# Patient Record
Sex: FEMALE | Race: WHITE | HISPANIC OR LATINO | Employment: OTHER | URBAN - METROPOLITAN AREA
[De-identification: names, ages, dates, MRNs, and addresses within clinical notes are randomized per-mention and may not be internally consistent; named-entity substitution may affect disease eponyms.]

---

## 2017-01-09 DIAGNOSIS — K21.00 GASTROESOPHAGEAL REFLUX DISEASE WITH ESOPHAGITIS: ICD-10-CM

## 2017-01-09 RX ORDER — OMEPRAZOLE 20 MG/1
CAPSULE, DELAYED RELEASE ORAL
Qty: 90 CAPSULE | Refills: 0 | Status: SHIPPED | OUTPATIENT
Start: 2017-01-09 | End: 2017-05-07 | Stop reason: SDUPTHER

## 2017-01-10 ENCOUNTER — LAB VISIT (OUTPATIENT)
Dept: LAB | Facility: HOSPITAL | Age: 67
End: 2017-01-10
Attending: INTERNAL MEDICINE
Payer: MEDICARE

## 2017-01-10 DIAGNOSIS — M10.9 INTERVAL GOUT: ICD-10-CM

## 2017-01-10 LAB
ANION GAP SERPL CALC-SCNC: 8 MMOL/L
BUN SERPL-MCNC: 16 MG/DL
CALCIUM SERPL-MCNC: 9.6 MG/DL
CHLORIDE SERPL-SCNC: 107 MMOL/L
CO2 SERPL-SCNC: 25 MMOL/L
CREAT SERPL-MCNC: 0.9 MG/DL
EST. GFR  (AFRICAN AMERICAN): >60 ML/MIN/1.73 M^2
EST. GFR  (NON AFRICAN AMERICAN): >60 ML/MIN/1.73 M^2
GLUCOSE SERPL-MCNC: 135 MG/DL
POTASSIUM SERPL-SCNC: 4.4 MMOL/L
SODIUM SERPL-SCNC: 140 MMOL/L
URATE SERPL-MCNC: 8.9 MG/DL

## 2017-01-10 PROCEDURE — 80048 BASIC METABOLIC PNL TOTAL CA: CPT

## 2017-01-10 PROCEDURE — 84550 ASSAY OF BLOOD/URIC ACID: CPT

## 2017-01-10 PROCEDURE — 36415 COLL VENOUS BLD VENIPUNCTURE: CPT | Mod: PO

## 2017-03-03 ENCOUNTER — OFFICE VISIT (OUTPATIENT)
Dept: INTERNAL MEDICINE | Facility: CLINIC | Age: 67
End: 2017-03-03
Payer: MEDICARE

## 2017-03-03 ENCOUNTER — LAB VISIT (OUTPATIENT)
Dept: LAB | Facility: HOSPITAL | Age: 67
End: 2017-03-03
Attending: INTERNAL MEDICINE
Payer: MEDICARE

## 2017-03-03 ENCOUNTER — TELEPHONE (OUTPATIENT)
Dept: INTERNAL MEDICINE | Facility: CLINIC | Age: 67
End: 2017-03-03

## 2017-03-03 VITALS
HEIGHT: 59 IN | WEIGHT: 170.88 LBS | BODY MASS INDEX: 34.45 KG/M2 | TEMPERATURE: 98 F | DIASTOLIC BLOOD PRESSURE: 81 MMHG | OXYGEN SATURATION: 99 % | SYSTOLIC BLOOD PRESSURE: 139 MMHG | HEART RATE: 66 BPM

## 2017-03-03 DIAGNOSIS — Z78.0 POSTMENOPAUSAL: ICD-10-CM

## 2017-03-03 DIAGNOSIS — R20.2 PARESTHESIA OF LEFT ARM: ICD-10-CM

## 2017-03-03 DIAGNOSIS — Z51.81 ENCOUNTER FOR MONITORING LONG-TERM PROTON PUMP INHIBITOR THERAPY: ICD-10-CM

## 2017-03-03 DIAGNOSIS — Z79.899 ENCOUNTER FOR MONITORING LONG-TERM PROTON PUMP INHIBITOR THERAPY: ICD-10-CM

## 2017-03-03 DIAGNOSIS — E11.9 TYPE 2 DIABETES MELLITUS WITHOUT COMPLICATION, WITHOUT LONG-TERM CURRENT USE OF INSULIN: Primary | ICD-10-CM

## 2017-03-03 DIAGNOSIS — I10 ESSENTIAL HYPERTENSION: ICD-10-CM

## 2017-03-03 DIAGNOSIS — M10.9 INTERVAL GOUT: ICD-10-CM

## 2017-03-03 DIAGNOSIS — E11.9 TYPE 2 DIABETES MELLITUS WITHOUT COMPLICATION, WITHOUT LONG-TERM CURRENT USE OF INSULIN: ICD-10-CM

## 2017-03-03 LAB
25(OH)D3+25(OH)D2 SERPL-MCNC: 40 NG/ML
ALBUMIN SERPL BCP-MCNC: 4 G/DL
ALP SERPL-CCNC: 84 U/L
ALT SERPL W/O P-5'-P-CCNC: 45 U/L
ANION GAP SERPL CALC-SCNC: 8 MMOL/L
AST SERPL-CCNC: 44 U/L
BILIRUB SERPL-MCNC: 0.4 MG/DL
BUN SERPL-MCNC: 18 MG/DL
CALCIUM SERPL-MCNC: 10.6 MG/DL
CHLORIDE SERPL-SCNC: 106 MMOL/L
CO2 SERPL-SCNC: 26 MMOL/L
CREAT SERPL-MCNC: 0.9 MG/DL
EST. GFR  (AFRICAN AMERICAN): >60 ML/MIN/1.73 M^2
EST. GFR  (NON AFRICAN AMERICAN): >60 ML/MIN/1.73 M^2
GLUCOSE SERPL-MCNC: 149 MG/DL
POTASSIUM SERPL-SCNC: 5 MMOL/L
PROT SERPL-MCNC: 8.2 G/DL
SODIUM SERPL-SCNC: 140 MMOL/L
TSH SERPL DL<=0.005 MIU/L-ACNC: 1.28 UIU/ML
VIT B12 SERPL-MCNC: 609 PG/ML

## 2017-03-03 PROCEDURE — 4010F ACE/ARB THERAPY RXD/TAKEN: CPT | Mod: S$GLB,,, | Performed by: INTERNAL MEDICINE

## 2017-03-03 PROCEDURE — 99214 OFFICE O/P EST MOD 30 MIN: CPT | Mod: S$GLB,,, | Performed by: INTERNAL MEDICINE

## 2017-03-03 PROCEDURE — 2022F DILAT RTA XM EVC RTNOPTHY: CPT | Mod: S$GLB,,, | Performed by: INTERNAL MEDICINE

## 2017-03-03 PROCEDURE — 84443 ASSAY THYROID STIM HORMONE: CPT

## 2017-03-03 PROCEDURE — 36415 COLL VENOUS BLD VENIPUNCTURE: CPT

## 2017-03-03 PROCEDURE — 3044F HG A1C LEVEL LT 7.0%: CPT | Mod: S$GLB,,, | Performed by: INTERNAL MEDICINE

## 2017-03-03 PROCEDURE — 82306 VITAMIN D 25 HYDROXY: CPT

## 2017-03-03 PROCEDURE — 1160F RVW MEDS BY RX/DR IN RCRD: CPT | Mod: S$GLB,,, | Performed by: INTERNAL MEDICINE

## 2017-03-03 PROCEDURE — 99999 PR PBB SHADOW E&M-EST. PATIENT-LVL IV: CPT | Mod: PBBFAC,,, | Performed by: INTERNAL MEDICINE

## 2017-03-03 PROCEDURE — 3079F DIAST BP 80-89 MM HG: CPT | Mod: S$GLB,,, | Performed by: INTERNAL MEDICINE

## 2017-03-03 PROCEDURE — 1159F MED LIST DOCD IN RCRD: CPT | Mod: S$GLB,,, | Performed by: INTERNAL MEDICINE

## 2017-03-03 PROCEDURE — 3075F SYST BP GE 130 - 139MM HG: CPT | Mod: S$GLB,,, | Performed by: INTERNAL MEDICINE

## 2017-03-03 PROCEDURE — 1126F AMNT PAIN NOTED NONE PRSNT: CPT | Mod: S$GLB,,, | Performed by: INTERNAL MEDICINE

## 2017-03-03 PROCEDURE — 80053 COMPREHEN METABOLIC PANEL: CPT

## 2017-03-03 PROCEDURE — 1157F ADVNC CARE PLAN IN RCRD: CPT | Mod: S$GLB,,, | Performed by: INTERNAL MEDICINE

## 2017-03-03 PROCEDURE — 83036 HEMOGLOBIN GLYCOSYLATED A1C: CPT

## 2017-03-03 PROCEDURE — 82607 VITAMIN B-12: CPT

## 2017-03-03 RX ORDER — METFORMIN HYDROCHLORIDE 500 MG/1
TABLET ORAL
Qty: 270 TABLET | Refills: 3 | Status: SHIPPED | OUTPATIENT
Start: 2017-03-03 | End: 2018-02-25 | Stop reason: SDUPTHER

## 2017-03-03 RX ORDER — LOSARTAN POTASSIUM 50 MG/1
TABLET ORAL
Qty: 90 TABLET | Refills: 3 | Status: SHIPPED | OUTPATIENT
Start: 2017-03-03 | End: 2018-03-14 | Stop reason: SDUPTHER

## 2017-03-03 NOTE — MR AVS SNAPSHOT
[facility]  [address]  [place] [address]  Phone: [phone]  Fax: [phone]                  Tammi Farris   3/3/2017 9:20 AM   Office Visit    Descripción:  Female : 1950   Personal Médico:  Ariel Mullins MD   Departamento:  [facility]           Razón de la celia     Numbness           Diagnósticos de Esta Visita        Comentarios    Type 2 diabetes mellitus without complication, without long-term current use of insulin    -  Primario     Essential hypertension         Interval gout         Postmenopausal         Paresthesia of left arm         Encounter for monitoring long-term proton pump inhibitor therapy                Lista de tareas           Citas próximas        Personal Médico Departamento Tfno del dpto    3/3/2017 10:40 AM LAB, APPOINTMENT [facility] [phone]    2017 10:00 AM Ariel Mullins MD [facility] - Internal Medicine [phone]      Metas (5 Years of Data)     Ninguna      Follow-Up and Disposition     Return in about 4 months (around 7/3/2017), or earlier if symptoms worsen or fail to improve.      Recetas para recoger        Disp Refills Start End    metformin (GLUCOPHAGE) 500 MG tablet 270 tablet 3 3/3/2017     Take 2 tabs (1000 mg)  in am and 500 mg ( 1 tab) in pm with food    [facility] [address] AT [facility] No. de tlfo: #: [phone]       losartan (COZAAR) 50 MG tablet 90 tablet 3 3/3/2017     TAKE 1 TABLET(25 MG) BY MOUTH EVERY DAY - for blood pressure    [facility]: [facility] [address] AT [facility] No. de tlfo: #: [phone]       blood sugar diagnostic (CONTOUR NEXT STRIPS) Strp 100 strip 3 3/3/2017     Use to check blood sugar daily    [facility]: [facility] [address] AT [address] No. de tlfo: #:  [phone]       Notas para la farmacia: **Patient requests 90 days supply**      [facility]     [facility] - Asistencia 24/7  Enfermeras registradas de [facility] pueden ayudarle a reservar abdirahman celia, proveer educación para la nieves, asesoría clínica, y otros servicios de asesoramiento.   Llame para benjamin servicio gratuito a [phone].             Medicamentos           Mensaje sobre Medicamentos     Verificar los cambios y / o adiciones a burger régimen de medicación son los mismos que discutir con burger médico. Si cualquiera de estos cambios o adiciones son incorrectos, por favor notifique a burger proveedor de atención médica.        CAMBIAR la forma en que está tomando estos medicamentos     Start Taking Instead of    losartan (COZAAR) 50 MG tablet losartan (COZAAR) 25 MG tablet    Dosage:  TAKE 1 TABLET(25 MG) BY MOUTH EVERY DAY - for blood pressure Dosage:  TAKE 1 TABLET(25 MG) BY MOUTH EVERY DAY    Reason for Change:  Reorder     blood sugar diagnostic (CONTOUR NEXT STRIPS) Strp CONTOUR NEXT STRIPS Strp    Dosage:  Use to check blood sugar daily Dosage:  USE AS DIRECTED BY DOCTOR    Reason for Change:  Reorder            Verifique que la siguiente lista de medicamentos es abdirahman representación exacta de los medicamentos que está tomando actualmente. Si no hay ningunos reportados, la lista puede estar en alex. Si no es correcta, por favor póngase en contacto con burger proveedor de atención médica. Lleve esta lista con usted en walker de emergencia.           Medicamentos Actuales     bisoprolol-hydrochlorothiazide (ZIAC) 10-6.25 mg per tablet Take 1 tablet by mouth once daily.    blood sugar diagnostic (CONTOUR NEXT STRIPS) Strp Use to check blood sugar daily    cholestyramine-aspartame (CHOLESTYRAMINE LIGHT) 4 gram PwPk Take 1 packet (4 g total) by mouth 2 (two) times daily. Give other medications an hour before or four hours after this one.    ciclopirox (LOPROX) 0.77 % Crea Apply  "topically to affected area(s) bid.    clobetasol (TEMOVATE) 0.05 % cream APPLY TO EXTERNAL GENITALIA NIGHTLY FOR 4 WEEKS THEN EVERY OTHER DAY FOR THE NEXT 2 WEEKS    cyanocobalamin, vitamin B-12, 2,000 mcg Tab Take 2,000 mcg by mouth once daily.    lancets Misc Use with Contour Next. Test once daily.    losartan (COZAAR) 50 MG tablet TAKE 1 TABLET(25 MG) BY MOUTH EVERY DAY - for blood pressure    meloxicam (MOBIC) 7.5 MG tablet Take 1 tablet (7.5 mg total) by mouth once daily.    metformin (GLUCOPHAGE) 500 MG tablet Take 2 tabs (1000 mg)  in am and 500 mg ( 1 tab) in pm with food    omeprazole (PRILOSEC) 20 MG capsule TAKE ONE CAPSULE BY MOUTH DAILY    peg 3350-electrolytes-vit C (MOVIPREP) 100-7.5-2.691 gram PwPk Take 1 packet by mouth as directed.    allopurinol (ZYLOPRIM) 100 MG tablet Take 1 tablet (100 mg total) by mouth once daily. To help prevent gout. Wait until current flare is gone, then start this medication.    colchicine 0.6 mg tablet Take 1 tablet (0.6 mg total) by mouth once daily. Start on this medication first, to help prevent gout flares.           Información de referencia clínica           Sylvia signos vitales alton     PS Pulso Temperatura [place] Peso SpO2    139/81 (BP Location: Right arm, Patient Position: Sitting) 66 98 °F (36.7 °C) (Oral) 4' 11" (1.499 m) 77.5 kg (170 lb 13.7 oz) 99%    BMI (IM)                   34.51 kg/m2           Blood Pressure          Most Recent Value    BP  139/81      Alergias     A partir del:  3/3/2017        Iodine And Iodide Containing Products    Benadryl [Diphenhydramine Hcl]    Darvon [Propoxyphene]    Shellfish Containing Products    Lisinopril    Propoxyphene Hcl    Tizanidine      Vacunas     Administradas en la fecha de la visita:  3/3/2017        None      Orders Placed During Today's Visit     Exámenes/Procedimientos futuros Se espera el Vence    Comprehensive metabolic panel  3/3/2017 3/3/2018    DXA Bone Density Spine And Hip_Axial Skeleton  3/3/2017 " 6/1/2017    Hemoglobin A1c  3/3/2017 3/3/2018    TSH  3/3/2017 (Approximate) 3/3/2018    Vitamin B12  3/3/2017 5/2/2018    Vitamin D  3/3/2017 (Approximate) 5/2/2018    X-Ray Cervical Spine AP And Lateral  3/3/2017 6/1/2017      Registrarse para [facility]     La activación de burger cuenta [facility] es tan fácil chip 1-2-3!    1) Ir a [email or web address], seleccione Registrarse Ahora, meter el código de activación y burger fecha de nacimiento, y seleccione Próximo.    [id number]  Expires: 4/17/2017 10:35 AM      2) Crear un nombre de usuario y contraseña para usar cuando se visita [facility] en el futuro y selecciona abdirahman pregunta de seguridad en walker de que pierda burger contraseña y seleccione Próximo.    3) Introduzca burger dirección de correo electrónico y jenaro cl en Registrarse!    Información Adicional  Si tiene alguna pregunta, por favor, e-mail [email or web address] o llame al [phone] para hablar con nuestro personal. Recuerde, [facility] no debe ser usada para necesidades urgentes. En walker de emergencia médica, lljennifer al [phone].        Instrucciones      To help prevent gout: take allopurinol WITH colchicine once a day. You will need to take the colchicine along with the allopurinol for 2-3 months, then if you are not having gout flares you can stop the cholchicine and just continue the allopurinol.      Gout Diet  Gout is a painful condition caused by an excess of uric acid, a waste product made by the body. Uric acid forms crystals that collect in the joints. The immune response to these crystals brings on symptoms of joint pain and swelling. This is called a gout attack. Often, medications and diet changes are combined to manage gout. Below are some guidelines for changing your diet to help you manage gout and prevent attacks. Your health care provider will help you determine the best eating plan for you.     Eating to manage gout  Weight loss for those who are overweight may help reduce gout attacks.  Eat less of  these foods  Eating too many foods containing purines may raise the levels of uric acid in your body. This raises your risk for a gout attack. Try to limit these foods and drinks:  · Alcohol, such as beer and red wine. You may be told to avoid alcohol completely.  · Soft drinks that contain sugar or high fructose corn syrup  · Certain fish, including anchovies, sardines, fish eggs, and herring  · Shellfish  · Certain meats, such as red meat, hot dogs, luncheon meats, and turkey  · Organ meats, such as liver, kidneys, and sweetbreads  · Legumes, such as dried beans and peas  · Other high fat foods such as gravy, whole milk, and high fat cheeses  · Vegetables such as asparagus, cauliflower, spinach, and mushrooms used to be thought to contribute to an increased risk for a gout attack, but recent studies show that high purine vegetables don't increase the risk for a gout attack.  Eat more of these foods  Other foods may be helpful for people with gout. Add some of these foods to your diet:  · Cherries contain chemicals that may lower uric acid.  · Omega fatty acids. These are found in some fatty fish such as salmon, certain oils (flax, olive, or nut), and nuts themselves. Omega fatty acids may help prevent inflammation due to gout.  · Dairy products that are low-fat or fat-free, such as cheese and yogurt  · Complex carbohydrate foods, including whole grains, brown rice, oats, and beans  · Coffee, in moderation  · Water, approximately 64 ounces per day  Follow-up care  Follow up with your healthcare provider as advised.  When to seek medical advice  Call your healthcare provider right away if any of these occur:  · Return of gout symptoms, usually at night:  · Severe pain, swelling, and heat in a joint, especially the base of the big toe  · Affected joint is hard to move  · Skin of the affected joint is purple or red  · Fever of 100.4°F (38°C) or higher  · Pain that doesn't get better even with prescribed  medicine   Date Last Reviewed: 2016 [employer]. [address] [place] [address], [place] [address]. All rights reserved. This information is not intended as a substitute for professional medical care. Always follow your healthcare professional's instructions.             Language Assistance Services     ATTENTION: Language assistance services are available, free of charge. Please call [phone].      ATENCIÓN: Si habla español, tiene a burger disposición servicios gratuitos de asistencia lingüística. Llame al [phone].     CHÚ Ý: N?u b?n nói Ti?ng Vi?t, có các d?ch v? h? tr? ngôn ng? mi?n phí dành cho b?n. G?i s? [phone].         [facility] cumple con las leyes federales aplicables de derechos civiles y no discrimina por motivos de patricia, color, origen nacional, edad, discapacidad, o sexo.                 Tammi GIBBS Kaleigh   3/3/2017 9:20 AM   Office Visit    Description:  Female : 1950   Provider:  Ariel Mullins MD   Department:  [facility]           Reason for Visit     Numbness           Diagnoses this Visit        Comments    Type 2 diabetes mellitus without complication, without long-term current use of insulin    -  Primary     Essential hypertension         Interval gout         Postmenopausal         Paresthesia of left arm         Encounter for monitoring long-term proton pump inhibitor therapy                To Do List           Future Appointments        Provider Department Dept Phone    3/3/2017 10:40 AM LAB, APPOINTMENT [facility] [phone]    2017 10:00 AM MD [facility] [phone]      Goals     None      Follow-Up and Disposition     Return in about 4 months (around 7/3/2017), or earlier if symptoms worsen or fail to improve.       These Medications        Disp Refills Start End    metformin (GLUCOPHAGE) 500 MG tablet 270 tablet 3  3/3/2017     Take 2 tabs (1000 mg)  in am and 500 mg ( 1 tab) in pm with food    Pharmacy: [facility] [address] #: [phone]       losartan (COZAAR) 50 MG tablet 90 tablet 3 3/3/2017     TAKE 1 TABLET(25 MG) BY MOUTH EVERY DAY - for blood pressure    Pharmacy: [facility] [address] #: [phone]       blood sugar diagnostic (CONTOUR NEXT STRIPS) Strp 100 strip 3 3/3/2017     Use to check blood sugar daily    Pharmacy: [facility] [address] #: [phone]       Notes to Pharmacy: **Patient requests 90 days supply**      [facility]     [facility] - 24/7 Assistance  Registered nurses in the [facility] On Call Center provide clinical advisement, health education, appointment booking, and other advisory services.  Call for this free service at [phone].             Medications           Message regarding Medications     Verify the changes and/or additions to your medication regime listed below are the same as discussed with your clinician today.  If any of these changes or additions are incorrect, please notify your healthcare provider.        CHANGE how you are taking these medications     Start Taking Instead of    losartan (COZAAR) 50 MG tablet losartan (COZAAR) 25 MG tablet    Dosage:  TAKE 1 TABLET(25 MG) BY MOUTH EVERY DAY - for blood pressure Dosage:  TAKE 1 TABLET(25 MG) BY MOUTH EVERY DAY    Reason for Change:  Reorder     blood sugar diagnostic (CONTOUR NEXT STRIPS) Strp CONTOUR NEXT STRIPS Strp    Dosage:  Use to check blood sugar daily Dosage:  USE AS DIRECTED BY DOCTOR    Reason for Change:  Reorder            Verify that the below list of medications is an accurate representation of the medications you are currently taking.  If none reported, the list may be blank. If  "incorrect, please contact your healthcare provider. Carry this list with you in case of emergency.           Current Medications     bisoprolol-hydrochlorothiazide (ZIAC) 10-6.25 mg per tablet Take 1 tablet by mouth once daily.    blood sugar diagnostic (CONTOUR NEXT STRIPS) Strp Use to check blood sugar daily    cholestyramine-aspartame (CHOLESTYRAMINE LIGHT) 4 gram PwPk Take 1 packet (4 g total) by mouth 2 (two) times daily. Give other medications an hour before or four hours after this one.    ciclopirox (LOPROX) 0.77 % Crea Apply topically to affected area(s) bid.    clobetasol (TEMOVATE) 0.05 % cream APPLY TO EXTERNAL GENITALIA NIGHTLY FOR 4 WEEKS THEN EVERY OTHER DAY FOR THE NEXT 2 WEEKS    cyanocobalamin, vitamin B-12, 2,000 mcg Tab Take 2,000 mcg by mouth once daily.    lancets Misc Use with Contour Next. Test once daily.    losartan (COZAAR) 50 MG tablet TAKE 1 TABLET(25 MG) BY MOUTH EVERY DAY - for blood pressure    meloxicam (MOBIC) 7.5 MG tablet Take 1 tablet (7.5 mg total) by mouth once daily.    metformin (GLUCOPHAGE) 500 MG tablet Take 2 tabs (1000 mg)  in am and 500 mg ( 1 tab) in pm with food    omeprazole (PRILOSEC) 20 MG capsule TAKE ONE CAPSULE BY MOUTH DAILY    peg 3350-electrolytes-vit C (MOVIPREP) 100-7.5-2.691 gram PwPk Take 1 packet by mouth as directed.    allopurinol (ZYLOPRIM) 100 MG tablet Take 1 tablet (100 mg total) by mouth once daily. To help prevent gout. Wait until current flare is gone, then start this medication.    colchicine 0.6 mg tablet Take 1 tablet (0.6 mg total) by mouth once daily. Start on this medication first, to help prevent gout flares.           Clinical Reference Information           Your Vitals Were     BP Pulse Temp Height Weight SpO2    139/81 (BP Location: Right arm, Patient Position: Sitting) 66 98 °F (36.7 °C) (Oral) 4' 11" (1.499 m) 77.5 kg (170 lb 13.7 oz) 99%    BMI                   34.51 kg/m2           Blood Pressure          Most Recent Value    BP  " 139/81      Allergies as of 3/3/2017     Iodine And Iodide Containing Products    Benadryl [Diphenhydramine Hcl]    Darvon [Propoxyphene]    Shellfish Containing Products    Lisinopril    Propoxyphene Hcl    Tizanidine      Immunizations Administered on Date of Encounter - 3/3/2017     None      Orders Placed During Today's Visit     Future Labs/Procedures Expected by Expires    Comprehensive metabolic panel  3/3/2017 3/3/2018    DXA Bone Density Spine And Hip_Axial Skeleton  3/3/2017 6/1/2017    Hemoglobin A1c  3/3/2017 3/3/2018    TSH  3/3/2017 (Approximate) 3/3/2018    Vitamin B12  3/3/2017 5/2/2018    Vitamin D  3/3/2017 (Approximate) 5/2/2018    X-Ray Cervical Spine AP And Lateral  3/3/2017 6/1/2017      [facility] Sign-Up     Activating your [facility] account is as easy as 1-2-3!     1) Visit [email or web address], select Sign Up Now, enter this activation code and your date of birth, then select Next.  [email or web address]  Expires: 4/17/2017 10:35 AM      2) Create a username and password to use when you visit [facility] in the future and select a security question in case you lose your password and select Next.    3) Enter your e-mail address and click Sign Up!    Additional Information  If you have questions, please e-mail [email or web address] or call [phone] to talk to our [facility] staff. Remember, [facility] is NOT to be used for urgent needs. For medical emergencies, dial 911.         Instructions      To help prevent gout: take allopurinol WITH colchicine once a day. You will need to take the colchicine along with the allopurinol for 2-3 months, then if you are not having gout flares you can stop the cholchicine and just continue the allopurinol.      Gout Diet  Gout is a painful condition caused by an excess of uric acid, a waste product made by the body. Uric acid forms crystals that collect in the joints. The immune response to these crystals brings on symptoms of joint pain and swelling. This is called a  gout attack. Often, medications and diet changes are combined to manage gout. Below are some guidelines for changing your diet to help you manage gout and prevent attacks. Your health care provider will help you determine the best eating plan for you.     Eating to manage gout  Weight loss for those who are overweight may help reduce gout attacks.  Eat less of these foods  Eating too many foods containing purines may raise the levels of uric acid in your body. This raises your risk for a gout attack. Try to limit these foods and drinks:  · Alcohol, such as beer and red wine. You may be told to avoid alcohol completely.  · Soft drinks that contain sugar or high fructose corn syrup  · Certain fish, including anchovies, sardines, fish eggs, and herring  · Shellfish  · Certain meats, such as red meat, hot dogs, luncheon meats, and turkey  · Organ meats, such as liver, kidneys, and sweetbreads  · Legumes, such as dried beans and peas  · Other high fat foods such as gravy, whole milk, and high fat cheeses  · Vegetables such as asparagus, cauliflower, spinach, and mushrooms used to be thought to contribute to an increased risk for a gout attack, but recent studies show that high purine vegetables don't increase the risk for a gout attack.  Eat more of these foods  Other foods may be helpful for people with gout. Add some of these foods to your diet:  · Cherries contain chemicals that may lower uric acid.  · Omega fatty acids. These are found in some fatty fish such as salmon, certain oils (flax, olive, or nut), and nuts themselves. Omega fatty acids may help prevent inflammation due to gout.  · Dairy products that are low-fat or fat-free, such as cheese and yogurt  · Complex carbohydrate foods, including whole grains, brown rice, oats, and beans  · Coffee, in moderation  · Water, approximately 64 ounces per day  Follow-up care  Follow up with your healthcare provider as advised.  When to seek medical advice  Call your  healthcare provider right away if any of these occur:  · Return of gout symptoms, usually at night:  · Severe pain, swelling, and heat in a joint, especially the base of the big toe  · Affected joint is hard to move  · Skin of the affected joint is purple or red  · Fever of 100.4°F (38°C) or higher  · Pain that doesn't get better even with prescribed medicine   Date Last Reviewed: 1/12/2016  © 8634-0774 [employer]. [address] [place] [address], [place] [address]. All rights reserved. This information is not intended as a substitute for professional medical care. Always follow your healthcare professional's instructions.             Language Assistance Services     ATTENTION: Language assistance services are available, free of charge. Please call [phone].      ATENCIÓN: Si kalie lemus, tiene a burger disposición servicios gratuitos de asistencia lingüística. Llame al [phone].     CHÚ Ý: N?u b?n nói Ti?ng Vi?t, có các d?ch v? h? tr? ngôn ng? mi?n phí dành cho b?n. G?i s? [phone].         [facility] complies with applicable Federal civil rights laws and does not discriminate on the basis of race, color, national origin, age, disability, or sex.

## 2017-03-03 NOTE — PROGRESS NOTES
"Subjective:       Patient ID: Tammi Farris is a 66 y.o. female.    Chief Complaint: Numbness (L arm)    HPI  67 y/o woman with HTN, DM2, GERD, gout here for follow-up visit.  Arrived 25 min late for visit. Primary concern is L arm numbness/paresthesias - new issue.    L arm paresthesias - has been noting tingling, "feeling like something is crawling on my arm" on underside of L arm and forearm. No symptoms in her hand or fingers. Has been having this for about 6 weeks. Intermittent, usually no more than once/day, lasts only a few minutes.  No similar symptoms in either leg or foot.     Gout - started on daily colchicine at last visit and instructed to start allopurinol after acute gout flare resolved -- she took the colchicine for a time then stop. Did not start allopurinol. No flares since last visit.   Uric acid level decreased from 9.1 7/2016 to 8.9 1/10/17.    Depression symptoms - discussed at last visit; doesn't want to discuss in detail today but feels that these have improved somewhat. She is less alienated from the family members she was having difficulty with before.     HTN - switched from lisinopril to losartan. Continued on ziac (bisoprolol-HCTZ 10-6.25mg) once daily. Discussed at that visit that HCTZ can contribute to gout but she preferred to continue on this medication with low-dose HCTZ as this has been working well for her.    DM2 - last A1c 6.9 in 8/22/16. Peak A1c in our records here is 7.7 in 2013.   Foot exam done 7/7/16 with podatry.   Due for eye exam.     GERD - taking omeprazole which controls symptoms well but she does have recurrence if she misses a day. Saw GI for this 8/2016.   B12 level low in 8/2016. Vitamin D level high at last visit - has stopped taking supplement for now.     Postmenopausal - DEXA 1/2013 showed elevated BMD associated with elevated BMI, recommended to repeat this in 4 years. H/o JOSEFA.     Due for colonoscopy - this was scheduled but not done, she has not yet " "rescheduled.  Mammogram done 7/2016, normal.  HCV testing done 7/2016, negative.    Review of Systems   Constitutional: Negative for activity change and fever.   HENT: Negative for congestion and sore throat.    Eyes: Negative for visual disturbance (no recent change).   Respiratory: Negative for cough and shortness of breath.    Cardiovascular: Negative.  Negative for chest pain and leg swelling.   Gastrointestinal: Negative.  Negative for abdominal pain.   Endocrine: Negative.    Genitourinary: Negative.    Musculoskeletal: Negative for arthralgias, back pain, gait problem and joint swelling (improved).   Neurological: Positive for numbness (paresthesias as noted). Negative for tremors, facial asymmetry, weakness, light-headedness and headaches.   Psychiatric/Behavioral: Negative for dysphoric mood (improved). The patient is not nervous/anxious.          Past medical history, surgical history, and family medical history reviewed and updated as appropriate.    Medications and allergies reviewed.     Objective:          Vitals:    03/03/17 0956   BP: 139/81   BP Location: Right arm   Patient Position: Sitting   Pulse: 66   Temp: 98 °F (36.7 °C)   TempSrc: Oral   SpO2: 99%   Weight: 77.5 kg (170 lb 13.7 oz)   Height: 4' 11" (1.499 m)     Body mass index is 34.51 kg/(m^2).  Physical Exam   Constitutional: She is oriented to person, place, and time. She appears well-developed and well-nourished. No distress.   HENT:   Head: Normocephalic and atraumatic.   Nose: Nose normal.   Mouth/Throat: Oropharynx is clear and moist.   Eyes: Conjunctivae and EOM are normal. Pupils are equal, round, and reactive to light.   Neck: Neck supple.   Cardiovascular: Normal rate, regular rhythm, normal heart sounds and intact distal pulses.    Pulmonary/Chest: Effort normal and breath sounds normal. No respiratory distress.   Abdominal: Soft. Bowel sounds are normal. She exhibits no distension. There is no tenderness.   Musculoskeletal: " Normal range of motion. She exhibits no edema or tenderness.   .    Lymphadenopathy:     She has no cervical adenopathy.   Neurological: She is alert and oriented to person, place, and time. No cranial nerve deficit or sensory deficit. Gait normal.   Skin: Skin is warm and dry.   Pink dry scaling rash behind both ears and between breasts on torso. Darker plaque at R radial side of wrist, 5-6mm, no scaling but +ointment over whole area.     Pruritic 1cm raised area with mild erythema posterior R shoulder   Psychiatric: She has a normal mood and affect.   Vitals reviewed.      Lab Results   Component Value Date    WBC 9.21 03/04/2016    HGB 12.3 03/04/2016    HCT 38.0 03/04/2016     03/04/2016    CHOL 192 08/22/2016    TRIG 256 (H) 08/22/2016    HDL 30 (L) 08/22/2016    ALT 28 03/04/2016    AST 32 03/04/2016     01/10/2017    K 4.4 01/10/2017     01/10/2017    CREATININE 0.9 01/10/2017    BUN 16 01/10/2017    CO2 25 01/10/2017    TSH 2.651 03/04/2016    INR 1.0 06/02/2007    HGBA1C 6.9 (H) 08/22/2016       Assessment:       1. Type 2 diabetes mellitus without complication, without long-term current use of insulin    2. Essential hypertension    3. Interval gout    4. Postmenopausal    5. Paresthesia of left arm    6. Encounter for monitoring long-term proton pump inhibitor therapy        Plan:   Tammi was seen today for numbness.    Diagnoses and all orders for this visit:    Type 2 diabetes mellitus without complication, without long-term current use of insulin - at goal on last check, will recheck A1c q6 months while <7. Continue metformin. Due for eye exam.  -     Hemoglobin A1c; Future  -     Comprehensive metabolic panel; Future  -     Vitamin D; Future  -     Vitamin B12; Future  -     metformin (GLUCOPHAGE) 500 MG tablet; Take 2 tabs (1000 mg)  in am and 500 mg ( 1 tab) in pm with food  -     blood sugar diagnostic (CONTOUR NEXT STRIPS) Strp; Use to check blood sugar daily    Essential  hypertension - manual recheck consistent with initial vitals. Goal <130/80. Will increase losartan today. Recommended she check BP at home, bring log to next visit.  Also bring all meds to next visit for full review.  -     losartan (COZAAR) 50 MG tablet; TAKE 1 TABLET(25 MG) BY MOUTH EVERY DAY - for blood pressure    Interval gout - again discussed role of allopurinol + colchicine for prophylaxis, recommended that she restart these. Plan to recheck uric acid before next visit.     Postmenopausal - due for DEXA  -     DXA Bone Density Spine And Hip_Axial Skeleton; Future    Paresthesia of left arm - mild symptoms, not clearly dermatomal but approximately in C8 dermatome without hand involvement. Will check c-spine xray to evaluate for possible areas of nerve compression; will also recheck B12 and TSH. Recommended work on regular activity, weight loss as well.  -     Comprehensive metabolic panel; Future  -     Vitamin B12; Future  -     TSH; Future  -     X-Ray Cervical Spine AP And Lateral; Future    Encounter for monitoring long-term proton pump inhibitor therapy -   -     Vitamin D; Future    Health maintenance reviewed with patient.   Recommended she schedule eye exam at checkout today.  Will schedule for DEXA.  A1c today.   Will give # for her to call to schedule c-scope; she says she will do this in a month when she returns after traveling to [place].    Return in about 4 months (around 7/3/2017), or earlier if symptoms worsen or fail to improve.    Ariel Mullins MD  Internal Medicine  [facility]  3/3/2017

## 2017-03-03 NOTE — PATIENT INSTRUCTIONS
To help prevent gout: take allopurinol WITH colchicine once a day. You will need to take the colchicine along with the allopurinol for 2-3 months, then if you are not having gout flares you can stop the cholchicine and just continue the allopurinol.      Gout Diet  Gout is a painful condition caused by an excess of uric acid, a waste product made by the body. Uric acid forms crystals that collect in the joints. The immune response to these crystals brings on symptoms of joint pain and swelling. This is called a gout attack. Often, medications and diet changes are combined to manage gout. Below are some guidelines for changing your diet to help you manage gout and prevent attacks. Your health care provider will help you determine the best eating plan for you.     Eating to manage gout  Weight loss for those who are overweight may help reduce gout attacks.  Eat less of these foods  Eating too many foods containing purines may raise the levels of uric acid in your body. This raises your risk for a gout attack. Try to limit these foods and drinks:  · Alcohol, such as beer and red wine. You may be told to avoid alcohol completely.  · Soft drinks that contain sugar or high fructose corn syrup  · Certain fish, including anchovies, sardines, fish eggs, and herring  · Shellfish  · Certain meats, such as red meat, hot dogs, luncheon meats, and turkey  · Organ meats, such as liver, kidneys, and sweetbreads  · Legumes, such as dried beans and peas  · Other high fat foods such as gravy, whole milk, and high fat cheeses  · Vegetables such as asparagus, cauliflower, spinach, and mushrooms used to be thought to contribute to an increased risk for a gout attack, but recent studies show that high purine vegetables don't increase the risk for a gout attack.  Eat more of these foods  Other foods may be helpful for people with gout. Add some of these foods to your diet:  · Cherries contain chemicals that may lower uric acid.  · Omega  fatty acids. These are found in some fatty fish such as salmon, certain oils (flax, olive, or nut), and nuts themselves. Omega fatty acids may help prevent inflammation due to gout.  · Dairy products that are low-fat or fat-free, such as cheese and yogurt  · Complex carbohydrate foods, including whole grains, brown rice, oats, and beans  · Coffee, in moderation  · Water, approximately 64 ounces per day  Follow-up care  Follow up with your healthcare provider as advised.  When to seek medical advice  Call your healthcare provider right away if any of these occur:  · Return of gout symptoms, usually at night:  · Severe pain, swelling, and heat in a joint, especially the base of the big toe  · Affected joint is hard to move  · Skin of the affected joint is purple or red  · Fever of 100.4°F (38°C) or higher  · Pain that doesn't get better even with prescribed medicine   Date Last Reviewed: 1/12/2016  © 6711-0150 [employer]. [address] [place] [address], [place] [address]. All rights reserved. This information is not intended as a substitute for professional medical care. Always follow your healthcare professional's instructions.

## 2017-03-04 LAB
ESTIMATED AVG GLUCOSE: 146 MG/DL
HBA1C MFR BLD HPLC: 6.7 %

## 2017-03-07 ENCOUNTER — TELEPHONE (OUTPATIENT)
Dept: INTERNAL MEDICINE | Facility: CLINIC | Age: 67
End: 2017-03-07

## 2017-03-07 NOTE — TELEPHONE ENCOUNTER
----- Message from Alondra Elizabeth sent at 3/7/2017  9:28 AM CST -----  Contact: pharmacy/trudy/[phone]  Pharmacy called in regards to getting clarification on the pt Rx for losartan (COZAAR) 50 MG tablet.        [facility]  Please advise

## 2017-03-08 ENCOUNTER — HOSPITAL ENCOUNTER (OUTPATIENT)
Dept: RADIOLOGY | Facility: HOSPITAL | Age: 67
Discharge: HOME | End: 2017-03-08
Attending: INTERNAL MEDICINE
Payer: MEDICARE

## 2017-03-08 DIAGNOSIS — R10.30 LOWER ABDOMINAL PAIN: ICD-10-CM

## 2017-03-08 DIAGNOSIS — R20.2 PARESTHESIA OF LEFT ARM: ICD-10-CM

## 2017-03-08 PROCEDURE — 76856 US EXAM PELVIC COMPLETE: CPT | Mod: 26,,, | Performed by: RADIOLOGY

## 2017-03-08 PROCEDURE — 76830 TRANSVAGINAL US NON-OB: CPT | Mod: 26,,, | Performed by: RADIOLOGY

## 2017-03-08 PROCEDURE — 72040 X-RAY EXAM NECK SPINE 2-3 VW: CPT | Mod: TC

## 2017-03-08 PROCEDURE — 72040 X-RAY EXAM NECK SPINE 2-3 VW: CPT | Mod: 26,,, | Performed by: RADIOLOGY

## 2017-03-08 PROCEDURE — 76856 US EXAM PELVIC COMPLETE: CPT | Mod: TC

## 2017-03-09 ENCOUNTER — TELEPHONE (OUTPATIENT)
Dept: INTERNAL MEDICINE | Facility: CLINIC | Age: 67
End: 2017-03-09

## 2017-03-09 ENCOUNTER — HOSPITAL ENCOUNTER (OUTPATIENT)
Dept: RADIOLOGY | Facility: CLINIC | Age: 67
Discharge: HOME | End: 2017-03-09
Attending: INTERNAL MEDICINE
Payer: MEDICARE

## 2017-03-09 DIAGNOSIS — Z78.0 POSTMENOPAUSAL: ICD-10-CM

## 2017-03-09 DIAGNOSIS — R20.2 PARESTHESIA OF LEFT ARM: Primary | ICD-10-CM

## 2017-03-09 DIAGNOSIS — M50.30 DEGENERATIVE DISC DISEASE, CERVICAL: ICD-10-CM

## 2017-03-09 PROCEDURE — 77080 DXA BONE DENSITY AXIAL: CPT | Mod: TC,PO

## 2017-03-09 PROCEDURE — 77080 DXA BONE DENSITY AXIAL: CPT | Mod: 26,,, | Performed by: INTERNAL MEDICINE

## 2017-03-09 NOTE — TELEPHONE ENCOUNTER
Pt informed of results and verbalized understanding, Pt will call to schedule appt with the back and spine clinic.     Xray of cervical spine in neck shows degenerative disc changes and arthritis, which can cause compression of nerves. I would recommend that she see the back & spine clinic given the new symptoms she is having in her left arm.   Referral placed. Please contact patient to inform & schedule   Ariel Mullins MD

## 2017-03-09 NOTE — TELEPHONE ENCOUNTER
Pt informed and verbalized understanding and will call to get scheduled with the back and spine clinic.

## 2017-03-09 NOTE — PROGRESS NOTES
Xray of cervical spine in neck shows degenerative disc changes and arthritis, which can cause compression of nerves. I would recommend that she see the back & spine clinic given the new symptoms she is having in her left arm.   Referral placed. Please contact patient to inform & schedule  Ariel Mullins MD

## 2017-03-22 ENCOUNTER — TELEPHONE (OUTPATIENT)
Dept: INTERNAL MEDICINE | Facility: CLINIC | Age: 67
End: 2017-03-22

## 2017-03-22 NOTE — PROGRESS NOTES
On Bone density scan, patient does have somewhat higher bone density than average, but not worrisome. We will recheck this in 4 years.  Please call patient to inform  Ariel Mullins MD

## 2017-03-22 NOTE — TELEPHONE ENCOUNTER
Called pt left Vm requesting return call:      On Bone density scan, patient does have somewhat higher bone density than average, but not worrisome. We will recheck this in 4 years.   Please call patient to inform   Ariel Mullins MD

## 2017-05-05 ENCOUNTER — TELEPHONE (OUTPATIENT)
Dept: OPTOMETRY | Facility: CLINIC | Age: 67
End: 2017-05-05

## 2017-05-07 DIAGNOSIS — K21.00 GASTROESOPHAGEAL REFLUX DISEASE WITH ESOPHAGITIS: ICD-10-CM

## 2017-05-08 RX ORDER — OMEPRAZOLE 20 MG/1
CAPSULE, DELAYED RELEASE ORAL
Qty: 90 CAPSULE | Refills: 0 | Status: SHIPPED | OUTPATIENT
Start: 2017-05-08 | End: 2017-07-29 | Stop reason: SDUPTHER

## 2017-05-19 ENCOUNTER — TELEPHONE (OUTPATIENT)
Dept: INTERNAL MEDICINE | Facility: CLINIC | Age: 67
End: 2017-05-19

## 2017-05-19 NOTE — TELEPHONE ENCOUNTER
Spoke with Mora @ [facility] pharmacy. She states that the insurance company is not covering pt refills that are requesting to be filled there because it shows that pt med was filled at another location. However, on my end it is showing that it was in fact sent and confirmed by the [facility]'s on file ( [facility] location. ) there seems to be some kind of mix up. Pharmacy mora will call insurance company and the give us a call back to straighten out issue.

## 2017-05-19 NOTE — TELEPHONE ENCOUNTER
Rx for 90 day supply of test strips + 3 refills was sent in 3/2017, so patient should have 3 refills remaining at pharmacy. Please contact patient/pharmacy to verify.

## 2017-05-19 NOTE — TELEPHONE ENCOUNTER
----- Message from Adeleantonino Clark sent at 5/19/2017  2:12 PM CDT -----  Contact: Ashia with [facility] [phone]  Ashia called requesting to speak to you concerning rx on file. Insurance is showing Pt filled at another location in March.  Do you want to cancel refills

## 2017-05-22 NOTE — TELEPHONE ENCOUNTER
Automated refill request rec'd for DM test strips. Rx for this for 90-day supply with 3 refills was sent in in 3/2017; she should not yet be due. Call pharmacy/patient to clarify.

## 2017-05-23 RX ORDER — BLOOD SUGAR DIAGNOSTIC
STRIP MISCELLANEOUS
Qty: 100 STRIP | Refills: 0 | Status: SHIPPED | OUTPATIENT
Start: 2017-05-23 | End: 2017-06-20 | Stop reason: SDUPTHER

## 2017-07-07 ENCOUNTER — LAB VISIT (OUTPATIENT)
Dept: LAB | Facility: HOSPITAL | Age: 67
End: 2017-07-07
Attending: INTERNAL MEDICINE
Payer: MEDICARE

## 2017-07-07 ENCOUNTER — OFFICE VISIT (OUTPATIENT)
Dept: INTERNAL MEDICINE | Facility: CLINIC | Age: 67
End: 2017-07-07
Payer: MEDICARE

## 2017-07-07 VITALS
HEART RATE: 69 BPM | HEIGHT: 59 IN | SYSTOLIC BLOOD PRESSURE: 136 MMHG | TEMPERATURE: 98 F | WEIGHT: 174.19 LBS | OXYGEN SATURATION: 98 % | DIASTOLIC BLOOD PRESSURE: 74 MMHG | BODY MASS INDEX: 35.12 KG/M2

## 2017-07-07 DIAGNOSIS — E11.9 TYPE 2 DIABETES MELLITUS WITHOUT COMPLICATION, WITHOUT LONG-TERM CURRENT USE OF INSULIN: Primary | ICD-10-CM

## 2017-07-07 DIAGNOSIS — E83.52 SERUM CALCIUM ELEVATED: ICD-10-CM

## 2017-07-07 DIAGNOSIS — R79.89 ELEVATED LFTS: ICD-10-CM

## 2017-07-07 DIAGNOSIS — K21.9 GASTROESOPHAGEAL REFLUX DISEASE, ESOPHAGITIS PRESENCE NOT SPECIFIED: ICD-10-CM

## 2017-07-07 DIAGNOSIS — E78.5 HYPERLIPIDEMIA, UNSPECIFIED HYPERLIPIDEMIA TYPE: ICD-10-CM

## 2017-07-07 DIAGNOSIS — I10 ESSENTIAL HYPERTENSION: ICD-10-CM

## 2017-07-07 DIAGNOSIS — M10.9 INTERVAL GOUT: ICD-10-CM

## 2017-07-07 DIAGNOSIS — N93.0 PCB (POST COITAL BLEEDING): ICD-10-CM

## 2017-07-07 LAB
ALBUMIN SERPL BCP-MCNC: 3.9 G/DL
ALP SERPL-CCNC: 71 U/L
ALT SERPL W/O P-5'-P-CCNC: 47 U/L
ANION GAP SERPL CALC-SCNC: 8 MMOL/L
AST SERPL-CCNC: 53 U/L
BILIRUB SERPL-MCNC: 0.5 MG/DL
BUN SERPL-MCNC: 17 MG/DL
CALCIUM SERPL-MCNC: 9.6 MG/DL
CHLORIDE SERPL-SCNC: 105 MMOL/L
CHOLEST/HDLC SERPL: 6.5 {RATIO}
CO2 SERPL-SCNC: 26 MMOL/L
CREAT SERPL-MCNC: 0.9 MG/DL
CREAT UR-MCNC: 79 MG/DL
EST. GFR  (AFRICAN AMERICAN): >60 ML/MIN/1.73 M^2
EST. GFR  (NON AFRICAN AMERICAN): >60 ML/MIN/1.73 M^2
GLUCOSE SERPL-MCNC: 119 MG/DL
HDL/CHOLESTEROL RATIO: 15.3 %
HDLC SERPL-MCNC: 196 MG/DL
HDLC SERPL-MCNC: 30 MG/DL
LDLC SERPL CALC-MCNC: 123.2 MG/DL
MICROALBUMIN UR DL<=1MG/L-MCNC: 7 UG/ML
MICROALBUMIN/CREATININE RATIO: 8.9 UG/MG
NONHDLC SERPL-MCNC: 166 MG/DL
POTASSIUM SERPL-SCNC: 4.5 MMOL/L
PROT SERPL-MCNC: 7.9 G/DL
SODIUM SERPL-SCNC: 139 MMOL/L
TRIGL SERPL-MCNC: 214 MG/DL
URATE SERPL-MCNC: 9.1 MG/DL

## 2017-07-07 PROCEDURE — 82570 ASSAY OF URINE CREATININE: CPT

## 2017-07-07 PROCEDURE — 99214 OFFICE O/P EST MOD 30 MIN: CPT | Mod: S$GLB,,, | Performed by: INTERNAL MEDICINE

## 2017-07-07 PROCEDURE — 84550 ASSAY OF BLOOD/URIC ACID: CPT

## 2017-07-07 PROCEDURE — 80061 LIPID PANEL: CPT

## 2017-07-07 PROCEDURE — 1126F AMNT PAIN NOTED NONE PRSNT: CPT | Mod: S$GLB,,, | Performed by: INTERNAL MEDICINE

## 2017-07-07 PROCEDURE — 3044F HG A1C LEVEL LT 7.0%: CPT | Mod: S$GLB,,, | Performed by: INTERNAL MEDICINE

## 2017-07-07 PROCEDURE — 1159F MED LIST DOCD IN RCRD: CPT | Mod: S$GLB,,, | Performed by: INTERNAL MEDICINE

## 2017-07-07 PROCEDURE — 99999 PR PBB SHADOW E&M-EST. PATIENT-LVL IV: CPT | Mod: PBBFAC,,, | Performed by: INTERNAL MEDICINE

## 2017-07-07 PROCEDURE — 80053 COMPREHEN METABOLIC PANEL: CPT

## 2017-07-07 PROCEDURE — 4010F ACE/ARB THERAPY RXD/TAKEN: CPT | Mod: S$GLB,,, | Performed by: INTERNAL MEDICINE

## 2017-07-07 PROCEDURE — 99499 UNLISTED E&M SERVICE: CPT | Mod: ,,, | Performed by: INTERNAL MEDICINE

## 2017-07-07 PROCEDURE — 99499 UNLISTED E&M SERVICE: CPT | Mod: S$GLB,,, | Performed by: INTERNAL MEDICINE

## 2017-07-07 PROCEDURE — 36415 COLL VENOUS BLD VENIPUNCTURE: CPT

## 2017-07-07 NOTE — PROGRESS NOTES
Subjective:       Patient ID: Tammi Farris is a 66 y.o. female.    Chief Complaint: Dizziness and Dental Pain    HPI  65 y/o woman with HTN, DM2, GERD, gout here for follow-up visit.  Doing Propel study.    Dizziness - notes feeling mild dizziness (not vertigo) if standing up too quickly.     Tooth pain - needs crown, can't afford. Dentist prescribed penicillin.    Had 1 day of post-coital bleeding (not sure if external vs internal, thinks external), just noted on toilet paper the next day. No clots. No pain.  Did previously see Dr Guzman for vulvar dermatitis / lichen planus, was prescribed clobetasol which helped. Due for follow up with Gyn.     L arm paresthesias, DDD of C-spine - referred to Back & Spine clinic after last visit  Hasn't made this appt, but symptoms have resolved. Doing more exercise.     Gout - recommended to restart allopurinol + colchicine at last visit. Not taking these.     HTN - was previously switched from ACEi to losartan, which was titrated up on last visit. Continued on ziac (bisoprolol-HCTZ 10-6.25mg) once daily.    DM2 - last A1c 6.7 in 3/2017. Peak A1c in our records here is 7.7 in 2013.   On metformin 1000mg qAM, 500mg qPM  Hasn't checked regularly, but knows she should check once/day - running out of strips, requests refill on Contour Next strips.  Foot exam done 7/7/16 with podatry.     GERD - taking omeprazole which controls symptoms well but she does have recurrence if she misses a day. Saw GI for this 8/2016.   B12 level low in 8/2016. Vit D level high on previous check, stopped supplement.     Postmenopausal - DEXA 1/2013 showed elevated BMD associated with elevated BMI, recommended to repeat this in 4 years. H/o JOSEFA.     Due for colonoscopy - this was scheduled but not done, she has not yet rescheduled.  Mammogram done 7/2016, normal.  HCV testing done 7/2016, negative.  Reports having both Hep A & B vaccines in the past.     Review of Systems   Constitutional: Negative for  "activity change and fever.   HENT: Positive for dental problem. Negative for congestion and sore throat.    Eyes: Negative for visual disturbance (no recent change).   Respiratory: Negative for cough and shortness of breath.    Cardiovascular: Negative.  Negative for chest pain and leg swelling.   Gastrointestinal: Negative.  Negative for abdominal pain.   Endocrine: Negative.    Genitourinary: Negative.    Musculoskeletal: Negative for arthralgias, back pain and joint swelling.   Neurological: Positive for light-headedness (as noted). Negative for tremors, facial asymmetry, weakness, numbness (paresthesias resolved) and headaches.   Psychiatric/Behavioral: Negative for dysphoric mood (improved). The patient is not nervous/anxious.          Past medical history, surgical history, and family medical history reviewed and updated as appropriate.    Medications and allergies reviewed.     Objective:          Vitals:    07/07/17 1011   BP: 136/74   BP Location: Right arm   Patient Position: Sitting   Pulse: 69   Temp: 98.2 °F (36.8 °C)   TempSrc: Oral   SpO2: 98%   Weight: 79 kg (174 lb 2.6 oz)   Height: 4' 11" (1.499 m)     Body mass index is 35.18 kg/m².  Physical Exam   Constitutional: She is oriented to person, place, and time. She appears well-developed and well-nourished. No distress.   HENT:   Head: Normocephalic and atraumatic.   Nose: Nose normal.   Mouth/Throat: Oropharynx is clear and moist.   Eyes: Conjunctivae and EOM are normal. Pupils are equal, round, and reactive to light.   Neck: Neck supple.   Cardiovascular: Normal rate, regular rhythm and normal heart sounds.    Pulmonary/Chest: Effort normal and breath sounds normal. No respiratory distress.   Abdominal: Soft. Bowel sounds are normal. She exhibits no distension. There is no tenderness.   Musculoskeletal: Normal range of motion. She exhibits no edema or tenderness.   Lymphadenopathy:     She has no cervical adenopathy.   Neurological: She is alert " and oriented to person, place, and time. No cranial nerve deficit or sensory deficit. Gait normal.   Skin: Skin is warm and dry. No rash noted.   Psychiatric: She has a normal mood and affect.   Vitals reviewed.    Protective Sensation (w/ 10 gram monofilament):  Right: Intact  Left: Intact    Visual Inspection:  Normal -  Bilateral    Pedal Pulses:   Right: Present  Left: Present    Posterior tibialis:   Right:Present  Left: Present      Lab Results   Component Value Date    WBC 9.21 03/04/2016    HGB 12.3 03/04/2016    HCT 38.0 03/04/2016     03/04/2016    CHOL 192 08/22/2016    TRIG 256 (H) 08/22/2016    HDL 30 (L) 08/22/2016    ALT 45 (H) 03/03/2017    AST 44 (H) 03/03/2017     03/03/2017    K 5.0 03/03/2017     03/03/2017    CREATININE 0.9 03/03/2017    BUN 18 03/03/2017    CO2 26 03/03/2017    TSH 1.277 03/03/2017    INR 1.0 06/02/2007    HGBA1C 6.7 (H) 03/03/2017       Assessment:       1. Type 2 diabetes mellitus without complication, without long-term current use of insulin    2. Essential hypertension    3. Gastroesophageal reflux disease, esophagitis presence not specified    4. Serum calcium elevated    5. Elevated LFTs    6. Hyperlipidemia, unspecified hyperlipidemia type    7. PCB (post coital bleeding)    8. Interval gout        Plan:   Tammi was seen today for dizziness and dental pain.    Diagnoses and all orders for this visit:    Type 2 diabetes mellitus without complication, without long-term current use of insulin - at goal last check; due for repeat labs and nephropathy screen  Schedule eye exam  Continue current meds  Check with pharmacy to see if needs to change glucometer due to insurance coverage  -     Discontinue: blood sugar diagnostic (CONTOUR NEXT STRIPS) Strp; USE AS DIRECTED  -     MICROALBUMIN / CREATININE RATIO URINE  -     blood sugar diagnostic (CONTOUR NEXT STRIPS) Strp; USE AS DIRECTED - check once a day    Essential hypertension - continue current medications,  work on low-salt diet    Gastroesophageal reflux disease, esophagitis presence not specified - continue PPI; so far has symptoms if stops for >1 day. Follow with GI as directed    Serum calcium elevated - noted on previous labs; will recheck  -     Comprehensive metabolic panel; Future    Elevated LFTs - noted on previous labs, will recheck; could be related to fatty liver  -     Comprehensive metabolic panel; Future    Hyperlipidemia, unspecified hyperlipidemia type - recommended by guidelines for statin but has not wanted to start this yet. Will recheck lipids with next labs and discuss further -- she agrees to consider statin if LDL > 70  -     Lipid panel; Future    PCB (post coital bleeding) - recommended use water-based lubricant; if persistent, discuss with Gyn at next visit (to be scheduled)    Interval gout - doesn't want to take additional medication at present, no further flares. Will recheck level; if elevated urate, will again discuss lowering agents. Otherwise, colchicine + NSAIDs prn  -     Uric acid; Future    Follow up with Dermatology as recommended 9/2017  Schedule f/u with Gyn  Counseled re: drinking more water every day.  Health maintenance reviewed with patient as noted. She says she will schedule colonoscopy.     Return in about 3 months (around 10/7/2017) for diabetes, hypertension, follow up.    Ariel Mullins MD  Internal Medicine  [facility]  7/7/2017

## 2017-07-07 NOTE — PATIENT INSTRUCTIONS
Work on drinking more water - goal should be 60-80oz of water/day.     Gout Diet  Gout is a painful condition caused by an excess of uric acid, a waste product made by the body. Uric acid forms crystals that collect in the joints. The immune response to these crystals brings on symptoms of joint pain and swelling. This is called a gout attack. Often, medications and diet changes are combined to manage gout. Below are some guidelines for changing your diet to help you manage gout and prevent attacks. Your health care provider will help you determine the best eating plan for you.     Eating to manage gout  Weight loss for those who are overweight may help reduce gout attacks.  Eat less of these foods  Eating too many foods containing purines may raise the levels of uric acid in your body. This raises your risk for a gout attack. Try to limit these foods and drinks:  · Alcohol, such as beer and red wine. You may be told to avoid alcohol completely.  · Soft drinks that contain sugar or high fructose corn syrup  · Certain fish, including anchovies, sardines, fish eggs, and herring  · Shellfish  · Certain meats, such as red meat, hot dogs, luncheon meats, and turkey  · Organ meats, such as liver, kidneys, and sweetbreads  · Legumes, such as dried beans and peas  · Other high fat foods such as gravy, whole milk, and high fat cheeses  · Vegetables such as asparagus, cauliflower, spinach, and mushrooms used to be thought to contribute to an increased risk for a gout attack, but recent studies show that high purine vegetables don't increase the risk for a gout attack.  Eat more of these foods  Other foods may be helpful for people with gout. Add some of these foods to your diet:  · Cherries contain chemicals that may lower uric acid.  · Omega fatty acids. These are found in some fatty fish such as salmon, certain oils (flax, olive, or nut), and nuts themselves. Omega fatty acids may help prevent inflammation due to  gout.  · Dairy products that are low-fat or fat-free, such as cheese and yogurt  · Complex carbohydrate foods, including whole grains, brown rice, oats, and beans  · Coffee, in moderation  · Water, approximately 64 ounces per day  Follow-up care  Follow up with your healthcare provider as advised.  When to seek medical advice  Call your healthcare provider right away if any of these occur:  · Return of gout symptoms, usually at night:  · Severe pain, swelling, and heat in a joint, especially the base of the big toe  · Affected joint is hard to move  · Skin of the affected joint is purple or red  · Fever of 100.4°F (38°C) or higher  · Pain that doesn't get better even with prescribed medicine   Date Last Reviewed: 1/12/2016  © 5629-1480 [employer]. [address] [place] [address], [place] [address]. All rights reserved. This information is not intended as a substitute for professional medical care. Always follow your healthcare professional's instructions.        A1C  Does this test have other names?  Hemoglobin A1c; HbA1c; glycosylated hemoglobin; glycohemoglobin; Glycated hemoglobin  What is this test?  A1C is a blood test used to screen people to find out whether they have diabetes or prediabetes. It's also used in people who know they have diabetes to measure how well they are controlling their blood sugar and to guide their treatment decisions over time.  Why do I need this test?  You may need this test to check for prediabetes or diabetes. If you already know that you have diabetes or prediabetes, you may need this test to see how well you are controlling your blood sugar.  People with diabetes must track their blood sugar (glucose) levels every day to make sure they arent too high or too low. The A1C test gives results for a longer period of time. It shows whether your blood sugar has been too high on average in the previous two to three months. When blood sugar is high, more glucose builds up and  sticks to your hemoglobin, a protein that carries oxygen in red blood cells. The A1C test measures the percentage of hemoglobin that is coated with blood sugar.  Depending on the type of diabetes you have, how well it's controlled, and your health care providers preferences, you may need to have the A1C test two or more times a year. The American Diabetes Association (ADA) recommends that you have an A1C test at least twice a year if you are meeting your blood sugar goals and your blood sugar is well-controlled. If you arent meeting your goals or your medication has changed lately, you should have the A1C test more often. You also may have the test when your health care provider first starts working with you to treat your diabetes.  What other tests might I have along with this test?   If your health care provider is testing you for diabetes, you may also take a fasting plasma glucose test, or FPG, or an oral glucose tolerance test, or OGTT, as part of your screening and diagnosis. You may also be tested for sugar, ketones or protein in the urine.  What do my test results mean?  Laboratory test results may vary depending on your age, gender, health history, the method used for the test, and many other factors. If your results are different from the results suggested below, this may not mean that you have a problem. Ask your health care provider to explain what the results mean for you.   A1C is reported as a percentage:  · Normal A1C is considered to be below 5.7 percent. Results between 5.7 and 6.4 percent may mean you have prediabetes. This means you have a higher risk of developing diabetes.  · Results of 6.5 percent or higher on two separate occasions may mean that you have diabetes.  · The ADA  recommends that people with diabetes should maintain an A1C below 7 percent. The American Association of Clinical Endocrinologists recommends an A1C of 6.5 percent or less. Recommendations may vary based on the  person's age, medical conditions, or other factors.   How is this test done?  The test requires a blood sample, which is drawn through a needle from a vein in your arm.   Does this test pose any risks?  Taking a blood sample with a needle carries risks that include bleeding, infection, bruising, and a sense of lightheadedness. When the needle pricks your arm, you may feel a slight stinging sensation or pain. Afterward, the site may be slightly sore.  What might affect my test results?  Your blood sugar levels usually vary throughout the day. These variations won't affect the A1C.  If you have sickle cell anemia or other blood disorders, a standard A1C test may be less useful for diagnosing or monitoring diabetes. (Your health care provider may be able to find another diabetes test that will better serve you.) In addition, the test results may be skewed if you have anemia, heavy bleeding, an iron deficiency, kidney failure, or liver disease.  How do I get ready for this test?  You don't need any special preparation for the test.    Date Last Reviewed: 5/15/2015  © 4436-9548 [employer]. [address] [place] [address]. All rights reserved. This information is not intended as a substitute for professional medical care. Always follow your healthcare professional's instructions.

## 2017-07-08 ENCOUNTER — TELEPHONE (OUTPATIENT)
Dept: INTERNAL MEDICINE | Facility: CLINIC | Age: 67
End: 2017-07-08

## 2017-07-08 NOTE — TELEPHONE ENCOUNTER
Pt informed of results and verbalized understanding. Pt declined scheduling U S and states she will call office at a later date to be scheduled.

## 2017-07-08 NOTE — PROGRESS NOTES
Microalbumin/creatinine ratio normal, so no sign of damage to the kidneys from diabetes.   Please call patient to inform.  Ariel Mullins MD

## 2017-07-08 NOTE — TELEPHONE ENCOUNTER
Called pt left  requesting return call in regards to results:      Labs reviewed. Triglycerides are still high, but overall lipid panel is stable.   Uric acid level is still high; she is at risk of having another gout attack, and I would recommend that she take the allopurinol + colchicine daily as we discussed.   She should NOT take the allopurinol alone as this could cause a gout flare.   Liver enzymes are still mildly elevated, so we will need to look into this more -- I would like her to have an ultrasound study done to look at her liver.   She should also avoid taking tylenol and avoid alcohol entirely.   One of the most common causes for this kind of elevation of liver enzymes is fatty liver, so following a healthy diet and working on weight loss would be beneficial.   Please contact her to review results, schedule for ultrasound, and schedule for follow-up visit after the ultrasound is done to review that result and discuss next steps.   Ariel Mullins MD      Microalbumin/creatinine ratio normal, so no sign of damage to the kidneys from diabetes.   Please call patient to inform.   Ariel Mullins MD

## 2017-07-08 NOTE — PROGRESS NOTES
Labs reviewed. Triglycerides are still high, but overall lipid panel is stable.  Uric acid level is still high; she is at risk of having another gout attack, and I would recommend that she take the allopurinol + colchicine daily as we discussed.   She should NOT take the allopurinol alone as this could cause a gout flare.  Liver enzymes are still mildly elevated, so we will need to look into this more -- I would like her to have an ultrasound study done to look at her liver.   She should also avoid taking tylenol and avoid alcohol entirely.  One of the most common causes for this kind of elevation of liver enzymes is fatty liver, so following a healthy diet and working on weight loss would be beneficial.  Please contact her to review results, schedule for ultrasound, and schedule for follow-up visit after the ultrasound is done to review that result and discuss next steps.  Ariel Mullins MD

## 2017-07-24 ENCOUNTER — TELEPHONE (OUTPATIENT)
Dept: DERMATOLOGY | Facility: CLINIC | Age: 67
End: 2017-07-24

## 2017-07-24 NOTE — TELEPHONE ENCOUNTER
----- Message from Kalpesh Pagan sent at 7/24/2017 12:45 PM CDT -----  Contact: [facility]  Good afternoon. Please call pt to discuss/reschedule her appointment that was scheduled today with Dr. Orellana. Pt had transportation issues. Thanks.

## 2017-07-29 DIAGNOSIS — K21.00 GASTROESOPHAGEAL REFLUX DISEASE WITH ESOPHAGITIS: ICD-10-CM

## 2017-07-31 RX ORDER — OMEPRAZOLE 20 MG/1
CAPSULE, DELAYED RELEASE ORAL
Qty: 90 CAPSULE | Refills: 0 | Status: SHIPPED | OUTPATIENT
Start: 2017-07-31 | End: 2017-11-08 | Stop reason: SDUPTHER

## 2017-08-31 DIAGNOSIS — E11.9 TYPE 2 DIABETES MELLITUS WITHOUT COMPLICATION, WITHOUT LONG-TERM CURRENT USE OF INSULIN: Primary | ICD-10-CM

## 2017-09-15 ENCOUNTER — TELEPHONE (OUTPATIENT)
Dept: INTERNAL MEDICINE | Facility: CLINIC | Age: 67
End: 2017-09-15

## 2017-09-15 DIAGNOSIS — E11.9 TYPE 2 DIABETES MELLITUS WITHOUT COMPLICATION, WITHOUT LONG-TERM CURRENT USE OF INSULIN: ICD-10-CM

## 2017-09-15 RX ORDER — LANCETS
EACH MISCELLANEOUS
Qty: 100 EACH | Refills: 3 | Status: CANCELLED | OUTPATIENT
Start: 2017-09-15

## 2017-09-15 NOTE — TELEPHONE ENCOUNTER
----- Message from Bhupinder Moreno sent at 9/15/2017 10:26 AM CDT -----  Contact: self/ [phone] cell  Type: Rx    Name of medication(s): diabetic meter, test strips, and lancets (true metrix)    Is this a refill? New rx? New    Who prescribed medication?    Pharmacy Name, Phone, & Location: [facility]    Comments: Pt has a meter that is not supported by [employer] and would like to request a prescription for one that is.  Please call and advise.    Thank you

## 2017-11-01 ENCOUNTER — TELEPHONE (OUTPATIENT)
Dept: DERMATOLOGY | Facility: CLINIC | Age: 67
End: 2017-11-01

## 2017-11-01 NOTE — TELEPHONE ENCOUNTER
----- Message from Nati Palafox sent at 11/1/2017  1:32 PM CDT -----  Contact: pt   [facility]- pt is returning the nurses phone call can you please call pt at [phone]    TAWANDA

## 2017-11-01 NOTE — TELEPHONE ENCOUNTER
----- Message from Nati Palafox sent at 10/31/2017  2:36 PM CDT -----  Contact: pt   [facility]- pt is calling to speak with the nurse pt needs to be seen asap pt is having a psoriasis flare up can you please call pt at [phone]. Pt has an appt on 11-24 but needs to be seen before pt is asking to be seen next Wed    ATWANDA

## 2017-11-07 DIAGNOSIS — K21.00 GASTROESOPHAGEAL REFLUX DISEASE WITH ESOPHAGITIS: ICD-10-CM

## 2017-11-07 RX ORDER — OMEPRAZOLE 20 MG/1
CAPSULE, DELAYED RELEASE ORAL
Qty: 90 CAPSULE | Refills: 0 | Status: CANCELLED | OUTPATIENT
Start: 2017-11-07

## 2017-11-08 ENCOUNTER — IMMUNIZATION (OUTPATIENT)
Dept: INTERNAL MEDICINE | Facility: CLINIC | Age: 67
End: 2017-11-08
Payer: MEDICARE

## 2017-11-08 ENCOUNTER — LAB VISIT (OUTPATIENT)
Dept: LAB | Facility: HOSPITAL | Age: 67
End: 2017-11-08
Attending: INTERNAL MEDICINE
Payer: MEDICARE

## 2017-11-08 ENCOUNTER — DOCUMENTATION ONLY (OUTPATIENT)
Dept: INTERNAL MEDICINE | Facility: CLINIC | Age: 67
End: 2017-11-08

## 2017-11-08 ENCOUNTER — OFFICE VISIT (OUTPATIENT)
Dept: INTERNAL MEDICINE | Facility: CLINIC | Age: 67
End: 2017-11-08
Payer: MEDICARE

## 2017-11-08 VITALS
WEIGHT: 169.31 LBS | DIASTOLIC BLOOD PRESSURE: 77 MMHG | BODY MASS INDEX: 34.13 KG/M2 | HEART RATE: 64 BPM | HEIGHT: 59 IN | OXYGEN SATURATION: 99 % | SYSTOLIC BLOOD PRESSURE: 138 MMHG | TEMPERATURE: 98 F

## 2017-11-08 DIAGNOSIS — Z51.81 ENCOUNTER FOR MONITORING LONG-TERM PROTON PUMP INHIBITOR THERAPY: ICD-10-CM

## 2017-11-08 DIAGNOSIS — R79.89 ELEVATED LFTS: ICD-10-CM

## 2017-11-08 DIAGNOSIS — L40.9 PSORIASIS: ICD-10-CM

## 2017-11-08 DIAGNOSIS — L43.9 LICHEN PLANUS: ICD-10-CM

## 2017-11-08 DIAGNOSIS — Z11.3 ROUTINE SCREENING FOR STI (SEXUALLY TRANSMITTED INFECTION): ICD-10-CM

## 2017-11-08 DIAGNOSIS — Z00.00 ANNUAL PHYSICAL EXAM: Primary | ICD-10-CM

## 2017-11-08 DIAGNOSIS — M10.9 INTERVAL GOUT: ICD-10-CM

## 2017-11-08 DIAGNOSIS — Z12.11 SCREENING FOR MALIGNANT NEOPLASM OF COLON: ICD-10-CM

## 2017-11-08 DIAGNOSIS — K21.00 GASTROESOPHAGEAL REFLUX DISEASE WITH ESOPHAGITIS: ICD-10-CM

## 2017-11-08 DIAGNOSIS — B36.9 FUNGAL SKIN INFECTION: ICD-10-CM

## 2017-11-08 DIAGNOSIS — Z91.89 AT RISK FOR SEXUALLY TRANSMITTED DISEASE DUE TO PARTNER WITH MULTIPLE PARTNERS: ICD-10-CM

## 2017-11-08 DIAGNOSIS — Z11.4 ENCOUNTER FOR SCREENING FOR HIV: ICD-10-CM

## 2017-11-08 DIAGNOSIS — Z79.899 ENCOUNTER FOR MONITORING LONG-TERM PROTON PUMP INHIBITOR THERAPY: ICD-10-CM

## 2017-11-08 DIAGNOSIS — E11.9 TYPE 2 DIABETES MELLITUS WITHOUT COMPLICATION, WITHOUT LONG-TERM CURRENT USE OF INSULIN: ICD-10-CM

## 2017-11-08 PROBLEM — E83.52 SERUM CALCIUM ELEVATED: Status: RESOLVED | Noted: 2017-07-07 | Resolved: 2017-11-08

## 2017-11-08 PROCEDURE — 99499 UNLISTED E&M SERVICE: CPT | Mod: ,,, | Performed by: INTERNAL MEDICINE

## 2017-11-08 PROCEDURE — 99499 UNLISTED E&M SERVICE: CPT | Mod: S$GLB,,, | Performed by: INTERNAL MEDICINE

## 2017-11-08 PROCEDURE — 36415 COLL VENOUS BLD VENIPUNCTURE: CPT

## 2017-11-08 PROCEDURE — 99397 PER PM REEVAL EST PAT 65+ YR: CPT | Mod: S$GLB,,, | Performed by: INTERNAL MEDICINE

## 2017-11-08 PROCEDURE — 90662 IIV NO PRSV INCREASED AG IM: CPT | Mod: S$GLB,,, | Performed by: INTERNAL MEDICINE

## 2017-11-08 PROCEDURE — 86703 HIV-1/HIV-2 1 RESULT ANTBDY: CPT

## 2017-11-08 PROCEDURE — G0008 ADMIN INFLUENZA VIRUS VAC: HCPCS | Mod: S$GLB,,, | Performed by: INTERNAL MEDICINE

## 2017-11-08 PROCEDURE — 99999 PR PBB SHADOW E&M-EST. PATIENT-LVL IV: CPT | Mod: PBBFAC,,, | Performed by: INTERNAL MEDICINE

## 2017-11-08 PROCEDURE — 87591 N.GONORRHOEAE DNA AMP PROB: CPT

## 2017-11-08 PROCEDURE — 86592 SYPHILIS TEST NON-TREP QUAL: CPT

## 2017-11-08 RX ORDER — CLOBETASOL PROPIONATE 0.5 MG/G
CREAM TOPICAL
Qty: 30 G | Refills: 0 | Status: SHIPPED | OUTPATIENT
Start: 2017-11-08 | End: 2021-02-18 | Stop reason: SDUPTHER

## 2017-11-08 RX ORDER — ROSUVASTATIN CALCIUM 5 MG/1
5 TABLET, COATED ORAL DAILY
Qty: 90 TABLET | Refills: 3 | Status: SHIPPED | OUTPATIENT
Start: 2017-11-08 | End: 2018-07-27

## 2017-11-08 RX ORDER — CLOTRIMAZOLE 1 %
CREAM (GRAM) TOPICAL 2 TIMES DAILY
Qty: 60 G | Refills: 1 | Status: SHIPPED | OUTPATIENT
Start: 2017-11-08 | End: 2018-02-07 | Stop reason: SDUPTHER

## 2017-11-08 RX ORDER — OMEPRAZOLE 20 MG/1
20 CAPSULE, DELAYED RELEASE ORAL DAILY
Qty: 90 CAPSULE | Refills: 3 | Status: SHIPPED | OUTPATIENT
Start: 2017-11-08 | End: 2018-09-21 | Stop reason: SDUPTHER

## 2017-11-08 NOTE — PATIENT INSTRUCTIONS
Start taking allopurinol 1 tablet daily and colchicine 1 tablet daily - take these together for 1 month. Then stop the cholchicine and continue taking allopurinol once a day.     Start taking the crestor (rosuvastatin) for cholesterol -- take this in the evening with your metformin.     Use the clotrimazole cream on the rash between and under your breasts -- use twice a day for at least 2-4 weeks.      Gout Diet  Gout is a painful condition caused by an excess of uric acid, a waste product made by the body. Uric acid forms crystals that collect in the joints. The immune response to these crystals brings on symptoms of joint pain and swelling. This is called a gout attack. Often, medications and diet changes are combined to manage gout. Below are some guidelines for changing your diet to help you manage gout and prevent attacks. Your health care provider will help you determine the best eating plan for you.     Eating to manage gout  Weight loss for those who are overweight may help reduce gout attacks.  Eat less of these foods  Eating too many foods containing purines may raise the levels of uric acid in your body. This raises your risk for a gout attack. Try to limit these foods and drinks:  · Alcohol, such as beer and red wine. You may be told to avoid alcohol completely.  · Soft drinks that contain sugar or high fructose corn syrup  · Certain fish, including anchovies, sardines, fish eggs, and herring  · Shellfish  · Certain meats, such as red meat, hot dogs, luncheon meats, and turkey  · Organ meats, such as liver, kidneys, and sweetbreads  · Legumes, such as dried beans and peas  · Other high fat foods such as gravy, whole milk, and high fat cheeses  · Vegetables such as asparagus, cauliflower, spinach, and mushrooms used to be thought to contribute to an increased risk for a gout attack, but recent studies show that high purine vegetables don't increase the risk for a gout attack.  Eat more of these  foods  Other foods may be helpful for people with gout. Add some of these foods to your diet:  · Cherries contain chemicals that may lower uric acid.  · Omega fatty acids. These are found in some fatty fish such as salmon, certain oils (flax, olive, or nut), and nuts themselves. Omega fatty acids may help prevent inflammation due to gout.  · Dairy products that are low-fat or fat-free, such as cheese and yogurt  · Complex carbohydrate foods, including whole grains, brown rice, oats, and beans  · Coffee, in moderation  · Water, approximately 64 ounces per day  Follow-up care  Follow up with your healthcare provider as advised.  When to seek medical advice  Call your healthcare provider right away if any of these occur:  · Return of gout symptoms, usually at night:  · Severe pain, swelling, and heat in a joint, especially the base of the big toe  · Affected joint is hard to move  · Skin of the affected joint is purple or red  · Fever of 100.4°F (38°C) or higher  · Pain that doesn't get better even with prescribed medicine   Date Last Reviewed: 1/12/2016  © 6673-9189 [employer]. [address] [place] [address]. All rights reserved. This information is not intended as a substitute for professional medical care. Always follow your healthcare professional's instructions.

## 2017-11-08 NOTE — PROGRESS NOTES
Subjective:       Patient ID: Tammi Farris is a 67 y.o. female.    Chief Complaint: Diabetes and Hypertension    HPI  66 y/o woman with HTN, DM2, GERD, gout here for follow-up visit / annual exam. Overall feeling well.     DM2 - last A1c 6.7, last glucose 119. Reports home BG 90-130s.  Taking 1000mg qAM, 500mg qPM of metformin.  Has lost 5# since last visit  Exercise - walking, mopping / housecleaning  Due for eye exam - needs to reschedule. Lost her glasses, needs repeat full eye exam  Foot exam 7/2017  Microalbumin done 7/2017    Elevated LFTs - AST 53, ALT 47. Recommended to have ultrasound, hasn't scheduled yet.    H/o gout - hasn't wanted to take allopurinol, doesn't want to start this. Not currently taking colchicine as she hasn't had a flare since last visit.  Working on avoiding foods that would increase uric acid - especially processed meats.     GERD - was on omeprazole, tried stopping this but symptoms return within a few days. Has run out of this.   Taking vitamin B12 2000mcg. Not currently taking Vitamin D or calcium.   Due for repeat yearly B12, magnesium, vitamin D    Doesn't want to get colonoscopy, agrees to FIT    Requests refill on topical steroid for rash that sometimes occurs.    Sexually active with male partner who is nonmonogamous; she would like to get routine STI screening done. Does not have any vaginal discharge, dysuria, or other symptoms currently.    Review of Systems   Constitutional: Negative for activity change, appetite change, fatigue and fever.   HENT: Negative for congestion and sore throat.    Eyes: Negative for visual disturbance (no recent change, but lost glasses).   Respiratory: Negative for cough and shortness of breath.    Cardiovascular: Negative.  Negative for chest pain and leg swelling.   Gastrointestinal: Negative.  Negative for abdominal pain, blood in stool, diarrhea and nausea.   Endocrine: Negative.    Genitourinary: Negative.    Musculoskeletal: Negative for  "arthralgias, back pain and joint swelling.   Skin: Negative for color change and rash.   Neurological: Negative for tremors, weakness, light-headedness, numbness (paresthesias resolved) and headaches.   Psychiatric/Behavioral: Negative for dysphoric mood (improved). The patient is not nervous/anxious.          Past medical history, surgical history, and family medical history reviewed and updated as appropriate.    Medications and allergies reviewed.     Objective:          Vitals:    11/08/17 0953   BP: 138/77   BP Location: Left arm   Patient Position: Sitting   Pulse: 64   Temp: 97.8 °F (36.6 °C)   TempSrc: Oral   SpO2: 99%   Weight: 76.8 kg (169 lb 5 oz)   Height: 4' 11" (1.499 m)     Body mass index is 34.2 kg/m².  Physical Exam   Constitutional: She is oriented to person, place, and time. She appears well-developed and well-nourished. No distress.   HENT:   Head: Normocephalic and atraumatic.   Nose: Nose normal.   Mouth/Throat: Oropharynx is clear and moist.   Eyes: Conjunctivae and EOM are normal. Pupils are equal, round, and reactive to light.   Neck: Neck supple.   Cardiovascular: Normal rate, regular rhythm, normal heart sounds and intact distal pulses.    No murmur heard.  Pulmonary/Chest: Effort normal and breath sounds normal. No respiratory distress.   Abdominal: Soft. Bowel sounds are normal. She exhibits no distension. There is no tenderness. There is no guarding.   +abdominal obesity   Musculoskeletal: Normal range of motion. She exhibits no edema or tenderness.   Lymphadenopathy:     She has no cervical adenopathy.   Neurological: She is alert and oriented to person, place, and time. No cranial nerve deficit or sensory deficit. Gait normal.   Skin: Skin is warm and dry.   +psoriasis rash at axillae  +macular moist slightly erythematous rash with clear demarcated border under R breast, hyperpigmentation in similar area under L breast   Psychiatric: She has a normal mood and affect.   Vitals " reviewed.      Lab Results   Component Value Date    WBC 9.21 03/04/2016    HGB 12.3 03/04/2016    HCT 38.0 03/04/2016     03/04/2016    CHOL 196 07/07/2017    TRIG 214 (H) 07/07/2017    HDL 30 (L) 07/07/2017    ALT 47 (H) 07/07/2017    AST 53 (H) 07/07/2017     07/07/2017    K 4.5 07/07/2017     07/07/2017    CREATININE 0.9 07/07/2017    BUN 17 07/07/2017    CO2 26 07/07/2017    TSH 1.277 03/03/2017    INR 1.0 06/02/2007    HGBA1C 6.7 (H) 03/03/2017       Assessment:       1. Annual physical exam    2. Type 2 diabetes mellitus without complication, without long-term current use of insulin    3. Elevated LFTs    4. Screening for malignant neoplasm of colon    5. Psoriasis    6. Lichen planus    7. Interval gout    8. Gastroesophageal reflux disease with esophagitis    9. Encounter for monitoring long-term proton pump inhibitor therapy    10. Fungal skin infection    11. Routine screening for STI (sexually transmitted infection)    12. At risk for sexually transmitted disease due to partner with multiple partners    13. Encounter for screening for HIV         Plan:   Tammi was seen today for diabetes and hypertension.    Diagnoses and all orders for this visit:    Annual physical exam - Overall healthy and stable, doing well. Reviewed chronic and preventive health concerns as noted below.    Type 2 diabetes mellitus without complication, without long-term current use of insulin - A1c at goal, continue current medications, continue working on diet/exercise  Reviewed in detail guidelines re: ASCVD risk modification and statin treatment for patients with diabetes. Today she agrees with starting low-dose statin. Ok to start with 1/2 tablet.  -     Hemoglobin A1c; Future  -     Comprehensive metabolic panel; Future  -     rosuvastatin (CRESTOR) 5 MG tablet; Take 1 tablet (5 mg total) by mouth once daily.    Elevated LFTs - mild elevation in past several months. Recommended avoid tylenol, alcohol. Will get  abdominal ultrasound to evaluate for fatty liver or other causes.  -     US Abdomen Limited; Future  -     Comprehensive metabolic panel; Future    Screening for malignant neoplasm of colon  -     Fecal Immunochemical Test (iFOBT); Future    Psoriasis  Comments:  hx of inverse and plaque psoriasis, generally controlled with topical medications  Orders:  -     clobetasol (TEMOVATE) 0.05 % cream; APPLY TO AFFECTED AREA NIGHTLY FOR 4 WEEKS THEN EVERY OTHER DAY FOR THE NEXT 2 WEEKS    Lichen planus  Comments:  hx of vulvar lichen planus - no acute issues but would like refill on topical medication to use as needed  Orders:  -     clobetasol (TEMOVATE) 0.05 % cream; APPLY TO AFFECTED AREA NIGHTLY FOR 4 WEEKS THEN EVERY OTHER DAY FOR THE NEXT 2 WEEKS    Interval gout - discussed role of allopurinol and relationship of uric acid level to risk of gout flare. She does not want to restart medication at this time but will consider based on results  -     Uric acid; Future    Gastroesophageal reflux disease with esophagitis - not able to taper or stop PPI; continue this for now, will monitor labs as below  -     omeprazole (PRILOSEC) 20 MG capsule; Take 1 capsule (20 mg total) by mouth once daily.    Encounter for monitoring long-term proton pump inhibitor therapy  -     Magnesium; Future  -     Vitamin D; Future  -     Vitamin B12; Future    Fungal skin infection  -     clotrimazole (LOTRIMIN) 1 % cream; Apply topically 2 (two) times daily. To area of rash on breasts    Routine screening for STI (sexually transmitted infection)  -     HIV-1 and HIV-2 antibodies; Future  -     RPR; Future  -     C. trachomatis/N. gonorrhoeae by AMP DNA Urine  Hep B labs also ordered.    At risk for sexually transmitted disease due to partner with multiple partners  -     C. trachomatis/N. gonorrhoeae by AMP DNA Urine    Encounter for screening for HIV   -     HIV-1 and HIV-2 antibodies; Future    Health maintenance reviewed with patient as  noted  Flu vaccine today  Labs in 3 months  Return in about 3 months (around 2/8/2018) for diabetes, hypertension.    Ariel Mullins MD  Internal Medicine  [facility]  11/8/2017

## 2017-11-09 LAB
C TRACH DNA SPEC QL NAA+PROBE: NOT DETECTED
HIV 1+2 AB+HIV1 P24 AG SERPL QL IA: NEGATIVE
N GONORRHOEA DNA SPEC QL NAA+PROBE: NOT DETECTED
RPR SER QL: NORMAL

## 2017-11-09 NOTE — PROGRESS NOTES
Tests for HIV and syphilis are negative. The tests for gonorrhea and chlamydia are negative.   Please call patient to inform.  Ariel Mullins MD

## 2017-11-10 ENCOUNTER — HOSPITAL ENCOUNTER (OUTPATIENT)
Dept: RADIOLOGY | Facility: HOSPITAL | Age: 67
Discharge: HOME | End: 2017-11-10
Attending: INTERNAL MEDICINE
Payer: MEDICARE

## 2017-11-10 ENCOUNTER — TELEPHONE (OUTPATIENT)
Dept: INTERNAL MEDICINE | Facility: CLINIC | Age: 67
End: 2017-11-10

## 2017-11-10 DIAGNOSIS — R79.89 ELEVATED LFTS: ICD-10-CM

## 2017-11-10 PROCEDURE — 76705 ECHO EXAM OF ABDOMEN: CPT | Mod: 26,,, | Performed by: RADIOLOGY

## 2017-11-10 PROCEDURE — 76705 ECHO EXAM OF ABDOMEN: CPT | Mod: TC

## 2017-11-10 NOTE — TELEPHONE ENCOUNTER
Called pt left Vm requesting return call in regards to results:    Tests for HIV and syphilis are negative. The tests for gonorrhea and chlamydia are negative.   Please call patient to inform.   Ariel Mullins MD

## 2017-11-20 PROBLEM — L40.9 PSORIASIS: Status: ACTIVE | Noted: 2017-11-20

## 2017-11-20 PROBLEM — L43.9 LICHEN PLANUS: Status: ACTIVE | Noted: 2017-11-20

## 2017-11-22 ENCOUNTER — TELEPHONE (OUTPATIENT)
Dept: INTERNAL MEDICINE | Facility: CLINIC | Age: 67
End: 2017-11-22

## 2017-11-22 NOTE — TELEPHONE ENCOUNTER
----- Message from Ariel Mullins MD sent at 11/20/2017  4:17 PM CST -----  Please link hepatitis B labs to her next lab appt

## 2017-11-24 ENCOUNTER — OFFICE VISIT (OUTPATIENT)
Dept: DERMATOLOGY | Facility: CLINIC | Age: 67
End: 2017-11-24
Payer: MEDICARE

## 2017-11-24 DIAGNOSIS — L40.8 INVERSE PSORIASIS: ICD-10-CM

## 2017-11-24 DIAGNOSIS — L40.0 PSORIASIS VULGARIS: Primary | ICD-10-CM

## 2017-11-24 DIAGNOSIS — D18.00 ANGIOMA: ICD-10-CM

## 2017-11-24 DIAGNOSIS — Z12.83 SKIN CANCER SCREENING: ICD-10-CM

## 2017-11-24 DIAGNOSIS — L82.1 SK (SEBORRHEIC KERATOSIS): ICD-10-CM

## 2017-11-24 DIAGNOSIS — L91.8 ST (SKIN TAG): ICD-10-CM

## 2017-11-24 DIAGNOSIS — Z85.828 HISTORY OF NONMELANOMA SKIN CANCER: ICD-10-CM

## 2017-11-24 PROCEDURE — 99213 OFFICE O/P EST LOW 20 MIN: CPT | Mod: S$GLB,,, | Performed by: DERMATOLOGY

## 2017-11-24 PROCEDURE — 99499 UNLISTED E&M SERVICE: CPT | Mod: S$GLB,,, | Performed by: DERMATOLOGY

## 2017-11-24 PROCEDURE — 99999 PR PBB SHADOW E&M-EST. PATIENT-LVL II: CPT | Mod: PBBFAC,,, | Performed by: DERMATOLOGY

## 2017-11-24 RX ORDER — TRIAMCINOLONE ACETONIDE 1 MG/G
CREAM TOPICAL
Qty: 1 BOTTLE | Refills: 1 | Status: SHIPPED | OUTPATIENT
Start: 2017-11-24 | End: 2019-12-23 | Stop reason: SDUPTHER

## 2017-11-24 NOTE — PROGRESS NOTES
Subjective:       Patient ID:  Tammi Farris is a 67 y.o. female who presents for   Chief Complaint   Patient presents with    Skin Check     UBSE     HPI  Interested in upper body skin check today.  Pt has a hx of BCC on her forehead excised in the past.    Has a hx of inverse and plaque psoriasis which are currently flaring in some areas. Uses white vinegar and vaseline to help with the odor.    Review of Systems   Constitutional: Negative for fever, chills and fatigue.   Hematologic/Lymphatic: Does not bruise/bleed easily.        Objective:    Physical Exam   Constitutional: She appears well-developed and well-nourished. No distress.   Neurological: She is alert and oriented to person, place, and time. She is not disoriented.   Psychiatric: She has a normal mood and affect.   Skin:   Areas Examined (abnormalities noted in diagram):   Head / Face Inspection Performed  Neck Inspection Performed  Chest / Axilla Inspection Performed  Abdomen Inspection Performed  Back Inspection Performed  RUE Inspected  LUE Inspection Performed                   Diagram Legend     Erythematous scaling macule/papule c/w actinic keratosis       Vascular papule c/w angioma      Pigmented verrucoid papule/plaque c/w seborrheic keratosis      Yellow umbilicated papule c/w sebaceous hyperplasia      Irregularly shaped tan macule c/w lentigo     1-2 mm smooth white papules consistent with Milia      Movable subcutaneous cyst with punctum c/w epidermal inclusion cyst      Subcutaneous movable cyst c/w pilar cyst      Firm pink to brown papule c/w dermatofibroma      Pedunculated fleshy papule(s) c/w skin tag(s)      Evenly pigmented macule c/w junctional nevus     Mildly variegated pigmented, slightly irregular-bordered macule c/w mildly atypical nevus      Flesh colored to evenly pigmented papule c/w intradermal nevus       Pink pearly papule/plaque c/w basal cell carcinoma      Erythematous hyperkeratotic cursted plaque c/w SCC       Surgical scar with no sign of skin cancer recurrence      Open and closed comedones      Inflammatory papules and pustules      Verrucoid papule consistent consistent with wart     Erythematous eczematous patches and plaques     Dystrophic onycholytic nail with subungual debris c/w onychomycosis     Umbilicated papule    Erythematous-base heme-crusted tan verrucoid plaque consistent with inflamed seborrheic keratosis     Erythematous Silvery Scaling Plaque c/w Psoriasis     See annotation      Assessment / Plan:      Inverse psoriasis  -     triamcinolone acetonide 0.1% (KENALOG) 0.1 % SHAKE LOTION; Apply to affected areas BID after cool blow dry  Dispense: 1 Bottle; Refill: 1    Psoriasis vulgaris  May use the Rx above on the small plaque on lower back as well    SK (seborrheic keratosis)  These are benign inherited growths without a malignant potential. Reassurance given to patient. No treatment is necessary.   Treatment of benign, asymptomatic lesions may be considered cosmetic.  Warned about risk of hypo- or hyperpigmentation with treatment and risk of recurrence.    Angioma  This is a benign vascular lesion. Reassurance given. No treatment required. Treatment of benign, asymptomatic lesions may be considered cosmetic.    ST (skin tag)  Reassurance given to patient. No treatment is necessary.   Treatment of benign, asymptomatic lesions may be considered cosmetic.    Skin cancer screening and History of nonmelanoma skin cancer  Upper body skin examination performed today including at least 6 points as noted in physical examination. No lesions suspicious for malignancy noted.  Patient instructed in importance of daily broad spectrum sunscreen use with spf at least 30. Sun avoidance and topical protection/protective clothing discussed.    rtc 6 months or sooner for any concerns

## 2017-11-24 NOTE — PATIENT INSTRUCTIONS

## 2017-11-28 ENCOUNTER — PATIENT OUTREACH (OUTPATIENT)
Dept: ADMINISTRATIVE | Facility: HOSPITAL | Age: 67
End: 2017-11-28

## 2017-11-28 NOTE — PROGRESS NOTES
Attempted to contact pt to follow up on Fit Kit that was given to her 11/8/17. No answer, left voice mail for return call.

## 2017-11-28 NOTE — PROGRESS NOTES
Pt returned call. She has not completed fit kit. I answered pt's questions about collection process. Pt says she is now able to collect sample. She will do so this week and prefers to bring kit to lab herself instead of mailing.

## 2017-11-29 ENCOUNTER — LAB VISIT (OUTPATIENT)
Dept: LAB | Facility: HOSPITAL | Age: 67
End: 2017-11-29
Attending: INTERNAL MEDICINE
Payer: MEDICARE

## 2017-11-29 DIAGNOSIS — Z12.11 SCREENING FOR MALIGNANT NEOPLASM OF COLON: ICD-10-CM

## 2017-11-29 PROCEDURE — 82274 ASSAY TEST FOR BLOOD FECAL: CPT

## 2017-12-01 LAB — HEMOCCULT STL QL IA: NEGATIVE

## 2017-12-04 ENCOUNTER — OFFICE VISIT (OUTPATIENT)
Dept: OPTOMETRY | Facility: CLINIC | Age: 67
End: 2017-12-04
Payer: MEDICARE

## 2017-12-04 DIAGNOSIS — H25.13 NUCLEAR SCLEROSIS OF BOTH EYES: ICD-10-CM

## 2017-12-04 DIAGNOSIS — Z01.00 DIABETIC EYE EXAM: ICD-10-CM

## 2017-12-04 DIAGNOSIS — H25.043 POSTERIOR SUBCAPSULAR AGE-RELATED CATARACT OF BOTH EYES: ICD-10-CM

## 2017-12-04 DIAGNOSIS — H52.4 PRESBYOPIA OF BOTH EYES: ICD-10-CM

## 2017-12-04 DIAGNOSIS — H52.223 REGULAR ASTIGMATISM OF BOTH EYES: ICD-10-CM

## 2017-12-04 DIAGNOSIS — E11.9 TYPE 2 DIABETES MELLITUS WITHOUT RETINOPATHY: ICD-10-CM

## 2017-12-04 DIAGNOSIS — E11.9 DIABETIC EYE EXAM: ICD-10-CM

## 2017-12-04 DIAGNOSIS — H40.003 GLAUCOMA SUSPECT, BOTH EYES: Primary | ICD-10-CM

## 2017-12-04 PROCEDURE — 99499 UNLISTED E&M SERVICE: CPT | Mod: S$GLB,,, | Performed by: OPTOMETRIST

## 2017-12-04 PROCEDURE — 92015 DETERMINE REFRACTIVE STATE: CPT | Mod: S$GLB,,, | Performed by: OPTOMETRIST

## 2017-12-04 PROCEDURE — 92014 COMPRE OPH EXAM EST PT 1/>: CPT | Mod: S$GLB,,, | Performed by: OPTOMETRIST

## 2017-12-04 PROCEDURE — 99999 PR PBB SHADOW E&M-EST. PATIENT-LVL II: CPT | Mod: PBBFAC,,, | Performed by: OPTOMETRIST

## 2017-12-04 NOTE — PATIENT INSTRUCTIONS
Early nuclear sclerotic changes of lens of both eyes, consistent with age.  Early axial posterior subcapsular cataract bilaterally, more apparent in the right eye.  No need for cataract surgery in either eye.    Type 2 diabetes without evidence of diabetic retinopathy.  No hypertensive retinopathy.    Larger-than-average optic nerve head cup-to-disc ratio in both eyes, with asymmetry (greater in the left eye).  Note apparent slight increase in the C/D ratio in the right eye, per estimated C/D ratio on previous eye examination.  Borderline intraocular pressure in each eye today.    Stereo disc photos taken previously.  Guardado visual field test (24-2 MERLY Standard) and Spectralis OCT retinal nerve fiber layer thickness analysis done on previous visit.  Suggest repeat.  Orders entered for tests and Ms. Ji to schedule tests and follow up appointment with me (Dr. Reese) on the same date.   Hyperopia with astigmatism in each eye, with good correctable vision in each eye.  Presbyopia consistent with age.  New spectacle lens Rx issued for full-time wear.  Repeat refraction in one year.

## 2017-12-04 NOTE — PROGRESS NOTES
"HPI     Diabetic Eye Exam    Additional comments: Diabetic eye examination.   No apparent changes in VA, but she has lost her glasses and has been using   OTC glasses ("but they're not the same")           Comments   Patient's age: 67 y.o.  female   Occupation: Retired  Approximate date of last eye examination:  01/11/2016  Name of last eye doctor seen: Dr Reese  City/State: [facility]  Wears glasses? Yes, patiently recently lost her eye glasses      If yes, wears  Full-time or part-time?  Part-time mostly reading   Present glasses are: Bifocal, SV Distance, SV Reading?  ST Lined-bifocal  Approximate age of present glasses:  2013   Got new glasses following last exam, or subsequently?:  Yes   Any problem with VA with glasses?  Pt would like a new RX for glasses  Wears CLs?:  No  Headaches?  No  Eye pain/discomfort?  no                                                                                  Flashes? No  Floaters?  Yes, several years denies increase or change  Diplopia/Double vision?  No  Patient's Ocular History:         Any eye surgeries? No         Any eye injury?  No         Any treatment for eye disease?  No  Family history of eye disease?  None  Significant patient medical history:         1. Diabetes?  Yes, diagnosed in 2008  LBS - 116 this morning        If yes, IDDM or NIDDM? NIDDM   2. HBP?  Yes, controlled by medication and diet              3. Other (describe):     ! OTC eyedrops currently using:  None   ! Prescription eye meds currently using:  None   ! Any history of allergy/adverse reaction to any eye meds used   previously?  No    ! Any history of allergy/adverse reaction to eyedrops used during prior   eye exam(s)? No    ! Any history of allergy/adverse reaction to Novacaine or similar meds?   No   ! Any history of allergy/adverse reaction to Epinephrine or similar meds?   No, allergic to Benadryl     ! Patient okay with use of anesthetic eyedrops to check eye pressure?    Yes        ! " "Patient okay with use of eyedrops to dilate pupils today?  Yes   !  Allergies/Medications/Medical History/Family History reviewed today?    Yes      PD =   67/63  Desired reading distance =  14"                                                                      Last edited by Rajiv Reese, OD on 12/4/2017 11:31 AM. (History)            Assessment /Plan     For exam results, see Encounter Report.    1. Glaucoma suspect, both eyes  Posterior Segment OCT Optic Nerve- Both eyes    Guardado Visual Field - OU - Extended - Both Eyes   2. Diabetic eye exam     3. Type 2 diabetes mellitus without retinopathy     4. Nuclear sclerosis of both eyes     5. Posterior subcapsular age-related cataract of both eyes     6. Regular astigmatism of both eyes     7. Presbyopia of both eyes                      Early nuclear sclerotic changes of lens of both eyes, consistent with age.  Early axial posterior subcapsular cataract bilaterally, more apparent in the right eye.  No need for cataract surgery in either eye.    Type 2 diabetes without evidence of diabetic retinopathy.  No hypertensive retinopathy.    Larger-than-average optic nerve head cup-to-disc ratio in both eyes, with asymmetry (greater in the left eye).  Note apparent slight increase in the C/D ratio in the right eye, per estimated C/D ratio on previous eye examination.  Borderline intraocular pressure in each eye today.    Stereo disc photos taken previously.  Guardado visual field test (24-2 MERLY Standard) and Spectralis OCT retinal nerve fiber layer thickness analysis done on previous visit.  Suggest repeat.  Orders entered for tests and Ms. Ji to schedule tests and follow up appointment with me (Dr. Reese) on the same date.   Hyperopia with astigmatism in each eye, with good correctable vision in each eye.  Presbyopia consistent with age.  New spectacle lens Rx issued for full-time wear.  Repeat refraction in one year.     "

## 2017-12-08 DIAGNOSIS — M10.9 INTERVAL GOUT: ICD-10-CM

## 2017-12-08 RX ORDER — COLCHICINE 0.6 MG/1
TABLET ORAL
Qty: 30 TABLET | Refills: 0 | Status: SHIPPED | OUTPATIENT
Start: 2017-12-08 | End: 2018-07-27

## 2017-12-08 NOTE — PROGRESS NOTES
FIT screening test for colon cancer is negative. We'll check in on this again in 1 year.   Please call patient to inform.    Ariel Mullins MD

## 2017-12-12 ENCOUNTER — TELEPHONE (OUTPATIENT)
Dept: INTERNAL MEDICINE | Facility: CLINIC | Age: 67
End: 2017-12-12

## 2017-12-12 NOTE — TELEPHONE ENCOUNTER
Called pt left  requesting return call:    FIT screening test for colon cancer is negative. We'll check in on this again in 1 year.   Please call patient to inform.     Ariel Mullins MD

## 2017-12-15 DIAGNOSIS — K76.0 FATTY LIVER: Primary | ICD-10-CM

## 2017-12-15 DIAGNOSIS — R79.89 ELEVATED LFTS: ICD-10-CM

## 2017-12-16 ENCOUNTER — TELEPHONE (OUTPATIENT)
Dept: INTERNAL MEDICINE | Facility: CLINIC | Age: 67
End: 2017-12-16

## 2017-12-16 NOTE — TELEPHONE ENCOUNTER
Called pt left  requesting return call:    Ultrasound shows hepatic steatosis (fatty liver), which is common but can sometimes lead to scarring in the liver. Since her last liver enzyme tests were a little elevated, I would like her to see the hepatology clinic for further evaluation and discussion. In the meantime, the most important next steps are to avoid alcohol and tylenol (acetominaphen), eat a healthy diet, and work on losing weight.   Please call patient to inform and schedule with hepatology.   Ariel Mullins MD

## 2017-12-16 NOTE — PROGRESS NOTES
Ultrasound shows hepatic steatosis (fatty liver), which is common but can sometimes lead to scarring in the liver. Since her last liver enzyme tests were a little elevated, I would like her to see the hepatology clinic for further evaluation and discussion. In the meantime, the most important next steps are to avoid alcohol and tylenol (acetominaphen), eat a healthy diet, and work on losing weight.   Please call patient to inform and schedule with hepatology.  Ariel Mullins MD

## 2017-12-18 ENCOUNTER — TELEPHONE (OUTPATIENT)
Dept: INTERNAL MEDICINE | Facility: CLINIC | Age: 67
End: 2017-12-18

## 2017-12-18 NOTE — TELEPHONE ENCOUNTER
Pt. informed but wishes not to schedule appointment with Hepatology due to out of pocket expensives and previous studies and appointment not being covered.

## 2017-12-18 NOTE — TELEPHONE ENCOUNTER
----- Message from Bhupinder Moreno sent at 12/16/2017 11:31 AM CST -----  Contact: self/ [phone] cell  Type: Returning a call    Who left a message? Tamiko    When did the practice call? 12/16/2017    Comments: pt returned call and is waiting on a call back.  Please call and advise.    Thank you

## 2017-12-18 NOTE — TELEPHONE ENCOUNTER
Pt informed of the the u/s report. She states that she does not at this time want to schedule the appointment with hepatology due to recent test ordered and appointments are not covered and she does not want to accrue more bills.

## 2017-12-19 ENCOUNTER — DOCUMENTATION ONLY (OUTPATIENT)
Dept: TRANSPLANT | Facility: CLINIC | Age: 67
End: 2017-12-19

## 2017-12-19 DIAGNOSIS — I10 ESSENTIAL HYPERTENSION: ICD-10-CM

## 2017-12-19 RX ORDER — BISOPROLOL FUMARATE AND HYDROCHLOROTHIAZIDE 10; 6.25 MG/1; MG/1
TABLET ORAL
Qty: 90 TABLET | Refills: 0 | Status: SHIPPED | OUTPATIENT
Start: 2017-12-19 | End: 2018-03-20 | Stop reason: SDUPTHER

## 2017-12-19 NOTE — LETTER
December 19, 2017    Tammi Farris  [address]  [place] [address]      Dear Tammi Farris:    Your doctor has referred you to the [facility]. You will be contacted by our office and an initial appointment will then be scheduled for you.    We look forward to seeing you soon. If you have any further questions, please contact us at [phone].       Sincerely,        [facility]   [address] [place] [id number]  [phone]

## 2017-12-19 NOTE — NURSING
Pt records reviewed.   Pt will be referred to Hepatology.    Initial referral received  from the workque.   Referring Provider/diagnosis    HILARIA CEE Provider:   Diagnosis: Fatty liver  Elevated LFTs         Referral letter sent to provider and patient.

## 2017-12-27 ENCOUNTER — LAB VISIT (OUTPATIENT)
Dept: LAB | Facility: HOSPITAL | Age: 67
End: 2017-12-27
Payer: MEDICARE

## 2017-12-27 ENCOUNTER — PROCEDURE VISIT (OUTPATIENT)
Dept: HEPATOLOGY | Facility: CLINIC | Age: 67
End: 2017-12-27
Attending: NURSE PRACTITIONER
Payer: MEDICARE

## 2017-12-27 ENCOUNTER — OFFICE VISIT (OUTPATIENT)
Dept: HEPATOLOGY | Facility: CLINIC | Age: 67
End: 2017-12-27
Payer: MEDICARE

## 2017-12-27 VITALS
TEMPERATURE: 98 F | HEART RATE: 72 BPM | WEIGHT: 168.19 LBS | BODY MASS INDEX: 33.91 KG/M2 | DIASTOLIC BLOOD PRESSURE: 76 MMHG | SYSTOLIC BLOOD PRESSURE: 140 MMHG | HEIGHT: 59 IN | OXYGEN SATURATION: 97 % | RESPIRATION RATE: 18 BRPM

## 2017-12-27 DIAGNOSIS — K21.9 GASTROESOPHAGEAL REFLUX DISEASE, ESOPHAGITIS PRESENCE NOT SPECIFIED: ICD-10-CM

## 2017-12-27 DIAGNOSIS — E11.9 TYPE 2 DIABETES MELLITUS WITHOUT COMPLICATION, WITHOUT LONG-TERM CURRENT USE OF INSULIN: ICD-10-CM

## 2017-12-27 DIAGNOSIS — K76.0 FATTY LIVER: ICD-10-CM

## 2017-12-27 DIAGNOSIS — R74.8 ELEVATED LIVER ENZYMES: Primary | ICD-10-CM

## 2017-12-27 DIAGNOSIS — K74.60 CIRRHOSIS OF LIVER WITHOUT ASCITES, UNSPECIFIED HEPATIC CIRRHOSIS TYPE: ICD-10-CM

## 2017-12-27 DIAGNOSIS — E78.5 HYPERLIPIDEMIA, UNSPECIFIED HYPERLIPIDEMIA TYPE: ICD-10-CM

## 2017-12-27 DIAGNOSIS — R74.8 ELEVATED LIVER ENZYMES: ICD-10-CM

## 2017-12-27 DIAGNOSIS — R93.89 ABNORMAL FINDING ON IMAGING: ICD-10-CM

## 2017-12-27 DIAGNOSIS — I10 ESSENTIAL HYPERTENSION: ICD-10-CM

## 2017-12-27 DIAGNOSIS — E66.09 CLASS 1 OBESITY DUE TO EXCESS CALORIES WITH BODY MASS INDEX (BMI) OF 33.0 TO 33.9 IN ADULT, UNSPECIFIED WHETHER SERIOUS COMORBIDITY PRESENT: ICD-10-CM

## 2017-12-27 PROBLEM — R79.89 ELEVATED LFTS: Status: RESOLVED | Noted: 2017-07-07 | Resolved: 2017-12-27

## 2017-12-27 LAB
AFP SERPL-MCNC: 3.1 NG/ML
ALBUMIN SERPL BCP-MCNC: 3.7 G/DL
ALP SERPL-CCNC: 69 U/L
ALT SERPL W/O P-5'-P-CCNC: 48 U/L
ANION GAP SERPL CALC-SCNC: 9 MMOL/L
AST SERPL-CCNC: 60 U/L
BASOPHILS # BLD AUTO: 0.02 K/UL
BASOPHILS NFR BLD: 0.2 %
BILIRUB SERPL-MCNC: 0.5 MG/DL
BUN SERPL-MCNC: 13 MG/DL
CALCIUM SERPL-MCNC: 9.7 MG/DL
CHLORIDE SERPL-SCNC: 108 MMOL/L
CO2 SERPL-SCNC: 22 MMOL/L
CREAT SERPL-MCNC: 0.9 MG/DL
DIFFERENTIAL METHOD: ABNORMAL
EOSINOPHIL # BLD AUTO: 0.3 K/UL
EOSINOPHIL NFR BLD: 3.2 %
ERYTHROCYTE [DISTWIDTH] IN BLOOD BY AUTOMATED COUNT: 14.2 %
EST. GFR  (AFRICAN AMERICAN): >60 ML/MIN/1.73 M^2
EST. GFR  (NON AFRICAN AMERICAN): >60 ML/MIN/1.73 M^2
ESTIMATED AVG GLUCOSE: 134 MG/DL
FERRITIN SERPL-MCNC: 173 NG/ML
GLUCOSE SERPL-MCNC: 146 MG/DL
HBA1C MFR BLD HPLC: 6.3 %
HBV CORE AB SERPL QL IA: NEGATIVE
HBV SURFACE AB SER-ACNC: POSITIVE M[IU]/ML
HBV SURFACE AG SERPL QL IA: NEGATIVE
HCT VFR BLD AUTO: 35 %
HEPATITIS A ANTIBODY, IGG: POSITIVE
HGB BLD-MCNC: 11.4 G/DL
IMM GRANULOCYTES # BLD AUTO: 0.01 K/UL
IMM GRANULOCYTES NFR BLD AUTO: 0.1 %
INR PPP: 1
LYMPHOCYTES # BLD AUTO: 4.3 K/UL
LYMPHOCYTES NFR BLD: 51.7 %
MCH RBC QN AUTO: 30.6 PG
MCHC RBC AUTO-ENTMCNC: 32.6 G/DL
MCV RBC AUTO: 94 FL
MONOCYTES # BLD AUTO: 0.5 K/UL
MONOCYTES NFR BLD: 6.1 %
NEUTROPHILS # BLD AUTO: 3.2 K/UL
NEUTROPHILS NFR BLD: 38.7 %
NRBC BLD-RTO: 0 /100 WBC
PLATELET # BLD AUTO: 181 K/UL
PMV BLD AUTO: 11.7 FL
POTASSIUM SERPL-SCNC: 4.1 MMOL/L
PROT SERPL-MCNC: 7.9 G/DL
PROTHROMBIN TIME: 11 SEC
RBC # BLD AUTO: 3.72 M/UL
SODIUM SERPL-SCNC: 139 MMOL/L
WBC # BLD AUTO: 8.33 K/UL

## 2017-12-27 PROCEDURE — 99215 OFFICE O/P EST HI 40 MIN: CPT | Mod: S$GLB,,, | Performed by: NURSE PRACTITIONER

## 2017-12-27 PROCEDURE — 83036 HEMOGLOBIN GLYCOSYLATED A1C: CPT

## 2017-12-27 PROCEDURE — 99499 UNLISTED E&M SERVICE: CPT | Mod: S$GLB,,, | Performed by: NURSE PRACTITIONER

## 2017-12-27 PROCEDURE — 87340 HEPATITIS B SURFACE AG IA: CPT

## 2017-12-27 PROCEDURE — 36415 COLL VENOUS BLD VENIPUNCTURE: CPT

## 2017-12-27 PROCEDURE — 85610 PROTHROMBIN TIME: CPT

## 2017-12-27 PROCEDURE — 99999 PR PBB SHADOW E&M-EST. PATIENT-LVL V: CPT | Mod: PBBFAC,,, | Performed by: NURSE PRACTITIONER

## 2017-12-27 PROCEDURE — 80053 COMPREHEN METABOLIC PANEL: CPT

## 2017-12-27 PROCEDURE — 86704 HEP B CORE ANTIBODY TOTAL: CPT

## 2017-12-27 PROCEDURE — 82105 ALPHA-FETOPROTEIN SERUM: CPT

## 2017-12-27 PROCEDURE — 85025 COMPLETE CBC W/AUTO DIFF WBC: CPT

## 2017-12-27 PROCEDURE — 91200 LIVER ELASTOGRAPHY: CPT | Mod: S$GLB,,, | Performed by: NURSE PRACTITIONER

## 2017-12-27 PROCEDURE — 82728 ASSAY OF FERRITIN: CPT

## 2017-12-27 PROCEDURE — 86706 HEP B SURFACE ANTIBODY: CPT

## 2017-12-27 PROCEDURE — 86790 VIRUS ANTIBODY NOS: CPT

## 2017-12-27 NOTE — PROGRESS NOTES
I have reviewed and concur with the TYLER's history, physical, assessment, and plan.  I have personally interviewed and examined the patient at bedside.  See below addendum for my evaluation and additional findings.    68yo female referred for further evaluation of elevated liver tests.  Risk factors for fatty liver present.  During work-up, also found to have nodular contour of the liver along with hepatic steatosis.  Recommend full serologic work-up to exclude other causes of chronic liver disease as well as fibroscan.  Discussed importance of risk factor modification for GARCIA and encouraged patient to restart statin.  Patient would also benefit from increased physical activity to achieve weight loss.  If cirrhosis is confirmed by fibroscan, will discuss appropriate healthcare maintenance.      Patient will return to clinic with Rylee Irving NP

## 2017-12-27 NOTE — PROCEDURES
Procedures Fibroscan Procedure     Name: Tammi Farris  Date of Procedure : 2017   :: Rylee Irving NP  Diagnosis: NAFLD    Probe: M    Fibroscan readin.0 KPa    Fibrosis:F4     CAP readin dB/m    Steatosis: :S3

## 2017-12-27 NOTE — LETTER
December 27, 2017      Ariel Mullins MD  [address]  [place] [address]           [facility] - [facility]at[facility]  [address]  [facility] [address]  Phone: [phone]  Fax: [phone]          Patient: Tammi Farris   MR Number: [id number]   YOB: 1950   Date of Visit: 12/27/2017       Dear Dr. Ariel Mullins:    Thank you for referring Tammi Farris to me for evaluation. Attached you will find relevant portions of my assessment and plan of care.    If you have questions, please do not hesitate to call me. I look forward to following Tammi Farris along with you.    Sincerely,    Rylee Irving, NP    Enclosure  CC:  No Recipients    If you would like to receive this communication electronically, please contact [email or web address] or [phone] to request more information on [employer] [facility] access.    For providers and/or their staff who would like to refer a patient to [facility], please contact us through our one-stop-shop provider referral line, [facility], at [phone].    If you feel you have received this communication in error or would no longer like to receive these types of communications, please e-mail [email or web address]

## 2017-12-27 NOTE — PROGRESS NOTES
[facility] VISIT NEW PT NOTE    REFERRING PROVIDER: Ariel Mullins MD     CHIEF COMPLAINT: elevated liver enzymes, fatty liver    HPI: This is a 67 y.o. White female with PMH as below referred for evaluation of elevated liver enzymes and fatty liver. She has had fatty liver noted on u/s since 2015. Her recent labs showed mildly elevated transaminases. Repeat u/s then done and showed fatty liver with nodular liver contour. Spleen not evaluated on limited exam. She has risk factors for NAFLD with obesity, DM, HLD, and HTN. she denies any significant alcohol intake. No family h/o liver disease. She has normal liver functioning. Transaminases now 40-50's, previous enzymes essentially normal since 2007. Hep C Ab negative but hep B never tested. Plts low nl at 167 but last checked in 3/2016. She reports vaccines for hep B done in past with work. She is originally from [place]. She denies any symptoms of advanced chronic liver disease including jaundice, dark urine, abdominal distention, hematemesis, melena, slowed mentation. She does report she stopped her PPI and has now been having lots of reflux. She was asymptomatic when taking it. She was recently started on Crestor but stopped this as well because she was concerned about her liver.        Review of patient's allergies indicates:   Allergen Reactions    Iodine and iodide containing products Hives    Benadryl [diphenhydramine hcl] Anxiety     Pt states she feels jumpy and anxious when taking.    Darvon [propoxyphene] Nausea Only    Shellfish containing products Hives    Lisinopril Other (See Comments)     cough    Propoxyphene hcl      Itchy (skin)^    Tizanidine Other (See Comments)     Sweaty, difficulty swallowing       Current Outpatient Prescriptions on File Prior to Visit   Medication Sig Dispense Refill    allopurinol (ZYLOPRIM) 100 MG tablet Take 1 tablet (100 mg total) by mouth once daily. To help prevent gout. Wait until current  flare is gone, then start this medication. 90 tablet 1    bisoprolol-hydrochlorothiazide (ZIAC) 10-6.25 mg per tablet TAKE 1 TABLET BY MOUTH EVERY DAY 90 tablet 0    blood sugar diagnostic Strp Check blood sugar daily. Contour next strip or other brand that is covered under medicare and matches glucometer 260 strip 0    cholestyramine-aspartame (CHOLESTYRAMINE LIGHT) 4 gram PwPk Take 1 packet (4 g total) by mouth 2 (two) times daily. Give other medications an hour before or four hours after this one. 60 packet 3    ciclopirox (LOPROX) 0.77 % Crea Apply topically to affected area(s) bid. 90 g 3    clobetasol (TEMOVATE) 0.05 % cream APPLY TO AFFECTED AREA NIGHTLY FOR 4 WEEKS THEN EVERY OTHER DAY FOR THE NEXT 2 WEEKS 30 g 0    clotrimazole (LOTRIMIN) 1 % cream Apply topically 2 (two) times daily. To area of rash on breasts 60 g 1    colchicine 0.6 mg tablet TAKE 1 TABLET(0.6 MG) BY MOUTH ONCE EVERY DAY. START ON THIS MEDICATION FIRST, TO HELP PREVENT GOUT FLARES 30 tablet 0    cyanocobalamin, vitamin B-12, 2,000 mcg Tab Take 2,000 mcg by mouth once daily. 30 tablet 3    lancets Misc Use with Contour Next. Test once daily. 100 each 3    losartan (COZAAR) 50 MG tablet TAKE 1 TABLET(25 MG) BY MOUTH EVERY DAY - for blood pressure 90 tablet 3    meloxicam (MOBIC) 7.5 MG tablet Take 1 tablet (7.5 mg total) by mouth once daily. 10 tablet 0    metformin (GLUCOPHAGE) 500 MG tablet Take 2 tabs (1000 mg)  in am and 500 mg ( 1 tab) in pm with food 270 tablet 3    omeprazole (PRILOSEC) 20 MG capsule Take 1 capsule (20 mg total) by mouth once daily. 90 capsule 3    triamcinolone acetonide 0.1% (KENALOG) 0.1 % cream Apply to affected areas BID after cool blow dry 1 Bottle 1    rosuvastatin (CRESTOR) 5 MG tablet Take 1 tablet (5 mg total) by mouth once daily. 90 tablet 3    [DISCONTINUED] peg 3350-electrolytes-vit C (MOVIPREP) 100-7.5-2.691 gram PwPk Take 1 packet by mouth as directed. 1 each 0     No current  "facility-administered medications on file prior to visit.        PMHX:  has a past medical history of Allergy; Basal cell carcinoma; Diabetes mellitus, type 2; GERD (gastroesophageal reflux disease); Hyperlipidemia; Hypertension; Joint pain; and Psoriasis.    PSHX:  has a past surgical history that includes Skin cancer destruction; Carpal tunnel release; Oophorectomy; abdominal laproscopic surgery; Cholecystectomy; Appendectomy; Hysterectomy (age 25); and Upper gastrointestinal endoscopy.    FAMILY HISTORY: Negative for liver disease, reviewed in [facility]    SOCIAL HISTORY:   History   Smoking Status    Never Smoker   Smokeless Tobacco    Never Used       History   Alcohol Use    1.2 - 3.6 oz/week    1 Glasses of wine, 1 Cans of beer per week     Comment: rarely       History   Drug Use No     She is retired. No children. Originally from [place].    ROS:   GENERAL: Denies fever, chills, weight loss/gain, fatigue  HEENT: Denies headaches, dizziness, vision/hearing changes  CARDIOVASCULAR: Denies chest pain, palpitations, or edema  RESPIRATORY: Denies dyspnea, cough  GI: Denies abdominal pain, rectal bleeding, nausea, vomiting. No change in bowel pattern or color  : Denies dysuria, hematuria   SKIN: Denies rash, itching   NEURO: Denies confusion, memory loss, or mood changes  PSYCH: Denies depression or anxiety  HEME/LYMPH: Denies easy bruising or bleeding        PHYSICAL EXAM:   Friendly White female, in no acute distress; alert and oriented to person, place and time  VITALS: BP (!) 140/76 (BP Location: Left arm, Patient Position: Sitting)   Pulse 72   Temp 97.7 °F (36.5 °C) (Oral)   Resp 18   Ht 4' 11" (1.499 m)   Wt 76.3 kg (168 lb 3.4 oz)   SpO2 97%   BMI 33.97 kg/m²   HENT: Normocephalic, without obvious abnormality. Oral mucosa pink and moist. Dentition good.  EYES: Sclerae anicteric. No conjunctival pallor.   NECK: Supple. No masses or cervical adenopathy.  CARDIOVASCULAR: Regular rate and rhythm. No " murmurs.  RESPIRATORY: Normal respiratory effort. BBS CTA. No wheezes or crackles.  GI: Soft, non-tender, non-distended. No hepatosplenomegaly. No masses palpable. No ascites.  EXTREMITIES:  No clubbing, cyanosis or edema.  SKIN: Warm and dry. No jaundice. No rashes noted to exposed skin. No telangectasias noted. (+) palmar erythema.  NEURO:  Normal gate. No asterixis.  PSYCH:  Memory intact. Thought and speech pattern appropriate. Behavior normal. No depression or anxiety noted.      RECENT LABS:    Labs:  Lab Results   Component Value Date    WBC 9.21 03/04/2016    HGB 12.3 03/04/2016    HCT 38.0 03/04/2016     03/04/2016    CHOL 196 07/07/2017    TRIG 214 (H) 07/07/2017    HDL 30 (L) 07/07/2017     07/07/2017    K 4.5 07/07/2017    CREATININE 0.9 07/07/2017    ALT 47 (H) 07/07/2017    AST 53 (H) 07/07/2017    ALKPHOS 71 07/07/2017    BILITOT 0.5 07/07/2017    ALBUMIN 3.9 07/07/2017    INR 1.0 06/02/2007       DIAGNOSTIC STUDIES:  EGD- 11/14/14  Impression:           - Normal examined duodenum.                        - Erythematous mucosa in the gastric fundus,                         gastric body, antrum and prepyloric region of the                         stomach. Biopsied.                        - 1cm sliding Hiatus hernia. C0M0.5 columnar                         appearing distal esophageal mucosa. Bxed. No                         esophagitis and no other lesions with NBI or white                         light seen.Biopsied.    COLONOSCOPY- declined    ABD. U/S-  11/10/17  Findings: Visualized portions of the pancreas are unremarkable.    The hepatic parenchyma other diffusely increased echotexture with nodular contour of the liver.  There is no evidence intrahepatic biliary ductal dilatation.    There is a gallbladder has been removed. The common duct is within normal limits at 4.8 mm.    The right kidney measures 10.4 cm in length.  There is no hydronephrosis.   Impression       There is hepatic  steatosis.       ASSESSMENT:  67 y.o. White female with:  1.  Elevated liver enzymes, likely due to NAFLD  -- will check hep B and ferritin  -- u/s shows fatty liver  -- hep C negative  2. NAFLD  -- transaminases now mildly elevated, previously normal  -- US shows fatty liver  -- synthetic liver function nl  -- risk factors for fatty liver include obesity, DM, HTN, HLD  3. Abnormal finding on imaging - nodular liver contour  -- discussed this is concerning for underlying cirrhosis. Will do Fibroscan today to assess  4. HLD  -- advised pt to restart statin  5. GERD  -- advised her to restart PPI      EDUCATION:   We discussed the manifestations of NAFLD. At this time there is no FDA approved therapy for NAFLD.   The patient and I discussed the importance of diet, exercise, and weight loss for management of NAFLD. We discussed a low fat, low carb/low sugar, high fiber diet, and a goal of 30 minutes of exercise 5 times per week.   Pt is aware that fatty liver can progress to steatohepatitis and possibly to cirrhosis, putting one at increased risk for liver cancer, liver failure, and death.      Discussed implications of a cirrhosis diagnosis with HCC screenings and EGD and why it is important for us to determine if pt's have cirrhosis so they can be properly monitored for potential complications.      Discussed GARCIA fibrosis/cirrhosis clinical trials that are currently enrolling.         PLAN:  1. Labs today  2. Fibroscan today  3. Weight loss goal of 15-20 lbs  4. Low fat, low cholesterol, low carb, high fiber diet  5. Exercise, 5 days a week, 30 min a day  6. Recommend good control of cholesterol, blood pressure, blood sugar levels  7. Restart Crestor and omeprazole  8. Will check immunity markers for HBV/HAV and arrange for vaccination if needed.   9. Follow up pending fibroscan results       Thank you for allowing me to participate in the care of LANNY Lima    Addendum: Fibroscan  today = F4, cirrhosis. Discussed results with pt. Reassured her that liver is functioning fine but she does have cirrhosis based on our workup. She should avoid alcohol completely going forward. She will need repeat EGD. Will add AFP to labs. Next HCC screening due 5/2018. Pt not interested in GARCIA cirrhosis clinical trials at this time. Will rediscuss at next visit. Tylenol/acetaminophen as needed for pain, up to 2000 mg daily. Avoid NSAIDS. She will f/u in 3 months.

## 2017-12-27 NOTE — PATIENT INSTRUCTIONS
1. You have cirrhosis from non alcoholic fatty liver disease  2. You need liver cancer screenings every 6 months, next due 5/2018  3. EGD to screen for varices (enlarged blood vessels in esophagus). Will arrange for you to have this on [facility]  4. Will check immunity markers for hepatitis A and B and arrange for vaccination if needed.   5. Restart cholesterol medication, Crestor, and omeprazole  6. Weight loss goal of 10-15 lbs  7. Low fat, low cholesterol, low carb, high fiber diet  8. Exercise, 5 days a week, 30 min a day  9. Recommend good control of cholesterol, blood pressure, blood sugar levels  10. Can try Truvia and Stevia instead of splenda for artificial sweetener  11. Tylenol/acetaminophen as needed for pain, up to 2000 mg daily. Avoid other over the counter pain meds  12. No alcohol or raw seafood.   13. Follow up in 3 months         Cirrhosis    The liver is found on the right side of your belly (abdomen). It is just below the rib cage. The liver has many important jobs. It removes toxins from the blood. It also helps your blood clot to stop bleeding. Cirrhosis happens when the liver is scarred or injured. This damage is permanent. It can cause your liver to stop working (liver failure).   The two most common causes of cirrhosis are long-term heavy alcohol use and having hepatitis B or C. Other things that can damage the liver include toxins, certain medicines, and certain viruses.  Common symptoms of cirrhosis include:  · Tiredness or weakness  · Loss of appetite  · Nausea and vomiting  · Easy bleeding and bruising  · Swelling of the belly (abdomen)  · Weight loss  · Yellowing of the eyes or skin (jaundice)  · Itching  · Confusion  Treatment helps ease symptoms and prevent more liver damage. You may also get treatment to fight the hepatitis virus. Quitting alcohol will help slow down the disease getting worse. It may also prevent more complications. If cirrhosis gets worse and becomes life  threatening, you may need a liver transplant.   Home care  · Don't take medicines that can make liver damage worse. Your healthcare provider will tell you if any of the medicines you now take need to be changed. Talk with your provider before taking any medicine not prescribed. These include dietary supplements and herbs. Some of these may make liver damage worse.  · Talk with your healthcare provider about medicines that have acetaminophen or NSAIDs such as ibuprofen and naproxen. These can also harm your liver.   · Stop drinking alcohol. If you find it hard to stop drinking, seek professional help. Consider joining Alcoholics Anonymous or another type of treatment program for support.  · If you use IV drugs, you are at high risk for hepatitis B and C. Seek help to stop.   · Be sure to ask your healthcare provider about recommended vaccines. These include vaccines for viruses that can cause liver disease.  Follow-up care  Follow up with your healthcare provider or as advised by our staff.  For more information and to learn about support groups for people with liver disease, contact:  · [employer], www.liverfoundation.org, 575.916.1740  · [employer], www.hepfi.org, 507.564.1572  When to seek medical advice  Call your healthcare provider right away if you have any of the following:  · Rapid weight gain with increased size of your belly (abdomen) or leg swelling  · Yellow color of your skin or eyes (jaundice) gets worse  · Excess bleeding from cuts or injuries  Date Last Reviewed: 6/22/2015  © 1839-5642 [employer]. [address] [place] [address], [place] [address]. All rights reserved. This information is not intended as a substitute for professional medical care. Always follow your healthcare professional's instructions.        Treating Cirrhosis  Cirrhosis is a condition where the liver is damaged. Scar tissue slowly replaces healthy tissue. Treatment can control or slow  liver scarring. Follow your healthcare providers instructions closely to get the most out of your treatment. And ask your family and friends for support.  Making a treatment plan  You and your healthcare provider will decide on a treatment plan thats best for you. The plan may include one or more of the following:  · Not drinking alcohol. Heavy alcohol use can damage the liver. Once the liver is damaged, even a small amount of alcohol can cause problems. You can slow down the cirrhosis getting worse if you stop all alcohol use.   · Taking medicines. You may need to take these to treat some causes of cirrhosis. These include infection or a bile duct blockage. You may also need medicine to help your blood clot. And you may need medicine if your immune system is attacking the liver or bile ducts. You should not take certain other medicines if you have cirrhosis. These include NSAIDs such as ibuprofen and naproxen.   · Treating symptoms. Cirrhosis can cause swelling in your stomach and legs. A low-salt diet can help ease this symptom. So can taking water pills (diuretics).  · Eating healthy foods  · Losing extra weight. This is especially important if you are overweight, or have diabetes, high blood pressure, or high cholesterol and triglycerides. Controlling these condition can slow down the cirrhosis getting worse. Exercise can help you lose weight.   · Removing iron from your blood. Sometimes the amount of iron in your liver is higher than normal. Your doctor may remove extra iron from your blood.  Severe cases of cirrhosis may need special treatments. Your healthcare provider can discuss them with you.  Vaccines  Be sure to ask your healthcare provider about recommended vaccines. These include vaccines for viruses than can cause liver disease.  Don't drink alcohol  Alcohol use can destroy liver cells. If you have problems quitting alcohol, ask your healthcare provider to get the support you need. Your healthcare  provider may be able to suggest local groups that can help you stop drinking.   Date Last Reviewed: 6/1/2016 © 2000-2017 [employer]. [address] [place] [address], [place] [address]. All rights reserved. This information is not intended as a substitute for professional medical care. Always follow your healthcare professional's instructions.        Understanding Cirrhosis    Cirrhosis is a chronic (lifelong) liver problem. It results from damaged and scarred liver tissue. Cirrhosis cant be cured. But it can be treated. Your healthcare provider can tell you more.  The liver  The liver is a large organ in the upper right part of the belly. A healthy liver metabolizes proteins, carbohydrates, and fats. It makes a digestive fluid called bile and removes toxins from the blood. The liver is also involved in the blood-clotting process.  When you have cirrhosis  When you have cirrhosis, your liver becomes damaged and scarred. The liver doesnt function as it should. In some cases, cirrhosis can lead to liver failure. If it does, your healthcare provider will tell you whether you may need a liver transplant. You can slow down the progression of cirrhosis if you stop all alcohol use. Also, if you have metabolic problems like being overweight, diabetes, high blood pressure, or high cholesterol and triglycerides, you can also slow down the progression of cirrhosis by trying to improve those diseases.   Causes of cirrhosis  Cirrhosis causes include the following:  · Alcohol use  · Viral liver infections, such as hepatitis  · Chronic bile duct blockage  · Certain inherited diseases that can result in too much copper or iron being stored in the liver  · Certain medicines  · Nonalcoholic fatty liver disease  · Autoimmune disease  Common signs and symptoms  Typical cirrhosis signs and symptoms include the following:  · Fatigue, weakness, and lack of appetite  · Vomiting with or without blood  · Weight loss or weight  gain  · Yellowish skin and eyes (jaundice)  · Itching  · Swollen belly and legs  · Intestinal bleeding  · Easy bruising of the skin  · Dilated veins in the esophagus and stomach  · Poor mental function  Date Last Reviewed: 6/1/2016  © 8668-4195 [employer]. [address] [place] [address]. All rights reserved. This information is not intended as a substitute for professional medical care. Always follow your healthcare professional's instructions.

## 2017-12-28 ENCOUNTER — TELEPHONE (OUTPATIENT)
Dept: HEPATOLOGY | Facility: CLINIC | Age: 67
End: 2017-12-28

## 2017-12-28 NOTE — TELEPHONE ENCOUNTER
MA mailed pt results.    PARI    ----- Message from Rylee Irving NP sent at 12/28/2017  7:58 AM CST -----  Please inform pt she is immune to both hep A and B already and will not need any vaccines. Liver functioning normal. Work on weight loss and f/u with me in 3 months as scheduled.

## 2018-01-05 ENCOUNTER — CLINICAL SUPPORT (OUTPATIENT)
Dept: OPHTHALMOLOGY | Facility: CLINIC | Age: 68
End: 2018-01-05
Attending: OPTOMETRIST
Payer: MEDICARE

## 2018-01-05 ENCOUNTER — OFFICE VISIT (OUTPATIENT)
Dept: OPTOMETRY | Facility: CLINIC | Age: 68
End: 2018-01-05
Attending: OPTOMETRIST
Payer: MEDICARE

## 2018-01-05 DIAGNOSIS — H25.13 NUCLEAR SCLEROSIS OF BOTH EYES: ICD-10-CM

## 2018-01-05 DIAGNOSIS — H53.40 VISUAL FIELD DEFECT: ICD-10-CM

## 2018-01-05 DIAGNOSIS — H40.003 GLAUCOMA SUSPECT, BOTH EYES: Primary | ICD-10-CM

## 2018-01-05 DIAGNOSIS — H40.003 GLAUCOMA SUSPECT, BOTH EYES: ICD-10-CM

## 2018-01-05 DIAGNOSIS — H25.043 POSTERIOR SUBCAPSULAR AGE-RELATED CATARACT OF BOTH EYES: ICD-10-CM

## 2018-01-05 PROCEDURE — 92083 EXTENDED VISUAL FIELD XM: CPT | Mod: S$GLB,,, | Performed by: OPTOMETRIST

## 2018-01-05 PROCEDURE — 92133 CPTRZD OPH DX IMG PST SGM ON: CPT | Mod: S$GLB,,, | Performed by: OPTOMETRIST

## 2018-01-05 PROCEDURE — 99999 PR PBB SHADOW E&M-EST. PATIENT-LVL II: CPT | Mod: PBBFAC,,, | Performed by: OPTOMETRIST

## 2018-01-05 PROCEDURE — 92012 INTRM OPH EXAM EST PATIENT: CPT | Mod: S$GLB,,, | Performed by: OPTOMETRIST

## 2018-01-05 NOTE — PATIENT INSTRUCTIONS
Prior diagnosis of glaucoma suspect based on the finding of larger-than-average optic nerve head cup-to-disc ratio in both eyes, with asymmetry (greater in the left eye).  Note apparent slight increase in the C/D ratio in the right eye, per estimated C/D ratio on previous eye examination.  High-normal ntraocular pressure in each eye today.    HVF test (24-2 MERLY Standard) and OCT RNFL thickness analysis done today   Reviewed results of HVF test (24-2 MERLY Standard) done 01/04/2018.  Refer to results in OIS.  Results appear reliable x 3, OD and OS.       OD  Multiple focal superior focal visual field defects - suggestive of early glaucoma, but not overtly glaucomatous     OS  Normal visual field    Discussed with Ms. Farris.    Continue to monitor. Return in six months with repeat HVF test.  Orders entered

## 2018-01-07 NOTE — PROGRESS NOTES
HPI     Concerns About Ocular Health    Additional comments: 1 month f/u            Comments   Patient in for progress check.  1 month f/u with HVF/ OCT   Being followed for (diagnosis):   Glaucoma suspect     Date last seen:  12/04/2017    Doctor last seen:  Dr. Reese     Prescribed eye medications(s) using:      OTC eye medication(s) using:      Signs/symptoms of condition resolved/better/stable/worse?:      Allergies/Medications reviewed today?  Yes              Last edited by Eugene Ji on 1/5/2018  9:12 AM. (History)            Assessment /Plan     For exam results, see Encounter Report.    1. Glaucoma suspect, both eyes  [facility]ado Visual Field - OU - Extended - Both Eyes   2. Nuclear sclerosis of both eyes     3. Posterior subcapsular age-related cataract of both eyes     4. Visual field defect                  Prior diagnosis of glaucoma suspect based on the finding of larger-than-average optic nerve head cup-to-disc ratio in both eyes, with asymmetry (greater in the left eye).  Note apparent slight increase in the C/D ratio in the right eye, per estimated C/D ratio on previous eye examination.  High-normal ntraocular pressure in each eye today.    HVF test (24-2 MERLY Standard) and OCT RNFL thickness analysis done today   Reviewed results of HVF test (24-2 MERLY Standard) done 01/04/2018.  Refer to results in OIS.  Results appear reliable x 3, OD and OS.       OD  Multiple focal superior focal visual field defects - suggestive of early glaucoma, but not overtly glaucomatous     OS  Normal visual field    Discussed with MsCuate Kaleigh.    Continue to monitor. Return in six months with repeat HVF test.  Orders entered

## 2018-01-10 NOTE — TELEPHONE ENCOUNTER
On review, patient did decide to schedule with hepatology, was seen 12/27, further testing did show nodular liver contour concerning for cirrhosis, with F4 on fibroscan confirming cirrhosis. Will need repeat EGD, regular HCC screening, avoiding any more than 2000mg/day tylenol, avoiding NSAIDs. She will f/u with hepatology again in 3 months.

## 2018-01-12 ENCOUNTER — PES CALL (OUTPATIENT)
Dept: ADMINISTRATIVE | Facility: CLINIC | Age: 68
End: 2018-01-12

## 2018-02-01 ENCOUNTER — LAB VISIT (OUTPATIENT)
Dept: LAB | Facility: HOSPITAL | Age: 68
End: 2018-02-01
Attending: INTERNAL MEDICINE
Payer: MEDICARE

## 2018-02-01 DIAGNOSIS — M10.9 INTERVAL GOUT: ICD-10-CM

## 2018-02-01 DIAGNOSIS — Z79.899 ENCOUNTER FOR MONITORING LONG-TERM PROTON PUMP INHIBITOR THERAPY: ICD-10-CM

## 2018-02-01 DIAGNOSIS — E11.9 TYPE 2 DIABETES MELLITUS WITHOUT COMPLICATION, WITHOUT LONG-TERM CURRENT USE OF INSULIN: ICD-10-CM

## 2018-02-01 DIAGNOSIS — R79.89 ELEVATED LFTS: ICD-10-CM

## 2018-02-01 DIAGNOSIS — Z51.81 ENCOUNTER FOR MONITORING LONG-TERM PROTON PUMP INHIBITOR THERAPY: ICD-10-CM

## 2018-02-01 DIAGNOSIS — Z91.89 AT RISK FOR SEXUALLY TRANSMITTED DISEASE DUE TO PARTNER WITH MULTIPLE PARTNERS: ICD-10-CM

## 2018-02-01 LAB
25(OH)D3+25(OH)D2 SERPL-MCNC: 32 NG/ML
ALBUMIN SERPL BCP-MCNC: 4 G/DL
ALP SERPL-CCNC: 67 U/L
ALT SERPL W/O P-5'-P-CCNC: 38 U/L
ANION GAP SERPL CALC-SCNC: 11 MMOL/L
AST SERPL-CCNC: 49 U/L
BILIRUB SERPL-MCNC: 0.5 MG/DL
BUN SERPL-MCNC: 13 MG/DL
CALCIUM SERPL-MCNC: 9.7 MG/DL
CHLORIDE SERPL-SCNC: 105 MMOL/L
CO2 SERPL-SCNC: 26 MMOL/L
CREAT SERPL-MCNC: 0.9 MG/DL
EST. GFR  (AFRICAN AMERICAN): >60 ML/MIN/1.73 M^2
EST. GFR  (NON AFRICAN AMERICAN): >60 ML/MIN/1.73 M^2
ESTIMATED AVG GLUCOSE: 140 MG/DL
GLUCOSE SERPL-MCNC: 124 MG/DL
HBA1C MFR BLD HPLC: 6.5 %
HBV CORE AB SERPL QL IA: NEGATIVE
HBV SURFACE AB SER-ACNC: POSITIVE M[IU]/ML
HBV SURFACE AG SERPL QL IA: NEGATIVE
MAGNESIUM SERPL-MCNC: 1.9 MG/DL
POTASSIUM SERPL-SCNC: 4.7 MMOL/L
PROT SERPL-MCNC: 7.7 G/DL
SODIUM SERPL-SCNC: 142 MMOL/L
URATE SERPL-MCNC: 6.1 MG/DL
VIT B12 SERPL-MCNC: >2000 PG/ML

## 2018-02-01 PROCEDURE — 83036 HEMOGLOBIN GLYCOSYLATED A1C: CPT

## 2018-02-01 PROCEDURE — 84550 ASSAY OF BLOOD/URIC ACID: CPT

## 2018-02-01 PROCEDURE — 86706 HEP B SURFACE ANTIBODY: CPT

## 2018-02-01 PROCEDURE — 36415 COLL VENOUS BLD VENIPUNCTURE: CPT

## 2018-02-01 PROCEDURE — 80053 COMPREHEN METABOLIC PANEL: CPT

## 2018-02-01 PROCEDURE — 82306 VITAMIN D 25 HYDROXY: CPT

## 2018-02-01 PROCEDURE — 83735 ASSAY OF MAGNESIUM: CPT

## 2018-02-01 PROCEDURE — 82607 VITAMIN B-12: CPT

## 2018-02-01 PROCEDURE — 86704 HEP B CORE ANTIBODY TOTAL: CPT

## 2018-02-01 PROCEDURE — 87340 HEPATITIS B SURFACE AG IA: CPT

## 2018-02-07 ENCOUNTER — OFFICE VISIT (OUTPATIENT)
Dept: INTERNAL MEDICINE | Facility: CLINIC | Age: 68
End: 2018-02-07
Payer: MEDICARE

## 2018-02-07 VITALS
HEART RATE: 68 BPM | WEIGHT: 168 LBS | SYSTOLIC BLOOD PRESSURE: 128 MMHG | HEIGHT: 59 IN | BODY MASS INDEX: 33.87 KG/M2 | DIASTOLIC BLOOD PRESSURE: 60 MMHG

## 2018-02-07 DIAGNOSIS — L40.9 PSORIASIS: Primary | ICD-10-CM

## 2018-02-07 DIAGNOSIS — L43.9 LICHEN PLANUS: ICD-10-CM

## 2018-02-07 DIAGNOSIS — K74.60 CIRRHOSIS OF LIVER WITHOUT ASCITES, UNSPECIFIED HEPATIC CIRRHOSIS TYPE: ICD-10-CM

## 2018-02-07 DIAGNOSIS — E11.9 TYPE 2 DIABETES MELLITUS WITHOUT COMPLICATION, WITHOUT LONG-TERM CURRENT USE OF INSULIN: ICD-10-CM

## 2018-02-07 DIAGNOSIS — E78.5 HYPERLIPIDEMIA, UNSPECIFIED HYPERLIPIDEMIA TYPE: ICD-10-CM

## 2018-02-07 DIAGNOSIS — K21.9 GASTROESOPHAGEAL REFLUX DISEASE, ESOPHAGITIS PRESENCE NOT SPECIFIED: ICD-10-CM

## 2018-02-07 DIAGNOSIS — B36.9 FUNGAL SKIN INFECTION: ICD-10-CM

## 2018-02-07 DIAGNOSIS — I10 ESSENTIAL HYPERTENSION: ICD-10-CM

## 2018-02-07 DIAGNOSIS — M10.9 INTERVAL GOUT: ICD-10-CM

## 2018-02-07 PROCEDURE — 3044F HG A1C LEVEL LT 7.0%: CPT | Mod: CPTII,S$GLB,, | Performed by: INTERNAL MEDICINE

## 2018-02-07 PROCEDURE — 99499 UNLISTED E&M SERVICE: CPT | Mod: S$GLB,,, | Performed by: INTERNAL MEDICINE

## 2018-02-07 PROCEDURE — 99214 OFFICE O/P EST MOD 30 MIN: CPT | Mod: S$GLB,,, | Performed by: INTERNAL MEDICINE

## 2018-02-07 PROCEDURE — 3078F DIAST BP <80 MM HG: CPT | Mod: CPTII,S$GLB,, | Performed by: INTERNAL MEDICINE

## 2018-02-07 PROCEDURE — 99999 PR PBB SHADOW E&M-EST. PATIENT-LVL III: CPT | Mod: PBBFAC,,, | Performed by: INTERNAL MEDICINE

## 2018-02-07 PROCEDURE — 3074F SYST BP LT 130 MM HG: CPT | Mod: CPTII,S$GLB,, | Performed by: INTERNAL MEDICINE

## 2018-02-07 RX ORDER — CLOTRIMAZOLE 1 %
CREAM (GRAM) TOPICAL 2 TIMES DAILY
Qty: 113 G | Refills: 1 | Status: SHIPPED | OUTPATIENT
Start: 2018-02-07 | End: 2019-03-08 | Stop reason: SDUPTHER

## 2018-02-07 NOTE — PATIENT INSTRUCTIONS
The creams you are using for your skin:  Clobetasol is a steroid cream that you should use for the psoriasis.    Clotrimazole (lotrimin) is an anti-fungal cream you should use in skin folds (under breast, etc) to keep from getting a fungal rash.    https://[email or web address]/travel/destinations/traveler/vfr/[email or web address]?s_cid=[email or web address]-travel-[email or web address]    Use this website to look up information for travelers in [place].

## 2018-02-07 NOTE — PROGRESS NOTES
Subjective:       Patient ID: Tammi Farris is a 67 y.o. female.    Chief Complaint: Follow-up and Hypertension    HPI  66 y/o woman with HTN, DM2, GERD, gout here for follow-up visit.    HTN - taking bisoprolol-HCTZ daily. No headache, vision changes, chest pain, or dyspnea.    DM2 - last A1c 6.5 2/2018. Checking BG at home, runs 110-120s.  Taking 1000mg qAM, 500mg qPM of metformin. Tolerating this well.  Eye exam done 12/4/17 and 1/5/18  Foot exam done 7/2017  Microalbumin done 7/2017  Started crestor 5mg    Elevated LFTs, hepatic steatosis/fibrosis concerning for cirrhosis - mild persistent elevation at last visit. Had ultrasound done 11/10/17 showing fatty liver; nodular liver contour noted concerning for cirrhosis. Referred to Hepatology for further evaluation, had fibroscan with F4 fibrosis noted, S3 steatosis. Will continue to follow with hepatology for this.   Immune to hep A/B.   LFTs improved on recent labs  Avoiding alcohol and tylenol generally; generally didn't drink much previously.    H/o gout - hasn't wanted to take allopurinol, doesn't want to start this. Not currently taking colchicine as she hasn't had a flare since last visit.  Working on avoiding foods that would increase uric acid - especially processed meats.   Uric acid level 6.1 on recent labs    GERD - was on omeprazole, tried stopping this but symptoms return within a few days. Has run out of this.   Taking vitamin B12 2000mcg; B12 level high on recent labs.  Vitamin D normal    Mild normocytic anemia noted on labs 12/27/17.    Psoriasis - using clobetasol cream as needed for this, though sometimes gets clobetasol and clotrimazole confused. Following with Dermatology. Some scaling behind L ear, on mid-back.     H/o BCC on forehead - following with dermatology, due for follow up in 5/2018    FIT test 11/2017, negative    Review of Systems   Constitutional: Negative for activity change and fever.   HENT: Negative.  Negative for congestion.   "  Eyes: Negative for visual disturbance.   Respiratory: Negative for cough and shortness of breath.    Cardiovascular: Negative for chest pain, palpitations and leg swelling.   Gastrointestinal: Negative for abdominal pain, diarrhea, nausea and vomiting.   Endocrine: Negative.  Negative for polyuria.   Genitourinary: Negative.    Musculoskeletal: Negative for arthralgias and joint swelling.   Skin: Negative for rash.   Neurological: Negative for weakness and numbness.   Psychiatric/Behavioral: Negative for dysphoric mood.         Past medical history, surgical history, and family medical history reviewed and updated as appropriate.    Medications and allergies reviewed.     Objective:          Vitals:    02/07/18 0949   BP: 128/60   BP Location: Right arm   Patient Position: Sitting   BP Method: Medium (Manual)   Pulse: 68   Weight: 76.2 kg (167 lb 15.9 oz)   Height: 4' 11" (1.499 m)     Body mass index is 33.93 kg/m².  Physical Exam   Constitutional: She is oriented to person, place, and time. She appears well-developed and well-nourished. No distress.   HENT:   Head: Normocephalic and atraumatic.   Mouth/Throat: Oropharynx is clear and moist.   Eyes: Conjunctivae and EOM are normal. Pupils are equal, round, and reactive to light. No scleral icterus.   Neck: Neck supple.   Cardiovascular: Normal rate, regular rhythm and normal heart sounds.    No murmur heard.  Pulmonary/Chest: Effort normal and breath sounds normal. No respiratory distress.   Abdominal: Soft. Bowel sounds are normal. She exhibits no distension. There is no tenderness. There is no guarding.   +abdominal obesity   Musculoskeletal: Normal range of motion. She exhibits no edema or tenderness.   Lymphadenopathy:     She has no cervical adenopathy.   Neurological: She is alert and oriented to person, place, and time. No cranial nerve deficit or sensory deficit. Gait normal.   Skin: Skin is warm and dry.   +scaling erythematous rash behind L ear (2-3cm " area)  +scaling erythematous rash mid-back 4-5cm area   Psychiatric: She has a normal mood and affect.   Vitals reviewed.      Lab Results   Component Value Date    WBC 8.33 12/27/2017    HGB 11.4 (L) 12/27/2017    HCT 35.0 (L) 12/27/2017     12/27/2017    CHOL 196 07/07/2017    TRIG 214 (H) 07/07/2017    HDL 30 (L) 07/07/2017    ALT 38 02/01/2018    AST 49 (H) 02/01/2018     02/01/2018    K 4.7 02/01/2018     02/01/2018    CREATININE 0.9 02/01/2018    BUN 13 02/01/2018    CO2 26 02/01/2018    TSH 1.277 03/03/2017    INR 1.0 12/27/2017    HGBA1C 6.5 (H) 02/01/2018       Assessment:       1. Psoriasis    2. Fungal skin infection    3. Lichen planus    4. Hyperlipidemia, unspecified hyperlipidemia type    5. Essential hypertension    6. Type 2 diabetes mellitus without complication, without long-term current use of insulin    7. Gastroesophageal reflux disease, esophagitis presence not specified    8. Cirrhosis of liver without ascites, unspecified hepatic cirrhosis type    9. Interval gout        Plan:   Tammi was seen today for follow-up and hypertension.    Diagnoses and all orders for this visit:    Psoriasis  Fungal skin infection  -     clotrimazole (LOTRIMIN) 1 % cream; Apply topically 2 (two) times daily. To area of rash on breasts and in fold of skin (for rash from skin fungus)  Lichen planus -   Continue topical steroid, following with Dermatology    Hyperlipidemia, unspecified hyperlipidemia type  - continue statin  -     Comprehensive metabolic panel; Future  -     Lipid panel; Future    Essential hypertension - at goal, continue current medications  -     Comprehensive metabolic panel; Future    Type 2 diabetes mellitus without complication, without long-term current use of insulin - at goal, DM HCM up to date  -     CBC auto differential; Future  -     Comprehensive metabolic panel; Future  -     Hemoglobin A1c; Future    Gastroesophageal reflux disease, esophagitis presence not  specified  - continue PPI, will continue to monitor    Cirrhosis of liver without ascites, unspecified hepatic cirrhosis type - continue to follow with hepatology  -     CBC auto differential; Future  -     Comprehensive metabolic panel; Future    Interval gout - stable, will recheck next labs  -     Uric acid; Future    Health maintenance reviewed with patient.     Follow-up in about 5 months (around 7/7/2018) for diabetes, hypertension, annual physical.    Ariel Mullins MD  Internal Medicine  [facility]  2/7/2018

## 2018-02-25 DIAGNOSIS — E11.9 TYPE 2 DIABETES MELLITUS WITHOUT COMPLICATION, WITHOUT LONG-TERM CURRENT USE OF INSULIN: ICD-10-CM

## 2018-02-26 RX ORDER — METFORMIN HYDROCHLORIDE 500 MG/1
TABLET ORAL
Qty: 270 TABLET | Refills: 3 | Status: SHIPPED | OUTPATIENT
Start: 2018-02-26 | End: 2019-02-25 | Stop reason: SDUPTHER

## 2018-02-27 ENCOUNTER — OFFICE VISIT (OUTPATIENT)
Dept: INTERNAL MEDICINE | Facility: CLINIC | Age: 68
End: 2018-02-27
Payer: MEDICARE

## 2018-02-27 ENCOUNTER — HOSPITAL ENCOUNTER (OUTPATIENT)
Dept: RADIOLOGY | Facility: HOSPITAL | Age: 68
Discharge: HOME | End: 2018-02-27
Attending: NURSE PRACTITIONER
Payer: MEDICARE

## 2018-02-27 VITALS
SYSTOLIC BLOOD PRESSURE: 124 MMHG | HEART RATE: 68 BPM | DIASTOLIC BLOOD PRESSURE: 56 MMHG | OXYGEN SATURATION: 98 % | TEMPERATURE: 99 F | BODY MASS INDEX: 33.43 KG/M2 | WEIGHT: 165.81 LBS | HEIGHT: 59 IN

## 2018-02-27 DIAGNOSIS — R30.0 DYSURIA: ICD-10-CM

## 2018-02-27 DIAGNOSIS — R10.9 FLANK PAIN: ICD-10-CM

## 2018-02-27 DIAGNOSIS — R10.9 FLANK PAIN: Primary | ICD-10-CM

## 2018-02-27 DIAGNOSIS — N30.00 ACUTE CYSTITIS WITHOUT HEMATURIA: ICD-10-CM

## 2018-02-27 LAB
BILIRUB SERPL-MCNC: NORMAL MG/DL
BLOOD URINE, POC: 250
COLOR, POC UA: NORMAL
GLUCOSE UR QL STRIP: NORMAL
KETONES UR QL STRIP: NORMAL
LEUKOCYTE ESTERASE URINE, POC: NORMAL
NITRITE, POC UA: NORMAL
PH, POC UA: 5
PROTEIN, POC: 100
SPECIFIC GRAVITY, POC UA: 1.01
UROBILINOGEN, POC UA: NORMAL

## 2018-02-27 PROCEDURE — 1159F MED LIST DOCD IN RCRD: CPT | Mod: S$GLB,,, | Performed by: NURSE PRACTITIONER

## 2018-02-27 PROCEDURE — 74018 RADEX ABDOMEN 1 VIEW: CPT | Mod: 26,,, | Performed by: RADIOLOGY

## 2018-02-27 PROCEDURE — 1125F AMNT PAIN NOTED PAIN PRSNT: CPT | Mod: S$GLB,,, | Performed by: NURSE PRACTITIONER

## 2018-02-27 PROCEDURE — 81002 URINALYSIS NONAUTO W/O SCOPE: CPT | Mod: S$GLB,,, | Performed by: NURSE PRACTITIONER

## 2018-02-27 PROCEDURE — 87086 URINE CULTURE/COLONY COUNT: CPT

## 2018-02-27 PROCEDURE — 99999 PR PBB SHADOW E&M-EST. PATIENT-LVL V: CPT | Mod: PBBFAC,,, | Performed by: NURSE PRACTITIONER

## 2018-02-27 PROCEDURE — 99213 OFFICE O/P EST LOW 20 MIN: CPT | Mod: 25,S$GLB,, | Performed by: NURSE PRACTITIONER

## 2018-02-27 PROCEDURE — 3008F BODY MASS INDEX DOCD: CPT | Mod: S$GLB,,, | Performed by: NURSE PRACTITIONER

## 2018-02-27 PROCEDURE — 74018 RADEX ABDOMEN 1 VIEW: CPT | Mod: TC

## 2018-02-27 PROCEDURE — 87077 CULTURE AEROBIC IDENTIFY: CPT

## 2018-02-27 PROCEDURE — 87088 URINE BACTERIA CULTURE: CPT

## 2018-02-27 PROCEDURE — 87186 SC STD MICRODIL/AGAR DIL: CPT

## 2018-02-27 RX ORDER — PHENAZOPYRIDINE HYDROCHLORIDE 100 MG/1
100 TABLET, FILM COATED ORAL 3 TIMES DAILY PRN
Qty: 15 TABLET | Refills: 0 | Status: SHIPPED | OUTPATIENT
Start: 2018-02-27 | End: 2018-03-04

## 2018-02-27 RX ORDER — CIPROFLOXACIN 250 MG/1
250 TABLET, FILM COATED ORAL 2 TIMES DAILY
Qty: 10 TABLET | Refills: 0 | Status: SHIPPED | OUTPATIENT
Start: 2018-02-27 | End: 2018-03-04

## 2018-02-27 NOTE — PROGRESS NOTES
INTERNAL MEDICINE PROGRESS/URGENT CARE NOTE    CHIEF COMPLAINT     Chief Complaint   Patient presents with    Flank Pain     R side     Dysuria     x3 days     Nausea       HPI     Tammi Farris is a 67 y.o. female with depression, psoriasis, lichen planus, HTN, HLD, DM, GERD, and cirrhosis who presents for an urgent visit today.  She is an established pt of Dr. Mullins.     Here with c/o flank pain to the right side, dysuria and nausea x 3 days   Pain tot he right flank radiating around to the abd and into the groin. Pain is intermittent. Described as cramping. No bloo din urine.   +nausea.   +dysuria x 3 days.   +urianry frequency.     Past Medical History:  Past Medical History:   Diagnosis Date    Allergy     Basal cell carcinoma     Cirrhosis of liver without ascites 12/27/2017    Diabetes mellitus, type 2     GERD (gastroesophageal reflux disease)     Hyperlipidemia     Hypertension     Joint pain     Psoriasis        Home Medications:  Prior to Admission medications    Medication Sig Start Date End Date Taking? Authorizing Provider   allopurinol (ZYLOPRIM) 100 MG tablet Take 1 tablet (100 mg total) by mouth once daily. To help prevent gout. Wait until current flare is gone, then start this medication. 11/29/16  Yes Ariel Mullins MD   bisoprolol-hydrochlorothiazide (ZIAC) 10-6.25 mg per tablet TAKE 1 TABLET BY MOUTH EVERY DAY 12/19/17  Yes Ariel Mullins MD   blood sugar diagnostic Strp Check blood sugar daily. Contour next strip or other brand that is covered under medicare and matches glucometer 8/31/17  Yes Ariel Mullins MD   cholestyramine-aspartame (CHOLESTYRAMINE LIGHT) 4 gram PwPk Take 1 packet (4 g total) by mouth 2 (two) times daily. Give other medications an hour before or four hours after this one. 8/19/16  Yes Latasha Patterson NP   clobetasol (TEMOVATE) 0.05 % cream APPLY TO AFFECTED AREA NIGHTLY FOR 4 WEEKS THEN EVERY OTHER DAY FOR THE NEXT 2 WEEKS 11/8/17  Yes Ariel  LINSEY Mullins MD   clotrimazole (LOTRIMIN) 1 % cream Apply topically 2 (two) times daily. To area of rash on breasts and in fold of skin (for rash from skin fungus) 2/7/18  Yes Ariel Mullins MD   colchicine 0.6 mg tablet TAKE 1 TABLET(0.6 MG) BY MOUTH ONCE EVERY DAY. START ON THIS MEDICATION FIRST, TO HELP PREVENT GOUT FLARES 12/8/17  Yes Ariel Mullins MD   cyanocobalamin, vitamin B-12, 2,000 mcg Tab Take 2,000 mcg by mouth once daily. 8/22/16  Yes Latasha Patterson NP   lancets Misc Use with Contour Next. Test once daily. 11/11/16  Yes Ariel Mullins MD   losartan (COZAAR) 50 MG tablet TAKE 1 TABLET(25 MG) BY MOUTH EVERY DAY - for blood pressure 3/3/17  Yes Ariel Mullins MD   meloxicam (MOBIC) 7.5 MG tablet Take 1 tablet (7.5 mg total) by mouth once daily. 10/20/16  Yes Giovanna Robison MD   metFORMIN (GLUCOPHAGE) 500 MG tablet TAKE 2 TABLETS BY MOUTH EVERY MORNING AND 1 TABLET BY MOUTH EVERY EVENING WITH FOOD 2/26/18  Yes Ariel Mullins MD   omeprazole (PRILOSEC) 20 MG capsule Take 1 capsule (20 mg total) by mouth once daily. 11/8/17  Yes Ariel Mullins MD   rosuvastatin (CRESTOR) 5 MG tablet Take 1 tablet (5 mg total) by mouth once daily. 11/8/17 11/8/18 Yes Ariel Mullins MD   triamcinolone acetonide 0.1% (KENALOG) 0.1 % cream Apply to affected areas BID after cool blow dry 11/24/17  Yes Breann Orellana MD       Review of Systems:  Review of Systems   Constitutional: Positive for chills and fever (subjective ). Negative for diaphoresis and unexpected weight change.   Respiratory: Negative for cough, shortness of breath and wheezing.    Cardiovascular: Negative for chest pain and palpitations.   Gastrointestinal: Positive for abdominal pain, diarrhea (3 days ago x 1 day, resolved. ) and nausea. Negative for constipation and vomiting.   Genitourinary: Positive for difficulty urinating, dysuria, flank pain, frequency, pelvic pain and urgency. Negative for hematuria, vaginal  "bleeding, vaginal discharge and vaginal pain.   Skin: Negative for rash.   Neurological: Negative for dizziness, light-headedness and headaches.       Health Maintainence:   Immunizations:  Health Maintenance       Date Due Completion Date    Mammogram 07/01/2018 7/1/2016    Foot Exam 07/07/2018 7/7/2017    Override on 7/7/2016: Done (see podiatry note from that date)    Override on 3/29/2016: Done    Override on 3/20/2015: Done    Lipid Panel 07/07/2018 7/7/2017    Urine Microalbumin 07/07/2018 7/7/2017    Hemoglobin A1c 08/01/2018 2/1/2018    Fecal Occult Blood Test (FOBT)/FitKit 11/29/2018 11/29/2017    Eye Exam 01/05/2019 1/5/2018    Override on 1/31/2013: Done    Pneumococcal (65+) (2 of 2 - PPSV23) 10/20/2019 11/6/2015    DEXA SCAN 03/09/2021 3/9/2017    TETANUS VACCINE 01/08/2023 1/8/2013           PHYSICAL EXAM     BP (!) 124/56 (BP Location: Left arm, Patient Position: Sitting, BP Method: Large (Manual))   Pulse 68   Temp 98.6 °F (37 °C) (Oral)   Ht 4' 11" (1.499 m)   Wt 75.2 kg (165 lb 12.6 oz)   SpO2 98%   BMI 33.48 kg/m²     Physical Exam   Constitutional: She is oriented to person, place, and time. She appears well-developed and well-nourished.   HENT:   Head: Normocephalic.   Right Ear: External ear normal.   Left Ear: External ear normal.   Nose: Nose normal.   Mouth/Throat: Oropharynx is clear and moist. No oropharyngeal exudate.   Eyes: Pupils are equal, round, and reactive to light.   Neck: Neck supple. No JVD present. No tracheal deviation present. No thyromegaly present.   Cardiovascular: Normal rate, regular rhythm, normal heart sounds and intact distal pulses.  Exam reveals no gallop and no friction rub.    No murmur heard.  Pulmonary/Chest: Effort normal and breath sounds normal. No respiratory distress. She has no wheezes. She has no rales.   Abdominal: Soft. Bowel sounds are normal. She exhibits no distension. There is tenderness. There is CVA tenderness (right).   Musculoskeletal: " Normal range of motion. She exhibits no edema or tenderness.   Lymphadenopathy:     She has no cervical adenopathy.   Neurological: She is alert and oriented to person, place, and time.   Skin: Skin is warm and dry. No rash noted.   Psychiatric: She has a normal mood and affect. Her behavior is normal.   Vitals reviewed.      LABS     Lab Results   Component Value Date    HGBA1C 6.5 (H) 02/01/2018     CMP  Sodium   Date Value Ref Range Status   02/01/2018 142 136 - 145 mmol/L Final     Potassium   Date Value Ref Range Status   02/01/2018 4.7 3.5 - 5.1 mmol/L Final     Chloride   Date Value Ref Range Status   02/01/2018 105 95 - 110 mmol/L Final     CO2   Date Value Ref Range Status   02/01/2018 26 23 - 29 mmol/L Final     Glucose   Date Value Ref Range Status   02/01/2018 124 (H) 70 - 110 mg/dL Final     BUN, Bld   Date Value Ref Range Status   02/01/2018 13 8 - 23 mg/dL Final     Creatinine   Date Value Ref Range Status   02/01/2018 0.9 0.5 - 1.4 mg/dL Final     Calcium   Date Value Ref Range Status   02/01/2018 9.7 8.7 - 10.5 mg/dL Final     Total Protein   Date Value Ref Range Status   02/01/2018 7.7 6.0 - 8.4 g/dL Final     Albumin   Date Value Ref Range Status   02/01/2018 4.0 3.5 - 5.2 g/dL Final     Total Bilirubin   Date Value Ref Range Status   02/01/2018 0.5 0.1 - 1.0 mg/dL Final     Comment:     For infants and newborns, interpretation of results should be based  on gestational age, weight and in agreement with clinical  observations.  Premature Infant recommended reference ranges:  Up to 24 hours.............<8.0 mg/dL  Up to 48 hours............<12.0 mg/dL  3-5 days..................<15.0 mg/dL  6-29 days.................<15.0 mg/dL       Alkaline Phosphatase   Date Value Ref Range Status   02/01/2018 67 55 - 135 U/L Final     AST   Date Value Ref Range Status   02/01/2018 49 (H) 10 - 40 U/L Final     ALT   Date Value Ref Range Status   02/01/2018 38 10 - 44 U/L Final     Anion Gap   Date Value Ref  Range Status   02/01/2018 11 8 - 16 mmol/L Final     eGFR if    Date Value Ref Range Status   02/01/2018 >60 >60 mL/min/1.73 m^2 Final     eGFR if non    Date Value Ref Range Status   02/01/2018 >60 >60 mL/min/1.73 m^2 Final     Comment:     Calculation used to obtain the estimated glomerular filtration  rate (eGFR) is the CKD-EPI equation.        Lab Results   Component Value Date    WBC 8.33 12/27/2017    HGB 11.4 (L) 12/27/2017    HCT 35.0 (L) 12/27/2017    MCV 94 12/27/2017     12/27/2017     Lab Results   Component Value Date    CHOL 196 07/07/2017    CHOL 192 08/22/2016    CHOL 180 07/01/2016     Lab Results   Component Value Date    HDL 30 (L) 07/07/2017    HDL 30 (L) 08/22/2016    HDL 29 (L) 07/01/2016     Lab Results   Component Value Date    LDLCALC 123.2 07/07/2017    LDLCALC 110.8 08/22/2016    LDLCALC 111.0 07/01/2016     Lab Results   Component Value Date    TRIG 214 (H) 07/07/2017    TRIG 256 (H) 08/22/2016    TRIG 200 (H) 07/01/2016     Lab Results   Component Value Date    CHOLHDL 15.3 (L) 07/07/2017    CHOLHDL 15.6 (L) 08/22/2016    CHOLHDL 16.1 (L) 07/01/2016     Lab Results   Component Value Date    TSH 1.277 03/03/2017       ASSESSMENT/PLAN     Tammi Farris is a 67 y.o. female with  Past Medical History:   Diagnosis Date    Allergy     Basal cell carcinoma     Cirrhosis of liver without ascites 12/27/2017    Diabetes mellitus, type 2     GERD (gastroesophageal reflux disease)     Hyperlipidemia     Hypertension     Joint pain     Psoriasis      Flank pain- will send for KUB to r/o kidney stones.   -     X-Ray Abdomen AP 1 View; Future; Expected date: 02/27/2018    Dysuria=poct with +leuks see below   -     POCT urine dipstick without microscope  -     Urine culture    Acute cystitis without hematuria- vo tart cipro bid and pyridium as needed   -     ciprofloxacin HCl (CIPRO) 250 MG tablet; Take 1 tablet (250 mg total) by mouth 2 (two) times  daily.  Dispense: 10 tablet; Refill: 0  -     phenazopyridine (PYRIDIUM) 100 MG tablet; Take 1 tablet (100 mg total) by mouth 3 (three) times daily as needed for Pain.  Dispense: 15 tablet; Refill: 0          Follow up with PCP for annual or bf as needed     Patient education provided from Barbara. Patient was counseled on when and how to seek emergent care.       Evi SORIANO, DOMINIQUE, FNP-c   Department of Internal Medicine - [facility]  9:44 AM

## 2018-03-01 LAB — BACTERIA UR CULT: NORMAL

## 2018-03-02 ENCOUNTER — TELEPHONE (OUTPATIENT)
Dept: INTERNAL MEDICINE | Facility: CLINIC | Age: 68
End: 2018-03-02

## 2018-03-02 NOTE — TELEPHONE ENCOUNTER
----- Message from Evi Flores NP sent at 3/2/2018  7:42 AM CST -----  Pt's urine + for bacteria which is responsive to the Cipro started at visit

## 2018-03-02 NOTE — TELEPHONE ENCOUNTER
----- Message from Josselyn Barnes sent at 3/2/2018 12:18 PM CST -----  Contact: Patient [phone]  Patient is returning a missed call.    Please call and advise.    Thank you

## 2018-03-02 NOTE — TELEPHONE ENCOUNTER
Informed pt of lab and xray results, pt had no questions and states that she is feeling much better since taking medication

## 2018-03-14 DIAGNOSIS — I10 ESSENTIAL HYPERTENSION: ICD-10-CM

## 2018-03-14 RX ORDER — LOSARTAN POTASSIUM 50 MG/1
TABLET ORAL
Qty: 90 TABLET | Refills: 0 | Status: SHIPPED | OUTPATIENT
Start: 2018-03-14 | End: 2018-06-14 | Stop reason: SDUPTHER

## 2018-03-19 ENCOUNTER — TELEPHONE (OUTPATIENT)
Dept: HEPATOLOGY | Facility: CLINIC | Age: 68
End: 2018-03-19

## 2018-03-19 NOTE — TELEPHONE ENCOUNTER
MA attempted to call patient to inform her that NP will not be in clinic on 3/29 we have to reschedule her appt to a different date. She is unable to reached left her DETAILED VM about the appt. Ask her to please call us back to reschedule. PJ

## 2018-03-20 DIAGNOSIS — I10 ESSENTIAL HYPERTENSION: ICD-10-CM

## 2018-03-20 RX ORDER — BISOPROLOL FUMARATE AND HYDROCHLOROTHIAZIDE 10; 6.25 MG/1; MG/1
TABLET ORAL
Qty: 90 TABLET | Refills: 3 | Status: SHIPPED | OUTPATIENT
Start: 2018-03-20 | End: 2019-03-08 | Stop reason: SDUPTHER

## 2018-03-24 DIAGNOSIS — E11.9 TYPE 2 DIABETES MELLITUS WITHOUT COMPLICATION, WITHOUT LONG-TERM CURRENT USE OF INSULIN: Primary | ICD-10-CM

## 2018-03-26 RX ORDER — LANCETS 33 GAUGE
EACH MISCELLANEOUS
Qty: 100 EACH | Refills: 3 | Status: SHIPPED | OUTPATIENT
Start: 2018-03-26 | End: 2019-04-15 | Stop reason: SDUPTHER

## 2018-06-01 DIAGNOSIS — E11.9 TYPE 2 DIABETES MELLITUS WITHOUT COMPLICATION, WITHOUT LONG-TERM CURRENT USE OF INSULIN: ICD-10-CM

## 2018-06-03 NOTE — TELEPHONE ENCOUNTER
Rec'd automated refill request for a glucometer and strips. Please check with patient to see if she does need these, and if so what brand meter/strips her insurance covers (if this needs to be specified, or if she has a preference).   She is also due for follow up / EPP, and needs to reschedule her hepatology follow up visit as well.  Labs ordered to be done before her visit with me.   Please contact her to clarify re: rx and help to schedule.

## 2018-06-04 NOTE — TELEPHONE ENCOUNTER
Called pt left  requesting return call.     Rec'd automated refill request for a glucometer and strips. Please check with patient to see if she does need these, and if so what brand meter/strips her insurance covers (if this needs to be specified, or if she has a preference).

## 2018-06-05 DIAGNOSIS — E11.9 TYPE 2 DIABETES MELLITUS WITHOUT COMPLICATION, WITHOUT LONG-TERM CURRENT USE OF INSULIN: ICD-10-CM

## 2018-06-06 RX ORDER — CALCIUM CITRATE/VITAMIN D3 200MG-6.25
TABLET ORAL
Qty: 300 STRIP | Refills: 0 | Status: SHIPPED | OUTPATIENT
Start: 2018-06-06 | End: 2019-02-26 | Stop reason: SDUPTHER

## 2018-06-11 RX ORDER — BLOOD-GLUCOSE METER
EACH MISCELLANEOUS
Qty: 1 EACH | Refills: 0 | Status: SHIPPED | OUTPATIENT
Start: 2018-06-11 | End: 2021-01-12

## 2018-06-11 RX ORDER — CALCIUM CITRATE/VITAMIN D3 200MG-6.25
TABLET ORAL
Qty: 300 STRIP | Refills: 0 | Status: SHIPPED | OUTPATIENT
Start: 2018-06-11 | End: 2019-02-26 | Stop reason: SDUPTHER

## 2018-06-14 DIAGNOSIS — I10 ESSENTIAL HYPERTENSION: ICD-10-CM

## 2018-06-14 RX ORDER — LOSARTAN POTASSIUM 50 MG/1
TABLET ORAL
Qty: 90 TABLET | Refills: 0 | Status: SHIPPED | OUTPATIENT
Start: 2018-06-14 | End: 2018-09-14 | Stop reason: SDUPTHER

## 2018-07-27 ENCOUNTER — OFFICE VISIT (OUTPATIENT)
Dept: HEPATOLOGY | Facility: CLINIC | Age: 68
End: 2018-07-27
Payer: MEDICARE

## 2018-07-27 ENCOUNTER — TELEPHONE (OUTPATIENT)
Dept: HEPATOLOGY | Facility: CLINIC | Age: 68
End: 2018-07-27

## 2018-07-27 ENCOUNTER — LAB VISIT (OUTPATIENT)
Dept: LAB | Facility: HOSPITAL | Age: 68
End: 2018-07-27
Payer: MEDICARE

## 2018-07-27 VITALS
RESPIRATION RATE: 18 BRPM | TEMPERATURE: 97 F | HEART RATE: 63 BPM | DIASTOLIC BLOOD PRESSURE: 65 MMHG | BODY MASS INDEX: 33.06 KG/M2 | WEIGHT: 164 LBS | OXYGEN SATURATION: 96 % | HEIGHT: 59 IN | SYSTOLIC BLOOD PRESSURE: 141 MMHG

## 2018-07-27 DIAGNOSIS — K74.60 CIRRHOSIS OF LIVER WITHOUT ASCITES, UNSPECIFIED HEPATIC CIRRHOSIS TYPE: Primary | ICD-10-CM

## 2018-07-27 DIAGNOSIS — R74.8 ELEVATED LIVER ENZYMES: ICD-10-CM

## 2018-07-27 DIAGNOSIS — K74.60 CIRRHOSIS OF LIVER WITHOUT ASCITES, UNSPECIFIED HEPATIC CIRRHOSIS TYPE: ICD-10-CM

## 2018-07-27 DIAGNOSIS — I10 ESSENTIAL HYPERTENSION: ICD-10-CM

## 2018-07-27 DIAGNOSIS — E11.9 TYPE 2 DIABETES MELLITUS WITHOUT COMPLICATION, WITHOUT LONG-TERM CURRENT USE OF INSULIN: ICD-10-CM

## 2018-07-27 DIAGNOSIS — E66.09 CLASS 1 OBESITY DUE TO EXCESS CALORIES WITH BODY MASS INDEX (BMI) OF 33.0 TO 33.9 IN ADULT, UNSPECIFIED WHETHER SERIOUS COMORBIDITY PRESENT: ICD-10-CM

## 2018-07-27 DIAGNOSIS — K76.0 NAFLD (NONALCOHOLIC FATTY LIVER DISEASE): ICD-10-CM

## 2018-07-27 DIAGNOSIS — E78.5 HYPERLIPIDEMIA, UNSPECIFIED HYPERLIPIDEMIA TYPE: ICD-10-CM

## 2018-07-27 LAB
AFP SERPL-MCNC: 2.5 NG/ML
ALBUMIN SERPL BCP-MCNC: 3.8 G/DL
ALP SERPL-CCNC: 94 U/L
ALT SERPL W/O P-5'-P-CCNC: 44 U/L
ANION GAP SERPL CALC-SCNC: 8 MMOL/L
AST SERPL-CCNC: 50 U/L
BASOPHILS # BLD AUTO: 0.02 K/UL
BASOPHILS NFR BLD: 0.3 %
BILIRUB SERPL-MCNC: 0.6 MG/DL
BUN SERPL-MCNC: 17 MG/DL
CALCIUM SERPL-MCNC: 10.3 MG/DL
CHLORIDE SERPL-SCNC: 107 MMOL/L
CO2 SERPL-SCNC: 24 MMOL/L
CREAT SERPL-MCNC: 0.9 MG/DL
DIFFERENTIAL METHOD: ABNORMAL
EOSINOPHIL # BLD AUTO: 0.3 K/UL
EOSINOPHIL NFR BLD: 4 %
ERYTHROCYTE [DISTWIDTH] IN BLOOD BY AUTOMATED COUNT: 13.8 %
EST. GFR  (AFRICAN AMERICAN): >60 ML/MIN/1.73 M^2
EST. GFR  (NON AFRICAN AMERICAN): >60 ML/MIN/1.73 M^2
GLUCOSE SERPL-MCNC: 178 MG/DL
HCT VFR BLD AUTO: 35.8 %
HGB BLD-MCNC: 11.5 G/DL
IMM GRANULOCYTES # BLD AUTO: 0.01 K/UL
IMM GRANULOCYTES NFR BLD AUTO: 0.1 %
INR PPP: 1
LYMPHOCYTES # BLD AUTO: 4 K/UL
LYMPHOCYTES NFR BLD: 51.6 %
MCH RBC QN AUTO: 30.1 PG
MCHC RBC AUTO-ENTMCNC: 32.1 G/DL
MCV RBC AUTO: 94 FL
MONOCYTES # BLD AUTO: 0.5 K/UL
MONOCYTES NFR BLD: 7 %
NEUTROPHILS # BLD AUTO: 2.8 K/UL
NEUTROPHILS NFR BLD: 37 %
NRBC BLD-RTO: 0 /100 WBC
PLATELET # BLD AUTO: 192 K/UL
PMV BLD AUTO: 12.1 FL
POTASSIUM SERPL-SCNC: 5 MMOL/L
PROT SERPL-MCNC: 8.1 G/DL
PROTHROMBIN TIME: 10.8 SEC
RBC # BLD AUTO: 3.82 M/UL
SODIUM SERPL-SCNC: 139 MMOL/L
WBC # BLD AUTO: 7.66 K/UL

## 2018-07-27 PROCEDURE — 80053 COMPREHEN METABOLIC PANEL: CPT

## 2018-07-27 PROCEDURE — 3078F DIAST BP <80 MM HG: CPT | Mod: CPTII,S$GLB,, | Performed by: NURSE PRACTITIONER

## 2018-07-27 PROCEDURE — 3044F HG A1C LEVEL LT 7.0%: CPT | Mod: CPTII,S$GLB,, | Performed by: NURSE PRACTITIONER

## 2018-07-27 PROCEDURE — 99999 PR PBB SHADOW E&M-EST. PATIENT-LVL V: CPT | Mod: PBBFAC,,, | Performed by: NURSE PRACTITIONER

## 2018-07-27 PROCEDURE — 99499 UNLISTED E&M SERVICE: CPT | Mod: ,,, | Performed by: NURSE PRACTITIONER

## 2018-07-27 PROCEDURE — 3077F SYST BP >= 140 MM HG: CPT | Mod: CPTII,S$GLB,, | Performed by: NURSE PRACTITIONER

## 2018-07-27 PROCEDURE — 85025 COMPLETE CBC W/AUTO DIFF WBC: CPT

## 2018-07-27 PROCEDURE — 82105 ALPHA-FETOPROTEIN SERUM: CPT

## 2018-07-27 PROCEDURE — 99499 UNLISTED E&M SERVICE: CPT | Mod: S$GLB,,, | Performed by: NURSE PRACTITIONER

## 2018-07-27 PROCEDURE — 99214 OFFICE O/P EST MOD 30 MIN: CPT | Mod: S$GLB,,, | Performed by: NURSE PRACTITIONER

## 2018-07-27 PROCEDURE — 85610 PROTHROMBIN TIME: CPT

## 2018-07-27 PROCEDURE — 36415 COLL VENOUS BLD VENIPUNCTURE: CPT

## 2018-07-27 NOTE — TELEPHONE ENCOUNTER
----- Message from Rylee Irving NP sent at 7/27/2018 12:11 PM CDT -----  Please inform pt that the labs are stable.

## 2018-07-27 NOTE — PROGRESS NOTES
[facility] Established Patient Visit    Reason for Visit:  F/u cirrhosis    PCP: Ariel Mullins    HPI:  This is a 68 y.o. female here for f/u of newly diagnosed well compensated cirrhosis due to NAFLD. She was initially seen 12/2017 and diagnosed with cirrhosis at that visit based on u/s with nodular liver contour and Fibroscan = F4. She was supposed to return in 3 months but was in [place] and [place]. She returns today. She has had mildly elevated transaminases on labs. Normal liver functioning. She has risk factors for NAFLD with obesity, DM, HLD, and HTN. She denies any significant alcohol intake. She is immune to hep A and B per labs. Has not had repeat EGD that was ordered at initial visit. She is overdue for labs and u/s again. She has not restarted her Crestor due to the cost of the medication. She has a f/u with PCP soon and discuss alternative treatments.  She denies any symptoms of advanced chronic liver disease including jaundice, dark urine, abdominal distention, hematemesis, melena, slowed mentation.  She denies any symptoms of advanced chronic liver disease including jaundice, dark urine, abdominal distention, hematemesis, melena, slowed mentation.        ROS:   GENERAL: Denies fever, chills, weight loss/gain, fatigue  HEENT: Denies headaches, dizziness, vision/hearing changes  CARDIOVASCULAR: Denies chest pain, palpitations, or edema  RESPIRATORY: Denies dyspnea, cough  GI: Denies abdominal pain, rectal bleeding, nausea, vomiting. No change in bowel pattern or color  : Denies dysuria, hematuria   SKIN: Denies rash, itching   NEURO: Denies confusion, memory loss, or mood changes  PSYCH: Denies depression or anxiety  HEME/LYMPH: Denies easy bruising or bleeding      PMHX:  has a past medical history of Allergy; Basal cell carcinoma; Cirrhosis of liver without ascites (12/27/2017); Diabetes mellitus, type 2; GERD (gastroesophageal reflux disease); Hyperlipidemia; Hypertension;  Joint pain; and Psoriasis.    PSHX:  has a past surgical history that includes Skin cancer destruction; Carpal tunnel release; Oophorectomy; abdominal laproscopic surgery; Cholecystectomy; Appendectomy; Hysterectomy (age 25); and Upper gastrointestinal endoscopy.    The patient's social and family histories were reviewed by me and updated in the appropriate section of the electronic medical record.    Review of patient's allergies indicates:   Allergen Reactions    Iodine and iodide containing products Hives    Benadryl [diphenhydramine hcl] Anxiety     Pt states she feels jumpy and anxious when taking.    Darvon [propoxyphene] Nausea Only    Shellfish containing products Hives    Lisinopril Other (See Comments)     cough    Propoxyphene hcl      Itchy (skin)^    Tizanidine Other (See Comments)     Sweaty, difficulty swallowing       Current Outpatient Prescriptions on File Prior to Visit   Medication Sig Dispense Refill    bisoprolol-hydrochlorothiazide (ZIAC) 10-6.25 mg per tablet TAKE 1 TABLET BY MOUTH EVERY DAY 90 tablet 3    clobetasol (TEMOVATE) 0.05 % cream APPLY TO AFFECTED AREA NIGHTLY FOR 4 WEEKS THEN EVERY OTHER DAY FOR THE NEXT 2 WEEKS 30 g 0    clotrimazole (LOTRIMIN) 1 % cream Apply topically 2 (two) times daily. To area of rash on breasts and in fold of skin (for rash from skin fungus) 113 g 1    cyanocobalamin, vitamin B-12, 2,000 mcg Tab Take 2,000 mcg by mouth once daily. 30 tablet 3    losartan (COZAAR) 50 MG tablet TAKE 1 TABLET BY MOUTH EVERY DAY FOR BLOOD PRESSURE 90 tablet 0    metFORMIN (GLUCOPHAGE) 500 MG tablet TAKE 2 TABLETS BY MOUTH EVERY MORNING AND 1 TABLET BY MOUTH EVERY EVENING WITH FOOD 270 tablet 3    omeprazole (PRILOSEC) 20 MG capsule Take 1 capsule (20 mg total) by mouth once daily. 90 capsule 3    triamcinolone acetonide 0.1% (KENALOG) 0.1 % cream Apply to affected areas BID after cool blow dry 1 Bottle 1    lancets Misc Use with Contour Next. Test once daily.  "100 each 3    TRUE METRIX GLUCOSE METER [facility] AS DIRECTED 1 each 0    TRUE METRIX GLUCOSE TEST STRIP Strp CHECK BLOOD SUGAR ONCE DAILY 300 strip 0    TRUE METRIX GLUCOSE TEST STRIP Strp CHECK BLOOD SUGAR ONCE DAILY 300 strip 0    TRUEPLUS LANCETS 33 gauge Misc USE TO TEST BLOOD SUGAR EVERY  each 3    [DISCONTINUED] allopurinol (ZYLOPRIM) 100 MG tablet Take 1 tablet (100 mg total) by mouth once daily. To help prevent gout. Wait until current flare is gone, then start this medication. 90 tablet 1    [DISCONTINUED] cholestyramine-aspartame (CHOLESTYRAMINE LIGHT) 4 gram PwPk Take 1 packet (4 g total) by mouth 2 (two) times daily. Give other medications an hour before or four hours after this one. 60 packet 3    [DISCONTINUED] colchicine 0.6 mg tablet TAKE 1 TABLET(0.6 MG) BY MOUTH ONCE EVERY DAY. START ON THIS MEDICATION FIRST, TO HELP PREVENT GOUT FLARES 30 tablet 0    [DISCONTINUED] meloxicam (MOBIC) 7.5 MG tablet Take 1 tablet (7.5 mg total) by mouth once daily. 10 tablet 0    [DISCONTINUED] rosuvastatin (CRESTOR) 5 MG tablet Take 1 tablet (5 mg total) by mouth once daily. 90 tablet 3     No current facility-administered medications on file prior to visit.          Objective Findings:    PHYSICAL EXAM:   Friendly White female, in no acute distress; alert and oriented to person, place and time  VITALS: BP (!) 141/65 (BP Location: Right arm, Patient Position: Sitting, BP Method: Medium (Automatic))   Pulse 63   Temp 97 °F (36.1 °C) (Oral)   Resp 18   Ht 4' 11" (1.499 m)   Wt 74.4 kg (164 lb 0.4 oz)   SpO2 96%   BMI 33.13 kg/m²   HENT: Normocephalic, without obvious abnormality. Oral mucosa pink and moist. Dentition good.  EYES: Sclerae anicteric. No conjunctival pallor.   NECK: Supple. No masses or cervical adenopathy.  CARDIOVASCULAR: Regular rate and rhythm. No murmurs.  RESPIRATORY: Normal respiratory effort. BBS CTA. No wheezes or crackles.  GI: Soft, non-tender, non-distended. No " hepatosplenomegaly. No masses palpable. No ascites.  EXTREMITIES:  No clubbing, cyanosis or edema.  SKIN: Warm and dry. No jaundice. No rashes noted to exposed skin. No telangectasias noted. No palmar erythema.  NEURO:  Normal gate. No asterixis.  PSYCH:  Memory intact. Thought and speech pattern appropriate. Behavior normal. No depression or anxiety noted.        Labs:  Lab Results   Component Value Date    WBC 7.66 07/27/2018    HGB 11.5 (L) 07/27/2018    HCT 35.8 (L) 07/27/2018     07/27/2018    CHOL 196 07/07/2017    TRIG 214 (H) 07/07/2017    HDL 30 (L) 07/07/2017     02/01/2018    K 4.7 02/01/2018    CREATININE 0.9 02/01/2018    ALT 38 02/01/2018    AST 49 (H) 02/01/2018    ALKPHOS 67 02/01/2018    BILITOT 0.5 02/01/2018    ALBUMIN 4.0 02/01/2018    INR 1.0 12/27/2017    AFP 3.1 12/27/2017       ASSESSMENT:  68 y.o. White female with:  1.  Cirrhosis, well compensated, diagnosed based on Fibroscan = F4, nodular liver on u/s  -- MELD = MELD-Na score: 6 at 12/27/2017  9:20 AM  MELD score: 6 at 12/27/2017  9:20 AM  Calculated from:  Serum Creatinine: 0.9 mg/dL (Rounded to 1) at 12/27/2017  9:20 AM  Serum Sodium: 139 mmol/L (Rounded to 137) at 12/27/2017  9:20 AM  Total Bilirubin: 0.5 mg/dL (Rounded to 1) at 12/27/2017  9:20 AM  INR(ratio): 1 at 12/27/2017  9:20 AM  Age: 67 years  -- HCC screening- AFP and abd. U/S - overdue for repeat  -- (+) Immunity to Hep A and B per labs  -- EGD - overdue for repeat  2. NAFLD  -- transaminases now mildly elevated, previously normal  -- US shows fatty liver  -- synthetic liver function nl  -- risk factors for fatty liver include obesity, DM, HTN, HLD  3. HLD  -- advised pt to restart statin after she discusses with PCP  -- reports cannot afford Crestor       EDUCATION:   The disease process and manifestations of cirrhosis were discussed.    Signs and symptoms of hepatic decompensation were reviewed, including jaundice, ascites, and slowed mentation due to hepatic  encephalopathy. The patient should seek medical attention if any of these things occur.  We discussed the potential for bleeding from esophageal varices with symptoms of hematemesis and melena. The patient should report to the Emergency Department for these symptoms.    We discussed the increased risk of hepatocellular carcinoma due to cirrhosis. Continued screening every six months with ultrasound and AFP is recommended.     No alcohol or raw seafood.  Tylenol/acetaminophen as needed for pain, up to 2000 mg daily    We discussed the manifestations of NAFLD. At this time there is no FDA approved therapy for NAFLD.   The patient and I discussed the importance of diet, exercise, and weight loss for management of NAFLD. We discussed a low fat, low carb/low sugar, high fiber diet, and a goal of 30 minutes of exercise 5 times per week.   Pt is aware that fatty liver can progress to steatohepatitis and possibly to cirrhosis, putting one at increased risk for liver cancer, liver failure, and death.        PLAN:  1. Labs today  2. Abd u/s  3. Repeat EGD  4. Weight loss goal of 15-20 lbs  5. Low fat, low cholesterol, low carb, high fiber, high protein diet  6. Exercise, 5 days a week, 30 min a day  7. Recommend good control of cholesterol, blood pressure, blood sugar levels  8. Pt to discuss cholesterol medicine with PCP at upcoming appt  9. Follow up in 1/2019 with u/s and labs same day if above results stable       Thank you for allowing me to participate in the care of LANNY Lima

## 2018-08-10 ENCOUNTER — HOSPITAL ENCOUNTER (OUTPATIENT)
Dept: RADIOLOGY | Facility: HOSPITAL | Age: 68
Discharge: HOME | End: 2018-08-10
Attending: NURSE PRACTITIONER
Payer: MEDICARE

## 2018-08-10 ENCOUNTER — TELEPHONE (OUTPATIENT)
Dept: HEPATOLOGY | Facility: CLINIC | Age: 68
End: 2018-08-10

## 2018-08-10 DIAGNOSIS — K74.60 CIRRHOSIS OF LIVER WITHOUT ASCITES, UNSPECIFIED HEPATIC CIRRHOSIS TYPE: ICD-10-CM

## 2018-08-10 PROCEDURE — 76700 US EXAM ABDOM COMPLETE: CPT | Mod: TC

## 2018-08-10 PROCEDURE — 76700 US EXAM ABDOM COMPLETE: CPT | Mod: 26,,, | Performed by: RADIOLOGY

## 2018-08-10 NOTE — TELEPHONE ENCOUNTER
----- Message from Rylee Irving NP sent at 8/10/2018  9:23 AM CDT -----  Please inform pt no mass on u/s

## 2018-09-06 ENCOUNTER — PATIENT OUTREACH (OUTPATIENT)
Dept: ADMINISTRATIVE | Facility: HOSPITAL | Age: 68
End: 2018-09-06

## 2018-09-06 DIAGNOSIS — Z12.39 BREAST CANCER SCREENING: Primary | ICD-10-CM

## 2018-09-06 NOTE — PROGRESS NOTES
Pt returned call.  Lab visit scheduled on 9/7/18 for labs ordered at last PCP appt.  Mammogram scheduled on 9/21/18 following PCP appointment.  Appointment reminder letter mailed.

## 2018-09-06 NOTE — PROGRESS NOTES
Pre-visit audit complete.  Pt due for labs ordered at last PCP appointment and mammogram.  Mammogram ordered per written order guidelines.  Attempted to contact pt to schedule lab and mammogram visit. Voice message left requesting a return call.

## 2018-09-07 ENCOUNTER — HOSPITAL ENCOUNTER (OUTPATIENT)
Dept: RADIOLOGY | Facility: HOSPITAL | Age: 68
Discharge: HOME | End: 2018-09-07
Attending: INTERNAL MEDICINE
Payer: MEDICARE

## 2018-09-07 VITALS — BODY MASS INDEX: 33.06 KG/M2 | HEIGHT: 59 IN | WEIGHT: 164 LBS

## 2018-09-07 DIAGNOSIS — Z12.39 BREAST CANCER SCREENING: ICD-10-CM

## 2018-09-07 PROCEDURE — 77067 SCR MAMMO BI INCL CAD: CPT | Mod: 26,,, | Performed by: RADIOLOGY

## 2018-09-07 PROCEDURE — 77067 SCR MAMMO BI INCL CAD: CPT | Mod: TC

## 2018-09-10 ENCOUNTER — TELEPHONE (OUTPATIENT)
Dept: RADIOLOGY | Facility: HOSPITAL | Age: 68
End: 2018-09-10

## 2018-09-10 NOTE — TELEPHONE ENCOUNTER
Spoke with patient and explained mammogram findings.Patient expressed understanding of results. Patient is scheduled for a abnormal mammogram follow up appointment [facility] on 9/11/18

## 2018-09-10 NOTE — TELEPHONE ENCOUNTER
----- Message from Sai Mccoy sent at 9/10/2018 10:26 AM CDT -----  Pt is returning missed call. Please call pt at [phone]

## 2018-09-11 ENCOUNTER — HOSPITAL ENCOUNTER (OUTPATIENT)
Dept: RADIOLOGY | Facility: HOSPITAL | Age: 68
Discharge: HOME | End: 2018-09-11
Attending: INTERNAL MEDICINE
Payer: MEDICARE

## 2018-09-11 DIAGNOSIS — R92.8 ABNORMAL MAMMOGRAM: ICD-10-CM

## 2018-09-11 PROCEDURE — 77061 BREAST TOMOSYNTHESIS UNI: CPT | Mod: TC,PO

## 2018-09-11 PROCEDURE — 77065 DX MAMMO INCL CAD UNI: CPT | Mod: TC,PO

## 2018-09-11 PROCEDURE — 77061 BREAST TOMOSYNTHESIS UNI: CPT | Mod: 26,,, | Performed by: RADIOLOGY

## 2018-09-11 PROCEDURE — 77065 DX MAMMO INCL CAD UNI: CPT | Mod: 26,,, | Performed by: RADIOLOGY

## 2018-09-14 DIAGNOSIS — I10 ESSENTIAL HYPERTENSION: ICD-10-CM

## 2018-09-14 RX ORDER — LOSARTAN POTASSIUM 50 MG/1
TABLET ORAL
Qty: 90 TABLET | Refills: 0 | Status: SHIPPED | OUTPATIENT
Start: 2018-09-14 | End: 2018-09-21 | Stop reason: SDUPTHER

## 2018-09-21 ENCOUNTER — OFFICE VISIT (OUTPATIENT)
Dept: INTERNAL MEDICINE | Facility: CLINIC | Age: 68
End: 2018-09-21
Payer: MEDICARE

## 2018-09-21 ENCOUNTER — IMMUNIZATION (OUTPATIENT)
Dept: INTERNAL MEDICINE | Facility: CLINIC | Age: 68
End: 2018-09-21
Payer: MEDICARE

## 2018-09-21 VITALS
DIASTOLIC BLOOD PRESSURE: 60 MMHG | OXYGEN SATURATION: 97 % | WEIGHT: 162.69 LBS | HEIGHT: 59 IN | TEMPERATURE: 99 F | HEART RATE: 67 BPM | SYSTOLIC BLOOD PRESSURE: 134 MMHG | BODY MASS INDEX: 32.8 KG/M2

## 2018-09-21 DIAGNOSIS — E11.9 TYPE 2 DIABETES MELLITUS WITHOUT COMPLICATION, WITHOUT LONG-TERM CURRENT USE OF INSULIN: ICD-10-CM

## 2018-09-21 DIAGNOSIS — R32 URINARY INCONTINENCE, UNSPECIFIED TYPE: ICD-10-CM

## 2018-09-21 DIAGNOSIS — E78.5 HYPERLIPIDEMIA, UNSPECIFIED HYPERLIPIDEMIA TYPE: ICD-10-CM

## 2018-09-21 DIAGNOSIS — M10.9 INTERVAL GOUT: ICD-10-CM

## 2018-09-21 DIAGNOSIS — K21.00 GASTROESOPHAGEAL REFLUX DISEASE WITH ESOPHAGITIS: ICD-10-CM

## 2018-09-21 DIAGNOSIS — E53.8 VITAMIN B 12 DEFICIENCY: ICD-10-CM

## 2018-09-21 DIAGNOSIS — N94.10 DYSPAREUNIA IN FEMALE: ICD-10-CM

## 2018-09-21 DIAGNOSIS — Z12.11 ENCOUNTER FOR SCREENING FOR MALIGNANT NEOPLASM OF COLON: ICD-10-CM

## 2018-09-21 DIAGNOSIS — Z85.828 HISTORY OF BASAL CELL CARCINOMA (BCC): ICD-10-CM

## 2018-09-21 DIAGNOSIS — Z00.00 ANNUAL PHYSICAL EXAM: Primary | ICD-10-CM

## 2018-09-21 DIAGNOSIS — I10 ESSENTIAL HYPERTENSION: ICD-10-CM

## 2018-09-21 DIAGNOSIS — B36.9 FUNGAL SKIN INFECTION: ICD-10-CM

## 2018-09-21 DIAGNOSIS — K74.60 CIRRHOSIS OF LIVER WITHOUT ASCITES, UNSPECIFIED HEPATIC CIRRHOSIS TYPE: ICD-10-CM

## 2018-09-21 DIAGNOSIS — K76.0 NAFLD (NONALCOHOLIC FATTY LIVER DISEASE): ICD-10-CM

## 2018-09-21 LAB
ALBUMIN/CREAT UR: 21.7 UG/MG
CREAT UR-MCNC: 69 MG/DL
MICROALBUMIN UR DL<=1MG/L-MCNC: 15 UG/ML

## 2018-09-21 PROCEDURE — 99999 PR PBB SHADOW E&M-EST. PATIENT-LVL IV: CPT | Mod: PBBFAC,,, | Performed by: INTERNAL MEDICINE

## 2018-09-21 PROCEDURE — 3044F HG A1C LEVEL LT 7.0%: CPT | Mod: CPTII,,, | Performed by: INTERNAL MEDICINE

## 2018-09-21 PROCEDURE — 3075F SYST BP GE 130 - 139MM HG: CPT | Mod: CPTII,,, | Performed by: INTERNAL MEDICINE

## 2018-09-21 PROCEDURE — 99214 OFFICE O/P EST MOD 30 MIN: CPT | Mod: PBBFAC,25 | Performed by: INTERNAL MEDICINE

## 2018-09-21 PROCEDURE — 3078F DIAST BP <80 MM HG: CPT | Mod: CPTII,,, | Performed by: INTERNAL MEDICINE

## 2018-09-21 PROCEDURE — 82043 UR ALBUMIN QUANTITATIVE: CPT

## 2018-09-21 PROCEDURE — 90662 IIV NO PRSV INCREASED AG IM: CPT | Mod: PBBFAC

## 2018-09-21 PROCEDURE — 99397 PER PM REEVAL EST PAT 65+ YR: CPT | Mod: S$PBB,,, | Performed by: INTERNAL MEDICINE

## 2018-09-21 RX ORDER — OMEPRAZOLE 20 MG/1
20 CAPSULE, DELAYED RELEASE ORAL DAILY
Qty: 90 CAPSULE | Refills: 3 | Status: SHIPPED | OUTPATIENT
Start: 2018-09-21 | End: 2019-12-08 | Stop reason: SDUPTHER

## 2018-09-21 RX ORDER — COLCHICINE 0.6 MG/1
TABLET ORAL
Qty: 30 TABLET | Refills: 3 | Status: SHIPPED | OUTPATIENT
Start: 2018-09-21 | End: 2019-05-30 | Stop reason: SDUPTHER

## 2018-09-21 RX ORDER — PRAVASTATIN SODIUM 20 MG/1
20 TABLET ORAL DAILY
Qty: 90 TABLET | Refills: 3 | Status: SHIPPED | OUTPATIENT
Start: 2018-09-21 | End: 2021-02-18 | Stop reason: SINTOL

## 2018-09-21 RX ORDER — LOSARTAN POTASSIUM 50 MG/1
50 TABLET ORAL DAILY
Qty: 90 TABLET | Refills: 3 | Status: SHIPPED | OUTPATIENT
Start: 2018-09-21 | End: 2019-10-01 | Stop reason: SDUPTHER

## 2018-09-21 NOTE — PATIENT INSTRUCTIONS
CLOTRIMAZOLE is an antifungal medication - use this on the rash in between and under your breasts.     CLOBETASOL is a steroid medication - use this for the psoriasis plaques.     You can also try using a coal tar shampoo for flaking/itching on your scalp -- you can wash your skin with this as well.     I do recommend that you take a B-complex vitamin with 1000mcg of B12 every day    Call in to reschedule your EGD (scope of your stomach) -- you can call the hepatology (liver) clinic or the Endoscopy clinic to make sure this is scheduled correctly.        Eating to Prevent Gout  Gout is a painful form of arthritis caused by an excess of uric acid. This is a waste product made by the body. It builds up in the body and forms crystals that collect in the joints, bringing on a gout attack. Alcohol and certain foods can trigger a gout attack. Below are some guidelines for changing your diet to help you manage gout. Your healthcare provider can work with you to determine the best eating plan for you. Know that diet is only one part of managing gout. Take your medicines as prescribed and follow the other guidelines your healthcare provider has given you.  Foods to limit  Eating too many foods containing purines may increase the levels of uric acid in your body and increase your risk for a gout attack. It may be best to limit these high-purine foods:  · Alcohol (beer, red wine). You may be told to avoid alcohol completely.  · Certain fish (anchovies, sardines, fish roes, herring, tuna, mussels, codfish, scallops, trout, and [place])  · Certain meats (red meat, processed meat, elkins, turkey, wild game, and goose)  · Sauces and gravies made with meat  · Organ meats (such as liver, kidneys, sweetbreads, and tripe)  · Legumes (such as dried beans, peas)  · Mushrooms, spinach, asparagus, and cauliflower  · Yeast and yeast extract supplements  Foods to try  Some foods may be helpful for people with gout. You may want to try adding  some of the following foods to your diet:  · Dark berries: These include blueberries, blackberries, and cherries. These berries contain chemicals that may lower uric acid.  · Tofu: Tofu, which is made from soy, is a good source of protein. Studies have shown that it may be a better choice than meat for people with gout.  · Omega fatty acids: These acids are found in fatty fish (such as salmon), certain oils (such as flax, olive, or nut oils), or nuts. They may help prevent inflammation due to gout.  The following guidelines are recommended by the American Medical Association for people with gout. Your diet should be:  · High in fiber, whole grains, fruits, and vegetables.  · Low in protein (15% of calories should come from protein. Choose lean sources such as soy, lean meats, and poultry).  · Low in fat (no more than 30% of calories should come from fat, with only 10% coming from animal fat).   Date Last Reviewed: 6/17/2015 © 2000-2017 [employer]. [address] [place] [address], [place] [address]. All rights reserved. This information is not intended as a substitute for professional medical care. Always follow your healthcare professional's instructions.

## 2018-09-21 NOTE — PROGRESS NOTES
"Subjective:       Patient ID: Tammi Farris is a 68 y.o. female.    Chief Complaint: Annual Exam    HPI  69 y/o woman with HTN, HLD, DM2, cirrhosis due to NAFLD, psoriasis, GERD, gout here for annual exam.  Last seen 2/2018    Abnormal mammogram recently with R breast calcifications; diagnostic mammogram f/u BIRADS-3 (probably benign), recommended for six month follow up or biopsy.    Fungal rash on chest - between and under breasts, has had this previously    GI problems - reports alternating constipation and diarrhea.     Also reports occasional urinary leakage, vaginal dryness, dyspareunia. Chronic, no dysuria.    Back pain - mild, nonradiating, not constant    HTN - taking bisoprolol-HCTZ and losartan daily    HLD - prescribed statin, not taking this (see below)  HDL 26, ,  on recent labs    DM2 - A1c 6.5 2/2018, repeat 9/7/18 6.4  Taking 1000mg qAM, 500mg qPM of metformin after meals. Tolerating this well.  Eye exam done 12/4/17 and 1/5/18  Foot exam done 7/2017  Microalbumin done 7/2017  Started on rosuvastatin but stopped due to cost and "it made me sick" and that it made her feel more anxious.  Has lost about 12# in past year    NAFLD, cirrhosis - now following with Hepatology  Ultrasound done 11/10/17 showing fatty liver; nodular liver contour noted concerning for cirrhosis. Fibroscan with F4 fibrosis noted, S3 steatosis.  Immune to hep A/B.   LFTs still mildly elevated on labs 9/7/18    H/o gout - hasn't wanted to take allopurinol  Uric acid had improved with diet changes, but recently 9/7/18 was 11.4  No recent flare    GERD - on omeprazole, has symptoms if she doesn't take this    Mild normocytic anemia noted on labs 12/27/17, stable 9/2018    Psoriasis - using clobetasol cream sometimes (she thinks this is the cream). Has also been prescribed triamcinolone lotion; she isn't sure if she has this.     H/o BCC on forehead - following with dermatology, was due for follow up in 5/2018. Has appt " "scheduled for 12/2018    FIT test 11/2017, negative    Review of Systems   Constitutional: Negative for activity change, fever and unexpected weight change.   HENT: Negative.  Negative for congestion.    Eyes: Negative for visual disturbance.   Respiratory: Negative for cough and shortness of breath.    Cardiovascular: Negative for chest pain, palpitations and leg swelling.   Gastrointestinal: Positive for constipation and diarrhea. Negative for abdominal pain, blood in stool, nausea and vomiting.   Endocrine: Negative.  Negative for polyuria.   Genitourinary: Positive for dyspareunia.        As noted   Musculoskeletal: Positive for back pain (not currently). Negative for arthralgias and joint swelling.   Skin: Rash: psoriasis.   Neurological: Negative for weakness and numbness.   Psychiatric/Behavioral: Negative for dysphoric mood. The patient is not nervous/anxious.          Past medical history, surgical history, and family medical history reviewed and updated as appropriate.    Medications and allergies reviewed.     Objective:          Vitals:    09/21/18 0901   BP: 134/60   BP Location: Right arm   Patient Position: Sitting   Pulse: 67   Temp: 98.5 °F (36.9 °C)   TempSrc: Oral   SpO2: 97%   Weight: 73.8 kg (162 lb 11.2 oz)   Height: 4' 11" (1.499 m)     Body mass index is 32.86 kg/m².  Physical Exam   Constitutional: She is oriented to person, place, and time. She appears well-developed and well-nourished. No distress.   HENT:   Head: Normocephalic and atraumatic.   Mouth/Throat: Oropharynx is clear and moist.   Eyes: Conjunctivae and EOM are normal. Pupils are equal, round, and reactive to light. No scleral icterus.   Neck: Neck supple.   Cardiovascular: Normal rate, regular rhythm and normal heart sounds.   No murmur heard.  Pulmonary/Chest: Effort normal and breath sounds normal. No respiratory distress.   Abdominal: Soft. Bowel sounds are normal. She exhibits no distension. There is no tenderness. There is " no guarding.   +abdominal obesity   Musculoskeletal: Normal range of motion. She exhibits no edema or tenderness.   Lymphadenopathy:     She has no cervical adenopathy.   Neurological: She is alert and oriented to person, place, and time. No cranial nerve deficit or sensory deficit. Gait normal.   Skin: Skin is warm and dry.   Small patches of scaling plaque (psoriasis)  Pale erythematous moist areas underneath both breasts, no ulcerations   Psychiatric: She has a normal mood and affect.   Vitals reviewed.    Protective Sensation (w/ 10 gram monofilament):  Right: Intact  Left: Intact    Visual Inspection:  Normal -  Bilateral, Dry Skin -  Bilateral and Onychomycosis -  Bilateral    Pedal Pulses:   Right: Present  Left: Present    Posterior tibialis:   Right:Present  Left: Present      Lab Results   Component Value Date    WBC 9.00 09/07/2018    HGB 11.7 (L) 09/07/2018    HCT 36.1 (L) 09/07/2018     09/07/2018    CHOL 183 09/07/2018    TRIG 210 (H) 09/07/2018    HDL 26 (L) 09/07/2018    ALT 56 (H) 09/07/2018    AST 67 (H) 09/07/2018     09/07/2018    K 4.9 09/07/2018     (H) 09/07/2018    CREATININE 1.0 09/07/2018    BUN 21 09/07/2018    CO2 21 (L) 09/07/2018    TSH 1.277 03/03/2017    INR 1.0 07/27/2018    HGBA1C 6.4 (H) 09/07/2018       Assessment:       1. Annual physical exam    2. Type 2 diabetes mellitus without complication, without long-term current use of insulin    3. Hyperlipidemia, unspecified hyperlipidemia type    4. Essential hypertension    5. Gastroesophageal reflux disease with esophagitis    6. Vitamin B 12 deficiency    7. Interval gout    8. Dyspareunia in female    9. Urinary incontinence, unspecified type    10. Fungal skin infection    11. NAFLD (nonalcoholic fatty liver disease)    12. Cirrhosis of liver without ascites, unspecified hepatic cirrhosis type    13. History of basal cell carcinoma (BCC)    14. Encounter for screening for malignant neoplasm of colon         Plan:   Tammi was seen today for annual exam.    Diagnoses and all orders for this visit:    Annual physical exam - Overall healthy, doing well. Reviewed chronic and preventive health concerns.  Reviewed labs and specialist notes    Type 2 diabetes mellitus without complication, without long-term current use of insulin - at goal, continue current medications  Repeat A1c at 6 months  -     MICROALBUMIN / CREATININE RATIO URINE    Hyperlipidemia, unspecified hyperlipidemia type - willing to try a different statin  -     pravastatin (PRAVACHOL) 20 MG tablet; Take 1 tablet (20 mg total) by mouth once daily. For cholesterol    Essential hypertension - near goal, counseled re: low-sodium diet. Continue current medications  -     losartan (COZAAR) 50 MG tablet; Take 1 tablet (50 mg total) by mouth once daily.    Gastroesophageal reflux disease with esophagitis  -     omeprazole (PRILOSEC) 20 MG capsule; Take 1 capsule (20 mg total) by mouth once daily.    Vitamin B 12 deficiency - recommended switch to 1000mcg daily    Interval gout - discussed allopurinol; she will work on being more careful with her diet, does not want to start daily medication  -     colchicine (COLCRYS) 0.6 mg tablet; For gout flare, take 1 tablet, then a second in 2 hours. Then take 1 tablet daily until flare resolves    Dyspareunia in female  -     Ambulatory consult to Urogynecology  Urinary incontinence, unspecified type  -     Ambulatory consult to Urogynecology    Fungal skin infection - recommended continue topical antifungal    NAFLD (nonalcoholic fatty liver disease)  Cirrhosis of liver without ascites, unspecified hepatic cirrhosis type - follow with hepatology, doing well  Due for EGD - endoscopy # given to help schedule    History of basal cell carcinoma (BCC) - follow up with dermatology    Encounter for screening for malignant neoplasm of colon  FIT ordered to be done after 1 year (12/2018)    Clarified - clotrimazole for fungal  infection, clobetasol for psoriasis    Health maintenance reviewed with patient.   Mammogram at 6 months  Flu shot today  Follow-up in about 4 months (around 1/21/2019) for follow up, coordination of care.    Ariel Mullins MD  Internal Medicine  [facility]  9/21/2018

## 2018-09-22 ENCOUNTER — TELEPHONE (OUTPATIENT)
Dept: INTERNAL MEDICINE | Facility: CLINIC | Age: 68
End: 2018-09-22

## 2018-09-22 NOTE — TELEPHONE ENCOUNTER
Called pt left Vm requesting return call:    Microalbumin/creatinine ratio normal, so no sign of damage to the kidneys from diabetes.   Please call patient to inform.   Ariel Mullins MD

## 2018-09-25 ENCOUNTER — TELEPHONE (OUTPATIENT)
Dept: INTERNAL MEDICINE | Facility: CLINIC | Age: 68
End: 2018-09-25

## 2018-09-25 NOTE — TELEPHONE ENCOUNTER
----- Message from Shantel Herrmann sent at 9/25/2018  3:02 PM CDT -----  Contact: Self [phone]      ----- Message -----  From: Antonella White  Sent: 9/25/2018   2:36 PM  To: Susanna George Staff     Pt will like to speak to the doctor or nurse, Pt did not leave many details.

## 2018-10-09 ENCOUNTER — TELEPHONE (OUTPATIENT)
Dept: INTERNAL MEDICINE | Facility: CLINIC | Age: 68
End: 2018-10-09

## 2018-10-09 PROBLEM — R32 URINARY INCONTINENCE: Status: ACTIVE | Noted: 2018-10-09

## 2018-10-09 PROBLEM — Z85.828 HISTORY OF BASAL CELL CARCINOMA (BCC): Status: ACTIVE | Noted: 2018-10-09

## 2018-10-09 NOTE — TELEPHONE ENCOUNTER
----- Message from Ariel Mullins MD sent at 10/9/2018 12:20 AM CDT -----  Please schedule for labs prior to next visit in ~4 months

## 2018-11-28 ENCOUNTER — TELEPHONE (OUTPATIENT)
Dept: INTERNAL MEDICINE | Facility: CLINIC | Age: 68
End: 2018-11-28

## 2018-11-28 NOTE — TELEPHONE ENCOUNTER
----- Message from Red Romo sent at 11/28/2018  8:10 AM CST -----  Contact: Patient [phone]  The patient is requesting a sooner appointment than 1/25/19. She wants to discuss a rash on her body.  She doesn't want to see another doctor.    Thank you

## 2018-12-05 ENCOUNTER — LAB VISIT (OUTPATIENT)
Dept: LAB | Facility: HOSPITAL | Age: 68
End: 2018-12-05
Attending: INTERNAL MEDICINE
Payer: MEDICARE

## 2018-12-05 ENCOUNTER — OFFICE VISIT (OUTPATIENT)
Dept: INTERNAL MEDICINE | Facility: CLINIC | Age: 68
End: 2018-12-05
Payer: MEDICARE

## 2018-12-05 VITALS
DIASTOLIC BLOOD PRESSURE: 70 MMHG | WEIGHT: 160.06 LBS | TEMPERATURE: 98 F | HEART RATE: 69 BPM | BODY MASS INDEX: 32.27 KG/M2 | SYSTOLIC BLOOD PRESSURE: 138 MMHG | HEIGHT: 59 IN | OXYGEN SATURATION: 98 %

## 2018-12-05 DIAGNOSIS — I10 ESSENTIAL HYPERTENSION: ICD-10-CM

## 2018-12-05 DIAGNOSIS — L40.9 PSORIASIS: Primary | ICD-10-CM

## 2018-12-05 DIAGNOSIS — Z11.3 ROUTINE SCREENING FOR STI (SEXUALLY TRANSMITTED INFECTION): ICD-10-CM

## 2018-12-05 DIAGNOSIS — Z11.4 ENCOUNTER FOR SCREENING FOR HIV: ICD-10-CM

## 2018-12-05 DIAGNOSIS — R74.9 ABNORMAL SERUM ENZYME LEVEL: ICD-10-CM

## 2018-12-05 DIAGNOSIS — K76.0 NAFLD (NONALCOHOLIC FATTY LIVER DISEASE): ICD-10-CM

## 2018-12-05 DIAGNOSIS — Z12.11 SCREENING FOR MALIGNANT NEOPLASM OF COLON: ICD-10-CM

## 2018-12-05 DIAGNOSIS — E11.9 TYPE 2 DIABETES MELLITUS WITHOUT COMPLICATION, WITHOUT LONG-TERM CURRENT USE OF INSULIN: ICD-10-CM

## 2018-12-05 PROCEDURE — 86703 HIV-1/HIV-2 1 RESULT ANTBDY: CPT

## 2018-12-05 PROCEDURE — 36415 COLL VENOUS BLD VENIPUNCTURE: CPT

## 2018-12-05 PROCEDURE — 3044F HG A1C LEVEL LT 7.0%: CPT | Mod: CPTII,S$GLB,, | Performed by: INTERNAL MEDICINE

## 2018-12-05 PROCEDURE — 86592 SYPHILIS TEST NON-TREP QUAL: CPT

## 2018-12-05 PROCEDURE — 3075F SYST BP GE 130 - 139MM HG: CPT | Mod: CPTII,S$GLB,, | Performed by: INTERNAL MEDICINE

## 2018-12-05 PROCEDURE — 99214 OFFICE O/P EST MOD 30 MIN: CPT | Mod: S$GLB,,, | Performed by: INTERNAL MEDICINE

## 2018-12-05 PROCEDURE — 87491 CHLMYD TRACH DNA AMP PROBE: CPT

## 2018-12-05 PROCEDURE — 99999 PR PBB SHADOW E&M-EST. PATIENT-LVL IV: CPT | Mod: PBBFAC,,, | Performed by: INTERNAL MEDICINE

## 2018-12-05 PROCEDURE — 3078F DIAST BP <80 MM HG: CPT | Mod: CPTII,S$GLB,, | Performed by: INTERNAL MEDICINE

## 2018-12-05 PROCEDURE — 1101F PT FALLS ASSESS-DOCD LE1/YR: CPT | Mod: CPTII,S$GLB,, | Performed by: INTERNAL MEDICINE

## 2018-12-05 NOTE — PROGRESS NOTES
Subjective:       Patient ID: Tammi Farris is a 68 y.o. female.    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    Rash - h/o inverse psoriasis, reports that when she uses prescription topical medication this resolves for a few days and then returns. Uses topical medications prescribed by dermatology very sparingly due to cost.   Does have appt with Dermatology on 12/10 - plans to discuss treatment plan at that visit.    Concerned today because her partner visited briefly about 3 weeks ago, and very recently developed a sore on his upper thigh. She is anxious about herpes in particular. Reports partner's skin problems started with a small bump like a boil and then progressed, not sure if any formal testing for HSV has been done for this, is concerned because he has diabetes and he is worried that his leg may need amputation if infection spreads.   She has had no genital ulcerations or sores, no h/o fever blisters/cold sores, no vaginal discharge or dysuria. Would like STI screening.    Does have elevated LFTs on last labs, with h/o NAFLD with cirrhosis. Due for follow up with hepatology 1/2019    DM2 - A1c 6.5 2/2018, repeat 9/7/18 6.4  Taking 1000mg qAM, 500mg qPM of metformin after meals. Tolerating this well.  Not yet due for repeat A1c.     FIT test 11/2017, negative -- due for repeat colon cancer screening. Today says she would prefer to do colonoscopy for screening.    Review of Systems   Constitutional: Negative for activity change, fatigue and fever. Unexpected weight change: gradual intentional weight loss.   HENT: Negative.    Eyes: Negative for visual disturbance.   Respiratory: Negative for cough and shortness of breath.    Cardiovascular: Negative for chest pain and leg swelling.   Gastrointestinal: Negative for abdominal pain, diarrhea and nausea.   Endocrine: Negative.  Negative for polyuria.  "  Genitourinary: Negative for difficulty urinating, dysuria, flank pain, frequency, genital sores, hematuria, pelvic pain and vaginal discharge.   Musculoskeletal: Negative for gait problem and joint swelling.   Skin: Positive for rash (chronic, recurrent).   Neurological: Negative.    Psychiatric/Behavioral: The patient is nervous/anxious.          Past medical history, surgical history, and family medical history reviewed and updated as appropriate.    Medications and allergies reviewed.     Objective:          Vitals:    12/05/18 1337 12/05/18 1600   BP: (!) 142/70 138/70   BP Location: Left arm    Patient Position: Sitting    Pulse: 69    Temp: 98.3 °F (36.8 °C)    TempSrc: Oral    SpO2: 98%    Weight: 72.6 kg (160 lb 0.9 oz)    Height: 4' 11" (1.499 m)      Body mass index is 32.33 kg/m².  Physical Exam   Constitutional: She is oriented to person, place, and time. She appears well-developed and well-nourished. No distress.   HENT:   Head: Normocephalic and atraumatic.   Mouth/Throat: Oropharynx is clear and moist.   Eyes: Conjunctivae and EOM are normal. Pupils are equal, round, and reactive to light. No scleral icterus.   Neck: Neck supple.   Cardiovascular: Normal rate, regular rhythm and normal heart sounds.   No murmur heard.  Pulmonary/Chest: Effort normal and breath sounds normal. No respiratory distress.   Abdominal: Soft. Bowel sounds are normal. She exhibits no distension. There is no tenderness. There is no guarding.   +abdominal obesity   Musculoskeletal: Normal range of motion. She exhibits no edema or tenderness.   Lymphadenopathy:     She has no cervical adenopathy.   Neurological: She is alert and oriented to person, place, and time. No cranial nerve deficit or sensory deficit. Gait normal.   Skin: Skin is warm and dry.   Small patches of scaling mildy erythematous plaque (psoriasis) - behind ears, between breasts / under breasts bilaterally, in intertriginous areas (groin).  No ulcerations. "   Psychiatric: Her behavior is normal.   Anxious today   Vitals reviewed.      Lab Results   Component Value Date    WBC 9.00 09/07/2018    HGB 11.7 (L) 09/07/2018    HCT 36.1 (L) 09/07/2018     09/07/2018    CHOL 183 09/07/2018    TRIG 210 (H) 09/07/2018    HDL 26 (L) 09/07/2018    ALT 56 (H) 09/07/2018    AST 67 (H) 09/07/2018     09/07/2018    K 4.9 09/07/2018     (H) 09/07/2018    CREATININE 1.0 09/07/2018    BUN 21 09/07/2018    CO2 21 (L) 09/07/2018    TSH 1.277 03/03/2017    INR 1.0 07/27/2018    HGBA1C 6.4 (H) 09/07/2018       Assessment:       1. Psoriasis    2. Routine screening for STI (sexually transmitted infection)    3. Abnormal serum enzyme level     4. Encounter for screening for HIV     5. Screening for malignant neoplasm of colon    6. Essential hypertension    7. NAFLD (nonalcoholic fatty liver disease)    8. Type 2 diabetes mellitus without complication, without long-term current use of insulin        Plan:   Tammi was seen today for rash.    Diagnoses and all orders for this visit:    Psoriasis - rash consistent with psoriasis, not adequately treated. Recommended review medications with dermatologist and work with them to make sure most affordable appropriate medications are prescribed.     Routine screening for STI (sexually transmitted infection) - discussed signs/symptoms of HSV with patient; she has no symptoms concerning for this, and partner's skin lesion (she brings in photos) looks more consistent with an abscess/cellulitis than HSV. If she is worried she can check to see if his wound was tested for this; however given that she is not currently or previously having symptoms concerning for HSV would not test serology now  Reasonable to recheck basic STI screening  -     C. trachomatis/N. gonorrhoeae by AMP DNA  -     RPR; Future  -     HIV 1/2 Ag/Ab (4th Gen); Future    NAFLD (nonalcoholic fatty liver disease)  Abnormal serum enzyme level   -     C. trachomatis/N.  gonorrhoeae by AMP DNA  Reminded to schedule f/u with hepatology for early February 2019    Encounter for screening for HIV   -     HIV 1/2 Ag/Ab (4th Gen); Future    Screening for malignant neoplasm of colon - discussed options, she would like to proceed with colonoscopy  -     Case request GI: COLONOSCOPY    Essential hypertension - elevated on intake but quite anxious. No med changes today    Type 2 diabetes mellitus without complication, without long-term current use of insulin - f/u labs prior to next visit  Reminded to schedule for diabetic eye exam for January 2019    Health maintenance reviewed with patient.     Follow-up in 7 weeks (on 1/25/2019) for diabetes, lab review.    Ariel Mullins MD  Internal Medicine  [facility]  12/5/2018

## 2018-12-06 LAB
HIV 1+2 AB+HIV1 P24 AG SERPL QL IA: NEGATIVE
RPR SER QL: NORMAL

## 2018-12-07 LAB
C TRACH DNA SPEC QL NAA+PROBE: NOT DETECTED
N GONORRHOEA DNA SPEC QL NAA+PROBE: NOT DETECTED

## 2018-12-10 DIAGNOSIS — Z12.11 SPECIAL SCREENING FOR MALIGNANT NEOPLASMS, COLON: Primary | ICD-10-CM

## 2018-12-12 ENCOUNTER — TELEPHONE (OUTPATIENT)
Dept: ENDOSCOPY | Facility: HOSPITAL | Age: 68
End: 2018-12-12

## 2018-12-12 DIAGNOSIS — Z12.11 SPECIAL SCREENING FOR MALIGNANT NEOPLASMS, COLON: Primary | ICD-10-CM

## 2018-12-12 DIAGNOSIS — I85.00 ESOPHAGEAL VARICES WITHOUT BLEEDING, UNSPECIFIED ESOPHAGEAL VARICES TYPE: Primary | ICD-10-CM

## 2018-12-12 RX ORDER — POLYETHYLENE GLYCOL 3350, SODIUM SULFATE ANHYDROUS, SODIUM BICARBONATE, SODIUM CHLORIDE, POTASSIUM CHLORIDE 236; 22.74; 6.74; 5.86; 2.97 G/4L; G/4L; G/4L; G/4L; G/4L
4 POWDER, FOR SOLUTION ORAL ONCE
Qty: 4000 ML | Refills: 0 | Status: SHIPPED | OUTPATIENT
Start: 2018-12-12 | End: 2018-12-12

## 2018-12-21 ENCOUNTER — TELEPHONE (OUTPATIENT)
Dept: INTERNAL MEDICINE | Facility: CLINIC | Age: 68
End: 2018-12-21

## 2019-02-01 ENCOUNTER — PES CALL (OUTPATIENT)
Dept: ADMINISTRATIVE | Facility: CLINIC | Age: 69
End: 2019-02-01

## 2019-02-08 ENCOUNTER — ANESTHESIA EVENT (OUTPATIENT)
Dept: ENDOSCOPY | Facility: HOSPITAL | Age: 69
End: 2019-02-08
Payer: MEDICARE

## 2019-02-08 ENCOUNTER — HOSPITAL ENCOUNTER (OUTPATIENT)
Facility: HOSPITAL | Age: 69
Discharge: HOME | End: 2019-02-08
Attending: INTERNAL MEDICINE | Admitting: INTERNAL MEDICINE
Payer: MEDICARE

## 2019-02-08 ENCOUNTER — ANESTHESIA (OUTPATIENT)
Dept: ENDOSCOPY | Facility: HOSPITAL | Age: 69
End: 2019-02-08
Payer: MEDICARE

## 2019-02-08 VITALS
HEIGHT: 59 IN | WEIGHT: 164 LBS | DIASTOLIC BLOOD PRESSURE: 65 MMHG | TEMPERATURE: 97 F | HEART RATE: 69 BPM | SYSTOLIC BLOOD PRESSURE: 143 MMHG | OXYGEN SATURATION: 100 % | RESPIRATION RATE: 18 BRPM | BODY MASS INDEX: 33.06 KG/M2

## 2019-02-08 DIAGNOSIS — K74.60 CIRRHOSIS: ICD-10-CM

## 2019-02-08 DIAGNOSIS — K74.60 CIRRHOSIS OF LIVER WITHOUT ASCITES, UNSPECIFIED HEPATIC CIRRHOSIS TYPE: Primary | ICD-10-CM

## 2019-02-08 LAB — POCT GLUCOSE: 111 MG/DL (ref 70–110)

## 2019-02-08 PROCEDURE — 25000003 PHARM REV CODE 250: Performed by: INTERNAL MEDICINE

## 2019-02-08 PROCEDURE — 82962 GLUCOSE BLOOD TEST: CPT | Performed by: INTERNAL MEDICINE

## 2019-02-08 PROCEDURE — E9220 PRA ENDO ANESTHESIA: HCPCS | Mod: ,,, | Performed by: NURSE ANESTHETIST, CERTIFIED REGISTERED

## 2019-02-08 PROCEDURE — 43235 EGD DIAGNOSTIC BRUSH WASH: CPT | Performed by: INTERNAL MEDICINE

## 2019-02-08 PROCEDURE — 63600175 PHARM REV CODE 636 W HCPCS: Performed by: NURSE ANESTHETIST, CERTIFIED REGISTERED

## 2019-02-08 PROCEDURE — 88305 TISSUE EXAM BY PATHOLOGIST: CPT | Mod: 59 | Performed by: PATHOLOGY

## 2019-02-08 PROCEDURE — E9220 PRA ENDO ANESTHESIA: ICD-10-PCS | Mod: ,,, | Performed by: NURSE ANESTHETIST, CERTIFIED REGISTERED

## 2019-02-08 PROCEDURE — 27201089 HC SNARE, DISP (ANY): Performed by: INTERNAL MEDICINE

## 2019-02-08 PROCEDURE — 88305 TISSUE SPECIMEN TO PATHOLOGY - SURGERY: ICD-10-PCS | Mod: 26,,, | Performed by: PATHOLOGY

## 2019-02-08 PROCEDURE — 37000009 HC ANESTHESIA EA ADD 15 MINS: Performed by: INTERNAL MEDICINE

## 2019-02-08 PROCEDURE — 88305 TISSUE EXAM BY PATHOLOGIST: CPT | Mod: 26,,, | Performed by: PATHOLOGY

## 2019-02-08 PROCEDURE — 37000008 HC ANESTHESIA 1ST 15 MINUTES: Performed by: INTERNAL MEDICINE

## 2019-02-08 PROCEDURE — 45385 PR COLONOSCOPY,REMV LESN,SNARE: ICD-10-PCS | Mod: PT,,, | Performed by: INTERNAL MEDICINE

## 2019-02-08 PROCEDURE — 43235 PR EGD, FLEX, DIAGNOSTIC: ICD-10-PCS | Mod: 51,,, | Performed by: INTERNAL MEDICINE

## 2019-02-08 PROCEDURE — 25000003 PHARM REV CODE 250: Performed by: NURSE ANESTHETIST, CERTIFIED REGISTERED

## 2019-02-08 PROCEDURE — 43235 EGD DIAGNOSTIC BRUSH WASH: CPT | Mod: 51,,, | Performed by: INTERNAL MEDICINE

## 2019-02-08 PROCEDURE — 45385 COLONOSCOPY W/LESION REMOVAL: CPT | Mod: PT,,, | Performed by: INTERNAL MEDICINE

## 2019-02-08 PROCEDURE — 45385 COLONOSCOPY W/LESION REMOVAL: CPT | Performed by: INTERNAL MEDICINE

## 2019-02-08 RX ORDER — PROPOFOL 10 MG/ML
VIAL (ML) INTRAVENOUS
Status: DISCONTINUED | OUTPATIENT
Start: 2019-02-08 | End: 2019-02-08

## 2019-02-08 RX ORDER — GLYCOPYRROLATE 0.2 MG/ML
INJECTION INTRAMUSCULAR; INTRAVENOUS
Status: DISCONTINUED | OUTPATIENT
Start: 2019-02-08 | End: 2019-02-08

## 2019-02-08 RX ORDER — PHENYLEPHRINE HYDROCHLORIDE 10 MG/ML
INJECTION INTRAVENOUS
Status: DISCONTINUED | OUTPATIENT
Start: 2019-02-08 | End: 2019-02-08

## 2019-02-08 RX ORDER — LIDOCAINE HCL/PF 100 MG/5ML
SYRINGE (ML) INTRAVENOUS
Status: DISCONTINUED | OUTPATIENT
Start: 2019-02-08 | End: 2019-02-08

## 2019-02-08 RX ORDER — SODIUM CHLORIDE 9 MG/ML
INJECTION, SOLUTION INTRAVENOUS CONTINUOUS
Status: DISCONTINUED | OUTPATIENT
Start: 2019-02-08 | End: 2019-02-08 | Stop reason: HOSPADM

## 2019-02-08 RX ADMIN — PHENYLEPHRINE HYDROCHLORIDE 100 MCG: 10 INJECTION INTRAVENOUS at 09:02

## 2019-02-08 RX ADMIN — LIDOCAINE HYDROCHLORIDE 100 MG: 20 INJECTION, SOLUTION INTRAVENOUS at 09:02

## 2019-02-08 RX ADMIN — PROPOFOL 50 MG: 10 INJECTION, EMULSION INTRAVENOUS at 09:02

## 2019-02-08 RX ADMIN — GLYCOPYRROLATE 0.2 MG: 0.2 INJECTION, SOLUTION INTRAMUSCULAR; INTRAVENOUS at 09:02

## 2019-02-08 RX ADMIN — SODIUM CHLORIDE: 0.9 INJECTION, SOLUTION INTRAVENOUS at 09:02

## 2019-02-08 NOTE — [employer] PATIENT INSTRUCTIONS
Discharge Summary/Instructions after an Endoscopic Procedure  Patient Name: Tammi Farris  Patient MRN: [id number]  Patient YOB: 1950 Friday, February 08, 2019  Celso Salas MD  RESTRICTIONS:  During your procedure today, you received medications for sedation.  These   medications may affect your judgment, balance and coordination.  Therefore,   for 24 hours, you have the following restrictions:   - DO NOT drive a car, operate machinery, make legal/financial decisions,   sign important papers or drink alcohol.    ACTIVITY:  Today: no heavy lifting, straining or running due to procedural   sedation/anesthesia.  The following day: return to full activity including work.  DIET:  Eat and drink normally unless instructed otherwise.     TREATMENT FOR COMMON SIDE EFFECTS:  - Mild abdominal pain, nausea, belching, bloating or excessive gas:  rest,   eat lightly and use a heating pad.  - Sore Throat: treat with throat lozenges and/or gargle with warm salt   water.  - Because air was used during the procedure, expelling large amounts of air   from your rectum or belching is normal.  - If a bowel prep was taken, you may not have a bowel movement for 1-3 days.    This is normal.  SYMPTOMS TO WATCH FOR AND REPORT TO YOUR PHYSICIAN:  1. Abdominal pain or bloating, other than gas cramps.  2. Chest pain.  3. Back pain.  4. Signs of infection such as: chills or fever occurring within 24 hours   after the procedure.  5. Rectal bleeding, which would show as bright red, maroon, or black stools.   (A tablespoon of blood from the rectum is not serious, especially if   hemorrhoids are present.)  6. Vomiting.  7. Weakness or dizziness.  GO DIRECTLY TO THE NEAREST EMERGENCY ROOM IF YOU HAVE ANY OF THE FOLLOWING:      Difficulty breathing              Chills and/or fever over 101 F   Persistent vomiting and/or vomiting blood   Severe abdominal pain   Severe chest pain   Black, tarry stools   Bleeding- more than one  tablespoon   Any other symptom or condition that you feel may need urgent attention  Your doctor recommends these additional instructions:  If any biopsies were taken, your doctors clinic will contact you in 1 to 2   weeks with any results.  - Patient has a contact number available for emergencies.  The signs and   symptoms of potential delayed complications were discussed with the   patient.  Return to normal activities tomorrow.  Written discharge   instructions were provided to the patient.   - Discharge patient to home.   - Resume previous diet.   - Continue present medications.   - Await pathology results.   - Repeat colonoscopy in 5 years for surveillance.   For questions, problems or results please call your physician - Celso Salas MD at Work:  [phone].  [facility], EMERGENCY ROOM PHONE NUMBER: [phone]  IF A COMPLICATION OR EMERGENCY SITUATION ARISES AND YOU ARE UNABLE TO REACH   YOUR PHYSICIAN - GO DIRECTLY TO THE EMERGENCY ROOM.  Celso Salas MD  2/8/2019 9:45:31 AM  This report has been verified and signed electronically.  [employer]

## 2019-02-08 NOTE — TRANSFER OF CARE
"Anesthesia Transfer of Care Note    Patient: Tammi Farris    Procedure(s) Performed: Procedure(s) (LRB):  EGD (ESOPHAGOGASTRODUODENOSCOPY) (N/A)  COLONOSCOPY (N/A)    Patient location: GI    Anesthesia Type: general    Transport from OR: Transported from OR on room air with adequate spontaneous ventilation    Post pain: adequate analgesia    Post assessment: no apparent anesthetic complications    Post vital signs: stable    Level of consciousness: sedated    Nausea/Vomiting: no nausea/vomiting    Complications: none    Transfer of care protocol was followed      Last vitals:   Visit Vitals  BP (!) 170/71 (BP Location: Left arm, Patient Position: Lying)   Pulse 61   Temp 36.7 °C (98.1 °F) (Temporal)   Resp 16   Ht 4' 11" (1.499 m)   Wt 74.4 kg (164 lb)   SpO2 98%   Breastfeeding? No   BMI 33.12 kg/m²     "

## 2019-02-08 NOTE — H&P
Short Stay Endoscopy History and Physical    PCP - Ariel Mullins MD    Procedure - EGD/Colonoscopy  Sedation: GA  ASA - per anesthesia  Mallampati - per anesthesia  History of Anesthesia problems - no  Family history Anesthesia problems -  no     HPI:  This is a 68 y.o. female here for evaluation of : Cirrhosis and Screening for varices and CRC    Reflux - no  Dysphagia - no  Abdominal pain - no  Diarrhea - no    ROS:  Constitutional: No fevers, chills, No weight loss  ENT: No allergies  CV: No chest pain  Pulm: No cough, No shortness of breath  Ophtho: No vision changes  GI: see HPI  Medical History:  has a past medical history of Allergy, Basal cell carcinoma, Cirrhosis of liver without ascites (12/27/2017), Diabetes mellitus, type 2, Endometrial cancer, GERD (gastroesophageal reflux disease), Hyperlipidemia, Hypertension, Joint pain, and Psoriasis.    Surgical History:  has a past surgical history that includes Skin cancer destruction; Carpal tunnel release; Oophorectomy; abdominal laproscopic surgery; Cholecystectomy; Appendectomy; Hysterectomy (age 25); and Upper gastrointestinal endoscopy.    Family History: family history includes Arthritis in her mother; Asthma in her brother; Hyperlipidemia in her brother; Hypertension in her brother, mother, and sister; No Known Problems in her father; Raynaud syndrome in her sister.. Otherwise no colon cancer, inflammatory bowel disease, or GI malignancies.    Social History:  reports that  has never smoked. she has never used smokeless tobacco. She reports that she drinks about 1.2 - 3.6 oz of alcohol per week. She reports that she does not use drugs.    Review of patient's allergies indicates:   Allergen Reactions    Iodine and iodide containing products Hives    Benadryl [diphenhydramine hcl] Anxiety     Pt states she feels jumpy and anxious when taking.    Darvon [propoxyphene] Nausea Only    Shellfish containing products Hives    Lisinopril Other (See  Comments)     cough    Propoxyphene hcl      Itchy (skin)^    Tizanidine Other (See Comments)     Sweaty, difficulty swallowing       Medications:   Medications Prior to Admission   Medication Sig Dispense Refill Last Dose    bisoprolol-hydrochlorothiazide (ZIAC) 10-6.25 mg per tablet TAKE 1 TABLET BY MOUTH EVERY DAY 90 tablet 3 Taking    clobetasol (TEMOVATE) 0.05 % cream APPLY TO AFFECTED AREA NIGHTLY FOR 4 WEEKS THEN EVERY OTHER DAY FOR THE NEXT 2 WEEKS 30 g 0 Taking    clotrimazole (LOTRIMIN) 1 % cream Apply topically 2 (two) times daily. To area of rash on breasts and in fold of skin (for rash from skin fungus) 113 g 1 Taking    colchicine (COLCRYS) 0.6 mg tablet For gout flare, take 1 tablet, then a second in 2 hours. Then take 1 tablet daily until flare resolves 30 tablet 3 Taking    cyanocobalamin, vitamin B-12, 2,000 mcg Tab Take 2,000 mcg by mouth once daily. 30 tablet 3 Taking    lancets Misc Use with Contour Next. Test once daily. 100 each 3 Taking    losartan (COZAAR) 50 MG tablet Take 1 tablet (50 mg total) by mouth once daily. 90 tablet 3 Taking    metFORMIN (GLUCOPHAGE) 500 MG tablet TAKE 2 TABLETS BY MOUTH EVERY MORNING AND 1 TABLET BY MOUTH EVERY EVENING WITH FOOD 270 tablet 3 Taking    omeprazole (PRILOSEC) 20 MG capsule Take 1 capsule (20 mg total) by mouth once daily. 90 capsule 3 Taking    pravastatin (PRAVACHOL) 20 MG tablet Take 1 tablet (20 mg total) by mouth once daily. For cholesterol 90 tablet 3 Not Taking    triamcinolone acetonide 0.1% (KENALOG) 0.1 % cream Apply to affected areas BID after cool blow dry 1 Bottle 1 Taking    TRUE METRIX GLUCOSE METER Misc AS DIRECTED 1 each 0 Taking    TRUE METRIX GLUCOSE TEST STRIP Strp CHECK BLOOD SUGAR ONCE DAILY 300 strip 0 Taking    TRUE METRIX GLUCOSE TEST STRIP Strp CHECK BLOOD SUGAR ONCE DAILY 300 strip 0 Taking    TRUEPLUS LANCETS 33 gauge Misc USE TO TEST BLOOD SUGAR EVERY  each 3 Taking       Objective  Findings:    Vital Signs: Per nursing notes.    Physical Exam:  General Appearance: Well appearing in no acute distress  Head:   Normocephalic, without obvious abnormality  Eyes:    No scleral icterus  Airway: Open  Neck: No restriction in mobility  Lungs: CTA bilaterally in anterior and posterior fields, no wheezes, no crackles.  Heart:  Regular rate and rhythm, S1, S2 normal, no murmurs heard  Abdomen: Soft, non tender, non distended      Labs:  Lab Results   Component Value Date    WBC 7.70 02/08/2019    HGB 12.1 02/08/2019    HCT 38.7 02/08/2019     02/08/2019    CHOL 183 09/07/2018    TRIG 210 (H) 09/07/2018    HDL 26 (L) 09/07/2018    ALT 56 (H) 09/07/2018    AST 67 (H) 09/07/2018     09/07/2018    K 4.9 09/07/2018     (H) 09/07/2018    CREATININE 1.0 09/07/2018    BUN 21 09/07/2018    CO2 21 (L) 09/07/2018    TSH 1.277 03/03/2017    INR 1.1 02/08/2019    HGBA1C 6.4 (H) 09/07/2018         I have explained the risks and benefits of endoscopy procedures to the patient including but not limited to bleeding, perforation, infection, and death.    Thank you so much for allowing me to participate in the care of Tammi Salas MD

## 2019-02-08 NOTE — DISCHARGE INSTRUCTIONS
Upper GI Endoscopy     During endoscopy, a long, flexible tube is used to view the inside of your upper GI tract.      Upper GI endoscopy allows your healthcare provider to look directly into the beginning of your gastrointestinal (GI) tract. The esophagus, stomach, and duodenum (the first part of the small intestine) make up the upper GI tract.   Before the exam  Follow these and any other instructions you are given before your endoscopy. If you dont follow the healthcare providers instructions carefully, the test may need to be canceled or done over:  · Don't eat or drink anything after midnight the night before your exam. If your exam is in the afternoon, drink only clear liquids in the morning. Don't eat or drink anything for 8 hours before the exam. In some cases, you may be able to take medicines with sips of water until 2 hours before the procedure. Speak with your healthcare provider about this.   · Bring your X-rays and any other test results you have.  · Because you will be sedated, arrange for an adult to drive you home after the exam.  · Tell your healthcare provider before the exam if you are taking any medicines or have any medical problems.  The procedure  Here is what to expect:  · You will lie on the endoscopy table. Usually patients lie on the left side.  · You will be monitored and given oxygen.  · Your throat may be numbed with a spray or gargle. You are given medicine through an intravenous (IV) line that will help you relax and remain comfortable. You may be awake or asleep during the procedure.  · The healthcare provider will put the endoscope in your mouth and down your esophagus. It is thinner than most pieces of food that you swallow. It will not affect your breathing. The medicine helps keep you from gagging.  · Air is put into your GI tract to expand it. It can make you burp.  · During the procedure, the healthcare provider can take biopsies (tissue samples), remove abnormalities,  such as polyps, or treat abnormalities through a variety of devices placed through the endoscope. You will not feel this.   · The endoscope carries images of your upper GI tract to a video screen. If you are awake, you may be able to look at the images.  · After the procedure is done, you will rest for a time. An adult must drive you home.  When to call your healthcare provider  Contact your healthcare provider if you have:  · Black or tarry stools, or blood in your stool  · Fever  · Pain in your belly that does not go away  · Nausea and vomiting, or vomiting blood   Date Last Reviewed: 7/1/2016 © 2000-2017 [employer]. [address] [place] [address], [place] [address]. All rights reserved. This information is not intended as a substitute for professional medical care. Always follow your healthcare professional's instructions.        Colonoscopy     A camera attached to a flexible tube with a viewing lens is used to take video pictures.     Colonoscopy is a test to view the inside of your lower digestive tract (colon and rectum). Sometimes it can show the last part of the small intestine (ileum). During the test, small pieces of tissue may be removed for testing. This is called a biopsy. Small growths, such as polyps, may also be removed.   Why is colonoscopy done?  The test is done to help look for colon cancer. And it can help find the source of abdominal pain, bleeding, and changes in bowel habits. It may be needed once a year, depending on factors such as your:  · Age  · Health history  · Family health history  · Symptoms  · Results from any prior colonoscopy  Risks and possible complications  These include:  · Bleeding               · A puncture or tear in the colon   · Risks of anesthesia  · A cancer lesion not being seen  Getting ready   To prepare for the test:  · Talk with your healthcare provider about the risks of the test (see below). Also ask your healthcare provider about alternatives to the  test.  · Tell your healthcare provider about any medicines you take. Also tell him or her about any health conditions you may have.  · Make sure your rectum and colon are empty for the test. Follow the diet and bowel prep instructions exactly. If you dont, the test may need to be rescheduled.  · Plan for a friend or family member to drive you home after the test.     Colonoscopy provides an inside view of the entire colon.     You may discuss the results with your doctor right away or at a future visit.  During the test   The test is usually done in the hospital on an outpatient basis. This means you go home the same day. The procedure takes about 30 minutes. During that time:  · You are given relaxing (sedating) medicine through an IV line. You may be drowsy, or fully asleep.  · The healthcare provider will first give you a physical exam to check for anal and rectal problems.  · Then the anus is lubricated and the scope inserted.  · If you are awake, you may have a feeling similar to needing to have a bowel movement. You may also feel pressure as air is pumped into the colon. Its OK to pass gas during the procedure.  · Biopsy, polyp removal, or other treatments may be done during the test.  After the test   You may have gas right after the test. It can help to try to pass it to help prevent later bloating. Your healthcare provider may discuss the results with you right away. Or you may need to schedule a follow-up visit to talk about the results. After the test, you can go back to your normal eating and other activities. You may be tired from the sedation and need to rest for a few hours.  Date Last Reviewed: 11/1/2016 © 2000-2017 [employer]. [address] [place] [id number]. All rights reserved. This information is not intended as a substitute for professional medical care. Always follow your healthcare professional's instructions.

## 2019-02-08 NOTE — PROVATION PATIENT INSTRUCTIONS
Discharge Summary/Instructions after an Endoscopic Procedure  Patient Name: Tammi Farris  Patient MRN: [id number]  Patient YOB: 1950 Friday, February 08, 2019  Celso Salas MD  RESTRICTIONS:  During your procedure today, you received medications for sedation.  These   medications may affect your judgment, balance and coordination.  Therefore,   for 24 hours, you have the following restrictions:   - DO NOT drive a car, operate machinery, make legal/financial decisions,   sign important papers or drink alcohol.    ACTIVITY:  Today: no heavy lifting, straining or running due to procedural   sedation/anesthesia.  The following day: return to full activity including work.  DIET:  Eat and drink normally unless instructed otherwise.     TREATMENT FOR COMMON SIDE EFFECTS:  - Mild abdominal pain, nausea, belching, bloating or excessive gas:  rest,   eat lightly and use a heating pad.  - Sore Throat: treat with throat lozenges and/or gargle with warm salt   water.  - Because air was used during the procedure, expelling large amounts of air   from your rectum or belching is normal.  - If a bowel prep was taken, you may not have a bowel movement for 1-3 days.    This is normal.  SYMPTOMS TO WATCH FOR AND REPORT TO YOUR PHYSICIAN:  1. Abdominal pain or bloating, other than gas cramps.  2. Chest pain.  3. Back pain.  4. Signs of infection such as: chills or fever occurring within 24 hours   after the procedure.  5. Rectal bleeding, which would show as bright red, maroon, or black stools.   (A tablespoon of blood from the rectum is not serious, especially if   hemorrhoids are present.)  6. Vomiting.  7. Weakness or dizziness.  GO DIRECTLY TO THE NEAREST EMERGENCY ROOM IF YOU HAVE ANY OF THE FOLLOWING:      Difficulty breathing              Chills and/or fever over 101 F   Persistent vomiting and/or vomiting blood   Severe abdominal pain   Severe chest pain   Black, tarry stools   Bleeding- more than one  tablespoon   Any other symptom or condition that you feel may need urgent attention  Your doctor recommends these additional instructions:  If any biopsies were taken, your doctors clinic will contact you in 1 to 2   weeks with any results.  - Patient has a contact number available for emergencies.  The signs and   symptoms of potential delayed complications were discussed with the   patient.  Return to normal activities tomorrow.  Written discharge   instructions were provided to the patient.   - Discharge patient to home.   - Resume previous diet.   - Continue present medications.   - Repeat upper endoscopy in 2 years for screening purposes.   For questions, problems or results please call your physician - Celso Salas MD at Work:  [phone].  [facility], EMERGENCY ROOM PHONE NUMBER: [phone]  IF A COMPLICATION OR EMERGENCY SITUATION ARISES AND YOU ARE UNABLE TO REACH   YOUR PHYSICIAN - GO DIRECTLY TO THE EMERGENCY ROOM.  Celso Salas MD  2/8/2019 9:21:49 AM  This report has been verified and signed electronically.  [employer]

## 2019-02-08 NOTE — ANESTHESIA PREPROCEDURE EVALUATION
02/08/2019  Tammi Farris is a 68 y.o., female.  Past Surgical History:   Procedure Laterality Date    abdominal laproscopic surgery      APPENDECTOMY      CARPAL TUNNEL RELEASE      right    CHOLECYSTECTOMY      CHOLECYSTECTOMY-LAPAROSCOPIC N/A 4/21/2015    Performed by Vernon Arellano III, MD at [facility] OR    ESOPHAGOGASTRODUODENOSCOPY (EGD) N/A 11/14/2014    Performed by Peewee Silva MD at [facility] ENDO (4TH FLR)    HYSTERECTOMY  age 25    JOSEFA/BSO (endometriosis)    OOPHORECTOMY      SKIN CANCER DESTRUCTION      UPPER GASTROINTESTINAL ENDOSCOPY       Past Medical History:   Diagnosis Date    Allergy     Basal cell carcinoma     Cirrhosis of liver without ascites 12/27/2017    Diabetes mellitus, type 2     Endometrial cancer     GERD (gastroesophageal reflux disease)     Hyperlipidemia     Hypertension     Joint pain     Psoriasis          Anesthesia Evaluation    I have reviewed the Patient Summary Reports.     I have reviewed the Medications.     Review of Systems  Anesthesia Hx:  Neg history of prior surgery. Denies Family Hx of Anesthesia complications.   Denies Personal Hx of Anesthesia complications.   Hematology/Oncology:  Hematology Normal   Oncology Normal     EENT/Dental:EENT/Dental Normal   Cardiovascular:  Cardiovascular Normal Exercise tolerance: good     Pulmonary:  Pulmonary Normal    Renal/:  Renal/ Normal     Hepatic/GI:  Hepatic/GI Normal    Musculoskeletal:  Musculoskeletal Normal    Neurological:  Neurology Normal    Endocrine:  Endocrine Normal    Dermatological:  Skin Normal    Psych:  Psychiatric Normal           Physical Exam  General:  Obesity    Airway/Jaw/Neck:  Airway Findings: Mouth Opening: Normal Tongue: Normal  General Airway Assessment: Adult  Mallampati: II  Improves to II with phonation.  TM Distance: Normal, at least 6 cm  Jaw/Neck Findings:      Neck ROM: Normal ROM     Eyes/Ears/Nose:  EYES/EARS/NOSE FINDINGS: Normal    Chest/Lungs:  Chest/Lungs Findings: Clear to auscultation     Heart/Vascular:  Heart Findings: Rate: Normal  Rhythm: Regular Rhythm  Sounds: Normal  Heart murmur: negative Vascular Findings: Normal     Musculoskeletal:  Musculoskeletal Findings: Normal   Skin:  Skin Findings: Normal    Mental Status:  Mental Status Findings:  Cooperative, Alert and Oriented         Anesthesia Plan  Type of Anesthesia, risks & benefits discussed:  Anesthesia Type:  general  Patient's Preference: GENERAL  Intra-op Monitoring Plan: standard ASA monitors  Intra-op Monitoring Plan Comments:   Post Op Pain Control Plan:   Post Op Pain Control Plan Comments:   Induction:   IV  Beta Blocker:  Patient is on a Beta-Blocker and has received one dose within the past 24 hours (No further documentation required).       Informed Consent: Patient understands risks and agrees with Anesthesia plan.  Questions answered. Anesthesia consent signed with patient.  ASA Score: 3     Day of Surgery Review of History & Physical: I have interviewed and examined the patient. I have reviewed the patient's H&P dated:  There are no significant changes.  H&P update referred to the surgeon.         Ready For Surgery From Anesthesia Perspective.

## 2019-02-08 NOTE — ANESTHESIA POSTPROCEDURE EVALUATION
"Anesthesia Post Evaluation    Patient: Tammi Farris    Procedure(s) Performed: Procedure(s) (LRB):  EGD (ESOPHAGOGASTRODUODENOSCOPY) (N/A)  COLONOSCOPY (N/A)    Final Anesthesia Type: general  Patient location during evaluation: PACU  Patient participation: Yes- Able to Participate  Level of consciousness: awake and alert  Post-procedure vital signs: reviewed and stable  Pain management: adequate  Airway patency: patent  PONV status at discharge: No PONV  Anesthetic complications: no      Cardiovascular status: blood pressure returned to baseline  Respiratory status: spontaneous ventilation and room air  Hydration status: euvolemic  Follow-up not needed.        Visit Vitals  BP (!) 143/65 (BP Location: Left arm, Patient Position: Sitting)   Pulse 69   Temp 36.3 °C (97.3 °F) (Temporal)   Resp 18   Ht 4' 11" (1.499 m)   Wt 74.4 kg (164 lb)   SpO2 100%   Breastfeeding? No   BMI 33.12 kg/m²       Pain/Slava Score: Slava Score: 8 (2/8/2019 10:03 AM)        "

## 2019-02-15 ENCOUNTER — TELEPHONE (OUTPATIENT)
Dept: ENDOSCOPY | Facility: HOSPITAL | Age: 69
End: 2019-02-15

## 2019-02-18 ENCOUNTER — TELEPHONE (OUTPATIENT)
Dept: GASTROENTEROLOGY | Facility: CLINIC | Age: 69
End: 2019-02-18

## 2019-02-18 NOTE — TELEPHONE ENCOUNTER
----- Message from Celso Salas MD sent at 2/18/2019  8:00 AM CST -----  Please notify patient, the colon polyp was benign.

## 2019-02-25 DIAGNOSIS — E11.9 TYPE 2 DIABETES MELLITUS WITHOUT COMPLICATION, WITHOUT LONG-TERM CURRENT USE OF INSULIN: ICD-10-CM

## 2019-02-25 RX ORDER — METFORMIN HYDROCHLORIDE 500 MG/1
TABLET ORAL
Qty: 270 TABLET | Refills: 1 | Status: SHIPPED | OUTPATIENT
Start: 2019-02-25 | End: 2019-05-29 | Stop reason: SDUPTHER

## 2019-02-25 NOTE — TELEPHONE ENCOUNTER
Due for follow up with fasting labs prior, not urgent unless having new/urgent issue, please contact to schedule

## 2019-02-26 DIAGNOSIS — E11.9 TYPE 2 DIABETES MELLITUS WITHOUT COMPLICATION, WITHOUT LONG-TERM CURRENT USE OF INSULIN: ICD-10-CM

## 2019-02-26 RX ORDER — CALCIUM CITRATE/VITAMIN D3 200MG-6.25
TABLET ORAL
Qty: 300 STRIP | Refills: 0 | Status: SHIPPED | OUTPATIENT
Start: 2019-02-26 | End: 2019-10-23 | Stop reason: SDUPTHER

## 2019-03-04 ENCOUNTER — TELEPHONE (OUTPATIENT)
Dept: INTERNAL MEDICINE | Facility: CLINIC | Age: 69
End: 2019-03-04

## 2019-03-04 DIAGNOSIS — N39.0 URINARY TRACT INFECTION WITHOUT HEMATURIA, SITE UNSPECIFIED: Primary | ICD-10-CM

## 2019-03-04 RX ORDER — NITROFURANTOIN 25; 75 MG/1; MG/1
100 CAPSULE ORAL 2 TIMES DAILY
Qty: 6 CAPSULE | Refills: 0 | Status: SHIPPED | OUTPATIENT
Start: 2019-03-04 | End: 2019-03-07

## 2019-03-04 NOTE — TELEPHONE ENCOUNTER
Spoke with pt---pt advised that Rx has been sent into the pharmacy and advised to make U C visit if she is not feeling better within the next 2-3 days pt verbalized understanding.

## 2019-03-04 NOTE — TELEPHONE ENCOUNTER
----- Message from Ricardo Valerio sent at 3/4/2019  8:47 AM CST -----  Contact: Patient [phone]  Patient stating has been urinating a lot but only drops and burns a little bit wants to know if can prescribe Rx that was prescribed previously for the same symptoms?     [facility] [address] [place] [address] [place] [address] [facility] [phone] (Phone) [phone] (Fax)    Please call an advise  Thank you

## 2019-03-04 NOTE — TELEPHONE ENCOUNTER
Hi, I have sent in:  Orders Placed This Encounter    nitrofurantoin, macrocrystal-monohydrate, (MACROBID) 100 MG capsule   twice per day for 3 days    She was previously on Cipro in 2017, but this is now the preferred medicine for urinary tract infections.    If she is not better in 2-3 days she should call and schedule an urgent care appt.    Let me know if patient has any questions.  Thank you, Roger Lizama

## 2019-03-08 DIAGNOSIS — B36.9 FUNGAL SKIN INFECTION: ICD-10-CM

## 2019-03-08 DIAGNOSIS — I10 ESSENTIAL HYPERTENSION: ICD-10-CM

## 2019-03-08 RX ORDER — CLOTRIMAZOLE 1 %
CREAM (GRAM) TOPICAL
Qty: 90 G | Refills: 0 | Status: SHIPPED | OUTPATIENT
Start: 2019-03-08 | End: 2020-09-16 | Stop reason: SDUPTHER

## 2019-03-08 RX ORDER — BISOPROLOL FUMARATE AND HYDROCHLOROTHIAZIDE 10; 6.25 MG/1; MG/1
TABLET ORAL
Qty: 90 TABLET | Refills: 0 | Status: SHIPPED | OUTPATIENT
Start: 2019-03-08 | End: 2019-07-05 | Stop reason: SDUPTHER

## 2019-04-15 DIAGNOSIS — E11.9 TYPE 2 DIABETES MELLITUS WITHOUT COMPLICATION, WITHOUT LONG-TERM CURRENT USE OF INSULIN: ICD-10-CM

## 2019-04-17 ENCOUNTER — TELEPHONE (OUTPATIENT)
Dept: INTERNAL MEDICINE | Facility: CLINIC | Age: 69
End: 2019-04-17

## 2019-04-17 DIAGNOSIS — E11.9 TYPE 2 DIABETES MELLITUS WITHOUT COMPLICATION, WITHOUT LONG-TERM CURRENT USE OF INSULIN: ICD-10-CM

## 2019-04-17 RX ORDER — LANCETS 33 GAUGE
EACH MISCELLANEOUS
Qty: 100 EACH | Refills: 3 | Status: SHIPPED | OUTPATIENT
Start: 2019-04-17 | End: 2020-05-01

## 2019-04-17 NOTE — TELEPHONE ENCOUNTER
Spoke with pharmacy her prescription has been received and is being prepared for . They will contact patient.

## 2019-04-17 NOTE — TELEPHONE ENCOUNTER
----- Message from Calli Zelaya sent at 4/17/2019 10:44 AM CDT -----  Contact: [phone]  Patient is calling for an RX refill or new RX.  Is this a refill or new RX:  Refill   RX name and strength: [facility]ANCETS [facility]    Local pharmacy or mail order pharmacy: [facility] [address] [place] [address] [address] AT [facility]   [phone] (Phone)  [phone] (Fax)      Comments:  Patient stated the pharmacy stated the prescription was declined.    Please advise, thanks

## 2019-05-10 DIAGNOSIS — E11.9 TYPE 2 DIABETES MELLITUS WITHOUT COMPLICATION, UNSPECIFIED WHETHER LONG TERM INSULIN USE: ICD-10-CM

## 2019-05-16 ENCOUNTER — PATIENT OUTREACH (OUTPATIENT)
Dept: ADMINISTRATIVE | Facility: HOSPITAL | Age: 69
End: 2019-05-16

## 2019-05-16 NOTE — PROGRESS NOTES
Health Maintenance Due   Topic Date Due    Fecal Occult Blood Test (FOBT)/FitKit  11/29/2018    Eye Exam  01/05/2019    Hemoglobin A1c  03/21/2019     Pre-visit chart audit complete.

## 2019-05-23 ENCOUNTER — LAB VISIT (OUTPATIENT)
Dept: LAB | Facility: HOSPITAL | Age: 69
End: 2019-05-23
Attending: INTERNAL MEDICINE
Payer: MEDICARE

## 2019-05-23 DIAGNOSIS — E11.9 TYPE 2 DIABETES MELLITUS WITHOUT COMPLICATION, WITHOUT LONG-TERM CURRENT USE OF INSULIN: ICD-10-CM

## 2019-05-23 DIAGNOSIS — I10 ESSENTIAL HYPERTENSION: ICD-10-CM

## 2019-05-23 DIAGNOSIS — M10.9 INTERVAL GOUT: ICD-10-CM

## 2019-05-23 LAB
ALBUMIN SERPL BCP-MCNC: 3.9 G/DL (ref 3.5–5.2)
ALP SERPL-CCNC: 94 U/L (ref 55–135)
ALT SERPL W/O P-5'-P-CCNC: 33 U/L (ref 10–44)
ANION GAP SERPL CALC-SCNC: 7 MMOL/L (ref 8–16)
AST SERPL-CCNC: 36 U/L (ref 10–40)
BILIRUB SERPL-MCNC: 0.3 MG/DL (ref 0.1–1)
BUN SERPL-MCNC: 19 MG/DL (ref 8–23)
CALCIUM SERPL-MCNC: 10.5 MG/DL (ref 8.7–10.5)
CHLORIDE SERPL-SCNC: 108 MMOL/L (ref 95–110)
CO2 SERPL-SCNC: 25 MMOL/L (ref 23–29)
CREAT SERPL-MCNC: 1 MG/DL (ref 0.5–1.4)
EST. GFR  (AFRICAN AMERICAN): >60 ML/MIN/1.73 M^2
EST. GFR  (NON AFRICAN AMERICAN): 58 ML/MIN/1.73 M^2
ESTIMATED AVG GLUCOSE: 146 MG/DL (ref 68–131)
GLUCOSE SERPL-MCNC: 119 MG/DL (ref 70–110)
HBA1C MFR BLD HPLC: 6.7 % (ref 4–5.6)
POTASSIUM SERPL-SCNC: 4.5 MMOL/L (ref 3.5–5.1)
PROT SERPL-MCNC: 7.8 G/DL (ref 6–8.4)
SODIUM SERPL-SCNC: 140 MMOL/L (ref 136–145)
URATE SERPL-MCNC: 8.3 MG/DL (ref 2.4–5.7)

## 2019-05-23 PROCEDURE — 83036 HEMOGLOBIN GLYCOSYLATED A1C: CPT | Mod: HCNC

## 2019-05-23 PROCEDURE — 36415 COLL VENOUS BLD VENIPUNCTURE: CPT | Mod: HCNC,PO

## 2019-05-23 PROCEDURE — 84550 ASSAY OF BLOOD/URIC ACID: CPT | Mod: HCNC

## 2019-05-23 PROCEDURE — 80053 COMPREHEN METABOLIC PANEL: CPT | Mod: HCNC

## 2019-05-27 ENCOUNTER — TELEPHONE (OUTPATIENT)
Dept: INTERNAL MEDICINE | Facility: CLINIC | Age: 69
End: 2019-05-27

## 2019-05-27 NOTE — PROGRESS NOTES
Lab results overall look stable, please call patient to let them know and remind of upcoming visit. We'll discuss in detail then.  Ariel Mullins MD

## 2019-05-27 NOTE — TELEPHONE ENCOUNTER
Pt informed of results, and reminded of upcoming appt:     Lab results overall look stable, please call patient to let them know and remind of upcoming visit. We'll discuss in detail then.   Ariel Mullins MD

## 2019-05-29 DIAGNOSIS — E11.9 TYPE 2 DIABETES MELLITUS WITHOUT COMPLICATION, WITHOUT LONG-TERM CURRENT USE OF INSULIN: ICD-10-CM

## 2019-05-29 RX ORDER — METFORMIN HYDROCHLORIDE 500 MG/1
TABLET ORAL
Qty: 270 TABLET | Refills: 0 | Status: SHIPPED | OUTPATIENT
Start: 2019-05-29 | End: 2019-05-30

## 2019-05-30 ENCOUNTER — DOCUMENTATION ONLY (OUTPATIENT)
Dept: INTERNAL MEDICINE | Facility: CLINIC | Age: 69
End: 2019-05-30

## 2019-05-30 ENCOUNTER — OFFICE VISIT (OUTPATIENT)
Dept: INTERNAL MEDICINE | Facility: CLINIC | Age: 69
End: 2019-05-30
Payer: MEDICARE

## 2019-05-30 VITALS
HEART RATE: 64 BPM | BODY MASS INDEX: 32.18 KG/M2 | WEIGHT: 159.63 LBS | HEIGHT: 59 IN | DIASTOLIC BLOOD PRESSURE: 74 MMHG | OXYGEN SATURATION: 97 % | SYSTOLIC BLOOD PRESSURE: 120 MMHG | TEMPERATURE: 98 F

## 2019-05-30 DIAGNOSIS — Z85.828 HISTORY OF BASAL CELL CARCINOMA (BCC): ICD-10-CM

## 2019-05-30 DIAGNOSIS — I10 ESSENTIAL HYPERTENSION: ICD-10-CM

## 2019-05-30 DIAGNOSIS — E11.9 TYPE 2 DIABETES MELLITUS WITHOUT COMPLICATION, WITHOUT LONG-TERM CURRENT USE OF INSULIN: Primary | ICD-10-CM

## 2019-05-30 DIAGNOSIS — Z51.81 ENCOUNTER FOR MONITORING LONG-TERM PROTON PUMP INHIBITOR THERAPY: ICD-10-CM

## 2019-05-30 DIAGNOSIS — Z00.00 ENCOUNTER FOR HEALTH MAINTENANCE EXAMINATION: ICD-10-CM

## 2019-05-30 DIAGNOSIS — Z12.39 SCREENING FOR MALIGNANT NEOPLASM OF BREAST: ICD-10-CM

## 2019-05-30 DIAGNOSIS — Z79.899 ENCOUNTER FOR MONITORING LONG-TERM PROTON PUMP INHIBITOR THERAPY: ICD-10-CM

## 2019-05-30 DIAGNOSIS — F41.9 ANXIETY: ICD-10-CM

## 2019-05-30 DIAGNOSIS — R92.8 ABNORMAL MAMMOGRAM: ICD-10-CM

## 2019-05-30 DIAGNOSIS — M10.9 INTERVAL GOUT: ICD-10-CM

## 2019-05-30 DIAGNOSIS — F43.21 GRIEF: ICD-10-CM

## 2019-05-30 DIAGNOSIS — K74.60 CIRRHOSIS OF LIVER WITHOUT ASCITES, UNSPECIFIED HEPATIC CIRRHOSIS TYPE: ICD-10-CM

## 2019-05-30 DIAGNOSIS — K21.9 GASTROESOPHAGEAL REFLUX DISEASE, ESOPHAGITIS PRESENCE NOT SPECIFIED: ICD-10-CM

## 2019-05-30 DIAGNOSIS — K52.9 CHRONIC DIARRHEA: ICD-10-CM

## 2019-05-30 PROCEDURE — 99999 PR PBB SHADOW E&M-EST. PATIENT-LVL V: CPT | Mod: PBBFAC,HCNC,, | Performed by: INTERNAL MEDICINE

## 2019-05-30 PROCEDURE — 1101F PT FALLS ASSESS-DOCD LE1/YR: CPT | Mod: HCNC,CPTII,S$GLB, | Performed by: INTERNAL MEDICINE

## 2019-05-30 PROCEDURE — 3078F DIAST BP <80 MM HG: CPT | Mod: HCNC,CPTII,S$GLB, | Performed by: INTERNAL MEDICINE

## 2019-05-30 PROCEDURE — 3074F SYST BP LT 130 MM HG: CPT | Mod: HCNC,CPTII,S$GLB, | Performed by: INTERNAL MEDICINE

## 2019-05-30 PROCEDURE — 1101F PR PT FALLS ASSESS DOC 0-1 FALLS W/OUT INJ PAST YR: ICD-10-PCS | Mod: HCNC,CPTII,S$GLB, | Performed by: INTERNAL MEDICINE

## 2019-05-30 PROCEDURE — 99999 PR PBB SHADOW E&M-EST. PATIENT-LVL V: ICD-10-PCS | Mod: PBBFAC,HCNC,, | Performed by: INTERNAL MEDICINE

## 2019-05-30 PROCEDURE — 3078F PR MOST RECENT DIASTOLIC BLOOD PRESSURE < 80 MM HG: ICD-10-PCS | Mod: HCNC,CPTII,S$GLB, | Performed by: INTERNAL MEDICINE

## 2019-05-30 PROCEDURE — 3074F PR MOST RECENT SYSTOLIC BLOOD PRESSURE < 130 MM HG: ICD-10-PCS | Mod: HCNC,CPTII,S$GLB, | Performed by: INTERNAL MEDICINE

## 2019-05-30 PROCEDURE — 99499 UNLISTED E&M SERVICE: CPT | Mod: HCNC,S$GLB,, | Performed by: INTERNAL MEDICINE

## 2019-05-30 PROCEDURE — 3044F HG A1C LEVEL LT 7.0%: CPT | Mod: HCNC,CPTII,S$GLB, | Performed by: INTERNAL MEDICINE

## 2019-05-30 PROCEDURE — 99215 PR OFFICE/OUTPT VISIT, EST, LEVL V, 40-54 MIN: ICD-10-PCS | Mod: HCNC,S$GLB,, | Performed by: INTERNAL MEDICINE

## 2019-05-30 PROCEDURE — 3044F PR MOST RECENT HEMOGLOBIN A1C LEVEL <7.0%: ICD-10-PCS | Mod: HCNC,CPTII,S$GLB, | Performed by: INTERNAL MEDICINE

## 2019-05-30 PROCEDURE — 99499 RISK ADDL DX/OHS AUDIT: ICD-10-PCS | Mod: HCNC,S$GLB,, | Performed by: INTERNAL MEDICINE

## 2019-05-30 PROCEDURE — 99215 OFFICE O/P EST HI 40 MIN: CPT | Mod: HCNC,S$GLB,, | Performed by: INTERNAL MEDICINE

## 2019-05-30 RX ORDER — ALLOPURINOL 100 MG/1
100 TABLET ORAL DAILY
Qty: 90 TABLET | Refills: 1 | Status: SHIPPED | OUTPATIENT
Start: 2019-05-30 | End: 2019-11-09 | Stop reason: SDUPTHER

## 2019-05-30 RX ORDER — COLCHICINE 0.6 MG/1
TABLET ORAL
Qty: 30 TABLET | Refills: 5 | Status: SHIPPED | OUTPATIENT
Start: 2019-05-30 | End: 2020-06-17

## 2019-05-30 RX ORDER — COLCHICINE 0.6 MG/1
TABLET ORAL
Qty: 30 TABLET | Refills: 3 | Status: SHIPPED | OUTPATIENT
Start: 2019-05-30 | End: 2019-05-30 | Stop reason: SDUPTHER

## 2019-05-30 RX ORDER — METFORMIN HYDROCHLORIDE 500 MG/1
TABLET, EXTENDED RELEASE ORAL
Qty: 270 TABLET | Refills: 3
Start: 2019-05-30 | End: 2019-09-25 | Stop reason: SDUPTHER

## 2019-05-30 NOTE — PROGRESS NOTES
Subjective:       Patient ID: Tammi Farris is a 68 y.o. female.    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    DM2 - Most recent A1c 6.7, up a bit from previous but still at goal  Tries to follow diabetic diet, not always  Taking 1000mg qAM, 500mg qPM of metformin after meals.   Due for eye exam  Foot exam 9/2018  MAC 9/2018    GERD - on omeprazole, has symptoms if she doesn't take this - takes this every day.    Chronic diarrhea - Does report having diarrhea intermittently since her cholecystectomy about 2 years ago, also has h/o GERD. Concerned that metformin may be causing more frequent BM and urgency  Has been taking pepto-bismol often  Saw GI for this in 2016, hasn't returned for follow up there yet.    Gout - hasn't wanted to take allopurinol; takes colchicine prn  Uric acid 8.3 on recent labs  Last flare about 2 weeks ago - took colchicine and this resolves.   However has been taking colchicine very often for 1-2 weeks at a time.    HTN - taking bisoprolol-HCTZ and losartan daily     HLD - prescribed statin, not taking this; didn't tolerate rosuvastatin  Working on high-fiber healthier diet. Ate oatmeal every day during Lent.    NAFLD, cirrhosis - now following with Hepatology  Ultrasound done 11/10/17 showing fatty liver; nodular liver contour noted concerning for cirrhosis. Fibroscan with F4 fibrosis noted, S3 steatosis. EGD/colonoscopy done recently as noted.  Immune to hep A/B.   LFTs normal on recent labs.  Having glass of wine rarely    Psoriasis - using clobetasol cream sometimes which helps, no rash currently.  Fungal rash on chest - between and under breasts, uses antifungal cream for this.   H/o BCC on forehead - following with dermatology, was due for follow up in 5/2018 but hasn't gone - due for this.  Concerned about mole on R shoulder - reports was told this was benign at previous dermatology  "visit.    FIT test 11/2017, negative   EGD, colonoscopy done 2/2019. EGD with irregular z-line, no varices, otherwise normal. +one benign colon polyp  Recommended to repeat EGD in 2 years for screening.  Recommended to repeat colonoscopy in 5 years for surveillance.    Abnormal mammogram in late 2018 with R breast calcifications; diagnostic mammogram f/u BIRADS-3 (probably benign), recommended for six month follow up or biopsy.  Hasn't yet scheduled this.    Review of Systems   Constitutional: Negative for activity change and unexpected weight change.   HENT: Negative.    Eyes: Negative for visual disturbance.   Respiratory: Negative for cough and shortness of breath.    Cardiovascular: Negative for chest pain and leg swelling.   Gastrointestinal: Positive for diarrhea. Negative for abdominal distention, abdominal pain and blood in stool.   Endocrine: Negative for polyuria.   Genitourinary: Negative.    Musculoskeletal: Negative for arthralgias, gait problem and joint swelling.        Forearm pain bilaterally sometimes   Skin: Negative for rash (none currently).   Neurological: Negative.    Psychiatric/Behavioral: Negative for dysphoric mood and sleep disturbance. The patient is nervous/anxious.          Past medical history, surgical history, and family medical history reviewed and updated as appropriate.    Medications and allergies reviewed.     Objective:          Vitals:    05/30/19 0939   BP: 120/74   BP Location: Right arm   Patient Position: Sitting   Pulse: 64   Temp: 98.4 °F (36.9 °C)   TempSrc: Oral   SpO2: 97%   Weight: 72.4 kg (159 lb 9.8 oz)   Height: 4' 11" (1.499 m)     Body mass index is 32.24 kg/m².  Physical Exam   Constitutional: She is oriented to person, place, and time. She appears well-developed and well-nourished. No distress.   HENT:   Head: Normocephalic and atraumatic.   Mouth/Throat: Oropharynx is clear and moist.   Eyes: Pupils are equal, round, and reactive to light. Conjunctivae and " EOM are normal. No scleral icterus.   Neck: Neck supple.   Cardiovascular: Normal rate, regular rhythm and normal heart sounds.   No murmur heard.  Pulmonary/Chest: Effort normal and breath sounds normal. No respiratory distress.   Abdominal: Soft. Bowel sounds are normal. She exhibits no distension. There is no tenderness.   +abdominal obesity   Musculoskeletal: She exhibits no edema or tenderness.   Lymphadenopathy:     She has no cervical adenopathy.   Neurological: She is alert and oriented to person, place, and time. No cranial nerve deficit. Gait normal.   Skin: Skin is warm and dry.   No rash noted today   Psychiatric: Her speech is normal and behavior is normal.   Some anxiety about health, also reports some distress / dysphoric mood related to relationship difficulties.    Vitals reviewed.      Lab Results   Component Value Date    WBC 7.70 02/08/2019    HGB 12.1 02/08/2019    HCT 38.7 02/08/2019     02/08/2019    CHOL 183 09/07/2018    TRIG 210 (H) 09/07/2018    HDL 26 (L) 09/07/2018    ALT 33 05/23/2019    AST 36 05/23/2019     05/23/2019    K 4.5 05/23/2019     05/23/2019    CREATININE 1.0 05/23/2019    BUN 19 05/23/2019    CO2 25 05/23/2019    TSH 1.277 03/03/2017    INR 1.1 02/08/2019    HGBA1C 6.7 (H) 05/23/2019       Assessment:       1. Type 2 diabetes mellitus without complication, without long-term current use of insulin    2. Interval gout    3. Anxiety    4. Grief    5. Abnormal mammogram    6. Screening for malignant neoplasm of breast    7. Encounter for health maintenance examination    8. Encounter for monitoring long-term proton pump inhibitor therapy    9. Gastroesophageal reflux disease, esophagitis presence not specified    10. Cirrhosis of liver without ascites, unspecified hepatic cirrhosis type    11. Chronic diarrhea    12. History of basal cell carcinoma (BCC)    13. Essential hypertension        Plan:   Tammi was seen today for follow-up.    Diagnoses and all  orders for this visit:    Type 2 diabetes mellitus without complication, without long-term current use of insulin  -     metFORMIN (GLUCOPHAGE-XR) 500 MG 24 hr tablet; Take 1 tablet after breakfast and 2 tablets after dinner every day  -     Comprehensive metabolic panel; Future  -     Lipid panel; Future  -     Hemoglobin A1c; Future  At goal but increased from prior  Reminded to schedule eye exam (can do retina photo)  Will switch metformin dosing to 1 tab qAM / 2 qPM to see if this helps with her frequent BM    Interval gout - uric acid high and having fairly frequent mild flares, taking colchicine frequently.   Will start allopurinol, +daily colchcicine for ppx for ~2 months  Recheck level with next labs  -     colchicine (COLCRYS) 0.6 mg tablet; Take every day for 2 months when starting allopurinol, to prevent gout. After this take only as needed for gout flare  -     allopurinol (ZYLOPRIM) 100 MG tablet; Take 1 tablet (100 mg total) by mouth once daily. To reduce uric acid and help prevent gout attacks. Take EVERY DAY.  -     Uric acid; Future    Anxiety, relationship stressors  -     Ambulatory Referral to Psychology  Grief  -     Ambulatory Referral to Psychology  Encouraged her to establish with a counselor; she agrees that this may be helpful for her    Abnormal mammogram - now due for 6 month follow up as recommended  -     Mammo Digital Screening Bilateral With CAD; Future  Screening for malignant neoplasm of breast  -     Mammo Digital Screening Bilateral With CAD; Future    Encounter for health maintenance examination  -     CBC Without Differential; Future  -     Comprehensive metabolic panel; Future  -     Lipid panel; Future  -     Hemoglobin A1c; Future  -     Uric acid; Future  -     Vitamin D; Future  -     Magnesium; Future  -     Vitamin B12; Future    Encounter for monitoring long-term proton pump inhibitor therapy  -     Comprehensive metabolic panel; Future  -     Vitamin D; Future  -      Magnesium; Future  -     Vitamin B12; Future    Gastroesophageal reflux disease, esophagitis presence not specified  -     CBC Without Differential; Future  -     Comprehensive metabolic panel; Future    Cirrhosis of liver without ascites, unspecified hepatic cirrhosis type - follow with hepatology  -     CBC Without Differential; Future  -     Comprehensive metabolic panel; Future    Chronic diarrhea - follow up with GI    H/o BCC - reminded to schedule follow up with dermatology. Nevus on R shoulder appears benign    HTN - at goal, continue current meds    Health maintenance reviewed with patient.   Labs fasting before next visit    Follow up in about 4 months (around 9/30/2019) for annual physical.    Ariel Mullins MD  Internal Medicine  [facility]  5/30/2019    45 minutes spent with patient with >50% of visit spent counseling patient regarding conditions documented above and planning for care coordination. All questions answered.

## 2019-05-30 NOTE — PATIENT INSTRUCTIONS
Call to schedule an appointment with a counselor (psychologist or LCSW)!    Try switching your metformin to 1 tablet in the morning and 2 tablets at night. When you are due for a refill, call in a request for the extended-release metformin.    Check with your pharmacy about getting the new shingles vaccine -- this is called Shingrix.     Work on avoiding any fried or fatty foods to help improve your loose stools / frequent bowel movements.  You can also take a fiber supplement (citrucel or benefiber) with a full glass of water once a day.    Start taking allopurinol once a day to help prevent gout flare. When you start this, also take colchine once a day, every day for 2 months.  After 2 months, STOP the colchicine but continue taking the allopurinol ever day.

## 2019-06-18 ENCOUNTER — OFFICE VISIT (OUTPATIENT)
Dept: OPTOMETRY | Facility: CLINIC | Age: 69
End: 2019-06-18
Payer: MEDICARE

## 2019-06-18 DIAGNOSIS — H25.043 POSTERIOR SUBCAPSULAR AGE-RELATED CATARACT OF BOTH EYES: ICD-10-CM

## 2019-06-18 DIAGNOSIS — H52.03 HYPEROPIA OF BOTH EYES WITH ASTIGMATISM: ICD-10-CM

## 2019-06-18 DIAGNOSIS — H25.13 NUCLEAR SCLEROSIS OF BOTH EYES: ICD-10-CM

## 2019-06-18 DIAGNOSIS — H40.003 GLAUCOMA SUSPECT, BOTH EYES: Primary | ICD-10-CM

## 2019-06-18 DIAGNOSIS — H52.4 PRESBYOPIA OF BOTH EYES: ICD-10-CM

## 2019-06-18 DIAGNOSIS — H52.203 HYPEROPIA OF BOTH EYES WITH ASTIGMATISM: ICD-10-CM

## 2019-06-18 DIAGNOSIS — E11.9 TYPE 2 DIABETES MELLITUS WITHOUT RETINOPATHY: ICD-10-CM

## 2019-06-18 PROCEDURE — 99499 UNLISTED E&M SERVICE: CPT | Mod: HCNC,S$GLB,, | Performed by: OPTOMETRIST

## 2019-06-18 PROCEDURE — 99499 RISK ADDL DX/OHS AUDIT: ICD-10-PCS | Mod: HCNC,S$GLB,, | Performed by: OPTOMETRIST

## 2019-06-18 PROCEDURE — 92015 DETERMINE REFRACTIVE STATE: CPT | Mod: HCNC,S$GLB,, | Performed by: OPTOMETRIST

## 2019-06-18 PROCEDURE — 99999 PR PBB SHADOW E&M-EST. PATIENT-LVL III: CPT | Mod: PBBFAC,HCNC,, | Performed by: OPTOMETRIST

## 2019-06-18 PROCEDURE — 99999 PR PBB SHADOW E&M-EST. PATIENT-LVL III: ICD-10-PCS | Mod: PBBFAC,HCNC,, | Performed by: OPTOMETRIST

## 2019-06-18 PROCEDURE — 92014 PR EYE EXAM, EST PATIENT,COMPREHESV: ICD-10-PCS | Mod: HCNC,S$GLB,, | Performed by: OPTOMETRIST

## 2019-06-18 PROCEDURE — 92015 PR REFRACTION: ICD-10-PCS | Mod: HCNC,S$GLB,, | Performed by: OPTOMETRIST

## 2019-06-18 PROCEDURE — 92014 COMPRE OPH EXAM EST PT 1/>: CPT | Mod: HCNC,S$GLB,, | Performed by: OPTOMETRIST

## 2019-06-18 NOTE — PATIENT INSTRUCTIONS
Early nuclear sclerotic changes of lens of both eyes, consistent with age.  Early axial posterior subcapsular cataract bilaterally, more apparent in the right eye.  Still no need for cataract surgery in either eye.     Type 2 diabetes without evidence of diabetic retinopathy.  No hypertensive retinopathy.     Larger-than-average optic nerve head cup-to-disc ratio in both eyes, with asymmetry (greater in the left eye), as noted previously.    Intraocular pressure within normal range, although high-normal (18 mm Hg) in each eye today.     Stereo disc photos taken previously.  Guardado visual field test (24-2 MERLY Standard) and Spectralis OCT retinal nerve fiber layer thickness analysis done on previous visit.  Suggest repeat HVF test (24-2)    Assistant to call  to schedule visual field test,  and follow up appointment with me (Dr. Reese) on the same date.     Hyperopia with astigmatism in each eye, with good correctable vision in each eye.  Presbyopia consistent with age.  New spectacle lens Rx issued for full-time wear.  Repeat refraction in one year.

## 2019-06-18 NOTE — PROGRESS NOTES
HPI     Diabetic Eye Exam      Additional comments: Pt returns for diabetic exam.   Lost last spectacle lens Rx issued, and never filled.   No complaints               Comments     Patient's age: 68 y.o.  female   Occupation: Retired   Approximate date of last eye examination:  01/05/2018  Name of last eye doctor seen:   City/State: [place]  Wears glasses? OTC readers      If yes, wears  Full-time or part-time?  N/a   Present glasses are: Bifocal, SV Distance, SV Reading?  SV OTC glasses.  Approximate age of present glasses:  n/a   Got new glasses following last exam, or subsequently?:  no   Any problem with VA with glasses?  No visual changes. No complains   Wears CLs?:  no  Headaches?  No   Eye pain/discomfort?  No                                                                                      Flashes?  No   Floaters?  No   Diplopia/Double vision?  No   Patient's Ocular History:         Any eye surgeries? No          Any eye injury?  No          Any treatment for eye disease?  No   Family history of eye disease?  No   Significant patient medical history:         1. Diabetes?  Yes, BS was 103 this morning       If yes, IDDM or NIDDM?  NIDDM - takes Metformin   2. HBP?  Yes, controlled with medication                 ! OTC eyedrops currently using:  No    ! Prescription eye meds currently using:  No    ! Any history of allergy/adverse reaction to any eye meds used   previously?  No     ! Any history of allergy/adverse reaction to eyedrops used during prior   eye exam(s)? No     ! Any history of allergy/adverse reaction to Novacaine or similar meds?   No    ! Any history of allergy/adverse reaction to Epinephrine or similar meds?   No     ! Patient okay with use of anesthetic eyedrops to check eye pressure?    Yes         ! Patient okay with use of eyedrops to dilate pupils today?  Yes    !  Allergies/Medications/Medical History/Family History reviewed today?    Yes       PD =   67/63  Desired  "reading distance =  14"                                                                    Last edited by Rajiv Reese, OD on 6/18/2019 11:31 AM. (History)            Assessment /Plan     For exam results, see Encounter Report.    1. Glaucoma suspect, both eyes  Guardado Visual Field - OU - Extended - Both Eyes   2. Type 2 diabetes mellitus without retinopathy     3. Nuclear sclerosis of both eyes     4. Posterior subcapsular age-related cataract of both eyes     5. Hyperopia of both eyes with astigmatism     6. Presbyopia of both eyes                    Early nuclear sclerotic changes of lens of both eyes, consistent with age.  Early axial posterior subcapsular cataract bilaterally, more apparent in the right eye.  Still no need for cataract surgery in either eye.     Type 2 diabetes without evidence of diabetic retinopathy.  No hypertensive retinopathy.     Larger-than-average optic nerve head cup-to-disc ratio in both eyes, with asymmetry (greater in the left eye), as noted previously.    Intraocular pressure within normal range, although high-normal (18 mm Hg) in each eye today.     Stereo disc photos taken previously.  Guardado visual field test (24-2 MERLY Standard) and Spectralis OCT retinal nerve fiber layer thickness analysis done on previous visit.  Suggest repeat HVF test (24-2)    Assistant to call  to schedule visual field test,  and follow up appointment with me (Dr. Reese) on the same date.     Hyperopia with astigmatism in each eye, with good correctable vision in each eye.  Presbyopia consistent with age.  New spectacle lens Rx issued for full-time wear.  Repeat refraction in one year.          "

## 2019-07-05 DIAGNOSIS — I10 ESSENTIAL HYPERTENSION: ICD-10-CM

## 2019-07-05 RX ORDER — BISOPROLOL FUMARATE AND HYDROCHLOROTHIAZIDE 10; 6.25 MG/1; MG/1
TABLET ORAL
Qty: 90 TABLET | Refills: 0 | Status: SHIPPED | OUTPATIENT
Start: 2019-07-05 | End: 2019-10-07 | Stop reason: SDUPTHER

## 2019-08-01 ENCOUNTER — TELEPHONE (OUTPATIENT)
Dept: DERMATOLOGY | Facility: CLINIC | Age: 69
End: 2019-08-01

## 2019-08-01 NOTE — TELEPHONE ENCOUNTER
----- Message from Nati Palafox sent at 8/1/2019 12:47 PM CDT -----  Contact: pt   [facility] Patient Requesting Sooner Appointment.     Reason for sooner appt.: pt is having a psoriasis flare up   When is the first available appointment? 10/2019  Communication Preference: can you please call pt at [phone]  Additional Information: pt is asking for a different cream to be called in to see if it will help     TAWANDA

## 2019-08-01 NOTE — TELEPHONE ENCOUNTER
----- Message from Nati Palafox sent at 8/1/2019  1:03 PM CDT -----  Contact: pt   [facility] - pt Patient Returning Call from [facility]    Who Left Message for Patient: pt is returning Sima's call   Communication Preference:can you please call pt at [phone]  Additional Information: none    TAWANDA

## 2019-08-07 ENCOUNTER — OFFICE VISIT (OUTPATIENT)
Dept: DERMATOLOGY | Facility: CLINIC | Age: 69
End: 2019-08-07
Payer: MEDICARE

## 2019-08-07 DIAGNOSIS — L40.0 PSORIASIS VULGARIS: ICD-10-CM

## 2019-08-07 DIAGNOSIS — L40.8 INVERSE PSORIASIS: Primary | ICD-10-CM
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
## 2019-08-07 PROCEDURE — 1101F PR PT FALLS ASSESS DOC 0-1 FALLS W/OUT INJ PAST YR: ICD-10-PCS | Mod: HCNC,CPTII,S$GLB, | Performed by: DERMATOLOGY

## 2019-08-07 PROCEDURE — 99999 PR PBB SHADOW E&M-EST. PATIENT-LVL II: CPT | Mod: PBBFAC,HCNC,, | Performed by: DERMATOLOGY

## 2019-08-07 PROCEDURE — 99999 PR PBB SHADOW E&M-EST. PATIENT-LVL II: ICD-10-PCS | Mod: PBBFAC,HCNC,, | Performed by: DERMATOLOGY

## 2019-08-07 PROCEDURE — 99213 PR OFFICE/OUTPT VISIT, EST, LEVL III, 20-29 MIN: ICD-10-PCS | Mod: HCNC,S$GLB,, | Performed by: DERMATOLOGY

## 2019-08-07 PROCEDURE — 99213 OFFICE O/P EST LOW 20 MIN: CPT | Mod: HCNC,S$GLB,, | Performed by: DERMATOLOGY

## 2019-08-07 PROCEDURE — 1101F PT FALLS ASSESS-DOCD LE1/YR: CPT | Mod: HCNC,CPTII,S$GLB, | Performed by: DERMATOLOGY

## 2019-08-07 RX ORDER — BETAMETHASONE DIPROPIONATE 0.5 MG/G
CREAM TOPICAL 2 TIMES DAILY
Qty: 45 G | Refills: 1 | Status: ON HOLD | OUTPATIENT
Start: 2019-08-07 | End: 2022-01-01

## 2019-08-07 RX ORDER — TRIAMCINOLONE ACETONIDE 1 MG/G
CREAM TOPICAL
Qty: 80 G | Refills: 1 | Status: SHIPPED | OUTPATIENT
Start: 2019-08-07 | End: 2020-02-14

## 2019-08-07 NOTE — PROGRESS NOTES
Subjective:       Patient ID:  Tammi Farris is a 69 y.o. female who presents for   Chief Complaint   Patient presents with    Skin Check     UBSE       Pt presents today for red spot between breast area and groin area,  2+ years, burning sensation, Tx. Clotrimazole 1%.  Has a hx of inverse and plaque psoriasis which was treated in the past with TAC shake lotion which worked well, but insurance stopped covering it.    Pt noticed what she thinks are insect bites on right jaw line, right arm and right hip x 1 week. No new meds prior to onset of these spots. They are itchy but not painful.    Pt has a h/o BCC on forehead.        Review of Systems   Constitutional: Negative for fever, chills, weight loss, weight gain, fatigue, night sweats and malaise.   Skin: Negative for daily sunscreen use and activity-related sunscreen use.   Hematologic/Lymphatic: Bruises/bleeds easily.        Objective:    Physical Exam   Constitutional: She appears well-developed and well-nourished.   Neurological: She is alert and oriented to person, place, and time.   Psychiatric: She has a normal mood and affect.   Skin:   Areas Examined (abnormalities noted in diagram):   Head / Face Inspection Performed  Neck Inspection Performed  Chest / Axilla Inspection Performed  Abdomen Inspection Performed  Genitals / Buttocks / Groin Inspection Performed  Back Inspection Performed  RUE Inspected  LUE Inspection Performed  RLE Inspected  LLE Inspection Performed              Diagram Legend     Erythematous scaling macule/papule c/w actinic keratosis       Vascular papule c/w angioma      Pigmented verrucoid papule/plaque c/w seborrheic keratosis      Yellow umbilicated papule c/w sebaceous hyperplasia      Irregularly shaped tan macule c/w lentigo     1-2 mm smooth white papules consistent with Milia      Movable subcutaneous cyst with punctum c/w epidermal inclusion cyst      Subcutaneous movable cyst c/w pilar cyst      Firm pink to brown papule  c/w dermatofibroma      Pedunculated fleshy papule(s) c/w skin tag(s)      Evenly pigmented macule c/w junctional nevus     Mildly variegated pigmented, slightly irregular-bordered macule c/w mildly atypical nevus      Flesh colored to evenly pigmented papule c/w intradermal nevus       Pink pearly papule/plaque c/w basal cell carcinoma      Erythematous hyperkeratotic cursted plaque c/w SCC      Surgical scar with no sign of skin cancer recurrence      Open and closed comedones      Inflammatory papules and pustules      Verrucoid papule consistent consistent with wart     Erythematous eczematous patches and plaques     Dystrophic onycholytic nail with subungual debris c/w onychomycosis     Umbilicated papule    Erythematous-base heme-crusted tan verrucoid plaque consistent with inflamed seborrheic keratosis     Erythematous Silvery Scaling Plaque c/w Psoriasis     See annotation      Assessment / Plan:        Inverse psoriasis  Discussed diagnosis and therapeutic options, including topical medications, intralesional steroids, NBUVB, soriatane, methotrexate, and biologic medications with risks and benefits of each.  Pt would like to stick with topical treatments for now.    -     triamcinolone acetonide 0.1% (KENALOG) 0.1 % cream; AAA under breasts, in groin, buttocks BID x 1-2 wks then prn flares only  Dispense: 80 g; Refill: 1    Psoriasis vulgaris  -     betamethasone dipropionate (DIPROLENE) 0.05 % cream; Apply topically 2 (two) times daily. To affected areas on lower back  Dispense: 45 g; Refill: 1    Bug bite, initial encounter  Offered biopsy, but pt declined. She will try the topical steroids on these areas and return if no improvement.    rtc 3 months

## 2019-08-07 NOTE — PROGRESS NOTES
"Patient is here today for a "mole" check.   Pt has no history of sun exposure in the past.   Pt does not recall having several blistering sunburns in the past   Pt does not hav history of tanning bed use  Pt had mole removed in the past  Pt has no history of melanoma in first degree relatives    Pt have h/o BCC on forehead    Pt present today for red spot between breast area and groin area    Pt noticed insect bite on right jaw line, right arm and right buttock   "

## 2019-08-26 ENCOUNTER — PES CALL (OUTPATIENT)
Dept: ADMINISTRATIVE | Facility: CLINIC | Age: 69
End: 2019-08-26

## 2019-08-28 ENCOUNTER — LAB VISIT (OUTPATIENT)
Dept: LAB | Facility: HOSPITAL | Age: 69
End: 2019-08-28
Attending: INTERNAL MEDICINE
Payer: MEDICARE

## 2019-08-28 DIAGNOSIS — Z00.00 ENCOUNTER FOR HEALTH MAINTENANCE EXAMINATION: ICD-10-CM

## 2019-08-28 DIAGNOSIS — Z51.81 ENCOUNTER FOR MONITORING LONG-TERM PROTON PUMP INHIBITOR THERAPY: ICD-10-CM

## 2019-08-28 DIAGNOSIS — K74.60 CIRRHOSIS OF LIVER WITHOUT ASCITES, UNSPECIFIED HEPATIC CIRRHOSIS TYPE: ICD-10-CM

## 2019-08-28 DIAGNOSIS — M10.9 INTERVAL GOUT: ICD-10-CM

## 2019-08-28 DIAGNOSIS — Z79.899 ENCOUNTER FOR MONITORING LONG-TERM PROTON PUMP INHIBITOR THERAPY: ICD-10-CM

## 2019-08-28 DIAGNOSIS — K21.9 GASTROESOPHAGEAL REFLUX DISEASE, ESOPHAGITIS PRESENCE NOT SPECIFIED: ICD-10-CM

## 2019-08-28 DIAGNOSIS — E11.9 TYPE 2 DIABETES MELLITUS WITHOUT COMPLICATION, WITHOUT LONG-TERM CURRENT USE OF INSULIN: ICD-10-CM

## 2019-08-28 LAB
25(OH)D3+25(OH)D2 SERPL-MCNC: 27 NG/ML (ref 30–96)
ALBUMIN SERPL BCP-MCNC: 4 G/DL (ref 3.5–5.2)
ALP SERPL-CCNC: 131 U/L (ref 55–135)
ALT SERPL W/O P-5'-P-CCNC: 41 U/L (ref 10–44)
ANION GAP SERPL CALC-SCNC: 11 MMOL/L (ref 8–16)
AST SERPL-CCNC: 39 U/L (ref 10–40)
BILIRUB SERPL-MCNC: 0.3 MG/DL (ref 0.1–1)
BUN SERPL-MCNC: 26 MG/DL (ref 8–23)
CALCIUM SERPL-MCNC: 10.2 MG/DL (ref 8.7–10.5)
CHLORIDE SERPL-SCNC: 108 MMOL/L (ref 95–110)
CHOLEST SERPL-MCNC: 184 MG/DL (ref 120–199)
CHOLEST/HDLC SERPL: 5.6 {RATIO} (ref 2–5)
CO2 SERPL-SCNC: 22 MMOL/L (ref 23–29)
CREAT SERPL-MCNC: 1.2 MG/DL (ref 0.5–1.4)
ERYTHROCYTE [DISTWIDTH] IN BLOOD BY AUTOMATED COUNT: 14.9 % (ref 11.5–14.5)
EST. GFR  (AFRICAN AMERICAN): 53.3 ML/MIN/1.73 M^2
EST. GFR  (NON AFRICAN AMERICAN): 46.2 ML/MIN/1.73 M^2
ESTIMATED AVG GLUCOSE: 146 MG/DL (ref 68–131)
GLUCOSE SERPL-MCNC: 124 MG/DL (ref 70–110)
HBA1C MFR BLD HPLC: 6.7 % (ref 4–5.6)
HCT VFR BLD AUTO: 35.1 % (ref 37–48.5)
HDLC SERPL-MCNC: 33 MG/DL (ref 40–75)
HDLC SERPL: 17.9 % (ref 20–50)
HGB BLD-MCNC: 10.8 G/DL (ref 12–16)
LDLC SERPL CALC-MCNC: 108.6 MG/DL (ref 63–159)
MAGNESIUM SERPL-MCNC: 1.9 MG/DL (ref 1.6–2.6)
MCH RBC QN AUTO: 30.6 PG (ref 27–31)
MCHC RBC AUTO-ENTMCNC: 30.8 G/DL (ref 32–36)
MCV RBC AUTO: 99 FL (ref 82–98)
NONHDLC SERPL-MCNC: 151 MG/DL
PLATELET # BLD AUTO: 160 K/UL (ref 150–350)
PMV BLD AUTO: 13.8 FL (ref 9.2–12.9)
POTASSIUM SERPL-SCNC: 4.5 MMOL/L (ref 3.5–5.1)
PROT SERPL-MCNC: 8 G/DL (ref 6–8.4)
RBC # BLD AUTO: 3.53 M/UL (ref 4–5.4)
SODIUM SERPL-SCNC: 141 MMOL/L (ref 136–145)
TRIGL SERPL-MCNC: 212 MG/DL (ref 30–150)
URATE SERPL-MCNC: 6.9 MG/DL (ref 2.4–5.7)
VIT B12 SERPL-MCNC: 293 PG/ML (ref 210–950)
WBC # BLD AUTO: 10.3 K/UL (ref 3.9–12.7)

## 2019-08-28 PROCEDURE — 80061 LIPID PANEL: CPT | Mod: HCNC

## 2019-08-28 PROCEDURE — 84550 ASSAY OF BLOOD/URIC ACID: CPT | Mod: HCNC

## 2019-08-28 PROCEDURE — 83735 ASSAY OF MAGNESIUM: CPT | Mod: HCNC

## 2019-08-28 PROCEDURE — 83036 HEMOGLOBIN GLYCOSYLATED A1C: CPT | Mod: HCNC

## 2019-08-28 PROCEDURE — 82306 VITAMIN D 25 HYDROXY: CPT | Mod: HCNC

## 2019-08-28 PROCEDURE — 82607 VITAMIN B-12: CPT | Mod: HCNC

## 2019-08-28 PROCEDURE — 80053 COMPREHEN METABOLIC PANEL: CPT | Mod: HCNC

## 2019-08-28 PROCEDURE — 85027 COMPLETE CBC AUTOMATED: CPT | Mod: HCNC

## 2019-08-28 PROCEDURE — 36415 COLL VENOUS BLD VENIPUNCTURE: CPT | Mod: HCNC,PO

## 2019-09-03 ENCOUNTER — OFFICE VISIT (OUTPATIENT)
Dept: OPTOMETRY | Facility: CLINIC | Age: 69
End: 2019-09-03
Payer: MEDICARE

## 2019-09-03 ENCOUNTER — CLINICAL SUPPORT (OUTPATIENT)
Dept: OPHTHALMOLOGY | Facility: CLINIC | Age: 69
End: 2019-09-03
Payer: MEDICARE

## 2019-09-03 DIAGNOSIS — H25.13 NUCLEAR SCLEROSIS OF BOTH EYES: Primary | ICD-10-CM

## 2019-09-03 DIAGNOSIS — H40.003 GLAUCOMA SUSPECT, BOTH EYES: ICD-10-CM

## 2019-09-03 PROCEDURE — 92083 EXTENDED VISUAL FIELD XM: CPT | Mod: HCNC,S$GLB,, | Performed by: OPTOMETRIST

## 2019-09-03 PROCEDURE — 99999 PR PBB SHADOW E&M-EST. PATIENT-LVL III: CPT | Mod: PBBFAC,HCNC,, | Performed by: OPTOMETRIST

## 2019-09-03 PROCEDURE — 92083 HUMPHREY VISUAL FIELD - OU - BOTH EYES: ICD-10-PCS | Mod: HCNC,S$GLB,, | Performed by: OPTOMETRIST

## 2019-09-03 PROCEDURE — 92012 PR EYE EXAM, EST PATIENT,INTERMED: ICD-10-PCS | Mod: HCNC,S$GLB,, | Performed by: OPTOMETRIST

## 2019-09-03 PROCEDURE — 99999 PR PBB SHADOW E&M-EST. PATIENT-LVL III: ICD-10-PCS | Mod: PBBFAC,HCNC,, | Performed by: OPTOMETRIST

## 2019-09-03 PROCEDURE — 92012 INTRM OPH EXAM EST PATIENT: CPT | Mod: HCNC,S$GLB,, | Performed by: OPTOMETRIST

## 2019-09-03 NOTE — PROGRESS NOTES
HPI     Glaucoma Suspect      Additional comments: Prior diagnosis of glaucoma suspect in both eyes,   based on the finding of larger-than-average cupping of the optic nerve   head in both eyes, with IOP within normal range in each eye.               Comments     Patient in for IOP w/u      Being followed for (diagnosis):   Glaucoma suspect     Date last seen:  06/18/2019    Doctor last seen:  Dr. Reese    Prescribed eye medications(s) using:  no    OTC eye medication(s) using:  no    Signs/symptoms of condition resolved/better/stable/worse?:  N/a                   Last edited by Rajiv Reese, OD on 9/3/2019  4:08 PM. (History)            Assessment /Plan     For exam results, see Encounter Report.    1. Nuclear sclerosis of both eyes     2. Glaucoma suspect, both eyes  Guardado Visual Field - OU - Extended - Both Eyes    Guardado Visual Field - OU - Extended - Both Eyes                    Prior finding of early nuclear sclerotic changes of lens of both eyes, consistent with age, and early axial posterior subcapsular cataract bilaterally, more apparent in the right eye.     Type 2 diabetes without evidence of diabetic retinopath, and no hypertensive retinopathy, per dilated fundus exam done on previous visit.       Prior diagnosis of glaucoma suspect, secondary to larger-than-average optic nerve head cup-to-disc ratio in both eyes, with asymmetry (greater in the left eye), as noted previously.    No evidence of overtly glaucomatous visual field defect in either eye per HVF test done today.         OD  Slight enlargement of blind spot, and focal paracentral visual field defect in association with shallow depression.     OS  Normal visual field  Intraocular pressure within normal range in both eyes, although high-normal in each eye today.  Discussed with Ms. Delacruz.  No compelling reason to initiate treatment for IOP control/reduction in either eye.  Continue to monitor.  Return for recheck in six months,  with repeat HVF test (24-2) at that time.   Orders for future HVF test entered,.

## 2019-09-25 ENCOUNTER — OFFICE VISIT (OUTPATIENT)
Dept: INTERNAL MEDICINE | Facility: CLINIC | Age: 69
End: 2019-09-25
Payer: MEDICARE

## 2019-09-25 ENCOUNTER — IMMUNIZATION (OUTPATIENT)
Dept: PHARMACY | Facility: CLINIC | Age: 69
End: 2019-09-25

## 2019-09-25 ENCOUNTER — TELEPHONE (OUTPATIENT)
Dept: HEPATOLOGY | Facility: CLINIC | Age: 69
End: 2019-09-25

## 2019-09-25 ENCOUNTER — IMMUNIZATION (OUTPATIENT)
Dept: PHARMACY | Facility: CLINIC | Age: 69
End: 2019-09-25
Payer: MEDICARE

## 2019-09-25 ENCOUNTER — TELEPHONE (OUTPATIENT)
Dept: INTERNAL MEDICINE | Facility: CLINIC | Age: 69
End: 2019-09-25

## 2019-09-25 VITALS
OXYGEN SATURATION: 98 % | DIASTOLIC BLOOD PRESSURE: 78 MMHG | WEIGHT: 163.81 LBS | HEART RATE: 65 BPM | HEIGHT: 59 IN | TEMPERATURE: 98 F | SYSTOLIC BLOOD PRESSURE: 120 MMHG | BODY MASS INDEX: 33.02 KG/M2

## 2019-09-25 DIAGNOSIS — Z00.00 ANNUAL PHYSICAL EXAM: Primary | ICD-10-CM

## 2019-09-25 DIAGNOSIS — R20.2 PARESTHESIAS: ICD-10-CM

## 2019-09-25 DIAGNOSIS — E78.2 MIXED HYPERLIPIDEMIA: ICD-10-CM

## 2019-09-25 DIAGNOSIS — Z85.828 HISTORY OF BASAL CELL CARCINOMA (BCC): ICD-10-CM

## 2019-09-25 DIAGNOSIS — M10.9 INTERVAL GOUT: ICD-10-CM

## 2019-09-25 DIAGNOSIS — Z87.898 HISTORY OF ABNORMAL MAMMOGRAM: ICD-10-CM

## 2019-09-25 DIAGNOSIS — E53.8 LOW SERUM VITAMIN B12: ICD-10-CM

## 2019-09-25 DIAGNOSIS — E11.9 TYPE 2 DIABETES MELLITUS WITHOUT COMPLICATION, WITHOUT LONG-TERM CURRENT USE OF INSULIN: ICD-10-CM

## 2019-09-25 DIAGNOSIS — K74.60 CIRRHOSIS OF LIVER WITHOUT ASCITES, UNSPECIFIED HEPATIC CIRRHOSIS TYPE: ICD-10-CM

## 2019-09-25 DIAGNOSIS — I10 ESSENTIAL HYPERTENSION: ICD-10-CM

## 2019-09-25 DIAGNOSIS — K76.0 NAFLD (NONALCOHOLIC FATTY LIVER DISEASE): ICD-10-CM

## 2019-09-25 DIAGNOSIS — D64.9 MILD ANEMIA: ICD-10-CM

## 2019-09-25 DIAGNOSIS — L40.9 PSORIASIS: ICD-10-CM

## 2019-09-25 DIAGNOSIS — M79.10 MYALGIA DUE TO STATIN: ICD-10-CM

## 2019-09-25 DIAGNOSIS — T46.6X5A MYALGIA DUE TO STATIN: ICD-10-CM

## 2019-09-25 LAB
ALBUMIN/CREAT UR: 21.5 UG/MG (ref 0–30)
CREAT UR-MCNC: 65 MG/DL (ref 15–325)
MICROALBUMIN UR DL<=1MG/L-MCNC: 14 UG/ML

## 2019-09-25 PROCEDURE — 3078F PR MOST RECENT DIASTOLIC BLOOD PRESSURE < 80 MM HG: ICD-10-PCS | Mod: HCNC,CPTII,S$GLB, | Performed by: INTERNAL MEDICINE

## 2019-09-25 PROCEDURE — 82043 UR ALBUMIN QUANTITATIVE: CPT | Mod: HCNC

## 2019-09-25 PROCEDURE — 99397 PR PREVENTIVE VISIT,EST,65 & OVER: ICD-10-PCS | Mod: HCNC,S$GLB,, | Performed by: INTERNAL MEDICINE

## 2019-09-25 PROCEDURE — 99999 PR PBB SHADOW E&M-EST. PATIENT-LVL IV: ICD-10-PCS | Mod: PBBFAC,HCNC,, | Performed by: INTERNAL MEDICINE

## 2019-09-25 PROCEDURE — 99999 PR PBB SHADOW E&M-EST. PATIENT-LVL IV: CPT | Mod: PBBFAC,HCNC,, | Performed by: INTERNAL MEDICINE

## 2019-09-25 PROCEDURE — 3044F HG A1C LEVEL LT 7.0%: CPT | Mod: HCNC,CPTII,S$GLB, | Performed by: INTERNAL MEDICINE

## 2019-09-25 PROCEDURE — 3074F PR MOST RECENT SYSTOLIC BLOOD PRESSURE < 130 MM HG: ICD-10-PCS | Mod: HCNC,CPTII,S$GLB, | Performed by: INTERNAL MEDICINE

## 2019-09-25 PROCEDURE — 99397 PER PM REEVAL EST PAT 65+ YR: CPT | Mod: HCNC,S$GLB,, | Performed by: INTERNAL MEDICINE

## 2019-09-25 PROCEDURE — 3044F PR MOST RECENT HEMOGLOBIN A1C LEVEL <7.0%: ICD-10-PCS | Mod: HCNC,CPTII,S$GLB, | Performed by: INTERNAL MEDICINE

## 2019-09-25 PROCEDURE — 3074F SYST BP LT 130 MM HG: CPT | Mod: HCNC,CPTII,S$GLB, | Performed by: INTERNAL MEDICINE

## 2019-09-25 PROCEDURE — 3078F DIAST BP <80 MM HG: CPT | Mod: HCNC,CPTII,S$GLB, | Performed by: INTERNAL MEDICINE

## 2019-09-25 RX ORDER — METFORMIN HYDROCHLORIDE 500 MG/1
TABLET, EXTENDED RELEASE ORAL
Qty: 270 TABLET | Refills: 3 | Status: SHIPPED | OUTPATIENT
Start: 2019-09-25 | End: 2020-11-24 | Stop reason: SDUPTHER

## 2019-09-25 NOTE — TELEPHONE ENCOUNTER
I only prescribed one medication this morning (metformin XR) and it was sent to her [facility]. Not sure what the [facility] pharmacy has; I haven't sent anything there for her today.    Please have her check at her [facility].

## 2019-09-25 NOTE — TELEPHONE ENCOUNTER
----- Message from Alondra Alvarado sent at 9/25/2019 12:19 PM CDT -----  Contact: patient [phone]  Patient saw you a little while ago and when she got home there was a message that her rx was ready at [facility] but she uses [facility] . Please call her rx in to the following pharmacy. She doesn't know the name of the medication.    [id number] - [place], [place] - [address] AT [facility] [phone]

## 2019-09-25 NOTE — TELEPHONE ENCOUNTER
----- Message from Sujata Reese sent at 9/25/2019 11:42 AM CDT -----  Pt is in need of a follow up apt with Rylee Irving

## 2019-09-25 NOTE — PATIENT INSTRUCTIONS
When you go to the pharmacy, make sure they switch you from the regular metformin to the extended-release metformin.    Increase your allopurinol to 200mg (2 tablets) every day. Keep taking the colchicine once a day for another 2-3 months, then stop.    Start taking a B-complex vitamin once a day every day -- your B12 level was a little low.    Try an anti-inflammatory cream for your hands/wrists - [employer] [place] [employer] with methyl salicylate    Make sure to drink lots of water every day to help protect your kidneys.  Stop drinking alcohol as this can damage your kidneys and liver!    There is a new shingles vaccine available (Shingrix) that is both safer and more effective than the old shingles vaccine (Zostavax). I do recommend that you get this, as it can help prevent a shingles outbreak even if you have had one in the past.   For this vaccine, you will need a series of 2 shots, first one and then a second 2 to 6 months later.  Please check in with the [facility] or with your local pharmacy about getting this vaccine; they should be able to check on insurance coverage and whether there is any cost to you.

## 2019-09-25 NOTE — PROGRESS NOTES
Subjective:       Patient ID: Tammi Farris is a 69 y.o. female.    Chief Complaint: Follow-up    HPI  67 y/o woman with HTN, HLD, DM2, cirrhosis due to NAFLD, psoriasis, GERD, gout here for follow up. Due for annual exam.    Anemia on recent labs  Cr up from 1.0 to 1.2  Uric acid down from 8.3 to 6.9  Vitamin D low at 27  B12 low-normal at 298  Magnesium normal    Due for flu, pneumovax    Pain in hands - tingling, muscle cramps, reduced  strength, difficulty writing, holding something in her hands/arms.   Has had carpal tunnel surgery on both hands in the past.   No similar paresthesias in toes. No neck pain or shoulder pain.     DM2 - Most recent A1c 6.7  Switched to XR metformin after last visit but hasn't been picking this up  Eye exam 6/18/19  Foot exam 9/2018  MA/C 9/2018    GERD - on omeprazole, has symptoms if she doesn't take this - takes this every day.    Chronic diarrhea - Does report having diarrhea intermittently since her cholecystectomy about 2 years ago, also has h/o GERD.  Not worse than previous. Exacerbated by fried/fatty/rich foods which she does not consistently avoid.    Gout - allopurinol added last visit, takes colchicine prn  Uric acid down to 6.9    HTN - taking bisoprolol-HCTZ and losartan daily     HLD - prescribed statin, not taking this; didn't tolerate rosuvastatin    NAFLD, cirrhosis - now following with Hepatology  Ultrasound done 11/10/17 showing fatty liver; nodular liver contour noted concerning for cirrhosis. Fibroscan with F4 fibrosis noted, S3 steatosis. EGD/colonoscopy done recently as noted.  Immune to hep A/B.   Due for follow up    Psoriasis - using clobetasol cream sometimes which helps, no rash currently.    H/o BCC on forehead - following with dermatology, seen 8/2019    Abnormal mammogram in late 2018 with R breast calcifications; diagnostic mammogram f/u BIRADS-3 (probably benign), recommended for six month follow up or biopsy.    FIT test 11/2017,  "negative   EGD, colonoscopy done 2/2019. EGD with irregular z-line, no varices, otherwise normal. +one benign colon polyp  Recommended to repeat EGD in 2 years for screening.  Recommended to repeat colonoscopy in 5 years for surveillance.      Review of Systems   Constitutional: Negative for activity change and unexpected weight change.   HENT: Negative.    Eyes: Negative for visual disturbance.   Respiratory: Negative for cough and shortness of breath.    Cardiovascular: Negative for chest pain and leg swelling.   Gastrointestinal: Positive for diarrhea. Negative for abdominal distention, abdominal pain and blood in stool.   Endocrine: Negative for polyuria.   Genitourinary: Negative.    Musculoskeletal: Positive for arthralgias. Negative for gait problem and joint swelling.        Pain as noted   Skin: Negative for rash (none currently).   Neurological: Positive for weakness (subjective) and numbness.   Psychiatric/Behavioral: Negative for dysphoric mood and sleep disturbance. The patient is nervous/anxious.          Past medical history, surgical history, and family medical history reviewed and updated as appropriate.    Medications and allergies reviewed.     Objective:          Vitals:    09/25/19 1019   BP: 120/78   BP Location: Left arm   Patient Position: Sitting   BP Method: Large (Manual)   Pulse: 65   Temp: 98.1 °F (36.7 °C)   SpO2: 98%   Weight: 74.3 kg (163 lb 12.8 oz)   Height: 4' 11" (1.499 m)     Body mass index is 33.08 kg/m².  Physical Exam   Constitutional: She is oriented to person, place, and time. She appears well-developed and well-nourished. No distress.   HENT:   Head: Normocephalic and atraumatic.   Nose: Nose normal.   Mouth/Throat: Oropharynx is clear and moist.   Eyes: Pupils are equal, round, and reactive to light. Conjunctivae and EOM are normal. No scleral icterus.   Neck: Neck supple. No thyromegaly present.   Cardiovascular: Normal rate, regular rhythm and normal heart sounds.   No " murmur heard.  Pulmonary/Chest: Effort normal and breath sounds normal. No respiratory distress.   Abdominal: Soft. Bowel sounds are normal. She exhibits no distension. There is no tenderness.   +abdominal obesity   Musculoskeletal: She exhibits no edema or tenderness.    and pinch strength normal   Lymphadenopathy:     She has no cervical adenopathy.   Neurological: She is alert and oriented to person, place, and time. No cranial nerve deficit. Gait normal.   phalen's and tinel's equivocal  Arm/hand pain and paresthesias not worse with axial loading or head turn   Skin: Skin is warm and dry.   No rash noted today   Psychiatric: She has a normal mood and affect. Her speech is normal and behavior is normal.   Vitals reviewed.      Lab Results   Component Value Date    WBC 10.30 08/28/2019    HGB 10.8 (L) 08/28/2019    HCT 35.1 (L) 08/28/2019     08/28/2019    CHOL 184 08/28/2019    TRIG 212 (H) 08/28/2019    HDL 33 (L) 08/28/2019    ALT 41 08/28/2019    AST 39 08/28/2019     08/28/2019    K 4.5 08/28/2019     08/28/2019    CREATININE 1.2 08/28/2019    BUN 26 (H) 08/28/2019    CO2 22 (L) 08/28/2019    TSH 1.277 03/03/2017    INR 1.1 02/08/2019    HGBA1C 6.7 (H) 08/28/2019       Assessment:       1. Annual physical exam    2. Type 2 diabetes mellitus without complication, without long-term current use of insulin    3. Essential hypertension    4. Mixed hyperlipidemia    5. Myalgia due to statin    6. Interval gout    7. Paresthesias    8. NAFLD (nonalcoholic fatty liver disease)    9. Cirrhosis of liver without ascites, unspecified hepatic cirrhosis type    10. Psoriasis    11. History of basal cell carcinoma (BCC)    12. History of abnormal mammogram        Plan:   Tammi was seen today for follow-up.    Diagnoses and all orders for this visit:    Annual physical exam - Overall stable, doing well. Reviewed chronic and preventive health concerns as well as more recent concern re: arm/hand  paresthesias and pain    Type 2 diabetes mellitus without complication, without long-term current use of insulin - again discussed switching from immediate release to extended release metformin  -     metFORMIN (GLUCOPHAGE-XR) 500 MG 24 hr tablet; Take 1 tablet after breakfast and 2 tablets after dinner every day  -     MICROALBUMIN / CREATININE RATIO URINE    Essential hypertension - at goal, no changes to medication    Mixed hyperlipidemia  Myalgia due to statin - not currently able to tolerate     Interval gout - increasing allopurinol to 200mg daily; continue colchicine for another 2-3 months then stop. Rechecking uric acid before next visit.  Doing well!  -     Uric acid; Future    Paresthesias - can try anti-inflammatory topical medication for pain in hands/wrists  Etiology not clear - will get EMG to further evaluate  -     EMG W/ ULTRASOUND AND NERVE CONDUCTION TEST 2 Extremities; Future    NAFLD (nonalcoholic fatty liver disease)  Cirrhosis of liver without ascites, unspecified hepatic cirrhosis type  Reviewed history / specialist notes and imaging. LFTs normal.  Follow up with hepatology  Stop drinking alcohol  Due for repeat ultrasound    Psoriasis - no rash currently, continue topical medication prn    History of basal cell carcinoma (BCC) - follow with dermatology at least yearly    History of abnormal mammogram  Comments:  Abnormal mammogram in late 2018 with R breast calcifications; diagnostic mammogram f/u BIRADS-3 (probably benign), recommended for six month follow up - schedule for this ASAP    Reviewed labs. Recommended start B-complex vitamin; increase vitamin D supplement  Health maintenance reviewed with patient.   Recommended get shingrix vaccine  Flu and pneumovax today    Follow up in about 4 months (around 1/25/2020) for diabetes, hypertension.    Ariel Mullins MD  Internal Medicine  [facility]  9/25/2019

## 2019-09-27 ENCOUNTER — HOSPITAL ENCOUNTER (OUTPATIENT)
Dept: RADIOLOGY | Facility: HOSPITAL | Age: 69
Discharge: HOME | End: 2019-09-27
Attending: NURSE PRACTITIONER
Payer: MEDICARE

## 2019-09-27 ENCOUNTER — HOSPITAL ENCOUNTER (OUTPATIENT)
Dept: RADIOLOGY | Facility: HOSPITAL | Age: 69
Discharge: HOME | End: 2019-09-27
Attending: INTERNAL MEDICINE
Payer: MEDICARE

## 2019-09-27 DIAGNOSIS — Z12.39 SCREENING FOR MALIGNANT NEOPLASM OF BREAST: ICD-10-CM

## 2019-09-27 DIAGNOSIS — K74.60 CIRRHOSIS OF LIVER WITHOUT ASCITES, UNSPECIFIED HEPATIC CIRRHOSIS TYPE: ICD-10-CM

## 2019-09-27 DIAGNOSIS — R92.8 ABNORMAL MAMMOGRAM: ICD-10-CM

## 2019-09-27 PROCEDURE — 77062 BREAST TOMOSYNTHESIS BI: CPT | Mod: 26,HCNC,, | Performed by: RADIOLOGY

## 2019-09-27 PROCEDURE — 77062 MAMMO DIGITAL DIAGNOSTIC BILAT WITH TOMOSYNTHESIS_CAD: ICD-10-PCS | Mod: 26,HCNC,, | Performed by: RADIOLOGY

## 2019-09-27 PROCEDURE — 76700 US ABDOMEN COMPLETE: ICD-10-PCS | Mod: 26,HCNC,, | Performed by: RADIOLOGY

## 2019-09-27 PROCEDURE — 77066 DX MAMMO INCL CAD BI: CPT | Mod: 26,HCNC,, | Performed by: RADIOLOGY

## 2019-09-27 PROCEDURE — 77066 MAMMO DIGITAL DIAGNOSTIC BILAT WITH TOMOSYNTHESIS_CAD: ICD-10-PCS | Mod: 26,HCNC,, | Performed by: RADIOLOGY

## 2019-09-27 PROCEDURE — 76700 US EXAM ABDOM COMPLETE: CPT | Mod: TC,HCNC

## 2019-09-27 PROCEDURE — 76700 US EXAM ABDOM COMPLETE: CPT | Mod: 26,HCNC,, | Performed by: RADIOLOGY

## 2019-09-27 PROCEDURE — 77066 DX MAMMO INCL CAD BI: CPT | Mod: TC,HCNC,PO

## 2019-09-30 PROBLEM — Z87.898 HISTORY OF ABNORMAL MAMMOGRAM: Status: ACTIVE | Noted: 2019-09-30

## 2019-09-30 PROBLEM — T46.6X5A MYALGIA DUE TO STATIN: Status: ACTIVE | Noted: 2019-09-30

## 2019-09-30 PROBLEM — M79.10 MYALGIA DUE TO STATIN: Status: ACTIVE | Noted: 2019-09-30

## 2019-10-01 DIAGNOSIS — I10 ESSENTIAL HYPERTENSION: ICD-10-CM

## 2019-10-01 RX ORDER — LOSARTAN POTASSIUM 50 MG/1
TABLET ORAL
Qty: 90 TABLET | Refills: 3 | Status: SHIPPED | OUTPATIENT
Start: 2019-10-01 | End: 2020-08-27

## 2019-10-04 ENCOUNTER — TELEPHONE (OUTPATIENT)
Dept: RADIOLOGY | Facility: HOSPITAL | Age: 69
End: 2019-10-04

## 2019-10-07 ENCOUNTER — HOSPITAL ENCOUNTER (OUTPATIENT)
Dept: RADIOLOGY | Facility: HOSPITAL | Age: 69
Discharge: HOME | End: 2019-10-07
Attending: NURSE PRACTITIONER
Payer: MEDICARE

## 2019-10-07 DIAGNOSIS — K74.60 CIRRHOSIS OF LIVER WITHOUT ASCITES, UNSPECIFIED HEPATIC CIRRHOSIS TYPE: ICD-10-CM

## 2019-10-07 DIAGNOSIS — I10 ESSENTIAL HYPERTENSION: ICD-10-CM

## 2019-10-07 PROCEDURE — 76700 US EXAM ABDOM COMPLETE: CPT | Mod: TC,HCNC

## 2019-10-07 PROCEDURE — 76700 US EXAM ABDOM COMPLETE: CPT | Mod: 26,HCNC,, | Performed by: RADIOLOGY

## 2019-10-07 PROCEDURE — 76700 US ABDOMEN COMPLETE: ICD-10-PCS | Mod: 26,HCNC,, | Performed by: RADIOLOGY

## 2019-10-08 RX ORDER — BISOPROLOL FUMARATE AND HYDROCHLOROTHIAZIDE 10; 6.25 MG/1; MG/1
TABLET ORAL
Qty: 90 TABLET | Refills: 0 | Status: SHIPPED | OUTPATIENT
Start: 2019-10-08 | End: 2019-12-23

## 2019-10-09 ENCOUNTER — OFFICE VISIT (OUTPATIENT)
Dept: HEPATOLOGY | Facility: CLINIC | Age: 69
End: 2019-10-09
Payer: MEDICARE

## 2019-10-09 VITALS
RESPIRATION RATE: 18 BRPM | OXYGEN SATURATION: 99 % | TEMPERATURE: 97 F | HEIGHT: 59 IN | WEIGHT: 163.13 LBS | DIASTOLIC BLOOD PRESSURE: 68 MMHG | SYSTOLIC BLOOD PRESSURE: 153 MMHG | BODY MASS INDEX: 32.88 KG/M2 | HEART RATE: 67 BPM

## 2019-10-09 DIAGNOSIS — K74.60 CIRRHOSIS OF LIVER WITHOUT ASCITES, UNSPECIFIED HEPATIC CIRRHOSIS TYPE: Primary | ICD-10-CM

## 2019-10-09 PROBLEM — N18.2 CKD (CHRONIC KIDNEY DISEASE) STAGE 2, GFR 60-89 ML/MIN: Status: ACTIVE | Noted: 2019-10-09

## 2019-10-09 PROCEDURE — 1101F PR PT FALLS ASSESS DOC 0-1 FALLS W/OUT INJ PAST YR: ICD-10-PCS | Mod: HCNC,CPTII,S$GLB, | Performed by: NURSE PRACTITIONER

## 2019-10-09 PROCEDURE — 99999 PR PBB SHADOW E&M-EST. PATIENT-LVL V: ICD-10-PCS | Mod: PBBFAC,HCNC,, | Performed by: NURSE PRACTITIONER

## 2019-10-09 PROCEDURE — 99214 OFFICE O/P EST MOD 30 MIN: CPT | Mod: HCNC,S$GLB,, | Performed by: NURSE PRACTITIONER

## 2019-10-09 PROCEDURE — 3077F SYST BP >= 140 MM HG: CPT | Mod: HCNC,CPTII,S$GLB, | Performed by: NURSE PRACTITIONER

## 2019-10-09 PROCEDURE — 99499 RISK ADDL DX/OHS AUDIT: ICD-10-PCS | Mod: HCNC,S$GLB,, | Performed by: NURSE PRACTITIONER

## 2019-10-09 PROCEDURE — 99214 PR OFFICE/OUTPT VISIT, EST, LEVL IV, 30-39 MIN: ICD-10-PCS | Mod: HCNC,S$GLB,, | Performed by: NURSE PRACTITIONER

## 2019-10-09 PROCEDURE — 3078F PR MOST RECENT DIASTOLIC BLOOD PRESSURE < 80 MM HG: ICD-10-PCS | Mod: HCNC,CPTII,S$GLB, | Performed by: NURSE PRACTITIONER

## 2019-10-09 PROCEDURE — 99999 PR PBB SHADOW E&M-EST. PATIENT-LVL V: CPT | Mod: PBBFAC,HCNC,, | Performed by: NURSE PRACTITIONER

## 2019-10-09 PROCEDURE — 3077F PR MOST RECENT SYSTOLIC BLOOD PRESSURE >= 140 MM HG: ICD-10-PCS | Mod: HCNC,CPTII,S$GLB, | Performed by: NURSE PRACTITIONER

## 2019-10-09 PROCEDURE — 3078F DIAST BP <80 MM HG: CPT | Mod: HCNC,CPTII,S$GLB, | Performed by: NURSE PRACTITIONER

## 2019-10-09 PROCEDURE — 1101F PT FALLS ASSESS-DOCD LE1/YR: CPT | Mod: HCNC,CPTII,S$GLB, | Performed by: NURSE PRACTITIONER

## 2019-10-09 PROCEDURE — 99499 UNLISTED E&M SERVICE: CPT | Mod: HCNC,S$GLB,, | Performed by: NURSE PRACTITIONER

## 2019-10-09 NOTE — PATIENT INSTRUCTIONS
Because you have cirrhosis, it is important to attend clinic visits every 6 months with an Ultrasound and blood tests every 6 months to screen for liver cancer (you are at risk of developing liver cancer due to scar tissue in the liver)    Signs and symptoms of worsening liver disease include jaundice, fluid in the belly (ascites), and confusion/disorientation/slowed thought processes due to hepatic encephalopathy (toxins building up because of liver problems).   You should seek medical attention if any of these things occur.    Also, possible bleeding from esophageal varices (blood vessels in the stomach and foodpipe can burst and cause fatal bleeding).  Therefore, if you have symptoms of vomiting blood, blood in your stool, dark or black stools or vomiting coffee ground vomit, YOU SHOULD GO TO THE EMERGENCY ROOM IMMEDIATELY.     Cirrhosis can increase the risk of liver cancer, liver failure, and death. However, we will watch your liver function score (MELD score) closely with each clinic visit. A normal MELD score is 6, highest is 40. Your last one was an 8. We will check this with every clinic visit. A MELD 15 or higher is when we start to consider transplant because MELD 15 or higher indicates that the liver is not functioning as well     Cirrhosis Counseling  - NO alcohol use (includes beer, wine, and/or liquor)  - avoid non-steroidal anti-inflammatory drugs (NSAIDs) such as ibuprofen, Motrin, naprosyn, Alleve due to the risk of kidney damage  - can take acetaminophen (Tylenol), no more than 2000 mg per day  - low sodium (salt) 2 gram per day diet  - high protein diet: 90 grams per day to prevent muscle mass loss. Drink at least 1 protein shake daily (Premier Protein is best option because it is very high protein and low sugar). Ok to use this as nighttime snack to fit it in   - resistance exercises for muscle strength  - avoid raw seafoods due to the risk of fatal Vibrio vulnificus infection  - ultrasound of  the liver every 6 months for liver cancer screening (you are at risk of developing liver cancer due to scar tissue in the liver)  - Upper endoscopy every 1-2 years to screen for varices in the stomach and foodpipe which can burst and cause fatal bleeding, next due 2/2021

## 2019-10-09 NOTE — PROGRESS NOTES
[facility] Established Patient Visit    Reason for Visit:  cirrhosis    PCP: Ariel Mullins    HPI:  This is a 69 y.o. female with PMH noted below, here for follow up of Well-compensated cirrhosis, unclear etiology     Diagnosed with cirrhosis 12/2017 visit based on u/s with nodular liver contour and Fibroscan = F4      Interval HPI: Presents today alone . Last seen by ANCELMO Irving in 2018, new to me today. Doing well. No s/s of hepatic decompensation. Still drinking wine occasionally     Lab Results   Component Value Date    ALT 48 (H) 10/07/2019    AST 51 (H) 10/07/2019    ALKPHOS 84 10/07/2019    BILITOT 0.6 10/07/2019    ALBUMIN 4.4 10/07/2019    INR 1.0 10/07/2019     10/07/2019     MELD-Na score: 8 at 10/7/2019 11:07 AM  MELD score: 8 at 10/7/2019 11:07 AM  Calculated from:  Serum Creatinine: 1.2 mg/dL at 10/7/2019 11:07 AM  Serum Sodium: 143 mmol/L (Rounded to 137 mmol/L) at 10/7/2019 11:07 AM  Total Bilirubin: 0.6 mg/dL (Rounded to 1 mg/dL) at 10/7/2019 11:07 AM  INR(ratio): 1.0 at 10/7/2019 11:07 AM  Age: 69 years  MELD has been low, indicating no benefit to liver transplant     No previous sero w/u, will obtain with next labs    Cirrhosis Health Maintenance:   -- Last EGD 2/2019 no Varices, Due for next EGD 2/2021  -- HCC screening   U/S no lesions, next due 4/2020   AFP WNL 10/2019, next due 4/2020  --+ Immunity to Hep A and B     Risk factors for NAFLD include obesity, DM, HLD, and HTN.    Denies family history of liver disease. Denies significant alcohol consumption currently or in the past     Denies jaundice, dark urine, abdominal distention, hematemesis, melena, slowed mentation. No abnormal skin rashes. No generalized joint or muscle pain.     PMHX:  has a past medical history of Allergy, Basal cell carcinoma, Cirrhosis of liver without ascites (12/27/2017), Diabetes mellitus, type 2, Endometrial cancer, GERD (gastroesophageal reflux disease), Hyperlipidemia, Hypertension,  Joint pain, and Psoriasis.    PSHX:  has a past surgical history that includes Skin cancer destruction; Carpal tunnel release; Oophorectomy; abdominal laproscopic surgery; Appendectomy; Hysterectomy (age 25); Upper gastrointestinal endoscopy; Esophagogastroduodenoscopy (N/A, 2/8/2019); Colonoscopy (N/A, 2/8/2019); and Cholecystectomy (04/2015).    The patient's social and family histories were reviewed by me and updated in the appropriate section of the electronic medical record.    Review of patient's allergies indicates:   Allergen Reactions    Iodine and iodide containing products Hives    Benadryl [diphenhydramine hcl] Anxiety     Pt states she feels jumpy and anxious when taking.    Darvon [propoxyphene] Nausea Only    Shellfish containing products Hives    Lisinopril Other (See Comments)     cough    Propoxyphene hcl      Itchy (skin)^    Tizanidine Other (See Comments)     Sweaty, difficulty swallowing    Statins-hmg-coa reductase inhibitors Anxiety and Other (See Comments)     Myalgia, fatigue, palpitations, anxiety       Current Outpatient Medications on File Prior to Visit   Medication Sig Dispense Refill    allopurinol (ZYLOPRIM) 100 MG tablet Take 1 tablet (100 mg total) by mouth once daily. To reduce uric acid and help prevent gout attacks. Take EVERY DAY. 90 tablet 1    betamethasone dipropionate (DIPROLENE) 0.05 % cream Apply topically 2 (two) times daily. To affected areas on lower back 45 g 1    bisoprolol-hydrochlorothiazide (ZIAC) 10-6.25 mg per tablet TAKE 1 TABLET BY MOUTH EVERY DAY 90 tablet 0    clobetasol (TEMOVATE) 0.05 % cream APPLY TO AFFECTED AREA NIGHTLY FOR 4 WEEKS THEN EVERY OTHER DAY FOR THE NEXT 2 WEEKS (Patient not taking: Reported on 9/25/2019) 30 g 0    clotrimazole (LOTRIMIN) 1 % cream APPLY TO THE AFFECTED AREA OF RASH ON BREASTS AND IN FOLDS OF SKIN AS DIRECTED TWICE DAILY (Patient not taking: Reported on 9/25/2019) 90 g 0    colchicine (COLCRYS) 0.6 mg tablet  Take every day for 2 months when starting allopurinol, to prevent gout. After this take only as needed for gout flare 30 tablet 5    cyanocobalamin, vitamin B-12, 2,000 mcg Tab Take 2,000 mcg by mouth once daily. (Patient not taking: Reported on 9/25/2019) 30 tablet 3    lancets Misc Use with Contour Next. Test once daily. 100 each 3    losartan (COZAAR) 50 MG tablet TAKE 1 TABLET(50 MG) BY MOUTH EVERY DAY 90 tablet 3    metFORMIN (GLUCOPHAGE-XR) 500 MG 24 hr tablet Take 1 tablet after breakfast and 2 tablets after dinner every day 270 tablet 3    omeprazole (PRILOSEC) 20 MG capsule Take 1 capsule (20 mg total) by mouth once daily. 90 capsule 3    pravastatin (PRAVACHOL) 20 MG tablet Take 1 tablet (20 mg total) by mouth once daily. For cholesterol 90 tablet 3    triamcinolone acetonide 0.1% (KENALOG) 0.1 % cream Apply to affected areas BID after cool blow dry (Patient not taking: Reported on 9/25/2019) 1 Bottle 1    triamcinolone acetonide 0.1% (KENALOG) 0.1 % cream AAA under breasts, in groin, buttocks BID x 1-2 wks then prn flares only 80 g 1    TRUE METRIX GLUCOSE METER Misc AS DIRECTED 1 each 0    TRUE METRIX GLUCOSE TEST STRIP Strp CHECK SUGAR ONCE DAILY 300 strip 0    TRUEPLUS LANCETS 33 gauge Misc USE TO TEST BLOOD SUGAR EVERY  each 3     No current facility-administered medications on file prior to visit.        SOCIAL HISTORY:   Social History     Tobacco Use   Smoking Status Never Smoker   Smokeless Tobacco Never Used       Social History     Substance and Sexual Activity   Alcohol Use Yes    Alcohol/week: 0.0 - 4.0 standard drinks    Comment: occasionally, wine intermittently       Social History     Substance and Sexual Activity   Drug Use No       ROS:   GENERAL: Denies fever, chills, weight loss/gain, fatigue  HEENT: Denies headaches, dizziness, vision/hearing changes  CARDIOVASCULAR: Denies chest pain, palpitations, or edema  RESPIRATORY: Denies dyspnea, cough  GI: Denies abdominal  "pain, rectal bleeding, nausea, vomiting. No change in bowel pattern or color  : Denies dysuria, hematuria   SKIN: Denies rash, itching   NEURO: Denies confusion, memory loss, or mood changes  PSYCH: Denies depression or anxiety  HEME/LYMPH: Denies easy bruising or bleeding    Objective Findings:    PHYSICAL EXAM:   Friendly White female, in no acute distress; alert and oriented to person, place and time  VITALS: BP (!) 153/68 (BP Location: Right arm, Patient Position: Sitting, BP Method: Medium (Automatic))   Pulse 67   Temp 96.9 °F (36.1 °C) (Oral)   Resp 18   Ht 4' 11" (1.499 m)   Wt 74 kg (163 lb 2.3 oz)   SpO2 99%   BMI 32.95 kg/m²   HENT: Normocephalic, without obvious abnormality. Oral mucosa pink and moist. Dentition good.  EYES: Sclerae anicteric. No conjunctival pallor.   NECK: Supple. No masses or cervical adenopathy.  CARDIOVASCULAR: Regular rate and rhythm. No murmurs.  RESPIRATORY: Normal respiratory effort. BBS CTA. No wheezes or crackles.  GI: Soft, non-tender, non-distended. No hepatosplenomegaly. No masses palpable. No ascites.  EXTREMITIES:  No clubbing, cyanosis or edema.  SKIN: Warm and dry. No jaundice. No rashes noted to exposed skin. No telangectasias noted. No palmar erythema.  NEURO:  Normal gait. No asterixis.  PSYCH:  Memory intact. Thought and speech pattern appropriate. Behavior normal. No depression or anxiety noted.    DIAGNOSTIC STUDIES:  EGD-  Last 2019, next due 2021    ABD. U/S-    10/2019  FINDINGS:  The pancreas is unremarkable.    The aorta and IVC are unremarkable.    Cholecystectomy.    No intrahepatic or extrahepatic biliary dilation.  The common duct measures 0.8 cm in diameter.    The liver is cirrhotic in morphology and measures approximately 15 cm craniocaudal.  Main portal vein is patent.  No suspicious liver lesion.  No ascites.    The spleen is unremarkable.    The kidneys are unremarkable.      Impression       No acute abnormality.  Since September 27, 2019, " no significant change or new abnormality       LIVER BIOPSY-  None   FIBROSCAN -   Done 2017  Fibroscan readin.0 KPa  Fibrosis:F4   CAP readin dB/m  Steatosis: :S3     EDUCATION:    The disease process and manifestations of cirrhosis were discussed.    Discussed implications of a cirrhosis diagnosis with HCC screenings and EGD and why it is important for us to properly monitor for potential complications.     Signs and symptoms of hepatic decompensation were reviewed, including jaundice, ascites, and slowed mentation due to hepatic encephalopathy. The patient should seek medical attention if any of these things occur.  We discussed the potential for bleeding from esophageal varices with symptoms of hematemesis and melena. The patient should report to the Emergency Department for these symptoms.    We discussed the increased risk of hepatocellular carcinoma due to cirrhosis. Continued screening every six months with ultrasound and AFP is recommended, discussed with patient.     Cirrhosis Counseling  - strict abstinence of alcohol use (includes beer, wine, and/or liquor)  - avoid non-steroidal anti-inflammatory drugs (NSAIDs) such as ibuprofen, Motrin, naprosyn, Alleve due to the risk of kidney damage  - can take acetaminophen (Tylenol), no more than 2000 mg per day  - low sodium (salt) 2 gram per day diet  - high protein diet: 90 grams per day to prevent muscle mass loss. Recommended at least 1 protein shake daily using Premier Protein shakes    - resistance exercises for muscle strength  - avoid raw seafoods due to the risk of fatal Vibrio vulnificus infection  - ultrasound of the liver every 6 months for liver cancer screening  - Upper endoscopy every 1-2 years to screen for varices in the stomach and esophagus      ASSESSMENT & PLAN:  69 y.o. White female with:  1.  Cirrhosis, unclear etiology  -- cirrhosis per fibroscan and imaging findings of nodular contour   -- MELD-Na score: 8 at 10/7/2019 11:07  AM  MELD score: 8 at 10/7/2019 11:07 AM  Calculated from:  Serum Creatinine: 1.2 mg/dL at 10/7/2019 11:07 AM  Serum Sodium: 143 mmol/L (Rounded to 137 mmol/L) at 10/7/2019 11:07 AM  Total Bilirubin: 0.6 mg/dL (Rounded to 1 mg/dL) at 10/7/2019 11:07 AM  INR(ratio): 1.0 at 10/7/2019 11:07 AM  Age: 69 years  -- HCC screening: AFP and abd. U/S.. U/S showed no lesions, AFP WNL - both next due 4/2020  -- +Immunity to Hep A and B   -- EGD last 2019. Next EGD Due 2021  --- Serological workup to be completed with next labs  -- Cirrhosis counseling as noted above and discussed with patient. Discussed with patient that do not recommend any elective abdominal surgery due to risk of hepatic decompensation.      2. Fatty liver  -- risk factors for fatty liver: obesity, DM, HLD, and HTN.  Recommend:  1. Weight loss goal of 15-20 lbs  2. Low carb/sugar, high fiber and protein diet  3. Exercise, 5 days per week, 30 minutes per day, as tolerated  4. Recommend good cholesterol, blood pressure, blood sugar levels     ADDENDUM 12/30/19: Serological workup was negative for Andrew's, alpha-1 antitrypsin deficiency, hemochromatosis, autoimmune etiology (mildly + TAMMI, normal IgG, IgM, ASMA, and AMA), and viral hepatitis.   + Hep A and B immunity    Follow up in about 5 months (around 3/1/2020). with US and labs before    Thank you for allowing me to participate in the care of Tammi LANNY Resendez    CC'ed note to:   Ariel Mullins MD

## 2019-10-23 DIAGNOSIS — E11.9 TYPE 2 DIABETES MELLITUS WITHOUT COMPLICATION, WITHOUT LONG-TERM CURRENT USE OF INSULIN: ICD-10-CM

## 2019-10-23 RX ORDER — CALCIUM CITRATE/VITAMIN D3 200MG-6.25
TABLET ORAL
Qty: 100 STRIP | Refills: 3 | Status: SHIPPED | OUTPATIENT
Start: 2019-10-23 | End: 2020-10-19

## 2019-11-09 DIAGNOSIS — M10.9 INTERVAL GOUT: ICD-10-CM

## 2019-11-10 RX ORDER — ALLOPURINOL 100 MG/1
TABLET ORAL
Qty: 90 TABLET | Refills: 0 | Status: SHIPPED | OUTPATIENT
Start: 2019-11-10 | End: 2020-02-18

## 2019-11-15 ENCOUNTER — PROCEDURE VISIT (OUTPATIENT)
Dept: NEUROLOGY | Facility: CLINIC | Age: 69
End: 2019-11-15
Payer: MEDICARE

## 2019-11-15 DIAGNOSIS — R20.2 PARESTHESIAS: ICD-10-CM

## 2019-11-15 PROCEDURE — 95912 NRV CNDJ TEST 11-12 STUDIES: CPT | Mod: HCNC,S$GLB,, | Performed by: PSYCHIATRY & NEUROLOGY

## 2019-11-15 PROCEDURE — 95886 MUSC TEST DONE W/N TEST COMP: CPT | Mod: HCNC,S$GLB,, | Performed by: PSYCHIATRY & NEUROLOGY

## 2019-11-15 PROCEDURE — 95886 PR EMG COMPLETE, W/ NERVE CONDUCTION STUDIES, 5+ MUSCLES: ICD-10-PCS | Mod: HCNC,S$GLB,, | Performed by: PSYCHIATRY & NEUROLOGY

## 2019-11-15 PROCEDURE — 95912 PR NERVE CONDUCTION STUDY; 11 -12 STUDIES: ICD-10-PCS | Mod: HCNC,S$GLB,, | Performed by: PSYCHIATRY & NEUROLOGY

## 2019-12-05 ENCOUNTER — OFFICE VISIT (OUTPATIENT)
Dept: FAMILY MEDICINE | Facility: CLINIC | Age: 69
End: 2019-12-05
Payer: MEDICARE

## 2019-12-05 VITALS
OXYGEN SATURATION: 96 % | WEIGHT: 159.81 LBS | HEIGHT: 59 IN | HEART RATE: 76 BPM | DIASTOLIC BLOOD PRESSURE: 60 MMHG | BODY MASS INDEX: 32.22 KG/M2 | SYSTOLIC BLOOD PRESSURE: 128 MMHG | TEMPERATURE: 98 F

## 2019-12-05 DIAGNOSIS — Z00.00 ENCOUNTER FOR PREVENTIVE HEALTH EXAMINATION: Primary | ICD-10-CM

## 2019-12-05 DIAGNOSIS — E11.9 TYPE 2 DIABETES MELLITUS WITHOUT COMPLICATION, WITHOUT LONG-TERM CURRENT USE OF INSULIN: ICD-10-CM

## 2019-12-05 DIAGNOSIS — K74.60 CIRRHOSIS OF LIVER WITHOUT ASCITES, UNSPECIFIED HEPATIC CIRRHOSIS TYPE: ICD-10-CM

## 2019-12-05 PROCEDURE — 3078F DIAST BP <80 MM HG: CPT | Mod: HCNC,CPTII,S$GLB, | Performed by: NURSE PRACTITIONER

## 2019-12-05 PROCEDURE — 3074F SYST BP LT 130 MM HG: CPT | Mod: HCNC,CPTII,S$GLB, | Performed by: NURSE PRACTITIONER

## 2019-12-05 PROCEDURE — 3044F HG A1C LEVEL LT 7.0%: CPT | Mod: HCNC,CPTII,S$GLB, | Performed by: NURSE PRACTITIONER

## 2019-12-05 PROCEDURE — G0439 PR MEDICARE ANNUAL WELLNESS SUBSEQUENT VISIT: ICD-10-PCS | Mod: HCNC,S$GLB,, | Performed by: NURSE PRACTITIONER

## 2019-12-05 PROCEDURE — G0439 PPPS, SUBSEQ VISIT: HCPCS | Mod: HCNC,S$GLB,, | Performed by: NURSE PRACTITIONER

## 2019-12-05 PROCEDURE — 3044F PR MOST RECENT HEMOGLOBIN A1C LEVEL <7.0%: ICD-10-PCS | Mod: HCNC,CPTII,S$GLB, | Performed by: NURSE PRACTITIONER

## 2019-12-05 PROCEDURE — 3078F PR MOST RECENT DIASTOLIC BLOOD PRESSURE < 80 MM HG: ICD-10-PCS | Mod: HCNC,CPTII,S$GLB, | Performed by: NURSE PRACTITIONER

## 2019-12-05 PROCEDURE — 99999 PR PBB SHADOW E&M-EST. PATIENT-LVL V: ICD-10-PCS | Mod: PBBFAC,HCNC,, | Performed by: NURSE PRACTITIONER

## 2019-12-05 PROCEDURE — 99999 PR PBB SHADOW E&M-EST. PATIENT-LVL V: CPT | Mod: PBBFAC,HCNC,, | Performed by: NURSE PRACTITIONER

## 2019-12-05 PROCEDURE — 3074F PR MOST RECENT SYSTOLIC BLOOD PRESSURE < 130 MM HG: ICD-10-PCS | Mod: HCNC,CPTII,S$GLB, | Performed by: NURSE PRACTITIONER

## 2019-12-05 NOTE — PROGRESS NOTES
"Tammi Farris presented for a  Medicare AWV and comprehensive Health Risk Assessment today. The following components were reviewed and updated:    · Medical history  · Family History  · Social history  · Allergies and Current Medications  · Health Risk Assessment  · Health Maintenance  · Care Team     ** See Completed Assessments for Annual Wellness Visit within the encounter summary.**       The following assessments were completed:  · Living Situation  · CAGE  · Depression Screening  · Timed Get Up and Go  · Whisper Test  · Cognitive Function Screening  · Nutrition Screening  · ADL Screening  · PAQ Screening    Vitals:    12/05/19 1438   BP: 128/60   BP Location: Left arm   Patient Position: Sitting   BP Method: Medium (Manual)   Pulse: 76   Temp: 97.6 °F (36.4 °C)   TempSrc: Oral   SpO2: 96%   Weight: 72.5 kg (159 lb 13.3 oz)   Height: 4' 11" (1.499 m)     Body mass index is 32.28 kg/m².  Physical Exam   Constitutional: She is oriented to person, place, and time. She appears well-developed and well-nourished.   HENT:   Head: Normocephalic and atraumatic.   Cardiovascular: Normal rate, regular rhythm and normal heart sounds.   Pulmonary/Chest: Effort normal and breath sounds normal. No respiratory distress.   Feet:   Right Foot:   Protective Sensation: 10 sites tested. 10 sites sensed.   Skin Integrity: Negative for erythema, warmth, callus or dry skin.   Left Foot:   Protective Sensation: 10 sites tested. 10 sites sensed.   Skin Integrity: Negative for erythema, warmth, callus or dry skin.   Neurological: She is alert and oriented to person, place, and time.   Skin: She is not diaphoretic. No pallor.   Psychiatric: She has a normal mood and affect. Her speech is normal and behavior is normal.           Diagnoses and health risks identified today and associated recommendations/orders:    1. Encounter for preventive health examination    2. Type 2 diabetes mellitus without complication, without long-term current use " of insulin    3. Cirrhosis of liver without ascites, unspecified hepatic cirrhosis type      Problem List Items Addressed This Visit     Type 2 diabetes mellitus without complication, without long-term current use of insulin    Overview     dx update         Current Assessment & Plan     Stable and controlled. Continue current treatment plan as previously prescribed with your PCP.   The patient is asked to make an attempt to improve diet and exercise patterns to aid in medical management of this problem.  Followed by PCP           Cirrhosis of liver without ascites    Current Assessment & Plan     Stable. Continue to monitor           Other Visit Diagnoses     Encounter for preventive health examination    -  Primary          Provided Tammi with a 5-10 year written screening schedule and personal prevention plan. Recommendations were developed using the USPSTF age appropriate recommendations. Education, counseling, and referrals were provided as needed. After Visit Summary printed and given to patient which includes a list of additional screenings\tests needed.    Follow up in about 1 year (around 12/5/2020).    Kaitlin Rivero, KARANP-C  I offered to discuss end of life issues, including information on how to make advance directives that the patient could use to name someone who would make medical decisions on their behalf if they became too ill to make themselves.    ___Patient declined  _X_Patient is interested, I provided paper work and offered to discuss.

## 2019-12-05 NOTE — PATIENT INSTRUCTIONS
Counseling and Referral of Other Preventative  (Italic type indicates deductible and co-insurance are waived)    Patient Name: Tammi Farris  Today's Date: 12/5/2019    Health Maintenance       Date Due Completion Date    Shingles Vaccine (2 of 3) 03/14/2013 1/17/2013    Foot Exam 09/21/2019 9/21/2018    Override on 7/7/2016: Done (see podiatry note from that date)    Override on 3/29/2016: Done    Override on 3/20/2015: Done    Hemoglobin A1c 02/28/2020 8/28/2019    Lipid Panel 08/28/2020 8/28/2019    Eye Exam 09/03/2020 9/3/2019    Override on 1/31/2013: Done    Urine Microalbumin 09/25/2020 9/25/2019    DEXA SCAN 03/09/2021 3/9/2017    Mammogram 09/27/2021 9/27/2019    TETANUS VACCINE 01/08/2023 1/8/2013    Colonoscopy 02/08/2024 2/8/2019        No orders of the defined types were placed in this encounter.    The following information is provided to all patients.  This information is to help you find resources for any of the problems found today that may be affecting your health:                Living healthy guide: www.[facility].[place].gov      Understanding Diabetes: www.diabetes.org      Eating healthy: www.cdc.gov/healthyweight      CDC home safety checklist: www.cdc.gov/steadi/patient.html      Agency on Aging: www.goea.[place].[facility]      Alcoholics anonymous (AA): www.aa.org      Physical Activity: www.carol.nih.gov/kb6vkuy      Tobacco use: www.quitwithusla.org

## 2019-12-08 DIAGNOSIS — K21.00 GASTROESOPHAGEAL REFLUX DISEASE WITH ESOPHAGITIS: ICD-10-CM

## 2019-12-10 RX ORDER — OMEPRAZOLE 20 MG/1
CAPSULE, DELAYED RELEASE ORAL
Qty: 90 CAPSULE | Refills: 1 | Status: SHIPPED | OUTPATIENT
Start: 2019-12-10 | End: 2020-06-24

## 2019-12-10 NOTE — PROGRESS NOTES
Refill Authorization Note     is requesting a refill authorization.    Brief assessment and rationale for refill: REVIEW: not on problem list          Medication Therapy Plan: GERD not on list but have been mentioned throughout the chart; REVIEW     Medication reconciliation completed: No                         Comments:   Requested Prescriptions   Pending Prescriptions Disp Refills    omeprazole (PRILOSEC) 20 MG capsule [Pharmacy Med Name: OMEPRAZOLE 20MG CAPSULES] 90 capsule 0     Sig: TAKE 1 CAPSULE(20 MG) BY MOUTH EVERY DAY       Gastroenterology: Proton Pump Inhibitors Failed - 12/8/2019  6:20 AM        Failed - GERD is on problem list         Passed - Patient is at least 18 years old        Passed - Osteoporosis is not on problem list        Passed - Plavix is not on active medication list        Passed - Valid encounter within last 12 months     Recent Outpatient Visits            5 days ago Encounter for preventive health examination    [facility] Kaitlin Rivero, FNP-C    2 months ago Cirrhosis of liver without ascites, unspecified hepatic cirrhosis type    [facility] Breann Rebolledo NP    2 months ago Annual physical exam    [facility] Ariel Mullins MD    3 months ago Nuclear sclerosis of both eyes    [facility] Rajiv E Hugh, OD    4 months ago Inverse psoriasis    [facility] Dermat[facility] Breann Orellana MD          Future Appointments              In 1 week LAB, [facility]    In 1 month MD [facility] - Internal Medicine, [facility]    In 2 months [facility] Imaging Ctr    In 2 months LAB, APPOINTMENT [facility]    In 3 months [facility] - [facility]ology[facility]

## 2019-12-10 NOTE — TELEPHONE ENCOUNTER
I have reviewed and agree with the assessment below with changes. GERD Active on problem list. LOV 9/19. Approve 6 more.

## 2019-12-23 ENCOUNTER — LAB VISIT (OUTPATIENT)
Dept: LAB | Facility: HOSPITAL | Age: 69
End: 2019-12-23
Attending: INTERNAL MEDICINE
Payer: MEDICARE

## 2019-12-23 DIAGNOSIS — K74.60 CIRRHOSIS OF LIVER WITHOUT ASCITES, UNSPECIFIED HEPATIC CIRRHOSIS TYPE: ICD-10-CM

## 2019-12-23 DIAGNOSIS — I10 ESSENTIAL HYPERTENSION: ICD-10-CM

## 2019-12-23 DIAGNOSIS — E11.9 TYPE 2 DIABETES MELLITUS WITHOUT COMPLICATION, WITHOUT LONG-TERM CURRENT USE OF INSULIN: ICD-10-CM

## 2019-12-23 DIAGNOSIS — E53.8 LOW SERUM VITAMIN B12: ICD-10-CM

## 2019-12-23 DIAGNOSIS — M10.9 INTERVAL GOUT: ICD-10-CM

## 2019-12-23 DIAGNOSIS — Z00.00 ANNUAL PHYSICAL EXAM: ICD-10-CM

## 2019-12-23 DIAGNOSIS — R20.2 PARESTHESIAS: ICD-10-CM

## 2019-12-23 DIAGNOSIS — D64.9 MILD ANEMIA: ICD-10-CM

## 2019-12-23 LAB
A1AT SERPL-MCNC: 155 MG/DL (ref 100–190)
ANION GAP SERPL CALC-SCNC: 9 MMOL/L (ref 8–16)
BUN SERPL-MCNC: 20 MG/DL (ref 8–23)
CALCIUM SERPL-MCNC: 9.8 MG/DL (ref 8.7–10.5)
CERULOPLASMIN SERPL-MCNC: 40 MG/DL (ref 15–45)
CHLORIDE SERPL-SCNC: 111 MMOL/L (ref 95–110)
CO2 SERPL-SCNC: 22 MMOL/L (ref 23–29)
CREAT SERPL-MCNC: 1 MG/DL (ref 0.5–1.4)
ERYTHROCYTE [DISTWIDTH] IN BLOOD BY AUTOMATED COUNT: 14.4 % (ref 11.5–14.5)
EST. GFR  (AFRICAN AMERICAN): >60 ML/MIN/1.73 M^2
EST. GFR  (NON AFRICAN AMERICAN): 57.6 ML/MIN/1.73 M^2
FERRITIN SERPL-MCNC: 101 NG/ML (ref 20–300)
GLUCOSE SERPL-MCNC: 104 MG/DL (ref 70–110)
HCT VFR BLD AUTO: 35.5 % (ref 37–48.5)
HGB BLD-MCNC: 10.9 G/DL (ref 12–16)
IGG SERPL-MCNC: 1513 MG/DL (ref 650–1600)
IGM SERPL-MCNC: 150 MG/DL (ref 50–300)
IRON SERPL-MCNC: 81 UG/DL (ref 30–160)
MCH RBC QN AUTO: 31.5 PG (ref 27–31)
MCHC RBC AUTO-ENTMCNC: 30.7 G/DL (ref 32–36)
MCV RBC AUTO: 103 FL (ref 82–98)
PLATELET # BLD AUTO: 158 K/UL (ref 150–350)
PMV BLD AUTO: 14 FL (ref 9.2–12.9)
POTASSIUM SERPL-SCNC: 4.6 MMOL/L (ref 3.5–5.1)
RBC # BLD AUTO: 3.46 M/UL (ref 4–5.4)
SATURATED IRON: 18 % (ref 20–50)
SODIUM SERPL-SCNC: 142 MMOL/L (ref 136–145)
TOTAL IRON BINDING CAPACITY: 457 UG/DL (ref 250–450)
TRANSFERRIN SERPL-MCNC: 309 MG/DL (ref 200–375)
URATE SERPL-MCNC: 10.5 MG/DL (ref 2.4–5.7)
VIT B12 SERPL-MCNC: 227 PG/ML (ref 210–950)
WBC # BLD AUTO: 8.97 K/UL (ref 3.9–12.7)

## 2019-12-23 PROCEDURE — 82784 ASSAY IGA/IGD/IGG/IGM EACH: CPT | Mod: 59,HCNC

## 2019-12-23 PROCEDURE — 86803 HEPATITIS C AB TEST: CPT | Mod: HCNC

## 2019-12-23 PROCEDURE — 80321 ALCOHOLS BIOMARKERS 1OR 2: CPT | Mod: HCNC

## 2019-12-23 PROCEDURE — 36415 COLL VENOUS BLD VENIPUNCTURE: CPT | Mod: HCNC,PO

## 2019-12-23 PROCEDURE — 85027 COMPLETE CBC AUTOMATED: CPT | Mod: HCNC

## 2019-12-23 PROCEDURE — 82103 ALPHA-1-ANTITRYPSIN TOTAL: CPT | Mod: HCNC

## 2019-12-23 PROCEDURE — 80048 BASIC METABOLIC PNL TOTAL CA: CPT | Mod: HCNC

## 2019-12-23 PROCEDURE — 87340 HEPATITIS B SURFACE AG IA: CPT | Mod: HCNC

## 2019-12-23 PROCEDURE — 82390 ASSAY OF CERULOPLASMIN: CPT | Mod: HCNC

## 2019-12-23 PROCEDURE — 86705 HEP B CORE ANTIBODY IGM: CPT | Mod: HCNC

## 2019-12-23 PROCEDURE — 86038 ANTINUCLEAR ANTIBODIES: CPT | Mod: HCNC

## 2019-12-23 PROCEDURE — 86039 ANTINUCLEAR ANTIBODIES (ANA): CPT | Mod: HCNC

## 2019-12-23 PROCEDURE — 86256 FLUORESCENT ANTIBODY TITER: CPT | Mod: 91,HCNC

## 2019-12-23 PROCEDURE — 86235 NUCLEAR ANTIGEN ANTIBODY: CPT | Mod: HCNC

## 2019-12-23 PROCEDURE — 82607 VITAMIN B-12: CPT | Mod: HCNC

## 2019-12-23 PROCEDURE — 84550 ASSAY OF BLOOD/URIC ACID: CPT | Mod: HCNC

## 2019-12-23 PROCEDURE — 82784 ASSAY IGA/IGD/IGG/IGM EACH: CPT | Mod: HCNC

## 2019-12-23 PROCEDURE — 83540 ASSAY OF IRON: CPT | Mod: HCNC

## 2019-12-23 PROCEDURE — 86235 NUCLEAR ANTIGEN ANTIBODY: CPT | Mod: 59,HCNC

## 2019-12-23 PROCEDURE — 82728 ASSAY OF FERRITIN: CPT | Mod: HCNC

## 2019-12-23 RX ORDER — BISOPROLOL FUMARATE AND HYDROCHLOROTHIAZIDE 10; 6.25 MG/1; MG/1
TABLET ORAL
Qty: 90 TABLET | Refills: 1 | Status: SHIPPED | OUTPATIENT
Start: 2019-12-23 | End: 2020-07-06

## 2019-12-23 NOTE — PROGRESS NOTES
Refill Authorization Note     is requesting a refill authorization.    Brief assessment and rationale for refill: APPROVE: prr  Name and strength of medication: bisoprolol-hydrochlorothiazide (ZIAC) 10-6.25 mg per tablet  Medication-related problems identified: Therapeutic duplication         Medication reconciliation completed: No                         Comments: Therapeutic Duplications Addressed in this encounter     Requested Prescriptions   Pending Prescriptions Disp Refills    bisoprolol-hydrochlorothiazide (ZIAC) 10-6.25 mg per tablet [Pharmacy Med Name: BISOPROLOL/HCTZ 10MG/6.25MG TABS] 90 tablet 1     Sig: TAKE 1 TABLET BY MOUTH EVERY DAY       Cardiovascular: Beta Blocker + Diuretic Combos Passed - 12/23/2019  4:00 PM        Passed - Patient is at least 18 years old        Passed - Last BP in normal range within 360 days     BP Readings from Last 3 Encounters:   12/05/19 128/60   10/09/19 (!) 153/68   09/25/19 120/78              Passed - Last Heart Rate in normal range within 360 days     Pulse Readings from Last 3 Encounters:   12/05/19 76   10/09/19 67   09/25/19 65             Passed - Office visit in past 12 months or future 90 days     Recent Outpatient Visits            2 weeks ago Encounter for preventive health examination    [facility] Kaitlin Rivero, KARANP-C    2 months ago Cirrhosis of liver without ascites, unspecified hepatic cirrhosis type    [facility] Breann Rebolledo NP    2 months ago Annual physical exam    [facility] Ariel Mullins MD    3 months ago Nuclear sclerosis of both eyes    [facility] Rajiv SHAHID Reese, OD    4 months ago Inverse psoriasis    [facility] Breann Orellana MD          Future Appointments              In 1 month MD [facility] - Internal Medicine, [facility]    In 2 months [facility]US[facility]    In 2 months LAB,  APPOINTMENT [facility]    In 2 months Breann Rebolledo, NP [facility], [facility]                Passed - K in normal range and within 180 days     Potassium   Date Value Ref Range Status   12/23/2019 4.6 3.5 - 5.1 mmol/L Final   10/07/2019 4.6 3.5 - 5.1 mmol/L Final   08/28/2019 4.5 3.5 - 5.1 mmol/L Final              Passed - Na in normal range and within 180 days     Sodium   Date Value Ref Range Status   12/23/2019 142 136 - 145 mmol/L Final   10/07/2019 143 136 - 145 mmol/L Final   08/28/2019 141 136 - 145 mmol/L Final              Passed - Cr is 1.4 or below and within 180 days     Creatinine   Date Value Ref Range Status   12/23/2019 1.0 0.5 - 1.4 mg/dL Final   10/07/2019 1.2 0.5 - 1.4 mg/dL Final   08/28/2019 1.2 0.5 - 1.4 mg/dL Final              Passed - Ca in normal range and within 360 days     Calcium   Date Value Ref Range Status   12/23/2019 9.8 8.7 - 10.5 mg/dL Final   10/07/2019 10.1 8.7 - 10.5 mg/dL Final   08/28/2019 10.2 8.7 - 10.5 mg/dL Final              Passed - eGFR within 180 days     eGFR if non    Date Value Ref Range Status   12/23/2019 57.6 (A) >60 mL/min/1.73 m^2 Final     Comment:     Calculation used to obtain the estimated glomerular filtration  rate (eGFR) is the CKD-EPI equation.      10/07/2019 46 (A) >60 mL/min/1.73 m^2 Final     Comment:     Calculation used to obtain the estimated glomerular filtration  rate (eGFR) is the CKD-EPI equation.      08/28/2019 46.2 (A) >60 mL/min/1.73 m^2 Final     Comment:     Calculation used to obtain the estimated glomerular filtration  rate (eGFR) is the CKD-EPI equation.        eGFR if    Date Value Ref Range Status   12/23/2019 >60.0 >60 mL/min/1.73 m^2 Final   10/07/2019 53 (A) >60 mL/min/1.73 m^2 Final   08/28/2019 53.3 (A) >60 mL/min/1.73 m^2 Final              Appointments  past 12m or future 3m with PCP    Date Provider   Last Visit   9/25/2019 Ariel  LINSEY Mullins MD   Next Visit   1/28/2020 Ariel Mullins MD

## 2019-12-24 LAB
ANA SER QL IF: POSITIVE
ANA TITR SER IF: NORMAL {TITER}
HBV CORE IGM SERPL QL IA: NEGATIVE
HBV SURFACE AG SERPL QL IA: NEGATIVE
HCV AB SERPL QL IA: NEGATIVE
MITOCHONDRIA AB TITR SER IF: NORMAL {TITER}
SMOOTH MUSCLE AB TITR SER IF: NORMAL {TITER}

## 2019-12-26 LAB
ANTI SM ANTIBODY: 1.55 EU (ref 0–19.99)
ANTI SM/RNP ANTIBODY: 161.8 EU (ref 0–19.99)
ANTI-SM INTERPRETATION: NEGATIVE
ANTI-SM/RNP INTERPRETATION: POSITIVE
ANTI-SSA ANTIBODY: 0 EU (ref 0–19.99)
ANTI-SSA INTERPRETATION: NEGATIVE
ANTI-SSB ANTIBODY: 0.46 EU (ref 0–19.99)
ANTI-SSB INTERPRETATION: NEGATIVE
DSDNA AB SER-ACNC: ABNORMAL [IU]/ML

## 2019-12-30 ENCOUNTER — PATIENT MESSAGE (OUTPATIENT)
Dept: HEPATOLOGY | Facility: CLINIC | Age: 69
End: 2019-12-30

## 2020-01-02 LAB — PHOSPHATIDYLETHANOL (PETH): NEGATIVE NG/ML

## 2020-01-07 ENCOUNTER — PATIENT MESSAGE (OUTPATIENT)
Dept: INTERNAL MEDICINE | Facility: CLINIC | Age: 70
End: 2020-01-07

## 2020-01-07 DIAGNOSIS — M10.9 ACUTE GOUT, UNSPECIFIED CAUSE, UNSPECIFIED SITE: Primary | ICD-10-CM

## 2020-01-09 ENCOUNTER — PATIENT MESSAGE (OUTPATIENT)
Dept: INTERNAL MEDICINE | Facility: CLINIC | Age: 70
End: 2020-01-09

## 2020-01-09 DIAGNOSIS — M10.9 ACUTE GOUT, UNSPECIFIED CAUSE, UNSPECIFIED SITE: ICD-10-CM

## 2020-01-09 RX ORDER — INDOMETHACIN 25 MG/1
25 CAPSULE ORAL
Qty: 15 CAPSULE | Refills: 0 | Status: SHIPPED | OUTPATIENT
Start: 2020-01-09 | End: 2020-02-19

## 2020-01-10 RX ORDER — INDOMETHACIN 25 MG/1
CAPSULE ORAL
Qty: 270 CAPSULE | OUTPATIENT
Start: 2020-01-10

## 2020-01-10 NOTE — TELEPHONE ENCOUNTER
Patient with ~normal renal function on last labs  Having gout flare  Will send in indomethacin but please check on whether she is taking the colchicine as directed for flare as well  If the indomethacin does not help enough within 2-3 days she needs to come in for UC visit  Msg sent to patient but please also call her to check in.

## 2020-01-10 NOTE — PROGRESS NOTES
Refill Routing Note     Medication(s) are not appropriate for processing by [facility]:    Medication Outside of Protocol    Appointments  past 12m or future 3m with PCP    Date Provider   Last Visit   9/25/2019 Ariel Mullins MD   Next Visit   1/28/2020 Ariel Mullins MD           Automatic Epic Protocol Generated Data:    Requested Prescriptions   Pending Prescriptions Disp Refills    indomethacin (INDOCIN) 25 MG capsule [Pharmacy Med Name: INDOMETHACIN 25MG CAPSULES] 270 capsule      Sig: TAKE 1 CAPSULE(25 MG) BY MOUTH THREE TIMES DAILY WITH MEALS AS NEEDED FOR GOUT PAIN       NSAIDs Protocol Passed - 1/9/2020  9:08 PM        Passed - Patient not currently pregnant        Passed - No positive pregnancy test in past 12 months         Passed - Serum Creatinine less than 1.4 on file in the past 12 months     Lab Results   Component Value Date    CREATININE 1.0 12/23/2019    CREATININE 1.2 10/07/2019    CREATININE 1.2 08/28/2019     Lab Results   Component Value Date    EGFRNONAA 57.6 (A) 12/23/2019    EGFRNONAA 46 (A) 10/07/2019    EGFRNONAA 46.2 (A) 08/28/2019               Passed - Visit with authorizing provider in past 12 months or upcoming 90 days        Passed - HGB greater than 10 or HCT greater than 30 in past 12 months        Passed - AST in past 12 months      Lab Results   Component Value Date    AST 51 (H) 10/07/2019    AST 39 08/28/2019    AST 36 05/23/2019              Passed - Serum Potassium less than 5.2 on file in the past 12 months     Lab Results   Component Value Date    K 4.6 12/23/2019    K 4.6 10/07/2019    K 4.5 08/28/2019                  Passed - Blood Pressure below 139/89 on file in past 12 months      BP Readings from Last 3 Encounters:   12/05/19 128/60   10/09/19 (!) 153/68   09/25/19 120/78             Passed - ALT less than 95 in past 12 months     Lab Results   Component Value Date    ALT 48 (H) 10/07/2019    ALT 41 08/28/2019    ALT 33  05/23/2019

## 2020-01-20 ENCOUNTER — TELEPHONE (OUTPATIENT)
Dept: HEPATOLOGY | Facility: CLINIC | Age: 70
End: 2020-01-20

## 2020-01-20 NOTE — TELEPHONE ENCOUNTER
Attempted to contact patient to reschedule her appointment due to her maternity leave approaching. Left patient a voicemail stating the purpose for the call. Awaiting a call back.

## 2020-01-20 NOTE — TELEPHONE ENCOUNTER
Contacted patient and rescheduled her appointment as instructed. Patient agreed to time and date of appointment. Sent reminder in the mail.

## 2020-01-28 ENCOUNTER — LAB VISIT (OUTPATIENT)
Dept: LAB | Facility: HOSPITAL | Age: 70
End: 2020-01-28
Attending: INTERNAL MEDICINE
Payer: MEDICARE

## 2020-01-28 ENCOUNTER — OFFICE VISIT (OUTPATIENT)
Dept: INTERNAL MEDICINE | Facility: CLINIC | Age: 70
End: 2020-01-28
Payer: MEDICARE

## 2020-01-28 VITALS
WEIGHT: 151.88 LBS | HEART RATE: 70 BPM | SYSTOLIC BLOOD PRESSURE: 118 MMHG | TEMPERATURE: 99 F | DIASTOLIC BLOOD PRESSURE: 70 MMHG | BODY MASS INDEX: 30.62 KG/M2 | HEIGHT: 59 IN | OXYGEN SATURATION: 97 %

## 2020-01-28 DIAGNOSIS — K21.9 GASTROESOPHAGEAL REFLUX DISEASE, ESOPHAGITIS PRESENCE NOT SPECIFIED: ICD-10-CM

## 2020-01-28 DIAGNOSIS — K52.9 CHRONIC DIARRHEA: ICD-10-CM

## 2020-01-28 DIAGNOSIS — G56.00 CARPAL TUNNEL SYNDROME, UNSPECIFIED LATERALITY: ICD-10-CM

## 2020-01-28 DIAGNOSIS — E11.9 TYPE 2 DIABETES MELLITUS WITHOUT COMPLICATION, WITHOUT LONG-TERM CURRENT USE OF INSULIN: ICD-10-CM

## 2020-01-28 DIAGNOSIS — I10 ESSENTIAL HYPERTENSION: ICD-10-CM

## 2020-01-28 DIAGNOSIS — I10 ESSENTIAL HYPERTENSION: Primary | ICD-10-CM

## 2020-01-28 DIAGNOSIS — F41.8 OTHER SPECIFIED ANXIETY DISORDERS: ICD-10-CM

## 2020-01-28 DIAGNOSIS — K74.60 CIRRHOSIS OF LIVER WITHOUT ASCITES, UNSPECIFIED HEPATIC CIRRHOSIS TYPE: ICD-10-CM

## 2020-01-28 DIAGNOSIS — M10.9 INTERVAL GOUT: ICD-10-CM

## 2020-01-28 DIAGNOSIS — E79.0 ELEVATED BLOOD URIC ACID LEVEL: ICD-10-CM

## 2020-01-28 DIAGNOSIS — F41.9 ANXIETY DISORDER, UNSPECIFIED: ICD-10-CM

## 2020-01-28 LAB
ALBUMIN SERPL BCP-MCNC: 4.1 G/DL (ref 3.5–5.2)
ALP SERPL-CCNC: 74 U/L (ref 55–135)
ALT SERPL W/O P-5'-P-CCNC: 47 U/L (ref 10–44)
ANION GAP SERPL CALC-SCNC: 11 MMOL/L (ref 8–16)
AST SERPL-CCNC: 48 U/L (ref 10–40)
BILIRUB SERPL-MCNC: 1.6 MG/DL (ref 0.1–1)
BUN SERPL-MCNC: 25 MG/DL (ref 8–23)
CALCIUM SERPL-MCNC: 9.9 MG/DL (ref 8.7–10.5)
CHLORIDE SERPL-SCNC: 106 MMOL/L (ref 95–110)
CO2 SERPL-SCNC: 21 MMOL/L (ref 23–29)
CREAT SERPL-MCNC: 1.3 MG/DL (ref 0.5–1.4)
EST. GFR  (AFRICAN AMERICAN): 48 ML/MIN/1.73 M^2
EST. GFR  (NON AFRICAN AMERICAN): 42 ML/MIN/1.73 M^2
ESTIMATED AVG GLUCOSE: 128 MG/DL (ref 68–131)
GLUCOSE SERPL-MCNC: 101 MG/DL (ref 70–110)
HBA1C MFR BLD HPLC: 6.1 % (ref 4–5.6)
POTASSIUM SERPL-SCNC: 4.4 MMOL/L (ref 3.5–5.1)
PROT SERPL-MCNC: 8.3 G/DL (ref 6–8.4)
SODIUM SERPL-SCNC: 138 MMOL/L (ref 136–145)
TSH SERPL DL<=0.005 MIU/L-ACNC: 0.41 UIU/ML (ref 0.4–4)
URATE SERPL-MCNC: 6.3 MG/DL (ref 2.4–5.7)

## 2020-01-28 PROCEDURE — 1125F PR PAIN SEVERITY QUANTIFIED, PAIN PRESENT: ICD-10-PCS | Mod: HCNC,S$GLB,, | Performed by: INTERNAL MEDICINE

## 2020-01-28 PROCEDURE — 3074F SYST BP LT 130 MM HG: CPT | Mod: HCNC,CPTII,S$GLB, | Performed by: INTERNAL MEDICINE

## 2020-01-28 PROCEDURE — 3078F PR MOST RECENT DIASTOLIC BLOOD PRESSURE < 80 MM HG: ICD-10-PCS | Mod: HCNC,CPTII,S$GLB, | Performed by: INTERNAL MEDICINE

## 2020-01-28 PROCEDURE — 3044F HG A1C LEVEL LT 7.0%: CPT | Mod: HCNC,CPTII,S$GLB, | Performed by: INTERNAL MEDICINE

## 2020-01-28 PROCEDURE — 99499 UNLISTED E&M SERVICE: CPT | Mod: HCNC,S$GLB,, | Performed by: INTERNAL MEDICINE

## 2020-01-28 PROCEDURE — 3044F PR MOST RECENT HEMOGLOBIN A1C LEVEL <7.0%: ICD-10-PCS | Mod: HCNC,CPTII,S$GLB, | Performed by: INTERNAL MEDICINE

## 2020-01-28 PROCEDURE — 3078F DIAST BP <80 MM HG: CPT | Mod: HCNC,CPTII,S$GLB, | Performed by: INTERNAL MEDICINE

## 2020-01-28 PROCEDURE — 84443 ASSAY THYROID STIM HORMONE: CPT | Mod: HCNC

## 2020-01-28 PROCEDURE — 80053 COMPREHEN METABOLIC PANEL: CPT | Mod: HCNC

## 2020-01-28 PROCEDURE — 83036 HEMOGLOBIN GLYCOSYLATED A1C: CPT | Mod: HCNC

## 2020-01-28 PROCEDURE — 1101F PR PT FALLS ASSESS DOC 0-1 FALLS W/OUT INJ PAST YR: ICD-10-PCS | Mod: HCNC,CPTII,S$GLB, | Performed by: INTERNAL MEDICINE

## 2020-01-28 PROCEDURE — 3074F PR MOST RECENT SYSTOLIC BLOOD PRESSURE < 130 MM HG: ICD-10-PCS | Mod: HCNC,CPTII,S$GLB, | Performed by: INTERNAL MEDICINE

## 2020-01-28 PROCEDURE — 99999 PR PBB SHADOW E&M-EST. PATIENT-LVL IV: ICD-10-PCS | Mod: PBBFAC,HCNC,, | Performed by: INTERNAL MEDICINE

## 2020-01-28 PROCEDURE — 36415 COLL VENOUS BLD VENIPUNCTURE: CPT | Mod: HCNC

## 2020-01-28 PROCEDURE — 1159F PR MEDICATION LIST DOCUMENTED IN MEDICAL RECORD: ICD-10-PCS | Mod: HCNC,S$GLB,, | Performed by: INTERNAL MEDICINE

## 2020-01-28 PROCEDURE — 99214 OFFICE O/P EST MOD 30 MIN: CPT | Mod: S$GLB,,, | Performed by: INTERNAL MEDICINE

## 2020-01-28 PROCEDURE — 99999 PR PBB SHADOW E&M-EST. PATIENT-LVL IV: CPT | Mod: PBBFAC,HCNC,, | Performed by: INTERNAL MEDICINE

## 2020-01-28 PROCEDURE — 1125F AMNT PAIN NOTED PAIN PRSNT: CPT | Mod: HCNC,S$GLB,, | Performed by: INTERNAL MEDICINE

## 2020-01-28 PROCEDURE — 1101F PT FALLS ASSESS-DOCD LE1/YR: CPT | Mod: HCNC,CPTII,S$GLB, | Performed by: INTERNAL MEDICINE

## 2020-01-28 PROCEDURE — 1159F MED LIST DOCD IN RCRD: CPT | Mod: HCNC,S$GLB,, | Performed by: INTERNAL MEDICINE

## 2020-01-28 PROCEDURE — 99499 RISK ADDL DX/OHS AUDIT: ICD-10-PCS | Mod: HCNC,S$GLB,, | Performed by: INTERNAL MEDICINE

## 2020-01-28 PROCEDURE — 99214 PR OFFICE/OUTPT VISIT, EST, LEVL IV, 30-39 MIN: ICD-10-PCS | Mod: S$GLB,,, | Performed by: INTERNAL MEDICINE

## 2020-01-28 PROCEDURE — 84550 ASSAY OF BLOOD/URIC ACID: CPT | Mod: HCNC

## 2020-01-28 NOTE — PROGRESS NOTES
"Subjective:       Patient ID: Tammi Farris is a 69 y.o. female.    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 Joint pain in elbows, knees. Gout pain in feet is not bothering her. Muscle aches.  "I think I had a touch of flu." Also feeling depressed, anxious, had a fight with her sister.    Gout not bothering her recently. Instructed to increase allopurinol last visit but says she has not done this - continued one pill daily. Instead she has been taking colchicine twice a day. Feels this has been helpful.  Last uric acid level in December up >12 -- reports she hadn't been taking her medications for awhile before this.    Chronic diarrhea worse recently - has been eating less because eating any food causes her to have diarrhea / urgency. 3-4 BM/day.   Does take metformin but doesn't feel that this is causing her symptoms  Taking colchcine as noted    DM2 - reports BG has been good, 90-110s  Taking metformin 500mg / 1000mg after breakfast and dinner.  Doesn't take this if she isn't eating.    EMG done for hand pain, noted +carpal tunnel changes bilaterally    Review of Systems   Constitutional: Negative for activity change.   HENT: Negative.    Eyes: Negative for visual disturbance.   Respiratory: Negative for cough and shortness of breath.    Cardiovascular: Negative for chest pain and leg swelling.   Gastrointestinal: Positive for diarrhea. Negative for abdominal distention, abdominal pain and blood in stool.   Endocrine: Negative for polyuria.   Genitourinary: Negative.    Musculoskeletal: Positive for arthralgias. Negative for gait problem and joint swelling.        Pain as noted   Skin: Negative for rash (none currently).   Neurological: Positive for weakness (generalized).   Psychiatric/Behavioral: Positive for " "dysphoric mood and sleep disturbance. The patient is nervous/anxious.          Past medical history, surgical history, and family medical history reviewed and updated as appropriate.    Medications and allergies reviewed.     Objective:          Vitals:    01/28/20 1100   BP: 118/70   BP Location: Left arm   Patient Position: Sitting   BP Method: Medium (Manual)   Pulse: 70   Temp: 99.4 °F (37.4 °C)   SpO2: 97%   Weight: 68.9 kg (151 lb 14.4 oz)   Height: 4' 11" (1.499 m)     Body mass index is 30.68 kg/m².  Physical Exam   Constitutional: She is oriented to person, place, and time. She appears well-developed and well-nourished. No distress.   HENT:   Head: Normocephalic and atraumatic.   Mouth/Throat: Oropharynx is clear and moist.   Eyes: Conjunctivae and EOM are normal. No scleral icterus.   Neck: Neck supple.   Cardiovascular: Normal rate, regular rhythm and normal heart sounds.   No murmur heard.  Pulmonary/Chest: Effort normal and breath sounds normal. No respiratory distress.   Abdominal: Soft. She exhibits no distension. There is no tenderness.   Hyperactive bowel sounds  +abdominal obesity   Musculoskeletal: She exhibits no edema or tenderness.    and pinch strength normal   Lymphadenopathy:     She has no cervical adenopathy.   Neurological: She is alert and oriented to person, place, and time. No cranial nerve deficit. Gait normal.   Skin: Skin is warm and dry.   No rash noted today   Psychiatric: Her speech is normal. Her mood appears anxious. She exhibits a depressed mood.   Vitals reviewed.      Lab Results   Component Value Date    WBC 8.97 12/23/2019    HGB 10.9 (L) 12/23/2019    HCT 35.5 (L) 12/23/2019     12/23/2019    CHOL 184 08/28/2019    TRIG 212 (H) 08/28/2019    HDL 33 (L) 08/28/2019    ALT 48 (H) 10/07/2019    AST 51 (H) 10/07/2019     12/23/2019    K 4.6 12/23/2019     (H) 12/23/2019    CREATININE 1.0 12/23/2019    BUN 20 12/23/2019    CO2 22 (L) 12/23/2019    TSH " 1.277 03/03/2017    INR 1.0 10/07/2019    HGBA1C 6.7 (H) 08/28/2019       Assessment:       1. Essential hypertension    2. Interval gout    3. Elevated blood uric acid level    4. Type 2 diabetes mellitus without complication, without long-term current use of insulin    5. Chronic diarrhea    6. Gastroesophageal reflux disease, esophagitis presence not specified    7. Other specified anxiety disorders    8. Cirrhosis of liver without ascites, unspecified hepatic cirrhosis type    9. Carpal tunnel syndrome, unspecified laterality        Plan:   Tammi was seen today for follow-up.    Diagnoses and all orders for this visit:    Essential hypertension  -     Comprehensive metabolic panel; Future  At goal, no med changes today    Interval gout  -     Comprehensive metabolic panel; Future  -     Uric acid; Future  Elevated blood uric acid level  -     Uric acid; Future  Reviewed appropriate / recommended use of allopurinol and colchicine, as well as that colchicine may be causing her chronic diarrhea to be worse  Recommended no more than 1 pill daily of colchicine  Recommended increase allopurinol - go up to 200mg, then up to 300mg on next prescription    Type 2 diabetes mellitus without complication, without long-term current use of insulin  -     Comprehensive metabolic panel; Future  -     Hemoglobin A1c; Future  Doing well, no changes; lab today    Chronic diarrhea  -     Ambulatory Referral to Gastroenterology  -     TSH; Future  Gastroesophageal reflux disease, esophagitis presence not specified  -     Ambulatory Referral to Gastroenterology    Other specified anxiety disorders  -     TSH; Future    Cirrhosis of liver without ascites, unspecified hepatic cirrhosis type  Follow with hepatology    Carpal tunnel syndrome, unspecified laterality  Discussed only briefly as focus of visit was on other issues.  EMG result reviewed    Health maintenance reviewed with patient.     Follow up in about 4 months (around  5/28/2020) for follow up.    Ariel Mullins MD  Internal Medicine  [facility]  1/28/2020

## 2020-01-28 NOTE — PATIENT INSTRUCTIONS
Increase your allopurinol to 2 pills (200mg) every day until you finish your current bottle. When you  your new prescription, make sure it is for the 300mg pills and take ONE of these every day.     Only take ONE colchicine pill every day. If you take too much of this it can cause diarrhea.    Follow up with the GI clinic for your problems with diarrhea.    Start taking a fiber supplement (metamucil, citrucel, benefiber) once a day with plenty of water to see if this helps you not have frequent stool and to have better control.

## 2020-02-14 DIAGNOSIS — M10.9 INTERVAL GOUT: ICD-10-CM

## 2020-02-14 DIAGNOSIS — M10.9 ACUTE GOUT, UNSPECIFIED CAUSE, UNSPECIFIED SITE: ICD-10-CM

## 2020-02-14 DIAGNOSIS — L40.8 INVERSE PSORIASIS: ICD-10-CM

## 2020-02-14 RX ORDER — TRIAMCINOLONE ACETONIDE 1 MG/G
CREAM TOPICAL
Qty: 80 G | Refills: 1 | Status: SHIPPED | OUTPATIENT
Start: 2020-02-14 | End: 2021-02-18

## 2020-02-18 NOTE — TELEPHONE ENCOUNTER
Please see the following assessment. This refill request still requires some action on your part. Note from 1/28/20 OV mentions pt titrating up to allopurinol 300 mg. Pended order to reflect most recent instructions - allopurinol 300 mg QD. Also, indomethacin is outside of [facility] protocol. Defer to you. Thank you.

## 2020-02-18 NOTE — PROGRESS NOTES
Refill Authorization Note     is requesting a refill authorization.    Brief assessment and rationale for refill: REVIEW: allopurinoL -abnormal labs // ROUTE: indomethacin -op            Medication Therapy Plan: URIC ACID>5.7 but lower then patient's previouse results; CBC results are low; FLOS                              Comments:   Requested Prescriptions   Pending Prescriptions Disp Refills    indomethacin (INDOCIN) 25 MG capsule [Pharmacy Med Name: INDOMETHACIN 25MG CAPSULES] 15 capsule 0     Sig: TAKE 1 CAPSULE(25 MG) BY MOUTH THREE TIMES DAILY WITH MEALS AS NEEDED FOR GOUT PAIN       NSAIDs Protocol Passed - 2/14/2020  3:06 PM        Passed - Patient not currently pregnant        Passed - No positive pregnancy test in past 12 months         Passed - Serum Creatinine less than 1.4 on file in the past 12 months     Lab Results   Component Value Date    CREATININE 1.3 01/28/2020    CREATININE 1.0 12/23/2019    CREATININE 1.2 10/07/2019     Lab Results   Component Value Date    EGFRNONAA 42 (A) 01/28/2020    EGFRNONAA 57.6 (A) 12/23/2019    EGFRNONAA 46 (A) 10/07/2019               Passed - Visit with authorizing provider in past 12 months or upcoming 90 days        Passed - HGB greater than 10 or HCT greater than 30 in past 12 months        Passed - AST in past 12 months      Lab Results   Component Value Date    AST 48 (H) 01/28/2020    AST 51 (H) 10/07/2019    AST 39 08/28/2019              Passed - Serum Potassium less than 5.2 on file in the past 12 months     Lab Results   Component Value Date    K 4.4 01/28/2020    K 4.6 12/23/2019    K 4.6 10/07/2019                  Passed - Blood Pressure below 139/89 on file in past 12 months      BP Readings from Last 3 Encounters:   01/28/20 118/70   12/05/19 128/60   10/09/19 (!) 153/68             Passed - ALT less than 95 in past 12 months     Lab Results   Component Value Date    ALT 47 (H) 01/28/2020    ALT 48 (H) 10/07/2019    ALT 41 08/28/2019              allopurinoL (ZYLOPRIM) 100 MG tablet [Pharmacy Med Name: ALLOPURINOL 100MG TABLETS] 90 tablet 0     Sig: TAKE 1 TABLET BY MOUTH ONCE DAILY TO REDUCE URIC ACID AND HELP PREVENT GOUT ATTACKS       Endocrinology:  Gout Agents - allopurinol Failed - 2/14/2020  3:06 PM        Failed - Uric Acid is 5.9 or below and within 360 days     Uric Acid   Date Value Ref Range Status   01/28/2020 6.3 (H) 2.4 - 5.7 mg/dL Final   12/23/2019 10.5 (H) 2.4 - 5.7 mg/dL Final   08/28/2019 6.9 (H) 2.4 - 5.7 mg/dL Final              Failed - RBC in normal range and within 360 days     RBC   Date Value Ref Range Status   12/23/2019 3.46 (L) 4.00 - 5.40 M/uL Final   10/07/2019 3.73 (L) 4.00 - 5.40 M/uL Final   08/28/2019 3.53 (L) 4.00 - 5.40 M/uL Final              Failed - HGB in normal range and within 360 days     Hemoglobin   Date Value Ref Range Status   12/23/2019 10.9 (L) 12.0 - 16.0 g/dL Final   10/07/2019 11.5 (L) 12.0 - 16.0 g/dL Final   08/28/2019 10.8 (L) 12.0 - 16.0 g/dL Final              Failed - HCT in normal range and within 360 days     Hematocrit   Date Value Ref Range Status   12/23/2019 35.5 (L) 37.0 - 48.5 % Final   10/07/2019 36.3 (L) 37.0 - 48.5 % Final   08/28/2019 35.1 (L) 37.0 - 48.5 % Final              Failed - eGFR is 60 or above and within 360 days     eGFR if non    Date Value Ref Range Status   01/28/2020 42 (A) >60 mL/min/1.73 m^2 Final     Comment:     Calculation used to obtain the estimated glomerular filtration  rate (eGFR) is the CKD-EPI equation.      12/23/2019 57.6 (A) >60 mL/min/1.73 m^2 Final     Comment:     Calculation used to obtain the estimated glomerular filtration  rate (eGFR) is the CKD-EPI equation.      10/07/2019 46 (A) >60 mL/min/1.73 m^2 Final     Comment:     Calculation used to obtain the estimated glomerular filtration  rate (eGFR) is the CKD-EPI equation.        eGFR if    Date Value Ref Range Status   01/28/2020 48 (A) >60 mL/min/1.73 m^2  Final   12/23/2019 >60.0 >60 mL/min/1.73 m^2 Final   10/07/2019 53 (A) >60 mL/min/1.73 m^2 Final              Passed - Patient is at least 18 years old        Passed - Office visit in past 12 months or future 90 days.     Recent Outpatient Visits            3 weeks ago Essential hypertension    [facility] - [facility] Ariel Mullins MD    2 months ago Encounter for preventive health examination    [facility] - [facility] Medicine Kaitlin Rivero, FNP-C    4 months ago Cirrhosis of liver without ascites, unspecified hepatic cirrhosis type    [facility] - Hepatology Breann Rebolledo NP    4 months ago Annual physical exam    [facility] Medicine Ariel Mullins MD    5 months ago Nuclear sclerosis of both eyes    [facility] - Optometry Rajivhamilton Reese, OD          Future Appointments              In 2 weeks [facility] MASTER [facility]    In 2 weeks LAB, APPOINTMENT [facility]ED [facility], [facility]    In 2 weeks [facility] - Hepatology, [facility]    In 3 months MD [facility] - Internal Medicine, [facility]                Passed - Cr is 1.3 or below and within 360 days     Creatinine   Date Value Ref Range Status   01/28/2020 1.3 0.5 - 1.4 mg/dL Final   12/23/2019 1.0 0.5 - 1.4 mg/dL Final   10/07/2019 1.2 0.5 - 1.4 mg/dL Final              Passed - WBC in normal range and within 360 days     WBC   Date Value Ref Range Status   12/23/2019 8.97 3.90 - 12.70 K/uL Final   10/07/2019 8.48 3.90 - 12.70 K/uL Final   08/28/2019 10.30 3.90 - 12.70 K/uL Final              Passed - PLT in normal range and within 360 days     Platelets   Date Value Ref Range Status   12/23/2019 158 150 - 350 K/uL Final   10/07/2019 199 150 - 350 K/uL Final   08/28/2019 160 150 - 350 K/uL Final              Passed - ALT is 94 or below and within 360 days     ALT   Date Value Ref Range Status   01/28/2020 47 (H) 10 - 44 U/L Final    10/07/2019 48 (H) 10 - 44 U/L Final   08/28/2019 41 10 - 44 U/L Final              Passed - AST is 54 or below and within 360 days     AST   Date Value Ref Range Status   01/28/2020 48 (H) 10 - 40 U/L Final   10/07/2019 51 (H) 10 - 40 U/L Final   08/28/2019 39 10 - 40 U/L Final               Appointments  past 12m or future 3m with PCP    Date Provider   Last Visit   1/28/2020 Ariel Mullins MD   Next Visit   6/3/2020 Ariel Mullins MD   .  ED visits in past 90 days: 0       Note composed:2:35 PM 02/18/2020

## 2020-02-19 DIAGNOSIS — M10.9 ACUTE GOUT, UNSPECIFIED CAUSE, UNSPECIFIED SITE: ICD-10-CM

## 2020-02-19 RX ORDER — ALLOPURINOL 300 MG/1
300 TABLET ORAL DAILY
Qty: 90 TABLET | Refills: 0 | Status: SHIPPED | OUTPATIENT
Start: 2020-02-19 | End: 2020-05-27

## 2020-02-19 RX ORDER — INDOMETHACIN 25 MG/1
CAPSULE ORAL
Qty: 270 CAPSULE | OUTPATIENT
Start: 2020-02-19

## 2020-02-19 RX ORDER — INDOMETHACIN 25 MG/1
CAPSULE ORAL
Qty: 15 CAPSULE | Refills: 0 | Status: SHIPPED | OUTPATIENT
Start: 2020-02-19 | End: 2021-01-01

## 2020-02-19 NOTE — PROGRESS NOTES
Refill Routing Note     Medication(s) are appropriate for refill:    Medication Outside of Protocol    Appointments  past 15m or future 3m with PCP    Date Provider   Last Visit   1/28/2020 Ariel Mullins MD   Next Visit   6/3/2020 Ariel Mullins MD       Automatic Epic Protocol Generated Data:    Requested Prescriptions   Pending Prescriptions Disp Refills    indomethacin (INDOCIN) 25 MG capsule [Pharmacy Med Name: INDOMETHACIN 25MG CAPSULES] 270 capsule      Sig: TAKE 1 CAPSULE(25 MG) BY MOUTH THREE TIMES DAILY WITH MEALS AS NEEDED FOR GOUT PAIN       NSAIDs Protocol Passed - 2/19/2020  4:18 PM        Passed - Patient not currently pregnant        Passed - No positive pregnancy test in past 12 months         Passed - Serum Creatinine less than 1.4 on file in the past 12 months     Lab Results   Component Value Date    CREATININE 1.3 01/28/2020    CREATININE 1.0 12/23/2019    CREATININE 1.2 10/07/2019     Lab Results   Component Value Date    EGFRNONAA 42 (A) 01/28/2020    EGFRNONAA 57.6 (A) 12/23/2019    EGFRNONAA 46 (A) 10/07/2019               Passed - Visit with authorizing provider in past 12 months or upcoming 90 days        Passed - HGB greater than 10 or HCT greater than 30 in past 12 months        Passed - AST in past 12 months      Lab Results   Component Value Date    AST 48 (H) 01/28/2020    AST 51 (H) 10/07/2019    AST 39 08/28/2019              Passed - Serum Potassium less than 5.2 on file in the past 12 months     Lab Results   Component Value Date    K 4.4 01/28/2020    K 4.6 12/23/2019    K 4.6 10/07/2019                  Passed - Blood Pressure below 139/89 on file in past 12 months      BP Readings from Last 3 Encounters:   01/28/20 118/70   12/05/19 128/60   10/09/19 (!) 153/68             Passed - ALT less than 95 in past 12 months     Lab Results   Component Value Date    ALT 47 (H) 01/28/2020    ALT 48 (H) 10/07/2019    ALT 41 08/28/2019                 Note created:5:00  PM 02/19/2020

## 2020-02-20 ENCOUNTER — PATIENT MESSAGE (OUTPATIENT)
Dept: INTERNAL MEDICINE | Facility: CLINIC | Age: 70
End: 2020-02-20

## 2020-02-20 NOTE — TELEPHONE ENCOUNTER
Please note denial of medication.     Refused by: Ariel Mullins MD    Refusal reason: Other (See comments) (short term PRN rx only. requested amt inappropriate)       Thanks

## 2020-02-26 ENCOUNTER — TELEPHONE (OUTPATIENT)
Dept: INTERNAL MEDICINE | Facility: CLINIC | Age: 70
End: 2020-02-26

## 2020-02-26 PROBLEM — E79.0 ELEVATED BLOOD URIC ACID LEVEL: Status: ACTIVE | Noted: 2020-02-26

## 2020-02-27 NOTE — TELEPHONE ENCOUNTER
We did not discuss in detail at her last visit, but she did have signs of carpal tunnel sydrome (recurrent) on her EMG nerve study (moderate on left, mild on right).   If she is continuing to have hand and wrist pain or numbness despite using the anti-inflammatory medication we discussed, I would recommend she see an Ortho hand specialist. Please check in with her on symptoms, let me know if she would like to see hand specialist.    Please also check in on how her gout symptoms are doing -- she was after last visit to increase her dose of allopurinol (to 200mg and then to 300mg) and decrease colchicine to no more than once a day.

## 2020-02-28 NOTE — TELEPHONE ENCOUNTER
"Pt answered the phone. She was informed. She wants to wait on the orthopedic referral. Her gout is doing "ok". She just came back form [facility] and her b/s was 180. She has been off of the metformin since December since her diabetes was in check. She has not been following her diet. She will keep tract of her b/s readings and call back with elevations. She is having trouble filling Losartan. She was told that it was too soon to fill. She thinks she may have misplaced some of her pills. She will continue to take all of her other medication as prescribed.   "

## 2020-03-04 ENCOUNTER — TELEPHONE (OUTPATIENT)
Dept: HEPATOLOGY | Facility: CLINIC | Age: 70
End: 2020-03-04

## 2020-03-04 NOTE — TELEPHONE ENCOUNTER
Contacted patient and informed them of the need to cancel their appointment due to Mrs. Crain being on maternity leave. I offered to reschedule patient ultrasound and lab appointment that she canceled, but patient declined and  stated she will not be able to do any testing right now due to her having too much going on with her family. Patient stated she will contact us back when she is able to take ultrasound and labs. Will route message to C[place] Marla to make her aware.

## 2020-04-28 DIAGNOSIS — E11.9 TYPE 2 DIABETES MELLITUS WITHOUT COMPLICATION, WITHOUT LONG-TERM CURRENT USE OF INSULIN: ICD-10-CM

## 2020-05-01 RX ORDER — LANCETS 33 GAUGE
EACH MISCELLANEOUS
Qty: 100 EACH | Refills: 3 | Status: SHIPPED | OUTPATIENT
Start: 2020-05-01 | End: 2020-05-04 | Stop reason: SDUPTHER

## 2020-05-01 NOTE — TELEPHONE ENCOUNTER
Refill Authorization Note    is requesting a refill authorization.    Brief assessment and rationale for refill: APPROVE: prr               Medication reconciliation completed: No                         Comments:      Requested Prescriptions   Signed Prescriptions Disp Refills    TRUEPLUS LANCETS 33 gauge Misc 100 each 3     Sig: USE AS DIRECTED       Endocrinology: Diabetes - Supplies Passed - 4/28/2020 11:07 AM        Passed - Patient is at least 18 years old        Passed - Office visit in past 12 months or future 90 days.     Recent Outpatient Visits            3 months ago Essential hypertension    [facility] - Internal Medicine Ariel Mullins MD    4 months ago Encounter for preventive health examination    [facility] - Family Medicine KARAN SneedP-C    6 months ago Cirrhosis of liver without ascites, unspecified hepatic cirrhosis type    [facility] - Hepatology Breann Rebolledo NP    7 months ago Annual physical exam    [facility] - Internal Medicine Ariel Mullins MD    8 months ago Nuclear sclerosis of both eyes    [facility] - Optometry Rajivhamilton Reese, OD          Future Appointments              In 1 month Ariel Mullins MD [facility] - Internal Medicine, [facility] PCW                Passed - HBA1C is 7.9 or below and within 180 days     Hemoglobin A1C   Date Value Ref Range Status   01/28/2020 6.1 (H) 4.0 - 5.6 % Final     Comment:     ADA Screening Guidelines:  5.7-6.4%  Consistent with prediabetes  >or=6.5%  Consistent with diabetes  High levels of fetal hemoglobin interfere with the HbA1C  assay. Heterozygous hemoglobin variants (HbS, HgC, etc)do  not significantly interfere with this assay.   However, presence of multiple variants may affect accuracy.     08/28/2019 6.7 (H) 4.0 - 5.6 % Final     Comment:     ADA Screening Guidelines:  5.7-6.4%  Consistent with prediabetes  >or=6.5%  Consistent with diabetes  High levels of fetal hemoglobin interfere with the HbA1C  assay.  Heterozygous hemoglobin variants (HbS, HgC, etc)do  not significantly interfere with this assay.   However, presence of multiple variants may affect accuracy.     05/23/2019 6.7 (H) 4.0 - 5.6 % Final     Comment:     ADA Screening Guidelines:  5.7-6.4%  Consistent with prediabetes  >or=6.5%  Consistent with diabetes  High levels of fetal hemoglobin interfere with the HbA1C  assay. Heterozygous hemoglobin variants (HbS, HgC, etc)do  not significantly interfere with this assay.   However, presence of multiple variants may affect accuracy.                Powered by [employer] - 4/28/2020 11:07 AM        Unable to evaluate active med list or whether request is a duplicate.         Appointments  past 12m or future 3m with PCP    Date Provider   Last Visit   1/28/2020 Ariel Mullins MD   Next Visit   6/3/2020 Ariel Mullins MD   ED visits in past 90 days: [unfilled]     Note composed:2:42 PM 05/01/2020

## 2020-05-04 ENCOUNTER — TELEPHONE (OUTPATIENT)
Dept: INTERNAL MEDICINE | Facility: CLINIC | Age: 70
End: 2020-05-04

## 2020-05-04 DIAGNOSIS — E11.9 TYPE 2 DIABETES MELLITUS WITHOUT COMPLICATION, WITHOUT LONG-TERM CURRENT USE OF INSULIN: Primary | ICD-10-CM

## 2020-05-04 RX ORDER — LANCETS 33 GAUGE
EACH MISCELLANEOUS
Qty: 100 EACH | Refills: 3 | Status: SHIPPED | OUTPATIENT
Start: 2020-05-04 | End: 2021-01-12

## 2020-05-04 NOTE — TELEPHONE ENCOUNTER
----- Message from Shantel Herrmann sent at 5/1/2020  3:17 PM CDT -----  Contact: Goyo [phone]  Prescription Clarification:     The pharmacy needs clarity on the following Rx:    TRUEPLUS LANCETS 33 gauge Misc    Needs frequency    Pharmacy: [id number] - [place] - [address] AT [facility]    Thank You

## 2020-05-04 NOTE — TELEPHONE ENCOUNTER
[facility] fax message stating that prescription need to be resent, directions have to be specific to process with frquency on how pt will be using and days supply limitations. Not just use as directed. Changed directions please check to determine if ok to use.

## 2020-05-26 DIAGNOSIS — M10.9 INTERVAL GOUT: ICD-10-CM

## 2020-05-27 RX ORDER — ALLOPURINOL 300 MG/1
TABLET ORAL
Qty: 90 TABLET | Refills: 1 | Status: SHIPPED | OUTPATIENT
Start: 2020-05-27 | End: 2020-11-27

## 2020-05-28 NOTE — PROGRESS NOTES
Refill Authorization Note    is requesting a refill authorization.    Brief assessment and rationale for refill: APPROVE: prr          Medication Therapy Plan: Hgb depressed; Uric acid trending down to 6.3 in 1/20; Allopurinol dose increased to 300 mg QD in 1/20; Approve 6 more until FOVS with PCP    Medication reconciliation completed: No                         Comments:      Requested Prescriptions   Pending Prescriptions Disp Refills    allopurinoL (ZYLOPRIM) 300 MG tablet [Pharmacy Med Name: ALLOPURINOL 300MG TABLETS] 90 tablet 1     Sig: TAKE 1 TABLET(300 MG) BY MOUTH EVERY DAY       Endocrinology:  Gout Agents - allopurinol Failed - 5/26/2020 12:56 PM        Failed - Uric Acid is 5.9 or below and within 360 days     Uric Acid   Date Value Ref Range Status   01/28/2020 6.3 (H) 2.4 - 5.7 mg/dL Final   12/23/2019 10.5 (H) 2.4 - 5.7 mg/dL Final   08/28/2019 6.9 (H) 2.4 - 5.7 mg/dL Final              Failed - RBC in normal range and within 360 days     RBC   Date Value Ref Range Status   12/23/2019 3.46 (L) 4.00 - 5.40 M/uL Final   10/07/2019 3.73 (L) 4.00 - 5.40 M/uL Final   08/28/2019 3.53 (L) 4.00 - 5.40 M/uL Final              Failed - HGB in normal range and within 360 days     Hemoglobin   Date Value Ref Range Status   12/23/2019 10.9 (L) 12.0 - 16.0 g/dL Final   10/07/2019 11.5 (L) 12.0 - 16.0 g/dL Final   08/28/2019 10.8 (L) 12.0 - 16.0 g/dL Final              Failed - HCT in normal range and within 360 days     Hematocrit   Date Value Ref Range Status   12/23/2019 35.5 (L) 37.0 - 48.5 % Final   10/07/2019 36.3 (L) 37.0 - 48.5 % Final   08/28/2019 35.1 (L) 37.0 - 48.5 % Final              Failed - eGFR is 60 or above and within 360 days     eGFR if non    Date Value Ref Range Status   01/28/2020 42 (A) >60 mL/min/1.73 m^2 Final     Comment:     Calculation used to obtain the estimated glomerular filtration  rate (eGFR) is the CKD-EPI equation.      12/23/2019 57.6 (A) >60  mL/min/1.73 m^2 Final     Comment:     Calculation used to obtain the estimated glomerular filtration  rate (eGFR) is the CKD-EPI equation.      10/07/2019 46 (A) >60 mL/min/1.73 m^2 Final     Comment:     Calculation used to obtain the estimated glomerular filtration  rate (eGFR) is the CKD-EPI equation.        eGFR if    Date Value Ref Range Status   01/28/2020 48 (A) >60 mL/min/1.73 m^2 Final   12/23/2019 >60.0 >60 mL/min/1.73 m^2 Final   10/07/2019 53 (A) >60 mL/min/1.73 m^2 Final              Passed - Patient is at least 18 years old        Passed - Office visit in past 12 months or future 90 days.     Recent Outpatient Visits            4 months ago Essential hypertension    [facility] - Internal Medicine Ariel Mullins MD    5 months ago Encounter for preventive health examination    [facility] Medicine AKASH Sneed-C    7 months ago Cirrhosis of liver without ascites, unspecified hepatic cirrhosis type    [facility] - Hepatology Breann Rebolledo NP    8 months ago Annual physical exam    [facility] Internal Medicine Ariel Mullins MD    8 months ago Nuclear sclerosis of both eyes    [facility] - Optometry Rajiv Reese, JADE          Future Appointments              In 3 months Ariel Mullins MD [facility] Internal Medicine, [facility]                Passed - Cr is 1.3 or below and within 360 days     Creatinine   Date Value Ref Range Status   01/28/2020 1.3 0.5 - 1.4 mg/dL Final   12/23/2019 1.0 0.5 - 1.4 mg/dL Final   10/07/2019 1.2 0.5 - 1.4 mg/dL Final              Passed - WBC in normal range and within 360 days     WBC   Date Value Ref Range Status   12/23/2019 8.97 3.90 - 12.70 K/uL Final   10/07/2019 8.48 3.90 - 12.70 K/uL Final   08/28/2019 10.30 3.90 - 12.70 K/uL Final              Passed - PLT in normal range and within 360 days     Platelets   Date Value Ref Range Status   12/23/2019 158 150 - 350 K/uL Final   10/07/2019 199 150 - 350 K/uL Final    08/28/2019 160 150 - 350 K/uL Final              Passed - ALT is 94 or below and within 360 days     ALT   Date Value Ref Range Status   01/28/2020 47 (H) 10 - 44 U/L Final   10/07/2019 48 (H) 10 - 44 U/L Final   08/28/2019 41 10 - 44 U/L Final              Passed - AST is 54 or below and within 360 days     AST   Date Value Ref Range Status   01/28/2020 48 (H) 10 - 40 U/L Final   10/07/2019 51 (H) 10 - 40 U/L Final   08/28/2019 39 10 - 40 U/L Final               Appointments  past 12m or future 3m with PCP    Date Provider   Last Visit   1/28/2020 Ariel Mullins MD   Next Visit   9/16/2020 Ariel Mullins MD   ED visits in past 90 days: 0     Note composed:11:25 PM 05/27/2020

## 2020-06-16 DIAGNOSIS — M10.9 INTERVAL GOUT: ICD-10-CM

## 2020-06-17 NOTE — PROGRESS NOTES
Refill Routing Note    Medication(s) are not appropriate for processing by [facility]:       Drug-Disease Interaction (colchicine and NAFLD (nonalcoholic fatty liver disease))     Medication-related problems identified: Drug-disease interaction  Medication Therapy Plan: Labs wnl; Drug-Disease: colchicine and NAFLD (nonalcoholic fatty liver disease); Cirrhosis of liver without ascites; Defer to you  Medication reconciliation completed: No      Automatic Epic Protocol Generated Data:    Requested Prescriptions   Pending Prescriptions Disp Refills    colchicine (COLCRYS) 0.6 mg tablet 90 tablet 2     Sig: TAKE 1 TABLET BY MOUTH EVERY DAY FOR 2 MONTHS WHEN STARTING ALLOPURINOL, TO PREVENT GOUT. AFTER THIS TAKE ONLY AS NEEDED FOR GOUT FLARE       Endocrinology: Gout Agents - colchicine Failed - 6/16/2020  7:34 PM        Failed - Uric Acid is 5.9 or below and within 360 days     Uric Acid   Date Value Ref Range Status   01/28/2020 6.3 (H) 2.4 - 5.7 mg/dL Final   12/23/2019 10.5 (H) 2.4 - 5.7 mg/dL Final   08/28/2019 6.9 (H) 2.4 - 5.7 mg/dL Final              Passed - Patient is at least 18 years old        Passed - Office visit in past 12 months or future 90 days.     Recent Outpatient Visits            4 months ago Essential hypertension    [facility] Medicine Ariel Mullins MD    6 months ago Encounter for preventive health examination    [facility] - [facility] Medicine Kaitlin Rivero, FNP-C    8 months ago Cirrhosis of liver without ascites, unspecified hepatic cirrhosis type    [facility] - Hepatology Breann Rebolledo NP    8 months ago Annual physical exam    [facility] - [facility] Medicine Ariel Mullins MD    9 months ago Nuclear sclerosis of both eyes    [facility] - Optometry Rajivhamilton Reese, OD          Future Appointments              In 3 months [facility]                Passed - Cr is 1.3 or below and within 360 days      Creatinine   Date Value Ref Range Status   01/28/2020 1.3 0.5 - 1.4 mg/dL Final   12/23/2019 1.0 0.5 - 1.4 mg/dL Final   10/07/2019 1.2 0.5 - 1.4 mg/dL Final              Passed - eGFR is 30 or above and within 360 days     eGFR if non    Date Value Ref Range Status   01/28/2020 42 (A) >60 mL/min/1.73 m^2 Final     Comment:     Calculation used to obtain the estimated glomerular filtration  rate (eGFR) is the CKD-EPI equation.      12/23/2019 57.6 (A) >60 mL/min/1.73 m^2 Final     Comment:     Calculation used to obtain the estimated glomerular filtration  rate (eGFR) is the CKD-EPI equation.      10/07/2019 46 (A) >60 mL/min/1.73 m^2 Final     Comment:     Calculation used to obtain the estimated glomerular filtration  rate (eGFR) is the CKD-EPI equation.        eGFR if    Date Value Ref Range Status   01/28/2020 48 (A) >60 mL/min/1.73 m^2 Final   12/23/2019 >60.0 >60 mL/min/1.73 m^2 Final   10/07/2019 53 (A) >60 mL/min/1.73 m^2 Final                    Appointments  past 12m or future 3m with PCP    Date Provider   Last Visit   1/28/2020 Ariel Mullins MD   Next Visit   9/16/2020 Ariel Mullins MD   ED visits in past 90 days: 0     Note composed:9:33 AM 06/17/2020

## 2020-06-19 RX ORDER — COLCHICINE 0.6 MG/1
TABLET ORAL
Qty: 90 TABLET | Refills: 0 | Status: SHIPPED | OUTPATIENT
Start: 2020-06-19 | End: 2020-09-21

## 2020-06-24 DIAGNOSIS — K21.00 GASTROESOPHAGEAL REFLUX DISEASE WITH ESOPHAGITIS: ICD-10-CM

## 2020-06-24 RX ORDER — OMEPRAZOLE 20 MG/1
CAPSULE, DELAYED RELEASE ORAL
Qty: 90 CAPSULE | Refills: 2 | Status: SHIPPED | OUTPATIENT
Start: 2020-06-24 | End: 2021-01-01

## 2020-06-24 NOTE — PROGRESS NOTES
Refill Authorization Note    is requesting a refill authorization.    Brief assessment and rationale for refill: APPROVE: PRR               Medication reconciliation completed: No                         Comments:      Requested Prescriptions   Pending Prescriptions Disp Refills    omeprazole (PRILOSEC) 20 MG capsule [Pharmacy Med Name: OMEPRAZOLE 20MG CAPSULES] 90 capsule 2     Sig: TAKE 1 CAPSULE(20 MG) BY MOUTH EVERY DAY       Gastroenterology: Proton Pump Inhibitors Passed - 6/24/2020  9:58 AM        Passed - Patient is at least 18 years old        Passed - Osteoporosis is not on problem list        Passed - Plavix is not on active medication list        Passed - Office visit in past 6 months or future 90 days.     Recent Outpatient Visits            4 months ago Essential hypertension    [facility] - Internal Medicine Ariel Mullins MD    6 months ago Encounter for preventive health examination    [facility] - Family Medicine Kaitlin Rivero, KARANP-C    8 months ago Cirrhosis of liver without ascites, unspecified hepatic cirrhosis type    [facility] - Hepatology Breann Rebolledo NP    9 months ago Annual physical exam    [facility] Medicine Ariel Mullins MD    9 months ago Nuclear sclerosis of both eyes    [facility] - Optometry Rajiv E Hugh, OD          Future Appointments              In 2 months [facility], [facility]                Passed - GERD is on problem list            Appointments  past 12m or future 3m with PCP    Date Provider   Last Visit   1/28/2020 Ariel Mullins MD   Next Visit   9/16/2020 Ariel Mullins MD   ED visits in past 90 days: 0     Note composed:12:09 PM 06/24/2020

## 2020-07-04 DIAGNOSIS — I10 ESSENTIAL HYPERTENSION: ICD-10-CM

## 2020-07-06 RX ORDER — BISOPROLOL FUMARATE AND HYDROCHLOROTHIAZIDE 10; 6.25 MG/1; MG/1
TABLET ORAL
Qty: 90 TABLET | Refills: 0 | Status: SHIPPED | OUTPATIENT
Start: 2020-07-06 | End: 2020-10-08

## 2020-07-06 NOTE — TELEPHONE ENCOUNTER
Refill Authorization Note    is requesting a refill authorization.    Brief assessment and rationale for refill: APPROVE: prr               Medication reconciliation completed: No                         Comments:      Requested Prescriptions   Signed Prescriptions Disp Refills    bisoproloL-hydrochlorothiazide (ZIAC) 10-6.25 mg per tablet 90 tablet 0     Sig: TAKE 1 TABLET BY MOUTH EVERY DAY       There is no refill protocol information for this order         Blood Pressure Readings: <139/89mmHg 12 months   BP Readings from Last 3 Encounters:   01/28/20 118/70   12/05/19 128/60   10/09/19 (!) 153/68         Last Labs: 6 months: Diuretics-(Cr/K/Na)  or 12 months: non-diuretics (Cr/K)   Lab Results   Component Value Date    CREATININE 1.3 01/28/2020    CREATININE 1.0 12/23/2019    CREATININE 1.2 10/07/2019       Lab Results   Component Value Date    K 4.4 01/28/2020    K 4.6 12/23/2019    K 4.6 10/07/2019    Lab Results   Component Value Date     01/28/2020     12/23/2019     10/07/2019        Digital Hypertension Data: values will display if enrolled   Last 5 Patient Entered Readings                                      Current 30 Day Average:      There is no flowsheet data to display.           Appointments  past 12m or future 3m with PCP    Date Provider   Last Visit   1/28/2020 Ariel Mullins MD   Next Visit   9/16/2020 Ariel Mullins MD   ED visits in past 90 days: [unfilled]     Note composed:1:30 PM 07/06/2020

## 2020-08-20 DIAGNOSIS — E11.9 TYPE 2 DIABETES MELLITUS WITHOUT COMPLICATION: ICD-10-CM

## 2020-09-04 DIAGNOSIS — E11.9 TYPE 2 DIABETES MELLITUS WITHOUT COMPLICATION: ICD-10-CM

## 2020-09-04 DIAGNOSIS — E11.9 TYPE 2 DIABETES MELLITUS WITHOUT COMPLICATION, UNSPECIFIED WHETHER LONG TERM INSULIN USE: ICD-10-CM

## 2020-09-08 ENCOUNTER — LAB VISIT (OUTPATIENT)
Dept: LAB | Facility: HOSPITAL | Age: 70
End: 2020-09-08
Attending: INTERNAL MEDICINE
Payer: MEDICARE

## 2020-09-08 DIAGNOSIS — E11.9 TYPE 2 DIABETES MELLITUS WITHOUT COMPLICATION: ICD-10-CM

## 2020-09-08 DIAGNOSIS — I10 ESSENTIAL HYPERTENSION: ICD-10-CM

## 2020-09-08 LAB
ANION GAP SERPL CALC-SCNC: 10 MMOL/L (ref 8–16)
BUN SERPL-MCNC: 29 MG/DL (ref 8–23)
CALCIUM SERPL-MCNC: 9.8 MG/DL (ref 8.7–10.5)
CHLORIDE SERPL-SCNC: 106 MMOL/L (ref 95–110)
CO2 SERPL-SCNC: 21 MMOL/L (ref 23–29)
CREAT SERPL-MCNC: 1.2 MG/DL (ref 0.5–1.4)
EST. GFR  (AFRICAN AMERICAN): 52.9 ML/MIN/1.73 M^2
EST. GFR  (NON AFRICAN AMERICAN): 45.9 ML/MIN/1.73 M^2
ESTIMATED AVG GLUCOSE: 143 MG/DL (ref 68–131)
GLUCOSE SERPL-MCNC: 115 MG/DL (ref 70–110)
HBA1C MFR BLD HPLC: 6.6 % (ref 4–5.6)
POTASSIUM SERPL-SCNC: 4.7 MMOL/L (ref 3.5–5.1)
SODIUM SERPL-SCNC: 137 MMOL/L (ref 136–145)

## 2020-09-08 PROCEDURE — 80048 BASIC METABOLIC PNL TOTAL CA: CPT | Mod: HCNC

## 2020-09-08 PROCEDURE — 36415 COLL VENOUS BLD VENIPUNCTURE: CPT | Mod: HCNC,PO

## 2020-09-08 PROCEDURE — 83036 HEMOGLOBIN GLYCOSYLATED A1C: CPT | Mod: HCNC

## 2020-09-14 NOTE — PROGRESS NOTES
Lab results show that her A1c level has increased back into the diabetes range, but still within the goal range for diabetes. We'll discuss in detail at her visit. Please call patient to let them know and remind of upcoming visit  Ariel Mullins MD

## 2020-09-16 ENCOUNTER — IMMUNIZATION (OUTPATIENT)
Dept: INTERNAL MEDICINE | Facility: CLINIC | Age: 70
End: 2020-09-16
Payer: MEDICARE

## 2020-09-16 ENCOUNTER — OFFICE VISIT (OUTPATIENT)
Dept: INTERNAL MEDICINE | Facility: CLINIC | Age: 70
End: 2020-09-16
Payer: MEDICARE

## 2020-09-16 ENCOUNTER — CLINICAL SUPPORT (OUTPATIENT)
Dept: OPTOMETRY | Facility: CLINIC | Age: 70
End: 2020-09-16
Attending: INTERNAL MEDICINE
Payer: MEDICARE

## 2020-09-16 VITALS
HEART RATE: 53 BPM | SYSTOLIC BLOOD PRESSURE: 128 MMHG | TEMPERATURE: 98 F | BODY MASS INDEX: 32.04 KG/M2 | OXYGEN SATURATION: 97 % | HEIGHT: 59 IN | RESPIRATION RATE: 18 BRPM | DIASTOLIC BLOOD PRESSURE: 66 MMHG | WEIGHT: 158.94 LBS

## 2020-09-16 DIAGNOSIS — T46.6X5A MYALGIA DUE TO STATIN: ICD-10-CM

## 2020-09-16 DIAGNOSIS — M79.10 MYALGIA DUE TO STATIN: ICD-10-CM

## 2020-09-16 DIAGNOSIS — K74.60 CIRRHOSIS OF LIVER WITHOUT ASCITES, UNSPECIFIED HEPATIC CIRRHOSIS TYPE: ICD-10-CM

## 2020-09-16 DIAGNOSIS — E11.9 TYPE 2 DIABETES MELLITUS WITHOUT COMPLICATION, UNSPECIFIED WHETHER LONG TERM INSULIN USE: ICD-10-CM

## 2020-09-16 DIAGNOSIS — E78.2 MIXED HYPERLIPIDEMIA: ICD-10-CM

## 2020-09-16 DIAGNOSIS — E11.9 TYPE 2 DIABETES MELLITUS WITHOUT COMPLICATION, WITHOUT LONG-TERM CURRENT USE OF INSULIN: ICD-10-CM

## 2020-09-16 DIAGNOSIS — Z00.00 ANNUAL PHYSICAL EXAM: Primary | ICD-10-CM

## 2020-09-16 DIAGNOSIS — M10.9 INTERVAL GOUT: ICD-10-CM

## 2020-09-16 DIAGNOSIS — B36.9 FUNGAL SKIN INFECTION: ICD-10-CM

## 2020-09-16 DIAGNOSIS — D69.6 THROMBOCYTOPENIA: ICD-10-CM

## 2020-09-16 DIAGNOSIS — L40.9 PSORIASIS: ICD-10-CM

## 2020-09-16 DIAGNOSIS — D64.9 ANEMIA, UNSPECIFIED TYPE: ICD-10-CM

## 2020-09-16 DIAGNOSIS — E53.8 LOW SERUM VITAMIN B12: ICD-10-CM

## 2020-09-16 DIAGNOSIS — I10 ESSENTIAL HYPERTENSION: ICD-10-CM

## 2020-09-16 DIAGNOSIS — K76.0 NAFLD (NONALCOHOLIC FATTY LIVER DISEASE): ICD-10-CM

## 2020-09-16 DIAGNOSIS — D61.818 PANCYTOPENIA: ICD-10-CM

## 2020-09-16 PROCEDURE — G0008 ADMIN INFLUENZA VIRUS VAC: HCPCS | Mod: HCNC,S$GLB,, | Performed by: INTERNAL MEDICINE

## 2020-09-16 PROCEDURE — 99999 PR PBB SHADOW E&M-EST. PATIENT-LVL V: ICD-10-PCS | Mod: PBBFAC,HCNC,, | Performed by: INTERNAL MEDICINE

## 2020-09-16 PROCEDURE — 90694 VACC AIIV4 NO PRSRV 0.5ML IM: CPT | Mod: HCNC,S$GLB,, | Performed by: INTERNAL MEDICINE

## 2020-09-16 PROCEDURE — 3008F BODY MASS INDEX DOCD: CPT | Mod: HCNC,CPTII,S$GLB, | Performed by: INTERNAL MEDICINE

## 2020-09-16 PROCEDURE — 1126F PR PAIN SEVERITY QUANTIFIED, NO PAIN PRESENT: ICD-10-PCS | Mod: HCNC,S$GLB,, | Performed by: INTERNAL MEDICINE

## 2020-09-16 PROCEDURE — 3078F PR MOST RECENT DIASTOLIC BLOOD PRESSURE < 80 MM HG: ICD-10-PCS | Mod: HCNC,CPTII,S$GLB, | Performed by: INTERNAL MEDICINE

## 2020-09-16 PROCEDURE — 3044F HG A1C LEVEL LT 7.0%: CPT | Mod: HCNC,CPTII,S$GLB, | Performed by: INTERNAL MEDICINE

## 2020-09-16 PROCEDURE — 1101F PT FALLS ASSESS-DOCD LE1/YR: CPT | Mod: HCNC,CPTII,S$GLB, | Performed by: INTERNAL MEDICINE

## 2020-09-16 PROCEDURE — 3072F PR LOW RISK FOR RETINOPATHY: ICD-10-PCS | Mod: HCNC,S$GLB,, | Performed by: INTERNAL MEDICINE

## 2020-09-16 PROCEDURE — 99999 PR PBB SHADOW E&M-EST. PATIENT-LVL I: ICD-10-PCS | Mod: PBBFAC,HCNC,,

## 2020-09-16 PROCEDURE — 99999 PR PBB SHADOW E&M-EST. PATIENT-LVL I: CPT | Mod: PBBFAC,HCNC,,

## 2020-09-16 PROCEDURE — 3074F PR MOST RECENT SYSTOLIC BLOOD PRESSURE < 130 MM HG: ICD-10-PCS | Mod: HCNC,CPTII,S$GLB, | Performed by: INTERNAL MEDICINE

## 2020-09-16 PROCEDURE — 99397 PR PREVENTIVE VISIT,EST,65 & OVER: ICD-10-PCS | Mod: HCNC,S$GLB,, | Performed by: INTERNAL MEDICINE

## 2020-09-16 PROCEDURE — 1101F PR PT FALLS ASSESS DOC 0-1 FALLS W/OUT INJ PAST YR: ICD-10-PCS | Mod: HCNC,CPTII,S$GLB, | Performed by: INTERNAL MEDICINE

## 2020-09-16 PROCEDURE — 3078F DIAST BP <80 MM HG: CPT | Mod: HCNC,CPTII,S$GLB, | Performed by: INTERNAL MEDICINE

## 2020-09-16 PROCEDURE — 99999 PR PBB SHADOW E&M-EST. PATIENT-LVL V: CPT | Mod: PBBFAC,HCNC,, | Performed by: INTERNAL MEDICINE

## 2020-09-16 PROCEDURE — G0008 PR ADMIN INFLUENZA VIRUS VAC: ICD-10-PCS | Mod: HCNC,S$GLB,, | Performed by: INTERNAL MEDICINE

## 2020-09-16 PROCEDURE — 3044F PR MOST RECENT HEMOGLOBIN A1C LEVEL <7.0%: ICD-10-PCS | Mod: HCNC,CPTII,S$GLB, | Performed by: INTERNAL MEDICINE

## 2020-09-16 PROCEDURE — 90694 FLU VACCINE - QUADRIVALENT - ADJUVANTED: ICD-10-PCS | Mod: HCNC,S$GLB,, | Performed by: INTERNAL MEDICINE

## 2020-09-16 PROCEDURE — 3008F PR BODY MASS INDEX (BMI) DOCUMENTED: ICD-10-PCS | Mod: HCNC,CPTII,S$GLB, | Performed by: INTERNAL MEDICINE

## 2020-09-16 PROCEDURE — 3288F PR FALLS RISK ASSESSMENT DOCUMENTED: ICD-10-PCS | Mod: HCNC,CPTII,S$GLB, | Performed by: INTERNAL MEDICINE

## 2020-09-16 PROCEDURE — 3072F LOW RISK FOR RETINOPATHY: CPT | Mod: HCNC,S$GLB,, | Performed by: INTERNAL MEDICINE

## 2020-09-16 PROCEDURE — 3074F SYST BP LT 130 MM HG: CPT | Mod: HCNC,CPTII,S$GLB, | Performed by: INTERNAL MEDICINE

## 2020-09-16 PROCEDURE — 92250 FUNDUS PHOTOGRAPHY W/I&R: CPT | Mod: 26,HCNC,S$GLB, | Performed by: OPHTHALMOLOGY

## 2020-09-16 PROCEDURE — 1126F AMNT PAIN NOTED NONE PRSNT: CPT | Mod: HCNC,S$GLB,, | Performed by: INTERNAL MEDICINE

## 2020-09-16 PROCEDURE — 82043 UR ALBUMIN QUANTITATIVE: CPT | Mod: HCNC

## 2020-09-16 PROCEDURE — 92250 DIABETIC EYE SCREENING PHOTO: ICD-10-PCS | Mod: 26,HCNC,S$GLB, | Performed by: OPHTHALMOLOGY

## 2020-09-16 PROCEDURE — 99397 PER PM REEVAL EST PAT 65+ YR: CPT | Mod: HCNC,S$GLB,, | Performed by: INTERNAL MEDICINE

## 2020-09-16 PROCEDURE — 3288F FALL RISK ASSESSMENT DOCD: CPT | Mod: HCNC,CPTII,S$GLB, | Performed by: INTERNAL MEDICINE

## 2020-09-16 RX ORDER — CLOTRIMAZOLE 1 %
CREAM (GRAM) TOPICAL
Qty: 90 G | Refills: 1 | Status: SHIPPED | OUTPATIENT
Start: 2020-09-16 | End: 2021-02-18 | Stop reason: SDUPTHER

## 2020-09-16 NOTE — PATIENT INSTRUCTIONS
OK to stop taking the cholchicine every day -- take this only as directed when you have a gout flare.      Ask at the pharmacy about whether a colchicine capsule or tablet would be less expensive.      CONTINUE TAKING THE ALLOPURINOL EVERY  DAY.

## 2020-09-16 NOTE — PROGRESS NOTES
Subjective:       Patient ID: Tammi Farris is a 70 y.o. female.    Chief Complaint: Annual Exam    HPI  71 y/o woman with HTN, HLD, DM2, cirrhosis due to NAFLD, psoriasis, GERD, gout here for annual exam.    DM2 - recent A1c 6.6  AM BG 99-120s, occasionally higher if eating more fruit  Taking metformin 500mg / 1000mg after breakfast and dinner.  Due for eye exam    GERD - on omeprazole, has symptoms if she doesn't take this - takes this every day.  Mild low vitamin D in the past  Not taking B12 recently    HTN - taking bisoprolol-HCTZ and losartan daily  No headache, vision changes, chest pain, or dyspnea.    HLD - has not tolerated statins due to myalgia, other side effects (rosuvastatin, pravastatin)    Gout - No gout flares since starting on allopurinol + colchicine; has not yet stopped taking the colchicine as directed    NAFLD, cirrhosis - following with Hepatology  Ultrasound done 11/10/17 showing fatty liver; nodular liver contour noted concerning for cirrhosis. Fibroscan with F4 fibrosis noted, S3 steatosis.  Due for follow up    Psoriasis - using clobetasol cream sometimes     H/o BCC on forehead - following with dermatology, seen 8/2019, due for follow up / skin check    Requests antifungal cream for rash under breasts    EGD, colonoscopy done 2/2019. EGD with irregular z-line, no varices, otherwise normal. +one benign colon polyp  Recommended to repeat EGD in 2 years for screening.  Recommended to repeat colonoscopy in 5 years for surveillance.    DEXA due 3/2021  Mammogram done 9/2019; TC risk score ~5%    Review of Systems   Constitutional: Negative for activity change, fever and unexpected weight change.   HENT: Negative.    Eyes: Negative for visual disturbance.   Respiratory: Negative for cough and shortness of breath.    Cardiovascular: Negative for chest pain, palpitations and leg swelling.   Gastrointestinal: Negative.  Negative for abdominal pain and blood in stool.   Endocrine: Negative.   "Negative for polyuria.   Genitourinary: Negative for difficulty urinating and dyspareunia.   Musculoskeletal: Negative for gait problem and joint swelling.        Mild joint / back pain, no acute issues   Skin: Rash: rash under breasts sometimes; psorasis.   Neurological: Negative for weakness and numbness.   Psychiatric/Behavioral: Negative for dysphoric mood. The patient is nervous/anxious (sometimes).          Past medical history, surgical history, and family medical history reviewed and updated as appropriate.    Medications and allergies reviewed.     Objective:          Vitals:    09/16/20 1144 09/16/20 1243   BP: (!) 142/62 128/66   BP Location: Left arm    Patient Position: Sitting    BP Method: Large (Manual)    Pulse: (!) 53    Resp: 18    Temp: 98.4 °F (36.9 °C)    TempSrc: Oral    SpO2: 97%    Weight: 72.1 kg (158 lb 15.2 oz)    Height: 4' 11" (1.499 m)      Body mass index is 32.1 kg/m².  Physical Exam  Vitals signs reviewed.   Constitutional:       General: She is not in acute distress.     Appearance: Normal appearance. She is well-developed. She is not ill-appearing.   HENT:      Head: Normocephalic and atraumatic.      Nose: Nose normal.      Mouth/Throat:      Mouth: Mucous membranes are moist.   Eyes:      General: No scleral icterus.     Extraocular Movements: Extraocular movements intact.      Conjunctiva/sclera: Conjunctivae normal.      Pupils: Pupils are equal, round, and reactive to light.   Neck:      Musculoskeletal: Neck supple.   Cardiovascular:      Rate and Rhythm: Normal rate and regular rhythm.      Pulses: Normal pulses.      Heart sounds: Normal heart sounds. No murmur.   Pulmonary:      Effort: Pulmonary effort is normal. No respiratory distress.      Breath sounds: Normal breath sounds.   Abdominal:      General: Bowel sounds are normal.      Palpations: Abdomen is soft.      Tenderness: There is no abdominal tenderness.   Musculoskeletal:         General: No tenderness.      " Right lower leg: No edema.      Left lower leg: No edema.   Lymphadenopathy:      Cervical: No cervical adenopathy.   Skin:     General: Skin is warm and dry.      Findings: Rash (pale erythematous rash with discrete border, some scaling under both breasts) present.   Neurological:      General: No focal deficit present.      Mental Status: She is alert and oriented to person, place, and time. Mental status is at baseline.      Cranial Nerves: No cranial nerve deficit.      Gait: Gait normal.   Psychiatric:         Mood and Affect: Mood normal.         Behavior: Behavior normal.         Thought Content: Thought content normal.         Lab Results   Component Value Date    WBC 8.97 12/23/2019    HGB 10.9 (L) 12/23/2019    HCT 35.5 (L) 12/23/2019     12/23/2019    CHOL 184 08/28/2019    TRIG 212 (H) 08/28/2019    HDL 33 (L) 08/28/2019    ALT 47 (H) 01/28/2020    AST 48 (H) 01/28/2020     09/08/2020    K 4.7 09/08/2020     09/08/2020    CREATININE 1.2 09/08/2020    BUN 29 (H) 09/08/2020    CO2 21 (L) 09/08/2020    TSH 0.413 01/28/2020    INR 1.0 10/07/2019    HGBA1C 6.6 (H) 09/08/2020       Assessment:       1. Annual physical exam    2. Type 2 diabetes mellitus without complication, without long-term current use of insulin    3. Fungal skin infection    4. Essential hypertension    5. Mixed hyperlipidemia    6. Psoriasis    7. NAFLD (nonalcoholic fatty liver disease)    8. Cirrhosis of liver without ascites, unspecified hepatic cirrhosis type    9. Low serum vitamin B12    10. Interval gout    11. Anemia, unspecified type    12. Myalgia due to statin        Plan:   Tammi was seen today for annual exam.    Diagnoses and all orders for this visit:    Annual physical exam  Overall stable. Reviewed chronic and preventive health concerns.  Reviewed specialist notes, counseled re: scheduling appropriate follow up at checkout today.  Has had zostavax; defers shingrix for now  Flu shot today  Up to date on  pneumonia vaccines    Type 2 diabetes mellitus without complication, without long-term current use of insulin  -     Diabetes Digital Medicine (DDMP) Enrollment Order  -     Diabetes Digital Medicine (DDMP): Assign Onboarding Questionnaires  -     Lipid Panel; Future  -     Microalbumin/creatinine urine ratio  At goal  Interested in Digital DM program  Eye exam    Fungal skin infection  -     clotrimazole (LOTRIMIN) 1 % cream; APPLY TO THE AFFECTED AREA OF RASH ON BREASTS AND IN FOLDS OF SKIN AS DIRECTED TWICE DAILY  Antifungal given; also follow up with dermatology    Essential hypertension  -     Hypertension Digital Medicine (HDMP) Enrollment Order  -     Hypertension Digital Medicine (HDMP): Assign Onboarding Questionnaires  At goal on recheck, interested in Digital HTN program    Mixed hyperlipidemia  Myalgia due to statin  -     Lipid Panel; Future  Should be on statin if possible by guidelines; does not tolerate and does not want to try another at this time.     Psoriasis  F/u with dermatology    NAFLD (nonalcoholic fatty liver disease)  -     Ferritin; Future  -     Iron and TIBC; Future  F/u with hepatology - schedule at checkout  Cirrhosis of liver without ascites, unspecified hepatic cirrhosis type  -     Ferritin; Future  -     Iron and TIBC; Future    Low serum vitamin B12  -     Vitamin B12; Future  Recommended take MVI or B-complex at least a few days a week while taking PPI    Interval gout  -     Uric acid; Future  Counseled re: stopping colchicine, continue allopurinol  Check labs in 1 month    Anemia, unspecified type  -     CBC Without Differential; Future  -     Ferritin; Future  -     Iron and TIBC; Future    Health maintenance reviewed with patient.   Labs in 1 month  Follow up in about 4 months (around 1/16/2021) for diabetes, hypertension, gout.    Ariel Mullins MD  Internal Medicine  [facility]  9/16/2020

## 2020-09-16 NOTE — PROGRESS NOTES
HPI     Diabetic Eye Exam      Additional comments: photos              Comments     Screening photos          Last edited by Radha Landaverde MA on 9/16/2020  1:23 PM. (History)            Assessment /Plan     For exam results, see Encounter Report.    Type 2 diabetes mellitus without complication, unspecified whether long term insulin use  -     Diabetic Eye Screening Photo      70 y.o. y/o here for screening for Diabetic Renopathy with non-dilated fundus photos per Ariel Mullins MD

## 2020-09-17 ENCOUNTER — PATIENT OUTREACH (OUTPATIENT)
Dept: ADMINISTRATIVE | Facility: OTHER | Age: 70
End: 2020-09-17

## 2020-09-17 LAB
ALBUMIN/CREAT UR: 21.2 UG/MG (ref 0–30)
CREAT UR-MCNC: 66 MG/DL (ref 15–325)
MICROALBUMIN UR DL<=1MG/L-MCNC: 14 UG/ML

## 2020-09-18 ENCOUNTER — LAB VISIT (OUTPATIENT)
Dept: LAB | Facility: HOSPITAL | Age: 70
End: 2020-09-18
Payer: MEDICARE

## 2020-09-18 ENCOUNTER — OFFICE VISIT (OUTPATIENT)
Dept: DERMATOLOGY | Facility: CLINIC | Age: 70
End: 2020-09-18
Payer: MEDICARE

## 2020-09-18 ENCOUNTER — OFFICE VISIT (OUTPATIENT)
Dept: HEPATOLOGY | Facility: CLINIC | Age: 70
End: 2020-09-18
Payer: MEDICARE

## 2020-09-18 VITALS
SYSTOLIC BLOOD PRESSURE: 140 MMHG | HEIGHT: 59 IN | WEIGHT: 159.63 LBS | BODY MASS INDEX: 32.18 KG/M2 | DIASTOLIC BLOOD PRESSURE: 70 MMHG | OXYGEN SATURATION: 97 % | HEART RATE: 65 BPM

## 2020-09-18 DIAGNOSIS — M10.9 INTERVAL GOUT: ICD-10-CM

## 2020-09-18 DIAGNOSIS — K74.60 CIRRHOSIS OF LIVER WITHOUT ASCITES, UNSPECIFIED HEPATIC CIRRHOSIS TYPE: Primary | ICD-10-CM

## 2020-09-18 DIAGNOSIS — K74.60 CIRRHOSIS OF LIVER WITHOUT ASCITES, UNSPECIFIED HEPATIC CIRRHOSIS TYPE: ICD-10-CM

## 2020-09-18 DIAGNOSIS — L40.8 INVERSE PSORIASIS: ICD-10-CM

## 2020-09-18 DIAGNOSIS — L40.0 PSORIASIS VULGARIS: Primary | ICD-10-CM

## 2020-09-18 LAB
AFP SERPL-MCNC: 3.2 NG/ML (ref 0–8.4)
ALBUMIN SERPL BCP-MCNC: 3.8 G/DL (ref 3.5–5.2)
ALP SERPL-CCNC: 98 U/L (ref 55–135)
ALT SERPL W/O P-5'-P-CCNC: 35 U/L (ref 10–44)
ANION GAP SERPL CALC-SCNC: 11 MMOL/L (ref 8–16)
AST SERPL-CCNC: 46 U/L (ref 10–40)
BILIRUB SERPL-MCNC: 0.6 MG/DL (ref 0.1–1)
BUN SERPL-MCNC: 18 MG/DL (ref 8–23)
CALCIUM SERPL-MCNC: 9.6 MG/DL (ref 8.7–10.5)
CHLORIDE SERPL-SCNC: 107 MMOL/L (ref 95–110)
CO2 SERPL-SCNC: 21 MMOL/L (ref 23–29)
CREAT SERPL-MCNC: 1.2 MG/DL (ref 0.5–1.4)
ERYTHROCYTE [DISTWIDTH] IN BLOOD BY AUTOMATED COUNT: 14.7 % (ref 11.5–14.5)
EST. GFR  (AFRICAN AMERICAN): 52.9 ML/MIN/1.73 M^2
EST. GFR  (NON AFRICAN AMERICAN): 45.9 ML/MIN/1.73 M^2
GLUCOSE SERPL-MCNC: 190 MG/DL (ref 70–110)
HCT VFR BLD AUTO: 32.1 % (ref 37–48.5)
HGB BLD-MCNC: 10.1 G/DL (ref 12–16)
INR PPP: 1 (ref 0.8–1.2)
MCH RBC QN AUTO: 32.7 PG (ref 27–31)
MCHC RBC AUTO-ENTMCNC: 31.5 G/DL (ref 32–36)
MCV RBC AUTO: 104 FL (ref 82–98)
PLATELET # BLD AUTO: 125 K/UL (ref 150–350)
PMV BLD AUTO: 13.1 FL (ref 9.2–12.9)
POTASSIUM SERPL-SCNC: 4 MMOL/L (ref 3.5–5.1)
PROT SERPL-MCNC: 7.7 G/DL (ref 6–8.4)
PROTHROMBIN TIME: 11.4 SEC (ref 9–12.5)
RBC # BLD AUTO: 3.09 M/UL (ref 4–5.4)
SODIUM SERPL-SCNC: 139 MMOL/L (ref 136–145)
WBC # BLD AUTO: 7.85 K/UL (ref 3.9–12.7)

## 2020-09-18 PROCEDURE — 3077F SYST BP >= 140 MM HG: CPT | Mod: HCNC,CPTII,S$GLB, | Performed by: NURSE PRACTITIONER

## 2020-09-18 PROCEDURE — 99999 PR PBB SHADOW E&M-EST. PATIENT-LVL III: ICD-10-PCS | Mod: PBBFAC,HCNC,, | Performed by: DERMATOLOGY

## 2020-09-18 PROCEDURE — 1159F PR MEDICATION LIST DOCUMENTED IN MEDICAL RECORD: ICD-10-PCS | Mod: HCNC,S$GLB,, | Performed by: NURSE PRACTITIONER

## 2020-09-18 PROCEDURE — 3078F DIAST BP <80 MM HG: CPT | Mod: HCNC,CPTII,S$GLB, | Performed by: NURSE PRACTITIONER

## 2020-09-18 PROCEDURE — 1126F AMNT PAIN NOTED NONE PRSNT: CPT | Mod: HCNC,S$GLB,, | Performed by: NURSE PRACTITIONER

## 2020-09-18 PROCEDURE — 99213 PR OFFICE/OUTPT VISIT, EST, LEVL III, 20-29 MIN: ICD-10-PCS | Mod: HCNC,S$GLB,, | Performed by: DERMATOLOGY

## 2020-09-18 PROCEDURE — 3078F PR MOST RECENT DIASTOLIC BLOOD PRESSURE < 80 MM HG: ICD-10-PCS | Mod: HCNC,CPTII,S$GLB, | Performed by: NURSE PRACTITIONER

## 2020-09-18 PROCEDURE — 85027 COMPLETE CBC AUTOMATED: CPT | Mod: HCNC

## 2020-09-18 PROCEDURE — 82105 ALPHA-FETOPROTEIN SERUM: CPT | Mod: HCNC

## 2020-09-18 PROCEDURE — 1101F PR PT FALLS ASSESS DOC 0-1 FALLS W/OUT INJ PAST YR: ICD-10-PCS | Mod: HCNC,CPTII,S$GLB, | Performed by: DERMATOLOGY

## 2020-09-18 PROCEDURE — 99499 UNLISTED E&M SERVICE: CPT | Mod: HCNC,S$GLB,, | Performed by: NURSE PRACTITIONER

## 2020-09-18 PROCEDURE — 1159F MED LIST DOCD IN RCRD: CPT | Mod: HCNC,S$GLB,, | Performed by: DERMATOLOGY

## 2020-09-18 PROCEDURE — 99213 OFFICE O/P EST LOW 20 MIN: CPT | Mod: HCNC,S$GLB,, | Performed by: DERMATOLOGY

## 2020-09-18 PROCEDURE — 36415 COLL VENOUS BLD VENIPUNCTURE: CPT | Mod: HCNC

## 2020-09-18 PROCEDURE — 3077F PR MOST RECENT SYSTOLIC BLOOD PRESSURE >= 140 MM HG: ICD-10-PCS | Mod: HCNC,CPTII,S$GLB, | Performed by: DERMATOLOGY

## 2020-09-18 PROCEDURE — 3078F PR MOST RECENT DIASTOLIC BLOOD PRESSURE < 80 MM HG: ICD-10-PCS | Mod: HCNC,CPTII,S$GLB, | Performed by: DERMATOLOGY

## 2020-09-18 PROCEDURE — 85610 PROTHROMBIN TIME: CPT | Mod: HCNC

## 2020-09-18 PROCEDURE — 3008F PR BODY MASS INDEX (BMI) DOCUMENTED: ICD-10-PCS | Mod: HCNC,CPTII,S$GLB, | Performed by: NURSE PRACTITIONER

## 2020-09-18 PROCEDURE — 80321 ALCOHOLS BIOMARKERS 1OR 2: CPT | Mod: HCNC

## 2020-09-18 PROCEDURE — 99999 PR PBB SHADOW E&M-EST. PATIENT-LVL V: CPT | Mod: PBBFAC,HCNC,, | Performed by: NURSE PRACTITIONER

## 2020-09-18 PROCEDURE — 1101F PT FALLS ASSESS-DOCD LE1/YR: CPT | Mod: HCNC,CPTII,S$GLB, | Performed by: NURSE PRACTITIONER

## 2020-09-18 PROCEDURE — 1159F PR MEDICATION LIST DOCUMENTED IN MEDICAL RECORD: ICD-10-PCS | Mod: HCNC,S$GLB,, | Performed by: DERMATOLOGY

## 2020-09-18 PROCEDURE — 1126F PR PAIN SEVERITY QUANTIFIED, NO PAIN PRESENT: ICD-10-PCS | Mod: HCNC,S$GLB,, | Performed by: NURSE PRACTITIONER

## 2020-09-18 PROCEDURE — 99999 PR PBB SHADOW E&M-EST. PATIENT-LVL III: CPT | Mod: PBBFAC,HCNC,, | Performed by: DERMATOLOGY

## 2020-09-18 PROCEDURE — 1159F MED LIST DOCD IN RCRD: CPT | Mod: HCNC,S$GLB,, | Performed by: NURSE PRACTITIONER

## 2020-09-18 PROCEDURE — 99214 PR OFFICE/OUTPT VISIT, EST, LEVL IV, 30-39 MIN: ICD-10-PCS | Mod: HCNC,S$GLB,, | Performed by: NURSE PRACTITIONER

## 2020-09-18 PROCEDURE — 1126F PR PAIN SEVERITY QUANTIFIED, NO PAIN PRESENT: ICD-10-PCS | Mod: HCNC,S$GLB,, | Performed by: DERMATOLOGY

## 2020-09-18 PROCEDURE — 3078F DIAST BP <80 MM HG: CPT | Mod: HCNC,CPTII,S$GLB, | Performed by: DERMATOLOGY

## 2020-09-18 PROCEDURE — 80053 COMPREHEN METABOLIC PANEL: CPT | Mod: HCNC

## 2020-09-18 PROCEDURE — 3077F SYST BP >= 140 MM HG: CPT | Mod: HCNC,CPTII,S$GLB, | Performed by: DERMATOLOGY

## 2020-09-18 PROCEDURE — 1101F PT FALLS ASSESS-DOCD LE1/YR: CPT | Mod: HCNC,CPTII,S$GLB, | Performed by: DERMATOLOGY

## 2020-09-18 PROCEDURE — 1101F PR PT FALLS ASSESS DOC 0-1 FALLS W/OUT INJ PAST YR: ICD-10-PCS | Mod: HCNC,CPTII,S$GLB, | Performed by: NURSE PRACTITIONER

## 2020-09-18 PROCEDURE — 1126F AMNT PAIN NOTED NONE PRSNT: CPT | Mod: HCNC,S$GLB,, | Performed by: DERMATOLOGY

## 2020-09-18 PROCEDURE — 3008F BODY MASS INDEX DOCD: CPT | Mod: HCNC,CPTII,S$GLB, | Performed by: NURSE PRACTITIONER

## 2020-09-18 PROCEDURE — 99499 RISK ADDL DX/OHS AUDIT: ICD-10-PCS | Mod: HCNC,S$GLB,, | Performed by: NURSE PRACTITIONER

## 2020-09-18 PROCEDURE — 99999 PR PBB SHADOW E&M-EST. PATIENT-LVL V: ICD-10-PCS | Mod: PBBFAC,HCNC,, | Performed by: NURSE PRACTITIONER

## 2020-09-18 PROCEDURE — 99214 OFFICE O/P EST MOD 30 MIN: CPT | Mod: HCNC,S$GLB,, | Performed by: NURSE PRACTITIONER

## 2020-09-18 PROCEDURE — 3077F PR MOST RECENT SYSTOLIC BLOOD PRESSURE >= 140 MM HG: ICD-10-PCS | Mod: HCNC,CPTII,S$GLB, | Performed by: NURSE PRACTITIONER

## 2020-09-18 RX ORDER — TRIAMCINOLONE ACETONIDE 5 MG/G
CREAM TOPICAL 2 TIMES DAILY
Qty: 454 G | Refills: 0 | Status: SHIPPED | OUTPATIENT
Start: 2020-09-18 | End: 2021-02-18

## 2020-09-18 NOTE — PATIENT INSTRUCTIONS
XEROSIS (DRY SKIN)        1. Definition    Xerosis is the term for dry skin.  We all have a natural oil coating over our skin produced by the skin oil glands.  If this oil is removed, the skin becomes dry which can lead to cracking, which can lead to inflammation.  Xerosis is usually a long-term problem that recurs often, especially in the winter.    2. Cause     Long hot baths or showers can remove our natural oil and lead to xerosis.  One should never take more than one bath or shower a day and for no longer than ten minutes.   Use of harsh soaps such as Zest, Dial, and Ivory can worsen and cause xerosis.   Cold winter weather worsens xerosis because the amount of moisture contained in cold air is much less than the amount of moisture in warm air.    3. Treatment     Treatment is intended to restore the natural oil to your skin.  Keep the skin lubricated.     Do not take more than one bath or shower a day.  Use lukewarm water, not hot.  Hot water dries out the skin.     Use a gentle moisturizing soap such as Cetaphil soap, Oil of Olay, Dove, Basis, Ivory moisture care, Restoraderm cleanser.     When toweling dry, dont rub.  Blot the skin so there is still some water left on the skin.  You should apply a moisturizing cream to all of the skin such as Cerave cream, Cetaphil cream, Restoraderm or Eucerin Original Formula cream.   Alpha hydroxyacid lotions, i.e., AmLactin, also work very well for preventing dry skin, but may burn when used on inflamed or reddened skin.     If you like to swim during the winter months, you should not use soap when getting out of the pool.  When you have finished swimming, rinse off the chlorine with cool to warm water.  If this will be the only shower of the day, then you may use Cetaphil or another mild soap to cleanse your skin.  After the shower, apply a moisturizing cream to all of the skin as above.        [address] [place] [address]/ [phone]  [phone] FAX/ [email or web address]

## 2020-09-18 NOTE — PROGRESS NOTES
[facility] Established Patient Visit    Reason for Visit:  cirrhosis    PCP: Ariel Mullins    HPI:  This is a 70 y.o. female with PMH noted below, here for follow up of Well-compensated cirrhosis, likely JOANIE versus GARCIA    Diagnosed with cirrhosis 12/2017 visit based on u/s with nodular liver contour and Fibroscan = F4    Serological workup was negative for Andrew's, alpha-1 antitrypsin deficiency, hemochromatosis, autoimmune etiology (mildly + ARMEN, normal IgG, IgM, ASMA, and AMA), and viral hepatitis.     Risk factors for NAFLD include obesity, DM, HLD, and HTN.    Interval HPI: Presents today alone.  Doing well. No s/s of hepatic decompensation. Drinking margaritas occasionally despite multiple discussions to stop alcohol (beer, wine or liquor) for life. Also did not do labs or US as discussed after last visit, well overdue     Lab Results   Component Value Date    ALT 47 (H) 01/28/2020    AST 48 (H) 01/28/2020    ALKPHOS 74 01/28/2020    BILITOT 1.6 (H) 01/28/2020    ALBUMIN 4.1 01/28/2020    INR 1.0 10/07/2019     12/23/2019     MELD-Na score: 8 at 10/7/2019 11:07 AM  MELD score: 8 at 10/7/2019 11:07 AM  Calculated from:  Serum Creatinine: 1.2 mg/dL at 10/7/2019 11:07 AM  Serum Sodium: 143 mmol/L (Rounded to 137 mmol/L) at 10/7/2019 11:07 AM  Total Bilirubin: 0.6 mg/dL (Rounded to 1 mg/dL) at 10/7/2019 11:07 AM  INR(ratio): 1.0 at 10/7/2019 11:07 AM  Age: 69 years 2 months  MELD to be repeated today     Cirrhosis Health Maintenance:   -- Last EGD 2/2019 no Varices, Due for next EGD 2/2021, order placed, pt instructed to complete before next f/u   -- HCC screening   U/S overdue   AFP  overdue  --+ Immunity to Hep A and B     Denies family history of liver disease. Denies significant alcohol consumption currently or in the past     Denies jaundice, dark urine, abdominal distention, hematemesis, melena, slowed mentation. No abnormal skin rashes. No generalized joint or muscle pain.      PMHX:  has a past medical history of Allergy, Basal cell carcinoma, Cirrhosis of liver without ascites (12/27/2017), Diabetes mellitus, type 2, Endometrial cancer, GERD (gastroesophageal reflux disease), Hyperlipidemia, Hypertension, Joint pain, and Psoriasis.    PSHX:  has a past surgical history that includes Skin cancer destruction; Carpal tunnel release; Oophorectomy; abdominal laproscopic surgery; Appendectomy; Hysterectomy (age 25); Upper gastrointestinal endoscopy; Esophagogastroduodenoscopy (N/A, 2/8/2019); Colonoscopy (N/A, 2/8/2019); and Cholecystectomy (04/2015).    The patient's social and family histories were reviewed by me and updated in the appropriate section of the electronic medical record.    Review of patient's allergies indicates:   Allergen Reactions    Iodine and iodide containing products Hives    Benadryl [diphenhydramine hcl] Anxiety     Pt states she feels jumpy and anxious when taking.    Darvon [propoxyphene] Nausea Only    Shellfish containing products Hives    Lisinopril Other (See Comments)     cough    Propoxyphene hcl      Itchy (skin)^    Tizanidine Other (See Comments)     Sweaty, difficulty swallowing    Statins-hmg-coa reductase inhibitors Anxiety and Other (See Comments)     Myalgia, fatigue, palpitations, anxiety       Current Outpatient Medications on File Prior to Visit   Medication Sig Dispense Refill    allopurinoL (ZYLOPRIM) 300 MG tablet TAKE 1 TABLET(300 MG) BY MOUTH EVERY DAY 90 tablet 1    betamethasone dipropionate (DIPROLENE) 0.05 % cream Apply topically 2 (two) times daily. To affected areas on lower back 45 g 1    bisoproloL-hydrochlorothiazide (ZIAC) 10-6.25 mg per tablet TAKE 1 TABLET BY MOUTH EVERY DAY 90 tablet 0    clobetasol (TEMOVATE) 0.05 % cream APPLY TO AFFECTED AREA NIGHTLY FOR 4 WEEKS THEN EVERY OTHER DAY FOR THE NEXT 2 WEEKS 30 g 0    clotrimazole (LOTRIMIN) 1 % cream APPLY TO THE AFFECTED AREA OF RASH ON BREASTS AND IN FOLDS OF SKIN  AS DIRECTED TWICE DAILY 90 g 1    colchicine (COLCRYS) 0.6 mg tablet Take only as needed for gout flare. Take 1 tablet at onset of gout pain, then 2nd tablet 6 hours later. Take 1 tablet daily after this until flare resolves. 90 tablet 0    cyanocobalamin, vitamin B-12, 2,000 mcg Tab Take 2,000 mcg by mouth once daily. (Patient not taking: Reported on 9/16/2020) 30 tablet 3    indomethacin (INDOCIN) 25 MG capsule TAKE 1 CAPSULE(25 MG) BY MOUTH THREE TIMES DAILY WITH MEALS AS NEEDED FOR GOUT PAIN (Patient not taking: Reported on 9/16/2020) 15 capsule 0    lancets (TRUEPLUS LANCETS) 33 gauge Misc Use one lancet to check sugar daily. 100 each 3    losartan (COZAAR) 50 MG tablet TAKE 1 TABLET(50 MG) BY MOUTH EVERY DAY 90 tablet 1    metFORMIN (GLUCOPHAGE-XR) 500 MG 24 hr tablet Take 1 tablet after breakfast and 2 tablets after dinner every day 270 tablet 3    omeprazole (PRILOSEC) 20 MG capsule TAKE 1 CAPSULE(20 MG) BY MOUTH EVERY DAY 90 capsule 2    pravastatin (PRAVACHOL) 20 MG tablet Take 1 tablet (20 mg total) by mouth once daily. For cholesterol 90 tablet 3    triamcinolone acetonide 0.1% (KENALOG) 0.1 % cream APPLY TO THE AFFECTED AREA UNDER THE BREASTS, IN GROIN, AND BUTTOCKS TWICE DAILY FOR 1 TO 2 WEEKS, THEN AS NEEDED FOR FLARES ONLY 80 g 1    TRUE METRIX GLUCOSE METER [facility] AS DIRECTED 1 each 0    TRUE METRIX GLUCOSE TEST STRIP Strp CHECK SUGAR ONCE DAILY 100 strip 3    varicella-zoster [id number], PF, (SHINGRIX, PF,) 50 mcg/0.5 mL injection Inject into the muscle. (Patient not taking: Reported on 9/16/2020) 0.5 mL 0     No current facility-administered medications on file prior to visit.        SOCIAL HISTORY:   Social History     Tobacco Use   Smoking Status Never Smoker   Smokeless Tobacco Never Used       Social History     Substance and Sexual Activity   Alcohol Use Yes    Comment: occasional dionna, vague about frequency        Social History     Substance and Sexual Activity   Drug Use No  "      ROS:   GENERAL: Denies fever, chills, weight loss/gain, fatigue  HEENT: Denies headaches, dizziness, vision/hearing changes  CARDIOVASCULAR: Denies chest pain, palpitations, or edema  RESPIRATORY: Denies dyspnea, cough  GI: Denies abdominal pain, rectal bleeding, nausea, vomiting. No change in bowel pattern or color  : Denies dysuria, hematuria   SKIN: Denies rash, itching   NEURO: Denies confusion, memory loss, or mood changes  PSYCH: Denies depression or anxiety  HEME/LYMPH: Denies easy bruising or bleeding    Objective Findings:    PHYSICAL EXAM:   Friendly White female, in no acute distress; alert and oriented to person, place and time  VITALS: BP (!) 140/70 (BP Location: Right arm, Patient Position: Sitting, BP Method: Medium (Automatic))   Pulse 65   Ht 4' 11" (1.499 m)   Wt 72.4 kg (159 lb 9.8 oz)   SpO2 97%   BMI 32.24 kg/m²   HENT: Normocephalic, without obvious abnormality. Oral mucosa pink and moist. Dentition good.  EYES: Sclerae anicteric. No conjunctival pallor.   NECK: Supple. No masses or cervical adenopathy.  CARDIOVASCULAR: Regular rate and rhythm. No murmurs.  RESPIRATORY: Normal respiratory effort. BBS CTA. No wheezes or crackles.  GI: Soft, non-tender, non-distended. No hepatosplenomegaly. No masses palpable. No ascites.  EXTREMITIES:  No clubbing, cyanosis or edema.  SKIN: Warm and dry. No jaundice. No rashes noted to exposed skin. No telangectasias noted. No palmar erythema.  NEURO:  Normal gait. No asterixis.  PSYCH:  Memory intact. Thought and speech pattern appropriate. Behavior normal. No depression or anxiety noted.    DIAGNOSTIC STUDIES:  EGD-  Last 2019, next due 2021    ABD. U/S-    10/2019 - needs to be repeated   FINDINGS:  The pancreas is unremarkable.    The aorta and IVC are unremarkable.    Cholecystectomy.    No intrahepatic or extrahepatic biliary dilation.  The common duct measures 0.8 cm in diameter.    The liver is cirrhotic in morphology and measures " approximately 15 cm craniocaudal.  Main portal vein is patent.  No suspicious liver lesion.  No ascites.    The spleen is unremarkable.    The kidneys are unremarkable.      Impression       No acute abnormality.  Since 2019, no significant change or new abnormality       LIVER BIOPSY-  None     FIBROSCAN -   Done 2017  Fibroscan readin.0 KPa  Fibrosis:F4   CAP readin dB/m  Steatosis: :S3     EDUCATION:    The disease process and manifestations of cirrhosis were discussed.    Discussed implications of a cirrhosis diagnosis with HCC screenings and EGD and why it is important for us to properly monitor for potential complications.     Signs and symptoms of hepatic decompensation were reviewed, including jaundice, ascites, and slowed mentation due to hepatic encephalopathy. The patient should seek medical attention if any of these things occur.  We discussed the potential for bleeding from esophageal varices with symptoms of hematemesis and melena. The patient should report to the Emergency Department for these symptoms.    We discussed the increased risk of hepatocellular carcinoma due to cirrhosis. Continued screening every six months with ultrasound and AFP is recommended, discussed with patient.     Cirrhosis Counseling  - strict abstinence of alcohol use (includes beer, wine, and/or liquor)  - avoid non-steroidal anti-inflammatory drugs (NSAIDs) such as ibuprofen, Motrin, naprosyn, Alleve due to the risk of kidney damage  - can take acetaminophen (Tylenol), no more than 2000 mg per day  - low sodium (salt) 2 gram per day diet  - high protein diet: 90 grams per day to prevent muscle mass loss. Recommended at least 1 protein shake daily using Premier Protein shakes    - resistance exercises for muscle strength  - avoid raw seafoods due to the risk of fatal Vibrio vulnificus infection  - ultrasound of the liver every 6 months for liver cancer screening  - Upper endoscopy every 1-2 years to  screen for varices in the stomach and esophagus      ASSESSMENT & PLAN:  70 y.o. White female with:  1.  Cirrhosis, likely JOANIE versus GARCIA  -- cirrhosis per fibroscan and imaging findings of nodular contour   -- MELD-Na score: 8 at 10/7/2019 11:07 AM  MELD score: 8 at 10/7/2019 11:07 AM  Calculated from:  Serum Creatinine: 1.2 mg/dL at 10/7/2019 11:07 AM  Serum Sodium: 143 mmol/L (Rounded to 137 mmol/L) at 10/7/2019 11:07 AM  Total Bilirubin: 0.6 mg/dL (Rounded to 1 mg/dL) at 10/7/2019 11:07 AM  INR(ratio): 1.0 at 10/7/2019 11:07 AM  Age: 69 years 2 months  -- HCC screening: AFP and abd. U/S.. U/S and AFP overdue, - both next due now  -- +Immunity to Hep A and B   -- EGD last 2019. Next EGD Due 2021, order placed, pt instructed to schedule before next visit   --- Serological workup was negative for Andrew's, alpha-1 antitrypsin deficiency, hemochromatosis, autoimmune etiology (mildly + ARMEN, normal IgG, IgM, ASMA, and AMA), and viral hepatitis.   -- Cirrhosis counseling as noted above and discussed with patient. Discussed with patient that do not recommend any elective abdominal surgery due to risk of hepatic decompensation.      2. Fatty liver  -- risk factors for fatty liver: obesity, DM, HLD, and HTN.  Recommend:  1. Weight loss goal of 15-20 lbs  2. Low carb/sugar, high fiber and protein diet  3. Exercise, 5 days per week, 30 minutes per day, as tolerated  4. Recommend good cholesterol, blood pressure, blood sugar levels     3. Alcohol use  -- intermittent  -- again discussed with pt recommendation to stop all alcohol (beer, wine or liquor) for life       Labs today, US soon, then   Follow up in about 6 months (around 3/18/2021). with US and labs before    Patient hesitant to do labs today, initially did not want to schedule although labs can be done quickly in the clinic on her way out from appt. Unclear why patient evasive to schedule, no reason given when asked. Convinced pt to do labs on way out, appt  scheduled, US to be done soon on [facility]     Thank you for allowing me to participate in the care of Tammi SAI Rebolledo, NP-C    CC'ed note to:   Ariel Mullins MD

## 2020-09-18 NOTE — PATIENT INSTRUCTIONS
1. Labs today  2. US soon  3. Then visit with me in March with labs and US same day       Because you have cirrhosis, it is important to attend clinic visits every 6 months with an Ultrasound and blood tests every 6 months to screen for liver cancer (you are at risk of developing liver cancer due to scar tissue in the liver)    Signs and symptoms of worsening liver disease include jaundice, fluid in the belly (ascites), and confusion/disorientation/slowed thought processes due to hepatic encephalopathy (toxins building up because of liver problems).   You should seek medical attention if any of these things occur.    Also, possible bleeding from esophageal varices (blood vessels in the stomach and foodpipe can burst and cause fatal bleeding).  Therefore, if you have symptoms of vomiting blood, blood in your stool, dark or black stools or vomiting coffee ground vomit, YOU SHOULD GO TO THE EMERGENCY ROOM IMMEDIATELY.     Cirrhosis can increase the risk of liver cancer, liver failure, and death. However, we will watch your liver function score (MELD score) closely with each clinic visit. A normal MELD score is 6, highest is 40. Your last one was an 8 (will check again today). We will check this with every clinic visit. A MELD 15 or higher is when we start to consider transplant because MELD 15 or higher indicates that the liver is not functioning as well     Cirrhosis Counseling  - NO alcohol use (includes beer, wine, and/or liquor)  - avoid non-steroidal anti-inflammatory drugs (NSAIDs) such as ibuprofen, Motrin, naprosyn, Alleve due to the risk of kidney damage  - can take acetaminophen (Tylenol), no more than 2000 mg per day  - low sodium (salt) 2 gram per day diet  - high protein diet: 90 grams per day to prevent muscle mass loss. Drink at least 1 protein shake daily (Premier Protein is best option because it is very high protein and low sugar). Ok to use this as nighttime snack to fit it in   - resistance  exercises for muscle strength  - avoid raw seafoods due to the risk of fatal Vibrio vulnificus infection  - ultrasound of the liver every 6 months for liver cancer screening (you are at risk of developing liver cancer due to scar tissue in the liver)  - Upper endoscopy every 1-2 years to screen for varices in the stomach and foodpipe which can burst and cause fatal bleeding
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
## 2020-09-19 NOTE — TELEPHONE ENCOUNTER
No new care gaps identified.  Powered by [employer]. Reference number: [id number]. 9/18/2020 7:49:34 PM CDT

## 2020-09-21 RX ORDER — COLCHICINE 0.6 MG/1
TABLET ORAL
Qty: 90 TABLET | Refills: 1 | Status: SHIPPED | OUTPATIENT
Start: 2020-09-21 | End: 2021-02-18 | Stop reason: SDUPTHER

## 2020-09-21 NOTE — PROGRESS NOTES
Refill Authorization Note   Tammi Farris is requesting a refill authorization.     Brief assessment and rationale for refill: APPROVE: prr          Medication Therapy Plan: CD    Medication reconciliation completed: No                    Comments:      Orders Placed This Encounter    colchicine (COLCRYS) 0.6 mg tablet      Requested Prescriptions   Signed Prescriptions Disp Refills    colchicine (COLCRYS) 0.6 mg tablet 90 tablet 1     Sig: TAKE ONLY AS NEEDED FOR GOUT FLARES- TAKE 1 TABLET AT ONSET OF PAIN THEN 2ND TABLET 6 HOURS LATER. TAKE 1 TABLET DAILY AFTER THIS UNTIL RESOLVES       Endocrinology: Gout Agents - colchicine Failed - 9/21/2020  9:30 AM        Failed - Uric Acid is 5.9 or below and within 360 days     Uric Acid   Date Value Ref Range Status   01/28/2020 6.3 (H) 2.4 - 5.7 mg/dL Final   12/23/2019 10.5 (H) 2.4 - 5.7 mg/dL Final   08/28/2019 6.9 (H) 2.4 - 5.7 mg/dL Final              Passed - Patient is at least 18 years old        Passed - Office visit in past 12 months or future 90 days.     Recent Outpatient Visits            3 days ago Cirrhosis of liver without ascites, unspecified hepatic cirrhosis type    [facility] 1[facility] Fl Breann Rebolledo NP    3 days ago Psoriasis vulgaris    [facility] D[facility]at[facility] 11th Fl Breann Orellana MD    5 days ago Type 2 diabetes mellitus without complication, without long-term current use of insulin    [facility] Ariel Mullnis MD    7 months ago Essential hypertension    [facility] Ariel Mullins MD    9 months ago Encounter for preventive health examination    [facility] Kaitlin Rivero, FNP-C          Future Appointments              In 2 days [facility] OI-US1 MASTER [facility]    In 3 weeks LAB, [facility]    In 5 months MD [facility]ur[facility], [facility] PC[facility]    In 6 months  [facility]-US1 MASTER [facility]    In 6 months LAB, APPOINTMENT [facility] - [facility] Bldg, [facility]    In 6 months Breann Rebolledo, [facility] 1st Fl, [facility]                Passed - Cr is 1.3 or below and within 360 days     Creatinine   Date Value Ref Range Status   09/18/2020 1.2 0.5 - 1.4 mg/dL Final   09/08/2020 1.2 0.5 - 1.4 mg/dL Final   01/28/2020 1.3 0.5 - 1.4 mg/dL Final              Passed - eGFR is 30 or above and within 360 days     eGFR if non    Date Value Ref Range Status   09/18/2020 45.9 (A) >60 mL/min/1.73 m^2 Final     Comment:     Calculation used to obtain the estimated glomerular filtration  rate (eGFR) is the CKD-EPI equation.      09/08/2020 45.9 (A) >60 mL/min/1.73 m^2 Final     Comment:     Calculation used to obtain the estimated glomerular filtration  rate (eGFR) is the CKD-EPI equation.      01/28/2020 42 (A) >60 mL/min/1.73 m^2 Final     Comment:     Calculation used to obtain the estimated glomerular filtration  rate (eGFR) is the CKD-EPI equation.        eGFR if    Date Value Ref Range Status   09/18/2020 52.9 (A) >60 mL/min/1.73 m^2 Final   09/08/2020 52.9 (A) >60 mL/min/1.73 m^2 Final   01/28/2020 48 (A) >60 mL/min/1.73 m^2 Final                  Appointments  past 12m or future 3m with PCP    Date Provider   Last Visit   9/16/2020 Ariel Mullins MD   Next Visit   2/18/2021 Ariel Mullins MD   ED visits in past 90 days: 0     Note composed:11:16 AM 09/21/2020

## 2020-09-23 ENCOUNTER — HOSPITAL ENCOUNTER (OUTPATIENT)
Dept: RADIOLOGY | Facility: HOSPITAL | Age: 70
Discharge: HOME | End: 2020-09-23
Attending: NURSE PRACTITIONER
Payer: MEDICARE

## 2020-09-23 DIAGNOSIS — K74.60 CIRRHOSIS OF LIVER WITHOUT ASCITES, UNSPECIFIED HEPATIC CIRRHOSIS TYPE: ICD-10-CM

## 2020-09-23 PROCEDURE — 76700 US ABDOMEN COMPLETE: ICD-10-PCS | Mod: 26,HCNC,, | Performed by: RADIOLOGY

## 2020-09-23 PROCEDURE — 76700 US EXAM ABDOM COMPLETE: CPT | Mod: 26,HCNC,, | Performed by: RADIOLOGY

## 2020-09-23 PROCEDURE — 76700 US EXAM ABDOM COMPLETE: CPT | Mod: TC,HCNC

## 2020-09-24 ENCOUNTER — PATIENT MESSAGE (OUTPATIENT)
Dept: HEPATOLOGY | Facility: CLINIC | Age: 70
End: 2020-09-24

## 2020-09-25 LAB — PHOSPHATIDYLETHANOL (PETH): NEGATIVE NG/ML

## 2020-09-29 ENCOUNTER — PATIENT MESSAGE (OUTPATIENT)
Dept: OTHER | Facility: OTHER | Age: 70
End: 2020-09-29

## 2020-10-05 ENCOUNTER — PATIENT MESSAGE (OUTPATIENT)
Dept: ADMINISTRATIVE | Facility: HOSPITAL | Age: 70
End: 2020-10-05

## 2020-10-07 DIAGNOSIS — I10 ESSENTIAL HYPERTENSION: ICD-10-CM

## 2020-10-08 NOTE — PROGRESS NOTES
Refill Routing Note   Medication(s) are not appropriate for processing by [facility] for the following reason(s):     - Drug-Disease Interaction ( Elevated blood uric acid level; Interval gout)    [facility] actions taken in this encounter: Defer    .    Medication-related problems identified: Drug-disease interaction  Medication Therapy Plan: DDzi( Elevated blood uric acid level; Interval gout); BP-ELEVATED(140/70) NOT TO [facility] STANDARDS ABNORMAL VITALS, PLEASE ADVISE DEFER TO YOU  Medication reconciliation completed: No   Automatic Epic Generated Protocol Data:        Requested Prescriptions   Pending Prescriptions Disp Refills    bisoproloL-hydrochlorothiazide (ZIAC) 10-6.25 mg per tablet [Pharmacy Med Name: BISOPROLOL/HCTZ 10MG/6.25MG TABS] 90 tablet 3     Sig: TAKE 1 TABLET BY MOUTH EVERY DAY       Cardiovascular: Beta Blocker + Diuretic Combos Failed - 10/7/2020  8:19 PM        Failed - Last BP in normal range within 360 days     BP Readings from Last 3 Encounters:   09/18/20 (!) 140/70   09/16/20 128/66   01/28/20 118/70              Passed - Patient is at least 18 years old        Passed - Last Heart Rate in normal range within 360 days     Pulse Readings from Last 3 Encounters:   09/18/20 65   09/16/20 53   01/28/20 70             Passed - Office Visit within last 12 months or future 90 days.     Recent Outpatient Visits            2 weeks ago Cirrhosis of liver without ascites, unspecified hepatic cirrhosis type    [facility] - Transplant 1st Fl Breann Rebolledo NP    2 weeks ago Psoriasis vulgaris    [facility] - Dermatology 11th Fl Breann Orellana MD    3 weeks ago Type 2 diabetes mellitus without complication, without long-term current use of insulin    [facility] Ariel Mullins MD    8 months ago Essential hypertension    [facility] Ariel Mullins MD    10 months ago Encounter for preventive health examination    [place] - [facility] Medicine  Kaitlin Rivero, AKASH-C          Future Appointments              In 5 days LAB, [facility]    In 4 months MD [facility]namurthy [facility] Med Primary Care Bldg, [facility] PCW    In 5 months [facility]-US[facility], Imaging Ctr    In 5 months LAB, APPOINTMENT [facility] Lab - Primary Care Bldg, Omar [facility] PCW    In 5 months Breann Rebolledo, [facility] - Transplant 1st Fl, [facility]                Passed - K in normal range and within 180 days     Potassium   Date Value Ref Range Status   09/18/2020 4.0 3.5 - 5.1 mmol/L Final   09/08/2020 4.7 3.5 - 5.1 mmol/L Final   01/28/2020 4.4 3.5 - 5.1 mmol/L Final              Passed - Na is between 130 and 148 and within 180 days     Sodium   Date Value Ref Range Status   09/18/2020 139 136 - 145 mmol/L Final   09/08/2020 137 136 - 145 mmol/L Final   01/28/2020 138 136 - 145 mmol/L Final              Passed - Cr is 1.3 or below and within 180 days     Creatinine   Date Value Ref Range Status   09/18/2020 1.2 0.5 - 1.4 mg/dL Final   09/08/2020 1.2 0.5 - 1.4 mg/dL Final   01/28/2020 1.3 0.5 - 1.4 mg/dL Final              Passed - eGFR within 180 days     eGFR if non    Date Value Ref Range Status   09/18/2020 45.9 (A) >60 mL/min/1.73 m^2 Final     Comment:     Calculation used to obtain the estimated glomerular filtration  rate (eGFR) is the CKD-EPI equation.      09/08/2020 45.9 (A) >60 mL/min/1.73 m^2 Final     Comment:     Calculation used to obtain the estimated glomerular filtration  rate (eGFR) is the CKD-EPI equation.      01/28/2020 42 (A) >60 mL/min/1.73 m^2 Final     Comment:     Calculation used to obtain the estimated glomerular filtration  rate (eGFR) is the CKD-EPI equation.        eGFR if    Date Value Ref Range Status   09/18/2020 52.9 (A) >60 mL/min/1.73 m^2 Final   09/08/2020 52.9 (A) >60 mL/min/1.73 m^2 Final   01/28/2020 48 (A) >60 mL/min/1.73 m^2  Final                    Appointments  past 12m or future 3m with PCP    Date Provider   Last Visit   9/16/2020 Ariel Mullins MD   Next Visit   2/18/2021 Ariel Mullins MD   ED visits in past 90 days: 0        Note composed:6:17 PM 10/08/2020        Digital Hypertension Data: values will display if enrolled   Last 5 Patient Entered Readings                                      Current 30 Day Average:      There is no flowsheet data to display.             Patient Enrolled in Digital Medicine at this time no

## 2020-10-08 NOTE — TELEPHONE ENCOUNTER
No new care gaps identified.  Powered by [employer]. Reference number: [id number]. 10/07/2020 8:20:28 PM   CDT

## 2020-10-09 RX ORDER — BISOPROLOL FUMARATE AND HYDROCHLOROTHIAZIDE 10; 6.25 MG/1; MG/1
TABLET ORAL
Qty: 90 TABLET | Refills: 3 | Status: SHIPPED | OUTPATIENT
Start: 2020-10-09 | End: 2021-01-01 | Stop reason: SDUPTHER

## 2020-10-13 ENCOUNTER — LAB VISIT (OUTPATIENT)
Dept: LAB | Facility: HOSPITAL | Age: 70
End: 2020-10-13
Attending: INTERNAL MEDICINE
Payer: MEDICARE

## 2020-10-13 DIAGNOSIS — D64.9 ANEMIA, UNSPECIFIED TYPE: ICD-10-CM

## 2020-10-13 DIAGNOSIS — K76.0 NAFLD (NONALCOHOLIC FATTY LIVER DISEASE): ICD-10-CM

## 2020-10-13 DIAGNOSIS — E53.8 LOW SERUM VITAMIN B12: ICD-10-CM

## 2020-10-13 DIAGNOSIS — E11.9 TYPE 2 DIABETES MELLITUS WITHOUT COMPLICATION, WITHOUT LONG-TERM CURRENT USE OF INSULIN: ICD-10-CM

## 2020-10-13 DIAGNOSIS — M10.9 INTERVAL GOUT: ICD-10-CM

## 2020-10-13 DIAGNOSIS — K74.60 CIRRHOSIS OF LIVER WITHOUT ASCITES, UNSPECIFIED HEPATIC CIRRHOSIS TYPE: ICD-10-CM

## 2020-10-13 DIAGNOSIS — E78.2 MIXED HYPERLIPIDEMIA: ICD-10-CM

## 2020-10-13 LAB
CHOLEST SERPL-MCNC: 161 MG/DL (ref 120–199)
CHOLEST/HDLC SERPL: 5.8 {RATIO} (ref 2–5)
ERYTHROCYTE [DISTWIDTH] IN BLOOD BY AUTOMATED COUNT: 15.1 % (ref 11.5–14.5)
FERRITIN SERPL-MCNC: 87 NG/ML (ref 20–300)
HCT VFR BLD AUTO: 33.5 % (ref 37–48.5)
HDLC SERPL-MCNC: 28 MG/DL (ref 40–75)
HDLC SERPL: 17.4 % (ref 20–50)
HGB BLD-MCNC: 10.5 G/DL (ref 12–16)
IRON SERPL-MCNC: 81 UG/DL (ref 30–160)
LDLC SERPL CALC-MCNC: 95.2 MG/DL (ref 63–159)
MCH RBC QN AUTO: 32.6 PG (ref 27–31)
MCHC RBC AUTO-ENTMCNC: 31.3 G/DL (ref 32–36)
MCV RBC AUTO: 104 FL (ref 82–98)
NONHDLC SERPL-MCNC: 133 MG/DL
PLATELET # BLD AUTO: 138 K/UL (ref 150–350)
PMV BLD AUTO: ABNORMAL FL (ref 9.2–12.9)
RBC # BLD AUTO: 3.22 M/UL (ref 4–5.4)
SATURATED IRON: 18 % (ref 20–50)
TOTAL IRON BINDING CAPACITY: 459 UG/DL (ref 250–450)
TRANSFERRIN SERPL-MCNC: 310 MG/DL (ref 200–375)
TRIGL SERPL-MCNC: 189 MG/DL (ref 30–150)
URATE SERPL-MCNC: 4.3 MG/DL (ref 2.4–5.7)
VIT B12 SERPL-MCNC: >2000 PG/ML (ref 210–950)
WBC # BLD AUTO: 8.88 K/UL (ref 3.9–12.7)

## 2020-10-13 PROCEDURE — 84550 ASSAY OF BLOOD/URIC ACID: CPT | Mod: HCNC

## 2020-10-13 PROCEDURE — 85027 COMPLETE CBC AUTOMATED: CPT | Mod: HCNC

## 2020-10-13 PROCEDURE — 80061 LIPID PANEL: CPT | Mod: HCNC

## 2020-10-13 PROCEDURE — 82728 ASSAY OF FERRITIN: CPT | Mod: HCNC

## 2020-10-13 PROCEDURE — 82607 VITAMIN B-12: CPT | Mod: HCNC

## 2020-10-13 PROCEDURE — 83540 ASSAY OF IRON: CPT | Mod: HCNC

## 2020-10-13 PROCEDURE — 36415 COLL VENOUS BLD VENIPUNCTURE: CPT | Mod: HCNC,PO

## 2020-10-15 ENCOUNTER — OFFICE VISIT (OUTPATIENT)
Dept: PODIATRY | Facility: CLINIC | Age: 70
End: 2020-10-15
Payer: MEDICARE

## 2020-10-15 ENCOUNTER — APPOINTMENT (OUTPATIENT)
Dept: RADIOLOGY | Facility: OTHER | Age: 70
End: 2020-10-15
Attending: STUDENT IN AN ORGANIZED HEALTH CARE EDUCATION/TRAINING PROGRAM
Payer: MEDICARE

## 2020-10-15 VITALS
HEART RATE: 67 BPM | HEIGHT: 59 IN | DIASTOLIC BLOOD PRESSURE: 66 MMHG | BODY MASS INDEX: 32.05 KG/M2 | WEIGHT: 159 LBS | SYSTOLIC BLOOD PRESSURE: 148 MMHG

## 2020-10-15 DIAGNOSIS — M79.672 FOOT PAIN, LEFT: ICD-10-CM

## 2020-10-15 DIAGNOSIS — M10.00 ACUTE IDIOPATHIC GOUT, UNSPECIFIED SITE: ICD-10-CM

## 2020-10-15 DIAGNOSIS — M1A.0720 CHRONIC GOUT OF LEFT FOOT, UNSPECIFIED CAUSE: Primary | ICD-10-CM

## 2020-10-15 DIAGNOSIS — M1A.0720 CHRONIC GOUT OF LEFT FOOT, UNSPECIFIED CAUSE: ICD-10-CM

## 2020-10-15 PROCEDURE — 20600 DRAIN/INJ JOINT/BURSA W/O US: CPT | Mod: 59,HCNC,LT,S$GLB | Performed by: PODIATRIST

## 2020-10-15 PROCEDURE — 29540 STRAPPING ANKLE &/FOOT: CPT | Mod: HCNC,LT,S$GLB, | Performed by: PODIATRIST

## 2020-10-15 PROCEDURE — 3078F DIAST BP <80 MM HG: CPT | Mod: HCNC,CPTII,S$GLB, | Performed by: PODIATRIST

## 2020-10-15 PROCEDURE — 3077F SYST BP >= 140 MM HG: CPT | Mod: HCNC,CPTII,S$GLB, | Performed by: PODIATRIST

## 2020-10-15 PROCEDURE — 3008F BODY MASS INDEX DOCD: CPT | Mod: HCNC,CPTII,S$GLB, | Performed by: PODIATRIST

## 2020-10-15 PROCEDURE — 99999 PR PBB SHADOW E&M-EST. PATIENT-LVL IV: ICD-10-PCS | Mod: PBBFAC,HCNC,, | Performed by: PODIATRIST

## 2020-10-15 PROCEDURE — 20600 PR DRAIN/INJECT SMALL JOINT/BURSA: ICD-10-PCS | Mod: 59,HCNC,LT,S$GLB | Performed by: PODIATRIST

## 2020-10-15 PROCEDURE — 1125F AMNT PAIN NOTED PAIN PRSNT: CPT | Mod: HCNC,S$GLB,, | Performed by: PODIATRIST

## 2020-10-15 PROCEDURE — 73630 XR FOOT COMPLETE 3 VIEW LEFT: ICD-10-PCS | Mod: 26,HCNC,LT, | Performed by: RADIOLOGY

## 2020-10-15 PROCEDURE — 99999 PR PBB SHADOW E&M-EST. PATIENT-LVL IV: CPT | Mod: PBBFAC,HCNC,, | Performed by: PODIATRIST

## 2020-10-15 PROCEDURE — 3078F PR MOST RECENT DIASTOLIC BLOOD PRESSURE < 80 MM HG: ICD-10-PCS | Mod: HCNC,CPTII,S$GLB, | Performed by: PODIATRIST

## 2020-10-15 PROCEDURE — 1159F MED LIST DOCD IN RCRD: CPT | Mod: HCNC,S$GLB,, | Performed by: PODIATRIST

## 2020-10-15 PROCEDURE — 99203 PR OFFICE/OUTPT VISIT, NEW, LEVL III, 30-44 MIN: ICD-10-PCS | Mod: 25,HCNC,S$GLB, | Performed by: PODIATRIST

## 2020-10-15 PROCEDURE — 1101F PR PT FALLS ASSESS DOC 0-1 FALLS W/OUT INJ PAST YR: ICD-10-PCS | Mod: HCNC,CPTII,S$GLB, | Performed by: PODIATRIST

## 2020-10-15 PROCEDURE — 73630 X-RAY EXAM OF FOOT: CPT | Mod: TC,HCNC,LT

## 2020-10-15 PROCEDURE — 29540 PR STRAPPING; ANKLE &/OR FOOT: ICD-10-PCS | Mod: HCNC,LT,S$GLB, | Performed by: PODIATRIST

## 2020-10-15 PROCEDURE — 99203 OFFICE O/P NEW LOW 30 MIN: CPT | Mod: 25,HCNC,S$GLB, | Performed by: PODIATRIST

## 2020-10-15 PROCEDURE — 1125F PR PAIN SEVERITY QUANTIFIED, PAIN PRESENT: ICD-10-PCS | Mod: HCNC,S$GLB,, | Performed by: PODIATRIST

## 2020-10-15 PROCEDURE — 73630 X-RAY EXAM OF FOOT: CPT | Mod: 26,HCNC,LT, | Performed by: RADIOLOGY

## 2020-10-15 PROCEDURE — 1159F PR MEDICATION LIST DOCUMENTED IN MEDICAL RECORD: ICD-10-PCS | Mod: HCNC,S$GLB,, | Performed by: PODIATRIST

## 2020-10-15 PROCEDURE — 1101F PT FALLS ASSESS-DOCD LE1/YR: CPT | Mod: HCNC,CPTII,S$GLB, | Performed by: PODIATRIST

## 2020-10-15 PROCEDURE — 3077F PR MOST RECENT SYSTOLIC BLOOD PRESSURE >= 140 MM HG: ICD-10-PCS | Mod: HCNC,CPTII,S$GLB, | Performed by: PODIATRIST

## 2020-10-15 PROCEDURE — 3008F PR BODY MASS INDEX (BMI) DOCUMENTED: ICD-10-PCS | Mod: HCNC,CPTII,S$GLB, | Performed by: PODIATRIST

## 2020-10-15 RX ORDER — LIDOCAINE HYDROCHLORIDE 20 MG/ML
JELLY TOPICAL
Qty: 30 ML | Refills: 2 | Status: SHIPPED | OUTPATIENT
Start: 2020-10-15 | End: 2021-01-01

## 2020-10-15 RX ADMIN — TRIAMCINOLONE ACETONIDE 40 MG: 40 INJECTION, SUSPENSION INTRA-ARTICULAR; INTRAMUSCULAR at 12:10

## 2020-10-15 RX ADMIN — DEXAMETHASONE SODIUM PHOSPHATE 4 MG: 4 INJECTION, SOLUTION INTRA-ARTICULAR; INTRALESIONAL; INTRAMUSCULAR; INTRAVENOUS; SOFT TISSUE at 12:10

## 2020-10-15 NOTE — PROGRESS NOTES
Subjective:      Patient ID: Tammi Farris is a 70 y.o. female.    Chief Complaint: Foot Pain (Left Foot)    Patient presents to clinic with chief complaint of left big toe joint pain. Symptoms were gradual and started approximately 1 month ago and progressively worsening in the past few weeks. She also noticed worsening swelling. She has a history of gout in her left foot and has been on colchicine and allopurinol prescribed to her by her primary care doctor for approximately 1 month with little improvement. Otherwise no prior treatments.  Pain is intermittent, but rates pain as 10/10 at its worst. Aggravated by weightbearing, shoe gear, pressure, and relieved with rest. She denies any recent trauma or injury to her feet. No history of foot surgeries.     Review of Systems   Constitution: Negative for chills, diaphoresis, fever and malaise/fatigue.   HENT: Negative for hearing loss.    Cardiovascular: Negative for claudication and leg swelling.   Respiratory: Negative for cough, shortness of breath and wheezing.    Hematologic/Lymphatic: Negative for bleeding problem. Does not bruise/bleed easily.   Skin: Negative for color change, dry skin, flushing, nail changes, poor wound healing, rash, suspicious lesions and unusual hair distribution.   Musculoskeletal: Positive for gout, joint pain, joint swelling and myalgias. Negative for muscle cramps and muscle weakness.        L great toe joint pain   Gastrointestinal: Negative for nausea and vomiting.   Neurological: Negative for focal weakness, loss of balance, numbness, paresthesias, sensory change and weakness.   Psychiatric/Behavioral: Negative for altered mental status.           Objective:      Physical Exam  Constitutional:       General: She is not in acute distress.     Appearance: Normal appearance.   Cardiovascular:      Pulses:           Dorsalis pedis pulses are 2+ on the right side and 2+ on the left side.        Posterior tibial pulses are 2+ on the  right side and 2+ on the left side.      Comments:  3 sec capillary refill time x 10 digits. Toes and feet are warm to touch b/l. There is normal hair growth on the feet and toes b/l.  No spider veins or varicosities present b/l.     Musculoskeletal: Normal range of motion.         General: Swelling and tenderness present. No deformity or signs of injury.      Comments: Mild edema and tenderness to palpation to left 1st MTPJ joint. Mild pain with 1st MTPJ ROM, left foot with signs of trauma or infection.    Otherwise, Normal angle, base, station of gait. All ten toes without clubbing, cyanosis, or signs of ischemia. Range of motion, stability, muscle strength, and muscle tone normal bilateral feet and legs.       Lymphadenopathy:      Comments: Negative lymphadenopathy bilateral popliteal fossa and tarsal tunnel.    Negative lymphangitic streaking bilateral feet/ankles/legs.      Skin:     General: Skin is warm and dry.      Capillary Refill: Capillary refill takes 2 to 3 seconds.      Findings: No erythema or lesion.      Comments: Skin is normal age and health appropriate color, turgor, texture, and temperature bilateral lower extremities without ulceration, hyperpigmentation, discoloration, masses nodules or cords palpated.  No ecchymosis, erythema or cardinal signs of infection bilateral lower extremities.     Neurological:      General: No focal deficit present.      Mental Status: She is alert and oriented to person, place, and time.      Sensory: No sensory deficit.      Motor: No weakness.      Comments: Negative tinel sign to percussion sural, superficial peroneal, deep peroneal, saphenous, and posterior tibial nerves right and left ankles and feet.      Psychiatric:         Mood and Affect: Mood normal.         Thought Content: Thought content normal.               Assessment:       Encounter Diagnoses   Name Primary?    Chronic gout of left foot, unspecified cause Yes    Acute idiopathic gout,  unspecified site     Foot pain, left          Plan:       Tammi was seen today for foot pain.    Diagnoses and all orders for this visit:    Chronic gout of left foot, unspecified cause  -     X-Ray Foot Complete 3 view Left; Future  -     Basic Metabolic Panel; Future  -     CBC auto differential; Future  -     C-Reactive Protein; Future  -     Sedimentation rate; Future  -     Uric Acid; Future    Acute idiopathic gout, unspecified site    Foot pain, left    Other orders  -     lidocaine HCL 2% (XYLOCAINE) 2 % jelly; Apply topically as needed. Apply topically once nightly to affected part of foot/feet.      I counseled the patient on her conditions, their implications and medical management.    I applied a plantar rest strapping to the patient's left foot to offload symptomatic area, support the arch, and relieve pain.      The nature of gout is fully explained, including dietary relationship, acute and interval phase and treatment of both. Long term complications such as kidney stones, tophi and arthritis are discussed. Avoidance of alcohol recommended along with a low purine diet.     Continue allopurinol and colchicine prescribed by her PCP.    Ordered baseline left foot xrays.    Ordered CBC, BMP, uric acid, ESR, CRP.     Adequately hydrate up to about 4 liters water unless on fluid restriction.    Discussed conservative care of acute gout attack in first metatarsal phalangeal joint left foot. Patient requesting injection today. After verbal consent was obtained, skin was prepped with alcohol. Injected 4 mg dexamethasone into first metatarsal phalangeal joint left foot. Patient tolerated the procedure well.    Return to clinic in 2 weeks (around 10/29/2020) for follow up.      Patient seen and assisted by resident Naomiesabas Morel PGY2 under the direct supervision of attending Dr. Wood.

## 2020-10-19 DIAGNOSIS — E11.9 TYPE 2 DIABETES MELLITUS WITHOUT COMPLICATION, WITHOUT LONG-TERM CURRENT USE OF INSULIN: ICD-10-CM

## 2020-10-19 RX ORDER — TRIAMCINOLONE ACETONIDE 40 MG/ML
40 INJECTION, SUSPENSION INTRA-ARTICULAR; INTRAMUSCULAR
Status: COMPLETED | OUTPATIENT
Start: 2020-10-19 | End: 2020-10-15

## 2020-10-19 RX ORDER — DEXAMETHASONE SODIUM PHOSPHATE 4 MG/ML
4 INJECTION, SOLUTION INTRA-ARTICULAR; INTRALESIONAL; INTRAMUSCULAR; INTRAVENOUS; SOFT TISSUE
Status: COMPLETED | OUTPATIENT
Start: 2020-10-19 | End: 2020-10-15

## 2020-10-19 RX ORDER — CALCIUM CITRATE/VITAMIN D3 200MG-6.25
TABLET ORAL
Qty: 100 STRIP | Refills: 3 | Status: SHIPPED | OUTPATIENT
Start: 2020-10-19 | End: 2021-01-12

## 2020-10-19 NOTE — PROGRESS NOTES
Refill Authorization Note   Tammi Farris is requesting a refill authorization.  Brief assessment and rationale for refill: Approve     Medication Therapy Plan: CDMR.     Medication reconciliation completed: No   Comments:   Orders Placed This Encounter    TRUE METRIX GLUCOSE TEST STRIP Strp      Requested Prescriptions   Signed Prescriptions Disp Refills    TRUE METRIX GLUCOSE TEST STRIP Strp 100 strip 3     Sig: USE TO CHECK SUGAR ONCE DAILY AS DIRECTED       Endocrinology: Diabetes - Supplies Passed - 10/19/2020  3:52 AM        Passed - Patient is at least 18 years old        Passed - Office Visit within last 12 months or future 90 days.     Recent Outpatient Visits            4 days ago Chronic gout of left foot, unspecified cause    [place] - Podiatry Gilbert Wood DPM    1 month ago Cirrhosis of liver without ascites, unspecified hepatic cirrhosis type    [facility] - Transplant 1st Fl Breann Rebolledo NP    1 month ago Psoriasis vulgaris    [facility] - Dermatology 11th Fl Breann Orellana MD    1 month ago Type 2 diabetes mellitus without complication, without long-term current use of insulin    [facility] Ariel Mullins MD    8 months ago Essential hypertension    [facility] Ariel Mullins MD          Future Appointments              In 1 week Gilbert Wood DPM [place] - Podiatry, [place]    In 4 months MD [facility] Krishnamurthy lexie [facility]    In 5 months [facility] OI-US1 MASTER [facility], Imaging Ctr    In 5 months [facility]    In 5 months [facility] - Transplant 1st Fl, [facility]                Passed - HBA1C is 7.9 or below and within 180 days     Hemoglobin A1C   Date Value Ref Range Status   09/08/2020 6.6 (H) 4.0 - 5.6 % Final     Comment:     ADA Screening Guidelines:  5.7-6.4%  Consistent with  prediabetes  >or=6.5%  Consistent with diabetes  High levels of fetal hemoglobin interfere with the HbA1C  assay. Heterozygous hemoglobin variants (HbS, HgC, etc)do  not significantly interfere with this assay.   However, presence of multiple variants may affect accuracy.     01/28/2020 6.1 (H) 4.0 - 5.6 % Final     Comment:     ADA Screening Guidelines:  5.7-6.4%  Consistent with prediabetes  >or=6.5%  Consistent with diabetes  High levels of fetal hemoglobin interfere with the HbA1C  assay. Heterozygous hemoglobin variants (HbS, HgC, etc)do  not significantly interfere with this assay.   However, presence of multiple variants may affect accuracy.     08/28/2019 6.7 (H) 4.0 - 5.6 % Final     Comment:     ADA Screening Guidelines:  5.7-6.4%  Consistent with prediabetes  >or=6.5%  Consistent with diabetes  High levels of fetal hemoglobin interfere with the HbA1C  assay. Heterozygous hemoglobin variants (HbS, HgC, etc)do  not significantly interfere with this assay.   However, presence of multiple variants may affect accuracy.                    Appointments  past 12m or future 3m with PCP    Date Provider   Last Visit   9/16/2020 Ariel Mullins MD   Next Visit   2/18/2021 Ariel Mullins MD   ED visits in past 90 days: 0     Note composed:3:04 PM 10/19/2020

## 2020-10-19 NOTE — TELEPHONE ENCOUNTER
No new care gaps identified.  Powered by [employer]. Reference number: [id number]. 10/19/2020 3:52:45 AM   NIKKIT

## 2020-10-27 ENCOUNTER — PES CALL (OUTPATIENT)
Dept: ADMINISTRATIVE | Facility: CLINIC | Age: 70
End: 2020-10-27

## 2020-10-29 ENCOUNTER — TELEPHONE (OUTPATIENT)
Dept: PODIATRY | Facility: CLINIC | Age: 70
End: 2020-10-29

## 2020-10-29 NOTE — TELEPHONE ENCOUNTER
Left voice message for pt to give the office a call back at [phone].  Called pt to help get her get rescheduled, there's no power in the clinic today.

## 2020-11-04 ENCOUNTER — OFFICE VISIT (OUTPATIENT)
Dept: PODIATRY | Facility: CLINIC | Age: 70
End: 2020-11-04
Payer: MEDICARE

## 2020-11-04 VITALS
HEART RATE: 64 BPM | DIASTOLIC BLOOD PRESSURE: 60 MMHG | HEIGHT: 59 IN | SYSTOLIC BLOOD PRESSURE: 144 MMHG | BODY MASS INDEX: 31.65 KG/M2 | WEIGHT: 157 LBS

## 2020-11-04 DIAGNOSIS — M1A.0720 CHRONIC GOUT OF LEFT FOOT, UNSPECIFIED CAUSE: Primary | ICD-10-CM

## 2020-11-04 DIAGNOSIS — M79.672 FOOT PAIN, LEFT: ICD-10-CM

## 2020-11-04 DIAGNOSIS — M10.00 ACUTE IDIOPATHIC GOUT, UNSPECIFIED SITE: ICD-10-CM

## 2020-11-04 PROCEDURE — 99213 OFFICE O/P EST LOW 20 MIN: CPT | Mod: HCNC,S$GLB,, | Performed by: PODIATRIST

## 2020-11-04 PROCEDURE — 99213 PR OFFICE/OUTPT VISIT, EST, LEVL III, 20-29 MIN: ICD-10-PCS | Mod: HCNC,S$GLB,, | Performed by: PODIATRIST

## 2020-11-04 PROCEDURE — 99999 PR PBB SHADOW E&M-EST. PATIENT-LVL IV: ICD-10-PCS | Mod: PBBFAC,HCNC,, | Performed by: PODIATRIST

## 2020-11-04 PROCEDURE — 3077F SYST BP >= 140 MM HG: CPT | Mod: HCNC,CPTII,S$GLB, | Performed by: PODIATRIST

## 2020-11-04 PROCEDURE — 99999 PR PBB SHADOW E&M-EST. PATIENT-LVL IV: CPT | Mod: PBBFAC,HCNC,, | Performed by: PODIATRIST

## 2020-11-04 PROCEDURE — 1101F PR PT FALLS ASSESS DOC 0-1 FALLS W/OUT INJ PAST YR: ICD-10-PCS | Mod: HCNC,CPTII,S$GLB, | Performed by: PODIATRIST

## 2020-11-04 PROCEDURE — 1101F PT FALLS ASSESS-DOCD LE1/YR: CPT | Mod: HCNC,CPTII,S$GLB, | Performed by: PODIATRIST

## 2020-11-04 PROCEDURE — 3008F PR BODY MASS INDEX (BMI) DOCUMENTED: ICD-10-PCS | Mod: HCNC,CPTII,S$GLB, | Performed by: PODIATRIST

## 2020-11-04 PROCEDURE — 1126F AMNT PAIN NOTED NONE PRSNT: CPT | Mod: HCNC,S$GLB,, | Performed by: PODIATRIST

## 2020-11-04 PROCEDURE — 3078F DIAST BP <80 MM HG: CPT | Mod: HCNC,CPTII,S$GLB, | Performed by: PODIATRIST

## 2020-11-04 PROCEDURE — 1126F PR PAIN SEVERITY QUANTIFIED, NO PAIN PRESENT: ICD-10-PCS | Mod: HCNC,S$GLB,, | Performed by: PODIATRIST

## 2020-11-04 PROCEDURE — 3077F PR MOST RECENT SYSTOLIC BLOOD PRESSURE >= 140 MM HG: ICD-10-PCS | Mod: HCNC,CPTII,S$GLB, | Performed by: PODIATRIST

## 2020-11-04 PROCEDURE — 3078F PR MOST RECENT DIASTOLIC BLOOD PRESSURE < 80 MM HG: ICD-10-PCS | Mod: HCNC,CPTII,S$GLB, | Performed by: PODIATRIST

## 2020-11-04 PROCEDURE — 99499 RISK ADDL DX/OHS AUDIT: ICD-10-PCS | Mod: HCNC,S$GLB,, | Performed by: PODIATRIST

## 2020-11-04 PROCEDURE — 3008F BODY MASS INDEX DOCD: CPT | Mod: HCNC,CPTII,S$GLB, | Performed by: PODIATRIST

## 2020-11-04 PROCEDURE — 1159F MED LIST DOCD IN RCRD: CPT | Mod: HCNC,S$GLB,, | Performed by: PODIATRIST

## 2020-11-04 PROCEDURE — 1159F PR MEDICATION LIST DOCUMENTED IN MEDICAL RECORD: ICD-10-PCS | Mod: HCNC,S$GLB,, | Performed by: PODIATRIST

## 2020-11-04 PROCEDURE — 99499 UNLISTED E&M SERVICE: CPT | Mod: HCNC,S$GLB,, | Performed by: PODIATRIST

## 2020-11-04 NOTE — PROGRESS NOTES
Subjective:      Patient ID: Tammi Farris is a 70 y.o. female.    Chief Complaint: Follow-up (Gout left foot)    Severe pain, redness, heat, swelling left 1st mtpj.  Gradual worsening from baseline past month, improved very well with injection.  Ambulating in shoes today without pain.    CC2 Diabetes, increased risk amputation needing evaluation/management/optomization of foot care.     Chief Complaint   Patient presents with    Follow-up     Gout left foot    last PCP visit DM - 9/16/2020    Review of Systems   Constitution: Negative for chills, diaphoresis, fever, malaise/fatigue and night sweats.   Cardiovascular: Negative for claudication, cyanosis, leg swelling and syncope.   Skin: Negative for color change, dry skin, nail changes, rash, suspicious lesions and unusual hair distribution.   Musculoskeletal: Positive for gout and joint pain. Negative for falls, joint swelling, muscle cramps, muscle weakness and stiffness.   Gastrointestinal: Negative for constipation, diarrhea, nausea and vomiting.   Neurological: Positive for sensory change. Negative for brief paralysis, disturbances in coordination, focal weakness, numbness, paresthesias and tremors.           Objective:      Physical Exam  Constitutional:       General: She is not in acute distress.     Appearance: She is well-developed. She is not diaphoretic.   Cardiovascular:      Pulses:           Popliteal pulses are 2+ on the right side and 2+ on the left side.        Dorsalis pedis pulses are 2+ on the right side and 2+ on the left side.        Posterior tibial pulses are 2+ on the right side and 2+ on the left side.      Comments: Capillary refill 3 seconds all toes/distal feet, all toes/both feet warm to touch.      Negative lymphadenopathy bilateral popliteal fossa and tarsal tunnel.      Negavie lower extremity edema bilateral.    Musculoskeletal:      Right ankle: She exhibits normal range of motion, no swelling, no ecchymosis, no deformity, no  laceration and normal pulse. Achilles tendon normal. Achilles tendon exhibits no pain, no defect and normal Rodas's test results.      Comments: No pain to palpation/range of motion left 1st mtpj.    Normal angle, base, station of gait. All ten toes without clubbing, cyanosis, or signs of ischemia.  No pain to palpation bilateral lower extremities.  Range of motion, stability, muscle strength, and muscle tone normal bilateral feet and legs.    Lymphadenopathy:      Lower Body: No right inguinal adenopathy. No left inguinal adenopathy.      Comments: Negative lymphadenopathy bilateral popliteal fossa and tarsal tunnel.    Negative lymphangitic streaking bilateral feet/ankles/legs.   Skin:     General: Skin is warm and dry.      Capillary Refill: Capillary refill takes 2 to 3 seconds.      Coloration: Skin is not pale.      Findings: No abrasion, bruising, burn, ecchymosis, erythema, laceration, lesion or rash.      Nails: There is no clubbing.        Comments:   Skin is normal age and health appropriate color, turgor, texture, and temperature bilateral lower extremities without ulceration, hyperpigmentation, discoloration, masses nodules or cords palpated.  No ecchymosis, erythema, edema, or cardinal signs of infection bilateral lower extremities.    Nails normal color and trophic qualities.    Neurological:      Mental Status: She is alert and oriented to person, place, and time.      Sensory: No sensory deficit.      Motor: No tremor, atrophy or abnormal muscle tone.      Gait: Gait normal.      Deep Tendon Reflexes:      Reflex Scores:       Patellar reflexes are 2+ on the right side and 2+ on the left side.       Achilles reflexes are 2+ on the right side and 2+ on the left side.     Comments: Negative tinel sign to percussion sural, superficial peroneal, deep peroneal, saphenous, and posterior tibial nerves right and left ankles and feet.       Psychiatric:         Behavior: Behavior is cooperative.                Assessment:       Encounter Diagnoses   Name Primary?    Chronic gout of left foot, unspecified cause Yes    Acute idiopathic gout, unspecified site     Foot pain, left          Plan:       Tammi was seen today for follow-up.    Diagnoses and all orders for this visit:    Chronic gout of left foot, unspecified cause    Acute idiopathic gout, unspecified site    Foot pain, left      I counseled the patient on her conditions, their implications and medical management.    Discussed conservative treatment with shoes of adequate dimensions, material, and style to alleviate symptoms and delay or prevent surgical intervention.    Inspect feet multiple times daily for signs of occurrence/recurrence ulceration.    The patient has received literature on basic diabetic foot care.  Patient will inspect feet daily, wear protective shoe gear when ambulatory, and apply moisturizer to skin as needed to maintain elasticity and help prevent ulceration.            Follow up if symptoms worsen or fail to improve.

## 2020-11-23 DIAGNOSIS — E11.9 TYPE 2 DIABETES MELLITUS WITHOUT COMPLICATION, WITHOUT LONG-TERM CURRENT USE OF INSULIN: ICD-10-CM

## 2020-11-23 NOTE — TELEPHONE ENCOUNTER
No new care gaps identified.  Powered by [employer]. Reference number: [id number]. 11/23/2020 2:03:40 PM   CST

## 2020-11-24 PROBLEM — D64.9 ANEMIA: Status: ACTIVE | Noted: 2020-11-24

## 2020-11-24 RX ORDER — METFORMIN HYDROCHLORIDE 500 MG/1
TABLET ORAL
Qty: 270 TABLET | Refills: 0 | OUTPATIENT
Start: 2020-11-24

## 2020-11-24 RX ORDER — METFORMIN HYDROCHLORIDE 500 MG/1
TABLET, EXTENDED RELEASE ORAL
Qty: 270 TABLET | Refills: 3 | Status: SHIPPED | OUTPATIENT
Start: 2020-11-24 | End: 2021-02-18 | Stop reason: SDUPTHER

## 2020-11-24 NOTE — TELEPHONE ENCOUNTER
----- Message from Shantel Herrmann sent at 11/24/2020 10:48 AM CST -----  Contact: Patient [phone]  Patient is returning a phone call.  Who left a message for the patient: Griselda Jones, LPN   Does patient know what this is regarding: test results    Please call and advise.    Thank You

## 2020-11-24 NOTE — TELEPHONE ENCOUNTER
"----- Message from rAiel Mullins MD sent at 11/24/2020 10:10 AM CST -----  Patient has not yet reviewed her results on MyChart; please contact her to review:  "Your complete blood count does still show that all 3 types of cells (white blood cells, red blood cells, platelets) are low. This could be related to your liver disease; it does not look like you have a clear iron deficiency. However, I would like you to see a hematologist (blood cell specialist) to see what further testing should be done."    Please help to schedule for appt with hematology.    Ariel Mullins MD    "

## 2020-11-24 NOTE — TELEPHONE ENCOUNTER
Spoke with pt . Results given .Your complete Blood ct still showed all 3 types of cells WBC, RBC ,and Platelets are low. This might be related to your liver . You have no clear iron deficiency. Gave pt the number to Hematology Thx Griselda

## 2020-11-24 NOTE — PROGRESS NOTES
"Patient has not yet reviewed her results on MyChart; please contact her to review:  "Your complete blood count does still show that all 3 types of cells (white blood cells, red blood cells, platelets) are low. This could be related to your liver disease; it does not look like you have a clear iron deficiency. However, I would like you to see a hematologist (blood cell specialist) to see what further testing should be done."    Please help to schedule for appt with hematology.    Ariel Mullins MD  "

## 2020-11-27 ENCOUNTER — TELEPHONE (OUTPATIENT)
Dept: HEMATOLOGY/ONCOLOGY | Facility: CLINIC | Age: 70
End: 2020-11-27

## 2020-11-30 ENCOUNTER — TELEPHONE (OUTPATIENT)
Dept: HEMATOLOGY/ONCOLOGY | Facility: CLINIC | Age: 70
End: 2020-11-30

## 2020-12-01 ENCOUNTER — TELEPHONE (OUTPATIENT)
Dept: HEMATOLOGY/ONCOLOGY | Facility: CLINIC | Age: 70
End: 2020-12-01

## 2020-12-03 ENCOUNTER — TELEPHONE (OUTPATIENT)
Dept: OPHTHALMOLOGY | Facility: CLINIC | Age: 70
End: 2020-12-03

## 2020-12-03 NOTE — TELEPHONE ENCOUNTER
Called patient regarding diabetic eye screening result  lvm   No Background Diabetic Retinopathy. Follow up in 12 months.

## 2020-12-10 ENCOUNTER — OFFICE VISIT (OUTPATIENT)
Dept: HEMATOLOGY/ONCOLOGY | Facility: CLINIC | Age: 70
End: 2020-12-10
Payer: MEDICARE

## 2020-12-10 VITALS
HEIGHT: 59 IN | HEART RATE: 63 BPM | OXYGEN SATURATION: 99 % | DIASTOLIC BLOOD PRESSURE: 63 MMHG | WEIGHT: 158.75 LBS | BODY MASS INDEX: 32 KG/M2 | TEMPERATURE: 98 F | SYSTOLIC BLOOD PRESSURE: 134 MMHG

## 2020-12-10 DIAGNOSIS — D69.6 THROMBOCYTOPENIA: ICD-10-CM

## 2020-12-10 DIAGNOSIS — E11.9 TYPE 2 DIABETES MELLITUS WITHOUT COMPLICATION, WITHOUT LONG-TERM CURRENT USE OF INSULIN: ICD-10-CM

## 2020-12-10 DIAGNOSIS — E78.2 MIXED HYPERLIPIDEMIA: ICD-10-CM

## 2020-12-10 DIAGNOSIS — D53.9 MACROCYTIC ANEMIA: Primary | ICD-10-CM

## 2020-12-10 DIAGNOSIS — I10 ESSENTIAL HYPERTENSION: ICD-10-CM

## 2020-12-10 DIAGNOSIS — K74.60 CIRRHOSIS OF LIVER WITHOUT ASCITES, UNSPECIFIED HEPATIC CIRRHOSIS TYPE: ICD-10-CM

## 2020-12-10 PROCEDURE — 1159F MED LIST DOCD IN RCRD: CPT | Mod: HCNC,S$GLB,, | Performed by: INTERNAL MEDICINE

## 2020-12-10 PROCEDURE — 3288F FALL RISK ASSESSMENT DOCD: CPT | Mod: HCNC,CPTII,S$GLB, | Performed by: INTERNAL MEDICINE

## 2020-12-10 PROCEDURE — 3072F PR LOW RISK FOR RETINOPATHY: ICD-10-PCS | Mod: HCNC,S$GLB,, | Performed by: INTERNAL MEDICINE

## 2020-12-10 PROCEDURE — 3288F PR FALLS RISK ASSESSMENT DOCUMENTED: ICD-10-PCS | Mod: HCNC,CPTII,S$GLB, | Performed by: INTERNAL MEDICINE

## 2020-12-10 PROCEDURE — 3075F SYST BP GE 130 - 139MM HG: CPT | Mod: HCNC,CPTII,S$GLB, | Performed by: INTERNAL MEDICINE

## 2020-12-10 PROCEDURE — 3044F HG A1C LEVEL LT 7.0%: CPT | Mod: HCNC,CPTII,S$GLB, | Performed by: INTERNAL MEDICINE

## 2020-12-10 PROCEDURE — 1126F AMNT PAIN NOTED NONE PRSNT: CPT | Mod: HCNC,S$GLB,, | Performed by: INTERNAL MEDICINE

## 2020-12-10 PROCEDURE — 99204 OFFICE O/P NEW MOD 45 MIN: CPT | Mod: HCNC,S$GLB,, | Performed by: INTERNAL MEDICINE

## 2020-12-10 PROCEDURE — 2024F 7 FLD RTA PHOTO EVC RTNOPTHY: CPT | Mod: HCNC,S$GLB,, | Performed by: INTERNAL MEDICINE

## 2020-12-10 PROCEDURE — 1101F PT FALLS ASSESS-DOCD LE1/YR: CPT | Mod: HCNC,CPTII,S$GLB, | Performed by: INTERNAL MEDICINE

## 2020-12-10 PROCEDURE — 3078F DIAST BP <80 MM HG: CPT | Mod: HCNC,CPTII,S$GLB, | Performed by: INTERNAL MEDICINE

## 2020-12-10 PROCEDURE — 1159F PR MEDICATION LIST DOCUMENTED IN MEDICAL RECORD: ICD-10-PCS | Mod: HCNC,S$GLB,, | Performed by: INTERNAL MEDICINE

## 2020-12-10 PROCEDURE — 1101F PR PT FALLS ASSESS DOC 0-1 FALLS W/OUT INJ PAST YR: ICD-10-PCS | Mod: HCNC,CPTII,S$GLB, | Performed by: INTERNAL MEDICINE

## 2020-12-10 PROCEDURE — 2024F PR 7 FIELD PHOTOS WITH INTERP/ REVIEW: ICD-10-PCS | Mod: HCNC,S$GLB,, | Performed by: INTERNAL MEDICINE

## 2020-12-10 PROCEDURE — 99499 UNLISTED E&M SERVICE: CPT | Mod: S$GLB,,, | Performed by: INTERNAL MEDICINE

## 2020-12-10 PROCEDURE — 99204 PR OFFICE/OUTPT VISIT, NEW, LEVL IV, 45-59 MIN: ICD-10-PCS | Mod: HCNC,S$GLB,, | Performed by: INTERNAL MEDICINE

## 2020-12-10 PROCEDURE — 99499 RISK ADDL DX/OHS AUDIT: ICD-10-PCS | Mod: S$GLB,,, | Performed by: INTERNAL MEDICINE

## 2020-12-10 PROCEDURE — 3075F PR MOST RECENT SYSTOLIC BLOOD PRESS GE 130-139MM HG: ICD-10-PCS | Mod: HCNC,CPTII,S$GLB, | Performed by: INTERNAL MEDICINE

## 2020-12-10 PROCEDURE — 3078F PR MOST RECENT DIASTOLIC BLOOD PRESSURE < 80 MM HG: ICD-10-PCS | Mod: HCNC,CPTII,S$GLB, | Performed by: INTERNAL MEDICINE

## 2020-12-10 PROCEDURE — 1126F PR PAIN SEVERITY QUANTIFIED, NO PAIN PRESENT: ICD-10-PCS | Mod: HCNC,S$GLB,, | Performed by: INTERNAL MEDICINE

## 2020-12-10 PROCEDURE — 3072F LOW RISK FOR RETINOPATHY: CPT | Mod: HCNC,S$GLB,, | Performed by: INTERNAL MEDICINE

## 2020-12-10 PROCEDURE — 3008F BODY MASS INDEX DOCD: CPT | Mod: HCNC,CPTII,S$GLB, | Performed by: INTERNAL MEDICINE

## 2020-12-10 PROCEDURE — 3044F PR MOST RECENT HEMOGLOBIN A1C LEVEL <7.0%: ICD-10-PCS | Mod: HCNC,CPTII,S$GLB, | Performed by: INTERNAL MEDICINE

## 2020-12-10 PROCEDURE — 3008F PR BODY MASS INDEX (BMI) DOCUMENTED: ICD-10-PCS | Mod: HCNC,CPTII,S$GLB, | Performed by: INTERNAL MEDICINE

## 2020-12-10 PROCEDURE — 99999 PR PBB SHADOW E&M-EST. PATIENT-LVL V: ICD-10-PCS | Mod: PBBFAC,HCNC,, | Performed by: INTERNAL MEDICINE

## 2020-12-10 PROCEDURE — 99999 PR PBB SHADOW E&M-EST. PATIENT-LVL V: CPT | Mod: PBBFAC,HCNC,, | Performed by: INTERNAL MEDICINE

## 2020-12-10 NOTE — LETTER
December 10, 2020      Ariel Mullins MD  [address]  [facility] [address]           [facility]  [address]  [place] [address]  Phone: [phone]          Patient: Tammi Farris   MR Number: [id number]   YOB: 1950   Date of Visit: 12/10/2020       Dear Dr. Ariel Mullins:    Thank you for referring Tammi Farris to me for evaluation. Attached you will find relevant portions of my assessment and plan of care.    If you have questions, please do not hesitate to call me. I look forward to following Tammi Farris along with you.    Sincerely,    Armando Hu MD    Enclosure  CC:  No Recipients    If you would like to receive this communication electronically, please contact [email or web address] or [phone] to request more information on [facility] [employer] access.    For providers and/or their staff who would like to refer a patient to [facility], please contact us through our one-stop-shop provider referral line, [facility], at [phone].    If you feel you have received this communication in error or would no longer like to receive these types of communications, please e-mail [email or web address]

## 2020-12-10 NOTE — PROGRESS NOTES
PATIENT: Tammi Farris  MRN: [id number]  DATE: 12/10/2020      Diagnosis:   1. Macrocytic anemia    2. Thrombocytopenia    3. Cirrhosis of liver without ascites, unspecified hepatic cirrhosis type    4. Essential hypertension    5. Mixed hyperlipidemia    6. Type 2 diabetes mellitus without complication, without long-term current use of insulin        Chief Complaint: Anemia (New patient)    Subjective:    Initial History: Ms. Farris is a 70 y.o. female with psoriasis, HNT, HLD, GERD, endometriosis s/p hysterectomy in her 20's, DMII, Cirrhosis, Basal cell carcinoma s/p resection on her forehead and right shoulder, cirrhosis from fatty liver who presents for work up of anemia.   The patient has had a intermittent macrocytic anemia since at least 4/21/15.  Over the past year her hemoglobin has ranged from 10.1-11.5.  She has been thrombocytopenic since 9/18/20 with platelet count ranging from 125k-138k.  Iron studies on 10/13/20 were relatively unremarkable.  Vitamin B12 on 10/13/20 was >2000.  The patient states he main complaint currently is difficulty sleeping.  She goes to sleep at 11PM and wakes around 2-3 AM with difficulty going back to sleep.  She states he appetite has decreased but she is maintaining her weight.  The patient denies CP, SOB, abdominal pain, N/V, constipation, diarrhea.  The patient denies fever, chills, night sweats, weight loss, new lumps or bumps, easy bruising or bleeding.    Past Medical History:   Past Medical History:   Diagnosis Date    Allergy     Basal cell carcinoma     Cirrhosis of liver without ascites 12/27/2017    Diabetes mellitus, type 2     Endometriosis     GERD (gastroesophageal reflux disease)     Hyperlipidemia     Hypertension     Joint pain     Psoriasis        Past Surgical HIstory:   Past Surgical History:   Procedure Laterality Date    abdominal laproscopic surgery      APPENDECTOMY      CARPAL TUNNEL RELEASE      right    CHOLECYSTECTOMY  04/2015     COLONOSCOPY N/A 2/8/2019    Procedure: COLONOSCOPY;  Surgeon: Celso Salas MD;  [facility]  Service: Endoscopy;  Laterality: N/A;    ESOPHAGOGASTRODUODENOSCOPY N/A 2/8/2019    Procedure: EGD (ESOPHAGOGASTRODUODENOSCOPY);  Surgeon: Celso Salas MD;  [facility]  Service: Endoscopy;  Laterality: N/A;  varices screening, labs ordered prior    HYSTERECTOMY  age 25    JOSEFA/BSO (endometriosis)    OOPHORECTOMY      SKIN CANCER DESTRUCTION      UPPER GASTROINTESTINAL ENDOSCOPY         Family History:   Family History   Problem Relation Age of Onset    Arthritis Mother     Hypertension Mother     Hypertension Sister     Asthma Brother     Hyperlipidemia Brother     Hypertension Brother     No Known Problems Father     Raynaud syndrome Sister     Breast cancer Neg Hx     Ovarian cancer Neg Hx     Colon cancer Neg Hx     Stomach cancer Neg Hx     Esophageal cancer Neg Hx     Rectal cancer Neg Hx     Irritable bowel syndrome Neg Hx     Ulcerative colitis Neg Hx     Crohn's disease Neg Hx     Celiac disease Neg Hx     Melanoma Neg Hx     Cirrhosis Neg Hx        Social History:  reports that she has never smoked. She has never used smokeless tobacco. She reports current alcohol use. She reports that she does not use drugs.    Allergies:  Review of patient's allergies indicates:   Allergen Reactions    Iodine and iodide containing products Hives    Benadryl [diphenhydramine hcl] Anxiety     Pt states she feels jumpy and anxious when taking.    Darvon [propoxyphene] Nausea Only    Shellfish containing products Hives    Lisinopril Other (See Comments)     cough    Propoxyphene hcl      Itchy (skin)^    Tizanidine Other (See Comments)     Sweaty, difficulty swallowing    Statins-hmg-coa reductase inhibitors Anxiety and Other (See Comments)     Myalgia, fatigue, palpitations, anxiety       Medications:  Current Outpatient Medications   Medication Sig  Dispense Refill    allopurinoL (ZYLOPRIM) 300 MG tablet TAKE 1 TABLET(300 MG) BY MOUTH EVERY DAY 90 tablet 1    betamethasone dipropionate (DIPROLENE) 0.05 % cream Apply topically 2 (two) times daily. To affected areas on lower back 45 g 1    bisoproloL-hydrochlorothiazide (ZIAC) 10-6.25 mg per tablet TAKE 1 TABLET BY MOUTH EVERY DAY 90 tablet 3    clobetasol (TEMOVATE) 0.05 % cream APPLY TO AFFECTED AREA NIGHTLY FOR 4 WEEKS THEN EVERY OTHER DAY FOR THE NEXT 2 WEEKS 30 g 0    clotrimazole (LOTRIMIN) 1 % cream APPLY TO THE AFFECTED AREA OF RASH ON BREASTS AND IN FOLDS OF SKIN AS DIRECTED TWICE DAILY 90 g 1    colchicine (COLCRYS) 0.6 mg tablet TAKE ONLY AS NEEDED FOR GOUT FLARES- TAKE 1 TABLET AT ONSET OF PAIN THEN 2ND TABLET 6 HOURS LATER. TAKE 1 TABLET DAILY AFTER THIS UNTIL RESOLVES 90 tablet 1    cyanocobalamin, vitamin B-12, 2,000 mcg Tab Take 2,000 mcg by mouth once daily. 30 tablet 3    indomethacin (INDOCIN) 25 MG capsule TAKE 1 CAPSULE(25 MG) BY MOUTH THREE TIMES DAILY WITH MEALS AS NEEDED FOR GOUT PAIN 15 capsule 0    lancets (TRUEPLUS LANCETS) 33 gauge Misc Use one lancet to check sugar daily. 100 each 3    losartan (COZAAR) 50 MG tablet TAKE 1 TABLET(50 MG) BY MOUTH EVERY DAY 90 tablet 1    metFORMIN (GLUCOPHAGE-XR) 500 MG ER 24hr tablet Take 1 tablet after breakfast and 2 tablets after dinner every day 270 tablet 3    omeprazole (PRILOSEC) 20 MG capsule TAKE 1 CAPSULE(20 MG) BY MOUTH EVERY DAY 90 capsule 2    triamcinolone acetonide 0.1% (KENALOG) 0.1 % cream APPLY TO THE AFFECTED AREA UNDER THE BREASTS, IN GROIN, AND BUTTOCKS TWICE DAILY FOR 1 TO 2 WEEKS, THEN AS NEEDED FOR FLARES ONLY 80 g 1    triamcinolone acetonide 0.5% (KENALOG) 0.5 % Crea Apply topically 2 (two) times daily. X 1-2 wks then prn flares only 454 g 0    TRUE METRIX GLUCOSE METER Mis AS DIRECTED 1 each 0    TRUE METRIX GLUCOSE TEST STRIP Strp USE TO CHECK SUGAR ONCE DAILY AS DIRECTED 100 strip 3    varicella-zoster  "[id number], PF, (SHINGRIX, PF,) 50 mcg/0.5 mL injection Inject into the muscle. 0.5 mL 0    lidocaine HCL 2% (XYLOCAINE) 2 % jelly Apply topically as needed. Apply topically once nightly to affected part of foot/feet. (Patient not taking: Reported on 12/10/2020) 30 mL 2    pravastatin (PRAVACHOL) 20 MG tablet Take 1 tablet (20 mg total) by mouth once daily. For cholesterol 90 tablet 3     No current facility-administered medications for this visit.        Review of Systems   Constitutional: Positive for appetite change. Negative for chills, fatigue, fever and unexpected weight change.   HENT: Negative for sore throat and trouble swallowing.    Eyes: Negative for photophobia, pain and visual disturbance.   Respiratory: Negative for cough, chest tightness and shortness of breath.    Cardiovascular: Negative for chest pain, palpitations and leg swelling.   Gastrointestinal: Negative for abdominal pain, constipation, diarrhea, nausea and vomiting.   Genitourinary: Negative for difficulty urinating and dysuria.   Musculoskeletal: Negative for arthralgias and back pain.   Skin: Negative for color change and rash.   Neurological: Negative for dizziness, weakness, numbness and headaches.   Hematological: Negative for adenopathy. Does not bruise/bleed easily.       ECOG Performance Status: 0   Objective:      Vitals:   Vitals:    12/10/20 1311   BP: 134/63   BP Location: Left arm   Patient Position: Sitting   BP Method: Large (Automatic)   Pulse: 63   Temp: 98 °F (36.7 °C)   TempSrc: Oral   SpO2: 99%   Weight: 72 kg (158 lb 11.7 oz)   Height: 4' 11" (1.499 m)       Physical Exam  Constitutional:       General: She is not in acute distress.     Appearance: She is well-developed. She is not diaphoretic.   HENT:      Head: Normocephalic and atraumatic.   Eyes:      General: No scleral icterus.        Right eye: No discharge.         Left eye: No discharge.   Cardiovascular:      Rate and Rhythm: Normal rate and regular rhythm. "      Heart sounds: Normal heart sounds. No murmur. No friction rub. No gallop.    Pulmonary:      Effort: Pulmonary effort is normal. No respiratory distress.      Breath sounds: Normal breath sounds. No wheezing or rales.   Chest:      Chest wall: No tenderness.   Abdominal:      General: Bowel sounds are normal. There is no distension.      Palpations: Abdomen is soft. There is no mass.      Tenderness: There is no abdominal tenderness. There is no guarding or rebound.   Musculoskeletal: Normal range of motion.         General: No tenderness.   Lymphadenopathy:      Cervical: No cervical adenopathy.      Upper Body:      Right upper body: No supraclavicular or axillary adenopathy.      Left upper body: No supraclavicular or axillary adenopathy.   Skin:     Findings: No erythema or rash.      Comments: Scaly rash on lower back   Neurological:      Mental Status: She is alert and oriented to person, place, and time.   Psychiatric:         Behavior: Behavior normal.         Laboratory Data:  No visits with results within 1 Week(s) from this visit.   Latest known visit with results is:   Lab Visit on 10/13/2020   Component Date Value Ref Range Status    WBC 10/13/2020 8.88  3.90 - 12.70 K/uL Final    RBC 10/13/2020 3.22* 4.00 - 5.40 M/uL Final    Hemoglobin 10/13/2020 10.5* 12.0 - 16.0 g/dL Final    Hematocrit 10/13/2020 33.5* 37.0 - 48.5 % Final    MCV 10/13/2020 104* 82 - 98 fL Final    MCH 10/13/2020 32.6* 27.0 - 31.0 pg Final    MCHC 10/13/2020 31.3* 32.0 - 36.0 g/dL Final    RDW 10/13/2020 15.1* 11.5 - 14.5 % Final    Platelets 10/13/2020 138* 150 - 350 K/uL Final    MPV 10/13/2020 SEE COMMENT  9.2 - 12.9 fL Final    Result not available.    Ferritin 10/13/2020 87  20.0 - 300.0 ng/mL Final    Iron 10/13/2020 81  30 - 160 ug/dL Final    Transferrin 10/13/2020 310  200 - 375 mg/dL Final    TIBC 10/13/2020 459* 250 - 450 ug/dL Final    Saturated Iron 10/13/2020 18* 20 - 50 % Final    Uric Acid  10/13/2020 4.3  2.4 - 5.7 mg/dL Final    Cholesterol 10/13/2020 161  120 - 199 mg/dL Final    Comment: The National Cholesterol Education Program (NCEP) has set the  following guidelines (reference ranges) for Cholesterol:  Optimal.....................<200 mg/dL  Borderline High.............200-239 mg/dL  High........................> or = 240 mg/dL      Triglycerides 10/13/2020 189* 30 - 150 mg/dL Final    Comment: The National Cholesterol Education Program (NCEP) has set the  following guidelines (reference values) for triglycerides:  Normal......................<150 mg/dL  Borderline High.............150-199 mg/dL  High........................200-499 mg/dL      HDL 10/13/2020 28* 40 - 75 mg/dL Final    Comment: The National Cholesterol Education Program (NCEP) has set the  following guidelines (reference values) for HDL Cholesterol:  Low...............<40 mg/dL  Optimal...........>60 mg/dL      LDL Cholesterol 10/13/2020 95.2  63.0 - 159.0 mg/dL Final    Comment: The National Cholesterol Education Program (NCEP) has set the  following guidelines (reference values) for LDL Cholesterol:  Optimal.......................<130 mg/dL  Borderline High...............130-159 mg/dL  High..........................160-189 mg/dL  Very High.....................>190 mg/dL      HDL/Cholesterol Ratio 10/13/2020 17.4* 20.0 - 50.0 % Final    Total Cholesterol/HDL Ratio 10/13/2020 5.8* 2.0 - 5.0 Final    Non-HDL Cholesterol 10/13/2020 133  mg/dL Final    Comment: Risk category and Non-HDL cholesterol goals:  Coronary heart disease (CHD)or equivalent (10-year risk of CHD >20%):  Non-HDL cholesterol goal     <130 mg/dL  Two or more CHD risk factors and 10-year risk of CHD <= 20%:  Non-HDL cholesterol goal     <160 mg/dL  0 to 1 CHD risk factor:  Non-HDL cholesterol goal     <190 mg/dL      Vitamin B-12 10/13/2020 >2000* 210 - 950 pg/mL Final         Imaging: US Abdomen 9/23/20     Liver: Normal in size, measuring 15 cm.  Coarsened echotexture and nodular contour compatible with cirrhosis.  No focal hepatic lesions.     Gallbladder: The gallbladder is surgically absent.     Biliary system: The common duct measures 7 mm in diameter.  No intrahepatic ductal dilatation.     Spleen: Normal in size with a homogeneous echotexture, measuring 10.1 x 3.1 cm.     Pancreas: The visualized portions of pancreas appear normal.     Right kidney: Normal in size with no hydronephrosis, measuring 9.5 cm.     Left kidney:  Normal in size with no hydronephrosis, measuring 9.4 cm.     Aorta: No aneurysm.     Inferior vena cava: Normal in appearance.     Miscellaneous: No ascites.  Assessment:       1. Macrocytic anemia    2. Thrombocytopenia    3. Cirrhosis of liver without ascites, unspecified hepatic cirrhosis type    4. Essential hypertension    5. Mixed hyperlipidemia    6. Type 2 diabetes mellitus without complication, without long-term current use of insulin           Plan:     Macrocytic Anemia - The patient has had a intermittent macrocytic anemia since at least 4/21/15.    -Over the past year her hemoglobin has ranged from 10.1-11.5  -Macrocytic anemia is likely due to cirrhosis of the liver  -B12 checked on 10/13/20 was elevated  -Will screen for folate deficiecny with RBC folate  -Screen for hemolysis with haptoglobin, LDH, retic  -Screen for MGUs with SPEP, SHERI, and FLC    Thrombocytopenia - the patient has a mild thrombocytopenia likely from liver dysfunction as well  -Platelet count has ranged from 125k-138k  -Will have peripheral smear reviewed    Cirrhosis - Pt with cirrhosis likely from fatty liver  -Last Us was done on 9/23/20  -Pt is followed by hepatology  -Pt encouraged to diet and exercise in order to improve her condition    HTN - pt on bisoprolol-HCTZ, losartan  -PCP managing    HLD - Pt on pravastatin  -PCP managing    DMII - pt on metformin  -Last A1C on 9/08/20 was 6.6  -PCP managing    Advance Care Planning     Power of    I initiated the process of advance care planning today and explained the importance of this process to the patient.  I introduced the concept of advance directives to the patient, as well. Then the patient received detailed information about the importance of designating a Health Care Power of  (HCPOA). She was also instructed to communicate with this person about their wishes for future healthcare, should she become sick and lose decision-making capacity. The patient has not previously appointed a HCPOA. After our discussion, the patient has decided to complete a HCPOA and will bring the form completed to her next clinic appointment.          Follow Up - folate RBC, pathologist review,haptoglobin, LDH, reticulocyte count, serum protein electrophoresis, immunofixation electrophoresis and serum free light chains;  appt with me in 2 weeks.    Armando Hu MD  Hematology and Oncology  [facility]  Office:[phone]  Fax: [phone]

## 2020-12-10 NOTE — Clinical Note
The patient will need lab work at the [facility] clinic with folate RBC, pathologist review,haptoglobin, LDH, reticulocyte count, serum protein electrophoresis, immunofixation electrophoresis and serum free light chains.  She will need follow up with em in 2 weeks.

## 2020-12-11 ENCOUNTER — PATIENT MESSAGE (OUTPATIENT)
Dept: OTHER | Facility: OTHER | Age: 70
End: 2020-12-11

## 2020-12-11 ENCOUNTER — LAB VISIT (OUTPATIENT)
Dept: LAB | Facility: HOSPITAL | Age: 70
End: 2020-12-11
Attending: INTERNAL MEDICINE
Payer: MEDICARE

## 2020-12-11 DIAGNOSIS — D53.9 MACROCYTIC ANEMIA: ICD-10-CM

## 2020-12-11 LAB
ANISOCYTOSIS BLD QL SMEAR: SLIGHT
BASOPHILS # BLD AUTO: 0.03 K/UL (ref 0–0.2)
BASOPHILS NFR BLD: 0.3 % (ref 0–1.9)
DIFFERENTIAL METHOD: ABNORMAL
EOSINOPHIL # BLD AUTO: 0.6 K/UL (ref 0–0.5)
EOSINOPHIL NFR BLD: 5.3 % (ref 0–8)
ERYTHROCYTE [DISTWIDTH] IN BLOOD BY AUTOMATED COUNT: 15 % (ref 11.5–14.5)
HAPTOGLOB SERPL-MCNC: 96 MG/DL (ref 30–250)
HCT VFR BLD AUTO: 31.1 % (ref 37–48.5)
HGB BLD-MCNC: 10.3 G/DL (ref 12–16)
IMM GRANULOCYTES # BLD AUTO: 0.03 K/UL (ref 0–0.04)
IMM GRANULOCYTES NFR BLD AUTO: 0.3 % (ref 0–0.5)
LDH SERPL L TO P-CCNC: 198 U/L (ref 110–260)
LYMPHOCYTES # BLD AUTO: 6 K/UL (ref 1–4.8)
LYMPHOCYTES NFR BLD: 58 % (ref 18–48)
MCH RBC QN AUTO: 34.1 PG (ref 27–31)
MCHC RBC AUTO-ENTMCNC: 33.1 G/DL (ref 32–36)
MCV RBC AUTO: 103 FL (ref 82–98)
MONOCYTES # BLD AUTO: 0.7 K/UL (ref 0.3–1)
MONOCYTES NFR BLD: 7 % (ref 4–15)
NEUTROPHILS # BLD AUTO: 3 K/UL (ref 1.8–7.7)
NEUTROPHILS NFR BLD: 29.1 % (ref 38–73)
NRBC BLD-RTO: 0 /100 WBC
PATH REV BLD -IMP: NORMAL
PLATELET # BLD AUTO: 149 K/UL (ref 150–350)
PMV BLD AUTO: 14.1 FL (ref 9.2–12.9)
POLYCHROMASIA BLD QL SMEAR: ABNORMAL
RBC # BLD AUTO: 3.02 M/UL (ref 4–5.4)
RETICS/RBC NFR AUTO: 2.5 % (ref 0.5–2.5)
WBC # BLD AUTO: 10.35 K/UL (ref 3.9–12.7)

## 2020-12-11 PROCEDURE — 83520 IMMUNOASSAY QUANT NOS NONAB: CPT | Mod: 59,HCNC

## 2020-12-11 PROCEDURE — 86334 IMMUNOFIX E-PHORESIS SERUM: CPT | Mod: 26,HCNC,, | Performed by: PATHOLOGY

## 2020-12-11 PROCEDURE — 85025 COMPLETE CBC W/AUTO DIFF WBC: CPT | Mod: HCNC

## 2020-12-11 PROCEDURE — 84165 PROTEIN E-PHORESIS SERUM: CPT | Mod: 26,HCNC,, | Performed by: PATHOLOGY

## 2020-12-11 PROCEDURE — 36415 COLL VENOUS BLD VENIPUNCTURE: CPT | Mod: HCNC,PO

## 2020-12-11 PROCEDURE — 83615 LACTATE (LD) (LDH) ENZYME: CPT | Mod: HCNC

## 2020-12-11 PROCEDURE — 82747 ASSAY OF FOLIC ACID RBC: CPT | Mod: HCNC

## 2020-12-11 PROCEDURE — 85060 BLOOD SMEAR INTERPRETATION: CPT | Mod: HCNC,,, | Performed by: PATHOLOGY

## 2020-12-11 PROCEDURE — 85045 AUTOMATED RETICULOCYTE COUNT: CPT | Mod: HCNC

## 2020-12-11 PROCEDURE — 86334 PATHOLOGIST INTERPRETATION IFE: ICD-10-PCS | Mod: 26,HCNC,, | Performed by: PATHOLOGY

## 2020-12-11 PROCEDURE — 84165 PROTEIN E-PHORESIS SERUM: CPT | Mod: HCNC

## 2020-12-11 PROCEDURE — 83010 ASSAY OF HAPTOGLOBIN QUANT: CPT | Mod: HCNC

## 2020-12-11 PROCEDURE — 86334 IMMUNOFIX E-PHORESIS SERUM: CPT | Mod: HCNC

## 2020-12-11 PROCEDURE — 85060 PATHOLOGIST REVIEW: ICD-10-PCS | Mod: HCNC,,, | Performed by: PATHOLOGY

## 2020-12-11 PROCEDURE — 84165 PATHOLOGIST INTERPRETATION SPE: ICD-10-PCS | Mod: 26,HCNC,, | Performed by: PATHOLOGY

## 2020-12-14 LAB
ALBUMIN SERPL ELPH-MCNC: 4.17 G/DL (ref 3.35–5.55)
ALPHA1 GLOB SERPL ELPH-MCNC: 0.27 G/DL (ref 0.17–0.41)
ALPHA2 GLOB SERPL ELPH-MCNC: 0.75 G/DL (ref 0.43–0.99)
B-GLOBULIN SERPL ELPH-MCNC: 0.95 G/DL (ref 0.5–1.1)
FOLATE RBC-MCNC: 932 NG/ML (ref 280–791)
GAMMA GLOB SERPL ELPH-MCNC: 1.26 G/DL (ref 0.67–1.58)
INTERPRETATION SERPL IFE-IMP: NORMAL
KAPPA LC SER QL IA: 4.88 MG/DL (ref 0.33–1.94)
KAPPA LC/LAMBDA SER IA: 1.38 (ref 0.26–1.65)
LAMBDA LC SER QL IA: 3.53 MG/DL (ref 0.57–2.63)
PATH REV BLD -IMP: NORMAL
PATHOLOGIST INTERPRETATION IFE: NORMAL
PATHOLOGIST INTERPRETATION SPE: NORMAL
PROT SERPL-MCNC: 7.4 G/DL (ref 6–8.4)

## 2020-12-20 NOTE — PROGRESS NOTES
PATIENT: Tammi Farris  MRN: [id number]  DATE: 12/22/2020      Diagnosis:   1. Macrocytic anemia    2. Thrombocytopenia    3. Cirrhosis of liver without ascites, unspecified hepatic cirrhosis type    4. Essential hypertension    5. Mixed hyperlipidemia    6. Type 2 diabetes mellitus without complication, without long-term current use of insulin        Chief Complaint: Anemia (follow up with labs)    Subjective:    Interval History: Ms. Farris is a 70 y.o. female with psoriasis, HNT, HLD, GERD, endometriosis s/p hysterectomy in her 20's, DMII, Cirrhosis, Basal cell carcinoma s/p resection on her forehead and right shoulder, cirrhosis from fatty liver who presents for follow up of anemia and thrombocytopenia.  Since the last clinic visit the patient underwent SPEP and SHERI showing no monoclonal proteins. RBC folate was not low.  Haptoglobin, retic and LDH were normal.  Serum free light chains showed an elevated faisal and lambda light chains at 4.88mg/dL and 3.53mg/dL respectively with normal kappa to lambda ratio at 1.38.  The patient states her sleep has improved with going to bed later at night.  The patient denies CP, SOB, abdominal pain, N/V, constipation, diarrhea.  The patient denies fever, chills, night sweats, weight loss, new lumps or bumps, easy bruising or bleeding.    Prior History: The patient has had a intermittent macrocytic anemia since at least 4/21/15.  Over the past year her hemoglobin has ranged from 10.1-11.5.  She has been thrombocytopenic since 9/18/20 with platelet count ranging from 125k-138k.  Iron studies on 10/13/20 were relatively unremarkable.  Vitamin B12 on 10/13/20 was >2000.     Past Medical History:   Past Medical History:   Diagnosis Date    Allergy     Basal cell carcinoma     Cirrhosis of liver without ascites 12/27/2017    Diabetes mellitus, type 2     Endometriosis     GERD (gastroesophageal reflux disease)     Hyperlipidemia     Hypertension     Joint pain     Psoriasis         Past Surgical HIstory:   Past Surgical History:   Procedure Laterality Date    abdominal laproscopic surgery      APPENDECTOMY      CARPAL TUNNEL RELEASE      right    CHOLECYSTECTOMY  04/2015    COLONOSCOPY N/A 2/8/2019    Procedure: COLONOSCOPY;  Surgeon: Celso Salas MD;  Location: [facility] ([address] [place] [address]);  Service: Endoscopy;  Laterality: N/A;    ESOPHAGOGASTRODUODENOSCOPY N/A 2/8/2019    Procedure: EGD (ESOPHAGOGASTRODUODENOSCOPY);  Surgeon: Celso Salas MD;  Location: [facility] ([address] [place] [address]);  Service: Endoscopy;  Laterality: N/A;  varices screening, labs ordered prior    HYSTERECTOMY  age 25    JOSEFA/BSO (endometriosis)    OOPHORECTOMY      SKIN CANCER DESTRUCTION      UPPER GASTROINTESTINAL ENDOSCOPY         Family History:   Family History   Problem Relation Age of Onset    Arthritis Mother     Hypertension Mother     Hypertension Sister     Asthma Brother     Hyperlipidemia Brother     Hypertension Brother     No Known Problems Father     Raynaud syndrome Sister     Breast cancer Neg Hx     Ovarian cancer Neg Hx     Colon cancer Neg Hx     Stomach cancer Neg Hx     Esophageal cancer Neg Hx     Rectal cancer Neg Hx     Irritable bowel syndrome Neg Hx     Ulcerative colitis Neg Hx     Crohn's disease Neg Hx     Celiac disease Neg Hx     Melanoma Neg Hx     Cirrhosis Neg Hx        Social History:  reports that she has never smoked. She has never used smokeless tobacco. She reports current alcohol use. She reports that she does not use drugs.    Allergies:  Review of patient's allergies indicates:   Allergen Reactions    Iodine and iodide containing products Hives    Benadryl [diphenhydramine hcl] Anxiety     Pt states she feels jumpy and anxious when taking.    Darvon [propoxyphene] Nausea Only    Shellfish containing products Hives    Lisinopril Other (See Comments)     cough    Propoxyphene hcl      Itchy (skin)^    Tizanidine Other (See Comments)     Sweaty,  difficulty swallowing    Statins-hmg-coa reductase inhibitors Anxiety and Other (See Comments)     Myalgia, fatigue, palpitations, anxiety       Medications:  Current Outpatient Medications   Medication Sig Dispense Refill    allopurinoL (ZYLOPRIM) 300 MG tablet TAKE 1 TABLET(300 MG) BY MOUTH EVERY DAY 90 tablet 1    betamethasone dipropionate (DIPROLENE) 0.05 % cream Apply topically 2 (two) times daily. To affected areas on lower back 45 g 1    bisoproloL-hydrochlorothiazide (ZIAC) 10-6.25 mg per tablet TAKE 1 TABLET BY MOUTH EVERY DAY 90 tablet 3    clobetasol (TEMOVATE) 0.05 % cream APPLY TO AFFECTED AREA NIGHTLY FOR 4 WEEKS THEN EVERY OTHER DAY FOR THE NEXT 2 WEEKS 30 g 0    clotrimazole (LOTRIMIN) 1 % cream APPLY TO THE AFFECTED AREA OF RASH ON BREASTS AND IN FOLDS OF SKIN AS DIRECTED TWICE DAILY 90 g 1    colchicine (COLCRYS) 0.6 mg tablet TAKE ONLY AS NEEDED FOR GOUT FLARES- TAKE 1 TABLET AT ONSET OF PAIN THEN 2ND TABLET 6 HOURS LATER. TAKE 1 TABLET DAILY AFTER THIS UNTIL RESOLVES 90 tablet 1    cyanocobalamin, vitamin B-12, 2,000 mcg Tab Take 2,000 mcg by mouth once daily. 30 tablet 3    indomethacin (INDOCIN) 25 MG capsule TAKE 1 CAPSULE(25 MG) BY MOUTH THREE TIMES DAILY WITH MEALS AS NEEDED FOR GOUT PAIN 15 capsule 0    lancets (TRUEPLUS LANCETS) 33 gauge Misc Use one lancet to check sugar daily. 100 each 3    losartan (COZAAR) 50 MG tablet TAKE 1 TABLET(50 MG) BY MOUTH EVERY DAY 90 tablet 1    metFORMIN (GLUCOPHAGE-XR) 500 MG ER 24hr tablet Take 1 tablet after breakfast and 2 tablets after dinner every day 270 tablet 3    omeprazole (PRILOSEC) 20 MG capsule TAKE 1 CAPSULE(20 MG) BY MOUTH EVERY DAY 90 capsule 2    triamcinolone acetonide 0.1% (KENALOG) 0.1 % cream APPLY TO THE AFFECTED AREA UNDER THE BREASTS, IN GROIN, AND BUTTOCKS TWICE DAILY FOR 1 TO 2 WEEKS, THEN AS NEEDED FOR FLARES ONLY 80 g 1    triamcinolone acetonide 0.5% (KENALOG) 0.5 % Crea Apply topically 2 (two) times daily. X  "1-2 wks then prn flares only 454 g 0    TRUE METRIX GLUCOSE METER Mis AS DIRECTED 1 each 0    TRUE METRIX GLUCOSE TEST STRIP Strp USE TO CHECK SUGAR ONCE DAILY AS DIRECTED 100 strip 3    varicella-zoster gE-AS01B, PF, (SHINGRIX, PF,) 50 mcg/0.5 mL injection Inject into the muscle. 0.5 mL 0    lidocaine HCL 2% (XYLOCAINE) 2 % jelly Apply topically as needed. Apply topically once nightly to affected part of foot/feet. (Patient not taking: Reported on 12/10/2020) 30 mL 2    pravastatin (PRAVACHOL) 20 MG tablet Take 1 tablet (20 mg total) by mouth once daily. For cholesterol 90 tablet 3     No current facility-administered medications for this visit.        Review of Systems   Constitutional: Negative for chills, fatigue, fever and unexpected weight change.   Respiratory: Negative for cough and shortness of breath.    Cardiovascular: Negative for chest pain and palpitations.   Gastrointestinal: Negative for abdominal pain, constipation, diarrhea, nausea and vomiting.   Skin: Negative for rash.   Neurological: Negative for headaches.   Hematological: Negative for adenopathy. Does not bruise/bleed easily.       ECOG Performance Status: 0   Objective:      Vitals:   Vitals:    12/22/20 1440   BP: 115/71   BP Location: Left arm   Patient Position: Sitting   BP Method: Medium (Automatic)   Pulse: 63   Temp: 98.8 °F (37.1 °C)   TempSrc: Oral   SpO2: 97%   Weight: 71.2 kg (156 lb 15.5 oz)   Height: 4' 11" (1.499 m)       Physical Exam  Constitutional:       General: She is not in acute distress.     Appearance: She is well-developed. She is not diaphoretic.   HENT:      Head: Normocephalic and atraumatic.   Cardiovascular:      Rate and Rhythm: Normal rate and regular rhythm.      Heart sounds: Normal heart sounds. No murmur. No friction rub. No gallop.    Pulmonary:      Effort: Pulmonary effort is normal. No respiratory distress.      Breath sounds: Normal breath sounds. No wheezing or rales.   Chest:      Chest wall: " No tenderness.   Abdominal:      General: Bowel sounds are normal. There is no distension.      Palpations: Abdomen is soft. There is no mass.      Tenderness: There is no abdominal tenderness. There is no guarding or rebound.   Musculoskeletal: Normal range of motion.         General: No tenderness.   Lymphadenopathy:      Cervical: No cervical adenopathy.      Upper Body:      Right upper body: No supraclavicular or axillary adenopathy.      Left upper body: No supraclavicular or axillary adenopathy.   Neurological:      Mental Status: She is alert and oriented to person, place, and time.   Psychiatric:         Behavior: Behavior normal.         Laboratory Data:  No visits with results within 1 Week(s) from this visit.   Latest known visit with results is:   Lab Visit on 12/11/2020   Component Date Value Ref Range Status    RBC Folate 12/11/2020 932* 280 - 791 ng/mL Final    Comment: Test performed at [facility],  [address] [place]  [address]     [phone]  Joce Jasso MD  - Medical Director      Haptoglobin 12/11/2020 96  30 - 250 mg/dL Final    LD 12/11/2020 198  110 - 260 U/L Final    Results are increased in hemolyzed samples.    Retic 12/11/2020 2.5  0.5 - 2.5 % Final    Pathologist Review 12/11/2020 Review required   Final    Protein, Serum 12/11/2020 7.4  6.0 - 8.4 g/dL Final    Comment: Serum protein electrophoresis and immunofixation results should be   interpreted in clinical context in that some therapeutic agents can   result   in false positive results (example, daratumumab). Correlation with   the   patient s therapeutic regimen is required.      Albumin 12/11/2020 4.17  3.35 - 5.55 g/dL Final    Alpha-1 12/11/2020 0.27  0.17 - 0.41 g/dL Final    Alpha-2 12/11/2020 0.75  0.43 - 0.99 g/dL Final    Beta 12/11/2020 0.95  0.50 - 1.10 g/dL Final    Gamma 12/11/2020 1.26  0.67 - 1.58 g/dL Final    Immunofix Interp. 12/11/2020 SEE COMMENT   Final    Comment:  Serum protein electrophoresis and immunofixation results should be   interpreted in clinical context in that some therapeutic agents can   result   in false positive results (example, daratumumab). Correlation with   the   patient s therapeutic regimen is required.  See pathologist's interpretation.      [place] Free Light Chains 12/11/2020 4.88* 0.33 - 1.94 mg/dL Final    Lambda Free Light Chains 12/11/2020 3.53* 0.57 - 2.63 mg/dL Final    Kappa/Lambda FLC Ratio 12/11/2020 1.38  0.26 - 1.65 Final    Comment: Undetected antigen excess is a rare event but cannot   be excluded. If these free light chain results do not   agree with other clinical or laboratory findings or   if the sample is from a patient that has previously   demonstrated antigen excess, discuss with the testing   laboratory.   Results should always be interpreted in conjunction   with other laboratory tests and clinical evidence.      WBC 12/11/2020 10.35  3.90 - 12.70 K/uL Final    RBC 12/11/2020 3.02* 4.00 - 5.40 M/uL Final    Hemoglobin 12/11/2020 10.3* 12.0 - 16.0 g/dL Final    Hematocrit 12/11/2020 31.1* 37.0 - 48.5 % Final    MCV 12/11/2020 103* 82 - 98 fL Final    MCH 12/11/2020 34.1* 27.0 - 31.0 pg Final    MCHC 12/11/2020 33.1  32.0 - 36.0 g/dL Final    RDW 12/11/2020 15.0* 11.5 - 14.5 % Final    Platelets 12/11/2020 149* 150 - 350 K/uL Final    MPV 12/11/2020 14.1* 9.2 - 12.9 fL Final    Immature Granulocytes 12/11/2020 0.3  0.0 - 0.5 % Final    Gran # (ANC) 12/11/2020 3.0  1.8 - 7.7 K/uL Final    Immature Grans (Abs) 12/11/2020 0.03  0.00 - 0.04 K/uL Final    Comment: Mild elevation in immature granulocytes is non specific and   can be seen in a variety of conditions including stress response,   acute inflammation, trauma and pregnancy. Correlation with other   laboratory and clinical findings is essential.      Lymph # 12/11/2020 6.0* 1.0 - 4.8 K/uL Final    Mono # 12/11/2020 0.7  0.3 - 1.0 K/uL Final    Eos # 12/11/2020  0.6* 0.0 - 0.5 K/uL Final    Baso # 12/11/2020 0.03  0.00 - 0.20 K/uL Final    nRBC 12/11/2020 0  0 /100 WBC Final    Gran % 12/11/2020 29.1* 38.0 - 73.0 % Final    Lymph % 12/11/2020 58.0* 18.0 - 48.0 % Final    Mono % 12/11/2020 7.0  4.0 - 15.0 % Final    Eosinophil % 12/11/2020 5.3  0.0 - 8.0 % Final    Basophil % 12/11/2020 0.3  0.0 - 1.9 % Final    Aniso 12/11/2020 Slight   Final    Poly 12/11/2020 Occasional   Final    Differential Method 12/11/2020 Automated   Final    Pathologist Review Peripheral Smear 12/11/2020 REVIEWED   Final    Comment: Electronically reviewed and signed by:  Rico Costello M.D.  Signed on 12/14/20 at 13:24  Normal total white count with a mild absolute lymphocytosis.  Most   lymphocytes are small, although occasional larger forms with slight   nuclear atypia and reactive-appearing features are present.  Also   present is a mild absolute eosinophilia.  Mild-to-moderate anemia with minimal macrocytosis and minimal   polychromasia.  Mild thrombocytopenia without morphologic abnormalities  The lymphocytosis could be a reactive process, although the   possibility of a lymphoproliferative disorder such as CLL or SLL   cannot be completely excluded.  Mild macrocytosis can be seen in   chronic liver disease, in association with the use of some   medications metabolized by the liver, and in some deficiency state   such as B12 and folate.  Thrombocytopenia is most often due to   decreased production by the marrow but can also be seen in increased   platelet loss/consumption or in a combination of these factors                           .  A   mixed etiology is also possible.  Correlation with clinical and other   laboratory findings is necessary.    PENG Costello M.D.      Pathologist Interpretation SHERI 12/11/2020 REVIEWED   Final    Comment: Electronically reviewed and signed by:  Jose Boateng MD  Signed on 12/14/20 at 13:43  No monoclonal peaks identified.      Pathologist  Interpretation SPE 12/11/2020 REVIEWED   Final    Comment: Electronically reviewed and signed by:  Jose Boateng MD  Signed on 12/14/20 at 13:44  Normal total protein, normal pattern.           Imaging: US Abdomen 9/23/20     Liver: Normal in size, measuring 15 cm. Coarsened echotexture and nodular contour compatible with cirrhosis.  No focal hepatic lesions.     Gallbladder: The gallbladder is surgically absent.     Biliary system: The common duct measures 7 mm in diameter.  No intrahepatic ductal dilatation.     Spleen: Normal in size with a homogeneous echotexture, measuring 10.1 x 3.1 cm.     Pancreas: The visualized portions of pancreas appear normal.     Right kidney: Normal in size with no hydronephrosis, measuring 9.5 cm.     Left kidney:  Normal in size with no hydronephrosis, measuring 9.4 cm.     Aorta: No aneurysm.     Inferior vena cava: Normal in appearance.     Miscellaneous: No ascites.  Assessment:       1. Macrocytic anemia    2. Thrombocytopenia    3. Cirrhosis of liver without ascites, unspecified hepatic cirrhosis type    4. Essential hypertension    5. Mixed hyperlipidemia    6. Type 2 diabetes mellitus without complication, without long-term current use of insulin           Plan:     Macrocytic Anemia - The patient has had a intermittent macrocytic anemia since at least 4/21/15.    -Over the past year her hemoglobin has ranged from 10.1-11.5  -B12 checked on 10/13/20 was elevated  -RBC folate checked an was normal  -haptoglobin, LDH, retic were unrevealing  -SPEP and SHERI showing no monoclonal proteins  -FLC assay showed elevated faisal and lambda light chains at 4.88mg/dL and 3.53mg/dL respectively with normal kappa to lambda ratio at 1.38 which is likely due to reduced renal clearance  -Macrocytic anemia is likely due to cirrhosis of the liver  -Given abnormal cells seen by pathology, BM biopsy was offered; however, the patient wishes to be monitored.  -Will repeat labs in 3  months    Elevated Free Light Chains - Serum free light chains showed an elevated faisal and lambda light chains at 4.88mg/dL and 3.53mg/dL respectively with normal kappa to lambda ratio at 1.38  -Elevated light chains are likely due to CKD and decreased renal clearance of these proteins.    Thrombocytopenia - the patient has a mild thrombocytopenia likely from liver dysfunction as well  -Platelet count has ranged from 125k-138k  -Peripheral smear showed some abnormal cells  -BM biopsy was offered; however, the patient wishes to be monitored.    Cirrhosis - Pt with cirrhosis likely from fatty liver  -Last Us was done on 9/23/20  -Pt is followed by hepatology  -Pt encouraged to diet and exercise in order to improve her condition    HTN - pt on bisoprolol-HCTZ, losartan  -PCP managing    HLD - Pt on pravastatin  -PCP managing    DMII - pt on metformin  -Last A1C on 9/08/20 was 6.6  -PCP managing    Advance Care Planning     Power of   I initiated the process of advance care planning today and explained the importance of this process to the patient.  I introduced the concept of advance directives to the patient, as well. Then the patient received detailed information about the importance of designating a Health Care Power of  (HCPOA). She was also instructed to communicate with this person about their wishes for future healthcare, should she become sick and lose decision-making capacity. The patient has not previously appointed a HCPOA. After our discussion, the patient has decided to complete a HCPOA and will bring the form completed to her next clinic appointment.          Follow Up - 3 months with CBC and CMP prior to the appt    Armando Hu MD  Hematology and Oncology  [facility]  Office:[phone]  Fax: [phone]

## 2020-12-22 ENCOUNTER — OFFICE VISIT (OUTPATIENT)
Dept: HEMATOLOGY/ONCOLOGY | Facility: CLINIC | Age: 70
End: 2020-12-22
Payer: MEDICARE

## 2020-12-22 VITALS
HEART RATE: 63 BPM | BODY MASS INDEX: 31.64 KG/M2 | SYSTOLIC BLOOD PRESSURE: 115 MMHG | HEIGHT: 59 IN | WEIGHT: 156.94 LBS | TEMPERATURE: 99 F | OXYGEN SATURATION: 97 % | DIASTOLIC BLOOD PRESSURE: 71 MMHG

## 2020-12-22 DIAGNOSIS — K74.60 CIRRHOSIS OF LIVER WITHOUT ASCITES, UNSPECIFIED HEPATIC CIRRHOSIS TYPE: ICD-10-CM

## 2020-12-22 DIAGNOSIS — D53.9 MACROCYTIC ANEMIA: Primary | ICD-10-CM

## 2020-12-22 DIAGNOSIS — E78.2 MIXED HYPERLIPIDEMIA: ICD-10-CM

## 2020-12-22 DIAGNOSIS — D69.6 THROMBOCYTOPENIA: ICD-10-CM

## 2020-12-22 DIAGNOSIS — E11.9 TYPE 2 DIABETES MELLITUS WITHOUT COMPLICATION, WITHOUT LONG-TERM CURRENT USE OF INSULIN: ICD-10-CM

## 2020-12-22 DIAGNOSIS — I10 ESSENTIAL HYPERTENSION: ICD-10-CM

## 2020-12-22 PROCEDURE — 99213 PR OFFICE/OUTPT VISIT, EST, LEVL III, 20-29 MIN: ICD-10-PCS | Mod: HCNC,S$GLB,, | Performed by: INTERNAL MEDICINE

## 2020-12-22 PROCEDURE — 3074F PR MOST RECENT SYSTOLIC BLOOD PRESSURE < 130 MM HG: ICD-10-PCS | Mod: HCNC,CPTII,S$GLB, | Performed by: INTERNAL MEDICINE

## 2020-12-22 PROCEDURE — 2024F 7 FLD RTA PHOTO EVC RTNOPTHY: CPT | Mod: HCNC,S$GLB,, | Performed by: INTERNAL MEDICINE

## 2020-12-22 PROCEDURE — 1101F PR PT FALLS ASSESS DOC 0-1 FALLS W/OUT INJ PAST YR: ICD-10-PCS | Mod: HCNC,CPTII,S$GLB, | Performed by: INTERNAL MEDICINE

## 2020-12-22 PROCEDURE — 3078F DIAST BP <80 MM HG: CPT | Mod: HCNC,CPTII,S$GLB, | Performed by: INTERNAL MEDICINE

## 2020-12-22 PROCEDURE — 3288F FALL RISK ASSESSMENT DOCD: CPT | Mod: HCNC,CPTII,S$GLB, | Performed by: INTERNAL MEDICINE

## 2020-12-22 PROCEDURE — 99213 OFFICE O/P EST LOW 20 MIN: CPT | Mod: HCNC,S$GLB,, | Performed by: INTERNAL MEDICINE

## 2020-12-22 PROCEDURE — 3288F PR FALLS RISK ASSESSMENT DOCUMENTED: ICD-10-PCS | Mod: HCNC,CPTII,S$GLB, | Performed by: INTERNAL MEDICINE

## 2020-12-22 PROCEDURE — 99999 PR PBB SHADOW E&M-EST. PATIENT-LVL V: ICD-10-PCS | Mod: PBBFAC,HCNC,, | Performed by: INTERNAL MEDICINE

## 2020-12-22 PROCEDURE — 3074F SYST BP LT 130 MM HG: CPT | Mod: HCNC,CPTII,S$GLB, | Performed by: INTERNAL MEDICINE

## 2020-12-22 PROCEDURE — 1126F AMNT PAIN NOTED NONE PRSNT: CPT | Mod: HCNC,S$GLB,, | Performed by: INTERNAL MEDICINE

## 2020-12-22 PROCEDURE — 3008F BODY MASS INDEX DOCD: CPT | Mod: HCNC,CPTII,S$GLB, | Performed by: INTERNAL MEDICINE

## 2020-12-22 PROCEDURE — 3078F PR MOST RECENT DIASTOLIC BLOOD PRESSURE < 80 MM HG: ICD-10-PCS | Mod: HCNC,CPTII,S$GLB, | Performed by: INTERNAL MEDICINE

## 2020-12-22 PROCEDURE — 3044F PR MOST RECENT HEMOGLOBIN A1C LEVEL <7.0%: ICD-10-PCS | Mod: HCNC,CPTII,S$GLB, | Performed by: INTERNAL MEDICINE

## 2020-12-22 PROCEDURE — 3044F HG A1C LEVEL LT 7.0%: CPT | Mod: HCNC,CPTII,S$GLB, | Performed by: INTERNAL MEDICINE

## 2020-12-22 PROCEDURE — 3008F PR BODY MASS INDEX (BMI) DOCUMENTED: ICD-10-PCS | Mod: HCNC,CPTII,S$GLB, | Performed by: INTERNAL MEDICINE

## 2020-12-22 PROCEDURE — 1101F PT FALLS ASSESS-DOCD LE1/YR: CPT | Mod: HCNC,CPTII,S$GLB, | Performed by: INTERNAL MEDICINE

## 2020-12-22 PROCEDURE — 1159F PR MEDICATION LIST DOCUMENTED IN MEDICAL RECORD: ICD-10-PCS | Mod: HCNC,S$GLB,, | Performed by: INTERNAL MEDICINE

## 2020-12-22 PROCEDURE — 1159F MED LIST DOCD IN RCRD: CPT | Mod: HCNC,S$GLB,, | Performed by: INTERNAL MEDICINE

## 2020-12-22 PROCEDURE — 1126F PR PAIN SEVERITY QUANTIFIED, NO PAIN PRESENT: ICD-10-PCS | Mod: HCNC,S$GLB,, | Performed by: INTERNAL MEDICINE

## 2020-12-22 PROCEDURE — 3072F LOW RISK FOR RETINOPATHY: CPT | Mod: HCNC,S$GLB,, | Performed by: INTERNAL MEDICINE

## 2020-12-22 PROCEDURE — 3072F PR LOW RISK FOR RETINOPATHY: ICD-10-PCS | Mod: HCNC,S$GLB,, | Performed by: INTERNAL MEDICINE

## 2020-12-22 PROCEDURE — 99999 PR PBB SHADOW E&M-EST. PATIENT-LVL V: CPT | Mod: PBBFAC,HCNC,, | Performed by: INTERNAL MEDICINE

## 2020-12-22 PROCEDURE — 2024F PR 7 FIELD PHOTOS WITH INTERP/ REVIEW: ICD-10-PCS | Mod: HCNC,S$GLB,, | Performed by: INTERNAL MEDICINE

## 2020-12-30 ENCOUNTER — TELEPHONE (OUTPATIENT)
Dept: DERMATOLOGY | Facility: CLINIC | Age: 70
End: 2020-12-30

## 2021-01-01 ENCOUNTER — TELEPHONE (OUTPATIENT)
Dept: INTERNAL MEDICINE | Facility: CLINIC | Age: 71
End: 2021-01-01

## 2021-01-01 ENCOUNTER — OFFICE VISIT (OUTPATIENT)
Dept: CARDIOLOGY | Facility: CLINIC | Age: 71
End: 2021-01-01
Payer: MEDICARE

## 2021-01-01 ENCOUNTER — LAB VISIT (OUTPATIENT)
Dept: LAB | Facility: HOSPITAL | Age: 71
End: 2021-01-01
Attending: INTERNAL MEDICINE
Payer: MEDICARE

## 2021-01-01 ENCOUNTER — HOSPITAL ENCOUNTER (OUTPATIENT)
Facility: HOSPITAL | Age: 71
Discharge: HOME | End: 2021-12-09
Attending: EMERGENCY MEDICINE | Admitting: INTERNAL MEDICINE
Payer: MEDICARE

## 2021-01-01 ENCOUNTER — HOSPITAL ENCOUNTER (OUTPATIENT)
Dept: RADIOLOGY | Facility: HOSPITAL | Age: 71
Discharge: HOME | End: 2021-03-22
Attending: NURSE PRACTITIONER
Payer: MEDICARE

## 2021-01-01 ENCOUNTER — TELEPHONE (OUTPATIENT)
Dept: DERMATOLOGY | Facility: CLINIC | Age: 71
End: 2021-01-01

## 2021-01-01 ENCOUNTER — CLINICAL SUPPORT (OUTPATIENT)
Dept: CARDIOLOGY | Facility: HOSPITAL | Age: 71
End: 2021-01-01
Attending: INTERNAL MEDICINE
Payer: MEDICARE

## 2021-01-01 ENCOUNTER — IMMUNIZATION (OUTPATIENT)
Dept: OBSTETRICS AND GYNECOLOGY | Facility: CLINIC | Age: 71
End: 2021-01-01
Payer: MEDICARE

## 2021-01-01 ENCOUNTER — HOSPITAL ENCOUNTER (OUTPATIENT)
Dept: PULMONOLOGY | Facility: CLINIC | Age: 71
Discharge: HOME | End: 2021-12-20
Payer: MEDICARE

## 2021-01-01 ENCOUNTER — HOSPITAL ENCOUNTER (EMERGENCY)
Facility: HOSPITAL | Age: 71
Discharge: HOME | End: 2021-11-08
Attending: EMERGENCY MEDICINE
Payer: MEDICARE

## 2021-01-01 ENCOUNTER — OFFICE VISIT (OUTPATIENT)
Dept: INTERNAL MEDICINE | Facility: CLINIC | Age: 71
End: 2021-01-01
Attending: FAMILY MEDICINE
Payer: MEDICARE

## 2021-01-01 ENCOUNTER — OFFICE VISIT (OUTPATIENT)
Dept: HEMATOLOGY/ONCOLOGY | Facility: CLINIC | Age: 71
End: 2021-01-01
Payer: MEDICARE

## 2021-01-01 ENCOUNTER — OFFICE VISIT (OUTPATIENT)
Dept: PULMONOLOGY | Facility: CLINIC | Age: 71
End: 2021-01-01
Payer: MEDICARE

## 2021-01-01 ENCOUNTER — OFFICE VISIT (OUTPATIENT)
Dept: HEPATOLOGY | Facility: CLINIC | Age: 71
End: 2021-01-01
Payer: MEDICARE

## 2021-01-01 ENCOUNTER — PATIENT MESSAGE (OUTPATIENT)
Dept: HEPATOLOGY | Facility: CLINIC | Age: 71
End: 2021-01-01

## 2021-01-01 ENCOUNTER — OFFICE VISIT (OUTPATIENT)
Dept: DERMATOLOGY | Facility: CLINIC | Age: 71
End: 2021-01-01
Payer: MEDICARE

## 2021-01-01 ENCOUNTER — TELEPHONE (OUTPATIENT)
Dept: HEMATOLOGY/ONCOLOGY | Facility: CLINIC | Age: 71
End: 2021-01-01

## 2021-01-01 ENCOUNTER — PATIENT MESSAGE (OUTPATIENT)
Dept: ADMINISTRATIVE | Facility: HOSPITAL | Age: 71
End: 2021-01-01

## 2021-01-01 ENCOUNTER — OFFICE VISIT (OUTPATIENT)
Dept: OTOLARYNGOLOGY | Facility: CLINIC | Age: 71
End: 2021-01-01
Payer: MEDICARE

## 2021-01-01 ENCOUNTER — NURSE TRIAGE (OUTPATIENT)
Dept: ADMINISTRATIVE | Facility: CLINIC | Age: 71
End: 2021-01-01
Payer: MEDICARE

## 2021-01-01 ENCOUNTER — TELEPHONE (OUTPATIENT)
Dept: INTERNAL MEDICINE | Facility: CLINIC | Age: 71
End: 2021-01-01
Payer: MEDICARE

## 2021-01-01 ENCOUNTER — OFFICE VISIT (OUTPATIENT)
Dept: INTERNAL MEDICINE | Facility: CLINIC | Age: 71
End: 2021-01-01
Payer: MEDICARE

## 2021-01-01 ENCOUNTER — HOSPITAL ENCOUNTER (OUTPATIENT)
Dept: RADIOLOGY | Facility: HOSPITAL | Age: 71
Discharge: HOME | End: 2021-09-24
Attending: NURSE PRACTITIONER
Payer: MEDICARE

## 2021-01-01 ENCOUNTER — PATIENT OUTREACH (OUTPATIENT)
Dept: ADMINISTRATIVE | Facility: OTHER | Age: 71
End: 2021-01-01
Payer: MEDICARE

## 2021-01-01 ENCOUNTER — CLINICAL SUPPORT (OUTPATIENT)
Dept: SPEECH THERAPY | Facility: HOSPITAL | Age: 71
End: 2021-01-01
Payer: MEDICARE

## 2021-01-01 ENCOUNTER — PATIENT OUTREACH (OUTPATIENT)
Dept: ADMINISTRATIVE | Facility: HOSPITAL | Age: 71
End: 2021-01-01
Payer: MEDICARE

## 2021-01-01 ENCOUNTER — HOSPITAL ENCOUNTER (OUTPATIENT)
Dept: RADIOLOGY | Facility: HOSPITAL | Age: 71
Discharge: HOME | End: 2021-12-03
Attending: INTERNAL MEDICINE
Payer: MEDICARE

## 2021-01-01 ENCOUNTER — HOSPITAL ENCOUNTER (OUTPATIENT)
Dept: PREADMISSION TESTING | Facility: HOSPITAL | Age: 71
Discharge: HOME | End: 2021-08-27
Attending: OTOLARYNGOLOGY
Payer: MEDICARE

## 2021-01-01 ENCOUNTER — PATIENT MESSAGE (OUTPATIENT)
Dept: PULMONOLOGY | Facility: CLINIC | Age: 71
End: 2021-01-01
Payer: MEDICARE

## 2021-01-01 ENCOUNTER — HOSPITAL ENCOUNTER (OUTPATIENT)
Dept: RADIOLOGY | Facility: HOSPITAL | Age: 71
Discharge: HOME | End: 2021-12-13
Attending: PHYSICIAN ASSISTANT
Payer: MEDICARE

## 2021-01-01 ENCOUNTER — HOSPITAL ENCOUNTER (EMERGENCY)
Facility: HOSPITAL | Age: 71
Discharge: HOME | End: 2021-12-01
Attending: EMERGENCY MEDICINE
Payer: MEDICARE

## 2021-01-01 ENCOUNTER — HOSPITAL ENCOUNTER (OUTPATIENT)
Dept: RADIOLOGY | Facility: CLINIC | Age: 71
Discharge: HOME | End: 2021-03-04
Attending: INTERNAL MEDICINE
Payer: MEDICARE

## 2021-01-01 ENCOUNTER — EXTERNAL HOME HEALTH (OUTPATIENT)
Dept: HOME HEALTH SERVICES | Facility: HOSPITAL | Age: 71
End: 2021-01-01
Payer: MEDICARE

## 2021-01-01 ENCOUNTER — HOSPITAL ENCOUNTER (OUTPATIENT)
Dept: CARDIOLOGY | Facility: HOSPITAL | Age: 71
Discharge: HOME | End: 2021-12-07
Attending: FAMILY MEDICINE
Payer: MEDICARE

## 2021-01-01 ENCOUNTER — HOSPITAL ENCOUNTER (EMERGENCY)
Facility: HOSPITAL | Age: 71
Discharge: HOME | End: 2021-10-25
Attending: EMERGENCY MEDICINE
Payer: MEDICARE

## 2021-01-01 ENCOUNTER — PES CALL (OUTPATIENT)
Dept: ADMINISTRATIVE | Facility: CLINIC | Age: 71
End: 2021-01-01

## 2021-01-01 ENCOUNTER — PATIENT OUTREACH (OUTPATIENT)
Dept: ADMINISTRATIVE | Facility: OTHER | Age: 71
End: 2021-01-01

## 2021-01-01 ENCOUNTER — HOSPITAL ENCOUNTER (OUTPATIENT)
Dept: RADIOLOGY | Facility: HOSPITAL | Age: 71
Discharge: HOME | End: 2021-11-12
Attending: INTERNAL MEDICINE
Payer: MEDICARE

## 2021-01-01 VITALS
HEIGHT: 59 IN | TEMPERATURE: 99 F | BODY MASS INDEX: 31.92 KG/M2 | DIASTOLIC BLOOD PRESSURE: 68 MMHG | OXYGEN SATURATION: 100 % | WEIGHT: 158.31 LBS | HEART RATE: 73 BPM | SYSTOLIC BLOOD PRESSURE: 138 MMHG

## 2021-01-01 VITALS
SYSTOLIC BLOOD PRESSURE: 126 MMHG | WEIGHT: 159.38 LBS | RESPIRATION RATE: 18 BRPM | HEIGHT: 59 IN | TEMPERATURE: 97 F | HEART RATE: 61 BPM | DIASTOLIC BLOOD PRESSURE: 62 MMHG | OXYGEN SATURATION: 99 % | BODY MASS INDEX: 32.13 KG/M2

## 2021-01-01 VITALS
DIASTOLIC BLOOD PRESSURE: 64 MMHG | SYSTOLIC BLOOD PRESSURE: 122 MMHG | BODY MASS INDEX: 34.13 KG/M2 | WEIGHT: 169 LBS | HEART RATE: 66 BPM

## 2021-01-01 VITALS
BODY MASS INDEX: 31.95 KG/M2 | WEIGHT: 158.5 LBS | OXYGEN SATURATION: 97 % | SYSTOLIC BLOOD PRESSURE: 137 MMHG | RESPIRATION RATE: 18 BRPM | HEIGHT: 59 IN | DIASTOLIC BLOOD PRESSURE: 84 MMHG | HEART RATE: 77 BPM | TEMPERATURE: 97 F

## 2021-01-01 VITALS
OXYGEN SATURATION: 98 % | OXYGEN SATURATION: 97 % | OXYGEN SATURATION: 95 % | DIASTOLIC BLOOD PRESSURE: 62 MMHG | HEART RATE: 70 BPM | HEIGHT: 59 IN | BODY MASS INDEX: 31.55 KG/M2 | HEART RATE: 102 BPM | DIASTOLIC BLOOD PRESSURE: 50 MMHG | HEART RATE: 72 BPM | DIASTOLIC BLOOD PRESSURE: 52 MMHG | SYSTOLIC BLOOD PRESSURE: 134 MMHG | SYSTOLIC BLOOD PRESSURE: 138 MMHG | TEMPERATURE: 98 F | SYSTOLIC BLOOD PRESSURE: 118 MMHG | HEIGHT: 59 IN | HEIGHT: 59 IN | BODY MASS INDEX: 31.45 KG/M2 | WEIGHT: 156 LBS | WEIGHT: 156 LBS | DIASTOLIC BLOOD PRESSURE: 60 MMHG | HEIGHT: 59 IN | RESPIRATION RATE: 16 BRPM | SYSTOLIC BLOOD PRESSURE: 116 MMHG | BODY MASS INDEX: 30.24 KG/M2 | WEIGHT: 150 LBS | RESPIRATION RATE: 16 BRPM | HEART RATE: 91 BPM | WEIGHT: 156.5 LBS | TEMPERATURE: 99 F | BODY MASS INDEX: 31.45 KG/M2

## 2021-01-01 VITALS
DIASTOLIC BLOOD PRESSURE: 82 MMHG | OXYGEN SATURATION: 97 % | BODY MASS INDEX: 32 KG/M2 | SYSTOLIC BLOOD PRESSURE: 120 MMHG | HEART RATE: 140 BPM | HEIGHT: 59 IN | WEIGHT: 158.75 LBS

## 2021-01-01 VITALS
HEIGHT: 59 IN | SYSTOLIC BLOOD PRESSURE: 126 MMHG | DIASTOLIC BLOOD PRESSURE: 59 MMHG | OXYGEN SATURATION: 97 % | WEIGHT: 156.31 LBS | HEART RATE: 88 BPM | BODY MASS INDEX: 31.51 KG/M2 | TEMPERATURE: 99 F

## 2021-01-01 VITALS
SYSTOLIC BLOOD PRESSURE: 130 MMHG | BODY MASS INDEX: 32.25 KG/M2 | HEIGHT: 59 IN | HEART RATE: 106 BPM | OXYGEN SATURATION: 98 % | DIASTOLIC BLOOD PRESSURE: 70 MMHG | WEIGHT: 160 LBS

## 2021-01-01 VITALS
TEMPERATURE: 98 F | OXYGEN SATURATION: 100 % | DIASTOLIC BLOOD PRESSURE: 61 MMHG | WEIGHT: 151 LBS | HEIGHT: 59 IN | RESPIRATION RATE: 17 BRPM | HEART RATE: 70 BPM | BODY MASS INDEX: 30.44 KG/M2 | SYSTOLIC BLOOD PRESSURE: 136 MMHG

## 2021-01-01 VITALS
OXYGEN SATURATION: 98 % | HEIGHT: 59 IN | BODY MASS INDEX: 32.22 KG/M2 | BODY MASS INDEX: 32.25 KG/M2 | HEART RATE: 58 BPM | WEIGHT: 159.81 LBS | WEIGHT: 160 LBS | DIASTOLIC BLOOD PRESSURE: 70 MMHG | HEIGHT: 59 IN | DIASTOLIC BLOOD PRESSURE: 78 MMHG | SYSTOLIC BLOOD PRESSURE: 130 MMHG | SYSTOLIC BLOOD PRESSURE: 128 MMHG

## 2021-01-01 VITALS
WEIGHT: 157 LBS | SYSTOLIC BLOOD PRESSURE: 116 MMHG | HEART RATE: 78 BPM | DIASTOLIC BLOOD PRESSURE: 50 MMHG | OXYGEN SATURATION: 98 % | RESPIRATION RATE: 18 BRPM | TEMPERATURE: 99 F | BODY MASS INDEX: 31.71 KG/M2

## 2021-01-01 VITALS
TEMPERATURE: 98 F | HEIGHT: 59 IN | DIASTOLIC BLOOD PRESSURE: 74 MMHG | SYSTOLIC BLOOD PRESSURE: 132 MMHG | WEIGHT: 160.38 LBS | BODY MASS INDEX: 32.33 KG/M2

## 2021-01-01 VITALS
SYSTOLIC BLOOD PRESSURE: 130 MMHG | HEIGHT: 59 IN | WEIGHT: 152 LBS | BODY MASS INDEX: 30.64 KG/M2 | TEMPERATURE: 97 F | OXYGEN SATURATION: 97 % | RESPIRATION RATE: 18 BRPM | DIASTOLIC BLOOD PRESSURE: 58 MMHG | HEART RATE: 73 BPM

## 2021-01-01 VITALS
OXYGEN SATURATION: 99 % | HEIGHT: 59 IN | DIASTOLIC BLOOD PRESSURE: 62 MMHG | SYSTOLIC BLOOD PRESSURE: 124 MMHG | WEIGHT: 156.63 LBS | BODY MASS INDEX: 31.58 KG/M2 | HEART RATE: 74 BPM

## 2021-01-01 VITALS
WEIGHT: 156.5 LBS | HEIGHT: 59 IN | TEMPERATURE: 98 F | SYSTOLIC BLOOD PRESSURE: 122 MMHG | SYSTOLIC BLOOD PRESSURE: 120 MMHG | BODY MASS INDEX: 31.51 KG/M2 | DIASTOLIC BLOOD PRESSURE: 58 MMHG | BODY MASS INDEX: 31.55 KG/M2 | DIASTOLIC BLOOD PRESSURE: 72 MMHG | HEART RATE: 107 BPM | WEIGHT: 156 LBS

## 2021-01-01 DIAGNOSIS — R00.2 PALPITATIONS: Primary | ICD-10-CM

## 2021-01-01 DIAGNOSIS — R06.09 DOE (DYSPNEA ON EXERTION): Primary | ICD-10-CM

## 2021-01-01 DIAGNOSIS — E11.9 TYPE 2 DIABETES MELLITUS WITHOUT COMPLICATION, WITHOUT LONG-TERM CURRENT USE OF INSULIN: ICD-10-CM

## 2021-01-01 DIAGNOSIS — L82.1 SK (SEBORRHEIC KERATOSIS): ICD-10-CM

## 2021-01-01 DIAGNOSIS — R76.8 ELEVATED SERUM IMMUNOGLOBULIN FREE LIGHT CHAINS: ICD-10-CM

## 2021-01-01 DIAGNOSIS — R49.0 DYSPHONIA: Primary | ICD-10-CM

## 2021-01-01 DIAGNOSIS — M10.9 INTERVAL GOUT: ICD-10-CM

## 2021-01-01 DIAGNOSIS — R09.A2 GLOBUS SENSATION: ICD-10-CM

## 2021-01-01 DIAGNOSIS — E86.0 DEHYDRATION: ICD-10-CM

## 2021-01-01 DIAGNOSIS — K21.00 GASTROESOPHAGEAL REFLUX DISEASE WITH ESOPHAGITIS: ICD-10-CM

## 2021-01-01 DIAGNOSIS — R71.8 OTHER ABNORMALITY OF RED BLOOD CELLS: ICD-10-CM

## 2021-01-01 DIAGNOSIS — I10 ESSENTIAL HYPERTENSION: ICD-10-CM

## 2021-01-01 DIAGNOSIS — R49.0 DYSPHONIA: ICD-10-CM

## 2021-01-01 DIAGNOSIS — K74.60 CIRRHOSIS OF LIVER WITHOUT ASCITES, UNSPECIFIED HEPATIC CIRRHOSIS TYPE: ICD-10-CM

## 2021-01-01 DIAGNOSIS — Z01.818 PRE-OP TESTING: ICD-10-CM

## 2021-01-01 DIAGNOSIS — D53.9 MACROCYTIC ANEMIA: ICD-10-CM

## 2021-01-01 DIAGNOSIS — D53.9 MACROCYTIC ANEMIA: Primary | ICD-10-CM

## 2021-01-01 DIAGNOSIS — J20.9 ACUTE BRONCHITIS, UNSPECIFIED ORGANISM: Primary | ICD-10-CM

## 2021-01-01 DIAGNOSIS — K76.0 FATTY LIVER: ICD-10-CM

## 2021-01-01 DIAGNOSIS — N18.2 CKD (CHRONIC KIDNEY DISEASE) STAGE 2, GFR 60-89 ML/MIN: ICD-10-CM

## 2021-01-01 DIAGNOSIS — J06.9 UPPER RESPIRATORY TRACT INFECTION, UNSPECIFIED TYPE: ICD-10-CM

## 2021-01-01 DIAGNOSIS — D69.6 THROMBOCYTOPENIA: ICD-10-CM

## 2021-01-01 DIAGNOSIS — E11.9 TYPE 2 DIABETES MELLITUS WITHOUT COMPLICATION: ICD-10-CM

## 2021-01-01 DIAGNOSIS — R05.3 CHRONIC COUGH: ICD-10-CM

## 2021-01-01 DIAGNOSIS — Z00.00 VISIT FOR ANNUAL HEALTH EXAMINATION: Primary | ICD-10-CM

## 2021-01-01 DIAGNOSIS — K21.9 GASTROESOPHAGEAL REFLUX DISEASE, UNSPECIFIED WHETHER ESOPHAGITIS PRESENT: ICD-10-CM

## 2021-01-01 DIAGNOSIS — K74.69 OTHER CIRRHOSIS OF LIVER: Primary | ICD-10-CM

## 2021-01-01 DIAGNOSIS — I50.32 CHRONIC DIASTOLIC HEART FAILURE: ICD-10-CM

## 2021-01-01 DIAGNOSIS — E55.9 VITAMIN D DEFICIENCY: ICD-10-CM

## 2021-01-01 DIAGNOSIS — E87.20 LACTIC ACIDOSIS: ICD-10-CM

## 2021-01-01 DIAGNOSIS — Z12.31 ENCOUNTER FOR SCREENING MAMMOGRAM FOR MALIGNANT NEOPLASM OF BREAST: ICD-10-CM

## 2021-01-01 DIAGNOSIS — R13.10 DYSPHAGIA, UNSPECIFIED TYPE: ICD-10-CM

## 2021-01-01 DIAGNOSIS — E78.2 MIXED HYPERLIPIDEMIA: ICD-10-CM

## 2021-01-01 DIAGNOSIS — R07.9 CHEST PAIN, UNSPECIFIED TYPE: ICD-10-CM

## 2021-01-01 DIAGNOSIS — K74.60 CIRRHOSIS OF LIVER WITHOUT ASCITES, UNSPECIFIED HEPATIC CIRRHOSIS TYPE: Primary | ICD-10-CM

## 2021-01-01 DIAGNOSIS — R53.83 FATIGUE: ICD-10-CM

## 2021-01-01 DIAGNOSIS — G47.30 SLEEP APNEA, UNSPECIFIED TYPE: ICD-10-CM

## 2021-01-01 DIAGNOSIS — Z23 NEED FOR VACCINATION: ICD-10-CM

## 2021-01-01 DIAGNOSIS — J01.90 ACUTE BACTERIAL SINUSITIS: Primary | ICD-10-CM

## 2021-01-01 DIAGNOSIS — J06.9 VIRAL URI WITH COUGH: ICD-10-CM

## 2021-01-01 DIAGNOSIS — R06.00 DYSPNEA, UNSPECIFIED TYPE: ICD-10-CM

## 2021-01-01 DIAGNOSIS — K21.9 LARYNGOPHARYNGEAL REFLUX (LPR): Primary | ICD-10-CM

## 2021-01-01 DIAGNOSIS — J20.9 ACUTE BRONCHITIS, UNSPECIFIED ORGANISM: ICD-10-CM

## 2021-01-01 DIAGNOSIS — E87.20 METABOLIC ACIDOSIS: Primary | ICD-10-CM

## 2021-01-01 DIAGNOSIS — I95.9 HYPOTENSION: ICD-10-CM

## 2021-01-01 DIAGNOSIS — R13.10 DYSPHAGIA, UNSPECIFIED TYPE: Primary | ICD-10-CM

## 2021-01-01 DIAGNOSIS — Z23 NEED FOR VACCINATION: Primary | ICD-10-CM

## 2021-01-01 DIAGNOSIS — R53.83 FATIGUE, UNSPECIFIED TYPE: Primary | ICD-10-CM

## 2021-01-01 DIAGNOSIS — R06.09 DOE (DYSPNEA ON EXERTION): ICD-10-CM

## 2021-01-01 DIAGNOSIS — R05.9 COUGH: Primary | ICD-10-CM

## 2021-01-01 DIAGNOSIS — Z78.0 POSTMENOPAUSAL: ICD-10-CM

## 2021-01-01 DIAGNOSIS — R07.9 CHEST PAIN: ICD-10-CM

## 2021-01-01 DIAGNOSIS — R68.89 OTHER GENERAL SYMPTOMS AND SIGNS: ICD-10-CM

## 2021-01-01 DIAGNOSIS — I10 HYPERTENSION, UNSPECIFIED TYPE: ICD-10-CM

## 2021-01-01 DIAGNOSIS — L40.9 PSORIASIS: ICD-10-CM

## 2021-01-01 DIAGNOSIS — R05.9 COUGH: ICD-10-CM

## 2021-01-01 DIAGNOSIS — J98.11 DISCOID ATELECTASIS: Primary | ICD-10-CM

## 2021-01-01 DIAGNOSIS — E86.0 DEHYDRATION: Primary | ICD-10-CM

## 2021-01-01 DIAGNOSIS — R04.0 EPISTAXIS: Primary | ICD-10-CM

## 2021-01-01 DIAGNOSIS — R09.A2 GLOBUS SENSATION: Primary | ICD-10-CM

## 2021-01-01 DIAGNOSIS — D61.818 PANCYTOPENIA: ICD-10-CM

## 2021-01-01 DIAGNOSIS — J30.9 ALLERGIC RHINITIS, UNSPECIFIED SEASONALITY, UNSPECIFIED TRIGGER: ICD-10-CM

## 2021-01-01 DIAGNOSIS — L40.0 PSORIASIS VULGARIS: Primary | ICD-10-CM

## 2021-01-01 DIAGNOSIS — R06.02 SHORTNESS OF BREATH: ICD-10-CM

## 2021-01-01 DIAGNOSIS — B96.89 ACUTE BACTERIAL SINUSITIS: Primary | ICD-10-CM

## 2021-01-01 LAB
25(OH)D3+25(OH)D2 SERPL-MCNC: 33 NG/ML (ref 30–96)
ALBUMIN SERPL BCP-MCNC: 2.4 G/DL (ref 3.5–5.2)
ALBUMIN SERPL BCP-MCNC: 2.5 G/DL (ref 3.5–5.2)
ALBUMIN SERPL BCP-MCNC: 3.4 G/DL (ref 3.5–5.2)
ALBUMIN SERPL-MCNC: 3.4 G/DL (ref 3.3–5.5)
ALBUMIN/CREAT UR: 12.4 UG/MG (ref 0–30)
ALLENS TEST: ABNORMAL
ALLENS TEST: ABNORMAL
ALLENS TEST: NORMAL
ALP SERPL-CCNC: 83 U/L (ref 42–141)
ALP SERPL-CCNC: 89 U/L (ref 55–135)
ALP SERPL-CCNC: 95 U/L (ref 55–135)
ALP SERPL-CCNC: 97 U/L (ref 55–135)
ALT SERPL W/O P-5'-P-CCNC: 25 U/L (ref 10–44)
ALT SERPL W/O P-5'-P-CCNC: 28 U/L (ref 10–44)
ALT SERPL W/O P-5'-P-CCNC: 40 U/L (ref 10–44)
ANION GAP SERPL CALC-SCNC: 10 MMOL/L (ref 8–16)
ANION GAP SERPL CALC-SCNC: 10 MMOL/L (ref 8–16)
ANION GAP SERPL CALC-SCNC: 11 MMOL/L (ref 8–16)
ANION GAP SERPL CALC-SCNC: 7 MMOL/L (ref 8–16)
ANION GAP SERPL CALC-SCNC: 8 MMOL/L (ref 8–16)
ANION GAP SERPL CALC-SCNC: 8 MMOL/L (ref 8–16)
ASCENDING AORTA: 2.83 CM
AST SERPL-CCNC: 51 U/L (ref 10–40)
AST SERPL-CCNC: 64 U/L (ref 10–40)
AST SERPL-CCNC: 69 U/L (ref 10–40)
AV INDEX (PROSTH): 0.8
AV MEAN GRADIENT: 8 MMHG
AV PEAK GRADIENT: 16 MMHG
AV VALVE AREA: 2.26 CM2
AV VELOCITY RATIO: 0.75
BACTERIA BLD CULT: NORMAL
BACTERIA BLD CULT: NORMAL
BASOPHILS # BLD AUTO: 0.01 K/UL (ref 0–0.2)
BASOPHILS # BLD AUTO: 0.02 K/UL (ref 0–0.2)
BASOPHILS # BLD AUTO: 0.03 K/UL (ref 0–0.2)
BASOPHILS NFR BLD: 0.2 % (ref 0–1.9)
BASOPHILS NFR BLD: 0.3 % (ref 0–1.9)
BASOPHILS NFR BLD: 0.3 % (ref 0–1.9)
BILIRUB SERPL-MCNC: 0.8 MG/DL (ref 0.1–1)
BILIRUB SERPL-MCNC: 0.9 MG/DL (ref 0.1–1)
BILIRUB SERPL-MCNC: 1 MG/DL (ref 0.1–1)
BILIRUB SERPL-MCNC: 1 MG/DL (ref 0.2–1.6)
BILIRUB UR QL STRIP: NEGATIVE
BILIRUBIN, POC UA: NEGATIVE
BLOOD, POC UA: NEGATIVE
BNP SERPL-MCNC: 297 PG/ML (ref 0–99)
BNP SERPL-MCNC: 309 PG/ML (ref 0–99)
BSA FOR ECHO PROCEDURE: 1.72 M2
BUN SERPL-MCNC: 14 MG/DL (ref 8–23)
BUN SERPL-MCNC: 18 MG/DL (ref 7–22)
BUN SERPL-MCNC: 21 MG/DL (ref 8–23)
BUN SERPL-MCNC: 21 MG/DL (ref 8–23)
BUN SERPL-MCNC: 23 MG/DL (ref 8–23)
BUN SERPL-MCNC: 23 MG/DL (ref 8–23)
BUN SERPL-MCNC: 24 MG/DL (ref 6–30)
BUN SERPL-MCNC: 25 MG/DL (ref 8–23)
CALCIUM SERPL-MCNC: 10.1 MG/DL (ref 8–10.3)
CALCIUM SERPL-MCNC: 10.2 MG/DL (ref 8.7–10.5)
CALCIUM SERPL-MCNC: 8.5 MG/DL (ref 8.7–10.5)
CALCIUM SERPL-MCNC: 8.5 MG/DL (ref 8.7–10.5)
CALCIUM SERPL-MCNC: 8.8 MG/DL (ref 8.7–10.5)
CALCIUM SERPL-MCNC: 9 MG/DL (ref 8.7–10.5)
CALCIUM SERPL-MCNC: 9.1 MG/DL (ref 8.7–10.5)
CHLORIDE SERPL-SCNC: 108 MMOL/L (ref 95–110)
CHLORIDE SERPL-SCNC: 111 MMOL/L (ref 98–108)
CHLORIDE SERPL-SCNC: 112 MMOL/L (ref 95–110)
CHLORIDE SERPL-SCNC: 113 MMOL/L (ref 95–110)
CHLORIDE SERPL-SCNC: 114 MMOL/L (ref 95–110)
CHLORIDE SERPL-SCNC: 114 MMOL/L (ref 95–110)
CHLORIDE SERPL-SCNC: 115 MMOL/L (ref 95–110)
CHLORIDE SERPL-SCNC: 120 MMOL/L (ref 95–110)
CLARITY UR REFRACT.AUTO: ABNORMAL
CLARITY, POC UA: CLEAR
CO2 SERPL-SCNC: 11 MMOL/L (ref 23–29)
CO2 SERPL-SCNC: 13 MMOL/L (ref 23–29)
CO2 SERPL-SCNC: 14 MMOL/L (ref 23–29)
CO2 SERPL-SCNC: 15 MMOL/L (ref 23–29)
CO2 SERPL-SCNC: 19 MMOL/L (ref 23–29)
CO2 SERPL-SCNC: 19 MMOL/L (ref 23–29)
COLOR UR AUTO: YELLOW
COLOR, POC UA: YELLOW
CREAT SERPL-MCNC: 0.8 MG/DL (ref 0.5–1.4)
CREAT SERPL-MCNC: 1 MG/DL (ref 0.6–1.2)
CREAT SERPL-MCNC: 1.1 MG/DL (ref 0.5–1.4)
CREAT SERPL-MCNC: 1.2 MG/DL (ref 0.5–1.4)
CREAT SERPL-MCNC: 1.2 MG/DL (ref 0.5–1.4)
CREAT SERPL-MCNC: 1.4 MG/DL (ref 0.5–1.4)
CREAT UR-MCNC: 97 MG/DL (ref 15–325)
CTP QC/QA: YES
CTP QC/QA: YES
CV ECHO LV RWT: 0.45 CM
CV STRESS BASE HR: 72 BPM
DELSYS: ABNORMAL
DELSYS: ABNORMAL
DELSYS: NORMAL
DIASTOLIC BLOOD PRESSURE: 60 MMHG
DIFFERENTIAL METHOD: ABNORMAL
DOP CALC AO PEAK VEL: 2.03 M/S
DOP CALC AO VTI: 41.92 CM
DOP CALC LVOT AREA: 2.8 CM2
DOP CALC LVOT DIAMETER: 1.9 CM
DOP CALC LVOT PEAK VEL: 1.53 M/S
DOP CALC LVOT STROKE VOLUME: 94.62 CM3
DOP CALCLVOT PEAK VEL VTI: 33.39 CM
E WAVE DECELERATION TIME: 274.4 MSEC
E/A RATIO: 1.19
E/E' RATIO: 14.27 M/S
ECHO LV POSTERIOR WALL: 0.94 CM (ref 0.6–1.1)
EJECTION FRACTION: 68 %
EOSINOPHIL # BLD AUTO: 0.1 K/UL (ref 0–0.5)
EOSINOPHIL # BLD AUTO: 0.2 K/UL (ref 0–0.5)
EOSINOPHIL # BLD AUTO: 0.3 K/UL (ref 0–0.5)
EOSINOPHIL # BLD AUTO: 0.3 K/UL (ref 0–0.5)
EOSINOPHIL # BLD AUTO: 0.4 K/UL (ref 0–0.5)
EOSINOPHIL NFR BLD: 1.4 % (ref 0–8)
EOSINOPHIL NFR BLD: 1.7 % (ref 0–8)
EOSINOPHIL NFR BLD: 3.8 % (ref 0–8)
EOSINOPHIL NFR BLD: 4.6 % (ref 0–8)
EOSINOPHIL NFR BLD: 4.9 % (ref 0–8)
ERYTHROCYTE [DISTWIDTH] IN BLOOD BY AUTOMATED COUNT: 14.9 % (ref 11.5–14.5)
ERYTHROCYTE [DISTWIDTH] IN BLOOD BY AUTOMATED COUNT: 17.7 % (ref 11.5–14.5)
ERYTHROCYTE [DISTWIDTH] IN BLOOD BY AUTOMATED COUNT: 18.2 % (ref 11.5–14.5)
ERYTHROCYTE [DISTWIDTH] IN BLOOD BY AUTOMATED COUNT: 18.3 % (ref 11.5–14.5)
ERYTHROCYTE [DISTWIDTH] IN BLOOD BY AUTOMATED COUNT: 18.4 % (ref 11.5–14.5)
ERYTHROCYTE [DISTWIDTH] IN BLOOD BY AUTOMATED COUNT: 18.5 % (ref 11.5–14.5)
EST. GFR  (AFRICAN AMERICAN): 43.6 ML/MIN/1.73 M^2
EST. GFR  (AFRICAN AMERICAN): 43.6 ML/MIN/1.73 M^2
EST. GFR  (AFRICAN AMERICAN): 52.5 ML/MIN/1.73 M^2
EST. GFR  (AFRICAN AMERICAN): 52.5 ML/MIN/1.73 M^2
EST. GFR  (AFRICAN AMERICAN): 58.8 ML/MIN/1.73 M^2
EST. GFR  (AFRICAN AMERICAN): >60 ML/MIN/1.73 M^2
EST. GFR  (NON AFRICAN AMERICAN): 37.8 ML/MIN/1.73 M^2
EST. GFR  (NON AFRICAN AMERICAN): 37.8 ML/MIN/1.73 M^2
EST. GFR  (NON AFRICAN AMERICAN): 45.6 ML/MIN/1.73 M^2
EST. GFR  (NON AFRICAN AMERICAN): 45.6 ML/MIN/1.73 M^2
EST. GFR  (NON AFRICAN AMERICAN): 51 ML/MIN/1.73 M^2
EST. GFR  (NON AFRICAN AMERICAN): >60 ML/MIN/1.73 M^2
FEF 25 75 LLN: 0.71
FEF 25 75 PRE REF: 140.3 %
FEF 25 75 REF: 1.55
FERRITIN SERPL-MCNC: 40 NG/ML (ref 20–300)
FEV05 LLN: 0.5
FEV05 REF: 1.35
FEV1 FVC LLN: 65
FEV1 FVC PRE REF: 105.5 %
FEV1 FVC REF: 79
FEV1 LLN: 1.23
FEV1 PRE REF: 91.7 %
FEV1 REF: 1.7
FRACTIONAL SHORTENING: 28 % (ref 28–44)
FVC LLN: 1.57
FVC PRE REF: 86.6 %
FVC REF: 2.17
GLUCOSE SERPL-MCNC: 117 MG/DL (ref 70–110)
GLUCOSE SERPL-MCNC: 119 MG/DL (ref 70–110)
GLUCOSE SERPL-MCNC: 136 MG/DL (ref 70–110)
GLUCOSE SERPL-MCNC: 148 MG/DL (ref 70–110)
GLUCOSE SERPL-MCNC: 150 MG/DL (ref 70–110)
GLUCOSE SERPL-MCNC: 185 MG/DL (ref 73–118)
GLUCOSE SERPL-MCNC: 87 MG/DL (ref 70–110)
GLUCOSE SERPL-MCNC: 89 MG/DL (ref 70–110)
GLUCOSE UR QL STRIP: NEGATIVE
GLUCOSE, POC UA: NEGATIVE
HCO3 UR-SCNC: 13.7 MMOL/L (ref 24–28)
HCO3 UR-SCNC: 18.7 MMOL/L (ref 24–28)
HCT VFR BLD AUTO: 25.1 % (ref 37–48.5)
HCT VFR BLD AUTO: 27.1 % (ref 37–48.5)
HCT VFR BLD AUTO: 27.4 % (ref 37–48.5)
HCT VFR BLD AUTO: 28.6 % (ref 37–48.5)
HCT VFR BLD AUTO: 29.1 % (ref 37–48.5)
HCT VFR BLD AUTO: 33.5 % (ref 37–48.5)
HCT VFR BLD CALC: 28 %PCV (ref 36–54)
HCT VFR BLD CALC: 34 %PCV (ref 36–54)
HGB BLD-MCNC: 10.4 G/DL (ref 12–16)
HGB BLD-MCNC: 8 G/DL (ref 12–16)
HGB BLD-MCNC: 8.7 G/DL (ref 12–16)
HGB BLD-MCNC: 8.8 G/DL (ref 12–16)
HGB BLD-MCNC: 9 G/DL (ref 12–16)
HGB BLD-MCNC: 9.1 G/DL (ref 12–16)
HGB UR QL STRIP: NEGATIVE
HYALINE CASTS UR QL AUTO: 44 /LPF
IMM GRANULOCYTES # BLD AUTO: 0.01 K/UL (ref 0–0.04)
IMM GRANULOCYTES # BLD AUTO: 0.02 K/UL (ref 0–0.04)
IMM GRANULOCYTES # BLD AUTO: 0.02 K/UL (ref 0–0.04)
IMM GRANULOCYTES # BLD AUTO: 0.03 K/UL (ref 0–0.04)
IMM GRANULOCYTES # BLD AUTO: 0.03 K/UL (ref 0–0.04)
IMM GRANULOCYTES NFR BLD AUTO: 0.2 % (ref 0–0.5)
IMM GRANULOCYTES NFR BLD AUTO: 0.2 % (ref 0–0.5)
IMM GRANULOCYTES NFR BLD AUTO: 0.3 % (ref 0–0.5)
IMM GRANULOCYTES NFR BLD AUTO: 0.3 % (ref 0–0.5)
IMM GRANULOCYTES NFR BLD AUTO: 0.4 % (ref 0–0.5)
INFLUENZA A ANTIGEN, POC: NEGATIVE
INFLUENZA B ANTIGEN, POC: NEGATIVE
INR PPP: 1.1 (ref 0.8–1.2)
INR PPP: 1.1 (ref 0.8–1.2)
INTERVENTRICULAR SEPTUM: 0.92 CM (ref 0.6–1.1)
IRON SERPL-MCNC: 94 UG/DL (ref 30–160)
IVRT: 57.09 MSEC
KETONES UR QL STRIP: NEGATIVE
KETONES, POC UA: NEGATIVE
LA MAJOR: 6.02 CM
LA MINOR: 5.53 CM
LA WIDTH: 2.85 CM
LACTATE SERPL-SCNC: 1.1 MMOL/L (ref 0.5–2.2)
LACTATE SERPL-SCNC: 2.3 MMOL/L (ref 0.5–2.2)
LACTATE SERPL-SCNC: 2.8 MMOL/L (ref 0.5–2.2)
LDH SERPL L TO P-CCNC: 2.05 MMOL/L (ref 0.5–2.2)
LEFT ATRIUM SIZE: 3.52 CM
LEFT ATRIUM VOLUME INDEX: 29.6 ML/M2
LEFT ATRIUM VOLUME: 49.16 CM3
LEFT INTERNAL DIMENSION IN SYSTOLE: 3.05 CM (ref 2.1–4)
LEFT VENTRICLE DIASTOLIC VOLUME INDEX: 47.95 ML/M2
LEFT VENTRICLE DIASTOLIC VOLUME: 79.59 ML
LEFT VENTRICLE MASS INDEX: 75 G/M2
LEFT VENTRICLE SYSTOLIC VOLUME INDEX: 22 ML/M2
LEFT VENTRICLE SYSTOLIC VOLUME: 36.47 ML
LEFT VENTRICULAR INTERNAL DIMENSION IN DIASTOLE: 4.22 CM (ref 3.5–6)
LEFT VENTRICULAR MASS: 125.07 G
LEUKOCYTE EST, POC UA: NEGATIVE
LEUKOCYTE ESTERASE UR QL STRIP: NEGATIVE
LV LATERAL E/E' RATIO: 13.38 M/S
LV SEPTAL E/E' RATIO: 15.29 M/S
LYMPHOCYTES # BLD AUTO: 2.3 K/UL (ref 1–4.8)
LYMPHOCYTES # BLD AUTO: 3.9 K/UL (ref 1–4.8)
LYMPHOCYTES # BLD AUTO: 4.3 K/UL (ref 1–4.8)
LYMPHOCYTES # BLD AUTO: 4.7 K/UL (ref 1–4.8)
LYMPHOCYTES # BLD AUTO: 4.9 K/UL (ref 1–4.8)
LYMPHOCYTES NFR BLD: 41.1 % (ref 18–48)
LYMPHOCYTES NFR BLD: 46.9 % (ref 18–48)
LYMPHOCYTES NFR BLD: 55.3 % (ref 18–48)
LYMPHOCYTES NFR BLD: 57.9 % (ref 18–48)
LYMPHOCYTES NFR BLD: 58.1 % (ref 18–48)
MCH RBC QN AUTO: 33 PG (ref 27–31)
MCH RBC QN AUTO: 34.1 PG (ref 27–31)
MCH RBC QN AUTO: 34.6 PG (ref 27–31)
MCH RBC QN AUTO: 34.9 PG (ref 27–31)
MCH RBC QN AUTO: 35.3 PG (ref 27–31)
MCH RBC QN AUTO: 35.6 PG (ref 27–31)
MCHC RBC AUTO-ENTMCNC: 31 G/DL (ref 32–36)
MCHC RBC AUTO-ENTMCNC: 31.3 G/DL (ref 32–36)
MCHC RBC AUTO-ENTMCNC: 31.5 G/DL (ref 32–36)
MCHC RBC AUTO-ENTMCNC: 31.8 G/DL (ref 32–36)
MCHC RBC AUTO-ENTMCNC: 31.9 G/DL (ref 32–36)
MCHC RBC AUTO-ENTMCNC: 32.5 G/DL (ref 32–36)
MCV RBC AUTO: 106 FL (ref 82–98)
MCV RBC AUTO: 108 FL (ref 82–98)
MCV RBC AUTO: 109 FL (ref 82–98)
MCV RBC AUTO: 110 FL (ref 82–98)
MCV RBC AUTO: 111 FL (ref 82–98)
MCV RBC AUTO: 112 FL (ref 82–98)
MICROALBUMIN UR DL<=1MG/L-MCNC: 12 UG/ML
MICROSCOPIC COMMENT: ABNORMAL
MODE: ABNORMAL
MODE: NORMAL
MONOCYTES # BLD AUTO: 0.4 K/UL (ref 0.3–1)
MONOCYTES # BLD AUTO: 0.6 K/UL (ref 0.3–1)
MONOCYTES # BLD AUTO: 0.6 K/UL (ref 0.3–1)
MONOCYTES NFR BLD: 4.7 % (ref 4–15)
MONOCYTES NFR BLD: 6.2 % (ref 4–15)
MONOCYTES NFR BLD: 6.7 % (ref 4–15)
MONOCYTES NFR BLD: 7.2 % (ref 4–15)
MONOCYTES NFR BLD: 7.3 % (ref 4–15)
MV A" WAVE DURATION": 10.56 MSEC
MV PEAK A VEL: 0.9 M/S
MV PEAK E VEL: 1.07 M/S
MV STENOSIS PRESSURE HALF TIME: 79.58 MS
MV VALVE AREA P 1/2 METHOD: 2.76 CM2
NEUTROPHILS # BLD AUTO: 2.1 K/UL (ref 1.8–7.7)
NEUTROPHILS # BLD AUTO: 2.4 K/UL (ref 1.8–7.7)
NEUTROPHILS # BLD AUTO: 2.8 K/UL (ref 1.8–7.7)
NEUTROPHILS # BLD AUTO: 2.9 K/UL (ref 1.8–7.7)
NEUTROPHILS # BLD AUTO: 4.2 K/UL (ref 1.8–7.7)
NEUTROPHILS NFR BLD: 30.2 % (ref 38–73)
NEUTROPHILS NFR BLD: 30.4 % (ref 38–73)
NEUTROPHILS NFR BLD: 32.4 % (ref 38–73)
NEUTROPHILS NFR BLD: 46.2 % (ref 38–73)
NEUTROPHILS NFR BLD: 50.4 % (ref 38–73)
NITRITE UR QL STRIP: NEGATIVE
NITRITE, POC UA: NEGATIVE
NRBC BLD-RTO: 0 /100 WBC
OHS CV CPX 1 MINUTE RECOVERY HEART RATE: 106 BPM
OHS CV CPX 85 PERCENT MAX PREDICTED HEART RATE MALE: 122
OHS CV CPX MAX PREDICTED HEART RATE: 144
OHS CV CPX PATIENT IS FEMALE: 1
OHS CV CPX PATIENT IS MALE: 0
OHS CV CPX PEAK DIASTOLIC BLOOD PRESSURE: 50 MMHG
OHS CV CPX PEAK HEAR RATE: 107 BPM
OHS CV CPX PEAK RATE PRESSURE PRODUCT: NORMAL
OHS CV CPX PEAK SYSTOLIC BLOOD PRESSURE: 198 MMHG
OHS CV CPX PERCENT MAX PREDICTED HEART RATE ACHIEVED: 75
OHS CV CPX RATE PRESSURE PRODUCT PRESENTING: 6336
OHS CV EVENT MONITOR DAY: 0
OHS CV HOLTER LENGTH DECIMAL HOURS: 48
OHS CV HOLTER LENGTH HOURS: 48
OHS CV HOLTER LENGTH MINUTES: 0
OHS CV HOLTER SINUS AVERAGE HR: 98
OHS CV HOLTER SINUS MAX HR: 190
OHS CV HOLTER SINUS MIN HR: 67
PCO2 BLDA: 20.9 MMHG (ref 35–45)
PCO2 BLDA: 31.4 MMHG (ref 35–45)
PEF LLN: 3.2
PEF PRE REF: 142.6 %
PEF REF: 4.56
PH SMN: 7.38 [PH] (ref 7.35–7.45)
PH SMN: 7.42 [PH] (ref 7.35–7.45)
PH UR STRIP: 5 [PH] (ref 5–8)
PH UR STRIP: 7 [PH]
PHYSICIAN COMMENT: ABNORMAL
PISA TR MAX VEL: 2.78 M/S
PLATELET # BLD AUTO: 103 K/UL (ref 150–450)
PLATELET # BLD AUTO: 114 K/UL (ref 150–450)
PLATELET # BLD AUTO: 125 K/UL (ref 150–450)
PLATELET # BLD AUTO: 138 K/UL (ref 150–450)
PLATELET # BLD AUTO: 94 K/UL (ref 150–450)
PLATELET # BLD AUTO: 99 K/UL (ref 150–450)
PMV BLD AUTO: 12.7 FL (ref 9.2–12.9)
PMV BLD AUTO: 12.9 FL (ref 9.2–12.9)
PMV BLD AUTO: 13.2 FL (ref 9.2–12.9)
PMV BLD AUTO: 13.2 FL (ref 9.2–12.9)
PMV BLD AUTO: 13.4 FL (ref 9.2–12.9)
PMV BLD AUTO: 13.8 FL (ref 9.2–12.9)
PO2 BLDA: 23 MMHG (ref 40–60)
PO2 BLDA: 75 MMHG (ref 80–100)
POC ALT (SGPT): 30 U/L (ref 10–47)
POC AST (SGOT): 61 U/L (ref 11–38)
POC BE: -11 MMOL/L
POC BE: -6 MMOL/L
POC IONIZED CALCIUM: 1.19 MMOL/L (ref 1.06–1.42)
POC IONIZED CALCIUM: 1.28 MMOL/L (ref 1.06–1.42)
POC RAPID STREP A: NEGATIVE
POC SATURATED O2: 39 % (ref 95–100)
POC SATURATED O2: 96 % (ref 95–100)
POC TCO2 (MEASURED): 16 MMOL/L (ref 23–29)
POC TCO2: 14 MMOL/L (ref 23–27)
POC TCO2: 20 MMOL/L (ref 24–29)
POC TCO2: 23 MMOL/L (ref 18–33)
POCT GLUCOSE: 102 MG/DL (ref 70–110)
POCT GLUCOSE: 102 MG/DL (ref 70–110)
POCT GLUCOSE: 127 MG/DL (ref 70–110)
POCT GLUCOSE: 165 MG/DL (ref 70–110)
POCT GLUCOSE: 182 MG/DL (ref 70–110)
POCT GLUCOSE: 263 MG/DL (ref 70–110)
POCT GLUCOSE: 98 MG/DL (ref 70–110)
POTASSIUM BLD-SCNC: 4.1 MMOL/L (ref 3.5–5.1)
POTASSIUM BLD-SCNC: 4.5 MMOL/L (ref 3.5–5.1)
POTASSIUM BLD-SCNC: 4.8 MMOL/L (ref 3.6–5.1)
POTASSIUM SERPL-SCNC: 4 MMOL/L (ref 3.5–5.1)
POTASSIUM SERPL-SCNC: 4.1 MMOL/L (ref 3.5–5.1)
POTASSIUM SERPL-SCNC: 4.3 MMOL/L (ref 3.5–5.1)
POTASSIUM SERPL-SCNC: 4.4 MMOL/L (ref 3.5–5.1)
POTASSIUM SERPL-SCNC: 4.5 MMOL/L (ref 3.5–5.1)
POTASSIUM SERPL-SCNC: 4.7 MMOL/L (ref 3.5–5.1)
PRE FEF 25 75: 2.18 L/S (ref 0.71–2.4)
PRE FET 100: 5.48 SEC
PRE FEV05 REF: 101.9 %
PRE FEV1 FVC: 83.25 % (ref 65.3–92.56)
PRE FEV1: 1.56 L (ref 1.23–2.17)
PRE FEV5: 1.38 L (ref 0.5–2.21)
PRE FVC: 1.88 L (ref 1.57–2.76)
PRE PEF: 6.51 L/S (ref 3.2–5.93)
PROT SERPL-MCNC: 7.3 G/DL (ref 6–8.4)
PROT SERPL-MCNC: 7.8 G/DL (ref 6–8.4)
PROT SERPL-MCNC: 7.8 G/DL (ref 6–8.4)
PROT UR QL STRIP: NEGATIVE
PROTEIN, POC UA: NEGATIVE
PROTEIN, POC: 8.8 G/DL (ref 6.4–8.1)
PROTHROMBIN TIME: 11.7 SEC (ref 9–12.5)
PROTHROMBIN TIME: 12.4 SEC (ref 9–12.5)
PULM VEIN S/D RATIO: 1
PV PEAK D VEL: 0.77 M/S
PV PEAK S VEL: 0.77 M/S
RA MAJOR: 5.16 CM
RA PRESSURE: 3 MMHG
RA WIDTH: 3.37 CM
RBC # BLD AUTO: 2.25 M/UL (ref 4–5.4)
RBC # BLD AUTO: 2.49 M/UL (ref 4–5.4)
RBC # BLD AUTO: 2.49 M/UL (ref 4–5.4)
RBC # BLD AUTO: 2.63 M/UL (ref 4–5.4)
RBC # BLD AUTO: 2.64 M/UL (ref 4–5.4)
RBC # BLD AUTO: 3.15 M/UL (ref 4–5.4)
RIGHT VENTRICULAR END-DIASTOLIC DIMENSION: 3.04 CM
RV TISSUE DOPPLER FREE WALL SYSTOLIC VELOCITY 1 (APICAL 4 CHAMBER VIEW): 14.44 CM/S
SAMPLE: ABNORMAL
SAMPLE: NORMAL
SARS-COV-2 RDRP RESP QL NAA+PROBE: NEGATIVE
SATURATED IRON: 26 % (ref 20–50)
SINUS: 3.12 CM
SITE: ABNORMAL
SITE: ABNORMAL
SITE: NORMAL
SODIUM BLD-SCNC: 143 MMOL/L (ref 136–145)
SODIUM BLD-SCNC: 144 MMOL/L (ref 128–145)
SODIUM BLD-SCNC: 145 MMOL/L (ref 136–145)
SODIUM SERPL-SCNC: 133 MMOL/L (ref 136–145)
SODIUM SERPL-SCNC: 136 MMOL/L (ref 136–145)
SODIUM SERPL-SCNC: 138 MMOL/L (ref 136–145)
SODIUM SERPL-SCNC: 138 MMOL/L (ref 136–145)
SODIUM SERPL-SCNC: 141 MMOL/L (ref 136–145)
SODIUM SERPL-SCNC: 141 MMOL/L (ref 136–145)
SP GR UR STRIP: 1.01 (ref 1–1.03)
SPECIFIC GRAVITY, POC UA: 1.02
SQUAMOUS #/AREA URNS AUTO: 18 /HPF
STJ: 2.46 CM
STRESS ST DEPRESSION: 1 MM
SYSTOLIC BLOOD PRESSURE: 88 MMHG
T4 FREE SERPL-MCNC: 0.97 NG/DL (ref 0.71–1.51)
TDI LATERAL: 0.08 M/S
TDI SEPTAL: 0.07 M/S
TDI: 0.08 M/S
TOTAL IRON BINDING CAPACITY: 367 UG/DL (ref 250–450)
TR MAX PG: 31 MMHG
TRANSFERRIN SERPL-MCNC: 248 MG/DL (ref 200–375)
TRICUSPID ANNULAR PLANE SYSTOLIC EXCURSION: 2.38 CM
TROPONIN I SERPL DL<=0.01 NG/ML-MCNC: 0.01 NG/ML (ref 0–0.03)
TROPONIN I SERPL DL<=0.01 NG/ML-MCNC: 0.02 NG/ML (ref 0–0.03)
TSH SERPL DL<=0.005 MIU/L-ACNC: 2.26 UIU/ML (ref 0.4–4)
TV REST PULMONARY ARTERY PRESSURE: 34 MMHG
URN SPEC COLLECT METH UR: ABNORMAL
UROBILINOGEN, POC UA: 2 E.U./DL
WBC # BLD AUTO: 5.52 K/UL (ref 3.9–12.7)
WBC # BLD AUTO: 6.8 K/UL (ref 3.9–12.7)
WBC # BLD AUTO: 6.95 K/UL (ref 3.9–12.7)
WBC # BLD AUTO: 8.06 K/UL (ref 3.9–12.7)
WBC # BLD AUTO: 8.86 K/UL (ref 3.9–12.7)
WBC # BLD AUTO: 9.08 K/UL (ref 3.9–12.7)
YEAST UR QL AUTO: ABNORMAL

## 2021-01-01 PROCEDURE — 99213 OFFICE O/P EST LOW 20 MIN: CPT | Mod: 25,S$GLB,, | Performed by: STUDENT IN AN ORGANIZED HEALTH CARE EDUCATION/TRAINING PROGRAM

## 2021-01-01 PROCEDURE — 25500020 PHARM REV CODE 255: Mod: HCNC | Performed by: EMERGENCY MEDICINE

## 2021-01-01 PROCEDURE — 80053 COMPREHEN METABOLIC PANEL: CPT | Mod: HCNC | Performed by: EMERGENCY MEDICINE

## 2021-01-01 PROCEDURE — 4010F PR ACE/ARB THEARPY RXD/TAKEN: ICD-10-PCS | Mod: CPTII,S$GLB,, | Performed by: STUDENT IN AN ORGANIZED HEALTH CARE EDUCATION/TRAINING PROGRAM

## 2021-01-01 PROCEDURE — 4010F PR ACE/ARB THEARPY RXD/TAKEN: ICD-10-PCS | Mod: CPTII,S$GLB,, | Performed by: OTOLARYNGOLOGY

## 2021-01-01 PROCEDURE — 3044F PR MOST RECENT HEMOGLOBIN A1C LEVEL <7.0%: ICD-10-PCS | Mod: CPTII,S$GLB,, | Performed by: NURSE PRACTITIONER

## 2021-01-01 PROCEDURE — 3044F PR MOST RECENT HEMOGLOBIN A1C LEVEL <7.0%: ICD-10-PCS | Mod: CPTII,S$GLB,, | Performed by: INTERNAL MEDICINE

## 2021-01-01 PROCEDURE — 94010 BREATHING CAPACITY TEST: ICD-10-PCS | Mod: HCNC,S$GLB,, | Performed by: INTERNAL MEDICINE

## 2021-01-01 PROCEDURE — 3061F PR NEG MICROALBUMINURIA RESULT DOCUMENTED/REVIEW: ICD-10-PCS | Mod: HCNC,CPTII,S$GLB, | Performed by: FAMILY MEDICINE

## 2021-01-01 PROCEDURE — 3066F PR DOCUMENTATION OF TREATMENT FOR NEPHROPATHY: ICD-10-PCS | Mod: HCNC,CPTII,S$GLB, | Performed by: INTERNAL MEDICINE

## 2021-01-01 PROCEDURE — 99284 EMERGENCY DEPT VISIT MOD MDM: CPT | Mod: ,,, | Performed by: EMERGENCY MEDICINE

## 2021-01-01 PROCEDURE — 3066F PR DOCUMENTATION OF TREATMENT FOR NEPHROPATHY: ICD-10-PCS | Mod: CPTII,S$GLB,, | Performed by: NURSE PRACTITIONER

## 2021-01-01 PROCEDURE — 0002A COVID-19, MRNA, LNP-S, PF, 30 MCG/0.3 ML DOSE VACCINE: CPT | Mod: PBBFAC | Performed by: FAMILY MEDICINE

## 2021-01-01 PROCEDURE — 1126F AMNT PAIN NOTED NONE PRSNT: CPT | Mod: CPTII,S$GLB,, | Performed by: OTOLARYNGOLOGY

## 2021-01-01 PROCEDURE — 99999 PR PBB SHADOW E&M-EST. PATIENT-LVL IV: CPT | Mod: PBBFAC,,, | Performed by: NURSE PRACTITIONER

## 2021-01-01 PROCEDURE — 3061F PR NEG MICROALBUMINURIA RESULT DOCUMENTED/REVIEW: ICD-10-PCS | Mod: CPTII,S$GLB,, | Performed by: STUDENT IN AN ORGANIZED HEALTH CARE EDUCATION/TRAINING PROGRAM

## 2021-01-01 PROCEDURE — 99204 PR OFFICE/OUTPT VISIT, NEW, LEVL IV, 45-59 MIN: ICD-10-PCS | Mod: S$GLB,,, | Performed by: NURSE PRACTITIONER

## 2021-01-01 PROCEDURE — 3075F PR MOST RECENT SYSTOLIC BLOOD PRESS GE 130-139MM HG: ICD-10-PCS | Mod: CPTII,S$GLB,, | Performed by: OTOLARYNGOLOGY

## 2021-01-01 PROCEDURE — 82570 ASSAY OF URINE CREATININE: CPT | Performed by: INTERNAL MEDICINE

## 2021-01-01 PROCEDURE — 1101F PT FALLS ASSESS-DOCD LE1/YR: CPT | Mod: CPTII,S$GLB,, | Performed by: OTOLARYNGOLOGY

## 2021-01-01 PROCEDURE — 3044F HG A1C LEVEL LT 7.0%: CPT | Mod: CPTII,S$GLB,, | Performed by: INTERNAL MEDICINE

## 2021-01-01 PROCEDURE — 93351 STRESS TTE COMPLETE: CPT | Mod: HCNC

## 2021-01-01 PROCEDURE — 1159F MED LIST DOCD IN RCRD: CPT | Mod: CPTII,S$GLB,, | Performed by: OTOLARYNGOLOGY

## 2021-01-01 PROCEDURE — 84443 ASSAY THYROID STIM HORMONE: CPT | Mod: HCNC | Performed by: INTERNAL MEDICINE

## 2021-01-01 PROCEDURE — 3288F FALL RISK ASSESSMENT DOCD: CPT | Mod: CPTII,S$GLB,, | Performed by: NURSE PRACTITIONER

## 2021-01-01 PROCEDURE — 1159F PR MEDICATION LIST DOCUMENTED IN MEDICAL RECORD: ICD-10-PCS | Mod: S$GLB,,, | Performed by: NURSE PRACTITIONER

## 2021-01-01 PROCEDURE — 99999 PR PBB SHADOW E&M-EST. PATIENT-LVL V: ICD-10-PCS | Mod: PBBFAC,,, | Performed by: INTERNAL MEDICINE

## 2021-01-01 PROCEDURE — 99900035 HC TECH TIME PER 15 MIN (STAT): Mod: HCNC

## 2021-01-01 PROCEDURE — 99214 PR OFFICE/OUTPT VISIT, EST, LEVL IV, 30-39 MIN: ICD-10-PCS | Mod: S$GLB,,, | Performed by: INTERNAL MEDICINE

## 2021-01-01 PROCEDURE — 77063 BREAST TOMOSYNTHESIS BI: CPT | Mod: 26,HCNC,, | Performed by: RADIOLOGY

## 2021-01-01 PROCEDURE — 4010F ACE/ARB THERAPY RXD/TAKEN: CPT | Mod: CPTII,S$GLB,, | Performed by: OTOLARYNGOLOGY

## 2021-01-01 PROCEDURE — 3078F DIAST BP <80 MM HG: CPT | Mod: CPTII,S$GLB,, | Performed by: NURSE PRACTITIONER

## 2021-01-01 PROCEDURE — 99285 PR EMERGENCY DEPT VISIT,LEVEL V: ICD-10-PCS | Mod: HCNC,,, | Performed by: EMERGENCY MEDICINE

## 2021-01-01 PROCEDURE — 3008F BODY MASS INDEX DOCD: CPT | Mod: CPTII,S$GLB,, | Performed by: INTERNAL MEDICINE

## 2021-01-01 PROCEDURE — 4010F ACE/ARB THERAPY RXD/TAKEN: CPT | Mod: HCNC,CPTII,S$GLB, | Performed by: PHYSICIAN ASSISTANT

## 2021-01-01 PROCEDURE — 1126F PR PAIN SEVERITY QUANTIFIED, NO PAIN PRESENT: ICD-10-PCS | Mod: CPTII,S$GLB,, | Performed by: OTOLARYNGOLOGY

## 2021-01-01 PROCEDURE — 99215 OFFICE O/P EST HI 40 MIN: CPT | Mod: HCNC,S$GLB,, | Performed by: INTERNAL MEDICINE

## 2021-01-01 PROCEDURE — 99220 PR INITIAL OBSERVATION CARE,LEVL III: ICD-10-PCS | Mod: HCNC,,, | Performed by: FAMILY MEDICINE

## 2021-01-01 PROCEDURE — 87804 INFLUENZA ASSAY W/OPTIC: CPT | Mod: HCNC,ER

## 2021-01-01 PROCEDURE — 3061F NEG MICROALBUMINURIA REV: CPT | Mod: HCNC,CPTII,S$GLB, | Performed by: INTERNAL MEDICINE

## 2021-01-01 PROCEDURE — 99499 RISK ADDL DX/OHS AUDIT: ICD-10-PCS | Mod: HCNC,S$GLB,, | Performed by: NURSE PRACTITIONER

## 2021-01-01 PROCEDURE — 99499 UNLISTED E&M SERVICE: CPT | Mod: HCNC,S$GLB,, | Performed by: NURSE PRACTITIONER

## 2021-01-01 PROCEDURE — 85025 COMPLETE CBC W/AUTO DIFF WBC: CPT | Mod: HCNC | Performed by: FAMILY MEDICINE

## 2021-01-01 PROCEDURE — 1101F PR PT FALLS ASSESS DOC 0-1 FALLS W/OUT INJ PAST YR: ICD-10-PCS | Mod: CPTII,S$GLB,, | Performed by: NURSE PRACTITIONER

## 2021-01-01 PROCEDURE — 3074F SYST BP LT 130 MM HG: CPT | Mod: CPTII,S$GLB,, | Performed by: INTERNAL MEDICINE

## 2021-01-01 PROCEDURE — 99214 PR OFFICE/OUTPT VISIT, EST, LEVL IV, 30-39 MIN: ICD-10-PCS | Mod: S$GLB,,, | Performed by: NURSE PRACTITIONER

## 2021-01-01 PROCEDURE — 77067 SCR MAMMO BI INCL CAD: CPT | Mod: TC,HCNC

## 2021-01-01 PROCEDURE — 99999 PR PBB SHADOW E&M-EST. PATIENT-LVL V: ICD-10-PCS | Mod: PBBFAC,HCNC,, | Performed by: PHYSICIAN ASSISTANT

## 2021-01-01 PROCEDURE — 1101F PR PT FALLS ASSESS DOC 0-1 FALLS W/OUT INJ PAST YR: ICD-10-PCS | Mod: CPTII,S$GLB,, | Performed by: INTERNAL MEDICINE

## 2021-01-01 PROCEDURE — 99215 PR OFFICE/OUTPT VISIT, EST, LEVL V, 40-54 MIN: ICD-10-PCS | Mod: HCNC,S$GLB,, | Performed by: INTERNAL MEDICINE

## 2021-01-01 PROCEDURE — 31238 PR NASAL/SINUS ENDOSCOPY,W/CONTROL NASAL HEM: ICD-10-PCS | Mod: RT,S$GLB,, | Performed by: STUDENT IN AN ORGANIZED HEALTH CARE EDUCATION/TRAINING PROGRAM

## 2021-01-01 PROCEDURE — 3044F HG A1C LEVEL LT 7.0%: CPT | Mod: HCNC,CPTII,S$GLB, | Performed by: INTERNAL MEDICINE

## 2021-01-01 PROCEDURE — G0180 PR HOME HEALTH MD CERTIFICATION: ICD-10-PCS | Mod: ,,, | Performed by: INTERNAL MEDICINE

## 2021-01-01 PROCEDURE — 3288F PR FALLS RISK ASSESSMENT DOCUMENTED: ICD-10-PCS | Mod: CPTII,S$GLB,, | Performed by: NURSE PRACTITIONER

## 2021-01-01 PROCEDURE — 3044F HG A1C LEVEL LT 7.0%: CPT | Mod: CPTII,S$GLB,, | Performed by: OTOLARYNGOLOGY

## 2021-01-01 PROCEDURE — 80048 BASIC METABOLIC PNL TOTAL CA: CPT | Mod: HCNC | Performed by: STUDENT IN AN ORGANIZED HEALTH CARE EDUCATION/TRAINING PROGRAM

## 2021-01-01 PROCEDURE — 76705 ECHO EXAM OF ABDOMEN: CPT | Mod: 26,,, | Performed by: RADIOLOGY

## 2021-01-01 PROCEDURE — 99214 PR OFFICE/OUTPT VISIT, EST, LEVL IV, 30-39 MIN: ICD-10-PCS | Mod: S$GLB,,, | Performed by: OTOLARYNGOLOGY

## 2021-01-01 PROCEDURE — 99999 PR PBB SHADOW E&M-EST. PATIENT-LVL V: CPT | Mod: PBBFAC,HCNC,, | Performed by: INTERNAL MEDICINE

## 2021-01-01 PROCEDURE — 83880 ASSAY OF NATRIURETIC PEPTIDE: CPT | Mod: HCNC | Performed by: EMERGENCY MEDICINE

## 2021-01-01 PROCEDURE — 96361 HYDRATE IV INFUSION ADD-ON: CPT | Mod: HCNC

## 2021-01-01 PROCEDURE — 25000003 PHARM REV CODE 250: Mod: HCNC | Performed by: FAMILY MEDICINE

## 2021-01-01 PROCEDURE — 3008F BODY MASS INDEX DOCD: CPT | Mod: CPTII,S$GLB,, | Performed by: NURSE PRACTITIONER

## 2021-01-01 PROCEDURE — 82330 ASSAY OF CALCIUM: CPT | Mod: HCNC

## 2021-01-01 PROCEDURE — 85025 COMPLETE CBC W/AUTO DIFF WBC: CPT | Performed by: INTERNAL MEDICINE

## 2021-01-01 PROCEDURE — 93010 EKG 12-LEAD: ICD-10-PCS | Mod: HCNC,,, | Performed by: INTERNAL MEDICINE

## 2021-01-01 PROCEDURE — 99999 PR PBB SHADOW E&M-EST. PATIENT-LVL V: ICD-10-PCS | Mod: PBBFAC,HCNC,, | Performed by: INTERNAL MEDICINE

## 2021-01-01 PROCEDURE — 3061F PR NEG MICROALBUMINURIA RESULT DOCUMENTED/REVIEW: ICD-10-PCS | Mod: HCNC,CPTII,S$GLB, | Performed by: INTERNAL MEDICINE

## 2021-01-01 PROCEDURE — 4010F PR ACE/ARB THEARPY RXD/TAKEN: ICD-10-PCS | Mod: HCNC,CPTII,S$GLB, | Performed by: FAMILY MEDICINE

## 2021-01-01 PROCEDURE — 3066F NEPHROPATHY DOC TX: CPT | Mod: CPTII,S$GLB,, | Performed by: STUDENT IN AN ORGANIZED HEALTH CARE EDUCATION/TRAINING PROGRAM

## 2021-01-01 PROCEDURE — 3008F PR BODY MASS INDEX (BMI) DOCUMENTED: ICD-10-PCS | Mod: HCNC,CPTII,S$GLB, | Performed by: INTERNAL MEDICINE

## 2021-01-01 PROCEDURE — 3288F FALL RISK ASSESSMENT DOCD: CPT | Mod: CPTII,S$GLB,, | Performed by: INTERNAL MEDICINE

## 2021-01-01 PROCEDURE — 3288F FALL RISK ASSESSMENT DOCD: CPT | Mod: CPTII,S$GLB,, | Performed by: OTOLARYNGOLOGY

## 2021-01-01 PROCEDURE — 99999 PR PBB SHADOW E&M-EST. PATIENT-LVL V: ICD-10-PCS | Mod: PBBFAC,HCNC,GC, | Performed by: STUDENT IN AN ORGANIZED HEALTH CARE EDUCATION/TRAINING PROGRAM

## 2021-01-01 PROCEDURE — 99220 PR INITIAL OBSERVATION CARE,LEVL III: ICD-10-PCS | Mod: HCNC,,, | Performed by: INTERNAL MEDICINE

## 2021-01-01 PROCEDURE — 71046 X-RAY EXAM CHEST 2 VIEWS: CPT | Mod: TC,HCNC

## 2021-01-01 PROCEDURE — 1126F AMNT PAIN NOTED NONE PRSNT: CPT | Mod: S$GLB,,, | Performed by: INTERNAL MEDICINE

## 2021-01-01 PROCEDURE — 3066F NEPHROPATHY DOC TX: CPT | Mod: HCNC,CPTII,S$GLB, | Performed by: INTERNAL MEDICINE

## 2021-01-01 PROCEDURE — 3008F PR BODY MASS INDEX (BMI) DOCUMENTED: ICD-10-PCS | Mod: CPTII,S$GLB,, | Performed by: INTERNAL MEDICINE

## 2021-01-01 PROCEDURE — 80053 COMPREHEN METABOLIC PANEL: CPT | Performed by: INTERNAL MEDICINE

## 2021-01-01 PROCEDURE — 99214 OFFICE O/P EST MOD 30 MIN: CPT | Mod: S$GLB,,, | Performed by: OTOLARYNGOLOGY

## 2021-01-01 PROCEDURE — 1101F PT FALLS ASSESS-DOCD LE1/YR: CPT | Mod: CPTII,S$GLB,, | Performed by: NURSE PRACTITIONER

## 2021-01-01 PROCEDURE — 76705 US ABDOMEN LIMITED: ICD-10-PCS | Mod: 26,,, | Performed by: RADIOLOGY

## 2021-01-01 PROCEDURE — 3078F PR MOST RECENT DIASTOLIC BLOOD PRESSURE < 80 MM HG: ICD-10-PCS | Mod: CPTII,S$GLB,, | Performed by: OTOLARYNGOLOGY

## 2021-01-01 PROCEDURE — 1126F PR PAIN SEVERITY QUANTIFIED, NO PAIN PRESENT: ICD-10-PCS | Mod: S$GLB,,, | Performed by: NURSE PRACTITIONER

## 2021-01-01 PROCEDURE — 1159F PR MEDICATION LIST DOCUMENTED IN MEDICAL RECORD: ICD-10-PCS | Mod: CPTII,S$GLB,, | Performed by: OTOLARYNGOLOGY

## 2021-01-01 PROCEDURE — 1159F MED LIST DOCD IN RCRD: CPT | Mod: CPTII,S$GLB,, | Performed by: DERMATOLOGY

## 2021-01-01 PROCEDURE — 3066F NEPHROPATHY DOC TX: CPT | Mod: CPTII,S$GLB,, | Performed by: NURSE PRACTITIONER

## 2021-01-01 PROCEDURE — 3078F PR MOST RECENT DIASTOLIC BLOOD PRESSURE < 80 MM HG: ICD-10-PCS | Mod: CPTII,S$GLB,, | Performed by: INTERNAL MEDICINE

## 2021-01-01 PROCEDURE — 93005 ELECTROCARDIOGRAM TRACING: CPT | Mod: HCNC,ER

## 2021-01-01 PROCEDURE — 77080 DXA BONE DENSITY AXIAL: CPT | Mod: TC

## 2021-01-01 PROCEDURE — 3075F PR MOST RECENT SYSTOLIC BLOOD PRESS GE 130-139MM HG: ICD-10-PCS | Mod: HCNC,CPTII,S$GLB, | Performed by: INTERNAL MEDICINE

## 2021-01-01 PROCEDURE — 3075F SYST BP GE 130 - 139MM HG: CPT | Mod: CPTII,S$GLB,, | Performed by: OTOLARYNGOLOGY

## 2021-01-01 PROCEDURE — 3075F SYST BP GE 130 - 139MM HG: CPT | Mod: CPTII,S$GLB,, | Performed by: NURSE PRACTITIONER

## 2021-01-01 PROCEDURE — 3078F PR MOST RECENT DIASTOLIC BLOOD PRESSURE < 80 MM HG: ICD-10-PCS | Mod: CPTII,S$GLB,, | Performed by: NURSE PRACTITIONER

## 2021-01-01 PROCEDURE — 91300 COVID-19, MRNA, LNP-S, PF, 30 MCG/0.3 ML DOSE VACCINE: CPT | Mod: PBBFAC | Performed by: FAMILY MEDICINE

## 2021-01-01 PROCEDURE — 99284 EMERGENCY DEPT VISIT MOD MDM: CPT | Mod: 25,HCNC,ER

## 2021-01-01 PROCEDURE — 84484 ASSAY OF TROPONIN QUANT: CPT | Mod: HCNC | Performed by: EMERGENCY MEDICINE

## 2021-01-01 PROCEDURE — U0002 COVID-19 LAB TEST NON-CDC: HCPCS | Mod: HCNC,ER | Performed by: NURSE PRACTITIONER

## 2021-01-01 PROCEDURE — 3078F PR MOST RECENT DIASTOLIC BLOOD PRESSURE < 80 MM HG: ICD-10-PCS | Mod: HCNC,CPTII,S$GLB, | Performed by: INTERNAL MEDICINE

## 2021-01-01 PROCEDURE — 63600175 PHARM REV CODE 636 W HCPCS: Mod: HCNC | Performed by: PHYSICIAN ASSISTANT

## 2021-01-01 PROCEDURE — 99999 PR PBB SHADOW E&M-EST. PATIENT-LVL V: ICD-10-PCS | Mod: PBBFAC,,, | Performed by: NURSE PRACTITIONER

## 2021-01-01 PROCEDURE — 99213 PR OFFICE/OUTPT VISIT, EST, LEVL III, 20-29 MIN: ICD-10-PCS | Mod: 25,S$GLB,, | Performed by: STUDENT IN AN ORGANIZED HEALTH CARE EDUCATION/TRAINING PROGRAM

## 2021-01-01 PROCEDURE — 82962 GLUCOSE BLOOD TEST: CPT | Mod: HCNC,ER

## 2021-01-01 PROCEDURE — 99214 OFFICE O/P EST MOD 30 MIN: CPT | Mod: HCNC,S$GLB,, | Performed by: FAMILY MEDICINE

## 2021-01-01 PROCEDURE — 3066F PR DOCUMENTATION OF TREATMENT FOR NEPHROPATHY: ICD-10-PCS | Mod: CPTII,S$GLB,, | Performed by: STUDENT IN AN ORGANIZED HEALTH CARE EDUCATION/TRAINING PROGRAM

## 2021-01-01 PROCEDURE — 1126F AMNT PAIN NOTED NONE PRSNT: CPT | Mod: CPTII,S$GLB,, | Performed by: INTERNAL MEDICINE

## 2021-01-01 PROCEDURE — 80048 BASIC METABOLIC PNL TOTAL CA: CPT | Mod: HCNC | Performed by: INTERNAL MEDICINE

## 2021-01-01 PROCEDURE — 1101F PR PT FALLS ASSESS DOC 0-1 FALLS W/OUT INJ PAST YR: ICD-10-PCS | Mod: HCNC,CPTII,S$GLB, | Performed by: INTERNAL MEDICINE

## 2021-01-01 PROCEDURE — 25000003 PHARM REV CODE 250: Mod: HCNC | Performed by: EMERGENCY MEDICINE

## 2021-01-01 PROCEDURE — 3066F NEPHROPATHY DOC TX: CPT | Mod: HCNC,CPTII,S$GLB, | Performed by: PHYSICIAN ASSISTANT

## 2021-01-01 PROCEDURE — 31238 NSL/SINS NDSC SRG NSL HEMRRG: CPT | Mod: RT,S$GLB,, | Performed by: STUDENT IN AN ORGANIZED HEALTH CARE EDUCATION/TRAINING PROGRAM

## 2021-01-01 PROCEDURE — 99214 OFFICE O/P EST MOD 30 MIN: CPT | Mod: S$GLB,,, | Performed by: INTERNAL MEDICINE

## 2021-01-01 PROCEDURE — 99999 PR PBB SHADOW E&M-EST. PATIENT-LVL III: CPT | Mod: PBBFAC,,, | Performed by: DERMATOLOGY

## 2021-01-01 PROCEDURE — 99204 OFFICE O/P NEW MOD 45 MIN: CPT | Mod: S$GLB,,, | Performed by: NURSE PRACTITIONER

## 2021-01-01 PROCEDURE — 3044F HG A1C LEVEL LT 7.0%: CPT | Mod: CPTII,S$GLB,, | Performed by: NURSE PRACTITIONER

## 2021-01-01 PROCEDURE — 92507 TX SP LANG VOICE COMM INDIV: CPT | Performed by: SPEECH-LANGUAGE PATHOLOGIST

## 2021-01-01 PROCEDURE — 1159F MED LIST DOCD IN RCRD: CPT | Mod: CPTII,S$GLB,, | Performed by: NURSE PRACTITIONER

## 2021-01-01 PROCEDURE — 1101F PT FALLS ASSESS-DOCD LE1/YR: CPT | Mod: HCNC,CPTII,S$GLB, | Performed by: INTERNAL MEDICINE

## 2021-01-01 PROCEDURE — 94761 N-INVAS EAR/PLS OXIMETRY MLT: CPT | Mod: HCNC

## 2021-01-01 PROCEDURE — 3044F PR MOST RECENT HEMOGLOBIN A1C LEVEL <7.0%: ICD-10-PCS | Mod: CPTII,S$GLB,, | Performed by: OTOLARYNGOLOGY

## 2021-01-01 PROCEDURE — G0180 MD CERTIFICATION HHA PATIENT: HCPCS | Mod: ,,, | Performed by: INTERNAL MEDICINE

## 2021-01-01 PROCEDURE — 36415 COLL VENOUS BLD VENIPUNCTURE: CPT | Mod: HCNC | Performed by: INTERNAL MEDICINE

## 2021-01-01 PROCEDURE — 1126F AMNT PAIN NOTED NONE PRSNT: CPT | Mod: HCNC,CPTII,S$GLB, | Performed by: INTERNAL MEDICINE

## 2021-01-01 PROCEDURE — 4010F ACE/ARB THERAPY RXD/TAKEN: CPT | Mod: CPTII,S$GLB,, | Performed by: STUDENT IN AN ORGANIZED HEALTH CARE EDUCATION/TRAINING PROGRAM

## 2021-01-01 PROCEDURE — 99285 EMERGENCY DEPT VISIT HI MDM: CPT | Mod: HCNC,,, | Performed by: EMERGENCY MEDICINE

## 2021-01-01 PROCEDURE — 83605 ASSAY OF LACTIC ACID: CPT | Mod: HCNC | Performed by: PHYSICIAN ASSISTANT

## 2021-01-01 PROCEDURE — 3288F PR FALLS RISK ASSESSMENT DOCUMENTED: ICD-10-PCS | Mod: HCNC,CPTII,S$GLB, | Performed by: INTERNAL MEDICINE

## 2021-01-01 PROCEDURE — 71046 X-RAY EXAM CHEST 2 VIEWS: CPT | Mod: 26,HCNC,, | Performed by: RADIOLOGY

## 2021-01-01 PROCEDURE — 4010F PR ACE/ARB THEARPY RXD/TAKEN: ICD-10-PCS | Mod: HCNC,CPTII,S$GLB, | Performed by: INTERNAL MEDICINE

## 2021-01-01 PROCEDURE — 4010F ACE/ARB THERAPY RXD/TAKEN: CPT | Mod: HCNC,CPTII,S$GLB, | Performed by: FAMILY MEDICINE

## 2021-01-01 PROCEDURE — 77063 MAMMO DIGITAL SCREENING BILAT WITH TOMO: ICD-10-PCS | Mod: 26,HCNC,, | Performed by: RADIOLOGY

## 2021-01-01 PROCEDURE — 99285 EMERGENCY DEPT VISIT HI MDM: CPT | Mod: 25,HCNC

## 2021-01-01 PROCEDURE — 1101F PT FALLS ASSESS-DOCD LE1/YR: CPT | Mod: CPTII,S$GLB,, | Performed by: INTERNAL MEDICINE

## 2021-01-01 PROCEDURE — 1159F PR MEDICATION LIST DOCUMENTED IN MEDICAL RECORD: ICD-10-PCS | Mod: CPTII,S$GLB,, | Performed by: DERMATOLOGY

## 2021-01-01 PROCEDURE — 93351 STRESS TTE COMPLETE: CPT | Mod: 26,HCNC,, | Performed by: INTERNAL MEDICINE

## 2021-01-01 PROCEDURE — 1126F AMNT PAIN NOTED NONE PRSNT: CPT | Mod: S$GLB,,, | Performed by: NURSE PRACTITIONER

## 2021-01-01 PROCEDURE — 1101F PR PT FALLS ASSESS DOC 0-1 FALLS W/OUT INJ PAST YR: ICD-10-PCS | Mod: CPTII,S$GLB,, | Performed by: OTOLARYNGOLOGY

## 2021-01-01 PROCEDURE — 91300 COVID-19, MRNA, LNP-S, PF, 30 MCG/0.3 ML DOSE VACCINE: CPT | Mod: HCNC,PBBFAC | Performed by: FAMILY MEDICINE

## 2021-01-01 PROCEDURE — 3078F DIAST BP <80 MM HG: CPT | Mod: CPTII,S$GLB,, | Performed by: OTOLARYNGOLOGY

## 2021-01-01 PROCEDURE — 84484 ASSAY OF TROPONIN QUANT: CPT | Mod: HCNC | Performed by: FAMILY MEDICINE

## 2021-01-01 PROCEDURE — 99217 PR OBSERVATION CARE DISCHARGE: CPT | Mod: HCNC,,, | Performed by: PHYSICIAN ASSISTANT

## 2021-01-01 PROCEDURE — 92610 EVALUATE SWALLOWING FUNCTION: CPT | Performed by: SPEECH-LANGUAGE PATHOLOGIST

## 2021-01-01 PROCEDURE — 3061F NEG MICROALBUMINURIA REV: CPT | Mod: HCNC,CPTII,S$GLB, | Performed by: FAMILY MEDICINE

## 2021-01-01 PROCEDURE — 93351 STRESS ECHO (CUPID ONLY): ICD-10-PCS | Mod: 26,HCNC,, | Performed by: INTERNAL MEDICINE

## 2021-01-01 PROCEDURE — 71046 XR CHEST PA AND LATERAL: ICD-10-PCS | Mod: 26,HCNC,, | Performed by: RADIOLOGY

## 2021-01-01 PROCEDURE — 99226 PR SUBSEQUENT OBSERVATION CARE,LEVEL III: CPT | Mod: HCNC,,, | Performed by: PHYSICIAN ASSISTANT

## 2021-01-01 PROCEDURE — 3075F PR MOST RECENT SYSTOLIC BLOOD PRESS GE 130-139MM HG: ICD-10-PCS | Mod: CPTII,S$GLB,, | Performed by: NURSE PRACTITIONER

## 2021-01-01 PROCEDURE — 3288F PR FALLS RISK ASSESSMENT DOCUMENTED: ICD-10-PCS | Mod: CPTII,S$GLB,, | Performed by: OTOLARYNGOLOGY

## 2021-01-01 PROCEDURE — 3078F DIAST BP <80 MM HG: CPT | Mod: CPTII,S$GLB,, | Performed by: INTERNAL MEDICINE

## 2021-01-01 PROCEDURE — 99999 PR PBB SHADOW E&M-EST. PATIENT-LVL IV: ICD-10-PCS | Mod: PBBFAC,HCNC,, | Performed by: FAMILY MEDICINE

## 2021-01-01 PROCEDURE — 99214 OFFICE O/P EST MOD 30 MIN: CPT | Mod: HCNC,S$GLB,, | Performed by: INTERNAL MEDICINE

## 2021-01-01 PROCEDURE — 99214 OFFICE O/P EST MOD 30 MIN: CPT | Mod: S$GLB,,, | Performed by: NURSE PRACTITIONER

## 2021-01-01 PROCEDURE — 82728 ASSAY OF FERRITIN: CPT | Mod: HCNC | Performed by: INTERNAL MEDICINE

## 2021-01-01 PROCEDURE — 99220 PR INITIAL OBSERVATION CARE,LEVL III: CPT | Mod: HCNC,,, | Performed by: FAMILY MEDICINE

## 2021-01-01 PROCEDURE — 85610 PROTHROMBIN TIME: CPT | Mod: HCNC | Performed by: FAMILY MEDICINE

## 2021-01-01 PROCEDURE — 1159F MED LIST DOCD IN RCRD: CPT | Mod: HCNC,CPTII,S$GLB, | Performed by: INTERNAL MEDICINE

## 2021-01-01 PROCEDURE — 87040 BLOOD CULTURE FOR BACTERIA: CPT | Mod: 59,HCNC | Performed by: EMERGENCY MEDICINE

## 2021-01-01 PROCEDURE — 99499 UNLISTED E&M SERVICE: CPT | Mod: HCNC,S$GLB,, | Performed by: FAMILY MEDICINE

## 2021-01-01 PROCEDURE — 99220 PR INITIAL OBSERVATION CARE,LEVL III: CPT | Mod: HCNC,,, | Performed by: INTERNAL MEDICINE

## 2021-01-01 PROCEDURE — 4010F ACE/ARB THERAPY RXD/TAKEN: CPT | Mod: CPTII,S$GLB,, | Performed by: NURSE PRACTITIONER

## 2021-01-01 PROCEDURE — 3008F PR BODY MASS INDEX (BMI) DOCUMENTED: ICD-10-PCS | Mod: CPTII,S$GLB,, | Performed by: NURSE PRACTITIONER

## 2021-01-01 PROCEDURE — 3074F PR MOST RECENT SYSTOLIC BLOOD PRESSURE < 130 MM HG: ICD-10-PCS | Mod: CPTII,S$GLB,, | Performed by: NURSE PRACTITIONER

## 2021-01-01 PROCEDURE — 3288F FALL RISK ASSESSMENT DOCD: CPT | Mod: HCNC,CPTII,S$GLB, | Performed by: INTERNAL MEDICINE

## 2021-01-01 PROCEDURE — 82962 GLUCOSE BLOOD TEST: CPT | Mod: HCNC

## 2021-01-01 PROCEDURE — 84466 ASSAY OF TRANSFERRIN: CPT | Mod: HCNC | Performed by: INTERNAL MEDICINE

## 2021-01-01 PROCEDURE — 99999 PR PBB SHADOW E&M-EST. PATIENT-LVL IV: CPT | Mod: PBBFAC,HCNC,, | Performed by: INTERNAL MEDICINE

## 2021-01-01 PROCEDURE — G0378 HOSPITAL OBSERVATION PER HR: HCPCS | Mod: HCNC

## 2021-01-01 PROCEDURE — 4010F ACE/ARB THERAPY RXD/TAKEN: CPT | Mod: HCNC,CPTII,S$GLB, | Performed by: INTERNAL MEDICINE

## 2021-01-01 PROCEDURE — 1126F PR PAIN SEVERITY QUANTIFIED, NO PAIN PRESENT: ICD-10-PCS | Mod: S$GLB,,, | Performed by: INTERNAL MEDICINE

## 2021-01-01 PROCEDURE — 36415 COLL VENOUS BLD VENIPUNCTURE: CPT | Mod: HCNC | Performed by: PHYSICIAN ASSISTANT

## 2021-01-01 PROCEDURE — 77080 DEXA BONE DENSITY SPINE HIP: ICD-10-PCS | Mod: 26,,, | Performed by: INTERNAL MEDICINE

## 2021-01-01 PROCEDURE — 83605 ASSAY OF LACTIC ACID: CPT | Mod: HCNC | Performed by: EMERGENCY MEDICINE

## 2021-01-01 PROCEDURE — 1159F MED LIST DOCD IN RCRD: CPT | Mod: S$GLB,,, | Performed by: NURSE PRACTITIONER

## 2021-01-01 PROCEDURE — 3074F PR MOST RECENT SYSTOLIC BLOOD PRESSURE < 130 MM HG: ICD-10-PCS | Mod: CPTII,S$GLB,, | Performed by: INTERNAL MEDICINE

## 2021-01-01 PROCEDURE — 99204 PR OFFICE/OUTPT VISIT, NEW, LEVL IV, 45-59 MIN: ICD-10-PCS | Mod: 25,HCNC,S$GLB, | Performed by: INTERNAL MEDICINE

## 2021-01-01 PROCEDURE — 81001 URINALYSIS AUTO W/SCOPE: CPT | Mod: HCNC | Performed by: EMERGENCY MEDICINE

## 2021-01-01 PROCEDURE — 1126F PR PAIN SEVERITY QUANTIFIED, NO PAIN PRESENT: ICD-10-PCS | Mod: HCNC,CPTII,S$GLB, | Performed by: INTERNAL MEDICINE

## 2021-01-01 PROCEDURE — 3044F HG A1C LEVEL LT 7.0%: CPT | Mod: CPTII,S$GLB,, | Performed by: DERMATOLOGY

## 2021-01-01 PROCEDURE — 1159F PR MEDICATION LIST DOCUMENTED IN MEDICAL RECORD: ICD-10-PCS | Mod: S$GLB,,, | Performed by: INTERNAL MEDICINE

## 2021-01-01 PROCEDURE — 99215 PR OFFICE/OUTPT VISIT, EST, LEVL V, 40-54 MIN: ICD-10-PCS | Mod: HCNC,S$GLB,, | Performed by: PHYSICIAN ASSISTANT

## 2021-01-01 PROCEDURE — 99226 PR SUBSEQUENT OBSERVATION CARE,LEVEL III: ICD-10-PCS | Mod: HCNC,,, | Performed by: PHYSICIAN ASSISTANT

## 2021-01-01 PROCEDURE — 76705 ECHO EXAM OF ABDOMEN: CPT | Mod: TC

## 2021-01-01 PROCEDURE — 80047 BASIC METABLC PNL IONIZED CA: CPT | Mod: HCNC

## 2021-01-01 PROCEDURE — 1159F MED LIST DOCD IN RCRD: CPT | Mod: CPTII,S$GLB,, | Performed by: INTERNAL MEDICINE

## 2021-01-01 PROCEDURE — 3288F FALL RISK ASSESSMENT DOCD: CPT | Mod: CPTII,S$GLB,, | Performed by: DERMATOLOGY

## 2021-01-01 PROCEDURE — 99214 OFFICE O/P EST MOD 30 MIN: CPT | Mod: HCNC,GC,S$GLB, | Performed by: STUDENT IN AN ORGANIZED HEALTH CARE EDUCATION/TRAINING PROGRAM

## 2021-01-01 PROCEDURE — 93005 ELECTROCARDIOGRAM TRACING: CPT | Mod: HCNC,59

## 2021-01-01 PROCEDURE — 99999 PR PBB SHADOW E&M-EST. PATIENT-LVL IV: CPT | Mod: PBBFAC,HCNC,, | Performed by: FAMILY MEDICINE

## 2021-01-01 PROCEDURE — 1160F RVW MEDS BY RX/DR IN RCRD: CPT | Mod: CPTII,S$GLB,, | Performed by: INTERNAL MEDICINE

## 2021-01-01 PROCEDURE — 99999 PR PBB SHADOW E&M-EST. PATIENT-LVL V: CPT | Mod: PBBFAC,,, | Performed by: INTERNAL MEDICINE

## 2021-01-01 PROCEDURE — 99214 OFFICE O/P EST MOD 30 MIN: CPT | Mod: S$GLB,,, | Performed by: DERMATOLOGY

## 2021-01-01 PROCEDURE — 1159F PR MEDICATION LIST DOCUMENTED IN MEDICAL RECORD: ICD-10-PCS | Mod: CPTII,S$GLB,, | Performed by: INTERNAL MEDICINE

## 2021-01-01 PROCEDURE — 1160F PR REVIEW ALL MEDS BY PRESCRIBER/CLIN PHARMACIST DOCUMENTED: ICD-10-PCS | Mod: CPTII,S$GLB,, | Performed by: INTERNAL MEDICINE

## 2021-01-01 PROCEDURE — 4010F PR ACE/ARB THEARPY RXD/TAKEN: ICD-10-PCS | Mod: HCNC,CPTII,S$GLB, | Performed by: PHYSICIAN ASSISTANT

## 2021-01-01 PROCEDURE — 82803 BLOOD GASES ANY COMBINATION: CPT | Mod: HCNC

## 2021-01-01 PROCEDURE — 1101F PT FALLS ASSESS-DOCD LE1/YR: CPT | Mod: CPTII,S$GLB,, | Performed by: DERMATOLOGY

## 2021-01-01 PROCEDURE — 93227 XTRNL ECG REC<48 HR R&I: CPT | Mod: HCNC,,, | Performed by: INTERNAL MEDICINE

## 2021-01-01 PROCEDURE — 99999 PR PBB SHADOW E&M-EST. PATIENT-LVL IV: ICD-10-PCS | Mod: PBBFAC,HCNC,, | Performed by: INTERNAL MEDICINE

## 2021-01-01 PROCEDURE — 3061F NEG MICROALBUMINURIA REV: CPT | Mod: HCNC,CPTII,S$GLB, | Performed by: PHYSICIAN ASSISTANT

## 2021-01-01 PROCEDURE — 93227 HOLTER MONITOR - 48 HOUR (CUPID ONLY): ICD-10-PCS | Mod: HCNC,,, | Performed by: INTERNAL MEDICINE

## 2021-01-01 PROCEDURE — 1126F PR PAIN SEVERITY QUANTIFIED, NO PAIN PRESENT: ICD-10-PCS | Mod: CPTII,S$GLB,, | Performed by: INTERNAL MEDICINE

## 2021-01-01 PROCEDURE — 3061F NEG MICROALBUMINURIA REV: CPT | Mod: CPTII,S$GLB,, | Performed by: NURSE PRACTITIONER

## 2021-01-01 PROCEDURE — 1126F AMNT PAIN NOTED NONE PRSNT: CPT | Mod: CPTII,S$GLB,, | Performed by: DERMATOLOGY

## 2021-01-01 PROCEDURE — 99999 PR PBB SHADOW E&M-EST. PATIENT-LVL IV: ICD-10-PCS | Mod: PBBFAC,,, | Performed by: NURSE PRACTITIONER

## 2021-01-01 PROCEDURE — 94010 BREATHING CAPACITY TEST: CPT | Mod: HCNC,S$GLB,, | Performed by: INTERNAL MEDICINE

## 2021-01-01 PROCEDURE — 93225 XTRNL ECG REC<48 HRS REC: CPT | Mod: HCNC

## 2021-01-01 PROCEDURE — 3044F PR MOST RECENT HEMOGLOBIN A1C LEVEL <7.0%: ICD-10-PCS | Mod: HCNC,CPTII,S$GLB, | Performed by: INTERNAL MEDICINE

## 2021-01-01 PROCEDURE — 99214 PR OFFICE/OUTPT VISIT, EST, LEVL IV, 30-39 MIN: ICD-10-PCS | Mod: S$GLB,,, | Performed by: DERMATOLOGY

## 2021-01-01 PROCEDURE — 84484 ASSAY OF TROPONIN QUANT: CPT | Mod: 91,HCNC | Performed by: EMERGENCY MEDICINE

## 2021-01-01 PROCEDURE — 3008F BODY MASS INDEX DOCD: CPT | Mod: HCNC,CPTII,S$GLB, | Performed by: INTERNAL MEDICINE

## 2021-01-01 PROCEDURE — 99214 PR OFFICE/OUTPT VISIT, EST, LEVL IV, 30-39 MIN: ICD-10-PCS | Mod: HCNC,GC,S$GLB, | Performed by: STUDENT IN AN ORGANIZED HEALTH CARE EDUCATION/TRAINING PROGRAM

## 2021-01-01 PROCEDURE — 1160F RVW MEDS BY RX/DR IN RCRD: CPT | Mod: CPTII,S$GLB,, | Performed by: DERMATOLOGY

## 2021-01-01 PROCEDURE — 3061F PR NEG MICROALBUMINURIA RESULT DOCUMENTED/REVIEW: ICD-10-PCS | Mod: CPTII,S$GLB,, | Performed by: OTOLARYNGOLOGY

## 2021-01-01 PROCEDURE — 99999 PR PBB SHADOW E&M-EST. PATIENT-LVL V: CPT | Mod: PBBFAC,HCNC,GC, | Performed by: STUDENT IN AN ORGANIZED HEALTH CARE EDUCATION/TRAINING PROGRAM

## 2021-01-01 PROCEDURE — 31575 PR LARYNGOSCOPY, FLEXIBLE; DIAGNOSTIC: ICD-10-PCS | Mod: S$GLB,,, | Performed by: OTOLARYNGOLOGY

## 2021-01-01 PROCEDURE — 83605 ASSAY OF LACTIC ACID: CPT | Mod: HCNC

## 2021-01-01 PROCEDURE — 99215 OFFICE O/P EST HI 40 MIN: CPT | Mod: HCNC,S$GLB,, | Performed by: PHYSICIAN ASSISTANT

## 2021-01-01 PROCEDURE — 3079F PR MOST RECENT DIASTOLIC BLOOD PRESSURE 80-89 MM HG: ICD-10-PCS | Mod: CPTII,S$GLB,, | Performed by: NURSE PRACTITIONER

## 2021-01-01 PROCEDURE — 99499 RISK ADDL DX/OHS AUDIT: ICD-10-PCS | Mod: HCNC,S$GLB,, | Performed by: INTERNAL MEDICINE

## 2021-01-01 PROCEDURE — 3066F PR DOCUMENTATION OF TREATMENT FOR NEPHROPATHY: ICD-10-PCS | Mod: CPTII,S$GLB,, | Performed by: OTOLARYNGOLOGY

## 2021-01-01 PROCEDURE — 3061F PR NEG MICROALBUMINURIA RESULT DOCUMENTED/REVIEW: ICD-10-PCS | Mod: CPTII,S$GLB,, | Performed by: NURSE PRACTITIONER

## 2021-01-01 PROCEDURE — 85025 COMPLETE CBC W/AUTO DIFF WBC: CPT | Mod: HCNC | Performed by: EMERGENCY MEDICINE

## 2021-01-01 PROCEDURE — 3061F NEG MICROALBUMINURIA REV: CPT | Mod: CPTII,S$GLB,, | Performed by: OTOLARYNGOLOGY

## 2021-01-01 PROCEDURE — 93010 ELECTROCARDIOGRAM REPORT: CPT | Mod: HCNC,,, | Performed by: INTERNAL MEDICINE

## 2021-01-01 PROCEDURE — 3079F DIAST BP 80-89 MM HG: CPT | Mod: CPTII,S$GLB,, | Performed by: NURSE PRACTITIONER

## 2021-01-01 PROCEDURE — 96360 HYDRATION IV INFUSION INIT: CPT | Mod: HCNC

## 2021-01-01 PROCEDURE — 84439 ASSAY OF FREE THYROXINE: CPT | Mod: HCNC | Performed by: INTERNAL MEDICINE

## 2021-01-01 PROCEDURE — 0003A COVID-19, MRNA, LNP-S, PF, 30 MCG/0.3 ML DOSE VACCINE: CPT | Mod: HCNC,PBBFAC | Performed by: FAMILY MEDICINE

## 2021-01-01 PROCEDURE — 1159F PR MEDICATION LIST DOCUMENTED IN MEDICAL RECORD: ICD-10-PCS | Mod: HCNC,CPTII,S$GLB, | Performed by: INTERNAL MEDICINE

## 2021-01-01 PROCEDURE — 77080 DXA BONE DENSITY AXIAL: CPT | Mod: 26,,, | Performed by: INTERNAL MEDICINE

## 2021-01-01 PROCEDURE — 3075F SYST BP GE 130 - 139MM HG: CPT | Mod: HCNC,CPTII,S$GLB, | Performed by: INTERNAL MEDICINE

## 2021-01-01 PROCEDURE — 3066F PR DOCUMENTATION OF TREATMENT FOR NEPHROPATHY: ICD-10-PCS | Mod: HCNC,CPTII,S$GLB, | Performed by: PHYSICIAN ASSISTANT

## 2021-01-01 PROCEDURE — 3061F NEG MICROALBUMINURIA REV: CPT | Mod: CPTII,S$GLB,, | Performed by: STUDENT IN AN ORGANIZED HEALTH CARE EDUCATION/TRAINING PROGRAM

## 2021-01-01 PROCEDURE — 3066F NEPHROPATHY DOC TX: CPT | Mod: CPTII,S$GLB,, | Performed by: OTOLARYNGOLOGY

## 2021-01-01 PROCEDURE — 3008F PR BODY MASS INDEX (BMI) DOCUMENTED: ICD-10-PCS | Mod: CPTII,S$GLB,, | Performed by: OTOLARYNGOLOGY

## 2021-01-01 PROCEDURE — 31231 NASAL ENDOSCOPY DX: CPT | Mod: S$GLB,,, | Performed by: STUDENT IN AN ORGANIZED HEALTH CARE EDUCATION/TRAINING PROGRAM

## 2021-01-01 PROCEDURE — 3061F PR NEG MICROALBUMINURIA RESULT DOCUMENTED/REVIEW: ICD-10-PCS | Mod: HCNC,CPTII,S$GLB, | Performed by: PHYSICIAN ASSISTANT

## 2021-01-01 PROCEDURE — 93005 ELECTROCARDIOGRAM TRACING: CPT | Mod: HCNC

## 2021-01-01 PROCEDURE — 63600175 PHARM REV CODE 636 W HCPCS: Mod: HCNC | Performed by: FAMILY MEDICINE

## 2021-01-01 PROCEDURE — 99999 PR PBB SHADOW E&M-EST. PATIENT-LVL V: CPT | Mod: PBBFAC,,, | Performed by: NURSE PRACTITIONER

## 2021-01-01 PROCEDURE — 3066F PR DOCUMENTATION OF TREATMENT FOR NEPHROPATHY: ICD-10-PCS | Mod: HCNC,CPTII,S$GLB, | Performed by: FAMILY MEDICINE

## 2021-01-01 PROCEDURE — 99213 OFFICE O/P EST LOW 20 MIN: CPT | Mod: 25,S$GLB,, | Performed by: OTOLARYNGOLOGY

## 2021-01-01 PROCEDURE — 3078F DIAST BP <80 MM HG: CPT | Mod: HCNC,CPTII,S$GLB, | Performed by: INTERNAL MEDICINE

## 2021-01-01 PROCEDURE — 99499 UNLISTED E&M SERVICE: CPT | Mod: HCNC,S$GLB,, | Performed by: INTERNAL MEDICINE

## 2021-01-01 PROCEDURE — 1126F PR PAIN SEVERITY QUANTIFIED, NO PAIN PRESENT: ICD-10-PCS | Mod: CPTII,S$GLB,, | Performed by: NURSE PRACTITIONER

## 2021-01-01 PROCEDURE — 99214 PR OFFICE/OUTPT VISIT, EST, LEVL IV, 30-39 MIN: ICD-10-PCS | Mod: HCNC,S$GLB,, | Performed by: FAMILY MEDICINE

## 2021-01-01 PROCEDURE — 77067 SCR MAMMO BI INCL CAD: CPT | Mod: 26,HCNC,, | Performed by: RADIOLOGY

## 2021-01-01 PROCEDURE — 36415 COLL VENOUS BLD VENIPUNCTURE: CPT | Mod: PO | Performed by: INTERNAL MEDICINE

## 2021-01-01 PROCEDURE — 36600 WITHDRAWAL OF ARTERIAL BLOOD: CPT | Mod: HCNC

## 2021-01-01 PROCEDURE — 3066F NEPHROPATHY DOC TX: CPT | Mod: HCNC,CPTII,S$GLB, | Performed by: FAMILY MEDICINE

## 2021-01-01 PROCEDURE — 99999 PR PBB SHADOW E&M-EST. PATIENT-LVL III: ICD-10-PCS | Mod: PBBFAC,,, | Performed by: DERMATOLOGY

## 2021-01-01 PROCEDURE — U0002 COVID-19 LAB TEST NON-CDC: HCPCS | Performed by: OTOLARYNGOLOGY

## 2021-01-01 PROCEDURE — 3008F BODY MASS INDEX DOCD: CPT | Mod: CPTII,S$GLB,, | Performed by: OTOLARYNGOLOGY

## 2021-01-01 PROCEDURE — 1101F PR PT FALLS ASSESS DOC 0-1 FALLS W/OUT INJ PAST YR: ICD-10-PCS | Mod: CPTII,S$GLB,, | Performed by: DERMATOLOGY

## 2021-01-01 PROCEDURE — 85027 COMPLETE CBC AUTOMATED: CPT | Mod: HCNC | Performed by: INTERNAL MEDICINE

## 2021-01-01 PROCEDURE — 3074F SYST BP LT 130 MM HG: CPT | Mod: CPTII,S$GLB,, | Performed by: NURSE PRACTITIONER

## 2021-01-01 PROCEDURE — 99217 PR OBSERVATION CARE DISCHARGE: ICD-10-PCS | Mod: HCNC,,, | Performed by: PHYSICIAN ASSISTANT

## 2021-01-01 PROCEDURE — 4010F PR ACE/ARB THEARPY RXD/TAKEN: ICD-10-PCS | Mod: CPTII,S$GLB,, | Performed by: NURSE PRACTITIONER

## 2021-01-01 PROCEDURE — 99213 PR OFFICE/OUTPT VISIT, EST, LEVL III, 20-29 MIN: ICD-10-PCS | Mod: 25,S$GLB,, | Performed by: OTOLARYNGOLOGY

## 2021-01-01 PROCEDURE — 3288F PR FALLS RISK ASSESSMENT DOCUMENTED: ICD-10-PCS | Mod: CPTII,S$GLB,, | Performed by: INTERNAL MEDICINE

## 2021-01-01 PROCEDURE — 31231 PR NASAL ENDOSCOPY, DX: ICD-10-PCS | Mod: S$GLB,,, | Performed by: STUDENT IN AN ORGANIZED HEALTH CARE EDUCATION/TRAINING PROGRAM

## 2021-01-01 PROCEDURE — 1126F AMNT PAIN NOTED NONE PRSNT: CPT | Mod: CPTII,S$GLB,, | Performed by: NURSE PRACTITIONER

## 2021-01-01 PROCEDURE — 99284 PR EMERGENCY DEPT VISIT,LEVEL IV: ICD-10-PCS | Mod: ,,, | Performed by: EMERGENCY MEDICINE

## 2021-01-01 PROCEDURE — 99499 RISK ADDL DX/OHS AUDIT: ICD-10-PCS | Mod: HCNC,S$GLB,, | Performed by: FAMILY MEDICINE

## 2021-01-01 PROCEDURE — 99999 PR PBB SHADOW E&M-EST. PATIENT-LVL V: CPT | Mod: PBBFAC,HCNC,, | Performed by: PHYSICIAN ASSISTANT

## 2021-01-01 PROCEDURE — 77067 MAMMO DIGITAL SCREENING BILAT WITH TOMO: ICD-10-PCS | Mod: 26,HCNC,, | Performed by: RADIOLOGY

## 2021-01-01 PROCEDURE — 1160F PR REVIEW ALL MEDS BY PRESCRIBER/CLIN PHARMACIST DOCUMENTED: ICD-10-PCS | Mod: CPTII,S$GLB,, | Performed by: DERMATOLOGY

## 2021-01-01 PROCEDURE — 99214 PR OFFICE/OUTPT VISIT, EST, LEVL IV, 30-39 MIN: ICD-10-PCS | Mod: HCNC,S$GLB,, | Performed by: INTERNAL MEDICINE

## 2021-01-01 PROCEDURE — 31575 DIAGNOSTIC LARYNGOSCOPY: CPT | Mod: S$GLB,,, | Performed by: OTOLARYNGOLOGY

## 2021-01-01 PROCEDURE — 94799 UNLISTED PULMONARY SVC/PX: CPT | Mod: HCNC

## 2021-01-01 PROCEDURE — 1126F PR PAIN SEVERITY QUANTIFIED, NO PAIN PRESENT: ICD-10-PCS | Mod: CPTII,S$GLB,, | Performed by: DERMATOLOGY

## 2021-01-01 PROCEDURE — 1159F MED LIST DOCD IN RCRD: CPT | Mod: S$GLB,,, | Performed by: INTERNAL MEDICINE

## 2021-01-01 PROCEDURE — 3044F PR MOST RECENT HEMOGLOBIN A1C LEVEL <7.0%: ICD-10-PCS | Mod: CPTII,S$GLB,, | Performed by: DERMATOLOGY

## 2021-01-01 PROCEDURE — 99204 OFFICE O/P NEW MOD 45 MIN: CPT | Mod: 25,HCNC,S$GLB, | Performed by: INTERNAL MEDICINE

## 2021-01-01 PROCEDURE — 80048 BASIC METABOLIC PNL TOTAL CA: CPT | Mod: HCNC | Performed by: PHYSICIAN ASSISTANT

## 2021-01-01 PROCEDURE — 1159F PR MEDICATION LIST DOCUMENTED IN MEDICAL RECORD: ICD-10-PCS | Mod: CPTII,S$GLB,, | Performed by: NURSE PRACTITIONER

## 2021-01-01 PROCEDURE — 3288F PR FALLS RISK ASSESSMENT DOCUMENTED: ICD-10-PCS | Mod: CPTII,S$GLB,, | Performed by: DERMATOLOGY

## 2021-01-01 PROCEDURE — 82306 VITAMIN D 25 HYDROXY: CPT | Mod: HCNC | Performed by: INTERNAL MEDICINE

## 2021-01-01 RX ORDER — FLUOCINONIDE TOPICAL SOLUTION USP, 0.05% 0.5 MG/ML
SOLUTION TOPICAL
Qty: 60 ML | Refills: 3 | Status: ON HOLD | OUTPATIENT
Start: 2021-01-01 | End: 2022-01-01

## 2021-01-01 RX ORDER — BENZONATATE 100 MG/1
100 CAPSULE ORAL 3 TIMES DAILY PRN
Qty: 20 CAPSULE | Refills: 0 | Status: SHIPPED | OUTPATIENT
Start: 2021-01-01 | End: 2021-01-01

## 2021-01-01 RX ORDER — MUPIROCIN 20 MG/G
OINTMENT TOPICAL 3 TIMES DAILY
Qty: 22 G | Refills: 1 | Status: ON HOLD | OUTPATIENT
Start: 2021-01-01 | End: 2021-01-01 | Stop reason: HOSPADM

## 2021-01-01 RX ORDER — COLCHICINE 0.6 MG/1
0.6 TABLET ORAL DAILY
Qty: 90 TABLET | Refills: 1 | OUTPATIENT
Start: 2021-01-01

## 2021-01-01 RX ORDER — ATROPINE SULFATE 0.1 MG/ML
1 INJECTION INTRAVENOUS
Status: COMPLETED | OUTPATIENT
Start: 2021-01-01 | End: 2021-01-01

## 2021-01-01 RX ORDER — TRIAMCINOLONE ACETONIDE 0.25 MG/G
CREAM TOPICAL
Qty: 30 G | Refills: 3 | Status: ON HOLD | OUTPATIENT
Start: 2021-01-01 | End: 2022-01-01

## 2021-01-01 RX ORDER — ONDANSETRON 2 MG/ML
4 INJECTION INTRAMUSCULAR; INTRAVENOUS EVERY 8 HOURS PRN
Status: DISCONTINUED | OUTPATIENT
Start: 2021-01-01 | End: 2021-01-01 | Stop reason: HOSPADM

## 2021-01-01 RX ORDER — LORATADINE 10 MG/1
10 TABLET ORAL DAILY
Qty: 30 TABLET | Refills: 0 | Status: SHIPPED | OUTPATIENT
Start: 2021-01-01 | End: 2022-01-01

## 2021-01-01 RX ORDER — OMEPRAZOLE 20 MG/1
CAPSULE, DELAYED RELEASE ORAL
Qty: 90 CAPSULE | Refills: 3 | Status: SHIPPED | OUTPATIENT
Start: 2021-01-01 | End: 2021-01-01

## 2021-01-01 RX ORDER — PANTOPRAZOLE SODIUM 40 MG/1
40 TABLET, DELAYED RELEASE ORAL DAILY
Refills: 3 | Status: DISCONTINUED | OUTPATIENT
Start: 2021-01-01 | End: 2021-01-01 | Stop reason: HOSPADM

## 2021-01-01 RX ORDER — ACETAMINOPHEN 500 MG
500 TABLET ORAL EVERY 6 HOURS PRN
Status: DISCONTINUED | OUTPATIENT
Start: 2021-01-01 | End: 2021-01-01 | Stop reason: HOSPADM

## 2021-01-01 RX ORDER — IPRATROPIUM BROMIDE AND ALBUTEROL SULFATE 2.5; .5 MG/3ML; MG/3ML
3 SOLUTION RESPIRATORY (INHALATION) EVERY 6 HOURS PRN
Status: DISCONTINUED | OUTPATIENT
Start: 2021-01-01 | End: 2021-01-01 | Stop reason: HOSPADM

## 2021-01-01 RX ORDER — AMOXICILLIN AND CLAVULANATE POTASSIUM 875; 125 MG/1; MG/1
1 TABLET, FILM COATED ORAL 2 TIMES DAILY
Qty: 20 TABLET | Refills: 0 | Status: SHIPPED | OUTPATIENT
Start: 2021-01-01 | End: 2021-01-01

## 2021-01-01 RX ORDER — COLCHICINE 0.6 MG/1
0.6 TABLET ORAL DAILY
Qty: 90 TABLET | Refills: 0 | Status: SHIPPED | OUTPATIENT
Start: 2021-01-01 | End: 2021-01-01

## 2021-01-01 RX ORDER — FLUTICASONE PROPIONATE 50 MCG
1 SPRAY, SUSPENSION (ML) NASAL 2 TIMES DAILY PRN
Qty: 15 G | Refills: 0 | OUTPATIENT
Start: 2021-01-01 | End: 2021-01-01

## 2021-01-01 RX ORDER — ALLOPURINOL 300 MG/1
300 TABLET ORAL DAILY
Status: DISCONTINUED | OUTPATIENT
Start: 2021-01-01 | End: 2021-01-01 | Stop reason: HOSPADM

## 2021-01-01 RX ORDER — TRIAMCINOLONE ACETONIDE 1 MG/G
CREAM TOPICAL
Qty: 454 G | Refills: 3 | Status: SHIPPED | OUTPATIENT
Start: 2021-01-01 | End: 2022-01-01

## 2021-01-01 RX ORDER — HYDRALAZINE HYDROCHLORIDE 25 MG/1
25 TABLET, FILM COATED ORAL EVERY 8 HOURS PRN
Status: DISCONTINUED | OUTPATIENT
Start: 2021-01-01 | End: 2021-01-01 | Stop reason: HOSPADM

## 2021-01-01 RX ORDER — OMEPRAZOLE 40 MG/1
40 CAPSULE, DELAYED RELEASE ORAL 2 TIMES DAILY
Qty: 60 CAPSULE | Refills: 3 | Status: ON HOLD | OUTPATIENT
Start: 2021-01-01 | End: 2022-01-01

## 2021-01-01 RX ORDER — NALOXONE HCL 0.4 MG/ML
0.02 VIAL (ML) INJECTION
Status: DISCONTINUED | OUTPATIENT
Start: 2021-01-01 | End: 2021-01-01 | Stop reason: HOSPADM

## 2021-01-01 RX ORDER — PROMETHAZINE HYDROCHLORIDE AND DEXTROMETHORPHAN HYDROBROMIDE 6.25; 15 MG/5ML; MG/5ML
5 SYRUP ORAL NIGHTLY PRN
Qty: 180 ML | Refills: 0 | Status: SHIPPED | OUTPATIENT
Start: 2021-01-01 | End: 2021-01-01

## 2021-01-01 RX ORDER — DOXYCYCLINE 100 MG/1
100 CAPSULE ORAL EVERY 12 HOURS
Qty: 20 CAPSULE | Refills: 0 | Status: SHIPPED | OUTPATIENT
Start: 2021-01-01 | End: 2021-01-01 | Stop reason: ALTCHOICE

## 2021-01-01 RX ORDER — INSULIN ASPART 100 [IU]/ML
0-5 INJECTION, SOLUTION INTRAVENOUS; SUBCUTANEOUS
Status: DISCONTINUED | OUTPATIENT
Start: 2021-01-01 | End: 2021-01-01 | Stop reason: HOSPADM

## 2021-01-01 RX ORDER — BISOPROLOL FUMARATE AND HYDROCHLOROTHIAZIDE 10; 6.25 MG/1; MG/1
1 TABLET ORAL DAILY
Qty: 90 TABLET | Refills: 3 | Status: SHIPPED | OUTPATIENT
Start: 2021-01-01 | End: 2022-01-01

## 2021-01-01 RX ORDER — FLUTICASONE PROPIONATE 50 MCG
1 SPRAY, SUSPENSION (ML) NASAL 2 TIMES DAILY PRN
Qty: 15 G | Refills: 0 | Status: SHIPPED | OUTPATIENT
Start: 2021-01-01 | End: 2022-01-01

## 2021-01-01 RX ORDER — SODIUM CHLORIDE 9 MG/ML
INJECTION, SOLUTION INTRAVENOUS
Status: COMPLETED | OUTPATIENT
Start: 2021-01-01 | End: 2021-01-01

## 2021-01-01 RX ORDER — LORATADINE 10 MG/1
10 TABLET ORAL DAILY
Qty: 30 TABLET | Refills: 2
Start: 2021-01-01 | End: 2021-01-01

## 2021-01-01 RX ORDER — VIT C/E/ZN/COPPR/LUTEIN/ZEAXAN 250MG-90MG
1000 CAPSULE ORAL DAILY
Refills: 0 | Status: ON HOLD
Start: 2021-01-01 | End: 2022-01-01

## 2021-01-01 RX ORDER — ALLOPURINOL 100 MG/1
100 TABLET ORAL DAILY
Qty: 30 TABLET | Refills: 2 | Status: ON HOLD | OUTPATIENT
Start: 2021-01-01 | End: 2022-01-01

## 2021-01-01 RX ORDER — SODIUM CHLORIDE 0.9 % (FLUSH) 0.9 %
10 SYRINGE (ML) INJECTION EVERY 12 HOURS PRN
Status: DISCONTINUED | OUTPATIENT
Start: 2021-01-01 | End: 2021-01-01 | Stop reason: HOSPADM

## 2021-01-01 RX ORDER — TALC
6 POWDER (GRAM) TOPICAL NIGHTLY PRN
Status: DISCONTINUED | OUTPATIENT
Start: 2021-01-01 | End: 2021-01-01 | Stop reason: HOSPADM

## 2021-01-01 RX ORDER — MAG HYDROX/ALUMINUM HYD/SIMETH 200-200-20
30 SUSPENSION, ORAL (FINAL DOSE FORM) ORAL 4 TIMES DAILY PRN
Status: DISCONTINUED | OUTPATIENT
Start: 2021-01-01 | End: 2021-01-01 | Stop reason: HOSPADM

## 2021-01-01 RX ORDER — IBUPROFEN 200 MG
16 TABLET ORAL
Status: DISCONTINUED | OUTPATIENT
Start: 2021-01-01 | End: 2021-01-01 | Stop reason: HOSPADM

## 2021-01-01 RX ORDER — GLUCAGON 1 MG
1 KIT INJECTION
Status: DISCONTINUED | OUTPATIENT
Start: 2021-01-01 | End: 2021-01-01 | Stop reason: HOSPADM

## 2021-01-01 RX ORDER — METFORMIN HYDROCHLORIDE 500 MG/1
TABLET, EXTENDED RELEASE ORAL
Qty: 270 TABLET | Refills: 3 | Status: ON HOLD | OUTPATIENT
Start: 2021-01-01 | End: 2022-01-01

## 2021-01-01 RX ORDER — FUROSEMIDE 20 MG/1
20 TABLET ORAL DAILY
Qty: 30 TABLET | Refills: 11 | Status: ON HOLD | OUTPATIENT
Start: 2021-01-01 | End: 2022-01-01

## 2021-01-01 RX ORDER — COLCHICINE 0.6 MG/1
TABLET, FILM COATED ORAL
Qty: 90 TABLET | Refills: 2 | Status: SHIPPED | OUTPATIENT
Start: 2021-01-01 | End: 2021-01-01 | Stop reason: SDUPTHER

## 2021-01-01 RX ORDER — DOBUTAMINE HYDROCHLORIDE 400 MG/100ML
10 INJECTION INTRAVENOUS
Status: COMPLETED | OUTPATIENT
Start: 2021-01-01 | End: 2021-01-01

## 2021-01-01 RX ORDER — KETOCONAZOLE 20 MG/ML
SHAMPOO, SUSPENSION TOPICAL
Qty: 120 ML | Refills: 5 | Status: SHIPPED | OUTPATIENT
Start: 2021-01-01 | End: 2021-01-01

## 2021-01-01 RX ORDER — IBUPROFEN 200 MG
24 TABLET ORAL
Status: DISCONTINUED | OUTPATIENT
Start: 2021-01-01 | End: 2021-01-01 | Stop reason: HOSPADM

## 2021-01-01 RX ADMIN — SODIUM CHLORIDE: 9 INJECTION, SOLUTION INTRAVENOUS at 01:11

## 2021-01-01 RX ADMIN — PANTOPRAZOLE SODIUM 40 MG: 40 TABLET, DELAYED RELEASE ORAL at 09:12

## 2021-01-01 RX ADMIN — ALLOPURINOL 300 MG: 100 TABLET ORAL at 09:12

## 2021-01-01 RX ADMIN — ATROPINE SULFATE 2 MG: 0.1 INJECTION PARENTERAL at 03:12

## 2021-01-01 RX ADMIN — DOBUTAMINE HYDROCHLORIDE 10 MCG/KG/MIN: 400 INJECTION INTRAVENOUS at 03:12

## 2021-01-01 RX ADMIN — SODIUM CHLORIDE, SODIUM LACTATE, POTASSIUM CHLORIDE, AND CALCIUM CHLORIDE 1000 ML: .6; .31; .03; .02 INJECTION, SOLUTION INTRAVENOUS at 06:12

## 2021-01-01 RX ADMIN — IOHEXOL 100 ML: 350 INJECTION, SOLUTION INTRAVENOUS at 10:11

## 2021-01-01 RX ADMIN — SODIUM CHLORIDE 1000 ML: 0.9 INJECTION, SOLUTION INTRAVENOUS at 06:12

## 2021-01-12 ENCOUNTER — TELEPHONE (OUTPATIENT)
Dept: INTERNAL MEDICINE | Facility: CLINIC | Age: 71
End: 2021-01-12

## 2021-01-12 DIAGNOSIS — E11.9 TYPE 2 DIABETES MELLITUS WITHOUT COMPLICATION, WITHOUT LONG-TERM CURRENT USE OF INSULIN: ICD-10-CM

## 2021-01-12 RX ORDER — LANCETS
1 EACH MISCELLANEOUS 2 TIMES DAILY WITH MEALS
Qty: 100 EACH | Refills: 11 | Status: ON HOLD | OUTPATIENT
Start: 2021-01-12 | End: 2022-01-01

## 2021-01-12 RX ORDER — LANCING DEVICE
1 EACH MISCELLANEOUS 2 TIMES DAILY WITH MEALS
Qty: 1 EACH | Refills: 0 | Status: ON HOLD | OUTPATIENT
Start: 2021-01-12 | End: 2022-01-01

## 2021-01-12 RX ORDER — INSULIN PUMP SYRINGE, 3 ML
EACH MISCELLANEOUS
Qty: 1 EACH | Refills: 0 | Status: ON HOLD | OUTPATIENT
Start: 2021-01-12 | End: 2022-01-01

## 2021-02-18 ENCOUNTER — OFFICE VISIT (OUTPATIENT)
Dept: INTERNAL MEDICINE | Facility: CLINIC | Age: 71
End: 2021-02-18
Payer: MEDICARE

## 2021-02-18 VITALS
DIASTOLIC BLOOD PRESSURE: 58 MMHG | BODY MASS INDEX: 31.78 KG/M2 | HEIGHT: 59 IN | SYSTOLIC BLOOD PRESSURE: 128 MMHG | WEIGHT: 157.63 LBS | HEART RATE: 65 BPM

## 2021-02-18 DIAGNOSIS — D64.9 ANEMIA, UNSPECIFIED TYPE: ICD-10-CM

## 2021-02-18 DIAGNOSIS — B36.9 FUNGAL SKIN INFECTION: ICD-10-CM

## 2021-02-18 DIAGNOSIS — E78.2 MIXED HYPERLIPIDEMIA: ICD-10-CM

## 2021-02-18 DIAGNOSIS — E11.9 TYPE 2 DIABETES MELLITUS WITHOUT COMPLICATION, WITHOUT LONG-TERM CURRENT USE OF INSULIN: ICD-10-CM

## 2021-02-18 DIAGNOSIS — L40.8 INVERSE PSORIASIS: ICD-10-CM

## 2021-02-18 DIAGNOSIS — Z78.9 STATIN INTOLERANCE: ICD-10-CM

## 2021-02-18 DIAGNOSIS — D69.6 THROMBOCYTOPENIA: ICD-10-CM

## 2021-02-18 DIAGNOSIS — L43.9 LICHEN PLANUS: ICD-10-CM

## 2021-02-18 DIAGNOSIS — Z78.0 POSTMENOPAUSAL: ICD-10-CM

## 2021-02-18 DIAGNOSIS — L40.9 PSORIASIS: Primary | ICD-10-CM

## 2021-02-18 DIAGNOSIS — I10 ESSENTIAL HYPERTENSION: ICD-10-CM

## 2021-02-18 DIAGNOSIS — Z85.828 HISTORY OF BASAL CELL CARCINOMA (BCC): ICD-10-CM

## 2021-02-18 DIAGNOSIS — K76.0 NAFLD (NONALCOHOLIC FATTY LIVER DISEASE): ICD-10-CM

## 2021-02-18 DIAGNOSIS — K74.60 CIRRHOSIS OF LIVER WITHOUT ASCITES, UNSPECIFIED HEPATIC CIRRHOSIS TYPE: ICD-10-CM

## 2021-02-18 DIAGNOSIS — M10.9 INTERVAL GOUT: ICD-10-CM

## 2021-02-18 PROCEDURE — 1159F PR MEDICATION LIST DOCUMENTED IN MEDICAL RECORD: ICD-10-PCS | Mod: S$GLB,,, | Performed by: INTERNAL MEDICINE

## 2021-02-18 PROCEDURE — 1101F PT FALLS ASSESS-DOCD LE1/YR: CPT | Mod: CPTII,S$GLB,, | Performed by: INTERNAL MEDICINE

## 2021-02-18 PROCEDURE — 3008F BODY MASS INDEX DOCD: CPT | Mod: CPTII,S$GLB,, | Performed by: INTERNAL MEDICINE

## 2021-02-18 PROCEDURE — 99215 PR OFFICE/OUTPT VISIT, EST, LEVL V, 40-54 MIN: ICD-10-PCS | Mod: S$GLB,,, | Performed by: INTERNAL MEDICINE

## 2021-02-18 PROCEDURE — 3044F PR MOST RECENT HEMOGLOBIN A1C LEVEL <7.0%: ICD-10-PCS | Mod: CPTII,S$GLB,, | Performed by: INTERNAL MEDICINE

## 2021-02-18 PROCEDURE — 3288F FALL RISK ASSESSMENT DOCD: CPT | Mod: CPTII,S$GLB,, | Performed by: INTERNAL MEDICINE

## 2021-02-18 PROCEDURE — 3288F PR FALLS RISK ASSESSMENT DOCUMENTED: ICD-10-PCS | Mod: CPTII,S$GLB,, | Performed by: INTERNAL MEDICINE

## 2021-02-18 PROCEDURE — 3078F DIAST BP <80 MM HG: CPT | Mod: CPTII,S$GLB,, | Performed by: INTERNAL MEDICINE

## 2021-02-18 PROCEDURE — 3044F HG A1C LEVEL LT 7.0%: CPT | Mod: CPTII,S$GLB,, | Performed by: INTERNAL MEDICINE

## 2021-02-18 PROCEDURE — 1159F MED LIST DOCD IN RCRD: CPT | Mod: S$GLB,,, | Performed by: INTERNAL MEDICINE

## 2021-02-18 PROCEDURE — 99499 RISK ADDL DX/OHS AUDIT: ICD-10-PCS | Mod: S$GLB,,, | Performed by: INTERNAL MEDICINE

## 2021-02-18 PROCEDURE — 1126F PR PAIN SEVERITY QUANTIFIED, NO PAIN PRESENT: ICD-10-PCS | Mod: S$GLB,,, | Performed by: INTERNAL MEDICINE

## 2021-02-18 PROCEDURE — 99999 PR PBB SHADOW E&M-EST. PATIENT-LVL V: CPT | Mod: PBBFAC,,, | Performed by: INTERNAL MEDICINE

## 2021-02-18 PROCEDURE — 3078F PR MOST RECENT DIASTOLIC BLOOD PRESSURE < 80 MM HG: ICD-10-PCS | Mod: CPTII,S$GLB,, | Performed by: INTERNAL MEDICINE

## 2021-02-18 PROCEDURE — 99999 PR PBB SHADOW E&M-EST. PATIENT-LVL V: ICD-10-PCS | Mod: PBBFAC,,, | Performed by: INTERNAL MEDICINE

## 2021-02-18 PROCEDURE — 3074F SYST BP LT 130 MM HG: CPT | Mod: CPTII,S$GLB,, | Performed by: INTERNAL MEDICINE

## 2021-02-18 PROCEDURE — 3008F PR BODY MASS INDEX (BMI) DOCUMENTED: ICD-10-PCS | Mod: CPTII,S$GLB,, | Performed by: INTERNAL MEDICINE

## 2021-02-18 PROCEDURE — 3074F PR MOST RECENT SYSTOLIC BLOOD PRESSURE < 130 MM HG: ICD-10-PCS | Mod: CPTII,S$GLB,, | Performed by: INTERNAL MEDICINE

## 2021-02-18 PROCEDURE — 99215 OFFICE O/P EST HI 40 MIN: CPT | Mod: S$GLB,,, | Performed by: INTERNAL MEDICINE

## 2021-02-18 PROCEDURE — 1101F PR PT FALLS ASSESS DOC 0-1 FALLS W/OUT INJ PAST YR: ICD-10-PCS | Mod: CPTII,S$GLB,, | Performed by: INTERNAL MEDICINE

## 2021-02-18 PROCEDURE — 1126F AMNT PAIN NOTED NONE PRSNT: CPT | Mod: S$GLB,,, | Performed by: INTERNAL MEDICINE

## 2021-02-18 PROCEDURE — 99499 UNLISTED E&M SERVICE: CPT | Mod: S$GLB,,, | Performed by: INTERNAL MEDICINE

## 2021-02-18 RX ORDER — LOSARTAN POTASSIUM 50 MG/1
50 TABLET ORAL DAILY
Qty: 90 TABLET | Refills: 1 | Status: SHIPPED | OUTPATIENT
Start: 2021-02-18 | End: 2021-01-01

## 2021-02-18 RX ORDER — METFORMIN HYDROCHLORIDE 500 MG/1
TABLET, EXTENDED RELEASE ORAL
Qty: 270 TABLET | Refills: 3 | Status: SHIPPED | OUTPATIENT
Start: 2021-02-18 | End: 2021-01-01 | Stop reason: SDUPTHER

## 2021-02-18 RX ORDER — TRIAMCINOLONE ACETONIDE 5 MG/G
CREAM TOPICAL 2 TIMES DAILY
Qty: 454 G | Refills: 1 | Status: SHIPPED | OUTPATIENT
Start: 2021-02-18 | End: 2021-01-01

## 2021-02-18 RX ORDER — PRAVASTATIN SODIUM 10 MG/1
10 TABLET ORAL DAILY
Qty: 30 TABLET | Refills: 1 | Status: SHIPPED | OUTPATIENT
Start: 2021-02-18 | End: 2021-01-01

## 2021-02-18 RX ORDER — CLOBETASOL PROPIONATE 0.5 MG/G
CREAM TOPICAL
Qty: 45 G | Refills: 0 | Status: SHIPPED | OUTPATIENT
Start: 2021-02-18 | End: 2021-01-01

## 2021-02-18 RX ORDER — CLOTRIMAZOLE 1 %
CREAM (GRAM) TOPICAL
Qty: 60 G | Refills: 1 | Status: ON HOLD | OUTPATIENT
Start: 2021-02-18 | End: 2022-01-01

## 2021-02-18 RX ORDER — ALLOPURINOL 300 MG/1
300 TABLET ORAL DAILY
Qty: 90 TABLET | Refills: 1 | Status: SHIPPED | OUTPATIENT
Start: 2021-02-18 | End: 2021-01-01

## 2021-02-18 RX ORDER — COLCHICINE 0.6 MG/1
0.6 TABLET ORAL DAILY
Qty: 90 TABLET | Refills: 1 | Status: SHIPPED | OUTPATIENT
Start: 2021-02-18 | End: 2021-01-01

## 2021-02-21 PROBLEM — Z78.9 STATIN INTOLERANCE: Status: ACTIVE | Noted: 2021-02-21

## 2021-02-21 PROBLEM — L40.8 INVERSE PSORIASIS: Status: ACTIVE | Noted: 2021-02-21

## 2021-02-21 PROBLEM — D69.6 THROMBOCYTOPENIA: Status: ACTIVE | Noted: 2021-02-21

## 2021-02-22 ENCOUNTER — TELEPHONE (OUTPATIENT)
Dept: INTERNAL MEDICINE | Facility: CLINIC | Age: 71
End: 2021-02-22

## 2021-02-23 RX ORDER — PRAVASTATIN SODIUM 10 MG/1
TABLET ORAL
Qty: 90 TABLET | Refills: 2 | OUTPATIENT
Start: 2021-02-23

## 2021-03-22 PROBLEM — K76.0 NAFLD (NONALCOHOLIC FATTY LIVER DISEASE): Status: RESOLVED | Noted: 2018-07-27 | Resolved: 2021-01-01

## 2021-07-08 PROBLEM — G47.30 SLEEP APNEA: Status: ACTIVE | Noted: 2021-01-01

## 2021-09-07 PROBLEM — R05.3 CHRONIC COUGH: Status: ACTIVE | Noted: 2021-01-01

## 2021-12-08 PROBLEM — E87.29 HYPERCHLOREMIC METABOLIC ACIDOSIS: Status: ACTIVE | Noted: 2021-01-01

## 2021-12-08 PROBLEM — Z92.89 H/O CARDIOVASCULAR STRESS TEST: Status: ACTIVE | Noted: 2021-01-01

## 2021-12-08 PROBLEM — E87.20 LACTIC ACIDOSIS: Status: ACTIVE | Noted: 2021-01-01

## 2021-12-08 PROBLEM — E87.1 HYPONATREMIA: Status: ACTIVE | Noted: 2021-01-01

## 2021-12-08 PROBLEM — R06.09 DOE (DYSPNEA ON EXERTION): Status: ACTIVE | Noted: 2021-01-01

## 2021-12-08 PROBLEM — E66.9 OBESITY (BMI 30.0-34.9): Status: ACTIVE | Noted: 2021-01-01

## 2021-12-08 PROBLEM — I95.2 HYPOTENSION DUE TO DRUGS: Status: ACTIVE | Noted: 2021-01-01

## 2021-12-08 PROBLEM — J98.11 ATELECTASIS: Status: ACTIVE | Noted: 2021-01-01

## 2021-12-10 NOTE — TELEPHONE ENCOUNTER
Called patient and discussed labs and or test results. Patient expressed understanding and had the opportunity to ask questions. Any questions were answered. See meds, orders, follow up and instructions sections of encounter.    Specific issues include:  Called about echo - hypotension and was admitted for 2 days. cardio consult felt non-ischemic. Has f/u w/ pcp next week. Was d/c'ed today.

## 2022-01-01 ENCOUNTER — TELEPHONE (OUTPATIENT)
Dept: HEPATOLOGY | Facility: CLINIC | Age: 72
End: 2022-01-01

## 2022-01-01 ENCOUNTER — TELEPHONE (OUTPATIENT)
Dept: CARDIOLOGY | Facility: HOSPITAL | Age: 72
End: 2022-01-01
Payer: MEDICARE

## 2022-01-01 ENCOUNTER — TELEPHONE (OUTPATIENT)
Dept: CARDIOLOGY | Facility: CLINIC | Age: 72
End: 2022-01-01
Payer: MEDICARE

## 2022-01-01 ENCOUNTER — LAB VISIT (OUTPATIENT)
Dept: LAB | Facility: HOSPITAL | Age: 72
End: 2022-01-01
Attending: INTERNAL MEDICINE
Payer: MEDICARE

## 2022-01-01 ENCOUNTER — PATIENT MESSAGE (OUTPATIENT)
Dept: ADMINISTRATIVE | Facility: HOSPITAL | Age: 72
End: 2022-01-01
Payer: MEDICARE

## 2022-01-01 ENCOUNTER — TELEPHONE (OUTPATIENT)
Dept: HEMATOLOGY/ONCOLOGY | Facility: CLINIC | Age: 72
End: 2022-01-01
Payer: MEDICARE

## 2022-01-01 ENCOUNTER — PATIENT MESSAGE (OUTPATIENT)
Dept: CARDIOLOGY | Facility: CLINIC | Age: 72
End: 2022-01-01
Payer: MEDICARE

## 2022-01-01 ENCOUNTER — DOCUMENT SCAN (OUTPATIENT)
Dept: HOME HEALTH SERVICES | Facility: HOSPITAL | Age: 72
End: 2022-01-01
Payer: MEDICARE

## 2022-01-01 ENCOUNTER — HOSPITAL ENCOUNTER (OUTPATIENT)
Dept: CARDIOLOGY | Facility: CLINIC | Age: 72
Discharge: HOME | End: 2022-01-12
Payer: MEDICARE

## 2022-01-01 ENCOUNTER — PATIENT MESSAGE (OUTPATIENT)
Dept: INTERNAL MEDICINE | Facility: CLINIC | Age: 72
End: 2022-01-01
Payer: MEDICARE

## 2022-01-01 ENCOUNTER — OFFICE VISIT (OUTPATIENT)
Dept: CARDIOLOGY | Facility: CLINIC | Age: 72
End: 2022-01-01
Payer: MEDICARE

## 2022-01-01 ENCOUNTER — TELEPHONE (OUTPATIENT)
Dept: GASTROENTEROLOGY | Facility: HOSPITAL | Age: 72
End: 2022-01-01
Payer: MEDICARE

## 2022-01-01 ENCOUNTER — HOSPITAL ENCOUNTER (OUTPATIENT)
Dept: RADIOLOGY | Facility: HOSPITAL | Age: 72
Discharge: HOME | DRG: 682 | End: 2022-01-31
Attending: INTERNAL MEDICINE
Payer: MEDICARE

## 2022-01-01 ENCOUNTER — TELEPHONE (OUTPATIENT)
Dept: INTERNAL MEDICINE | Facility: CLINIC | Age: 72
End: 2022-01-01
Payer: MEDICARE

## 2022-01-01 ENCOUNTER — TELEPHONE (OUTPATIENT)
Dept: HEPATOLOGY | Facility: CLINIC | Age: 72
End: 2022-01-01
Payer: MEDICARE

## 2022-01-01 ENCOUNTER — CLINICAL SUPPORT (OUTPATIENT)
Dept: CARDIOLOGY | Facility: HOSPITAL | Age: 72
End: 2022-01-01
Attending: STUDENT IN AN ORGANIZED HEALTH CARE EDUCATION/TRAINING PROGRAM
Payer: MEDICARE

## 2022-01-01 ENCOUNTER — HOSPITAL ENCOUNTER (INPATIENT)
Facility: HOSPITAL | Age: 72
LOS: 28 days | DRG: 682 | End: 2022-03-01
Attending: EMERGENCY MEDICINE | Admitting: HOSPITALIST
Payer: MEDICARE

## 2022-01-01 ENCOUNTER — OFFICE VISIT (OUTPATIENT)
Dept: HEPATOLOGY | Facility: CLINIC | Age: 72
End: 2022-01-01
Payer: MEDICARE

## 2022-01-01 ENCOUNTER — NURSE TRIAGE (OUTPATIENT)
Dept: ADMINISTRATIVE | Facility: CLINIC | Age: 72
End: 2022-01-01
Payer: MEDICARE

## 2022-01-01 VITALS
DIASTOLIC BLOOD PRESSURE: 65 MMHG | HEART RATE: 95 BPM | SYSTOLIC BLOOD PRESSURE: 142 MMHG | HEIGHT: 59 IN | WEIGHT: 147.25 LBS | OXYGEN SATURATION: 97 % | BODY MASS INDEX: 29.68 KG/M2

## 2022-01-01 VITALS
BODY MASS INDEX: 34.52 KG/M2 | TEMPERATURE: 98 F | SYSTOLIC BLOOD PRESSURE: 127 MMHG | HEIGHT: 57 IN | HEART RATE: 85 BPM | DIASTOLIC BLOOD PRESSURE: 50 MMHG | OXYGEN SATURATION: 95 % | RESPIRATION RATE: 10 BRPM | WEIGHT: 160 LBS

## 2022-01-01 VITALS
DIASTOLIC BLOOD PRESSURE: 58 MMHG | OXYGEN SATURATION: 97 % | WEIGHT: 149.94 LBS | RESPIRATION RATE: 18 BRPM | HEART RATE: 78 BPM | SYSTOLIC BLOOD PRESSURE: 122 MMHG | HEIGHT: 59 IN | BODY MASS INDEX: 30.23 KG/M2 | TEMPERATURE: 97 F

## 2022-01-01 DIAGNOSIS — N17.9 ACUTE RENAL FAILURE, UNSPECIFIED ACUTE RENAL FAILURE TYPE: Primary | ICD-10-CM

## 2022-01-01 DIAGNOSIS — I50.30 HEART FAILURE WITH PRESERVED EJECTION FRACTION, UNSPECIFIED HF CHRONICITY: Primary | ICD-10-CM

## 2022-01-01 DIAGNOSIS — R00.0 TACHYCARDIA: ICD-10-CM

## 2022-01-01 DIAGNOSIS — R06.00 DYSPNEA, UNSPECIFIED TYPE: Primary | ICD-10-CM

## 2022-01-01 DIAGNOSIS — R06.02 SOBOE (SHORTNESS OF BREATH ON EXERTION): ICD-10-CM

## 2022-01-01 DIAGNOSIS — I49.1 RUN OF ATRIAL PREMATURE COMPLEXES: ICD-10-CM

## 2022-01-01 DIAGNOSIS — N18.2 ACUTE RENAL FAILURE SUPERIMPOSED ON STAGE 2 CHRONIC KIDNEY DISEASE, UNSPECIFIED ACUTE RENAL FAILURE TYPE: ICD-10-CM

## 2022-01-01 DIAGNOSIS — I95.9 HYPOTENSION: ICD-10-CM

## 2022-01-01 DIAGNOSIS — I10 HYPERTENSION, UNSPECIFIED TYPE: ICD-10-CM

## 2022-01-01 DIAGNOSIS — R04.0 EPISTAXIS: ICD-10-CM

## 2022-01-01 DIAGNOSIS — J96.01 ACUTE RESPIRATORY FAILURE WITH HYPOXIA: ICD-10-CM

## 2022-01-01 DIAGNOSIS — K76.0 FATTY LIVER: Primary | ICD-10-CM

## 2022-01-01 DIAGNOSIS — M32.14 OTHER SYSTEMIC LUPUS ERYTHEMATOSUS WITH GLOMERULAR DISEASE: ICD-10-CM

## 2022-01-01 DIAGNOSIS — I48.91 A-FIB: ICD-10-CM

## 2022-01-01 DIAGNOSIS — K76.6 PORTAL HYPERTENSION: ICD-10-CM

## 2022-01-01 DIAGNOSIS — E11.9 TYPE 2 DIABETES MELLITUS WITHOUT COMPLICATION, WITHOUT LONG-TERM CURRENT USE OF INSULIN: ICD-10-CM

## 2022-01-01 DIAGNOSIS — K74.60 CIRRHOSIS OF LIVER WITHOUT ASCITES, UNSPECIFIED HEPATIC CIRRHOSIS TYPE: Primary | ICD-10-CM

## 2022-01-01 DIAGNOSIS — R05.9 COUGH: ICD-10-CM

## 2022-01-01 DIAGNOSIS — R07.9 CHEST PAIN, UNSPECIFIED TYPE: ICD-10-CM

## 2022-01-01 DIAGNOSIS — K74.60 CIRRHOSIS OF LIVER WITHOUT ASCITES, UNSPECIFIED HEPATIC CIRRHOSIS TYPE: Chronic | ICD-10-CM

## 2022-01-01 DIAGNOSIS — D64.9 ANEMIA, UNSPECIFIED TYPE: ICD-10-CM

## 2022-01-01 DIAGNOSIS — N17.9 ACUTE RENAL FAILURE SUPERIMPOSED ON STAGE 2 CHRONIC KIDNEY DISEASE, UNSPECIFIED ACUTE RENAL FAILURE TYPE: ICD-10-CM

## 2022-01-01 DIAGNOSIS — K76.0 FATTY LIVER: ICD-10-CM

## 2022-01-01 DIAGNOSIS — R06.00 DYSPNEA, UNSPECIFIED TYPE: ICD-10-CM

## 2022-01-01 DIAGNOSIS — E78.2 MIXED HYPERLIPIDEMIA: Chronic | ICD-10-CM

## 2022-01-01 DIAGNOSIS — N18.2 ACUTE RENAL FAILURE SUPERIMPOSED ON STAGE 2 CHRONIC KIDNEY DISEASE: ICD-10-CM

## 2022-01-01 DIAGNOSIS — K74.60 CIRRHOSIS OF LIVER WITHOUT ASCITES, UNSPECIFIED HEPATIC CIRRHOSIS TYPE: ICD-10-CM

## 2022-01-01 DIAGNOSIS — D68.9 COAGULOPATHY: Primary | ICD-10-CM

## 2022-01-01 DIAGNOSIS — I48.92 ATRIAL FLUTTER BY ELECTROCARDIOGRAPHY: ICD-10-CM

## 2022-01-01 DIAGNOSIS — R06.02 SOB (SHORTNESS OF BREATH): ICD-10-CM

## 2022-01-01 DIAGNOSIS — F10.10 ALCOHOL ABUSE: ICD-10-CM

## 2022-01-01 DIAGNOSIS — N18.2 CKD (CHRONIC KIDNEY DISEASE) STAGE 2, GFR 60-89 ML/MIN: ICD-10-CM

## 2022-01-01 DIAGNOSIS — D84.9 IMMUNOSUPPRESSED STATUS: ICD-10-CM

## 2022-01-01 DIAGNOSIS — R07.9 CHEST PAIN: ICD-10-CM

## 2022-01-01 DIAGNOSIS — E87.5 HYPERKALEMIA: ICD-10-CM

## 2022-01-01 DIAGNOSIS — M79.89 LEG SWELLING: ICD-10-CM

## 2022-01-01 DIAGNOSIS — N17.9 ACUTE RENAL FAILURE SUPERIMPOSED ON STAGE 2 CHRONIC KIDNEY DISEASE: ICD-10-CM

## 2022-01-01 DIAGNOSIS — I10 PRIMARY HYPERTENSION: ICD-10-CM

## 2022-01-01 DIAGNOSIS — N17.9 AKI (ACUTE KIDNEY INJURY): ICD-10-CM

## 2022-01-01 DIAGNOSIS — R60.0 EDEMA OF BOTH LEGS: ICD-10-CM

## 2022-01-01 DIAGNOSIS — N17.9 ARF (ACUTE RENAL FAILURE): ICD-10-CM

## 2022-01-01 DIAGNOSIS — D64.9 ANEMIA, UNSPECIFIED TYPE: Primary | ICD-10-CM

## 2022-01-01 LAB
ABO + RH BLD: NORMAL
ABO GROUP BLD: NORMAL
ADAMTS13 ACTIVITY: NORMAL %
ADENOVIRUS: NOT DETECTED
ALBUMIN FLD-MCNC: 0.5 G/DL
ALBUMIN SERPL BCP-MCNC: 2 G/DL (ref 3.5–5.2)
ALBUMIN SERPL BCP-MCNC: 2.1 G/DL (ref 3.5–5.2)
ALBUMIN SERPL BCP-MCNC: 2.1 G/DL (ref 3.5–5.2)
ALBUMIN SERPL BCP-MCNC: 2.2 G/DL (ref 3.5–5.2)
ALBUMIN SERPL BCP-MCNC: 2.2 G/DL (ref 3.5–5.2)
ALBUMIN SERPL BCP-MCNC: 2.3 G/DL (ref 3.5–5.2)
ALBUMIN SERPL BCP-MCNC: 2.3 G/DL (ref 3.5–5.2)
ALBUMIN SERPL BCP-MCNC: 2.4 G/DL (ref 3.5–5.2)
ALBUMIN SERPL BCP-MCNC: 2.5 G/DL (ref 3.5–5.2)
ALBUMIN SERPL BCP-MCNC: 2.8 G/DL (ref 3.5–5.2)
ALBUMIN SERPL BCP-MCNC: 3.4 G/DL (ref 3.5–5.2)
ALBUMIN SERPL BCP-MCNC: 3.6 G/DL (ref 3.5–5.2)
ALBUMIN SERPL BCP-MCNC: 3.7 G/DL (ref 3.5–5.2)
ALBUMIN SERPL BCP-MCNC: 3.8 G/DL (ref 3.5–5.2)
ALBUMIN SERPL BCP-MCNC: 3.8 G/DL (ref 3.5–5.2)
ALBUMIN SERPL BCP-MCNC: 4 G/DL (ref 3.5–5.2)
ALBUMIN SERPL BCP-MCNC: 4.1 G/DL (ref 3.5–5.2)
ALBUMIN SERPL BCP-MCNC: 4.3 G/DL (ref 3.5–5.2)
ALBUMIN SERPL BCP-MCNC: 4.7 G/DL (ref 3.5–5.2)
ALBUMIN SERPL ELPH-MCNC: 4.03 G/DL (ref 3.35–5.55)
ALBUMIN SERPL-MCNC: 3.1 G/DL (ref 3.3–5.5)
ALDOLASE SERPL-CCNC: 6.2 U/L (ref 1.2–7.6)
ALLENS TEST: ABNORMAL
ALP SERPL-CCNC: 109 U/L (ref 55–135)
ALP SERPL-CCNC: 130 U/L (ref 55–135)
ALP SERPL-CCNC: 139 U/L (ref 55–135)
ALP SERPL-CCNC: 145 U/L (ref 55–135)
ALP SERPL-CCNC: 153 U/L (ref 55–135)
ALP SERPL-CCNC: 158 U/L (ref 55–135)
ALP SERPL-CCNC: 161 U/L (ref 55–135)
ALP SERPL-CCNC: 166 U/L (ref 55–135)
ALP SERPL-CCNC: 170 U/L (ref 55–135)
ALP SERPL-CCNC: 181 U/L (ref 55–135)
ALP SERPL-CCNC: 185 U/L (ref 55–135)
ALP SERPL-CCNC: 65 U/L (ref 55–135)
ALP SERPL-CCNC: 68 U/L (ref 55–135)
ALP SERPL-CCNC: 72 U/L (ref 55–135)
ALP SERPL-CCNC: 73 U/L (ref 55–135)
ALP SERPL-CCNC: 76 U/L (ref 55–135)
ALP SERPL-CCNC: 76 U/L (ref 55–135)
ALP SERPL-CCNC: 77 U/L (ref 55–135)
ALP SERPL-CCNC: 79 U/L (ref 55–135)
ALP SERPL-CCNC: 80 U/L (ref 55–135)
ALP SERPL-CCNC: 89 U/L (ref 55–135)
ALP SERPL-CCNC: 90 U/L (ref 55–135)
ALP SERPL-CCNC: 94 U/L (ref 55–135)
ALP SERPL-CCNC: 98 U/L (ref 42–141)
ALPHA1 GLOB SERPL ELPH-MCNC: 0.34 G/DL (ref 0.17–0.41)
ALPHA2 GLOB SERPL ELPH-MCNC: 0.56 G/DL (ref 0.43–0.99)
ALT SERPL W/O P-5'-P-CCNC: 19 U/L (ref 10–44)
ALT SERPL W/O P-5'-P-CCNC: 20 U/L (ref 10–44)
ALT SERPL W/O P-5'-P-CCNC: 22 U/L (ref 10–44)
ALT SERPL W/O P-5'-P-CCNC: 22 U/L (ref 10–44)
ALT SERPL W/O P-5'-P-CCNC: 223 U/L (ref 10–44)
ALT SERPL W/O P-5'-P-CCNC: 23 U/L (ref 10–44)
ALT SERPL W/O P-5'-P-CCNC: 24 U/L (ref 10–44)
ALT SERPL W/O P-5'-P-CCNC: 24 U/L (ref 10–44)
ALT SERPL W/O P-5'-P-CCNC: 25 U/L (ref 10–44)
ALT SERPL W/O P-5'-P-CCNC: 26 U/L (ref 10–44)
ALT SERPL W/O P-5'-P-CCNC: 31 U/L (ref 10–44)
ALT SERPL W/O P-5'-P-CCNC: 37 U/L (ref 10–44)
ALT SERPL W/O P-5'-P-CCNC: 37 U/L (ref 10–44)
ALT SERPL W/O P-5'-P-CCNC: 38 U/L (ref 10–44)
ALT SERPL W/O P-5'-P-CCNC: 41 U/L (ref 10–44)
ALT SERPL W/O P-5'-P-CCNC: 46 U/L (ref 10–44)
ALT SERPL W/O P-5'-P-CCNC: 47 U/L (ref 10–44)
ALT SERPL W/O P-5'-P-CCNC: 48 U/L (ref 10–44)
ALT SERPL W/O P-5'-P-CCNC: 49 U/L (ref 10–44)
ALT SERPL W/O P-5'-P-CCNC: 49 U/L (ref 10–44)
ALT SERPL W/O P-5'-P-CCNC: 51 U/L (ref 10–44)
ALT SERPL W/O P-5'-P-CCNC: 55 U/L (ref 10–44)
ALT SERPL W/O P-5'-P-CCNC: 626 U/L (ref 10–44)
AMMONIA PLAS-SCNC: 46 UMOL/L (ref 10–50)
AMMONIA PLAS-SCNC: 75 UMOL/L (ref 10–50)
AMYLASE, BODY FLUID: 167 U/L
ANA PATTERN 1: NORMAL
ANA SER QL IF: POSITIVE
ANA TITR SER IF: NORMAL {TITER}
ANCA AB TITR SER IF: NORMAL TITER
ANCA AB TITR SER IF: NORMAL TITER
ANION GAP SERPL CALC-SCNC: 11 MMOL/L (ref 8–16)
ANION GAP SERPL CALC-SCNC: 12 MMOL/L (ref 8–16)
ANION GAP SERPL CALC-SCNC: 12 MMOL/L (ref 8–16)
ANION GAP SERPL CALC-SCNC: 13 MMOL/L (ref 8–16)
ANION GAP SERPL CALC-SCNC: 14 MMOL/L (ref 8–16)
ANION GAP SERPL CALC-SCNC: 15 MMOL/L (ref 8–16)
ANION GAP SERPL CALC-SCNC: 16 MMOL/L (ref 8–16)
ANION GAP SERPL CALC-SCNC: 17 MMOL/L (ref 8–16)
ANION GAP SERPL CALC-SCNC: 18 MMOL/L (ref 8–16)
ANION GAP SERPL CALC-SCNC: 19 MMOL/L (ref 8–16)
ANION GAP SERPL CALC-SCNC: 20 MMOL/L (ref 8–16)
ANION GAP SERPL CALC-SCNC: 21 MMOL/L (ref 8–16)
ANION GAP SERPL CALC-SCNC: 27 MMOL/L (ref 8–16)
ANION GAP SERPL CALC-SCNC: 7 MMOL/L (ref 8–16)
ANISOCYTOSIS BLD QL SMEAR: ABNORMAL
ANISOCYTOSIS BLD QL SMEAR: SLIGHT
ANTI SM ANTIBODY: 0.16 RATIO (ref 0–0.99)
ANTI SM/RNP ANTIBODY: 4.06 RATIO (ref 0–0.99)
ANTI-SM INTERPRETATION: NEGATIVE
ANTI-SM/RNP INTERPRETATION: POSITIVE
ANTI-SSA ANTIBODY: 0.08 RATIO (ref 0–0.99)
ANTI-SSA INTERPRETATION: NEGATIVE
ANTI-SSB ANTIBODY: 0.09 RATIO (ref 0–0.99)
ANTI-SSB INTERPRETATION: NEGATIVE
APPEARANCE FLD: NORMAL
APTT BLDCRRT: 31.3 SEC (ref 21–32)
APTT IMM NP PPP: ABNORMAL SEC (ref 32–48)
APTT P HEP NEUT PPP: ABNORMAL SEC (ref 32–48)
ASCENDING AORTA: 2.86 CM
AST SERPL-CCNC: 1603 U/L (ref 10–40)
AST SERPL-CCNC: 27 U/L (ref 10–40)
AST SERPL-CCNC: 29 U/L (ref 10–40)
AST SERPL-CCNC: 30 U/L (ref 10–40)
AST SERPL-CCNC: 31 U/L (ref 10–40)
AST SERPL-CCNC: 31 U/L (ref 10–40)
AST SERPL-CCNC: 33 U/L (ref 10–40)
AST SERPL-CCNC: 37 U/L (ref 10–40)
AST SERPL-CCNC: 38 U/L (ref 10–40)
AST SERPL-CCNC: 40 U/L (ref 10–40)
AST SERPL-CCNC: 40 U/L (ref 10–40)
AST SERPL-CCNC: 43 U/L (ref 10–40)
AST SERPL-CCNC: 43 U/L (ref 10–40)
AST SERPL-CCNC: 44 U/L (ref 10–40)
AST SERPL-CCNC: 44 U/L (ref 10–40)
AST SERPL-CCNC: 46 U/L (ref 10–40)
AST SERPL-CCNC: 46 U/L (ref 10–40)
AST SERPL-CCNC: 470 U/L (ref 10–40)
AST SERPL-CCNC: 48 U/L (ref 10–40)
AST SERPL-CCNC: 56 U/L (ref 10–40)
AST SERPL-CCNC: 61 U/L (ref 10–40)
AST SERPL-CCNC: 65 U/L (ref 10–40)
AST SERPL-CCNC: 74 U/L (ref 10–40)
AV INDEX (PROSTH): 0.91
AV MEAN GRADIENT: 10 MMHG
AV PEAK GRADIENT: 17 MMHG
AV VALVE AREA: 2.27 CM2
AV VELOCITY RATIO: 0.77
B-GLOBULIN SERPL ELPH-MCNC: 1.01 G/DL (ref 0.5–1.1)
B2 GLYCOPROT1 IGA SER QL: <9 SAU
B2 GLYCOPROT1 IGG SER QL: <9 SGU
B2 GLYCOPROT1 IGM SER QL: <9 SMU
BACTERIA #/AREA URNS HPF: ABNORMAL /HPF
BACTERIA BLD CULT: NORMAL
BACTERIA BLD CULT: NORMAL
BACTERIA UR CULT: ABNORMAL
BASO STIPL BLD QL SMEAR: ABNORMAL
BASOPHILS # BLD AUTO: 0 K/UL (ref 0–0.2)
BASOPHILS # BLD AUTO: 0.01 K/UL (ref 0–0.2)
BASOPHILS # BLD AUTO: 0.02 K/UL (ref 0–0.2)
BASOPHILS # BLD AUTO: ABNORMAL K/UL (ref 0–0.2)
BASOPHILS NFR BLD: 0 % (ref 0–1.9)
BASOPHILS NFR BLD: 0 % (ref 0–1.9)
BASOPHILS NFR BLD: 0.1 % (ref 0–1.9)
BASOPHILS NFR BLD: 0.2 % (ref 0–1.9)
BILIRUB DIRECT SERPL-MCNC: 3.6 MG/DL (ref 0.1–0.3)
BILIRUB DIRECT SERPL-MCNC: 5.1 MG/DL (ref 0.1–0.3)
BILIRUB SERPL-MCNC: 0.8 MG/DL (ref 0.1–1)
BILIRUB SERPL-MCNC: 1.1 MG/DL (ref 0.1–1)
BILIRUB SERPL-MCNC: 1.1 MG/DL (ref 0.2–1.6)
BILIRUB SERPL-MCNC: 1.2 MG/DL (ref 0.1–1)
BILIRUB SERPL-MCNC: 1.3 MG/DL (ref 0.1–1)
BILIRUB SERPL-MCNC: 1.5 MG/DL (ref 0.1–1)
BILIRUB SERPL-MCNC: 1.5 MG/DL (ref 0.1–1)
BILIRUB SERPL-MCNC: 1.6 MG/DL (ref 0.1–1)
BILIRUB SERPL-MCNC: 1.6 MG/DL (ref 0.1–1)
BILIRUB SERPL-MCNC: 1.9 MG/DL (ref 0.1–1)
BILIRUB SERPL-MCNC: 2 MG/DL (ref 0.1–1)
BILIRUB SERPL-MCNC: 2.1 MG/DL (ref 0.1–1)
BILIRUB SERPL-MCNC: 2.1 MG/DL (ref 0.1–1)
BILIRUB SERPL-MCNC: 2.3 MG/DL (ref 0.1–1)
BILIRUB SERPL-MCNC: 2.5 MG/DL (ref 0.1–1)
BILIRUB SERPL-MCNC: 2.6 MG/DL (ref 0.1–1)
BILIRUB SERPL-MCNC: 2.6 MG/DL (ref 0.1–1)
BILIRUB SERPL-MCNC: 4.8 MG/DL (ref 0.1–1)
BILIRUB SERPL-MCNC: 8.7 MG/DL (ref 0.1–1)
BILIRUB UR QL STRIP: NEGATIVE
BILIRUBIN, POC UA: NEGATIVE
BLD GP AB SCN CELLS X3 SERPL QL: NORMAL
BLD PROD TYP BPU: NORMAL
BLOOD GROUP ANTIBODIES SERPL: NORMAL
BLOOD UNIT EXPIRATION DATE: NORMAL
BLOOD UNIT TYPE CODE: 5100
BLOOD UNIT TYPE CODE: 6200
BLOOD UNIT TYPE CODE: 7300
BLOOD UNIT TYPE: NORMAL
BLOOD, POC UA: NEGATIVE
BM IGG SER-ACNC: <0.2 U
BNP SERPL-MCNC: 150 PG/ML (ref 0–99)
BNP SERPL-MCNC: 1812 PG/ML (ref 0–99)
BODY FLD TYPE: NORMAL
BODY FLUID SOURCE AMYLASE: NORMAL
BODY FLUID SOURCE, LDH: NORMAL
BORDETELLA PARAPERTUSSIS (IS1001): NOT DETECTED
BORDETELLA PERTUSSIS (PTXP): NOT DETECTED
BSA FOR ECHO PROCEDURE: 1.71 M2
BUN SERPL-MCNC: 100 MG/DL (ref 8–23)
BUN SERPL-MCNC: 104 MG/DL (ref 8–23)
BUN SERPL-MCNC: 107 MG/DL (ref 8–23)
BUN SERPL-MCNC: 110 MG/DL (ref 8–23)
BUN SERPL-MCNC: 113 MG/DL (ref 8–23)
BUN SERPL-MCNC: 113 MG/DL (ref 8–23)
BUN SERPL-MCNC: 116 MG/DL (ref 8–23)
BUN SERPL-MCNC: 119 MG/DL (ref 8–23)
BUN SERPL-MCNC: 12 MG/DL (ref 8–23)
BUN SERPL-MCNC: 120 MG/DL (ref 8–23)
BUN SERPL-MCNC: 120 MG/DL (ref 8–23)
BUN SERPL-MCNC: 124 MG/DL (ref 8–23)
BUN SERPL-MCNC: 129 MG/DL (ref 8–23)
BUN SERPL-MCNC: 130 MG/DL (ref 8–23)
BUN SERPL-MCNC: 130 MG/DL (ref 8–23)
BUN SERPL-MCNC: 132 MG/DL (ref 8–23)
BUN SERPL-MCNC: 137 MG/DL (ref 8–23)
BUN SERPL-MCNC: 18 MG/DL (ref 8–23)
BUN SERPL-MCNC: 18 MG/DL (ref 8–23)
BUN SERPL-MCNC: 20 MG/DL (ref 8–23)
BUN SERPL-MCNC: 23 MG/DL (ref 8–23)
BUN SERPL-MCNC: 24 MG/DL (ref 8–23)
BUN SERPL-MCNC: 26 MG/DL (ref 8–23)
BUN SERPL-MCNC: 26 MG/DL (ref 8–23)
BUN SERPL-MCNC: 3 MG/DL (ref 8–23)
BUN SERPL-MCNC: 30 MG/DL (ref 8–23)
BUN SERPL-MCNC: 30 MG/DL (ref 8–23)
BUN SERPL-MCNC: 37 MG/DL (ref 8–23)
BUN SERPL-MCNC: 37 MG/DL (ref 8–23)
BUN SERPL-MCNC: 49 MG/DL (ref 8–23)
BUN SERPL-MCNC: 5 MG/DL (ref 8–23)
BUN SERPL-MCNC: 6 MG/DL (ref 8–23)
BUN SERPL-MCNC: 61 MG/DL (ref 8–23)
BUN SERPL-MCNC: 71 MG/DL (ref 8–23)
BUN SERPL-MCNC: 73 MG/DL (ref 7–22)
BUN SERPL-MCNC: 74 MG/DL (ref 8–23)
BUN SERPL-MCNC: 76 MG/DL (ref 8–23)
BUN SERPL-MCNC: 79 MG/DL (ref 8–23)
BUN SERPL-MCNC: 8 MG/DL (ref 8–23)
BUN SERPL-MCNC: 83 MG/DL (ref 8–23)
BUN SERPL-MCNC: 86 MG/DL (ref 8–23)
BUN SERPL-MCNC: 88 MG/DL (ref 8–23)
BUN SERPL-MCNC: 88 MG/DL (ref 8–23)
BUN SERPL-MCNC: 89 MG/DL (ref 8–23)
BUN SERPL-MCNC: 92 MG/DL (ref 8–23)
BUN SERPL-MCNC: 95 MG/DL (ref 8–23)
BUN SERPL-MCNC: 99 MG/DL (ref 8–23)
BURR CELLS BLD QL SMEAR: ABNORMAL
BURR CELLS BLD QL SMEAR: ABNORMAL
C3 SERPL-MCNC: 26 MG/DL (ref 50–180)
C3 SERPL-MCNC: 31 MG/DL (ref 50–180)
C3 SERPL-MCNC: 40 MG/DL (ref 50–180)
C3 SERPL-MCNC: 41 MG/DL (ref 50–180)
C4 SERPL-MCNC: 4 MG/DL (ref 11–44)
C4 SERPL-MCNC: 5 MG/DL (ref 11–44)
C4 SERPL-MCNC: 6 MG/DL (ref 11–44)
C4 SERPL-MCNC: <3 MG/DL (ref 11–44)
CA TITR SERPL: NORMAL TITER
CA-I BLDV-SCNC: 0.89 MMOL/L (ref 1.06–1.42)
CA-I BLDV-SCNC: 0.9 MMOL/L (ref 1.06–1.42)
CA-I BLDV-SCNC: 0.92 MMOL/L (ref 1.06–1.42)
CA-I BLDV-SCNC: 0.96 MMOL/L (ref 1.06–1.42)
CA-I BLDV-SCNC: 1.06 MMOL/L (ref 1.06–1.42)
CA-I BLDV-SCNC: 1.1 MMOL/L (ref 1.06–1.42)
CA-I BLDV-SCNC: 1.1 MMOL/L (ref 1.06–1.42)
CALCIUM SERPL-MCNC: 10 MG/DL (ref 8.7–10.5)
CALCIUM SERPL-MCNC: 10 MG/DL (ref 8.7–10.5)
CALCIUM SERPL-MCNC: 7.4 MG/DL (ref 8.7–10.5)
CALCIUM SERPL-MCNC: 7.5 MG/DL (ref 8.7–10.5)
CALCIUM SERPL-MCNC: 7.6 MG/DL (ref 8.7–10.5)
CALCIUM SERPL-MCNC: 7.7 MG/DL (ref 8.7–10.5)
CALCIUM SERPL-MCNC: 7.8 MG/DL (ref 8.7–10.5)
CALCIUM SERPL-MCNC: 7.8 MG/DL (ref 8.7–10.5)
CALCIUM SERPL-MCNC: 7.9 MG/DL (ref 8.7–10.5)
CALCIUM SERPL-MCNC: 8.1 MG/DL (ref 8.7–10.5)
CALCIUM SERPL-MCNC: 8.2 MG/DL (ref 8.7–10.5)
CALCIUM SERPL-MCNC: 8.2 MG/DL (ref 8.7–10.5)
CALCIUM SERPL-MCNC: 8.3 MG/DL (ref 8.7–10.5)
CALCIUM SERPL-MCNC: 8.4 MG/DL (ref 8.7–10.5)
CALCIUM SERPL-MCNC: 8.5 MG/DL (ref 8.7–10.5)
CALCIUM SERPL-MCNC: 8.6 MG/DL (ref 8.7–10.5)
CALCIUM SERPL-MCNC: 8.9 MG/DL (ref 8.7–10.5)
CALCIUM SERPL-MCNC: 8.9 MG/DL (ref 8.7–10.5)
CALCIUM SERPL-MCNC: 9 MG/DL (ref 8.7–10.5)
CALCIUM SERPL-MCNC: 9.1 MG/DL (ref 8.7–10.5)
CALCIUM SERPL-MCNC: 9.2 MG/DL (ref 8.7–10.5)
CALCIUM SERPL-MCNC: 9.3 MG/DL (ref 8.7–10.5)
CALCIUM SERPL-MCNC: 9.4 MG/DL (ref 8.7–10.5)
CALCIUM SERPL-MCNC: 9.5 MG/DL (ref 8.7–10.5)
CALCIUM SERPL-MCNC: 9.5 MG/DL (ref 8.7–10.5)
CALCIUM SERPL-MCNC: 9.5 MG/DL (ref 8–10.3)
CALCIUM SERPL-MCNC: 9.7 MG/DL (ref 8.7–10.5)
CALCIUM SERPL-MCNC: 9.8 MG/DL (ref 8.7–10.5)
CALCIUM SERPL-MCNC: 9.9 MG/DL (ref 8.7–10.5)
CARDIOLIPIN IGG SER IA-ACNC: 12.9 GPL (ref 0–14.99)
CARDIOLIPIN IGM SER IA-ACNC: 22 MPL (ref 0–12.49)
CCP AB SER IA-ACNC: 1 U/ML
CHLAMYDIA PNEUMONIAE: NOT DETECTED
CHLORIDE SERPL-SCNC: 100 MMOL/L (ref 95–110)
CHLORIDE SERPL-SCNC: 101 MMOL/L (ref 95–110)
CHLORIDE SERPL-SCNC: 101 MMOL/L (ref 95–110)
CHLORIDE SERPL-SCNC: 102 MMOL/L (ref 95–110)
CHLORIDE SERPL-SCNC: 103 MMOL/L (ref 95–110)
CHLORIDE SERPL-SCNC: 104 MMOL/L (ref 95–110)
CHLORIDE SERPL-SCNC: 104 MMOL/L (ref 95–110)
CHLORIDE SERPL-SCNC: 105 MMOL/L (ref 95–110)
CHLORIDE SERPL-SCNC: 105 MMOL/L (ref 95–110)
CHLORIDE SERPL-SCNC: 105 MMOL/L (ref 98–108)
CHLORIDE SERPL-SCNC: 106 MMOL/L (ref 95–110)
CHLORIDE SERPL-SCNC: 106 MMOL/L (ref 95–110)
CHLORIDE SERPL-SCNC: 107 MMOL/L (ref 95–110)
CHLORIDE SERPL-SCNC: 109 MMOL/L (ref 95–110)
CHLORIDE SERPL-SCNC: 110 MMOL/L (ref 95–110)
CHLORIDE SERPL-SCNC: 90 MMOL/L (ref 95–110)
CHLORIDE SERPL-SCNC: 92 MMOL/L (ref 95–110)
CHLORIDE SERPL-SCNC: 92 MMOL/L (ref 95–110)
CHLORIDE SERPL-SCNC: 93 MMOL/L (ref 95–110)
CHLORIDE SERPL-SCNC: 93 MMOL/L (ref 95–110)
CHLORIDE SERPL-SCNC: 94 MMOL/L (ref 95–110)
CHLORIDE SERPL-SCNC: 95 MMOL/L (ref 95–110)
CHLORIDE SERPL-SCNC: 97 MMOL/L (ref 95–110)
CHLORIDE SERPL-SCNC: 98 MMOL/L (ref 95–110)
CHLORIDE SERPL-SCNC: 99 MMOL/L (ref 95–110)
CK SERPL-CCNC: 140 U/L (ref 20–180)
CLARITY UR: ABNORMAL
CLARITY, POC UA: CLEAR
CO2 SERPL-SCNC: 11 MMOL/L (ref 23–29)
CO2 SERPL-SCNC: 11 MMOL/L (ref 23–29)
CO2 SERPL-SCNC: 12 MMOL/L (ref 23–29)
CO2 SERPL-SCNC: 14 MMOL/L (ref 23–29)
CO2 SERPL-SCNC: 15 MMOL/L (ref 23–29)
CO2 SERPL-SCNC: 16 MMOL/L (ref 23–29)
CO2 SERPL-SCNC: 16 MMOL/L (ref 23–29)
CO2 SERPL-SCNC: 17 MMOL/L (ref 23–29)
CO2 SERPL-SCNC: 18 MMOL/L (ref 23–29)
CO2 SERPL-SCNC: 19 MMOL/L (ref 23–29)
CO2 SERPL-SCNC: 20 MMOL/L (ref 23–29)
CO2 SERPL-SCNC: 21 MMOL/L (ref 23–29)
CO2 SERPL-SCNC: 22 MMOL/L (ref 23–29)
CO2 SERPL-SCNC: 23 MMOL/L (ref 23–29)
CO2 SERPL-SCNC: 23 MMOL/L (ref 23–29)
CO2 SERPL-SCNC: 24 MMOL/L (ref 23–29)
CO2 SERPL-SCNC: 25 MMOL/L (ref 23–29)
CO2 SERPL-SCNC: 25 MMOL/L (ref 23–29)
CO2 SERPL-SCNC: 7 MMOL/L (ref 23–29)
CODING SYSTEM: NORMAL
COLOR FLD: YELLOW
COLOR UR: YELLOW
COLOR, POC UA: YELLOW
CONFIRM APTT STACLOT: ABNORMAL
CORONAVIRUS 229E, COMMON COLD VIRUS: NOT DETECTED
CORONAVIRUS HKU1, COMMON COLD VIRUS: NOT DETECTED
CORONAVIRUS NL63, COMMON COLD VIRUS: NOT DETECTED
CORONAVIRUS OC43, COMMON COLD VIRUS: NOT DETECTED
CREAT SERPL-MCNC: 0.4 MG/DL (ref 0.5–1.4)
CREAT SERPL-MCNC: 0.5 MG/DL (ref 0.5–1.4)
CREAT SERPL-MCNC: 0.6 MG/DL (ref 0.5–1.4)
CREAT SERPL-MCNC: 0.7 MG/DL (ref 0.5–1.4)
CREAT SERPL-MCNC: 0.9 MG/DL (ref 0.5–1.4)
CREAT SERPL-MCNC: 0.9 MG/DL (ref 0.5–1.4)
CREAT SERPL-MCNC: 1 MG/DL (ref 0.5–1.4)
CREAT SERPL-MCNC: 1 MG/DL (ref 0.5–1.4)
CREAT SERPL-MCNC: 1.3 MG/DL (ref 0.5–1.4)
CREAT SERPL-MCNC: 1.5 MG/DL (ref 0.5–1.4)
CREAT SERPL-MCNC: 1.6 MG/DL (ref 0.5–1.4)
CREAT SERPL-MCNC: 1.6 MG/DL (ref 0.5–1.4)
CREAT SERPL-MCNC: 1.8 MG/DL (ref 0.5–1.4)
CREAT SERPL-MCNC: 1.9 MG/DL (ref 0.5–1.4)
CREAT SERPL-MCNC: 2.7 MG/DL (ref 0.5–1.4)
CREAT SERPL-MCNC: 2.7 MG/DL (ref 0.6–1.2)
CREAT SERPL-MCNC: 3 MG/DL (ref 0.5–1.4)
CREAT SERPL-MCNC: 3.3 MG/DL (ref 0.5–1.4)
CREAT SERPL-MCNC: 3.4 MG/DL (ref 0.5–1.4)
CREAT SERPL-MCNC: 3.4 MG/DL (ref 0.5–1.4)
CREAT SERPL-MCNC: 3.5 MG/DL (ref 0.5–1.4)
CREAT SERPL-MCNC: 3.5 MG/DL (ref 0.5–1.4)
CREAT SERPL-MCNC: 3.7 MG/DL (ref 0.5–1.4)
CREAT SERPL-MCNC: 3.8 MG/DL (ref 0.5–1.4)
CREAT SERPL-MCNC: 3.9 MG/DL (ref 0.5–1.4)
CREAT SERPL-MCNC: 3.9 MG/DL (ref 0.5–1.4)
CREAT SERPL-MCNC: 4 MG/DL (ref 0.5–1.4)
CREAT SERPL-MCNC: 4.1 MG/DL (ref 0.5–1.4)
CREAT SERPL-MCNC: 4.2 MG/DL (ref 0.5–1.4)
CREAT SERPL-MCNC: 4.2 MG/DL (ref 0.5–1.4)
CREAT SERPL-MCNC: 4.4 MG/DL (ref 0.5–1.4)
CREAT SERPL-MCNC: 4.5 MG/DL (ref 0.5–1.4)
CREAT SERPL-MCNC: 4.6 MG/DL (ref 0.5–1.4)
CREAT SERPL-MCNC: 4.6 MG/DL (ref 0.5–1.4)
CREAT SERPL-MCNC: 4.7 MG/DL (ref 0.5–1.4)
CREAT SERPL-MCNC: 4.8 MG/DL (ref 0.5–1.4)
CREAT SERPL-MCNC: 4.9 MG/DL (ref 0.5–1.4)
CREAT SERPL-MCNC: 5 MG/DL (ref 0.5–1.4)
CREAT SERPL-MCNC: 5.1 MG/DL (ref 0.5–1.4)
CREAT SERPL-MCNC: 5.3 MG/DL (ref 0.5–1.4)
CREAT SERPL-MCNC: 5.8 MG/DL (ref 0.5–1.4)
CREAT SERPL-MCNC: 6.4 MG/DL (ref 0.5–1.4)
CREAT SERPL-MCNC: 6.4 MG/DL (ref 0.5–1.4)
CREAT UR-MCNC: 231.5 MG/DL (ref 15–325)
CREAT UR-MCNC: 275.8 MG/DL (ref 15–325)
CRP SERPL-MCNC: 20.9 MG/L (ref 0–8.2)
CRP SERPL-MCNC: 58.7 MG/L (ref 0–8.2)
CRP SERPL-MCNC: 64.7 MG/L (ref 0–8.2)
CRP SERPL-MCNC: 79.2 MG/L (ref 0–8.2)
CTP QC/QA: YES
CV ECHO LV RWT: 0.33 CM
DACRYOCYTES BLD QL SMEAR: ABNORMAL
DAT IGG-SP REAG RBC-IMP: NORMAL
DAT IGG-SP REAG RBC-IMP: NORMAL
DELSYS: ABNORMAL
DIFFERENTIAL METHOD: ABNORMAL
DISPENSE STATUS: NORMAL
DOHLE BOD BLD QL SMEAR: PRESENT
DOHLE BOD BLD QL SMEAR: PRESENT
DOP CALC AO PEAK VEL: 2.05 M/S
DOP CALC AO VTI: 37.08 CM
DOP CALC LVOT AREA: 2.5 CM2
DOP CALC LVOT DIAMETER: 1.78 CM
DOP CALC LVOT PEAK VEL: 1.58 M/S
DOP CALC LVOT STROKE VOLUME: 84.27 CM3
DOP CALC MV VTI: 43.34 CM
DOP CALCLVOT PEAK VEL VTI: 33.88 CM
DRVVT SCREEN TO CONFIRM RATIO: ABNORMAL RATIO
DSDNA AB SER-ACNC: ABNORMAL [IU]/ML
DSDNA AB SER-ACNC: NORMAL [IU]/ML
E WAVE DECELERATION TIME: 294.71 MSEC
E/A RATIO: 2.09
E/E' RATIO: 14.11 M/S
ECHO LV POSTERIOR WALL: 0.75 CM (ref 0.6–1.1)
EJECTION FRACTION: 65 %
EOSINOPHIL # BLD AUTO: 0 K/UL (ref 0–0.5)
EOSINOPHIL # BLD AUTO: 0.1 K/UL (ref 0–0.5)
EOSINOPHIL # BLD AUTO: 0.2 K/UL (ref 0–0.5)
EOSINOPHIL # BLD AUTO: 0.3 K/UL (ref 0–0.5)
EOSINOPHIL # BLD AUTO: 0.4 K/UL (ref 0–0.5)
EOSINOPHIL # BLD AUTO: 0.5 K/UL (ref 0–0.5)
EOSINOPHIL # BLD AUTO: 0.5 K/UL (ref 0–0.5)
EOSINOPHIL # BLD AUTO: ABNORMAL K/UL (ref 0–0.5)
EOSINOPHIL NFR BLD: 0 % (ref 0–8)
EOSINOPHIL NFR BLD: 0.1 % (ref 0–8)
EOSINOPHIL NFR BLD: 0.1 % (ref 0–8)
EOSINOPHIL NFR BLD: 0.2 % (ref 0–8)
EOSINOPHIL NFR BLD: 0.5 % (ref 0–8)
EOSINOPHIL NFR BLD: 1.5 % (ref 0–8)
EOSINOPHIL NFR BLD: 3.6 % (ref 0–8)
EOSINOPHIL NFR BLD: 4.2 % (ref 0–8)
EOSINOPHIL NFR BLD: 5.3 % (ref 0–8)
EOSINOPHIL NFR BLD: 6 % (ref 0–8)
EOSINOPHIL NFR BLD: 6.2 % (ref 0–8)
EP: 5
ERYTHROCYTE [DISTWIDTH] IN BLOOD BY AUTOMATED COUNT: 17.2 % (ref 11.5–14.5)
ERYTHROCYTE [DISTWIDTH] IN BLOOD BY AUTOMATED COUNT: 17.4 % (ref 11.5–14.5)
ERYTHROCYTE [DISTWIDTH] IN BLOOD BY AUTOMATED COUNT: 17.6 % (ref 11.5–14.5)
ERYTHROCYTE [DISTWIDTH] IN BLOOD BY AUTOMATED COUNT: 18.5 % (ref 11.5–14.5)
ERYTHROCYTE [DISTWIDTH] IN BLOOD BY AUTOMATED COUNT: 18.5 % (ref 11.5–14.5)
ERYTHROCYTE [DISTWIDTH] IN BLOOD BY AUTOMATED COUNT: 18.6 % (ref 11.5–14.5)
ERYTHROCYTE [DISTWIDTH] IN BLOOD BY AUTOMATED COUNT: 19 % (ref 11.5–14.5)
ERYTHROCYTE [DISTWIDTH] IN BLOOD BY AUTOMATED COUNT: 19 % (ref 11.5–14.5)
ERYTHROCYTE [DISTWIDTH] IN BLOOD BY AUTOMATED COUNT: 19.2 % (ref 11.5–14.5)
ERYTHROCYTE [DISTWIDTH] IN BLOOD BY AUTOMATED COUNT: 19.2 % (ref 11.5–14.5)
ERYTHROCYTE [DISTWIDTH] IN BLOOD BY AUTOMATED COUNT: 19.3 % (ref 11.5–14.5)
ERYTHROCYTE [DISTWIDTH] IN BLOOD BY AUTOMATED COUNT: 19.3 % (ref 11.5–14.5)
ERYTHROCYTE [DISTWIDTH] IN BLOOD BY AUTOMATED COUNT: 19.6 % (ref 11.5–14.5)
ERYTHROCYTE [DISTWIDTH] IN BLOOD BY AUTOMATED COUNT: 19.9 % (ref 11.5–14.5)
ERYTHROCYTE [DISTWIDTH] IN BLOOD BY AUTOMATED COUNT: 19.9 % (ref 11.5–14.5)
ERYTHROCYTE [DISTWIDTH] IN BLOOD BY AUTOMATED COUNT: 20 % (ref 11.5–14.5)
ERYTHROCYTE [DISTWIDTH] IN BLOOD BY AUTOMATED COUNT: 20.3 % (ref 11.5–14.5)
ERYTHROCYTE [DISTWIDTH] IN BLOOD BY AUTOMATED COUNT: 20.4 % (ref 11.5–14.5)
ERYTHROCYTE [DISTWIDTH] IN BLOOD BY AUTOMATED COUNT: 20.7 % (ref 11.5–14.5)
ERYTHROCYTE [DISTWIDTH] IN BLOOD BY AUTOMATED COUNT: 20.9 % (ref 11.5–14.5)
ERYTHROCYTE [DISTWIDTH] IN BLOOD BY AUTOMATED COUNT: 21.3 % (ref 11.5–14.5)
ERYTHROCYTE [DISTWIDTH] IN BLOOD BY AUTOMATED COUNT: 23 % (ref 11.5–14.5)
ERYTHROCYTE [DISTWIDTH] IN BLOOD BY AUTOMATED COUNT: 23.5 % (ref 11.5–14.5)
ERYTHROCYTE [SEDIMENTATION RATE] IN BLOOD BY WESTERGREN METHOD: 14 MM/H
ERYTHROCYTE [SEDIMENTATION RATE] IN BLOOD BY WESTERGREN METHOD: 4 MM/HR (ref 0–36)
ERYTHROCYTE [SEDIMENTATION RATE] IN BLOOD BY WESTERGREN METHOD: 47 MM/HR (ref 0–36)
ERYTHROCYTE [SEDIMENTATION RATE] IN BLOOD BY WESTERGREN METHOD: 50 MM/HR (ref 0–36)
ERYTHROCYTE [SEDIMENTATION RATE] IN BLOOD BY WESTERGREN METHOD: >140 MM/HR (ref 0–20)
EST. GFR  (AFRICAN AMERICAN): 10 ML/MIN/1.73 M^2
EST. GFR  (AFRICAN AMERICAN): 10.4 ML/MIN/1.73 M^2
EST. GFR  (AFRICAN AMERICAN): 10.6 ML/MIN/1.73 M^2
EST. GFR  (AFRICAN AMERICAN): 10.6 ML/MIN/1.73 M^2
EST. GFR  (AFRICAN AMERICAN): 10.9 ML/MIN/1.73 M^2
EST. GFR  (AFRICAN AMERICAN): 11 ML/MIN/1.73 M^2
EST. GFR  (AFRICAN AMERICAN): 11.6 ML/MIN/1.73 M^2
EST. GFR  (AFRICAN AMERICAN): 11.9 ML/MIN/1.73 M^2
EST. GFR  (AFRICAN AMERICAN): 12 ML/MIN/1.73 M^2
EST. GFR  (AFRICAN AMERICAN): 12.3 ML/MIN/1.73 M^2
EST. GFR  (AFRICAN AMERICAN): 12.3 ML/MIN/1.73 M^2
EST. GFR  (AFRICAN AMERICAN): 12.6 ML/MIN/1.73 M^2
EST. GFR  (AFRICAN AMERICAN): 12.6 ML/MIN/1.73 M^2
EST. GFR  (AFRICAN AMERICAN): 13 ML/MIN/1.73 M^2
EST. GFR  (AFRICAN AMERICAN): 13.5 ML/MIN/1.73 M^2
EST. GFR  (AFRICAN AMERICAN): 14 ML/MIN/1.73 M^2
EST. GFR  (AFRICAN AMERICAN): 14.4 ML/MIN/1.73 M^2
EST. GFR  (AFRICAN AMERICAN): 14.9 ML/MIN/1.73 M^2
EST. GFR  (AFRICAN AMERICAN): 15 ML/MIN/1.73 M^2
EST. GFR  (AFRICAN AMERICAN): 15 ML/MIN/1.73 M^2
EST. GFR  (AFRICAN AMERICAN): 17 ML/MIN/1.73 M^2
EST. GFR  (AFRICAN AMERICAN): 19.7 ML/MIN/1.73 M^2
EST. GFR  (AFRICAN AMERICAN): 30.1 ML/MIN/1.73 M^2
EST. GFR  (AFRICAN AMERICAN): 32.2 ML/MIN/1.73 M^2
EST. GFR  (AFRICAN AMERICAN): 37.1 ML/MIN/1.73 M^2
EST. GFR  (AFRICAN AMERICAN): 37.1 ML/MIN/1.73 M^2
EST. GFR  (AFRICAN AMERICAN): 40.1 ML/MIN/1.73 M^2
EST. GFR  (AFRICAN AMERICAN): 47.7 ML/MIN/1.73 M^2
EST. GFR  (AFRICAN AMERICAN): 7 ML/MIN/1.73 M^2
EST. GFR  (AFRICAN AMERICAN): 7 ML/MIN/1.73 M^2
EST. GFR  (AFRICAN AMERICAN): 8 ML/MIN/1.73 M^2
EST. GFR  (AFRICAN AMERICAN): 9 ML/MIN/1.73 M^2
EST. GFR  (AFRICAN AMERICAN): >60 ML/MIN/1.73 M^2
EST. GFR  (NON AFRICAN AMERICAN): 10 ML/MIN/1.73 M^2
EST. GFR  (NON AFRICAN AMERICAN): 10 ML/MIN/1.73 M^2
EST. GFR  (NON AFRICAN AMERICAN): 10.3 ML/MIN/1.73 M^2
EST. GFR  (NON AFRICAN AMERICAN): 10.6 ML/MIN/1.73 M^2
EST. GFR  (NON AFRICAN AMERICAN): 10.6 ML/MIN/1.73 M^2
EST. GFR  (NON AFRICAN AMERICAN): 11 ML/MIN/1.73 M^2
EST. GFR  (NON AFRICAN AMERICAN): 11.3 ML/MIN/1.73 M^2
EST. GFR  (NON AFRICAN AMERICAN): 11.7 ML/MIN/1.73 M^2
EST. GFR  (NON AFRICAN AMERICAN): 12 ML/MIN/1.73 M^2
EST. GFR  (NON AFRICAN AMERICAN): 12.5 ML/MIN/1.73 M^2
EST. GFR  (NON AFRICAN AMERICAN): 12.9 ML/MIN/1.73 M^2
EST. GFR  (NON AFRICAN AMERICAN): 13 ML/MIN/1.73 M^2
EST. GFR  (NON AFRICAN AMERICAN): 13 ML/MIN/1.73 M^2
EST. GFR  (NON AFRICAN AMERICAN): 15 ML/MIN/1.73 M^2
EST. GFR  (NON AFRICAN AMERICAN): 17.1 ML/MIN/1.73 M^2
EST. GFR  (NON AFRICAN AMERICAN): 26.1 ML/MIN/1.73 M^2
EST. GFR  (NON AFRICAN AMERICAN): 27.9 ML/MIN/1.73 M^2
EST. GFR  (NON AFRICAN AMERICAN): 32.2 ML/MIN/1.73 M^2
EST. GFR  (NON AFRICAN AMERICAN): 32.2 ML/MIN/1.73 M^2
EST. GFR  (NON AFRICAN AMERICAN): 34.8 ML/MIN/1.73 M^2
EST. GFR  (NON AFRICAN AMERICAN): 41.4 ML/MIN/1.73 M^2
EST. GFR  (NON AFRICAN AMERICAN): 56.8 ML/MIN/1.73 M^2
EST. GFR  (NON AFRICAN AMERICAN): 56.8 ML/MIN/1.73 M^2
EST. GFR  (NON AFRICAN AMERICAN): 6 ML/MIN/1.73 M^2
EST. GFR  (NON AFRICAN AMERICAN): 6 ML/MIN/1.73 M^2
EST. GFR  (NON AFRICAN AMERICAN): 7 ML/MIN/1.73 M^2
EST. GFR  (NON AFRICAN AMERICAN): 8 ML/MIN/1.73 M^2
EST. GFR  (NON AFRICAN AMERICAN): 9 ML/MIN/1.73 M^2
EST. GFR  (NON AFRICAN AMERICAN): 9.2 ML/MIN/1.73 M^2
EST. GFR  (NON AFRICAN AMERICAN): 9.2 ML/MIN/1.73 M^2
EST. GFR  (NON AFRICAN AMERICAN): 9.5 ML/MIN/1.73 M^2
EST. GFR  (NON AFRICAN AMERICAN): >60 ML/MIN/1.73 M^2
ESTIMATED AVG GLUCOSE: 123 MG/DL (ref 68–131)
FERRITIN SERPL-MCNC: 170 NG/ML (ref 20–300)
FERRITIN SERPL-MCNC: 191 NG/ML (ref 20–300)
FERRITIN SERPL-MCNC: 43 NG/ML (ref 20–300)
FERRITIN SERPL-MCNC: 629 NG/ML (ref 20–300)
FIBRINOGEN PPP-MCNC: 178 MG/DL (ref 182–400)
FIBRINOGEN PPP-MCNC: 189 MG/DL (ref 182–400)
FIBRINOGEN PPP-MCNC: 196 MG/DL (ref 182–400)
FIO2: 25
FIO2: 36
FIO2: 60
FLOW: 2
FLOW: 4
FLUBV RNA NPH QL NAA+NON-PROBE: NOT DETECTED
FOLATE SERPL-MCNC: 14 NG/ML (ref 4–24)
FRACTIONAL SHORTENING: 42 % (ref 28–44)
GAMMA GLOB SERPL ELPH-MCNC: 2.35 G/DL (ref 0.67–1.58)
GAMMA INTERFERON BACKGROUND BLD IA-ACNC: 0.02 IU/ML
GIANT PLATELETS BLD QL SMEAR: PRESENT
GIANT PLATELETS BLD QL SMEAR: PRESENT
GLUCOSE SERPL-MCNC: 100 MG/DL (ref 70–110)
GLUCOSE SERPL-MCNC: 101 MG/DL (ref 70–110)
GLUCOSE SERPL-MCNC: 111 MG/DL (ref 70–110)
GLUCOSE SERPL-MCNC: 111 MG/DL (ref 70–110)
GLUCOSE SERPL-MCNC: 112 MG/DL (ref 70–110)
GLUCOSE SERPL-MCNC: 113 MG/DL (ref 70–110)
GLUCOSE SERPL-MCNC: 113 MG/DL (ref 70–110)
GLUCOSE SERPL-MCNC: 116 MG/DL (ref 70–110)
GLUCOSE SERPL-MCNC: 119 MG/DL (ref 70–110)
GLUCOSE SERPL-MCNC: 138 MG/DL (ref 70–110)
GLUCOSE SERPL-MCNC: 145 MG/DL (ref 73–118)
GLUCOSE SERPL-MCNC: 150 MG/DL (ref 70–110)
GLUCOSE SERPL-MCNC: 150 MG/DL (ref 70–110)
GLUCOSE SERPL-MCNC: 153 MG/DL (ref 70–110)
GLUCOSE SERPL-MCNC: 161 MG/DL (ref 70–110)
GLUCOSE SERPL-MCNC: 163 MG/DL (ref 70–110)
GLUCOSE SERPL-MCNC: 167 MG/DL (ref 70–110)
GLUCOSE SERPL-MCNC: 170 MG/DL (ref 70–110)
GLUCOSE SERPL-MCNC: 171 MG/DL (ref 70–110)
GLUCOSE SERPL-MCNC: 172 MG/DL (ref 70–110)
GLUCOSE SERPL-MCNC: 173 MG/DL (ref 70–110)
GLUCOSE SERPL-MCNC: 173 MG/DL (ref 70–110)
GLUCOSE SERPL-MCNC: 176 MG/DL (ref 70–110)
GLUCOSE SERPL-MCNC: 177 MG/DL (ref 70–110)
GLUCOSE SERPL-MCNC: 177 MG/DL (ref 70–110)
GLUCOSE SERPL-MCNC: 178 MG/DL (ref 70–110)
GLUCOSE SERPL-MCNC: 180 MG/DL (ref 70–110)
GLUCOSE SERPL-MCNC: 190 MG/DL (ref 70–110)
GLUCOSE SERPL-MCNC: 203 MG/DL (ref 70–110)
GLUCOSE SERPL-MCNC: 203 MG/DL (ref 70–110)
GLUCOSE SERPL-MCNC: 206 MG/DL (ref 70–110)
GLUCOSE SERPL-MCNC: 212 MG/DL (ref 70–110)
GLUCOSE SERPL-MCNC: 214 MG/DL (ref 70–110)
GLUCOSE SERPL-MCNC: 214 MG/DL (ref 70–110)
GLUCOSE SERPL-MCNC: 232 MG/DL (ref 70–110)
GLUCOSE SERPL-MCNC: 232 MG/DL (ref 70–110)
GLUCOSE SERPL-MCNC: 238 MG/DL (ref 70–110)
GLUCOSE SERPL-MCNC: 246 MG/DL (ref 70–110)
GLUCOSE SERPL-MCNC: 246 MG/DL (ref 70–110)
GLUCOSE SERPL-MCNC: 247 MG/DL (ref 70–110)
GLUCOSE SERPL-MCNC: 254 MG/DL (ref 70–110)
GLUCOSE SERPL-MCNC: 258 MG/DL (ref 70–110)
GLUCOSE SERPL-MCNC: 258 MG/DL (ref 70–110)
GLUCOSE SERPL-MCNC: 278 MG/DL (ref 70–110)
GLUCOSE SERPL-MCNC: 296 MG/DL (ref 70–110)
GLUCOSE SERPL-MCNC: 58 MG/DL (ref 70–110)
GLUCOSE SERPL-MCNC: 58 MG/DL (ref 70–110)
GLUCOSE SERPL-MCNC: 85 MG/DL (ref 70–110)
GLUCOSE SERPL-MCNC: 86 MG/DL (ref 70–110)
GLUCOSE SERPL-MCNC: 88 MG/DL (ref 70–110)
GLUCOSE SERPL-MCNC: 89 MG/DL (ref 70–110)
GLUCOSE SERPL-MCNC: 89 MG/DL (ref 70–110)
GLUCOSE SERPL-MCNC: 94 MG/DL (ref 70–110)
GLUCOSE SERPL-MCNC: 99 MG/DL (ref 70–110)
GLUCOSE UR QL STRIP: NEGATIVE
GLUCOSE, POC UA: NEGATIVE
GRAM STN SPEC: NORMAL
GRAM STN SPEC: NORMAL
HAPTOGLOB SERPL-MCNC: 110 MG/DL (ref 30–250)
HAPTOGLOB SERPL-MCNC: 168 MG/DL (ref 30–250)
HAPTOGLOB SERPL-MCNC: 84 MG/DL (ref 30–250)
HAV IGM SERPL QL IA: NEGATIVE
HBA1C MFR BLD: 5.9 % (ref 4–5.6)
HBV CORE AB SERPL QL IA: NEGATIVE
HBV CORE IGM SERPL QL IA: NEGATIVE
HBV SURFACE AB SER-ACNC: POSITIVE M[IU]/ML
HBV SURFACE AG SERPL QL IA: NEGATIVE
HBV SURFACE AG SERPL QL IA: NEGATIVE
HCO3 UR-SCNC: 13.8 MMOL/L (ref 24–28)
HCO3 UR-SCNC: 18.3 MMOL/L (ref 24–28)
HCO3 UR-SCNC: 20.5 MMOL/L (ref 24–28)
HCO3 UR-SCNC: 22.5 MMOL/L (ref 24–28)
HCO3 UR-SCNC: 25.2 MMOL/L (ref 24–28)
HCT VFR BLD AUTO: 19.3 % (ref 37–48.5)
HCT VFR BLD AUTO: 20.7 % (ref 37–48.5)
HCT VFR BLD AUTO: 20.9 % (ref 37–48.5)
HCT VFR BLD AUTO: 21.3 % (ref 37–48.5)
HCT VFR BLD AUTO: 22.2 % (ref 37–48.5)
HCT VFR BLD AUTO: 22.5 % (ref 37–48.5)
HCT VFR BLD AUTO: 22.8 % (ref 37–48.5)
HCT VFR BLD AUTO: 22.9 % (ref 37–48.5)
HCT VFR BLD AUTO: 23.4 % (ref 37–48.5)
HCT VFR BLD AUTO: 23.7 % (ref 37–48.5)
HCT VFR BLD AUTO: 23.7 % (ref 37–48.5)
HCT VFR BLD AUTO: 23.8 % (ref 37–48.5)
HCT VFR BLD AUTO: 23.8 % (ref 37–48.5)
HCT VFR BLD AUTO: 23.9 % (ref 37–48.5)
HCT VFR BLD AUTO: 24 % (ref 37–48.5)
HCT VFR BLD AUTO: 24 % (ref 37–48.5)
HCT VFR BLD AUTO: 24.4 % (ref 37–48.5)
HCT VFR BLD AUTO: 24.6 % (ref 37–48.5)
HCT VFR BLD AUTO: 24.6 % (ref 37–48.5)
HCT VFR BLD AUTO: 24.9 % (ref 37–48.5)
HCT VFR BLD AUTO: 24.9 % (ref 37–48.5)
HCT VFR BLD AUTO: 25.3 % (ref 37–48.5)
HCT VFR BLD AUTO: 25.5 % (ref 37–48.5)
HCT VFR BLD AUTO: 25.9 % (ref 37–48.5)
HCT VFR BLD AUTO: 26.1 % (ref 37–48.5)
HCT VFR BLD AUTO: 26.4 % (ref 37–48.5)
HCV AB SERPL QL IA: NEGATIVE
HGB BLD-MCNC: 6.4 G/DL (ref 12–16)
HGB BLD-MCNC: 6.6 G/DL (ref 12–16)
HGB BLD-MCNC: 6.9 G/DL (ref 12–16)
HGB BLD-MCNC: 7.2 G/DL (ref 12–16)
HGB BLD-MCNC: 7.6 G/DL (ref 12–16)
HGB BLD-MCNC: 7.7 G/DL (ref 12–16)
HGB BLD-MCNC: 7.8 G/DL (ref 12–16)
HGB BLD-MCNC: 7.9 G/DL (ref 12–16)
HGB BLD-MCNC: 7.9 G/DL (ref 12–16)
HGB BLD-MCNC: 8 G/DL (ref 12–16)
HGB BLD-MCNC: 8 G/DL (ref 12–16)
HGB BLD-MCNC: 8.1 G/DL (ref 12–16)
HGB BLD-MCNC: 8.2 G/DL (ref 12–16)
HGB BLD-MCNC: 8.2 G/DL (ref 12–16)
HGB BLD-MCNC: 8.3 G/DL (ref 12–16)
HGB BLD-MCNC: 8.4 G/DL (ref 12–16)
HGB BLD-MCNC: 8.4 G/DL (ref 12–16)
HGB BLD-MCNC: 8.5 G/DL (ref 12–16)
HGB BLD-MCNC: 8.6 G/DL (ref 12–16)
HGB UR QL STRIP: ABNORMAL
HIV1+2 IGG SERPL QL IA.RAPID: NORMAL
HPIV1 RNA NPH QL NAA+NON-PROBE: NOT DETECTED
HPIV2 RNA NPH QL NAA+NON-PROBE: NOT DETECTED
HPIV3 RNA NPH QL NAA+NON-PROBE: NOT DETECTED
HPIV4 RNA NPH QL NAA+NON-PROBE: NOT DETECTED
HUMAN METAPNEUMOVIRUS: NOT DETECTED
HYALINE CASTS #/AREA URNS LPF: 0 /LPF
HYPOCHROMIA BLD QL SMEAR: ABNORMAL
IGA SERPL-MCNC: 780 MG/DL (ref 40–350)
IGG SERPL-MCNC: 2181 MG/DL (ref 650–1600)
IGM SERPL-MCNC: 207 MG/DL (ref 50–300)
IMM GRANULOCYTES # BLD AUTO: 0.02 K/UL (ref 0–0.04)
IMM GRANULOCYTES # BLD AUTO: 0.03 K/UL (ref 0–0.04)
IMM GRANULOCYTES # BLD AUTO: 0.04 K/UL (ref 0–0.04)
IMM GRANULOCYTES # BLD AUTO: 0.05 K/UL (ref 0–0.04)
IMM GRANULOCYTES # BLD AUTO: 0.05 K/UL (ref 0–0.04)
IMM GRANULOCYTES # BLD AUTO: 0.06 K/UL (ref 0–0.04)
IMM GRANULOCYTES # BLD AUTO: 0.08 K/UL (ref 0–0.04)
IMM GRANULOCYTES # BLD AUTO: 0.08 K/UL (ref 0–0.04)
IMM GRANULOCYTES # BLD AUTO: 0.09 K/UL (ref 0–0.04)
IMM GRANULOCYTES # BLD AUTO: 0.1 K/UL (ref 0–0.04)
IMM GRANULOCYTES # BLD AUTO: 0.12 K/UL (ref 0–0.04)
IMM GRANULOCYTES # BLD AUTO: 0.16 K/UL (ref 0–0.04)
IMM GRANULOCYTES # BLD AUTO: 0.18 K/UL (ref 0–0.04)
IMM GRANULOCYTES # BLD AUTO: 0.18 K/UL (ref 0–0.04)
IMM GRANULOCYTES # BLD AUTO: ABNORMAL K/UL (ref 0–0.04)
IMM GRANULOCYTES NFR BLD AUTO: 0.2 % (ref 0–0.5)
IMM GRANULOCYTES NFR BLD AUTO: 0.2 % (ref 0–0.5)
IMM GRANULOCYTES NFR BLD AUTO: 0.3 % (ref 0–0.5)
IMM GRANULOCYTES NFR BLD AUTO: 0.4 % (ref 0–0.5)
IMM GRANULOCYTES NFR BLD AUTO: 0.4 % (ref 0–0.5)
IMM GRANULOCYTES NFR BLD AUTO: 0.5 % (ref 0–0.5)
IMM GRANULOCYTES NFR BLD AUTO: 0.6 % (ref 0–0.5)
IMM GRANULOCYTES NFR BLD AUTO: 0.6 % (ref 0–0.5)
IMM GRANULOCYTES NFR BLD AUTO: 0.7 % (ref 0–0.5)
IMM GRANULOCYTES NFR BLD AUTO: 0.8 % (ref 0–0.5)
IMM GRANULOCYTES NFR BLD AUTO: 1.1 % (ref 0–0.5)
IMM GRANULOCYTES NFR BLD AUTO: 1.2 % (ref 0–0.5)
IMM GRANULOCYTES NFR BLD AUTO: 1.4 % (ref 0–0.5)
IMM GRANULOCYTES NFR BLD AUTO: 1.4 % (ref 0–0.5)
IMM GRANULOCYTES NFR BLD AUTO: ABNORMAL % (ref 0–0.5)
INFLUENZA A (SUBTYPES H1,H1-2009,H3): NOT DETECTED
INFLUENZA A ANTIGEN, POC: NEGATIVE
INFLUENZA B ANTIGEN, POC: NEGATIVE
INR PPP: 1.3 (ref 0.8–1.2)
INR PPP: 1.4 (ref 0.8–1.2)
INR PPP: 1.7 (ref 0.8–1.2)
INR PPP: 1.7 (ref 0.8–1.2)
INR PPP: 2.4 (ref 0.8–1.2)
INTERPRETATION SERPL IFE-IMP: NORMAL
INTERVENTRICULAR SEPTUM: 0.94 CM (ref 0.6–1.1)
IP: 15
IRON SERPL-MCNC: 54 UG/DL (ref 30–160)
IVRT: 45.67 MSEC
KAPPA LC SER QL IA: 43.28 MG/DL (ref 0.33–1.94)
KAPPA LC/LAMBDA SER IA: 2.1 (ref 0.26–1.65)
KETONES UR QL STRIP: ABNORMAL
KETONES, POC UA: NEGATIVE
LA 3 SCREEN W REFLEX-IMP: ABNORMAL
LA MAJOR: 5.92 CM
LA MINOR: 5.83 CM
LA NT DPL PPP QL: ABNORMAL
LA WIDTH: 4.2 CM
LACTATE SERPL-SCNC: 1.6 MMOL/L (ref 0.5–2.2)
LACTATE SERPL-SCNC: 1.7 MMOL/L (ref 0.5–2.2)
LACTATE SERPL-SCNC: 2.3 MMOL/L (ref 0.5–2.2)
LACTATE SERPL-SCNC: 3.6 MMOL/L (ref 0.5–2.2)
LACTATE SERPL-SCNC: 4.9 MMOL/L (ref 0.5–2.2)
LACTATE SERPL-SCNC: 5 MMOL/L (ref 0.5–2.2)
LACTATE SERPL-SCNC: 5.1 MMOL/L (ref 0.5–2.2)
LACTATE SERPL-SCNC: 5.3 MMOL/L (ref 0.5–2.2)
LACTATE SERPL-SCNC: 6.2 MMOL/L (ref 0.5–2.2)
LAMBDA LC SER QL IA: 20.62 MG/DL (ref 0.57–2.63)
LDH FLD L TO P-CCNC: 351 U/L
LDH SERPL L TO P-CCNC: 2.34 MMOL/L (ref 0.5–2.2)
LDH SERPL L TO P-CCNC: 203 U/L (ref 110–260)
LDH SERPL L TO P-CCNC: 271 U/L (ref 110–260)
LDH SERPL L TO P-CCNC: 419 U/L (ref 110–260)
LEFT ATRIUM SIZE: 3.94 CM
LEFT ATRIUM VOLUME INDEX MOD: 38.4 ML/M2
LEFT ATRIUM VOLUME INDEX: 50.4 ML/M2
LEFT ATRIUM VOLUME MOD: 63.04 CM3
LEFT ATRIUM VOLUME: 82.63 CM3
LEFT INTERNAL DIMENSION IN SYSTOLE: 2.64 CM (ref 2.1–4)
LEFT VENTRICLE DIASTOLIC VOLUME INDEX: 58.34 ML/M2
LEFT VENTRICLE DIASTOLIC VOLUME: 95.68 ML
LEFT VENTRICLE MASS INDEX: 76 G/M2
LEFT VENTRICLE SYSTOLIC VOLUME INDEX: 15.5 ML/M2
LEFT VENTRICLE SYSTOLIC VOLUME: 25.47 ML
LEFT VENTRICULAR INTERNAL DIMENSION IN DIASTOLE: 4.57 CM (ref 3.5–6)
LEFT VENTRICULAR MASS: 125.29 G
LEUKOCYTE EST, POC UA: NEGATIVE
LEUKOCYTE ESTERASE UR QL STRIP: ABNORMAL
LIPASE SERPL-CCNC: 13 U/L (ref 4–60)
LIPASE SERPL-CCNC: 21 U/L (ref 4–60)
LIPASE SERPL-CCNC: 52 U/L (ref 4–60)
LV LATERAL E/E' RATIO: 14.89 M/S
LV SEPTAL E/E' RATIO: 13.4 M/S
LYMPHOCYTES # BLD AUTO: 0.8 K/UL (ref 1–4.8)
LYMPHOCYTES # BLD AUTO: 0.8 K/UL (ref 1–4.8)
LYMPHOCYTES # BLD AUTO: 1 K/UL (ref 1–4.8)
LYMPHOCYTES # BLD AUTO: 1.2 K/UL (ref 1–4.8)
LYMPHOCYTES # BLD AUTO: 1.3 K/UL (ref 1–4.8)
LYMPHOCYTES # BLD AUTO: 1.4 K/UL (ref 1–4.8)
LYMPHOCYTES # BLD AUTO: 1.6 K/UL (ref 1–4.8)
LYMPHOCYTES # BLD AUTO: 1.7 K/UL (ref 1–4.8)
LYMPHOCYTES # BLD AUTO: 1.7 K/UL (ref 1–4.8)
LYMPHOCYTES # BLD AUTO: 1.8 K/UL (ref 1–4.8)
LYMPHOCYTES # BLD AUTO: 1.8 K/UL (ref 1–4.8)
LYMPHOCYTES # BLD AUTO: 1.9 K/UL (ref 1–4.8)
LYMPHOCYTES # BLD AUTO: 2 K/UL (ref 1–4.8)
LYMPHOCYTES # BLD AUTO: 2 K/UL (ref 1–4.8)
LYMPHOCYTES # BLD AUTO: 2.4 K/UL (ref 1–4.8)
LYMPHOCYTES # BLD AUTO: 2.5 K/UL (ref 1–4.8)
LYMPHOCYTES # BLD AUTO: 2.7 K/UL (ref 1–4.8)
LYMPHOCYTES # BLD AUTO: 2.7 K/UL (ref 1–4.8)
LYMPHOCYTES # BLD AUTO: 3 K/UL (ref 1–4.8)
LYMPHOCYTES # BLD AUTO: 3.7 K/UL (ref 1–4.8)
LYMPHOCYTES # BLD AUTO: ABNORMAL K/UL (ref 1–4.8)
LYMPHOCYTES NFR BLD: 10.2 % (ref 18–48)
LYMPHOCYTES NFR BLD: 10.3 % (ref 18–48)
LYMPHOCYTES NFR BLD: 10.6 % (ref 18–48)
LYMPHOCYTES NFR BLD: 11.1 % (ref 18–48)
LYMPHOCYTES NFR BLD: 11.9 % (ref 18–48)
LYMPHOCYTES NFR BLD: 12.6 % (ref 18–48)
LYMPHOCYTES NFR BLD: 12.7 % (ref 18–48)
LYMPHOCYTES NFR BLD: 12.9 % (ref 18–48)
LYMPHOCYTES NFR BLD: 13 % (ref 18–48)
LYMPHOCYTES NFR BLD: 13.1 % (ref 18–48)
LYMPHOCYTES NFR BLD: 13.5 % (ref 18–48)
LYMPHOCYTES NFR BLD: 14.1 % (ref 18–48)
LYMPHOCYTES NFR BLD: 15.1 % (ref 18–48)
LYMPHOCYTES NFR BLD: 15.4 % (ref 18–48)
LYMPHOCYTES NFR BLD: 17.3 % (ref 18–48)
LYMPHOCYTES NFR BLD: 18.1 % (ref 18–48)
LYMPHOCYTES NFR BLD: 20.5 % (ref 18–48)
LYMPHOCYTES NFR BLD: 22.9 % (ref 18–48)
LYMPHOCYTES NFR BLD: 24.8 % (ref 18–48)
LYMPHOCYTES NFR BLD: 36.4 % (ref 18–48)
LYMPHOCYTES NFR BLD: 38.3 % (ref 18–48)
LYMPHOCYTES NFR BLD: 39.1 % (ref 18–48)
LYMPHOCYTES NFR BLD: 40 % (ref 18–48)
LYMPHOCYTES NFR BLD: 45.2 % (ref 18–48)
LYMPHOCYTES NFR BLD: 8.3 % (ref 18–48)
LYMPHOCYTES NFR FLD MANUAL: 7 %
M TB IFN-G CD4+ BCKGRND COR BLD-ACNC: 0 IU/ML
M TB IFN-G CD4+CD8+ BCKGRND COR BLD-ACNC: 0 IU/ML
MAGNESIUM SERPL-MCNC: 2.2 MG/DL (ref 1.6–2.6)
MAGNESIUM SERPL-MCNC: 2.3 MG/DL (ref 1.6–2.6)
MCH RBC QN AUTO: 32.5 PG (ref 27–31)
MCH RBC QN AUTO: 32.9 PG (ref 27–31)
MCH RBC QN AUTO: 33.3 PG (ref 27–31)
MCH RBC QN AUTO: 33.7 PG (ref 27–31)
MCH RBC QN AUTO: 33.8 PG (ref 27–31)
MCH RBC QN AUTO: 33.8 PG (ref 27–31)
MCH RBC QN AUTO: 34.3 PG (ref 27–31)
MCH RBC QN AUTO: 34.4 PG (ref 27–31)
MCH RBC QN AUTO: 34.7 PG (ref 27–31)
MCH RBC QN AUTO: 34.7 PG (ref 27–31)
MCH RBC QN AUTO: 35 PG (ref 27–31)
MCH RBC QN AUTO: 35.2 PG (ref 27–31)
MCH RBC QN AUTO: 35.2 PG (ref 27–31)
MCH RBC QN AUTO: 35.4 PG (ref 27–31)
MCH RBC QN AUTO: 35.7 PG (ref 27–31)
MCH RBC QN AUTO: 35.7 PG (ref 27–31)
MCH RBC QN AUTO: 35.8 PG (ref 27–31)
MCH RBC QN AUTO: 36.3 PG (ref 27–31)
MCH RBC QN AUTO: 36.4 PG (ref 27–31)
MCH RBC QN AUTO: 36.8 PG (ref 27–31)
MCH RBC QN AUTO: 37.3 PG (ref 27–31)
MCH RBC QN AUTO: 37.9 PG (ref 27–31)
MCH RBC QN AUTO: 39 PG (ref 27–31)
MCH RBC QN AUTO: 39.2 PG (ref 27–31)
MCH RBC QN AUTO: 39.4 PG (ref 27–31)
MCH RBC QN AUTO: 42.6 PG (ref 27–31)
MCHC RBC AUTO-ENTMCNC: 30.7 G/DL (ref 32–36)
MCHC RBC AUTO-ENTMCNC: 31 G/DL (ref 32–36)
MCHC RBC AUTO-ENTMCNC: 31.6 G/DL (ref 32–36)
MCHC RBC AUTO-ENTMCNC: 31.6 G/DL (ref 32–36)
MCHC RBC AUTO-ENTMCNC: 32 G/DL (ref 32–36)
MCHC RBC AUTO-ENTMCNC: 32.4 G/DL (ref 32–36)
MCHC RBC AUTO-ENTMCNC: 32.4 G/DL (ref 32–36)
MCHC RBC AUTO-ENTMCNC: 32.6 G/DL (ref 32–36)
MCHC RBC AUTO-ENTMCNC: 32.6 G/DL (ref 32–36)
MCHC RBC AUTO-ENTMCNC: 32.8 G/DL (ref 32–36)
MCHC RBC AUTO-ENTMCNC: 32.8 G/DL (ref 32–36)
MCHC RBC AUTO-ENTMCNC: 32.9 G/DL (ref 32–36)
MCHC RBC AUTO-ENTMCNC: 33.2 G/DL (ref 32–36)
MCHC RBC AUTO-ENTMCNC: 33.2 G/DL (ref 32–36)
MCHC RBC AUTO-ENTMCNC: 33.3 G/DL (ref 32–36)
MCHC RBC AUTO-ENTMCNC: 33.6 G/DL (ref 32–36)
MCHC RBC AUTO-ENTMCNC: 33.7 G/DL (ref 32–36)
MCHC RBC AUTO-ENTMCNC: 34.2 G/DL (ref 32–36)
MCHC RBC AUTO-ENTMCNC: 34.5 G/DL (ref 32–36)
MCHC RBC AUTO-ENTMCNC: 35 G/DL (ref 32–36)
MCHC RBC AUTO-ENTMCNC: 36.8 G/DL (ref 32–36)
MCV RBC AUTO: 100 FL (ref 82–98)
MCV RBC AUTO: 101 FL (ref 82–98)
MCV RBC AUTO: 105 FL (ref 82–98)
MCV RBC AUTO: 105 FL (ref 82–98)
MCV RBC AUTO: 106 FL (ref 82–98)
MCV RBC AUTO: 107 FL (ref 82–98)
MCV RBC AUTO: 107 FL (ref 82–98)
MCV RBC AUTO: 108 FL (ref 82–98)
MCV RBC AUTO: 108 FL (ref 82–98)
MCV RBC AUTO: 109 FL (ref 82–98)
MCV RBC AUTO: 110 FL (ref 82–98)
MCV RBC AUTO: 110 FL (ref 82–98)
MCV RBC AUTO: 111 FL (ref 82–98)
MCV RBC AUTO: 112 FL (ref 82–98)
MCV RBC AUTO: 112 FL (ref 82–98)
MCV RBC AUTO: 115 FL (ref 82–98)
MCV RBC AUTO: 115 FL (ref 82–98)
MCV RBC AUTO: 116 FL (ref 82–98)
MCV RBC AUTO: 118 FL (ref 82–98)
MCV RBC AUTO: 122 FL (ref 82–98)
MCV RBC AUTO: 99 FL (ref 82–98)
MCV RBC AUTO: 99 FL (ref 82–98)
MICROSCOPIC COMMENT: ABNORMAL
MIN VOL: 12.5
MIN VOL: 400
MITOGEN IGNF BCKGRD COR BLD-ACNC: 0.04 IU/ML
MIXING DRVVT: ABNORMAL SEC (ref 33–44)
MODE: ABNORMAL
MONOCYTES # BLD AUTO: 0.3 K/UL (ref 0.3–1)
MONOCYTES # BLD AUTO: 0.4 K/UL (ref 0.3–1)
MONOCYTES # BLD AUTO: 0.5 K/UL (ref 0.3–1)
MONOCYTES # BLD AUTO: 0.6 K/UL (ref 0.3–1)
MONOCYTES # BLD AUTO: 0.6 K/UL (ref 0.3–1)
MONOCYTES # BLD AUTO: 0.7 K/UL (ref 0.3–1)
MONOCYTES # BLD AUTO: 0.8 K/UL (ref 0.3–1)
MONOCYTES # BLD AUTO: 0.8 K/UL (ref 0.3–1)
MONOCYTES # BLD AUTO: 0.9 K/UL (ref 0.3–1)
MONOCYTES # BLD AUTO: 1.1 K/UL (ref 0.3–1)
MONOCYTES # BLD AUTO: 1.3 K/UL (ref 0.3–1)
MONOCYTES # BLD AUTO: ABNORMAL K/UL (ref 0.3–1)
MONOCYTES NFR BLD: 1.5 % (ref 4–15)
MONOCYTES NFR BLD: 10.2 % (ref 4–15)
MONOCYTES NFR BLD: 10.4 % (ref 4–15)
MONOCYTES NFR BLD: 13.1 % (ref 4–15)
MONOCYTES NFR BLD: 3.3 % (ref 4–15)
MONOCYTES NFR BLD: 3.4 % (ref 4–15)
MONOCYTES NFR BLD: 3.4 % (ref 4–15)
MONOCYTES NFR BLD: 3.9 % (ref 4–15)
MONOCYTES NFR BLD: 4 % (ref 4–15)
MONOCYTES NFR BLD: 4.2 % (ref 4–15)
MONOCYTES NFR BLD: 4.6 % (ref 4–15)
MONOCYTES NFR BLD: 4.7 % (ref 4–15)
MONOCYTES NFR BLD: 5.5 % (ref 4–15)
MONOCYTES NFR BLD: 5.6 % (ref 4–15)
MONOCYTES NFR BLD: 5.8 % (ref 4–15)
MONOCYTES NFR BLD: 6.4 % (ref 4–15)
MONOCYTES NFR BLD: 7 % (ref 4–15)
MONOCYTES NFR BLD: 7.1 % (ref 4–15)
MONOCYTES NFR BLD: 8.2 % (ref 4–15)
MONOCYTES NFR BLD: 8.3 % (ref 4–15)
MONOCYTES NFR BLD: 8.9 % (ref 4–15)
MONOCYTES NFR BLD: 9.8 % (ref 4–15)
MONOS+MACROS NFR FLD MANUAL: 4 %
MV MEAN GRADIENT: 1 MMHG
MV PEAK A VEL: 0.64 M/S
MV PEAK E VEL: 1.34 M/S
MV PEAK GRADIENT: 12 MMHG
MV STENOSIS PRESSURE HALF TIME: 85.47 MS
MV VALVE AREA BY CONTINUITY EQUATION: 1.94 CM2
MV VALVE AREA P 1/2 METHOD: 2.57 CM2
MYCOPLASMA PNEUMONIAE: NOT DETECTED
MYELOCYTES NFR BLD MANUAL: 0.5 %
MYELOPEROXIDASE AB SER-ACNC: 2 UNITS
NEUTROPHILS # BLD AUTO: 10 K/UL (ref 1.8–7.7)
NEUTROPHILS # BLD AUTO: 10.5 K/UL (ref 1.8–7.7)
NEUTROPHILS # BLD AUTO: 10.7 K/UL (ref 1.8–7.7)
NEUTROPHILS # BLD AUTO: 11.1 K/UL (ref 1.8–7.7)
NEUTROPHILS # BLD AUTO: 11.2 K/UL (ref 1.8–7.7)
NEUTROPHILS # BLD AUTO: 2.9 K/UL (ref 1.8–7.7)
NEUTROPHILS # BLD AUTO: 3.2 K/UL (ref 1.8–7.7)
NEUTROPHILS # BLD AUTO: 3.4 K/UL (ref 1.8–7.7)
NEUTROPHILS # BLD AUTO: 5.2 K/UL (ref 1.8–7.7)
NEUTROPHILS # BLD AUTO: 5.8 K/UL (ref 1.8–7.7)
NEUTROPHILS # BLD AUTO: 6.4 K/UL (ref 1.8–7.7)
NEUTROPHILS # BLD AUTO: 7.1 K/UL (ref 1.8–7.7)
NEUTROPHILS # BLD AUTO: 7.3 K/UL (ref 1.8–7.7)
NEUTROPHILS # BLD AUTO: 7.7 K/UL (ref 1.8–7.7)
NEUTROPHILS # BLD AUTO: 8.4 K/UL (ref 1.8–7.7)
NEUTROPHILS # BLD AUTO: 8.5 K/UL (ref 1.8–7.7)
NEUTROPHILS # BLD AUTO: 8.5 K/UL (ref 1.8–7.7)
NEUTROPHILS # BLD AUTO: 8.6 K/UL (ref 1.8–7.7)
NEUTROPHILS # BLD AUTO: 9.2 K/UL (ref 1.8–7.7)
NEUTROPHILS # BLD AUTO: 9.5 K/UL (ref 1.8–7.7)
NEUTROPHILS # BLD AUTO: 9.9 K/UL (ref 1.8–7.7)
NEUTROPHILS # BLD AUTO: 9.9 K/UL (ref 1.8–7.7)
NEUTROPHILS NFR BLD: 40 % (ref 38–73)
NEUTROPHILS NFR BLD: 44.1 % (ref 38–73)
NEUTROPHILS NFR BLD: 45.6 % (ref 38–73)
NEUTROPHILS NFR BLD: 45.9 % (ref 38–73)
NEUTROPHILS NFR BLD: 46.8 % (ref 38–73)
NEUTROPHILS NFR BLD: 70.4 % (ref 38–73)
NEUTROPHILS NFR BLD: 71.5 % (ref 38–73)
NEUTROPHILS NFR BLD: 72.4 % (ref 38–73)
NEUTROPHILS NFR BLD: 75 % (ref 38–73)
NEUTROPHILS NFR BLD: 75.8 % (ref 38–73)
NEUTROPHILS NFR BLD: 76.2 % (ref 38–73)
NEUTROPHILS NFR BLD: 77.8 % (ref 38–73)
NEUTROPHILS NFR BLD: 78.2 % (ref 38–73)
NEUTROPHILS NFR BLD: 79.1 % (ref 38–73)
NEUTROPHILS NFR BLD: 79.4 % (ref 38–73)
NEUTROPHILS NFR BLD: 79.6 % (ref 38–73)
NEUTROPHILS NFR BLD: 80.8 % (ref 38–73)
NEUTROPHILS NFR BLD: 81.1 % (ref 38–73)
NEUTROPHILS NFR BLD: 81.7 % (ref 38–73)
NEUTROPHILS NFR BLD: 82 % (ref 38–73)
NEUTROPHILS NFR BLD: 82.7 % (ref 38–73)
NEUTROPHILS NFR BLD: 83 % (ref 38–73)
NEUTROPHILS NFR BLD: 84.6 % (ref 38–73)
NEUTROPHILS NFR BLD: 85.5 % (ref 38–73)
NEUTROPHILS NFR BLD: 86.5 % (ref 38–73)
NEUTROPHILS NFR FLD MANUAL: 89 %
NEUTS BAND NFR BLD MANUAL: 4.5 %
NITRITE UR QL STRIP: NEGATIVE
NITRITE, POC UA: NEGATIVE
NON-SQ EPI CELLS #/AREA URNS HPF: 1 /HPF
NRBC BLD-RTO: 0 /100 WBC
NRBC BLD-RTO: 1 /100 WBC
NRBC BLD-RTO: 1 /100 WBC
NUM UNITS TRANS FFP: NORMAL
NUM UNITS TRANS PACKED RBC: NORMAL
NUM UNITS TRANS PACKED RBC: NORMAL
OB PNL STL: NEGATIVE
OB PNL STL: POSITIVE
OHS CV EVENT MONITOR DAY: 0
OHS CV HOLTER LENGTH DECIMAL HOURS: 47.98
OHS CV HOLTER LENGTH HOURS: 47
OHS CV HOLTER LENGTH MINUTES: 59
OHS CV HOLTER SINUS AVERAGE HR: 74
OHS CV HOLTER SINUS MAX HR: 96
OHS CV HOLTER SINUS MIN HR: 54
OVALOCYTES BLD QL SMEAR: ABNORMAL
P-ANCA TITR SER IF: NORMAL TITER
P-ANCA TITR SER IF: NORMAL TITER
PATH REV BLD -IMP: NORMAL
PATH REV BLD -IMP: NORMAL
PATHOLOGIST INTERPRETATION IFE: NORMAL
PATHOLOGIST INTERPRETATION SPE: NORMAL
PATHOLOGIST INTERPRETATION UPE: NORMAL
PCO2 BLDA: 22.5 MMHG (ref 35–45)
PCO2 BLDA: 28.2 MMHG (ref 35–45)
PCO2 BLDA: 28.6 MMHG (ref 35–45)
PCO2 BLDA: 30.8 MMHG (ref 35–45)
PCO2 BLDA: 36.9 MMHG (ref 35–45)
PF4 HEPARIN CMPLX AB SER QL: 0.35 OD (ref 0–0.4)
PH SMN: 7.39 [PH] (ref 7.35–7.45)
PH SMN: 7.42 [PH] (ref 7.35–7.45)
PH SMN: 7.43 [PH] (ref 7.35–7.45)
PH SMN: 7.44 [PH] (ref 7.35–7.45)
PH SMN: 7.5 [PH] (ref 7.35–7.45)
PH UR STRIP: 5 [PH]
PH UR STRIP: 6 [PH] (ref 5–8)
PHOSPHATE SERPL-MCNC: 1.6 MG/DL (ref 2.7–4.5)
PHOSPHATE SERPL-MCNC: 1.7 MG/DL (ref 2.7–4.5)
PHOSPHATE SERPL-MCNC: 2.4 MG/DL (ref 2.7–4.5)
PHOSPHATE SERPL-MCNC: 2.5 MG/DL (ref 2.7–4.5)
PHOSPHATE SERPL-MCNC: 2.6 MG/DL (ref 2.7–4.5)
PHOSPHATE SERPL-MCNC: 3.1 MG/DL (ref 2.7–4.5)
PHOSPHATE SERPL-MCNC: 3.9 MG/DL (ref 2.7–4.5)
PHOSPHATE SERPL-MCNC: 4.4 MG/DL (ref 2.7–4.5)
PHOSPHATE SERPL-MCNC: 5 MG/DL (ref 2.7–4.5)
PHOSPHATE SERPL-MCNC: 5.1 MG/DL (ref 2.7–4.5)
PHOSPHATE SERPL-MCNC: 5.7 MG/DL (ref 2.7–4.5)
PHOSPHATE SERPL-MCNC: 5.8 MG/DL (ref 2.7–4.5)
PHOSPHATE SERPL-MCNC: 6 MG/DL (ref 2.7–4.5)
PHOSPHATE SERPL-MCNC: 6.6 MG/DL (ref 2.7–4.5)
PHOSPHATE SERPL-MCNC: 7.1 MG/DL (ref 2.7–4.5)
PHOSPHATE SERPL-MCNC: 7.8 MG/DL (ref 2.7–4.5)
PHOSPHATE SERPL-MCNC: 8.6 MG/DL (ref 2.7–4.5)
PHOSPHOLIPASE A2 RECEPTOR, ELISA: <2 RU/ML
PHOSPHOLIPASE A2 RECEPTOR, IFA: NEGATIVE
PISA TR MAX VEL: 3.28 M/S
PLATELET # BLD AUTO: 103 K/UL (ref 150–450)
PLATELET # BLD AUTO: 26 K/UL (ref 150–450)
PLATELET # BLD AUTO: 29 K/UL (ref 150–450)
PLATELET # BLD AUTO: 31 K/UL (ref 150–450)
PLATELET # BLD AUTO: 33 K/UL (ref 150–450)
PLATELET # BLD AUTO: 42 K/UL (ref 150–450)
PLATELET # BLD AUTO: 43 K/UL (ref 150–450)
PLATELET # BLD AUTO: 43 K/UL (ref 150–450)
PLATELET # BLD AUTO: 44 K/UL (ref 150–450)
PLATELET # BLD AUTO: 46 K/UL (ref 150–450)
PLATELET # BLD AUTO: 46 K/UL (ref 150–450)
PLATELET # BLD AUTO: 48 K/UL (ref 150–450)
PLATELET # BLD AUTO: 50 K/UL (ref 150–450)
PLATELET # BLD AUTO: 51 K/UL (ref 150–450)
PLATELET # BLD AUTO: 51 K/UL (ref 150–450)
PLATELET # BLD AUTO: 52 K/UL (ref 150–450)
PLATELET # BLD AUTO: 52 K/UL (ref 150–450)
PLATELET # BLD AUTO: 62 K/UL (ref 150–450)
PLATELET # BLD AUTO: 68 K/UL (ref 150–450)
PLATELET # BLD AUTO: 74 K/UL (ref 150–450)
PLATELET # BLD AUTO: 82 K/UL (ref 150–450)
PLATELET # BLD AUTO: 88 K/UL (ref 150–450)
PLATELET # BLD AUTO: 92 K/UL (ref 150–450)
PLATELET # BLD AUTO: 92 K/UL (ref 150–450)
PLATELET # BLD AUTO: 94 K/UL (ref 150–450)
PLATELET # BLD AUTO: 98 K/UL (ref 150–450)
PLATELET BLD QL SMEAR: ABNORMAL
PMV BLD AUTO: 12.1 FL (ref 9.2–12.9)
PMV BLD AUTO: 12.5 FL (ref 9.2–12.9)
PMV BLD AUTO: 12.8 FL (ref 9.2–12.9)
PMV BLD AUTO: 12.9 FL (ref 9.2–12.9)
PMV BLD AUTO: 13 FL (ref 9.2–12.9)
PMV BLD AUTO: 13.1 FL (ref 9.2–12.9)
PMV BLD AUTO: 13.2 FL (ref 9.2–12.9)
PMV BLD AUTO: 13.2 FL (ref 9.2–12.9)
PMV BLD AUTO: 13.3 FL (ref 9.2–12.9)
PMV BLD AUTO: 13.4 FL (ref 9.2–12.9)
PMV BLD AUTO: 13.5 FL (ref 9.2–12.9)
PMV BLD AUTO: 13.5 FL (ref 9.2–12.9)
PMV BLD AUTO: 13.6 FL (ref 9.2–12.9)
PMV BLD AUTO: 13.7 FL (ref 9.2–12.9)
PMV BLD AUTO: 13.8 FL (ref 9.2–12.9)
PMV BLD AUTO: 13.9 FL (ref 9.2–12.9)
PMV BLD AUTO: 14.1 FL (ref 9.2–12.9)
PMV BLD AUTO: 14.2 FL (ref 9.2–12.9)
PMV BLD AUTO: 14.3 FL (ref 9.2–12.9)
PMV BLD AUTO: ABNORMAL FL (ref 9.2–12.9)
PO2 BLDA: 221 MMHG (ref 80–100)
PO2 BLDA: 28 MMHG (ref 40–60)
PO2 BLDA: 36 MMHG (ref 40–60)
PO2 BLDA: 43 MMHG (ref 40–60)
PO2 BLDA: 76 MMHG (ref 80–100)
POC ALT (SGPT): 56 U/L (ref 10–47)
POC AST (SGOT): 113 U/L (ref 11–38)
POC B-TYPE NATRIURETIC PEPTIDE: 1160 PG/ML (ref 0–100)
POC BE: -10 MMOL/L
POC BE: -4 MMOL/L
POC BE: -5 MMOL/L
POC BE: 0 MMOL/L
POC BE: 1 MMOL/L
POC CARDIAC TROPONIN I: 0.02 NG/ML
POC D-DI: 3940 NG/ML (ref 0–450)
POC PTINR: 1.3 (ref 0.9–1.2)
POC PTWBT: 15.2 SEC (ref 9.7–14.3)
POC SATURATED O2: 100 % (ref 95–100)
POC SATURATED O2: 56 % (ref 95–100)
POC SATURATED O2: 72 % (ref 95–100)
POC SATURATED O2: 80 % (ref 95–100)
POC SATURATED O2: 96 % (ref 95–100)
POC TCO2: 14 MMOL/L (ref 24–29)
POC TCO2: 17 MMOL/L (ref 18–33)
POC TCO2: 19 MMOL/L (ref 23–27)
POC TCO2: 21 MMOL/L (ref 24–29)
POC TCO2: 23 MMOL/L (ref 23–27)
POC TCO2: 26 MMOL/L (ref 24–29)
POCT GLUCOSE: 102 MG/DL (ref 70–110)
POCT GLUCOSE: 105 MG/DL (ref 70–110)
POCT GLUCOSE: 106 MG/DL (ref 70–110)
POCT GLUCOSE: 114 MG/DL (ref 70–110)
POCT GLUCOSE: 117 MG/DL (ref 70–110)
POCT GLUCOSE: 120 MG/DL (ref 70–110)
POCT GLUCOSE: 123 MG/DL (ref 70–110)
POCT GLUCOSE: 126 MG/DL (ref 70–110)
POCT GLUCOSE: 128 MG/DL (ref 70–110)
POCT GLUCOSE: 131 MG/DL (ref 70–110)
POCT GLUCOSE: 133 MG/DL (ref 70–110)
POCT GLUCOSE: 137 MG/DL (ref 70–110)
POCT GLUCOSE: 140 MG/DL (ref 70–110)
POCT GLUCOSE: 140 MG/DL (ref 70–110)
POCT GLUCOSE: 141 MG/DL (ref 70–110)
POCT GLUCOSE: 141 MG/DL (ref 70–110)
POCT GLUCOSE: 152 MG/DL (ref 70–110)
POCT GLUCOSE: 153 MG/DL (ref 70–110)
POCT GLUCOSE: 156 MG/DL (ref 70–110)
POCT GLUCOSE: 157 MG/DL (ref 70–110)
POCT GLUCOSE: 163 MG/DL (ref 70–110)
POCT GLUCOSE: 165 MG/DL (ref 70–110)
POCT GLUCOSE: 167 MG/DL (ref 70–110)
POCT GLUCOSE: 169 MG/DL (ref 70–110)
POCT GLUCOSE: 172 MG/DL (ref 70–110)
POCT GLUCOSE: 173 MG/DL (ref 70–110)
POCT GLUCOSE: 173 MG/DL (ref 70–110)
POCT GLUCOSE: 176 MG/DL (ref 70–110)
POCT GLUCOSE: 176 MG/DL (ref 70–110)
POCT GLUCOSE: 180 MG/DL (ref 70–110)
POCT GLUCOSE: 181 MG/DL (ref 70–110)
POCT GLUCOSE: 181 MG/DL (ref 70–110)
POCT GLUCOSE: 182 MG/DL (ref 70–110)
POCT GLUCOSE: 183 MG/DL (ref 70–110)
POCT GLUCOSE: 184 MG/DL (ref 70–110)
POCT GLUCOSE: 188 MG/DL (ref 70–110)
POCT GLUCOSE: 189 MG/DL (ref 70–110)
POCT GLUCOSE: 192 MG/DL (ref 70–110)
POCT GLUCOSE: 193 MG/DL (ref 70–110)
POCT GLUCOSE: 194 MG/DL (ref 70–110)
POCT GLUCOSE: 194 MG/DL (ref 70–110)
POCT GLUCOSE: 199 MG/DL (ref 70–110)
POCT GLUCOSE: 200 MG/DL (ref 70–110)
POCT GLUCOSE: 202 MG/DL (ref 70–110)
POCT GLUCOSE: 204 MG/DL (ref 70–110)
POCT GLUCOSE: 208 MG/DL (ref 70–110)
POCT GLUCOSE: 210 MG/DL (ref 70–110)
POCT GLUCOSE: 210 MG/DL (ref 70–110)
POCT GLUCOSE: 211 MG/DL (ref 70–110)
POCT GLUCOSE: 212 MG/DL (ref 70–110)
POCT GLUCOSE: 213 MG/DL (ref 70–110)
POCT GLUCOSE: 214 MG/DL (ref 70–110)
POCT GLUCOSE: 216 MG/DL (ref 70–110)
POCT GLUCOSE: 216 MG/DL (ref 70–110)
POCT GLUCOSE: 218 MG/DL (ref 70–110)
POCT GLUCOSE: 224 MG/DL (ref 70–110)
POCT GLUCOSE: 224 MG/DL (ref 70–110)
POCT GLUCOSE: 229 MG/DL (ref 70–110)
POCT GLUCOSE: 230 MG/DL (ref 70–110)
POCT GLUCOSE: 231 MG/DL (ref 70–110)
POCT GLUCOSE: 235 MG/DL (ref 70–110)
POCT GLUCOSE: 236 MG/DL (ref 70–110)
POCT GLUCOSE: 237 MG/DL (ref 70–110)
POCT GLUCOSE: 238 MG/DL (ref 70–110)
POCT GLUCOSE: 238 MG/DL (ref 70–110)
POCT GLUCOSE: 240 MG/DL (ref 70–110)
POCT GLUCOSE: 241 MG/DL (ref 70–110)
POCT GLUCOSE: 241 MG/DL (ref 70–110)
POCT GLUCOSE: 242 MG/DL (ref 70–110)
POCT GLUCOSE: 243 MG/DL (ref 70–110)
POCT GLUCOSE: 246 MG/DL (ref 70–110)
POCT GLUCOSE: 246 MG/DL (ref 70–110)
POCT GLUCOSE: 247 MG/DL (ref 70–110)
POCT GLUCOSE: 248 MG/DL (ref 70–110)
POCT GLUCOSE: 253 MG/DL (ref 70–110)
POCT GLUCOSE: 259 MG/DL (ref 70–110)
POCT GLUCOSE: 262 MG/DL (ref 70–110)
POCT GLUCOSE: 265 MG/DL (ref 70–110)
POCT GLUCOSE: 265 MG/DL (ref 70–110)
POCT GLUCOSE: 268 MG/DL (ref 70–110)
POCT GLUCOSE: 269 MG/DL (ref 70–110)
POCT GLUCOSE: 272 MG/DL (ref 70–110)
POCT GLUCOSE: 273 MG/DL (ref 70–110)
POCT GLUCOSE: 275 MG/DL (ref 70–110)
POCT GLUCOSE: 282 MG/DL (ref 70–110)
POCT GLUCOSE: 288 MG/DL (ref 70–110)
POCT GLUCOSE: 290 MG/DL (ref 70–110)
POCT GLUCOSE: 292 MG/DL (ref 70–110)
POCT GLUCOSE: 293 MG/DL (ref 70–110)
POCT GLUCOSE: 297 MG/DL (ref 70–110)
POCT GLUCOSE: 302 MG/DL (ref 70–110)
POCT GLUCOSE: 302 MG/DL (ref 70–110)
POCT GLUCOSE: 329 MG/DL (ref 70–110)
POCT GLUCOSE: 352 MG/DL (ref 70–110)
POCT GLUCOSE: 358 MG/DL (ref 70–110)
POCT GLUCOSE: 384 MG/DL (ref 70–110)
POCT GLUCOSE: 408 MG/DL (ref 70–110)
POCT GLUCOSE: 57 MG/DL (ref 70–110)
POCT GLUCOSE: 73 MG/DL (ref 70–110)
POCT GLUCOSE: 76 MG/DL (ref 70–110)
POCT GLUCOSE: 84 MG/DL (ref 70–110)
POCT GLUCOSE: 89 MG/DL (ref 70–110)
POCT GLUCOSE: 98 MG/DL (ref 70–110)
POCT GLUCOSE: 98 MG/DL (ref 70–110)
POIKILOCYTOSIS BLD QL SMEAR: SLIGHT
POLYCHROMASIA BLD QL SMEAR: ABNORMAL
POTASSIUM BLD-SCNC: 5.2 MMOL/L (ref 3.6–5.1)
POTASSIUM SERPL-SCNC: 3.8 MMOL/L (ref 3.5–5.1)
POTASSIUM SERPL-SCNC: 3.9 MMOL/L (ref 3.5–5.1)
POTASSIUM SERPL-SCNC: 4 MMOL/L (ref 3.5–5.1)
POTASSIUM SERPL-SCNC: 4 MMOL/L (ref 3.5–5.1)
POTASSIUM SERPL-SCNC: 4.1 MMOL/L (ref 3.5–5.1)
POTASSIUM SERPL-SCNC: 4.2 MMOL/L (ref 3.5–5.1)
POTASSIUM SERPL-SCNC: 4.3 MMOL/L (ref 3.5–5.1)
POTASSIUM SERPL-SCNC: 4.4 MMOL/L (ref 3.5–5.1)
POTASSIUM SERPL-SCNC: 4.5 MMOL/L (ref 3.5–5.1)
POTASSIUM SERPL-SCNC: 4.5 MMOL/L (ref 3.5–5.1)
POTASSIUM SERPL-SCNC: 4.6 MMOL/L (ref 3.5–5.1)
POTASSIUM SERPL-SCNC: 4.7 MMOL/L (ref 3.5–5.1)
POTASSIUM SERPL-SCNC: 4.8 MMOL/L (ref 3.5–5.1)
POTASSIUM SERPL-SCNC: 4.8 MMOL/L (ref 3.5–5.1)
POTASSIUM SERPL-SCNC: 4.9 MMOL/L (ref 3.5–5.1)
POTASSIUM SERPL-SCNC: 5 MMOL/L (ref 3.5–5.1)
POTASSIUM SERPL-SCNC: 5.1 MMOL/L (ref 3.5–5.1)
POTASSIUM SERPL-SCNC: 5.2 MMOL/L (ref 3.5–5.1)
POTASSIUM SERPL-SCNC: 5.3 MMOL/L (ref 3.5–5.1)
POTASSIUM SERPL-SCNC: 5.3 MMOL/L (ref 3.5–5.1)
POTASSIUM SERPL-SCNC: 5.4 MMOL/L (ref 3.5–5.1)
POTASSIUM SERPL-SCNC: 5.5 MMOL/L (ref 3.5–5.1)
POTASSIUM SERPL-SCNC: 5.5 MMOL/L (ref 3.5–5.1)
POTASSIUM SERPL-SCNC: 5.6 MMOL/L (ref 3.5–5.1)
POTASSIUM SERPL-SCNC: 5.7 MMOL/L (ref 3.5–5.1)
POTASSIUM SERPL-SCNC: 5.7 MMOL/L (ref 3.5–5.1)
POTASSIUM SERPL-SCNC: 5.8 MMOL/L (ref 3.5–5.1)
POTASSIUM SERPL-SCNC: 5.9 MMOL/L (ref 3.5–5.1)
POTASSIUM SERPL-SCNC: 6.1 MMOL/L (ref 3.5–5.1)
POTASSIUM SERPL-SCNC: 6.1 MMOL/L (ref 3.5–5.1)
PROCALCITONIN SERPL IA-MCNC: 0.23 NG/ML
PROT 24H UR-MRATE: 163 MG/SPEC (ref 0–100)
PROT 24H UR-MRATE: 560 MG/SPEC (ref 0–100)
PROT FLD-MCNC: <1 G/DL
PROT PATTERN UR ELPH-IMP: NORMAL
PROT SERPL-MCNC: 5 G/DL (ref 6–8.4)
PROT SERPL-MCNC: 5.1 G/DL (ref 6–8.4)
PROT SERPL-MCNC: 5.6 G/DL (ref 6–8.4)
PROT SERPL-MCNC: 6.2 G/DL (ref 6–8.4)
PROT SERPL-MCNC: 6.3 G/DL (ref 6–8.4)
PROT SERPL-MCNC: 6.3 G/DL (ref 6–8.4)
PROT SERPL-MCNC: 6.4 G/DL (ref 6–8.4)
PROT SERPL-MCNC: 6.5 G/DL (ref 6–8.4)
PROT SERPL-MCNC: 6.5 G/DL (ref 6–8.4)
PROT SERPL-MCNC: 6.6 G/DL (ref 6–8.4)
PROT SERPL-MCNC: 6.6 G/DL (ref 6–8.4)
PROT SERPL-MCNC: 7.2 G/DL (ref 6–8.4)
PROT SERPL-MCNC: 7.4 G/DL (ref 6–8.4)
PROT SERPL-MCNC: 7.6 G/DL (ref 6–8.4)
PROT SERPL-MCNC: 7.6 G/DL (ref 6–8.4)
PROT SERPL-MCNC: 7.7 G/DL (ref 6–8.4)
PROT SERPL-MCNC: 8.1 G/DL (ref 6–8.4)
PROT SERPL-MCNC: 8.2 G/DL (ref 6–8.4)
PROT SERPL-MCNC: 8.3 G/DL (ref 6–8.4)
PROT SERPL-MCNC: 8.4 G/DL (ref 6–8.4)
PROT SERPL-MCNC: 8.4 G/DL (ref 6–8.4)
PROT SERPL-MCNC: 8.5 G/DL (ref 6–8.4)
PROT SERPL-MCNC: 8.8 G/DL (ref 6–8.4)
PROT SERPL-MCNC: 8.8 G/DL (ref 6–8.4)
PROT UR QL STRIP: ABNORMAL
PROT UR-MCNC: 373 MG/DL (ref 0–15)
PROT UR-MCNC: 50 MG/DL (ref 0–15)
PROT UR-MCNC: >1500 MG/DL
PROT/CREAT UR: NORMAL MG/G{CREAT} (ref 0–0.2)
PROTEIN, POC UA: ABNORMAL
PROTEIN, POC: 8.4 G/DL (ref 6.4–8.1)
PROTEINASE3 IGG SER-ACNC: <0.2 U
PROTHROMBIN TIME: 13.8 SEC (ref 9–12.5)
PROTHROMBIN TIME: 14 SEC (ref 9–12.5)
PROTHROMBIN TIME: 16.8 SEC (ref 12–15.5)
PROTHROMBIN TIME: 17.6 SEC (ref 9–12.5)
PROTHROMBIN TIME: 18 SEC (ref 9–12.5)
PROTHROMBIN TIME: 23.8 SEC (ref 9–12.5)
PS: 10
RA MAJOR: 4.77 CM
RA PRESSURE: 8 MMHG
RA WIDTH: 3.42 CM
RBC # BLD AUTO: 1.64 M/UL (ref 4–5.4)
RBC # BLD AUTO: 1.75 M/UL (ref 4–5.4)
RBC # BLD AUTO: 1.82 M/UL (ref 4–5.4)
RBC # BLD AUTO: 1.95 M/UL (ref 4–5.4)
RBC # BLD AUTO: 1.97 M/UL (ref 4–5.4)
RBC # BLD AUTO: 2.12 M/UL (ref 4–5.4)
RBC # BLD AUTO: 2.19 M/UL (ref 4–5.4)
RBC # BLD AUTO: 2.19 M/UL (ref 4–5.4)
RBC # BLD AUTO: 2.2 M/UL (ref 4–5.4)
RBC # BLD AUTO: 2.21 M/UL (ref 4–5.4)
RBC # BLD AUTO: 2.24 M/UL (ref 4–5.4)
RBC # BLD AUTO: 2.24 M/UL (ref 4–5.4)
RBC # BLD AUTO: 2.25 M/UL (ref 4–5.4)
RBC # BLD AUTO: 2.25 M/UL (ref 4–5.4)
RBC # BLD AUTO: 2.26 M/UL (ref 4–5.4)
RBC # BLD AUTO: 2.27 M/UL (ref 4–5.4)
RBC # BLD AUTO: 2.28 M/UL (ref 4–5.4)
RBC # BLD AUTO: 2.33 M/UL (ref 4–5.4)
RBC # BLD AUTO: 2.39 M/UL (ref 4–5.4)
RBC # BLD AUTO: 2.4 M/UL (ref 4–5.4)
RBC # BLD AUTO: 2.4 M/UL (ref 4–5.4)
RBC # BLD AUTO: 2.46 M/UL (ref 4–5.4)
RBC # BLD AUTO: 2.46 M/UL (ref 4–5.4)
RBC # BLD AUTO: 2.48 M/UL (ref 4–5.4)
RBC #/AREA URNS HPF: >100 /HPF (ref 0–4)
REPTILASE TIME: ABNORMAL SEC
RESPIRATORY INFECTION PANEL SOURCE: NORMAL
RETICS/RBC NFR AUTO: 2.1 % (ref 0.5–2.5)
RH BLD: NORMAL
RHEUMATOID FACT SERPL-ACNC: <13 IU/ML (ref 0–15)
RIGHT VENTRICULAR END-DIASTOLIC DIMENSION: 2.96 CM
RSV RNA NPH QL NAA+NON-PROBE: NOT DETECTED
RV TISSUE DOPPLER FREE WALL SYSTOLIC VELOCITY 1 (APICAL 4 CHAMBER VIEW): 9.8 CM/S
RV+EV RNA NPH QL NAA+NON-PROBE: NOT DETECTED
SAMPLE: ABNORMAL
SAMPLE: NORMAL
SARS-COV-2 RDRP RESP QL NAA+PROBE: NEGATIVE
SARS-COV-2 RNA RESP QL NAA+PROBE: NOT DETECTED
SATURATED IRON: 13 % (ref 20–50)
SCHISTOCYTES BLD QL SMEAR: ABNORMAL
SCREEN APTT: 43 SEC (ref 32–48)
SCREEN DRVVT: 35 SEC (ref 33–44)
SINUS: 2.69 CM
SITE: ABNORMAL
SMUDGE CELLS BLD QL SMEAR: PRESENT
SMUDGE CELLS BLD QL SMEAR: PRESENT
SODIUM BLD-SCNC: 136 MMOL/L (ref 128–145)
SODIUM SERPL-SCNC: 126 MMOL/L (ref 136–145)
SODIUM SERPL-SCNC: 129 MMOL/L (ref 136–145)
SODIUM SERPL-SCNC: 129 MMOL/L (ref 136–145)
SODIUM SERPL-SCNC: 130 MMOL/L (ref 136–145)
SODIUM SERPL-SCNC: 131 MMOL/L (ref 136–145)
SODIUM SERPL-SCNC: 132 MMOL/L (ref 136–145)
SODIUM SERPL-SCNC: 132 MMOL/L (ref 136–145)
SODIUM SERPL-SCNC: 133 MMOL/L (ref 136–145)
SODIUM SERPL-SCNC: 134 MMOL/L (ref 136–145)
SODIUM SERPL-SCNC: 135 MMOL/L (ref 136–145)
SODIUM SERPL-SCNC: 136 MMOL/L (ref 136–145)
SODIUM SERPL-SCNC: 136 MMOL/L (ref 136–145)
SODIUM SERPL-SCNC: 137 MMOL/L (ref 136–145)
SODIUM SERPL-SCNC: 138 MMOL/L (ref 136–145)
SODIUM SERPL-SCNC: 139 MMOL/L (ref 136–145)
SODIUM SERPL-SCNC: 141 MMOL/L (ref 136–145)
SODIUM SERPL-SCNC: 143 MMOL/L (ref 136–145)
SODIUM UR-SCNC: <20 MMOL/L (ref 20–250)
SP GR UR STRIP: 1.03 (ref 1–1.03)
SP02: 100
SP02: 96
SP02: 96
SPECIFIC GRAVITY, POC UA: >=1.03
SPECIMEN SOURCE: NORMAL
SPECIMEN SOURCE: NORMAL
SPONT RATE: 16
SPONT RATE: 22
SQUAMOUS #/AREA URNS HPF: 7 /HPF
STJ: 2.35 CM
TARGETS BLD QL SMEAR: ABNORMAL
TB GOLD PLUS: ABNORMAL
TDI LATERAL: 0.09 M/S
TDI SEPTAL: 0.1 M/S
TDI: 0.1 M/S
THROMBIN TIME: ABNORMAL SEC (ref 14.7–19.5)
TOTAL IRON BINDING CAPACITY: 425 UG/DL (ref 250–450)
TOXIC GRANULES BLD QL SMEAR: ABNORMAL
TOXIC GRANULES BLD QL SMEAR: PRESENT
TR MAX PG: 43 MMHG
TRANS ERYTHROCYTES VOL PATIENT: NORMAL ML
TRANS ERYTHROCYTES VOL PATIENT: NORMAL ML
TRANSFERRIN SERPL-MCNC: 287 MG/DL (ref 200–375)
TRICUSPID ANNULAR PLANE SYSTOLIC EXCURSION: 2 CM
TRIGL SERPL-MCNC: 63 MG/DL (ref 30–150)
TROPONIN I SERPL DL<=0.01 NG/ML-MCNC: 0.03 NG/ML (ref 0–0.03)
TROPONIN I SERPL DL<=0.01 NG/ML-MCNC: 0.09 NG/ML (ref 0–0.03)
TSH SERPL DL<=0.005 MIU/L-ACNC: 2.32 UIU/ML (ref 0.4–4)
TV REST PULMONARY ARTERY PRESSURE: 51 MMHG
UNIDENT CRYS URNS QL MICRO: ABNORMAL
UNIT NUMBER: NORMAL
UNIT NUMBER: NORMAL
URINE COLLECTION DURATION: 24 HR
URINE COLLECTION DURATION: 24 HR
URINE VOLUME: 150 ML
URINE VOLUME: 325 ML
URN SPEC COLLECT METH UR: ABNORMAL
UROBILINOGEN UR STRIP-ACNC: NEGATIVE EU/DL
UROBILINOGEN, POC UA: 0.2 E.U./DL
UUN UR-MCNC: 445 MG/DL (ref 140–1050)
VANCOMYCIN SERPL-MCNC: 19.7 UG/ML
VANCOMYCIN SERPL-MCNC: 8.7 UG/ML
VIT B12 SERPL-MCNC: 1401 PG/ML (ref 210–950)
WBC # BLD AUTO: 10.24 K/UL (ref 3.9–12.7)
WBC # BLD AUTO: 10.43 K/UL (ref 3.9–12.7)
WBC # BLD AUTO: 10.56 K/UL (ref 3.9–12.7)
WBC # BLD AUTO: 11.25 K/UL (ref 3.9–12.7)
WBC # BLD AUTO: 11.3 K/UL (ref 3.9–12.7)
WBC # BLD AUTO: 12 K/UL (ref 3.9–12.7)
WBC # BLD AUTO: 13.04 K/UL (ref 3.9–12.7)
WBC # BLD AUTO: 13.06 K/UL (ref 3.9–12.7)
WBC # BLD AUTO: 13.51 K/UL (ref 3.9–12.7)
WBC # BLD AUTO: 13.56 K/UL (ref 3.9–12.7)
WBC # BLD AUTO: 13.95 K/UL (ref 3.9–12.7)
WBC # BLD AUTO: 14.89 K/UL (ref 3.9–12.7)
WBC # BLD AUTO: 6.1 K/UL (ref 3.9–12.7)
WBC # BLD AUTO: 6.95 K/UL (ref 3.9–12.7)
WBC # BLD AUTO: 7.12 K/UL (ref 3.9–12.7)
WBC # BLD AUTO: 7.5 K/UL (ref 3.9–12.7)
WBC # BLD AUTO: 7.59 K/UL (ref 3.9–12.7)
WBC # BLD AUTO: 7.78 K/UL (ref 3.9–12.7)
WBC # BLD AUTO: 8.08 K/UL (ref 3.9–12.7)
WBC # BLD AUTO: 8.24 K/UL (ref 3.9–12.7)
WBC # BLD AUTO: 8.59 K/UL (ref 3.9–12.7)
WBC # BLD AUTO: 9.02 K/UL (ref 3.9–12.7)
WBC # BLD AUTO: 9.11 K/UL (ref 3.9–12.7)
WBC # BLD AUTO: 9.15 K/UL (ref 3.9–12.7)
WBC # BLD AUTO: 9.82 K/UL (ref 3.9–12.7)
WBC # BLD AUTO: 9.89 K/UL (ref 3.9–12.7)
WBC # FLD: 2350 /CU MM
WBC #/AREA URNS HPF: 40 /HPF (ref 0–5)
WBC CLUMPS URNS QL MICRO: ABNORMAL
WBC TOXIC VACUOLES BLD QL SMEAR: PRESENT
YEAST URNS QL MICRO: ABNORMAL

## 2022-01-01 PROCEDURE — 94761 N-INVAS EAR/PLS OXIMETRY MLT: CPT | Mod: HCNC

## 2022-01-01 PROCEDURE — 94760 N-INVAS EAR/PLS OXIMETRY 1: CPT | Mod: HCNC,ER

## 2022-01-01 PROCEDURE — 99900035 HC TECH TIME PER 15 MIN (STAT): Mod: HCNC

## 2022-01-01 PROCEDURE — 20000000 HC ICU ROOM: Mod: HCNC

## 2022-01-01 PROCEDURE — 63600175 PHARM REV CODE 636 W HCPCS: Mod: HCNC | Performed by: INTERNAL MEDICINE

## 2022-01-01 PROCEDURE — 86022 PLATELET ANTIBODIES: CPT | Mod: HCNC | Performed by: INTERNAL MEDICINE

## 2022-01-01 PROCEDURE — 94660 CPAP INITIATION&MGMT: CPT | Mod: HCNC

## 2022-01-01 PROCEDURE — 80069 RENAL FUNCTION PANEL: CPT | Mod: HCNC | Performed by: STUDENT IN AN ORGANIZED HEALTH CARE EDUCATION/TRAINING PROGRAM

## 2022-01-01 PROCEDURE — 94760 N-INVAS EAR/PLS OXIMETRY 1: CPT | Mod: HCNC

## 2022-01-01 PROCEDURE — 99232 PR SUBSEQUENT HOSPITAL CARE,LEVL II: ICD-10-PCS | Mod: HCNC,,, | Performed by: NURSE PRACTITIONER

## 2022-01-01 PROCEDURE — C1752 CATH,HEMODIALYSIS,SHORT-TERM: HCPCS | Mod: HCNC

## 2022-01-01 PROCEDURE — 86900 BLOOD TYPING SEROLOGIC ABO: CPT | Mod: HCNC | Performed by: HOSPITALIST

## 2022-01-01 PROCEDURE — 36415 COLL VENOUS BLD VENIPUNCTURE: CPT | Mod: HCNC | Performed by: HOSPITALIST

## 2022-01-01 PROCEDURE — 25000003 PHARM REV CODE 250: Mod: HCNC | Performed by: HOSPITALIST

## 2022-01-01 PROCEDURE — 82330 ASSAY OF CALCIUM: CPT | Mod: HCNC | Performed by: STUDENT IN AN ORGANIZED HEALTH CARE EDUCATION/TRAINING PROGRAM

## 2022-01-01 PROCEDURE — 85610 PROTHROMBIN TIME: CPT | Mod: HCNC | Performed by: INTERNAL MEDICINE

## 2022-01-01 PROCEDURE — 80053 COMPREHEN METABOLIC PANEL: CPT | Mod: HCNC | Performed by: HOSPITALIST

## 2022-01-01 PROCEDURE — 93005 ELECTROCARDIOGRAM TRACING: CPT | Mod: HCNC

## 2022-01-01 PROCEDURE — 27000221 HC OXYGEN, UP TO 24 HOURS: Mod: HCNC

## 2022-01-01 PROCEDURE — 36415 COLL VENOUS BLD VENIPUNCTURE: CPT | Mod: HCNC | Performed by: INTERNAL MEDICINE

## 2022-01-01 PROCEDURE — 99233 PR SUBSEQUENT HOSPITAL CARE,LEVL III: ICD-10-PCS | Mod: HCNC,,, | Performed by: INTERNAL MEDICINE

## 2022-01-01 PROCEDURE — 94640 AIRWAY INHALATION TREATMENT: CPT | Mod: HCNC

## 2022-01-01 PROCEDURE — 25000003 PHARM REV CODE 250: Mod: HCNC | Performed by: INTERNAL MEDICINE

## 2022-01-01 PROCEDURE — 36430 TRANSFUSION BLD/BLD COMPNT: CPT

## 2022-01-01 PROCEDURE — 99223 PR INITIAL HOSPITAL CARE,LEVL III: ICD-10-PCS | Mod: HCNC,,, | Performed by: STUDENT IN AN ORGANIZED HEALTH CARE EDUCATION/TRAINING PROGRAM

## 2022-01-01 PROCEDURE — 84100 ASSAY OF PHOSPHORUS: CPT | Mod: HCNC | Performed by: INTERNAL MEDICINE

## 2022-01-01 PROCEDURE — 85060 PATHOLOGIST REVIEW: ICD-10-PCS | Mod: HCNC,,, | Performed by: PATHOLOGY

## 2022-01-01 PROCEDURE — 86160 COMPLEMENT ANTIGEN: CPT | Mod: HCNC

## 2022-01-01 PROCEDURE — 86431 RHEUMATOID FACTOR QUANT: CPT | Mod: HCNC | Performed by: INTERNAL MEDICINE

## 2022-01-01 PROCEDURE — 63600175 PHARM REV CODE 636 W HCPCS: Mod: HCNC | Performed by: HOSPITALIST

## 2022-01-01 PROCEDURE — 93010 EKG 12-LEAD: ICD-10-PCS | Mod: HCNC,S$GLB,, | Performed by: INTERNAL MEDICINE

## 2022-01-01 PROCEDURE — 97116 GAIT TRAINING THERAPY: CPT | Mod: HCNC

## 2022-01-01 PROCEDURE — 80069 RENAL FUNCTION PANEL: CPT | Mod: HCNC | Performed by: HOSPITALIST

## 2022-01-01 PROCEDURE — 99499 NO LOS: ICD-10-PCS | Mod: HCNC,,, | Performed by: INTERNAL MEDICINE

## 2022-01-01 PROCEDURE — 93010 ELECTROCARDIOGRAM REPORT: CPT | Mod: HCNC,S$GLB,, | Performed by: INTERNAL MEDICINE

## 2022-01-01 PROCEDURE — 99233 SBSQ HOSP IP/OBS HIGH 50: CPT | Mod: HCNC,,, | Performed by: INTERNAL MEDICINE

## 2022-01-01 PROCEDURE — 97110 THERAPEUTIC EXERCISES: CPT | Mod: HCNC

## 2022-01-01 PROCEDURE — 99499 UNLISTED E&M SERVICE: CPT | Mod: S$GLB,,, | Performed by: STUDENT IN AN ORGANIZED HEALTH CARE EDUCATION/TRAINING PROGRAM

## 2022-01-01 PROCEDURE — 36415 COLL VENOUS BLD VENIPUNCTURE: CPT | Mod: HCNC | Performed by: STUDENT IN AN ORGANIZED HEALTH CARE EDUCATION/TRAINING PROGRAM

## 2022-01-01 PROCEDURE — 86200 CCP ANTIBODY: CPT | Mod: HCNC | Performed by: INTERNAL MEDICINE

## 2022-01-01 PROCEDURE — 92526 ORAL FUNCTION THERAPY: CPT | Mod: HCNC

## 2022-01-01 PROCEDURE — 97530 THERAPEUTIC ACTIVITIES: CPT | Mod: HCNC

## 2022-01-01 PROCEDURE — 82728 ASSAY OF FERRITIN: CPT | Mod: HCNC | Performed by: INTERNAL MEDICINE

## 2022-01-01 PROCEDURE — 93975 VASCULAR STUDY: CPT | Mod: 26,HCNC,, | Performed by: RADIOLOGY

## 2022-01-01 PROCEDURE — 25000242 PHARM REV CODE 250 ALT 637 W/ HCPCS: Mod: HCNC | Performed by: NURSE PRACTITIONER

## 2022-01-01 PROCEDURE — 99223 PR INITIAL HOSPITAL CARE,LEVL III: ICD-10-PCS | Mod: HCNC,,, | Performed by: INTERNAL MEDICINE

## 2022-01-01 PROCEDURE — 83605 ASSAY OF LACTIC ACID: CPT | Mod: 91,HCNC | Performed by: STUDENT IN AN ORGANIZED HEALTH CARE EDUCATION/TRAINING PROGRAM

## 2022-01-01 PROCEDURE — 86160 COMPLEMENT ANTIGEN: CPT | Mod: 59,HCNC | Performed by: INTERNAL MEDICINE

## 2022-01-01 PROCEDURE — 86850 RBC ANTIBODY SCREEN: CPT | Mod: 91,HCNC | Performed by: HOSPITALIST

## 2022-01-01 PROCEDURE — 86140 C-REACTIVE PROTEIN: CPT | Mod: HCNC | Performed by: STUDENT IN AN ORGANIZED HEALTH CARE EDUCATION/TRAINING PROGRAM

## 2022-01-01 PROCEDURE — 80053 COMPREHEN METABOLIC PANEL: CPT | Mod: HCNC | Performed by: INTERNAL MEDICINE

## 2022-01-01 PROCEDURE — 99499 RISK ADDL DX/OHS AUDIT: ICD-10-PCS | Mod: S$GLB,,, | Performed by: STUDENT IN AN ORGANIZED HEALTH CARE EDUCATION/TRAINING PROGRAM

## 2022-01-01 PROCEDURE — 93010 EKG 12-LEAD: ICD-10-PCS | Mod: HCNC,,, | Performed by: INTERNAL MEDICINE

## 2022-01-01 PROCEDURE — 83880 ASSAY OF NATRIURETIC PEPTIDE: CPT | Mod: HCNC | Performed by: INTERNAL MEDICINE

## 2022-01-01 PROCEDURE — 83516 IMMUNOASSAY NONANTIBODY: CPT | Mod: HCNC | Performed by: HOSPITALIST

## 2022-01-01 PROCEDURE — 85025 COMPLETE CBC W/AUTO DIFF WBC: CPT | Mod: HCNC | Performed by: HOSPITALIST

## 2022-01-01 PROCEDURE — 99233 PR SUBSEQUENT HOSPITAL CARE,LEVL III: ICD-10-PCS | Mod: HCNC,,, | Performed by: HOSPITALIST

## 2022-01-01 PROCEDURE — 82728 ASSAY OF FERRITIN: CPT | Mod: HCNC | Performed by: HOSPITALIST

## 2022-01-01 PROCEDURE — 85027 COMPLETE CBC AUTOMATED: CPT | Mod: HCNC

## 2022-01-01 PROCEDURE — 80048 BASIC METABOLIC PNL TOTAL CA: CPT | Mod: 91,HCNC | Performed by: HOSPITALIST

## 2022-01-01 PROCEDURE — 97168 OT RE-EVAL EST PLAN CARE: CPT | Mod: HCNC

## 2022-01-01 PROCEDURE — 82607 VITAMIN B-12: CPT | Mod: HCNC | Performed by: HOSPITALIST

## 2022-01-01 PROCEDURE — 85610 PROTHROMBIN TIME: CPT | Mod: HCNC | Performed by: STUDENT IN AN ORGANIZED HEALTH CARE EDUCATION/TRAINING PROGRAM

## 2022-01-01 PROCEDURE — 63600175 PHARM REV CODE 636 W HCPCS: Mod: HCNC | Performed by: STUDENT IN AN ORGANIZED HEALTH CARE EDUCATION/TRAINING PROGRAM

## 2022-01-01 PROCEDURE — 93010 ELECTROCARDIOGRAM REPORT: CPT | Mod: HCNC,,, | Performed by: INTERNAL MEDICINE

## 2022-01-01 PROCEDURE — 83880 ASSAY OF NATRIURETIC PEPTIDE: CPT | Mod: HCNC,ER

## 2022-01-01 PROCEDURE — 84132 ASSAY OF SERUM POTASSIUM: CPT | Mod: HCNC | Performed by: INTERNAL MEDICINE

## 2022-01-01 PROCEDURE — 25000003 PHARM REV CODE 250: Mod: HCNC | Performed by: STUDENT IN AN ORGANIZED HEALTH CARE EDUCATION/TRAINING PROGRAM

## 2022-01-01 PROCEDURE — 11000001 HC ACUTE MED/SURG PRIVATE ROOM: Mod: HCNC

## 2022-01-01 PROCEDURE — 86703 HIV-1/HIV-2 1 RESULT ANTBDY: CPT | Mod: HCNC | Performed by: INTERNAL MEDICINE

## 2022-01-01 PROCEDURE — 90945 PR DIALYSIS, NOT HEMO, 1 EVAL: ICD-10-PCS | Mod: HCNC,,, | Performed by: INTERNAL MEDICINE

## 2022-01-01 PROCEDURE — P9047 ALBUMIN (HUMAN), 25%, 50ML: HCPCS | Mod: JG,HCNC | Performed by: INTERNAL MEDICINE

## 2022-01-01 PROCEDURE — 85025 COMPLETE CBC W/AUTO DIFF WBC: CPT | Mod: HCNC | Performed by: INTERNAL MEDICINE

## 2022-01-01 PROCEDURE — 63600175 PHARM REV CODE 636 W HCPCS: Mod: HCNC

## 2022-01-01 PROCEDURE — 87633 RESP VIRUS 12-25 TARGETS: CPT | Mod: HCNC | Performed by: HOSPITALIST

## 2022-01-01 PROCEDURE — 87086 URINE CULTURE/COLONY COUNT: CPT | Mod: HCNC | Performed by: INTERNAL MEDICINE

## 2022-01-01 PROCEDURE — 86140 C-REACTIVE PROTEIN: CPT | Mod: HCNC | Performed by: INTERNAL MEDICINE

## 2022-01-01 PROCEDURE — 93010 RHYTHM STRIP: ICD-10-PCS | Mod: HCNC,,, | Performed by: INTERNAL MEDICINE

## 2022-01-01 PROCEDURE — 99497 PR ADVNCD CARE PLAN 30 MIN: ICD-10-PCS | Mod: HCNC,25,, | Performed by: NURSE PRACTITIONER

## 2022-01-01 PROCEDURE — 90945 DIALYSIS ONE EVALUATION: CPT | Mod: HCNC

## 2022-01-01 PROCEDURE — 99232 PR SUBSEQUENT HOSPITAL CARE,LEVL II: ICD-10-PCS | Mod: HCNC,,, | Performed by: INTERNAL MEDICINE

## 2022-01-01 PROCEDURE — 80053 COMPREHEN METABOLIC PANEL: CPT | Mod: 91,HCNC | Performed by: HOSPITALIST

## 2022-01-01 PROCEDURE — 97165 OT EVAL LOW COMPLEX 30 MIN: CPT | Mod: HCNC

## 2022-01-01 PROCEDURE — 83615 LACTATE (LD) (LDH) ENZYME: CPT | Mod: HCNC | Performed by: INTERNAL MEDICINE

## 2022-01-01 PROCEDURE — 99291 PR CRITICAL CARE, E/M 30-74 MINUTES: ICD-10-PCS | Mod: HCNC,25,GC, | Performed by: INTERNAL MEDICINE

## 2022-01-01 PROCEDURE — 90935 HEMODIALYSIS ONE EVALUATION: CPT | Mod: HCNC

## 2022-01-01 PROCEDURE — 86160 COMPLEMENT ANTIGEN: CPT | Mod: HCNC | Performed by: STUDENT IN AN ORGANIZED HEALTH CARE EDUCATION/TRAINING PROGRAM

## 2022-01-01 PROCEDURE — 85025 COMPLETE CBC W/AUTO DIFF WBC: CPT | Mod: HCNC | Performed by: STUDENT IN AN ORGANIZED HEALTH CARE EDUCATION/TRAINING PROGRAM

## 2022-01-01 PROCEDURE — 99223 1ST HOSP IP/OBS HIGH 75: CPT | Mod: HCNC,,, | Performed by: STUDENT IN AN ORGANIZED HEALTH CARE EDUCATION/TRAINING PROGRAM

## 2022-01-01 PROCEDURE — 1160F PR REVIEW ALL MEDS BY PRESCRIBER/CLIN PHARMACIST DOCUMENTED: ICD-10-PCS | Mod: HCNC,CPTII,S$GLB, | Performed by: INTERNAL MEDICINE

## 2022-01-01 PROCEDURE — 84100 ASSAY OF PHOSPHORUS: CPT | Mod: HCNC | Performed by: HOSPITALIST

## 2022-01-01 PROCEDURE — 99232 SBSQ HOSP IP/OBS MODERATE 35: CPT | Mod: HCNC,,, | Performed by: INTERNAL MEDICINE

## 2022-01-01 PROCEDURE — 86901 BLOOD TYPING SEROLOGIC RH(D): CPT | Mod: HCNC | Performed by: STUDENT IN AN ORGANIZED HEALTH CARE EDUCATION/TRAINING PROGRAM

## 2022-01-01 PROCEDURE — 85045 AUTOMATED RETICULOCYTE COUNT: CPT | Mod: HCNC | Performed by: INTERNAL MEDICINE

## 2022-01-01 PROCEDURE — 63600175 PHARM REV CODE 636 W HCPCS: Mod: HCNC | Performed by: NURSE PRACTITIONER

## 2022-01-01 PROCEDURE — 99222 PR INITIAL HOSPITAL CARE,LEVL II: ICD-10-PCS | Mod: HCNC,,, | Performed by: NURSE PRACTITIONER

## 2022-01-01 PROCEDURE — 97535 SELF CARE MNGMENT TRAINING: CPT | Mod: HCNC

## 2022-01-01 PROCEDURE — 93225 XTRNL ECG REC<48 HRS REC: CPT | Mod: HCNC

## 2022-01-01 PROCEDURE — 99499 UNLISTED E&M SERVICE: CPT | Mod: S$GLB,,, | Performed by: INTERNAL MEDICINE

## 2022-01-01 PROCEDURE — 86140 C-REACTIVE PROTEIN: CPT | Mod: HCNC

## 2022-01-01 PROCEDURE — 80100016 HC MAINTENANCE HEMODIALYSIS: Mod: HCNC

## 2022-01-01 PROCEDURE — 99222 PR INITIAL HOSPITAL CARE,LEVL II: ICD-10-PCS | Mod: HCNC,,, | Performed by: INTERNAL MEDICINE

## 2022-01-01 PROCEDURE — 82595 ASSAY OF CRYOGLOBULIN: CPT | Mod: HCNC | Performed by: INTERNAL MEDICINE

## 2022-01-01 PROCEDURE — 84157 ASSAY OF PROTEIN OTHER: CPT | Mod: HCNC | Performed by: STUDENT IN AN ORGANIZED HEALTH CARE EDUCATION/TRAINING PROGRAM

## 2022-01-01 PROCEDURE — 83605 ASSAY OF LACTIC ACID: CPT | Mod: HCNC | Performed by: HOSPITALIST

## 2022-01-01 PROCEDURE — 99233 SBSQ HOSP IP/OBS HIGH 50: CPT | Mod: HCNC,,, | Performed by: HOSPITALIST

## 2022-01-01 PROCEDURE — 84156 ASSAY OF PROTEIN URINE: CPT | Mod: HCNC | Performed by: INTERNAL MEDICINE

## 2022-01-01 PROCEDURE — 99999 PR PBB SHADOW E&M-EST. PATIENT-LVL V: CPT | Mod: PBBFAC,HCNC,, | Performed by: INTERNAL MEDICINE

## 2022-01-01 PROCEDURE — 99215 OFFICE O/P EST HI 40 MIN: CPT | Mod: HCNC,S$GLB,, | Performed by: INTERNAL MEDICINE

## 2022-01-01 PROCEDURE — 76700 US ABDOMEN COMP WITH DOPPLER (XPD): ICD-10-PCS | Mod: 26,59,HCNC, | Performed by: RADIOLOGY

## 2022-01-01 PROCEDURE — 83010 ASSAY OF HAPTOGLOBIN QUANT: CPT | Mod: HCNC | Performed by: STUDENT IN AN ORGANIZED HEALTH CARE EDUCATION/TRAINING PROGRAM

## 2022-01-01 PROCEDURE — 86920 COMPATIBILITY TEST SPIN: CPT | Mod: HCNC

## 2022-01-01 PROCEDURE — 83880 ASSAY OF NATRIURETIC PEPTIDE: CPT | Mod: HCNC | Performed by: STUDENT IN AN ORGANIZED HEALTH CARE EDUCATION/TRAINING PROGRAM

## 2022-01-01 PROCEDURE — 87040 BLOOD CULTURE FOR BACTERIA: CPT | Mod: HCNC | Performed by: EMERGENCY MEDICINE

## 2022-01-01 PROCEDURE — 21400001 HC TELEMETRY ROOM: Mod: HCNC

## 2022-01-01 PROCEDURE — 85060 BLOOD SMEAR INTERPRETATION: CPT | Mod: HCNC,,, | Performed by: PATHOLOGY

## 2022-01-01 PROCEDURE — 85025 COMPLETE CBC W/AUTO DIFF WBC: CPT | Mod: 91,HCNC | Performed by: INTERNAL MEDICINE

## 2022-01-01 PROCEDURE — 20600001 HC STEP DOWN PRIVATE ROOM: Mod: HCNC

## 2022-01-01 PROCEDURE — 99233 PR SUBSEQUENT HOSPITAL CARE,LEVL III: ICD-10-PCS | Mod: HCNC,GC,, | Performed by: STUDENT IN AN ORGANIZED HEALTH CARE EDUCATION/TRAINING PROGRAM

## 2022-01-01 PROCEDURE — 86038 ANTINUCLEAR ANTIBODIES: CPT | Mod: HCNC | Performed by: INTERNAL MEDICINE

## 2022-01-01 PROCEDURE — 51702 INSERT TEMP BLADDER CATH: CPT | Mod: HCNC,ER

## 2022-01-01 PROCEDURE — P9016 RBC LEUKOCYTES REDUCED: HCPCS | Mod: HCNC | Performed by: HOSPITALIST

## 2022-01-01 PROCEDURE — 80048 BASIC METABOLIC PNL TOTAL CA: CPT | Mod: HCNC | Performed by: HOSPITALIST

## 2022-01-01 PROCEDURE — 83735 ASSAY OF MAGNESIUM: CPT | Mod: HCNC | Performed by: HOSPITALIST

## 2022-01-01 PROCEDURE — 83605 ASSAY OF LACTIC ACID: CPT | Mod: HCNC | Performed by: INTERNAL MEDICINE

## 2022-01-01 PROCEDURE — 84478 ASSAY OF TRIGLYCERIDES: CPT | Mod: HCNC | Performed by: INTERNAL MEDICINE

## 2022-01-01 PROCEDURE — 85652 RBC SED RATE AUTOMATED: CPT | Mod: HCNC | Performed by: STUDENT IN AN ORGANIZED HEALTH CARE EDUCATION/TRAINING PROGRAM

## 2022-01-01 PROCEDURE — 86039 ANTINUCLEAR ANTIBODIES (ANA): CPT | Mod: HCNC | Performed by: INTERNAL MEDICINE

## 2022-01-01 PROCEDURE — 99232 SBSQ HOSP IP/OBS MODERATE 35: CPT | Mod: HCNC,,, | Performed by: NURSE PRACTITIONER

## 2022-01-01 PROCEDURE — 94640 AIRWAY INHALATION TREATMENT: CPT | Mod: HCNC,ER

## 2022-01-01 PROCEDURE — 86850 RBC ANTIBODY SCREEN: CPT | Mod: 91,HCNC | Performed by: STUDENT IN AN ORGANIZED HEALTH CARE EDUCATION/TRAINING PROGRAM

## 2022-01-01 PROCEDURE — 25000242 PHARM REV CODE 250 ALT 637 W/ HCPCS: Mod: HCNC | Performed by: HOSPITALIST

## 2022-01-01 PROCEDURE — 25000003 PHARM REV CODE 250: Mod: HCNC | Performed by: NURSE PRACTITIONER

## 2022-01-01 PROCEDURE — 87070 CULTURE OTHR SPECIMN AEROBIC: CPT | Mod: HCNC | Performed by: STUDENT IN AN ORGANIZED HEALTH CARE EDUCATION/TRAINING PROGRAM

## 2022-01-01 PROCEDURE — 86704 HEP B CORE ANTIBODY TOTAL: CPT | Mod: HCNC | Performed by: HOSPITALIST

## 2022-01-01 PROCEDURE — 89051 BODY FLUID CELL COUNT: CPT | Mod: HCNC | Performed by: STUDENT IN AN ORGANIZED HEALTH CARE EDUCATION/TRAINING PROGRAM

## 2022-01-01 PROCEDURE — 83690 ASSAY OF LIPASE: CPT | Mod: HCNC

## 2022-01-01 PROCEDURE — 80202 ASSAY OF VANCOMYCIN: CPT | Mod: HCNC | Performed by: STUDENT IN AN ORGANIZED HEALTH CARE EDUCATION/TRAINING PROGRAM

## 2022-01-01 PROCEDURE — 63600175 PHARM REV CODE 636 W HCPCS: Mod: JG,HCNC | Performed by: INTERNAL MEDICINE

## 2022-01-01 PROCEDURE — 3008F BODY MASS INDEX DOCD: CPT | Mod: HCNC,CPTII,S$GLB, | Performed by: INTERNAL MEDICINE

## 2022-01-01 PROCEDURE — 99223 1ST HOSP IP/OBS HIGH 75: CPT | Mod: HCNC,,, | Performed by: INTERNAL MEDICINE

## 2022-01-01 PROCEDURE — 86334 PATHOLOGIST INTERPRETATION IFE: ICD-10-PCS | Mod: 26,HCNC,, | Performed by: PATHOLOGY

## 2022-01-01 PROCEDURE — 82330 ASSAY OF CALCIUM: CPT | Mod: 91,HCNC | Performed by: STUDENT IN AN ORGANIZED HEALTH CARE EDUCATION/TRAINING PROGRAM

## 2022-01-01 PROCEDURE — 99291 CRITICAL CARE FIRST HOUR: CPT | Mod: HCNC,25,ER

## 2022-01-01 PROCEDURE — 99233 PR SUBSEQUENT HOSPITAL CARE,LEVL III: ICD-10-PCS | Mod: 25,HCNC,GC, | Performed by: INTERNAL MEDICINE

## 2022-01-01 PROCEDURE — 84145 PROCALCITONIN (PCT): CPT | Mod: HCNC | Performed by: EMERGENCY MEDICINE

## 2022-01-01 PROCEDURE — 84466 ASSAY OF TRANSFERRIN: CPT | Mod: HCNC | Performed by: HOSPITALIST

## 2022-01-01 PROCEDURE — 82803 BLOOD GASES ANY COMBINATION: CPT | Mod: HCNC,ER

## 2022-01-01 PROCEDURE — 96375 TX/PRO/DX INJ NEW DRUG ADDON: CPT | Mod: 59,HCNC,ER

## 2022-01-01 PROCEDURE — 83735 ASSAY OF MAGNESIUM: CPT | Mod: HCNC | Performed by: INTERNAL MEDICINE

## 2022-01-01 PROCEDURE — 85652 RBC SED RATE AUTOMATED: CPT | Mod: HCNC

## 2022-01-01 PROCEDURE — 86235 NUCLEAR ANTIGEN ANTIBODY: CPT | Mod: 59,HCNC | Performed by: INTERNAL MEDICINE

## 2022-01-01 PROCEDURE — C9399 UNCLASSIFIED DRUGS OR BIOLOG: HCPCS | Mod: HCNC | Performed by: HOSPITALIST

## 2022-01-01 PROCEDURE — 81000 URINALYSIS NONAUTO W/SCOPE: CPT | Mod: HCNC | Performed by: INTERNAL MEDICINE

## 2022-01-01 PROCEDURE — 93005 ELECTROCARDIOGRAM TRACING: CPT | Mod: HCNC,ER

## 2022-01-01 PROCEDURE — 86160 COMPLEMENT ANTIGEN: CPT | Mod: 59,HCNC

## 2022-01-01 PROCEDURE — 85610 PROTHROMBIN TIME: CPT | Mod: HCNC | Performed by: HOSPITALIST

## 2022-01-01 PROCEDURE — 82330 ASSAY OF CALCIUM: CPT | Mod: HCNC

## 2022-01-01 PROCEDURE — 27000190 HC CPAP FULL FACE MASK W/VALVE: Mod: HCNC

## 2022-01-01 PROCEDURE — 1101F PT FALLS ASSESS-DOCD LE1/YR: CPT | Mod: HCNC,CPTII,S$GLB, | Performed by: INTERNAL MEDICINE

## 2022-01-01 PROCEDURE — 82746 ASSAY OF FOLIC ACID SERUM: CPT | Mod: HCNC | Performed by: HOSPITALIST

## 2022-01-01 PROCEDURE — 86225 DNA ANTIBODY NATIVE: CPT | Mod: HCNC | Performed by: STUDENT IN AN ORGANIZED HEALTH CARE EDUCATION/TRAINING PROGRAM

## 2022-01-01 PROCEDURE — 86870 RBC ANTIBODY IDENTIFICATION: CPT | Mod: HCNC | Performed by: HOSPITALIST

## 2022-01-01 PROCEDURE — C9113 INJ PANTOPRAZOLE SODIUM, VIA: HCPCS | Mod: HCNC

## 2022-01-01 PROCEDURE — 85025 COMPLETE CBC W/AUTO DIFF WBC: CPT | Mod: HCNC,ER

## 2022-01-01 PROCEDURE — 84300 ASSAY OF URINE SODIUM: CPT | Mod: HCNC | Performed by: EMERGENCY MEDICINE

## 2022-01-01 PROCEDURE — 99214 PR OFFICE/OUTPT VISIT, EST, LEVL IV, 30-39 MIN: ICD-10-PCS | Mod: HCNC,25,GC,S$GLB | Performed by: STUDENT IN AN ORGANIZED HEALTH CARE EDUCATION/TRAINING PROGRAM

## 2022-01-01 PROCEDURE — 36415 COLL VENOUS BLD VENIPUNCTURE: CPT | Mod: HCNC,PO | Performed by: STUDENT IN AN ORGANIZED HEALTH CARE EDUCATION/TRAINING PROGRAM

## 2022-01-01 PROCEDURE — 36600 WITHDRAWAL OF ARTERIAL BLOOD: CPT | Mod: HCNC

## 2022-01-01 PROCEDURE — 85027 COMPLETE CBC AUTOMATED: CPT | Mod: HCNC | Performed by: HOSPITALIST

## 2022-01-01 PROCEDURE — 99999 PR PBB SHADOW E&M-EST. PATIENT-LVL V: ICD-10-PCS | Mod: PBBFAC,HCNC,, | Performed by: INTERNAL MEDICINE

## 2022-01-01 PROCEDURE — 85025 COMPLETE CBC W/AUTO DIFF WBC: CPT | Mod: 91,HCNC | Performed by: HOSPITALIST

## 2022-01-01 PROCEDURE — 63600175 PHARM REV CODE 636 W HCPCS: Mod: JA,HCNC

## 2022-01-01 PROCEDURE — 86480 TB TEST CELL IMMUN MEASURE: CPT | Mod: HCNC | Performed by: INTERNAL MEDICINE

## 2022-01-01 PROCEDURE — 99233 SBSQ HOSP IP/OBS HIGH 50: CPT | Mod: HCNC,GC,, | Performed by: INTERNAL MEDICINE

## 2022-01-01 PROCEDURE — P9021 RED BLOOD CELLS UNIT: HCPCS | Mod: HCNC

## 2022-01-01 PROCEDURE — 86334 IMMUNOFIX E-PHORESIS SERUM: CPT | Mod: HCNC | Performed by: INTERNAL MEDICINE

## 2022-01-01 PROCEDURE — 99233 PR SUBSEQUENT HOSPITAL CARE,LEVL III: ICD-10-PCS | Mod: HCNC,GC,, | Performed by: INTERNAL MEDICINE

## 2022-01-01 PROCEDURE — 84484 ASSAY OF TROPONIN QUANT: CPT | Mod: HCNC | Performed by: HOSPITALIST

## 2022-01-01 PROCEDURE — 63600175 PHARM REV CODE 636 W HCPCS: Mod: HCNC,ER | Performed by: EMERGENCY MEDICINE

## 2022-01-01 PROCEDURE — 3074F SYST BP LT 130 MM HG: CPT | Mod: HCNC,CPTII,S$GLB, | Performed by: INTERNAL MEDICINE

## 2022-01-01 PROCEDURE — 76700 US EXAM ABDOM COMPLETE: CPT | Mod: 26,59,HCNC, | Performed by: RADIOLOGY

## 2022-01-01 PROCEDURE — 99223 1ST HOSP IP/OBS HIGH 75: CPT | Mod: HCNC,,, | Performed by: NURSE PRACTITIONER

## 2022-01-01 PROCEDURE — 80100008 HC CRRT DAILY MAINTENANCE: Mod: HCNC

## 2022-01-01 PROCEDURE — 25000003 PHARM REV CODE 250: Mod: HCNC

## 2022-01-01 PROCEDURE — 86850 RBC ANTIBODY SCREEN: CPT | Mod: HCNC | Performed by: HOSPITALIST

## 2022-01-01 PROCEDURE — 83036 HEMOGLOBIN GLYCOSYLATED A1C: CPT | Mod: HCNC | Performed by: HOSPITALIST

## 2022-01-01 PROCEDURE — 82085 ASSAY OF ALDOLASE: CPT | Mod: HCNC | Performed by: STUDENT IN AN ORGANIZED HEALTH CARE EDUCATION/TRAINING PROGRAM

## 2022-01-01 PROCEDURE — 85007 BL SMEAR W/DIFF WBC COUNT: CPT | Mod: HCNC

## 2022-01-01 PROCEDURE — 96372 THER/PROPH/DIAG INJ SC/IM: CPT | Mod: HCNC,ER

## 2022-01-01 PROCEDURE — 80076 HEPATIC FUNCTION PANEL: CPT | Mod: HCNC | Performed by: INTERNAL MEDICINE

## 2022-01-01 PROCEDURE — C1751 CATH, INF, PER/CENT/MIDLINE: HCPCS | Mod: HCNC

## 2022-01-01 PROCEDURE — 83615 LACTATE (LD) (LDH) ENZYME: CPT | Mod: HCNC | Performed by: STUDENT IN AN ORGANIZED HEALTH CARE EDUCATION/TRAINING PROGRAM

## 2022-01-01 PROCEDURE — 85384 FIBRINOGEN ACTIVITY: CPT | Mod: HCNC | Performed by: INTERNAL MEDICINE

## 2022-01-01 PROCEDURE — 99222 1ST HOSP IP/OBS MODERATE 55: CPT | Mod: HCNC,,, | Performed by: INTERNAL MEDICINE

## 2022-01-01 PROCEDURE — 83010 ASSAY OF HAPTOGLOBIN QUANT: CPT | Mod: HCNC | Performed by: INTERNAL MEDICINE

## 2022-01-01 PROCEDURE — 86922 COMPATIBILITY TEST ANTIGLOB: CPT | Mod: HCNC | Performed by: STUDENT IN AN ORGANIZED HEALTH CARE EDUCATION/TRAINING PROGRAM

## 2022-01-01 PROCEDURE — 36625 ARTERIAL LINE: ICD-10-PCS | Mod: GC,,, | Performed by: INTERNAL MEDICINE

## 2022-01-01 PROCEDURE — 1126F AMNT PAIN NOTED NONE PRSNT: CPT | Mod: HCNC,CPTII,S$GLB, | Performed by: INTERNAL MEDICINE

## 2022-01-01 PROCEDURE — 82962 GLUCOSE BLOOD TEST: CPT | Mod: HCNC,ER

## 2022-01-01 PROCEDURE — 82803 BLOOD GASES ANY COMBINATION: CPT | Mod: HCNC

## 2022-01-01 PROCEDURE — P9017 PLASMA 1 DONOR FRZ W/IN 8 HR: HCPCS | Mod: HCNC

## 2022-01-01 PROCEDURE — 97161 PT EVAL LOW COMPLEX 20 MIN: CPT | Mod: HCNC

## 2022-01-01 PROCEDURE — 85379 FIBRIN DEGRADATION QUANT: CPT | Mod: HCNC,ER

## 2022-01-01 PROCEDURE — 84443 ASSAY THYROID STIM HORMONE: CPT | Mod: HCNC | Performed by: HOSPITALIST

## 2022-01-01 PROCEDURE — 80053 COMPREHEN METABOLIC PANEL: CPT | Mod: 91,HCNC | Performed by: INTERNAL MEDICINE

## 2022-01-01 PROCEDURE — 63600175 PHARM REV CODE 636 W HCPCS: Mod: HCNC | Performed by: PHYSICIAN ASSISTANT

## 2022-01-01 PROCEDURE — 1126F PR PAIN SEVERITY QUANTIFIED, NO PAIN PRESENT: ICD-10-PCS | Mod: HCNC,CPTII,S$GLB, | Performed by: INTERNAL MEDICINE

## 2022-01-01 PROCEDURE — 99291 CRITICAL CARE FIRST HOUR: CPT | Mod: HCNC,25,GC, | Performed by: INTERNAL MEDICINE

## 2022-01-01 PROCEDURE — 86334 IMMUNOFIX E-PHORESIS SERUM: CPT | Mod: 26,HCNC,, | Performed by: PATHOLOGY

## 2022-01-01 PROCEDURE — 82728 ASSAY OF FERRITIN: CPT | Mod: HCNC | Performed by: STUDENT IN AN ORGANIZED HEALTH CARE EDUCATION/TRAINING PROGRAM

## 2022-01-01 PROCEDURE — 99499 RISK ADDL DX/OHS AUDIT: ICD-10-PCS | Mod: S$GLB,,, | Performed by: INTERNAL MEDICINE

## 2022-01-01 PROCEDURE — 83520 IMMUNOASSAY QUANT NOS NONAB: CPT | Mod: HCNC | Performed by: STUDENT IN AN ORGANIZED HEALTH CARE EDUCATION/TRAINING PROGRAM

## 2022-01-01 PROCEDURE — 84484 ASSAY OF TROPONIN QUANT: CPT | Mod: HCNC,ER

## 2022-01-01 PROCEDURE — 86160 COMPLEMENT ANTIGEN: CPT | Mod: 59,HCNC | Performed by: STUDENT IN AN ORGANIZED HEALTH CARE EDUCATION/TRAINING PROGRAM

## 2022-01-01 PROCEDURE — 99233 SBSQ HOSP IP/OBS HIGH 50: CPT | Mod: HCNC,GC,, | Performed by: STUDENT IN AN ORGANIZED HEALTH CARE EDUCATION/TRAINING PROGRAM

## 2022-01-01 PROCEDURE — 81003 URINALYSIS AUTO W/O SCOPE: CPT | Mod: HCNC,ER

## 2022-01-01 PROCEDURE — 92610 EVALUATE SWALLOWING FUNCTION: CPT | Mod: HCNC

## 2022-01-01 PROCEDURE — 99291 PR CRITICAL CARE, E/M 30-74 MINUTES: ICD-10-PCS | Mod: HCNC,,, | Performed by: INTERNAL MEDICINE

## 2022-01-01 PROCEDURE — 1101F PR PT FALLS ASSESS DOC 0-1 FALLS W/OUT INJ PAST YR: ICD-10-PCS | Mod: HCNC,CPTII,S$GLB, | Performed by: INTERNAL MEDICINE

## 2022-01-01 PROCEDURE — 85025 COMPLETE CBC W/AUTO DIFF WBC: CPT | Mod: 91,HCNC

## 2022-01-01 PROCEDURE — 84166 PATHOLOGIST INTERPRETATION UPE: ICD-10-PCS | Mod: 26,HCNC,, | Performed by: PATHOLOGY

## 2022-01-01 PROCEDURE — 99499 UNLISTED E&M SERVICE: CPT | Mod: HCNC,,, | Performed by: INTERNAL MEDICINE

## 2022-01-01 PROCEDURE — U0002 COVID-19 LAB TEST NON-CDC: HCPCS | Mod: HCNC,ER | Performed by: EMERGENCY MEDICINE

## 2022-01-01 PROCEDURE — 97530 THERAPEUTIC ACTIVITIES: CPT | Mod: HCNC,CQ

## 2022-01-01 PROCEDURE — P9035 PLATELET PHERES LEUKOREDUCED: HCPCS | Mod: HCNC

## 2022-01-01 PROCEDURE — 80074 ACUTE HEPATITIS PANEL: CPT | Mod: HCNC | Performed by: INTERNAL MEDICINE

## 2022-01-01 PROCEDURE — 93227 HOLTER MONITOR - 48 HOUR (CUPID ONLY): ICD-10-PCS | Mod: HCNC,,, | Performed by: INTERNAL MEDICINE

## 2022-01-01 PROCEDURE — 80069 RENAL FUNCTION PANEL: CPT | Mod: 91,HCNC | Performed by: STUDENT IN AN ORGANIZED HEALTH CARE EDUCATION/TRAINING PROGRAM

## 2022-01-01 PROCEDURE — 84540 ASSAY OF URINE/UREA-N: CPT | Mod: HCNC | Performed by: EMERGENCY MEDICINE

## 2022-01-01 PROCEDURE — 87106 FUNGI IDENTIFICATION YEAST: CPT | Mod: HCNC | Performed by: INTERNAL MEDICINE

## 2022-01-01 PROCEDURE — 93975 US ABDOMEN COMP WITH DOPPLER (XPD): ICD-10-PCS | Mod: 26,HCNC,, | Performed by: RADIOLOGY

## 2022-01-01 PROCEDURE — 82272 OCCULT BLD FECES 1-3 TESTS: CPT | Mod: HCNC | Performed by: HOSPITALIST

## 2022-01-01 PROCEDURE — 93227 XTRNL ECG REC<48 HR R&I: CPT | Mod: HCNC,,, | Performed by: INTERNAL MEDICINE

## 2022-01-01 PROCEDURE — 1159F PR MEDICATION LIST DOCUMENTED IN MEDICAL RECORD: ICD-10-PCS | Mod: HCNC,CPTII,S$GLB, | Performed by: INTERNAL MEDICINE

## 2022-01-01 PROCEDURE — 99233 SBSQ HOSP IP/OBS HIGH 50: CPT | Mod: 25,HCNC,GC, | Performed by: INTERNAL MEDICINE

## 2022-01-01 PROCEDURE — 80100014 HC HEMODIALYSIS 1:1: Mod: HCNC

## 2022-01-01 PROCEDURE — 36625 INSERTION CATHETER ARTERY: CPT | Mod: GC,,, | Performed by: INTERNAL MEDICINE

## 2022-01-01 PROCEDURE — 86880 COOMBS TEST DIRECT: CPT | Mod: HCNC | Performed by: HOSPITALIST

## 2022-01-01 PROCEDURE — 84165 PROTEIN E-PHORESIS SERUM: CPT | Mod: HCNC | Performed by: INTERNAL MEDICINE

## 2022-01-01 PROCEDURE — 87798 DETECT AGENT NOS DNA AMP: CPT | Mod: 59,HCNC | Performed by: HOSPITALIST

## 2022-01-01 PROCEDURE — 25000003 PHARM REV CODE 250: Mod: HCNC | Performed by: PHYSICIAN ASSISTANT

## 2022-01-01 PROCEDURE — 99222 1ST HOSP IP/OBS MODERATE 55: CPT | Mod: HCNC,,, | Performed by: NURSE PRACTITIONER

## 2022-01-01 PROCEDURE — 82150 ASSAY OF AMYLASE: CPT | Mod: HCNC | Performed by: STUDENT IN AN ORGANIZED HEALTH CARE EDUCATION/TRAINING PROGRAM

## 2022-01-01 PROCEDURE — 99999 PR PBB SHADOW E&M-EST. PATIENT-LVL IV: CPT | Mod: PBBFAC,HCNC,GC, | Performed by: STUDENT IN AN ORGANIZED HEALTH CARE EDUCATION/TRAINING PROGRAM

## 2022-01-01 PROCEDURE — 86255 FLUORESCENT ANTIBODY SCREEN: CPT | Mod: 59,HCNC | Performed by: INTERNAL MEDICINE

## 2022-01-01 PROCEDURE — 82550 ASSAY OF CK (CPK): CPT | Mod: HCNC | Performed by: STUDENT IN AN ORGANIZED HEALTH CARE EDUCATION/TRAINING PROGRAM

## 2022-01-01 PROCEDURE — 86225 DNA ANTIBODY NATIVE: CPT | Mod: 59,HCNC | Performed by: STUDENT IN AN ORGANIZED HEALTH CARE EDUCATION/TRAINING PROGRAM

## 2022-01-01 PROCEDURE — 49082 PR ABD PARACENTESIS: ICD-10-PCS | Mod: HCNC,GC,, | Performed by: INTERNAL MEDICINE

## 2022-01-01 PROCEDURE — 84484 ASSAY OF TROPONIN QUANT: CPT | Mod: HCNC | Performed by: INTERNAL MEDICINE

## 2022-01-01 PROCEDURE — 83735 ASSAY OF MAGNESIUM: CPT | Mod: HCNC | Performed by: STUDENT IN AN ORGANIZED HEALTH CARE EDUCATION/TRAINING PROGRAM

## 2022-01-01 PROCEDURE — 97164 PT RE-EVAL EST PLAN CARE: CPT | Mod: HCNC

## 2022-01-01 PROCEDURE — 86157 COLD AGGLUTININ TITER: CPT | Mod: HCNC | Performed by: INTERNAL MEDICINE

## 2022-01-01 PROCEDURE — 87040 BLOOD CULTURE FOR BACTERIA: CPT | Mod: HCNC | Performed by: STUDENT IN AN ORGANIZED HEALTH CARE EDUCATION/TRAINING PROGRAM

## 2022-01-01 PROCEDURE — 87205 SMEAR GRAM STAIN: CPT | Mod: HCNC | Performed by: STUDENT IN AN ORGANIZED HEALTH CARE EDUCATION/TRAINING PROGRAM

## 2022-01-01 PROCEDURE — 93005 EKG 12-LEAD: ICD-10-PCS | Mod: HCNC,S$GLB,, | Performed by: STUDENT IN AN ORGANIZED HEALTH CARE EDUCATION/TRAINING PROGRAM

## 2022-01-01 PROCEDURE — 87340 HEPATITIS B SURFACE AG IA: CPT | Mod: HCNC | Performed by: HOSPITALIST

## 2022-01-01 PROCEDURE — 93005 ELECTROCARDIOGRAM TRACING: CPT | Mod: HCNC,S$GLB,, | Performed by: STUDENT IN AN ORGANIZED HEALTH CARE EDUCATION/TRAINING PROGRAM

## 2022-01-01 PROCEDURE — 86147 CARDIOLIPIN ANTIBODY EA IG: CPT | Mod: 59,HCNC | Performed by: STUDENT IN AN ORGANIZED HEALTH CARE EDUCATION/TRAINING PROGRAM

## 2022-01-01 PROCEDURE — 1159F MED LIST DOCD IN RCRD: CPT | Mod: HCNC,CPTII,S$GLB, | Performed by: INTERNAL MEDICINE

## 2022-01-01 PROCEDURE — 80076 HEPATIC FUNCTION PANEL: CPT | Mod: HCNC

## 2022-01-01 PROCEDURE — 99291 CRITICAL CARE FIRST HOUR: CPT | Mod: HCNC,,, | Performed by: INTERNAL MEDICINE

## 2022-01-01 PROCEDURE — 99999 PR PBB SHADOW E&M-EST. PATIENT-LVL IV: ICD-10-PCS | Mod: PBBFAC,HCNC,GC, | Performed by: STUDENT IN AN ORGANIZED HEALTH CARE EDUCATION/TRAINING PROGRAM

## 2022-01-01 PROCEDURE — 84166 PROTEIN E-PHORESIS/URINE/CSF: CPT | Mod: HCNC | Performed by: STUDENT IN AN ORGANIZED HEALTH CARE EDUCATION/TRAINING PROGRAM

## 2022-01-01 PROCEDURE — 82570 ASSAY OF URINE CREATININE: CPT | Mod: HCNC | Performed by: EMERGENCY MEDICINE

## 2022-01-01 PROCEDURE — 87088 URINE BACTERIA CULTURE: CPT | Mod: HCNC | Performed by: INTERNAL MEDICINE

## 2022-01-01 PROCEDURE — 84100 ASSAY OF PHOSPHORUS: CPT | Mod: HCNC | Performed by: STUDENT IN AN ORGANIZED HEALTH CARE EDUCATION/TRAINING PROGRAM

## 2022-01-01 PROCEDURE — 84165 PROTEIN E-PHORESIS SERUM: CPT | Mod: 26,HCNC,, | Performed by: PATHOLOGY

## 2022-01-01 PROCEDURE — 3288F FALL RISK ASSESSMENT DOCD: CPT | Mod: HCNC,CPTII,S$GLB, | Performed by: INTERNAL MEDICINE

## 2022-01-01 PROCEDURE — 84166 PROTEIN E-PHORESIS/URINE/CSF: CPT | Mod: 26,HCNC,, | Performed by: PATHOLOGY

## 2022-01-01 PROCEDURE — 25000003 PHARM REV CODE 250: Mod: HCNC,ER | Performed by: EMERGENCY MEDICINE

## 2022-01-01 PROCEDURE — 80053 COMPREHEN METABOLIC PANEL: CPT | Mod: HCNC | Performed by: STUDENT IN AN ORGANIZED HEALTH CARE EDUCATION/TRAINING PROGRAM

## 2022-01-01 PROCEDURE — P9016 RBC LEUKOCYTES REDUCED: HCPCS | Mod: HCNC | Performed by: STUDENT IN AN ORGANIZED HEALTH CARE EDUCATION/TRAINING PROGRAM

## 2022-01-01 PROCEDURE — 99223 PR INITIAL HOSPITAL CARE,LEVL III: ICD-10-PCS | Mod: HCNC,,, | Performed by: NURSE PRACTITIONER

## 2022-01-01 PROCEDURE — 97116 GAIT TRAINING THERAPY: CPT | Mod: HCNC,CQ

## 2022-01-01 PROCEDURE — 83615 LACTATE (LD) (LDH) ENZYME: CPT | Mod: 91,HCNC | Performed by: STUDENT IN AN ORGANIZED HEALTH CARE EDUCATION/TRAINING PROGRAM

## 2022-01-01 PROCEDURE — 90945 DIALYSIS ONE EVALUATION: CPT | Mod: HCNC,,, | Performed by: INTERNAL MEDICINE

## 2022-01-01 PROCEDURE — 1160F RVW MEDS BY RX/DR IN RCRD: CPT | Mod: HCNC,CPTII,S$GLB, | Performed by: INTERNAL MEDICINE

## 2022-01-01 PROCEDURE — 80048 BASIC METABOLIC PNL TOTAL CA: CPT | Mod: HCNC | Performed by: STUDENT IN AN ORGANIZED HEALTH CARE EDUCATION/TRAINING PROGRAM

## 2022-01-01 PROCEDURE — 96365 THER/PROPH/DIAG IV INF INIT: CPT | Mod: HCNC,ER

## 2022-01-01 PROCEDURE — 85384 FIBRINOGEN ACTIVITY: CPT | Mod: HCNC | Performed by: STUDENT IN AN ORGANIZED HEALTH CARE EDUCATION/TRAINING PROGRAM

## 2022-01-01 PROCEDURE — 82140 ASSAY OF AMMONIA: CPT | Mod: HCNC | Performed by: HOSPITALIST

## 2022-01-01 PROCEDURE — 80053 COMPREHEN METABOLIC PANEL: CPT | Mod: HCNC,ER

## 2022-01-01 PROCEDURE — 83690 ASSAY OF LIPASE: CPT | Mod: HCNC | Performed by: INTERNAL MEDICINE

## 2022-01-01 PROCEDURE — 97530 THERAPEUTIC ACTIVITIES: CPT | Mod: HCNC,CO

## 2022-01-01 PROCEDURE — 36620 PR INSERT CATH,ART,PERCUT,SHORTTERM: ICD-10-PCS | Mod: HCNC,GC,, | Performed by: INTERNAL MEDICINE

## 2022-01-01 PROCEDURE — 86255 FLUORESCENT ANTIBODY SCREEN: CPT | Mod: HCNC | Performed by: INTERNAL MEDICINE

## 2022-01-01 PROCEDURE — 36593 DECLOT VASCULAR DEVICE: CPT | Mod: HCNC

## 2022-01-01 PROCEDURE — 85610 PROTHROMBIN TIME: CPT | Mod: HCNC,ER

## 2022-01-01 PROCEDURE — 25000242 PHARM REV CODE 250 ALT 637 W/ HCPCS: Mod: HCNC,ER | Performed by: EMERGENCY MEDICINE

## 2022-01-01 PROCEDURE — 84165 PATHOLOGIST INTERPRETATION SPE: ICD-10-PCS | Mod: 26,HCNC,, | Performed by: PATHOLOGY

## 2022-01-01 PROCEDURE — 36620 INSERTION CATHETER ARTERY: CPT | Mod: HCNC,GC,, | Performed by: INTERNAL MEDICINE

## 2022-01-01 PROCEDURE — 3008F PR BODY MASS INDEX (BMI) DOCUMENTED: ICD-10-PCS | Mod: HCNC,CPTII,S$GLB, | Performed by: INTERNAL MEDICINE

## 2022-01-01 PROCEDURE — 86255 FLUORESCENT ANTIBODY SCREEN: CPT | Mod: 59,HCNC | Performed by: HOSPITALIST

## 2022-01-01 PROCEDURE — 86901 BLOOD TYPING SEROLOGIC RH(D): CPT | Mod: HCNC | Performed by: HOSPITALIST

## 2022-01-01 PROCEDURE — 93975 VASCULAR STUDY: CPT | Mod: TC,HCNC

## 2022-01-01 PROCEDURE — 49082 ABD PARACENTESIS: CPT | Performed by: INTERNAL MEDICINE

## 2022-01-01 PROCEDURE — 85730 THROMBOPLASTIN TIME PARTIAL: CPT | Mod: HCNC | Performed by: HOSPITALIST

## 2022-01-01 PROCEDURE — 3074F PR MOST RECENT SYSTOLIC BLOOD PRESSURE < 130 MM HG: ICD-10-PCS | Mod: HCNC,CPTII,S$GLB, | Performed by: INTERNAL MEDICINE

## 2022-01-01 PROCEDURE — 86160 COMPLEMENT ANTIGEN: CPT | Mod: HCNC | Performed by: INTERNAL MEDICINE

## 2022-01-01 PROCEDURE — 3078F PR MOST RECENT DIASTOLIC BLOOD PRESSURE < 80 MM HG: ICD-10-PCS | Mod: HCNC,CPTII,S$GLB, | Performed by: INTERNAL MEDICINE

## 2022-01-01 PROCEDURE — 83605 ASSAY OF LACTIC ACID: CPT | Mod: HCNC

## 2022-01-01 PROCEDURE — 83516 IMMUNOASSAY NONANTIBODY: CPT | Mod: 59,HCNC | Performed by: HOSPITALIST

## 2022-01-01 PROCEDURE — 84156 ASSAY OF PROTEIN URINE: CPT | Mod: HCNC | Performed by: STUDENT IN AN ORGANIZED HEALTH CARE EDUCATION/TRAINING PROGRAM

## 2022-01-01 PROCEDURE — 99497 ADVNCD CARE PLAN 30 MIN: CPT | Mod: HCNC,25,, | Performed by: NURSE PRACTITIONER

## 2022-01-01 PROCEDURE — 82042 OTHER SOURCE ALBUMIN QUAN EA: CPT | Mod: HCNC | Performed by: STUDENT IN AN ORGANIZED HEALTH CARE EDUCATION/TRAINING PROGRAM

## 2022-01-01 PROCEDURE — 51702 INSERT TEMP BLADDER CATH: CPT | Mod: HCNC

## 2022-01-01 PROCEDURE — 3288F PR FALLS RISK ASSESSMENT DOCUMENTED: ICD-10-PCS | Mod: HCNC,CPTII,S$GLB, | Performed by: INTERNAL MEDICINE

## 2022-01-01 PROCEDURE — 83520 IMMUNOASSAY QUANT NOS NONAB: CPT | Mod: HCNC | Performed by: INTERNAL MEDICINE

## 2022-01-01 PROCEDURE — 82784 ASSAY IGA/IGD/IGG/IGM EACH: CPT | Mod: HCNC | Performed by: STUDENT IN AN ORGANIZED HEALTH CARE EDUCATION/TRAINING PROGRAM

## 2022-01-01 PROCEDURE — 86922 COMPATIBILITY TEST ANTIGLOB: CPT | Mod: HCNC | Performed by: HOSPITALIST

## 2022-01-01 PROCEDURE — 86706 HEP B SURFACE ANTIBODY: CPT | Mod: HCNC | Performed by: HOSPITALIST

## 2022-01-01 PROCEDURE — 87075 CULTR BACTERIA EXCEPT BLOOD: CPT | Mod: HCNC | Performed by: STUDENT IN AN ORGANIZED HEALTH CARE EDUCATION/TRAINING PROGRAM

## 2022-01-01 PROCEDURE — 85652 RBC SED RATE AUTOMATED: CPT | Mod: HCNC | Performed by: INTERNAL MEDICINE

## 2022-01-01 PROCEDURE — 86225 DNA ANTIBODY NATIVE: CPT | Mod: HCNC | Performed by: INTERNAL MEDICINE

## 2022-01-01 PROCEDURE — 85018 HEMOGLOBIN: CPT | Mod: HCNC | Performed by: HOSPITALIST

## 2022-01-01 PROCEDURE — 99215 PR OFFICE/OUTPT VISIT, EST, LEVL V, 40-54 MIN: ICD-10-PCS | Mod: HCNC,S$GLB,, | Performed by: INTERNAL MEDICINE

## 2022-01-01 PROCEDURE — 3078F DIAST BP <80 MM HG: CPT | Mod: HCNC,CPTII,S$GLB, | Performed by: INTERNAL MEDICINE

## 2022-01-01 PROCEDURE — 85397 CLOTTING FUNCT ACTIVITY: CPT | Mod: HCNC | Performed by: INTERNAL MEDICINE

## 2022-01-01 PROCEDURE — 99214 OFFICE O/P EST MOD 30 MIN: CPT | Mod: HCNC,25,GC,S$GLB | Performed by: STUDENT IN AN ORGANIZED HEALTH CARE EDUCATION/TRAINING PROGRAM

## 2022-01-01 PROCEDURE — 86146 BETA-2 GLYCOPROTEIN ANTIBODY: CPT | Mod: HCNC | Performed by: STUDENT IN AN ORGANIZED HEALTH CARE EDUCATION/TRAINING PROGRAM

## 2022-01-01 PROCEDURE — 49082 ABD PARACENTESIS: CPT | Mod: HCNC,GC,, | Performed by: INTERNAL MEDICINE

## 2022-01-01 PROCEDURE — 36415 COLL VENOUS BLD VENIPUNCTURE: CPT | Mod: HCNC

## 2022-01-01 RX ORDER — HYDROCODONE BITARTRATE AND ACETAMINOPHEN 500; 5 MG/1; MG/1
TABLET ORAL
Status: DISCONTINUED | OUTPATIENT
Start: 2022-01-01 | End: 2022-01-01

## 2022-01-01 RX ORDER — GLUCAGON 1 MG
1 KIT INJECTION
Status: DISCONTINUED | OUTPATIENT
Start: 2022-01-01 | End: 2022-01-01

## 2022-01-01 RX ORDER — VANCOMYCIN HCL IN 5 % DEXTROSE 1G/250ML
1000 PLASTIC BAG, INJECTION (ML) INTRAVENOUS ONCE
Status: COMPLETED | OUTPATIENT
Start: 2022-01-01 | End: 2022-01-01

## 2022-01-01 RX ORDER — SODIUM CHLORIDE 9 MG/ML
INJECTION, SOLUTION INTRAVENOUS
Status: DISCONTINUED | OUTPATIENT
Start: 2022-01-01 | End: 2022-01-01

## 2022-01-01 RX ORDER — METOPROLOL TARTRATE 1 MG/ML
INJECTION, SOLUTION INTRAVENOUS
Status: COMPLETED
Start: 2022-01-01 | End: 2022-01-01

## 2022-01-01 RX ORDER — SCOLOPAMINE TRANSDERMAL SYSTEM 1 MG/1
1 PATCH, EXTENDED RELEASE TRANSDERMAL
Status: DISCONTINUED | OUTPATIENT
Start: 2022-01-01 | End: 2022-01-01 | Stop reason: HOSPADM

## 2022-01-01 RX ORDER — LIDOCAINE HYDROCHLORIDE 10 MG/ML
1 INJECTION INFILTRATION; PERINEURAL ONCE
Status: COMPLETED | OUTPATIENT
Start: 2022-01-01 | End: 2022-01-01

## 2022-01-01 RX ORDER — HYDROMORPHONE HYDROCHLORIDE 1 MG/ML
0.5 INJECTION, SOLUTION INTRAMUSCULAR; INTRAVENOUS; SUBCUTANEOUS ONCE
Status: COMPLETED | OUTPATIENT
Start: 2022-01-01 | End: 2022-01-01

## 2022-01-01 RX ORDER — FENTANYL CITRATE 50 UG/ML
25 INJECTION, SOLUTION INTRAMUSCULAR; INTRAVENOUS
Status: DISCONTINUED | OUTPATIENT
Start: 2022-01-01 | End: 2022-01-01

## 2022-01-01 RX ORDER — FAMOTIDINE 20 MG/1
20 TABLET, FILM COATED ORAL DAILY
Status: DISCONTINUED | OUTPATIENT
Start: 2022-01-01 | End: 2022-01-01

## 2022-01-01 RX ORDER — FUROSEMIDE 10 MG/ML
40 INJECTION INTRAMUSCULAR; INTRAVENOUS
Status: DISCONTINUED | OUTPATIENT
Start: 2022-01-01 | End: 2022-01-01

## 2022-01-01 RX ORDER — FUROSEMIDE 10 MG/ML
40 INJECTION INTRAMUSCULAR; INTRAVENOUS ONCE
Status: COMPLETED | OUTPATIENT
Start: 2022-01-01 | End: 2022-01-01

## 2022-01-01 RX ORDER — LORAZEPAM 2 MG/ML
1 INJECTION INTRAMUSCULAR EVERY 30 MIN PRN
Status: DISCONTINUED | OUTPATIENT
Start: 2022-01-01 | End: 2022-01-01 | Stop reason: HOSPADM

## 2022-01-01 RX ORDER — HYDROCODONE BITARTRATE AND ACETAMINOPHEN 500; 5 MG/1; MG/1
TABLET ORAL CONTINUOUS
Status: DISCONTINUED | OUTPATIENT
Start: 2022-01-01 | End: 2022-01-01

## 2022-01-01 RX ORDER — ONDANSETRON 2 MG/ML
4 INJECTION INTRAMUSCULAR; INTRAVENOUS
Status: DISCONTINUED | OUTPATIENT
Start: 2022-01-01 | End: 2022-01-01 | Stop reason: HOSPADM

## 2022-01-01 RX ORDER — IBUPROFEN 200 MG
24 TABLET ORAL
Status: DISCONTINUED | OUTPATIENT
Start: 2022-01-01 | End: 2022-01-01

## 2022-01-01 RX ORDER — METOPROLOL TARTRATE 1 MG/ML
5 INJECTION, SOLUTION INTRAVENOUS ONCE
Status: COMPLETED | OUTPATIENT
Start: 2022-01-01 | End: 2022-01-01

## 2022-01-01 RX ORDER — SODIUM BICARBONATE 1 MEQ/ML
50 SYRINGE (ML) INTRAVENOUS
Status: COMPLETED | OUTPATIENT
Start: 2022-01-01 | End: 2022-01-01

## 2022-01-01 RX ORDER — MAGNESIUM SULFATE HEPTAHYDRATE 40 MG/ML
2 INJECTION, SOLUTION INTRAVENOUS
Status: ACTIVE | OUTPATIENT
Start: 2022-01-01 | End: 2022-01-01

## 2022-01-01 RX ORDER — SODIUM CHLORIDE 0.9 % (FLUSH) 0.9 %
10 SYRINGE (ML) INJECTION
Status: DISCONTINUED | OUTPATIENT
Start: 2022-01-01 | End: 2022-01-01 | Stop reason: HOSPADM

## 2022-01-01 RX ORDER — LOPERAMIDE HYDROCHLORIDE 2 MG/1
2 CAPSULE ORAL 4 TIMES DAILY PRN
Status: DISCONTINUED | OUTPATIENT
Start: 2022-01-01 | End: 2022-01-01

## 2022-01-01 RX ORDER — DICYCLOMINE HYDROCHLORIDE 10 MG/ML
20 INJECTION INTRAMUSCULAR ONCE
Status: COMPLETED | OUTPATIENT
Start: 2022-01-01 | End: 2022-01-01

## 2022-01-01 RX ORDER — LORAZEPAM 0.5 MG/1
0.5 TABLET ORAL ONCE
Status: COMPLETED | OUTPATIENT
Start: 2022-01-01 | End: 2022-01-01

## 2022-01-01 RX ORDER — ATOVAQUONE 750 MG/5ML
1500 SUSPENSION ORAL DAILY
Status: DISCONTINUED | OUTPATIENT
Start: 2022-01-01 | End: 2022-01-01

## 2022-01-01 RX ORDER — MORPHINE SULFATE IN 0.9 % NACL 30 MG/30ML
0-4 PATIENT CONTROLLED ANALGESIA SYRINGE INTRAVENOUS CONTINUOUS
Status: DISCONTINUED | OUTPATIENT
Start: 2022-01-01 | End: 2022-01-01 | Stop reason: HOSPADM

## 2022-01-01 RX ORDER — INSULIN ASPART 100 [IU]/ML
0-5 INJECTION, SOLUTION INTRAVENOUS; SUBCUTANEOUS
Status: DISCONTINUED | OUTPATIENT
Start: 2022-01-01 | End: 2022-01-01

## 2022-01-01 RX ORDER — LACTULOSE 10 G/15ML
10 SOLUTION ORAL 2 TIMES DAILY
Status: DISCONTINUED | OUTPATIENT
Start: 2022-01-01 | End: 2022-01-01

## 2022-01-01 RX ORDER — HYDROXYCHLOROQUINE SULFATE 200 MG/1
200 TABLET, FILM COATED ORAL DAILY
Status: DISCONTINUED | OUTPATIENT
Start: 2022-01-01 | End: 2022-01-01

## 2022-01-01 RX ORDER — MYCOPHENOLATE MOFETIL 250 MG/1
1000 CAPSULE ORAL 2 TIMES DAILY
Status: DISCONTINUED | OUTPATIENT
Start: 2022-01-01 | End: 2022-01-01

## 2022-01-01 RX ORDER — ENOXAPARIN SODIUM 100 MG/ML
1 INJECTION SUBCUTANEOUS
Status: COMPLETED | OUTPATIENT
Start: 2022-01-01 | End: 2022-01-01

## 2022-01-01 RX ORDER — ALBUTEROL SULFATE 2.5 MG/.5ML
10 SOLUTION RESPIRATORY (INHALATION) ONCE
Status: COMPLETED | OUTPATIENT
Start: 2022-01-01 | End: 2022-01-01

## 2022-01-01 RX ORDER — INDOMETHACIN 25 MG/1
100 CAPSULE ORAL ONCE
Status: COMPLETED | OUTPATIENT
Start: 2022-01-01 | End: 2022-01-01

## 2022-01-01 RX ORDER — SODIUM CHLORIDE 9 MG/ML
INJECTION, SOLUTION INTRAVENOUS ONCE
Status: DISCONTINUED | OUTPATIENT
Start: 2022-01-01 | End: 2022-01-01

## 2022-01-01 RX ORDER — ALBUMIN HUMAN 250 G/1000ML
12.5 SOLUTION INTRAVENOUS ONCE
Status: COMPLETED | OUTPATIENT
Start: 2022-01-01 | End: 2022-01-01

## 2022-01-01 RX ORDER — DILTIAZEM HYDROCHLORIDE 5 MG/ML
10 INJECTION INTRAVENOUS ONCE
Status: COMPLETED | OUTPATIENT
Start: 2022-01-01 | End: 2022-01-01

## 2022-01-01 RX ORDER — HYDROXYZINE PAMOATE 25 MG/1
25 CAPSULE ORAL
Status: COMPLETED | OUTPATIENT
Start: 2022-01-01 | End: 2022-01-01

## 2022-01-01 RX ORDER — METHYLPREDNISOLONE SOD SUCC 125 MG
125 VIAL (EA) INJECTION
Status: DISCONTINUED | OUTPATIENT
Start: 2022-01-01 | End: 2022-01-01

## 2022-01-01 RX ORDER — NALOXONE HCL 0.4 MG/ML
VIAL (ML) INJECTION
Status: COMPLETED
Start: 2022-01-01 | End: 2022-01-01

## 2022-01-01 RX ORDER — TALC
6 POWDER (GRAM) TOPICAL NIGHTLY PRN
Status: DISCONTINUED | OUTPATIENT
Start: 2022-01-01 | End: 2022-01-01

## 2022-01-01 RX ORDER — METOPROLOL TARTRATE 25 MG/1
25 TABLET, FILM COATED ORAL 2 TIMES DAILY
Qty: 60 TABLET | Refills: 11 | Status: ON HOLD | OUTPATIENT
Start: 2022-01-01 | End: 2022-01-01

## 2022-01-01 RX ORDER — FENTANYL CITRATE 50 UG/ML
25 INJECTION, SOLUTION INTRAMUSCULAR; INTRAVENOUS EVERY 30 MIN PRN
Status: DISCONTINUED | OUTPATIENT
Start: 2022-01-01 | End: 2022-01-01 | Stop reason: HOSPADM

## 2022-01-01 RX ORDER — GUAIFENESIN/DEXTROMETHORPHAN 100-10MG/5
5 SYRUP ORAL EVERY 6 HOURS
Status: DISCONTINUED | OUTPATIENT
Start: 2022-01-01 | End: 2022-01-01

## 2022-01-01 RX ORDER — POLYETHYLENE GLYCOL 3350 17 G/17G
17 POWDER, FOR SOLUTION ORAL 2 TIMES DAILY
Status: DISCONTINUED | OUTPATIENT
Start: 2022-01-01 | End: 2022-01-01

## 2022-01-01 RX ORDER — IBUPROFEN 200 MG
16 TABLET ORAL
Status: DISCONTINUED | OUTPATIENT
Start: 2022-01-01 | End: 2022-01-01

## 2022-01-01 RX ORDER — ALBUTEROL SULFATE 2.5 MG/.5ML
10 SOLUTION RESPIRATORY (INHALATION)
Status: COMPLETED | OUTPATIENT
Start: 2022-01-01 | End: 2022-01-01

## 2022-01-01 RX ORDER — NALOXONE HCL 0.4 MG/ML
0.2 VIAL (ML) INJECTION ONCE
Status: COMPLETED | OUTPATIENT
Start: 2022-01-01 | End: 2022-01-01

## 2022-01-01 RX ORDER — SODIUM CHLORIDE 9 MG/ML
INJECTION, SOLUTION INTRAVENOUS CONTINUOUS
Status: DISCONTINUED | OUTPATIENT
Start: 2022-01-01 | End: 2022-01-01

## 2022-01-01 RX ORDER — NOREPINEPHRINE BITARTRATE/D5W 4MG/250ML
0-3 PLASTIC BAG, INJECTION (ML) INTRAVENOUS CONTINUOUS
Status: DISCONTINUED | OUTPATIENT
Start: 2022-01-01 | End: 2022-01-01

## 2022-01-01 RX ORDER — HYDROCODONE BITARTRATE AND ACETAMINOPHEN 500; 5 MG/1; MG/1
TABLET ORAL CONTINUOUS
Status: ACTIVE | OUTPATIENT
Start: 2022-01-01 | End: 2022-01-01

## 2022-01-01 RX ORDER — LACTULOSE 10 G/15ML
200 SOLUTION ORAL; RECTAL 3 TIMES DAILY
Status: DISCONTINUED | OUTPATIENT
Start: 2022-01-01 | End: 2022-01-01

## 2022-01-01 RX ORDER — PREDNISONE 20 MG/1
40 TABLET ORAL DAILY
Status: DISCONTINUED | OUTPATIENT
Start: 2022-01-01 | End: 2022-01-01

## 2022-01-01 RX ORDER — INSULIN ASPART 100 [IU]/ML
4 INJECTION, SOLUTION INTRAVENOUS; SUBCUTANEOUS
Status: DISCONTINUED | OUTPATIENT
Start: 2022-01-01 | End: 2022-01-01

## 2022-01-01 RX ORDER — NOREPINEPHRINE BITARTRATE 1 MG/ML
INJECTION, SOLUTION INTRAVENOUS
Status: DISCONTINUED
Start: 2022-01-01 | End: 2022-01-01 | Stop reason: WASHOUT

## 2022-01-01 RX ORDER — MYCOPHENOLATE MOFETIL 250 MG/1
500 CAPSULE ORAL 2 TIMES DAILY
Status: DISCONTINUED | OUTPATIENT
Start: 2022-01-01 | End: 2022-01-01

## 2022-01-01 RX ORDER — LANTHANUM CARBONATE 500 MG/1
500 TABLET, CHEWABLE ORAL 4 TIMES DAILY
Status: DISCONTINUED | OUTPATIENT
Start: 2022-01-01 | End: 2022-01-01

## 2022-01-01 RX ORDER — ENOXAPARIN SODIUM 100 MG/ML
1 INJECTION SUBCUTANEOUS EVERY 24 HOURS
Status: DISCONTINUED | OUTPATIENT
Start: 2022-01-01 | End: 2022-01-01

## 2022-01-01 RX ORDER — MAGNESIUM SULFATE HEPTAHYDRATE 40 MG/ML
2 INJECTION, SOLUTION INTRAVENOUS
Status: DISCONTINUED | OUTPATIENT
Start: 2022-01-01 | End: 2022-01-01

## 2022-01-01 RX ORDER — METOPROLOL TARTRATE 1 MG/ML
5 INJECTION, SOLUTION INTRAVENOUS ONCE
Status: DISCONTINUED | OUTPATIENT
Start: 2022-01-01 | End: 2022-01-01

## 2022-01-01 RX ORDER — FENTANYL CITRATE 50 UG/ML
100 INJECTION, SOLUTION INTRAMUSCULAR; INTRAVENOUS ONCE
Status: COMPLETED | OUTPATIENT
Start: 2022-01-01 | End: 2022-01-01

## 2022-01-01 RX ORDER — LORAZEPAM 1 MG/1
1 TABLET ORAL EVERY 30 MIN PRN
Status: DISCONTINUED | OUTPATIENT
Start: 2022-01-01 | End: 2022-01-01

## 2022-01-01 RX ORDER — POLYETHYLENE GLYCOL 3350 17 G/17G
17 POWDER, FOR SOLUTION ORAL DAILY PRN
Status: DISCONTINUED | OUTPATIENT
Start: 2022-01-01 | End: 2022-01-01

## 2022-01-01 RX ORDER — HYDROMORPHONE HYDROCHLORIDE 1 MG/ML
0.5 INJECTION, SOLUTION INTRAMUSCULAR; INTRAVENOUS; SUBCUTANEOUS
Status: DISCONTINUED | OUTPATIENT
Start: 2022-01-01 | End: 2022-01-01 | Stop reason: HOSPADM

## 2022-01-01 RX ORDER — ACETAMINOPHEN 325 MG/1
650 TABLET ORAL EVERY 6 HOURS PRN
Status: DISCONTINUED | OUTPATIENT
Start: 2022-01-01 | End: 2022-01-01

## 2022-01-01 RX ORDER — GUAIFENESIN 100 MG/5ML
200 SOLUTION ORAL EVERY 4 HOURS PRN
Status: DISCONTINUED | OUTPATIENT
Start: 2022-01-01 | End: 2022-01-01

## 2022-01-01 RX ORDER — ONDANSETRON 2 MG/ML
4 INJECTION INTRAMUSCULAR; INTRAVENOUS EVERY 4 HOURS PRN
Status: DISCONTINUED | OUTPATIENT
Start: 2022-01-01 | End: 2022-01-01

## 2022-01-01 RX ORDER — HEPARIN SODIUM 1000 [USP'U]/ML
3200 INJECTION, SOLUTION INTRAVENOUS; SUBCUTANEOUS
Status: DISCONTINUED | OUTPATIENT
Start: 2022-01-01 | End: 2022-01-01

## 2022-01-01 RX ORDER — INSULIN ASPART 100 [IU]/ML
6 INJECTION, SOLUTION INTRAVENOUS; SUBCUTANEOUS
Status: DISCONTINUED | OUTPATIENT
Start: 2022-01-01 | End: 2022-01-01

## 2022-01-01 RX ORDER — MORPHINE SULFATE 2 MG/ML
2 INJECTION, SOLUTION INTRAMUSCULAR; INTRAVENOUS EVERY 6 HOURS PRN
Status: DISCONTINUED | OUTPATIENT
Start: 2022-01-01 | End: 2022-01-01

## 2022-01-01 RX ORDER — PANTOPRAZOLE SODIUM 40 MG/10ML
80 INJECTION, POWDER, LYOPHILIZED, FOR SOLUTION INTRAVENOUS ONCE
Status: COMPLETED | OUTPATIENT
Start: 2022-01-01 | End: 2022-01-01

## 2022-01-01 RX ORDER — FLUDROCORTISONE ACETATE 0.1 MG/1
100 TABLET ORAL DAILY
Status: DISCONTINUED | OUTPATIENT
Start: 2022-01-01 | End: 2022-01-01

## 2022-01-01 RX ORDER — HEPARIN SODIUM 5000 [USP'U]/ML
5000 INJECTION, SOLUTION INTRAVENOUS; SUBCUTANEOUS EVERY 8 HOURS
Status: DISCONTINUED | OUTPATIENT
Start: 2022-01-01 | End: 2022-01-01

## 2022-01-01 RX ORDER — PHYTONADIONE 5 MG/1
5 TABLET ORAL ONCE
Qty: 5 TABLET | Refills: 1 | Status: SHIPPED | OUTPATIENT
Start: 2022-01-01 | End: 2022-01-01

## 2022-01-01 RX ORDER — INSULIN ASPART 100 [IU]/ML
0-4 INJECTION, SOLUTION INTRAVENOUS; SUBCUTANEOUS
Status: DISCONTINUED | OUTPATIENT
Start: 2022-01-01 | End: 2022-01-01

## 2022-01-01 RX ORDER — FAMOTIDINE 10 MG/ML
40 INJECTION INTRAVENOUS
Status: DISCONTINUED | OUTPATIENT
Start: 2022-01-01 | End: 2022-01-01

## 2022-01-01 RX ORDER — FUROSEMIDE 10 MG/ML
40 INJECTION INTRAMUSCULAR; INTRAVENOUS
Status: COMPLETED | OUTPATIENT
Start: 2022-01-01 | End: 2022-01-01

## 2022-01-01 RX ORDER — MYCOPHENOLATE MOFETIL 200 MG/ML
500 POWDER, FOR SUSPENSION ORAL EVERY 6 HOURS
Status: DISCONTINUED | OUTPATIENT
Start: 2022-01-01 | End: 2022-01-01

## 2022-01-01 RX ORDER — PREDNISONE 20 MG/1
60 TABLET ORAL DAILY
Status: DISCONTINUED | OUTPATIENT
Start: 2022-01-01 | End: 2022-01-01

## 2022-01-01 RX ORDER — ADHESIVE BANDAGE
30 BANDAGE TOPICAL ONCE
Status: DISCONTINUED | OUTPATIENT
Start: 2022-01-01 | End: 2022-01-01

## 2022-01-01 RX ORDER — NALOXONE HCL 0.4 MG/ML
0.02 VIAL (ML) INJECTION
Status: DISCONTINUED | OUTPATIENT
Start: 2022-01-01 | End: 2022-01-01 | Stop reason: HOSPADM

## 2022-01-01 RX ORDER — SIMETHICONE 80 MG
1 TABLET,CHEWABLE ORAL 3 TIMES DAILY PRN
Status: DISCONTINUED | OUTPATIENT
Start: 2022-01-01 | End: 2022-01-01

## 2022-01-01 RX ORDER — MORPHINE SULFATE 2 MG/ML
1 INJECTION, SOLUTION INTRAMUSCULAR; INTRAVENOUS ONCE
Status: COMPLETED | OUTPATIENT
Start: 2022-01-01 | End: 2022-01-01

## 2022-01-01 RX ORDER — LIDOCAINE HYDROCHLORIDE 10 MG/ML
5 INJECTION INFILTRATION; PERINEURAL ONCE
Status: COMPLETED | OUTPATIENT
Start: 2022-01-01 | End: 2022-01-01

## 2022-01-01 RX ORDER — IPRATROPIUM BROMIDE AND ALBUTEROL SULFATE 2.5; .5 MG/3ML; MG/3ML
3 SOLUTION RESPIRATORY (INHALATION) EVERY 4 HOURS PRN
Status: DISCONTINUED | OUTPATIENT
Start: 2022-01-01 | End: 2022-01-01

## 2022-01-01 RX ORDER — ASPIRIN 325 MG
325 TABLET ORAL
Status: COMPLETED | OUTPATIENT
Start: 2022-01-01 | End: 2022-01-01

## 2022-01-01 RX ORDER — BISACODYL 10 MG
10 SUPPOSITORY, RECTAL RECTAL DAILY PRN
Status: DISCONTINUED | OUTPATIENT
Start: 2022-01-01 | End: 2022-01-01

## 2022-01-01 RX ORDER — FUROSEMIDE 10 MG/ML
20 INJECTION INTRAMUSCULAR; INTRAVENOUS ONCE
Status: COMPLETED | OUTPATIENT
Start: 2022-01-01 | End: 2022-01-01

## 2022-01-01 RX ORDER — HYDROMORPHONE HYDROCHLORIDE 1 MG/ML
0.2 INJECTION, SOLUTION INTRAMUSCULAR; INTRAVENOUS; SUBCUTANEOUS EVERY 4 HOURS PRN
Status: DISCONTINUED | OUTPATIENT
Start: 2022-01-01 | End: 2022-01-01

## 2022-01-01 RX ORDER — PANTOPRAZOLE SODIUM 40 MG/10ML
40 INJECTION, POWDER, LYOPHILIZED, FOR SOLUTION INTRAVENOUS 2 TIMES DAILY
Status: DISCONTINUED | OUTPATIENT
Start: 2022-01-01 | End: 2022-01-01

## 2022-01-01 RX ORDER — MUPIROCIN 20 MG/G
OINTMENT TOPICAL 2 TIMES DAILY
Status: COMPLETED | OUTPATIENT
Start: 2022-01-01 | End: 2022-01-01

## 2022-01-01 RX ORDER — ALBUMIN HUMAN 250 G/1000ML
25 SOLUTION INTRAVENOUS 4 TIMES DAILY
Status: COMPLETED | OUTPATIENT
Start: 2022-01-01 | End: 2022-01-01

## 2022-01-01 RX ORDER — PHYTONADIONE 5 MG/1
5 TABLET ORAL ONCE
Qty: 1 TABLET | Refills: 0 | Status: SHIPPED | OUTPATIENT
Start: 2022-01-01 | End: 2022-01-01

## 2022-01-01 RX ORDER — BENZONATATE 100 MG/1
100 CAPSULE ORAL 3 TIMES DAILY PRN
Status: DISCONTINUED | OUTPATIENT
Start: 2022-01-01 | End: 2022-01-01

## 2022-01-01 RX ORDER — HYDROMORPHONE HYDROCHLORIDE 1 MG/ML
0.2 INJECTION, SOLUTION INTRAMUSCULAR; INTRAVENOUS; SUBCUTANEOUS EVERY 6 HOURS PRN
Status: DISCONTINUED | OUTPATIENT
Start: 2022-01-01 | End: 2022-01-01

## 2022-01-01 RX ORDER — IPRATROPIUM BROMIDE AND ALBUTEROL SULFATE 2.5; .5 MG/3ML; MG/3ML
3 SOLUTION RESPIRATORY (INHALATION) EVERY 6 HOURS
Status: DISCONTINUED | OUTPATIENT
Start: 2022-01-01 | End: 2022-01-01

## 2022-01-01 RX ORDER — INSULIN ASPART 100 [IU]/ML
0-10 INJECTION, SOLUTION INTRAVENOUS; SUBCUTANEOUS
Status: DISCONTINUED | OUTPATIENT
Start: 2022-01-01 | End: 2022-01-01

## 2022-01-01 RX ORDER — INDOMETHACIN 25 MG/1
50 CAPSULE ORAL EVERY 6 HOURS
Status: COMPLETED | OUTPATIENT
Start: 2022-01-01 | End: 2022-01-01

## 2022-01-01 RX ORDER — METOPROLOL TARTRATE 25 MG/1
25 TABLET, FILM COATED ORAL 2 TIMES DAILY
Status: DISCONTINUED | OUTPATIENT
Start: 2022-01-01 | End: 2022-01-01

## 2022-01-01 RX ADMIN — SODIUM BICARBONATE 50 MEQ: 84 INJECTION, SOLUTION INTRAVENOUS at 11:02

## 2022-01-01 RX ADMIN — LACTULOSE 200 G: 10 SOLUTION ORAL; RECTAL at 10:02

## 2022-01-01 RX ADMIN — FUROSEMIDE 5 MG/HR: 10 INJECTION, SOLUTION INTRAMUSCULAR; INTRAVENOUS at 11:02

## 2022-01-01 RX ADMIN — INSULIN DETEMIR 10 UNITS: 100 INJECTION, SOLUTION SUBCUTANEOUS at 11:02

## 2022-01-01 RX ADMIN — LACTULOSE 10 G: 10 SOLUTION ORAL at 08:02

## 2022-01-01 RX ADMIN — MUPIROCIN: 20 OINTMENT TOPICAL at 08:02

## 2022-01-01 RX ADMIN — PREDNISONE 40 MG: 20 TABLET ORAL at 08:02

## 2022-01-01 RX ADMIN — GUAIFENESIN AND DEXTROMETHORPHAN 5 ML: 100; 10 SYRUP ORAL at 12:02

## 2022-01-01 RX ADMIN — IPRATROPIUM BROMIDE AND ALBUTEROL SULFATE 3 ML: 2.5; .5 SOLUTION RESPIRATORY (INHALATION) at 07:02

## 2022-01-01 RX ADMIN — METOPROLOL TARTRATE 25 MG: 25 TABLET, FILM COATED ORAL at 09:02

## 2022-01-01 RX ADMIN — NALXONE HYDROCHLORIDE 0.4 MG: 0.4 INJECTION INTRAMUSCULAR; INTRAVENOUS; SUBCUTANEOUS at 07:02

## 2022-01-01 RX ADMIN — NEPHROCAP 1 CAPSULE: 1 CAP ORAL at 08:02

## 2022-01-01 RX ADMIN — CEFTRIAXONE 1 G: 1 INJECTION, SOLUTION INTRAVENOUS at 11:02

## 2022-01-01 RX ADMIN — IPRATROPIUM BROMIDE AND ALBUTEROL SULFATE 3 ML: 2.5; .5 SOLUTION RESPIRATORY (INHALATION) at 02:02

## 2022-01-01 RX ADMIN — GUAIFENESIN 200 MG: 100 SOLUTION ORAL at 06:02

## 2022-01-01 RX ADMIN — PANTOPRAZOLE SODIUM 40 MG: 40 INJECTION, POWDER, FOR SOLUTION INTRAVENOUS at 09:02

## 2022-01-01 RX ADMIN — BENZONATATE 100 MG: 100 CAPSULE ORAL at 08:02

## 2022-01-01 RX ADMIN — PREDNISONE 40 MG: 20 TABLET ORAL at 11:02

## 2022-01-01 RX ADMIN — DEXTROSE 125 ML: 10 SOLUTION INTRAVENOUS at 05:02

## 2022-01-01 RX ADMIN — LANTHANUM CARBONATE 500 MG: 500 TABLET, CHEWABLE ORAL at 08:02

## 2022-01-01 RX ADMIN — FUROSEMIDE 5 MG/HR: 10 INJECTION, SOLUTION INTRAMUSCULAR; INTRAVENOUS at 09:02

## 2022-01-01 RX ADMIN — ONDANSETRON 4 MG: 2 INJECTION INTRAMUSCULAR; INTRAVENOUS at 08:02

## 2022-01-01 RX ADMIN — MYCOPHENOLATE MOFETIL 500 MG: 200 POWDER, FOR SUSPENSION ORAL at 06:02

## 2022-01-01 RX ADMIN — INSULIN ASPART 4 UNITS: 100 INJECTION, SOLUTION INTRAVENOUS; SUBCUTANEOUS at 07:02

## 2022-01-01 RX ADMIN — IPRATROPIUM BROMIDE AND ALBUTEROL SULFATE 3 ML: 2.5; .5 SOLUTION RESPIRATORY (INHALATION) at 01:02

## 2022-01-01 RX ADMIN — GUAIFENESIN AND DEXTROMETHORPHAN 5 ML: 100; 10 SYRUP ORAL at 03:02

## 2022-01-01 RX ADMIN — METOPROLOL TARTRATE 6.25 MG: 25 TABLET ORAL at 08:02

## 2022-01-01 RX ADMIN — GUAIFENESIN AND DEXTROMETHORPHAN 5 ML: 100; 10 SYRUP ORAL at 11:02

## 2022-01-01 RX ADMIN — ONDANSETRON 4 MG: 2 INJECTION INTRAMUSCULAR; INTRAVENOUS at 07:02

## 2022-01-01 RX ADMIN — METOPROLOL TARTRATE 6.25 MG: 25 TABLET ORAL at 02:02

## 2022-01-01 RX ADMIN — AMIODARONE HYDROCHLORIDE 0.5 MG/MIN: 50 INJECTION, SOLUTION INTRAVENOUS at 07:02

## 2022-01-01 RX ADMIN — ENOXAPARIN SODIUM 70 MG: 100 INJECTION SUBCUTANEOUS at 11:02

## 2022-01-01 RX ADMIN — FAMOTIDINE 20 MG: 20 TABLET ORAL at 08:02

## 2022-01-01 RX ADMIN — ONDANSETRON 4 MG: 2 INJECTION INTRAMUSCULAR; INTRAVENOUS at 05:02

## 2022-01-01 RX ADMIN — INSULIN ASPART 4 UNITS: 100 INJECTION, SOLUTION INTRAVENOUS; SUBCUTANEOUS at 08:02

## 2022-01-01 RX ADMIN — GUAIFENESIN AND DEXTROMETHORPHAN 5 ML: 100; 10 SYRUP ORAL at 06:02

## 2022-01-01 RX ADMIN — GUAIFENESIN AND DEXTROMETHORPHAN 5 ML: 100; 10 SYRUP ORAL at 05:02

## 2022-01-01 RX ADMIN — PIPERACILLIN AND TAZOBACTAM 4.5 G: 4; .5 INJECTION, POWDER, LYOPHILIZED, FOR SOLUTION INTRAVENOUS; PARENTERAL at 05:02

## 2022-01-01 RX ADMIN — FUROSEMIDE 5 MG/HR: 10 INJECTION, SOLUTION INTRAVENOUS at 11:02

## 2022-01-01 RX ADMIN — INSULIN DETEMIR 16 UNITS: 100 INJECTION, SOLUTION SUBCUTANEOUS at 08:02

## 2022-01-01 RX ADMIN — POLYETHYLENE GLYCOL 3350 17 G: 17 POWDER, FOR SOLUTION ORAL at 09:02

## 2022-01-01 RX ADMIN — INSULIN DETEMIR 5 UNITS: 100 INJECTION, SOLUTION SUBCUTANEOUS at 08:02

## 2022-01-01 RX ADMIN — INSULIN DETEMIR 10 UNITS: 100 INJECTION, SOLUTION SUBCUTANEOUS at 08:02

## 2022-01-01 RX ADMIN — FAMOTIDINE 20 MG: 20 TABLET ORAL at 11:02

## 2022-01-01 RX ADMIN — LACTULOSE 10 G: 10 SOLUTION ORAL at 09:02

## 2022-01-01 RX ADMIN — LANTHANUM CARBONATE 500 MG: 500 TABLET, CHEWABLE ORAL at 04:02

## 2022-01-01 RX ADMIN — MUPIROCIN: 20 OINTMENT TOPICAL at 09:02

## 2022-01-01 RX ADMIN — METOPROLOL TARTRATE 25 MG: 25 TABLET, FILM COATED ORAL at 08:02

## 2022-01-01 RX ADMIN — Medication 80 MG: at 09:02

## 2022-01-01 RX ADMIN — LANTHANUM CARBONATE 500 MG: 500 TABLET, CHEWABLE ORAL at 12:02

## 2022-01-01 RX ADMIN — LANTHANUM CARBONATE 500 MG: 500 TABLET, CHEWABLE ORAL at 09:02

## 2022-01-01 RX ADMIN — FAMOTIDINE 20 MG: 20 TABLET ORAL at 12:02

## 2022-01-01 RX ADMIN — MYCOPHENOLATE MOFETIL 500 MG: 200 POWDER, FOR SUSPENSION ORAL at 11:02

## 2022-01-01 RX ADMIN — INSULIN ASPART 3 UNITS: 100 INJECTION, SOLUTION INTRAVENOUS; SUBCUTANEOUS at 08:02

## 2022-01-01 RX ADMIN — CALCIUM GLUCONATE: 98 INJECTION, SOLUTION INTRAVENOUS at 10:02

## 2022-01-01 RX ADMIN — LANTHANUM CARBONATE 500 MG: 500 TABLET, CHEWABLE ORAL at 03:02

## 2022-01-01 RX ADMIN — PREDNISONE 40 MG: 20 TABLET ORAL at 10:02

## 2022-01-01 RX ADMIN — DEXTROSE MONOHYDRATE, SODIUM CITRATE, AND CITRIC ACID MONOHYDRATE: 2.45; 2.2; .8 INJECTION, SOLUTION INTRAVENOUS at 07:02

## 2022-01-01 RX ADMIN — ONDANSETRON 4 MG: 2 INJECTION INTRAMUSCULAR; INTRAVENOUS at 06:02

## 2022-01-01 RX ADMIN — INSULIN ASPART 3 UNITS: 100 INJECTION, SOLUTION INTRAVENOUS; SUBCUTANEOUS at 11:02

## 2022-01-01 RX ADMIN — INSULIN ASPART 2 UNITS: 100 INJECTION, SOLUTION INTRAVENOUS; SUBCUTANEOUS at 12:02

## 2022-01-01 RX ADMIN — INSULIN ASPART 6 UNITS: 100 INJECTION, SOLUTION INTRAVENOUS; SUBCUTANEOUS at 08:02

## 2022-01-01 RX ADMIN — INSULIN ASPART 2 UNITS: 100 INJECTION, SOLUTION INTRAVENOUS; SUBCUTANEOUS at 02:02

## 2022-01-01 RX ADMIN — FAMOTIDINE 20 MG: 20 TABLET ORAL at 09:02

## 2022-01-01 RX ADMIN — LANTHANUM CARBONATE 500 MG: 500 TABLET, CHEWABLE ORAL at 06:02

## 2022-01-01 RX ADMIN — METOPROLOL TARTRATE 25 MG: 25 TABLET, FILM COATED ORAL at 10:02

## 2022-01-01 RX ADMIN — INSULIN ASPART 2 UNITS: 100 INJECTION, SOLUTION INTRAVENOUS; SUBCUTANEOUS at 07:02

## 2022-01-01 RX ADMIN — MELATONIN TAB 3 MG 6 MG: 3 TAB at 10:02

## 2022-01-01 RX ADMIN — POLYETHYLENE GLYCOL 3350 17 G: 17 POWDER, FOR SOLUTION ORAL at 08:02

## 2022-01-01 RX ADMIN — INSULIN ASPART 1 UNITS: 100 INJECTION, SOLUTION INTRAVENOUS; SUBCUTANEOUS at 10:02

## 2022-01-01 RX ADMIN — FUROSEMIDE 40 MG: 10 INJECTION, SOLUTION INTRAMUSCULAR; INTRAVENOUS at 12:02

## 2022-01-01 RX ADMIN — INSULIN ASPART 6 UNITS: 100 INJECTION, SOLUTION INTRAVENOUS; SUBCUTANEOUS at 04:02

## 2022-01-01 RX ADMIN — INSULIN ASPART 1 UNITS: 100 INJECTION, SOLUTION INTRAVENOUS; SUBCUTANEOUS at 09:02

## 2022-01-01 RX ADMIN — INSULIN DETEMIR 5 UNITS: 100 INJECTION, SOLUTION SUBCUTANEOUS at 09:02

## 2022-01-01 RX ADMIN — FENTANYL CITRATE 25 MCG: 50 INJECTION INTRAMUSCULAR; INTRAVENOUS at 10:02

## 2022-01-01 RX ADMIN — PIPERACILLIN AND TAZOBACTAM 4.5 G: 4; .5 INJECTION, POWDER, LYOPHILIZED, FOR SOLUTION INTRAVENOUS; PARENTERAL at 10:02

## 2022-01-01 RX ADMIN — VASOPRESSIN 0.04 UNITS/MIN: 20 INJECTION INTRAVENOUS at 12:02

## 2022-01-01 RX ADMIN — METOPROLOL TARTRATE 6.25 MG: 25 TABLET ORAL at 09:02

## 2022-01-01 RX ADMIN — INSULIN DETEMIR 5 UNITS: 100 INJECTION, SOLUTION SUBCUTANEOUS at 10:02

## 2022-01-01 RX ADMIN — INSULIN ASPART 2 UNITS: 100 INJECTION, SOLUTION INTRAVENOUS; SUBCUTANEOUS at 11:02

## 2022-01-01 RX ADMIN — NEPHROCAP 1 CAPSULE: 1 CAP ORAL at 10:02

## 2022-01-01 RX ADMIN — MELATONIN TAB 3 MG 6 MG: 3 TAB at 11:02

## 2022-01-01 RX ADMIN — GUAIFENESIN 200 MG: 100 SOLUTION ORAL at 11:02

## 2022-01-01 RX ADMIN — INSULIN ASPART 4 UNITS: 100 INJECTION, SOLUTION INTRAVENOUS; SUBCUTANEOUS at 05:02

## 2022-01-01 RX ADMIN — METOPROLOL TARTRATE 6.25 MG: 25 TABLET ORAL at 12:02

## 2022-01-01 RX ADMIN — HYDROXYCHLOROQUINE SULFATE 200 MG: 200 TABLET, FILM COATED ORAL at 05:02

## 2022-01-01 RX ADMIN — METOPROLOL SUCCINATE 12.5 MG: 25 TABLET, EXTENDED RELEASE ORAL at 03:02

## 2022-01-01 RX ADMIN — GUAIFENESIN AND DEXTROMETHORPHAN 5 ML: 100; 10 SYRUP ORAL at 07:02

## 2022-01-01 RX ADMIN — AMIODARONE HYDROCHLORIDE 150 MG: 1.5 INJECTION, SOLUTION INTRAVENOUS at 02:02

## 2022-01-01 RX ADMIN — GUAIFENESIN 200 MG: 100 SOLUTION ORAL at 10:02

## 2022-01-01 RX ADMIN — HYDROMORPHONE HYDROCHLORIDE 0.2 MG: 1 INJECTION, SOLUTION INTRAMUSCULAR; INTRAVENOUS; SUBCUTANEOUS at 03:02

## 2022-01-01 RX ADMIN — ACETAMINOPHEN 650 MG: 325 TABLET ORAL at 09:02

## 2022-01-01 RX ADMIN — Medication 1 SPRAY: at 11:02

## 2022-01-01 RX ADMIN — AMIODARONE HYDROCHLORIDE 0.5 MG/MIN: 50 INJECTION, SOLUTION INTRAVENOUS at 08:02

## 2022-01-01 RX ADMIN — METOPROLOL SUCCINATE 12.5 MG: 25 TABLET, EXTENDED RELEASE ORAL at 08:02

## 2022-01-01 RX ADMIN — MELATONIN TAB 3 MG 6 MG: 3 TAB at 09:02

## 2022-01-01 RX ADMIN — DEXTROSE 1000 MG: 50 INJECTION, SOLUTION INTRAVENOUS at 10:02

## 2022-01-01 RX ADMIN — BENZONATATE 100 MG: 100 CAPSULE ORAL at 05:02

## 2022-01-01 RX ADMIN — GUAIFENESIN 200 MG: 100 SOLUTION ORAL at 05:02

## 2022-01-01 RX ADMIN — FUROSEMIDE 40 MG: 10 INJECTION, SOLUTION INTRAMUSCULAR; INTRAVENOUS at 07:02

## 2022-01-01 RX ADMIN — HEPARIN SODIUM 5000 UNITS: 5000 INJECTION INTRAVENOUS; SUBCUTANEOUS at 09:02

## 2022-01-01 RX ADMIN — SODIUM ZIRCONIUM CYCLOSILICATE 10 G: 10 POWDER, FOR SUSPENSION ORAL at 02:02

## 2022-01-01 RX ADMIN — SODIUM BICARBONATE: 84 INJECTION, SOLUTION INTRAVENOUS at 03:02

## 2022-01-01 RX ADMIN — MUPIROCIN: 20 OINTMENT TOPICAL at 10:02

## 2022-01-01 RX ADMIN — ALBUMIN (HUMAN) 25 G: 5 SOLUTION INTRAVENOUS at 08:02

## 2022-01-01 RX ADMIN — GUAIFENESIN AND DEXTROMETHORPHAN 5 ML: 100; 10 SYRUP ORAL at 04:02

## 2022-01-01 RX ADMIN — PREDNISONE 50 MG: 5 TABLET ORAL at 09:02

## 2022-01-01 RX ADMIN — GUAIFENESIN 200 MG: 100 SOLUTION ORAL at 09:02

## 2022-01-01 RX ADMIN — ALBUMIN (HUMAN) 25 G: 5 SOLUTION INTRAVENOUS at 02:02

## 2022-01-01 RX ADMIN — SODIUM ZIRCONIUM CYCLOSILICATE 10 G: 10 POWDER, FOR SUSPENSION ORAL at 11:02

## 2022-01-01 RX ADMIN — INSULIN ASPART 2 UNITS: 100 INJECTION, SOLUTION INTRAVENOUS; SUBCUTANEOUS at 08:02

## 2022-01-01 RX ADMIN — HEPARIN SODIUM 3200 UNITS: 1000 INJECTION INTRAVENOUS; SUBCUTANEOUS at 07:02

## 2022-01-01 RX ADMIN — IPRATROPIUM BROMIDE AND ALBUTEROL SULFATE 3 ML: 2.5; .5 SOLUTION RESPIRATORY (INHALATION) at 12:02

## 2022-01-01 RX ADMIN — ALBUMIN (HUMAN) 25 G: 5 SOLUTION INTRAVENOUS at 05:02

## 2022-01-01 RX ADMIN — LORAZEPAM 1 MG: 2 INJECTION INTRAMUSCULAR; INTRAVENOUS at 10:02

## 2022-01-01 RX ADMIN — ALBUMIN (HUMAN) 25 G: 5 SOLUTION INTRAVENOUS at 09:02

## 2022-01-01 RX ADMIN — ONDANSETRON 4 MG: 2 INJECTION INTRAMUSCULAR; INTRAVENOUS at 04:02

## 2022-01-01 RX ADMIN — ALBUTEROL SULFATE 10 MG: 2.5 SOLUTION RESPIRATORY (INHALATION) at 02:02

## 2022-01-01 RX ADMIN — INSULIN ASPART 1 UNITS: 100 INJECTION, SOLUTION INTRAVENOUS; SUBCUTANEOUS at 08:02

## 2022-01-01 RX ADMIN — PIPERACILLIN AND TAZOBACTAM 4.5 G: 4; .5 INJECTION, POWDER, LYOPHILIZED, FOR SOLUTION INTRAVENOUS; PARENTERAL at 08:02

## 2022-01-01 RX ADMIN — PREDNISONE 60 MG: 20 TABLET ORAL at 09:02

## 2022-01-01 RX ADMIN — LORAZEPAM 0.5 MG: 0.5 TABLET ORAL at 02:02

## 2022-01-01 RX ADMIN — VASOPRESSIN 0.04 UNITS/MIN: 20 INJECTION INTRAVENOUS at 08:02

## 2022-01-01 RX ADMIN — HYDROCORTISONE SODIUM SUCCINATE 100 MG: 100 INJECTION, POWDER, FOR SOLUTION INTRAMUSCULAR; INTRAVENOUS at 09:02

## 2022-01-01 RX ADMIN — Medication 80 MG: at 04:02

## 2022-01-01 RX ADMIN — ACETAMINOPHEN 650 MG: 325 TABLET ORAL at 10:02

## 2022-01-01 RX ADMIN — PIPERACILLIN AND TAZOBACTAM 4.5 G: 4; .5 INJECTION, POWDER, LYOPHILIZED, FOR SOLUTION INTRAVENOUS; PARENTERAL at 09:02

## 2022-01-01 RX ADMIN — LACTULOSE 10 G: 10 SOLUTION ORAL at 10:02

## 2022-01-01 RX ADMIN — NOREPINEPHRINE BITARTRATE 1.24 MCG/KG/MIN: 1 INJECTION, SOLUTION, CONCENTRATE INTRAVENOUS at 09:02

## 2022-01-01 RX ADMIN — METOPROLOL SUCCINATE 12.5 MG: 25 TABLET, EXTENDED RELEASE ORAL at 11:02

## 2022-01-01 RX ADMIN — PREDNISONE 40 MG: 20 TABLET ORAL at 09:02

## 2022-01-01 RX ADMIN — LANTHANUM CARBONATE 500 MG: 500 TABLET, CHEWABLE ORAL at 05:02

## 2022-01-01 RX ADMIN — PANTOPRAZOLE SODIUM 80 MG: 40 INJECTION, POWDER, FOR SOLUTION INTRAVENOUS at 07:02

## 2022-01-01 RX ADMIN — FAMOTIDINE 20 MG: 20 TABLET ORAL at 10:02

## 2022-01-01 RX ADMIN — DEXTROSE 1000 MG: 50 INJECTION, SOLUTION INTRAVENOUS at 12:02

## 2022-01-01 RX ADMIN — DEXTROSE MONOHYDRATE, SODIUM CITRATE, AND CITRIC ACID MONOHYDRATE: 2.45; 2.2; .8 INJECTION, SOLUTION INTRAVENOUS at 05:02

## 2022-01-01 RX ADMIN — INSULIN DETEMIR 16 UNITS: 100 INJECTION, SOLUTION SUBCUTANEOUS at 09:02

## 2022-01-01 RX ADMIN — DEXTROSE MONOHYDRATE, SODIUM CITRATE, AND CITRIC ACID MONOHYDRATE 150 ML: 2.45; 2.2; .8 INJECTION, SOLUTION INTRAVENOUS at 03:02

## 2022-01-01 RX ADMIN — FUROSEMIDE 40 MG: 10 INJECTION, SOLUTION INTRAMUSCULAR; INTRAVENOUS at 01:02

## 2022-01-01 RX ADMIN — AMIODARONE HYDROCHLORIDE 0.5 MG/MIN: 50 INJECTION, SOLUTION INTRAVENOUS at 06:02

## 2022-01-01 RX ADMIN — SODIUM BICARBONATE 50 MEQ: 84 INJECTION, SOLUTION INTRAVENOUS at 05:02

## 2022-01-01 RX ADMIN — METOROPROLOL TARTRATE 5 MG: 5 INJECTION, SOLUTION INTRAVENOUS at 03:02

## 2022-01-01 RX ADMIN — INSULIN ASPART 3 UNITS: 100 INJECTION, SOLUTION INTRAVENOUS; SUBCUTANEOUS at 12:02

## 2022-01-01 RX ADMIN — GUAIFENESIN 200 MG: 100 SOLUTION ORAL at 08:02

## 2022-01-01 RX ADMIN — BENZONATATE 100 MG: 100 CAPSULE ORAL at 10:02

## 2022-01-01 RX ADMIN — ONDANSETRON 4 MG: 2 INJECTION INTRAMUSCULAR; INTRAVENOUS at 10:02

## 2022-01-01 RX ADMIN — INSULIN ASPART 2 UNITS: 100 INJECTION, SOLUTION INTRAVENOUS; SUBCUTANEOUS at 06:02

## 2022-01-01 RX ADMIN — INSULIN ASPART 2 UNITS: 100 INJECTION, SOLUTION INTRAVENOUS; SUBCUTANEOUS at 09:02

## 2022-01-01 RX ADMIN — OCTREOTIDE ACETATE 50 MCG/HR: 500 INJECTION, SOLUTION INTRAVENOUS; SUBCUTANEOUS at 07:02

## 2022-01-01 RX ADMIN — MYCOPHENOLATE MOFETIL 1000 MG: 250 CAPSULE ORAL at 09:02

## 2022-01-01 RX ADMIN — VASOPRESSIN 0.04 UNITS/MIN: 20 INJECTION INTRAVENOUS at 01:02

## 2022-01-01 RX ADMIN — MYCOPHENOLATE MOFETIL 500 MG: 200 POWDER, FOR SUSPENSION ORAL at 01:02

## 2022-01-01 RX ADMIN — SODIUM BICARBONATE 50 MEQ: 84 INJECTION, SOLUTION INTRAVENOUS at 09:02

## 2022-01-01 RX ADMIN — INSULIN ASPART 1 UNITS: 100 INJECTION, SOLUTION INTRAVENOUS; SUBCUTANEOUS at 12:02

## 2022-01-01 RX ADMIN — ONDANSETRON 4 MG: 2 INJECTION INTRAMUSCULAR; INTRAVENOUS at 11:02

## 2022-01-01 RX ADMIN — ALBUTEROL SULFATE 10 MG: 2.5 SOLUTION RESPIRATORY (INHALATION) at 08:02

## 2022-01-01 RX ADMIN — INSULIN ASPART 3 UNITS: 100 INJECTION, SOLUTION INTRAVENOUS; SUBCUTANEOUS at 05:02

## 2022-01-01 RX ADMIN — ONDANSETRON 4 MG: 2 INJECTION INTRAMUSCULAR; INTRAVENOUS at 03:02

## 2022-01-01 RX ADMIN — INSULIN ASPART 4 UNITS: 100 INJECTION, SOLUTION INTRAVENOUS; SUBCUTANEOUS at 04:02

## 2022-01-01 RX ADMIN — LANTHANUM CARBONATE 500 MG: 500 TABLET, CHEWABLE ORAL at 10:02

## 2022-01-01 RX ADMIN — VANCOMYCIN HYDROCHLORIDE 500 MG: 500 INJECTION, POWDER, LYOPHILIZED, FOR SOLUTION INTRAVENOUS at 12:02

## 2022-01-01 RX ADMIN — HEPARIN SODIUM 5000 UNITS: 5000 INJECTION INTRAVENOUS; SUBCUTANEOUS at 05:02

## 2022-01-01 RX ADMIN — HEPARIN SODIUM 3200 UNITS: 1000 INJECTION INTRAVENOUS; SUBCUTANEOUS at 03:02

## 2022-01-01 RX ADMIN — HYDROXYZINE PAMOATE 25 MG: 25 CAPSULE ORAL at 09:02

## 2022-01-01 RX ADMIN — AMIODARONE HYDROCHLORIDE 1 MG/MIN: 50 INJECTION, SOLUTION INTRAVENOUS at 02:02

## 2022-01-01 RX ADMIN — POLYETHYLENE GLYCOL 3350 17 G: 17 POWDER, FOR SOLUTION ORAL at 12:02

## 2022-01-01 RX ADMIN — HYDROXYCHLOROQUINE SULFATE 200 MG: 200 TABLET, FILM COATED ORAL at 08:02

## 2022-01-01 RX ADMIN — ONDANSETRON 4 MG: 2 INJECTION INTRAMUSCULAR; INTRAVENOUS at 09:02

## 2022-01-01 RX ADMIN — FENTANYL CITRATE 100 MCG: 50 INJECTION INTRAMUSCULAR; INTRAVENOUS at 11:02

## 2022-01-01 RX ADMIN — LANTHANUM CARBONATE 500 MG: 500 TABLET, CHEWABLE ORAL at 01:02

## 2022-01-01 RX ADMIN — LIDOCAINE HYDROCHLORIDE 1 ML: 10 INJECTION, SOLUTION INFILTRATION; PERINEURAL at 03:02

## 2022-01-01 RX ADMIN — NOREPINEPHRINE BITARTRATE 0.02 MCG/KG/MIN: 4 INJECTION, SOLUTION INTRAVENOUS at 08:02

## 2022-01-01 RX ADMIN — IPRATROPIUM BROMIDE AND ALBUTEROL SULFATE 3 ML: 2.5; .5 SOLUTION RESPIRATORY (INHALATION) at 11:02

## 2022-01-01 RX ADMIN — SODIUM ZIRCONIUM CYCLOSILICATE 10 G: 10 POWDER, FOR SUSPENSION ORAL at 09:02

## 2022-01-01 RX ADMIN — LACTULOSE 10 G: 10 SOLUTION ORAL at 04:02

## 2022-01-01 RX ADMIN — PREDNISONE 60 MG: 20 TABLET ORAL at 12:02

## 2022-01-01 RX ADMIN — PREDNISONE 60 MG: 20 TABLET ORAL at 08:02

## 2022-01-01 RX ADMIN — PIPERACILLIN AND TAZOBACTAM 4.5 G: 4; .5 INJECTION, POWDER, LYOPHILIZED, FOR SOLUTION INTRAVENOUS; PARENTERAL at 01:02

## 2022-01-01 RX ADMIN — SODIUM ZIRCONIUM CYCLOSILICATE 10 G: 10 POWDER, FOR SUSPENSION ORAL at 10:02

## 2022-01-01 RX ADMIN — ALBUMIN (HUMAN) 12.5 G: 12.5 SOLUTION INTRAVENOUS at 11:02

## 2022-01-01 RX ADMIN — HEPARIN SODIUM 5000 UNITS: 5000 INJECTION INTRAVENOUS; SUBCUTANEOUS at 02:02

## 2022-01-01 RX ADMIN — LACTULOSE 200 G: 10 SOLUTION ORAL; RECTAL at 08:02

## 2022-01-01 RX ADMIN — MYCOPHENOLATE MOFETIL 500 MG: 200 POWDER, FOR SUSPENSION ORAL at 12:02

## 2022-01-01 RX ADMIN — INSULIN ASPART 4 UNITS: 100 INJECTION, SOLUTION INTRAVENOUS; SUBCUTANEOUS at 12:02

## 2022-01-01 RX ADMIN — VANCOMYCIN HYDROCHLORIDE 1500 MG: 1.5 INJECTION, POWDER, LYOPHILIZED, FOR SOLUTION INTRAVENOUS at 06:02

## 2022-01-01 RX ADMIN — LORAZEPAM 0.5 MG: 2 INJECTION INTRAMUSCULAR; INTRAVENOUS at 11:02

## 2022-01-01 RX ADMIN — ONDANSETRON 4 MG: 2 INJECTION INTRAMUSCULAR; INTRAVENOUS at 12:02

## 2022-01-01 RX ADMIN — FAMOTIDINE 20 MG: 20 TABLET ORAL at 04:02

## 2022-01-01 RX ADMIN — INSULIN ASPART 4 UNITS: 100 INJECTION, SOLUTION INTRAVENOUS; SUBCUTANEOUS at 11:02

## 2022-01-01 RX ADMIN — NOREPINEPHRINE BITARTRATE 0.2 MCG/KG/MIN: 4 INJECTION, SOLUTION INTRAVENOUS at 11:02

## 2022-01-01 RX ADMIN — Medication 80 MG: at 02:02

## 2022-01-01 RX ADMIN — ACETAMINOPHEN 650 MG: 325 TABLET ORAL at 05:02

## 2022-01-01 RX ADMIN — MELATONIN TAB 3 MG 6 MG: 3 TAB at 08:02

## 2022-01-01 RX ADMIN — METOROPROLOL TARTRATE 5 MG: 5 INJECTION, SOLUTION INTRAVENOUS at 06:02

## 2022-01-01 RX ADMIN — ONDANSETRON 4 MG: 2 INJECTION INTRAMUSCULAR; INTRAVENOUS at 01:02

## 2022-01-01 RX ADMIN — NOREPINEPHRINE BITARTRATE 0.12 MCG/KG/MIN: 4 INJECTION, SOLUTION INTRAVENOUS at 05:02

## 2022-01-01 RX ADMIN — CALCIUM GLUCONATE: 98 INJECTION, SOLUTION INTRAVENOUS at 03:02

## 2022-01-01 RX ADMIN — ALBUTEROL SULFATE 10 MG: 2.5 SOLUTION RESPIRATORY (INHALATION) at 11:02

## 2022-01-01 RX ADMIN — SODIUM BICARBONATE 100 MEQ: 84 INJECTION, SOLUTION INTRAVENOUS at 04:02

## 2022-01-01 RX ADMIN — BISACODYL 10 MG: 10 SUPPOSITORY RECTAL at 08:02

## 2022-01-01 RX ADMIN — MYCOPHENOLATE MOFETIL 500 MG: 200 POWDER, FOR SUSPENSION ORAL at 05:02

## 2022-01-01 RX ADMIN — MORPHINE SULFATE 1 MG: 2 INJECTION, SOLUTION INTRAMUSCULAR; INTRAVENOUS at 03:02

## 2022-01-01 RX ADMIN — GUAIFENESIN 200 MG: 100 SOLUTION ORAL at 04:02

## 2022-01-01 RX ADMIN — SODIUM ZIRCONIUM CYCLOSILICATE 10 G: 10 POWDER, FOR SUSPENSION ORAL at 08:02

## 2022-01-01 RX ADMIN — INSULIN ASPART 4 UNITS: 100 INJECTION, SOLUTION INTRAVENOUS; SUBCUTANEOUS at 06:02

## 2022-01-01 RX ADMIN — DILTIAZEM HYDROCHLORIDE 10 MG: 5 INJECTION INTRAVENOUS at 08:02

## 2022-01-01 RX ADMIN — EPOETIN ALFA-EPBX 10000 UNITS: 10000 INJECTION, SOLUTION INTRAVENOUS; SUBCUTANEOUS at 09:02

## 2022-01-01 RX ADMIN — INSULIN ASPART 2 UNITS: 100 INJECTION, SOLUTION INTRAVENOUS; SUBCUTANEOUS at 05:02

## 2022-01-01 RX ADMIN — POLYETHYLENE GLYCOL 3350 17 G: 17 POWDER, FOR SOLUTION ORAL at 10:02

## 2022-01-01 RX ADMIN — IPRATROPIUM BROMIDE AND ALBUTEROL SULFATE 3 ML: 2.5; .5 SOLUTION RESPIRATORY (INHALATION) at 03:02

## 2022-01-01 RX ADMIN — HYDROCORTISONE SODIUM SUCCINATE 100 MG: 100 INJECTION, POWDER, FOR SOLUTION INTRAMUSCULAR; INTRAVENOUS at 06:02

## 2022-01-01 RX ADMIN — LACTULOSE 200 G: 10 SOLUTION ORAL; RECTAL at 02:02

## 2022-01-01 RX ADMIN — INSULIN ASPART 3 UNITS: 100 INJECTION, SOLUTION INTRAVENOUS; SUBCUTANEOUS at 04:02

## 2022-01-01 RX ADMIN — LACTULOSE 10 G: 10 SOLUTION ORAL at 12:02

## 2022-01-01 RX ADMIN — LACTULOSE 200 G: 10 SOLUTION ORAL; RECTAL at 09:02

## 2022-01-01 RX ADMIN — LIDOCAINE HYDROCHLORIDE 1 ML: 10 INJECTION, SOLUTION INFILTRATION; PERINEURAL at 05:02

## 2022-01-01 RX ADMIN — PHYTONADIONE 10 MG: 10 INJECTION, EMULSION INTRAMUSCULAR; INTRAVENOUS; SUBCUTANEOUS at 11:02

## 2022-01-01 RX ADMIN — SODIUM ZIRCONIUM CYCLOSILICATE 10 G: 10 POWDER, FOR SUSPENSION ORAL at 06:02

## 2022-01-01 RX ADMIN — ALBUMIN (HUMAN) 25 G: 5 SOLUTION INTRAVENOUS at 03:02

## 2022-01-01 RX ADMIN — IPRATROPIUM BROMIDE AND ALBUTEROL SULFATE 3 ML: 2.5; .5 SOLUTION RESPIRATORY (INHALATION) at 08:02

## 2022-01-01 RX ADMIN — HEPARIN SODIUM 3200 UNITS: 1000 INJECTION INTRAVENOUS; SUBCUTANEOUS at 11:02

## 2022-01-01 RX ADMIN — ATOVAQUONE 1500 MG: 750 SUSPENSION ORAL at 08:02

## 2022-01-01 RX ADMIN — DEXTROSE 250 ML: 10 SOLUTION INTRAVENOUS at 09:02

## 2022-01-01 RX ADMIN — FENTANYL CITRATE 25 MCG: 50 INJECTION INTRAMUSCULAR; INTRAVENOUS at 09:02

## 2022-01-01 RX ADMIN — INSULIN ASPART 2 UNITS: 100 INJECTION, SOLUTION INTRAVENOUS; SUBCUTANEOUS at 04:02

## 2022-01-01 RX ADMIN — HYDROXYCHLOROQUINE SULFATE 200 MG: 200 TABLET, FILM COATED ORAL at 10:02

## 2022-01-01 RX ADMIN — HYDROMORPHONE HYDROCHLORIDE 0.5 MG: 1 INJECTION, SOLUTION INTRAMUSCULAR; INTRAVENOUS; SUBCUTANEOUS at 10:02

## 2022-01-01 RX ADMIN — VANCOMYCIN HYDROCHLORIDE 1000 MG: 1 INJECTION, POWDER, LYOPHILIZED, FOR SOLUTION INTRAVENOUS at 09:02

## 2022-01-01 RX ADMIN — NEPHROCAP 1 CAPSULE: 1 CAP ORAL at 09:02

## 2022-01-01 RX ADMIN — HEPARIN SODIUM 5000 UNITS: 5000 INJECTION INTRAVENOUS; SUBCUTANEOUS at 10:02

## 2022-01-01 RX ADMIN — INSULIN HUMAN 7.28 UNITS: 100 INJECTION, SOLUTION PARENTERAL at 09:02

## 2022-01-01 RX ADMIN — SODIUM CHLORIDE 500 ML: 0.9 INJECTION, SOLUTION INTRAVENOUS at 06:02

## 2022-01-01 RX ADMIN — FUROSEMIDE 5 MG/HR: 10 INJECTION, SOLUTION INTRAVENOUS at 12:02

## 2022-01-01 RX ADMIN — DILTIAZEM HYDROCHLORIDE 10 MG: 5 INJECTION INTRAVENOUS at 04:02

## 2022-01-01 RX ADMIN — ACETAMINOPHEN 650 MG: 325 TABLET ORAL at 08:02

## 2022-01-01 RX ADMIN — Medication 80 MG: at 10:02

## 2022-01-01 RX ADMIN — CALCIUM GLUCONATE: 98 INJECTION, SOLUTION INTRAVENOUS at 07:02

## 2022-01-01 RX ADMIN — MORPHINE SULFATE 1 MG/HR: 10 INJECTION, SOLUTION INTRAMUSCULAR; INTRAVENOUS at 10:02

## 2022-01-01 RX ADMIN — FLUDROCORTISONE ACETATE 100 MCG: 0.1 TABLET ORAL at 08:02

## 2022-01-01 RX ADMIN — INSULIN ASPART 2 UNITS: 100 INJECTION, SOLUTION INTRAVENOUS; SUBCUTANEOUS at 01:02

## 2022-01-01 RX ADMIN — INSULIN ASPART 3 UNITS: 100 INJECTION, SOLUTION INTRAVENOUS; SUBCUTANEOUS at 09:02

## 2022-01-01 RX ADMIN — BENZONATATE 100 MG: 100 CAPSULE ORAL at 09:02

## 2022-01-01 RX ADMIN — GUAIFENESIN AND DEXTROMETHORPHAN 5 ML: 100; 10 SYRUP ORAL at 01:02

## 2022-01-01 RX ADMIN — ACETAMINOPHEN 650 MG: 325 TABLET ORAL at 12:02

## 2022-01-01 RX ADMIN — DICYCLOMINE HYDROCHLORIDE 20 MG: 20 INJECTION INTRAMUSCULAR at 10:02

## 2022-01-01 RX ADMIN — SODIUM CHLORIDE, SODIUM LACTATE, POTASSIUM CHLORIDE, AND CALCIUM CHLORIDE 500 ML: .6; .31; .03; .02 INJECTION, SOLUTION INTRAVENOUS at 03:02

## 2022-01-01 RX ADMIN — DEXTROSE MONOHYDRATE, SODIUM CITRATE, AND CITRIC ACID MONOHYDRATE: 2.45; 2.2; .8 INJECTION, SOLUTION INTRAVENOUS at 10:02

## 2022-01-01 RX ADMIN — NOREPINEPHRINE BITARTRATE 0.62 MCG/KG/MIN: 1 INJECTION, SOLUTION, CONCENTRATE INTRAVENOUS at 02:02

## 2022-01-01 RX ADMIN — MYCOPHENOLATE MOFETIL 500 MG: 200 POWDER, FOR SUSPENSION ORAL at 03:02

## 2022-01-01 RX ADMIN — SODIUM BICARBONATE 50 MEQ: 84 INJECTION INTRAVENOUS at 11:02

## 2022-01-01 RX ADMIN — FUROSEMIDE 20 MG: 10 INJECTION, SOLUTION INTRAMUSCULAR; INTRAVENOUS at 03:02

## 2022-01-01 RX ADMIN — SODIUM CHLORIDE: 0.9 INJECTION, SOLUTION INTRAVENOUS at 10:02

## 2022-01-01 RX ADMIN — INSULIN ASPART 5 UNITS: 100 INJECTION, SOLUTION INTRAVENOUS; SUBCUTANEOUS at 04:02

## 2022-01-01 RX ADMIN — IPRATROPIUM BROMIDE AND ALBUTEROL SULFATE 3 ML: 2.5; .5 SOLUTION RESPIRATORY (INHALATION) at 09:02

## 2022-01-01 RX ADMIN — CALCIUM GLUCONATE 1 G: 98 INJECTION, SOLUTION INTRAVENOUS at 11:02

## 2022-01-01 RX ADMIN — LIDOCAINE HYDROCHLORIDE 5 ML: 10 INJECTION, SOLUTION INFILTRATION; PERINEURAL at 12:02

## 2022-01-01 RX ADMIN — SODIUM BICARBONATE 50 MEQ: 84 INJECTION, SOLUTION INTRAVENOUS at 12:02

## 2022-01-01 RX ADMIN — ACETAMINOPHEN 650 MG: 325 TABLET ORAL at 04:02

## 2022-01-01 RX ADMIN — HYDROCORTISONE SODIUM SUCCINATE 100 MG: 100 INJECTION, POWDER, FOR SOLUTION INTRAMUSCULAR; INTRAVENOUS at 03:02

## 2022-01-01 RX ADMIN — NOREPINEPHRINE BITARTRATE 0.2 MCG/KG/MIN: 1 INJECTION, SOLUTION, CONCENTRATE INTRAVENOUS at 04:02

## 2022-01-01 RX ADMIN — SODIUM ZIRCONIUM CYCLOSILICATE 5 G: 5 POWDER, FOR SUSPENSION ORAL at 09:02

## 2022-01-01 RX ADMIN — NOREPINEPHRINE BITARTRATE 0.1 MCG/KG/MIN: 4 INJECTION, SOLUTION INTRAVENOUS at 10:02

## 2022-01-01 RX ADMIN — DEXTROSE 250 ML: 10 SOLUTION INTRAVENOUS at 08:02

## 2022-01-01 RX ADMIN — INSULIN HUMAN 5 UNITS: 100 INJECTION, SOLUTION PARENTERAL at 08:02

## 2022-01-01 RX ADMIN — Medication 80 MG: at 11:02

## 2022-01-01 RX ADMIN — INSULIN ASPART 6 UNITS: 100 INJECTION, SOLUTION INTRAVENOUS; SUBCUTANEOUS at 11:02

## 2022-01-01 RX ADMIN — VASOPRESSIN 0.04 UNITS/MIN: 20 INJECTION INTRAVENOUS at 05:02

## 2022-01-01 RX ADMIN — INSULIN ASPART 3 UNITS: 100 INJECTION, SOLUTION INTRAVENOUS; SUBCUTANEOUS at 10:02

## 2022-01-01 RX ADMIN — Medication 0.4 MG: at 07:02

## 2022-01-01 RX ADMIN — SODIUM PHOSPHATE, MONOBASIC, MONOHYDRATE 30 MMOL: 276; 142 INJECTION, SOLUTION INTRAVENOUS at 08:02

## 2022-01-01 RX ADMIN — ASPIRIN 325 MG ORAL TABLET 325 MG: 325 PILL ORAL at 09:02

## 2022-01-01 RX ADMIN — BENZONATATE 100 MG: 100 CAPSULE ORAL at 04:02

## 2022-01-01 RX ADMIN — HEPARIN SODIUM 3200 UNITS: 1000 INJECTION INTRAVENOUS; SUBCUTANEOUS at 05:02

## 2022-01-01 RX ADMIN — INSULIN HUMAN 1 UNITS/HR: 1 INJECTION, SOLUTION INTRAVENOUS at 06:02

## 2022-01-03 NOTE — TELEPHONE ENCOUNTER
----- Message from Lucía Padron MA sent at 1/3/2022 10:10 AM CST -----  Contact: molly [phone]    ----- Message -----  From: Ed Ashley  Sent: 1/3/2022   9:27 AM CST  To: [facility] Staff    Mrn# [id number]    Callback# [phone]    Additional Info# PT has been experiencing some really abnormal heart palputations that are very concerning. PT is requesting a callback to see about getting some guidance as to what she needs to do when it comes to this issue.

## 2022-01-03 NOTE — TELEPHONE ENCOUNTER
I talked to patient's sister (Tania) who takes care of Tammi Gallegosoway to discuss follow up since patent has started taking lasix. Was notified that she has lost ~ 10 lbs (currently 148 lbs) and her dyspnea is subjectively improved. No side effects. Informed her to stop the lasix for now since she feels back at baseline and get blood work done (BNP, BMP). Encouraged daily standing weights and low salt in diet. If increase in weight by 3 lbs in 1 day or 5 lbs in 1 week, can take prn lasix 20mg for three days and send me a message.     Dorys Hernandez MD  Cardiology, PGY VI  Pager: [phone]

## 2022-01-03 NOTE — TELEPHONE ENCOUNTER
I was not able to hear from pt but I talked to her sister who had talked to Dr Hernandez about that issue earlier.

## 2022-01-11 NOTE — TELEPHONE ENCOUNTER
Pt's sister, Tania Guido, called due to concerns about pt's HR. Her HR earlier today was 177-181 and now back to 103.  He BP has been running 120s/50-60.  Hx of atrial tachycardia per Holter.  Per sister, pt is asymptomatic.I called pt who confirmed the info.She mentioned later a cough associated w. palpitations. I scheduled her in Am w. dr JANELL Hernandez + EKG and she agreed to date/time of appointment(s).

## 2022-01-12 PROBLEM — E66.9 OBESITY (BMI 30.0-34.9): Status: RESOLVED | Noted: 2021-01-01 | Resolved: 2022-01-01

## 2022-01-12 NOTE — PROGRESS NOTES
Cardiology Clinic Note  Reason for Visit:palpitations  Date of Visit: 1/12/22    HPI:     Tammi Farris 71 y.o. F  with liver cirrhosis, T2DM, GERD, HLD, HFpEF who presents for follow up.     She was last seen last month for progressive SOBOE with simple activities (ex: showering, bathing, walking). She is a nonsmoker. DSE 12/7/21 was negative for myocardial ischemia, but had a hypotensieve episode afterwards attributed to dehydration. Pt was found to be volume overloaded with bilateral leg swelling, elevated JVD, elevated BNP and was started on a brief course of lasix during which she lost ~9 lbs and reports subjective improvement in her SOBOE and leg swelling. No exertional chest pain.     She is complaining of palpitations where she feels her heart pounding in her chest and associated SOB, at rest. On pulse ox, her HR is 180s. Episode last few minutes and resolve spontaneously. Last episode was yesterday. She has worn a holter monitor before which noted few minutes of atrial tachycardia, but unclear if symptoms correlated to her arrhythmia. No presyncope or syncope.     She was seen by Dr. Rodriguez 12/20/21 and had a normal spirometry test 12/20/21.    ROS:    Constitution: Negative for fever or chills. Negative for weight loss or gain.   HENT: Negative for sore throat or headaches. Negative for rhinorrhea.  Eyes: Negative for blurred or double vision.   Cardiovascular: See above  Pulmonary: Negative for SOB. Negative for cough.   Gastrointestinal: Negative for abdominal pain. Negative for nausea/ vomiting. Negative for diarrhea.   : Negative for dysuria.   Neurological: Negative for focal weakness or sensory changes.  PMH:     Past Medical History:   Diagnosis Date    Allergy     Anxiety     Basal cell carcinoma     Cirrhosis of liver without ascites 12/27/2017    Depression     Diabetes mellitus, type 2     Disorder of kidney and ureter     Endometriosis     GERD (gastroesophageal reflux disease)      Hyperlipidemia     Hypertension     Joint pain     Psoriasis      Past Surgical History:   Procedure Laterality Date    abdominal laproscopic surgery      APPENDECTOMY      CARPAL TUNNEL RELEASE      right    CHOLECYSTECTOMY  04/2015    COLONOSCOPY N/A 2/8/2019    Procedure: COLONOSCOPY;  Surgeon: Celso Salas MD;  [facility]  Service: Endoscopy;  Laterality: N/A;    ESOPHAGOGASTRODUODENOSCOPY N/A 2/8/2019    Procedure: EGD (ESOPHAGOGASTRODUODENOSCOPY);  Surgeon: Celso Salas MD;  [facility]  Service: Endoscopy;  Laterality: N/A;  varices screening, labs ordered prior    HYSTERECTOMY  age 25    JOSEFA/BSO (endometriosis)    OOPHORECTOMY      SKIN CANCER DESTRUCTION      UPPER GASTROINTESTINAL ENDOSCOPY       Allergies:     Review of patient's allergies indicates:   Allergen Reactions    Dobutamine Hives    Benadryl [diphenhydramine hcl] Anxiety     Pt states she feels jumpy and anxious when taking.    Darvon [propoxyphene] Nausea Only    Shellfish containing products Hives    Iodinated contrast media Hives    Lisinopril Other (See Comments)     cough    Propoxyphene hcl      Itchy (skin)^    Tizanidine Other (See Comments)     Sweaty, difficulty swallowing    Statins-hmg-coa reductase inhibitors Anxiety and Other (See Comments)     Myalgia, fatigue, palpitations, anxiety     Medications:     Current Outpatient Medications on File Prior to Visit   Medication Sig Dispense Refill    allopurinoL (ZYLOPRIM) 100 MG tablet Take 1 tablet (100 mg total) by mouth once daily. 30 tablet 2    betamethasone dipropionate (DIPROLENE) 0.05 % cream Apply topically 2 (two) times daily. To affected areas on lower back 45 g 1    blood sugar diagnostic Strp Use 1-2 times daily to check blood sugar as directed. 100 strip 11    blood-glucose meter kit Use as instructed to check blood sugar 1-2 times a day 1 each 0    cholecalciferol, vitamin D3, (VITAMIN D3) 25 mcg  (1,000 unit) capsule Take 1 capsule (1,000 Units total) by mouth once daily.  0    clotrimazole (LOTRIMIN) 1 % cream APPLY TO THE AFFECTED AREA OF RASH ON BREASTS AND IN FOLDS OF SKIN AS DIRECTED TWICE DAILY 60 g 1    fluocinonide (LIDEX) 0.05 % external solution AAA scalp qday - bid prn pruritus 60 mL 3    furosemide (LASIX) 20 MG tablet Take 1 tablet (20 mg total) by mouth once daily. 30 tablet 11    lancets Misc 1 lancet by Misc.(Non-Drug; Combo Route) route 2 (two) times daily with meals. 100 each 11    lancing device Misc 1 Device by Misc.(Non-Drug; Combo Route) route 2 (two) times daily with meals. 1 each 0    losartan (COZAAR) 50 MG tablet TAKE 1 TABLET(50 MG) BY MOUTH EVERY DAY 90 tablet 1    omeprazole (PRILOSEC) 40 MG capsule Take 1 capsule (40 mg total) by mouth 2 (two) times daily. Take first thing in the morning and then again 30 minutes before dinner 60 capsule 3    triamcinolone acetonide 0.025% (KENALOG) 0.025 % cream AAA under breasts and groin bid after cool blow dry prn 30 g 3    [DISCONTINUED] triamcinolone acetonide 0.1% (KENALOG) 0.1 % cream AAA body (arm/lower back/abdomen)  bid (Patient taking differently: AAA body (arm/lower back/abdomen)  bid) 454 g 3    metFORMIN (GLUCOPHAGE-XR) 500 MG ER 24hr tablet Take 1 tablet after breakfast and 2 tablets after dinner every day (Patient not taking: No sig reported) 270 tablet 3    [DISCONTINUED] bisoproloL-hydrochlorothiazide (ZIAC) 10-6.25 mg per tablet Take 1 tablet by mouth once daily. (Patient not taking: No sig reported) 90 tablet 3    [DISCONTINUED] fluticasone propionate (FLONASE) 50 mcg/actuation nasal spray 1 spray (50 mcg total) by Each Nostril route 2 (two) times daily as needed for Rhinitis. (Patient not taking: No sig reported) 15 g 0    [DISCONTINUED] loratadine (CLARITIN) 10 mg tablet Take 1 tablet (10 mg total) by mouth once daily. (Patient not taking: No sig reported) 30 tablet 0     No current facility-administered  "medications on file prior to visit.     Social History:     Social History     Tobacco Use    Smoking status: Never Smoker    Smokeless tobacco: Never Used   Substance Use Topics    Alcohol use: Not Currently     Family History:     Family History   Problem Relation Age of Onset    Arthritis Mother     Hypertension Mother     Hypertension Sister     Asthma Brother     Hyperlipidemia Brother     Hypertension Brother     No Known Problems Father     Raynaud syndrome Sister     Breast cancer Neg Hx     Ovarian cancer Neg Hx     Colon cancer Neg Hx     Stomach cancer Neg Hx     Esophageal cancer Neg Hx     Rectal cancer Neg Hx     Irritable bowel syndrome Neg Hx     Ulcerative colitis Neg Hx     Crohn's disease Neg Hx     Celiac disease Neg Hx     Melanoma Neg Hx     Cirrhosis Neg Hx      Physical Exam:   BP (!) 142/65 (BP Location: Left arm, Patient Position: Sitting, BP Method: Medium (Automatic))   Pulse 95   Ht 4' 11" (1.499 m)   Wt 66.8 kg (147 lb 4.3 oz)   SpO2 97%   BMI 29.74 kg/m²      Constitutional: No acute distress, conversant  HEENT: Sclera anicteric, extraocular motions intact, Oropharynx clear  Neck: No JVD, no carotid bruits  Cardiovascular: regular rate and rhythm, no murmur, rubs or gallops, normal S1/S2  Pulmonary: Clear to auscultation bilaterally  Abdominal: Abdomen soft, nontender, nondistended, positive bowel sounds  Extremities: No lower extremity edema,   Pulses: 2+ BL Radial, 2+ BL carotid, 2+ BL DP, 2+ BL PT  Skin: No ecchymosis, erythema, or ulcers  Psych: Alert and oriented x 3, appropriate affect  Neuro: CNII-XII intact, no focal deficits    Labs:     Lab Results   Component Value Date     (L) 01/03/2022    K 4.6 01/03/2022     01/03/2022    CO2 20 (L) 01/03/2022    BUN 12 01/03/2022    CREATININE 0.9 01/03/2022    ANIONGAP 7 (L) 01/03/2022     Lab Results   Component Value Date    AST 51 (H) 12/13/2021    ALT 26 12/13/2021    ALKPHOS 88 12/13/2021    " BILITOT 1.2 (H) 12/13/2021    ALBUMIN 2.6 (L) 12/13/2021     Lab Results   Component Value Date    CALCIUM 9.3 01/03/2022    MG 1.9 08/28/2019     Lab Results   Component Value Date     (H) 01/03/2022     (H) 12/22/2021     (H) 12/07/2021    Lab Results   Component Value Date    WBC 6.95 12/21/2021    HGB 8.8 (L) 12/21/2021    HCT 27.1 (L) 12/21/2021    HCT 28 (L) 12/08/2021     (L) 12/21/2021    GRAN 2.5 12/13/2021    GRAN 38.1 12/13/2021     Lab Results   Component Value Date    INR 1.1 12/08/2021     Lab Results   Component Value Date    CHOL 151 03/22/2021    HDL 33 (L) 03/22/2021    LDLCALC 97.8 03/22/2021    TRIG 101 03/22/2021     Lab Results   Component Value Date    HGBA1C 6.3 (H) 03/22/2021     Lab Results   Component Value Date    TSH 2.262 12/20/2021          Imaging:     CT Chest 11/30/21: No evidence of pulmonary thromboembolism or infarct.   Diffuse mosaic attenuation to the lung parenchyma with scattered bands of subsegmental atelectasis and mild interlobular septal thickening.  Mosaic attenuation patterns can be seen with small airway disease or sequela of chronic pulmonary embolic disease.  Bilateral prominent axillary lymph nodes.  Consider correlation with breast exam and mammography    DSE 12/7/21  Negative for myocardial ischemia.   LVEF 68%, indeterminate diastolic dysfunction  Normal right ventricular size and function  Mild TR, MR  Trivial anterior pericardial effusion.  At rest BP 88/60, at peak Stress 198/50. Achieved 75% of max predicted heart rate.    ECG 11/30/21: sinus rhythm, nonspecific ST/T wave changes  ECG 1/12/22: sinus rhythm, nonspecific ST/T wave changes    PFT 12/20/21: FEV1/FVC normal spirometry    Holter monitor 12/7/21:  Sinus rhythm with frequent PACs.  10 runs of a long RP narrow complex tachycardia up to ~5 minutes in duration. Initiations are both spontaneous and post-PVC couplets. Spontaneous terminations are with a QRS morphology. Taken  together this is likely an atrial tachycardia.    Assessment & Plan:     Heart failure with preserved ejection fraction - currently euvolemic with NYHA III symptoms.  Shortness of breath on exertion - improved with diuresis  - dry weight 146 lbs. Encouraged pt to keep taking daily standing weights and lasix prn for > 3lbs weight gain in 1 day or > 5 lbs in 1 week  - discussed low salt diet  - encouraged more physical activity daily, weight loss    Atrial tachycardia - on monitor, unclear if correlates with palpitations necessarily but pt is having symptomatic episodes.   - will start metoprolol 25 mg BID and repeat holter in 1 week.   - If pt still has atrial tachycardia or symptoms, will refer to EP to discuss candidacy for additional medical therapy or ablation.   - no Afib/Aflutter on holter. No indication to start A/C at this time.     Essential Hypertension - BP elevated in clinic today, but pt has not been taking losartan regularly  - encouraged compliance with losartan daily    Type 2 diabetes mellitus without complication, without long-term current use of insulin  - A1c 6.3 off metformin. Manage per primary     Cirrhosis 2/2 GARCIA - last MELD 7 but concern for progressive given relatively low BP, progressive abdominal swelling and leg swelling. Recommend follow up with Hepatology.      Macrocytic anemia with elevated free light chains - follows with Heme/Onc and wanted to get BM biopsy but pt refused at the time. Encouraged follow up.      Plan of care discussed with Dr. Mares    RTC in 3 months, or earlier, as needed    Signed:  Dorys Hernandez MD  Cardiology Fellow PGY VI  Beeper:  [phone]

## 2022-01-18 NOTE — PROGRESS NOTES
Hepatology Note    PATIENT: Tammi Farris  MRN: [id number]  DATE: 1/19/2022    Provider: Hepatologist - Dr Oh  Urgency of review: non-urgent  Referring provider: Libby Tilley  PCP: Ann-Marie Hartman    Diagnosis:   1. Fatty liver    2. Alcohol abuse    3. Cirrhosis of liver without ascites, unspecified hepatic cirrhosis type    4. Portal hypertension    5. Type 2 diabetes mellitus without complication, without long-term current use of insulin    6. Primary hypertension        Chief complaint:   Chief Complaint   Patient presents with    Hepatic Disease       Subjective:    Initial History: Tammi Farris is a 71 y.o. female who was referred to Hepatology Clinic for consultation of well compensated JOANIE/GARCIA cirrhosis. Patient is new to me and previously seen by Breann Rebolledo NP    1/19/22  Intermittent nose bleeds. Had treatment with ENT recently but still have intermittent episodes. No bleeding from any site  She denies recent hematemesis, coffee ground emesis, melena, hematochezia, jaundice, confusion, LE edema, abdominal distension.     Etiological workup for viral ( Hepatitis A/B/C), autoimmune, genetic liver disease ( Andrew, A1AT etc) is negative      Risk factors for NAFLD include obesity, DM, HLD, and HTN.     Cirrhosis Health Maintenance:   -- Last EGD 2/2019 no Varices,   -- HCC screening              U/S no lesions, 9/2021              AFP WNL,  --+ Immunity to Hep A and B     Per records last visit 9/2021  Diagnosed with cirrhosis 12/2017 visit based on u/s with nodular liver contour and Fibroscan = F4    Prior Relevant History:    She  denies hepatotoxic medication    Review of systems:  A review of 12+ systems was conducted with pertinent positive and negative findings documented in HPI with all other systems reviewed and negative.      PFSH:  Past medical, family, and social history reviewed as documented in chart with pertinent positive medical, family, and social history detailed in  HPI.    Past Medical History:   Past Medical History:   Diagnosis Date    Allergy     Anxiety     Basal cell carcinoma     Cirrhosis of liver without ascites 12/27/2017    Depression     Diabetes mellitus, type 2     Disorder of kidney and ureter     Endometriosis     GERD (gastroesophageal reflux disease)     Hyperlipidemia     Hypertension     Joint pain     Psoriasis        Past Surgical HIstory:   Past Surgical History:   Procedure Laterality Date    abdominal laproscopic surgery      APPENDECTOMY      CARPAL TUNNEL RELEASE      right    CHOLECYSTECTOMY  04/2015    COLONOSCOPY N/A 2/8/2019    Procedure: COLONOSCOPY;  Surgeon: Celso Salas MD;  [facility]  Service: Endoscopy;  Laterality: N/A;    ESOPHAGOGASTRODUODENOSCOPY N/A 2/8/2019    Procedure: EGD (ESOPHAGOGASTRODUODENOSCOPY);  Surgeon: Celso Salas MD;  [facility] ([facility]  Service: Endoscopy;  Laterality: N/A;  varices screening, labs ordered prior    HYSTERECTOMY  age 25    JOSEFA/BSO (endometriosis)    OOPHORECTOMY      SKIN CANCER DESTRUCTION      UPPER GASTROINTESTINAL ENDOSCOPY         Family History:   Family History   Problem Relation Age of Onset    Arthritis Mother     Hypertension Mother     Hypertension Sister     Asthma Brother     Hyperlipidemia Brother     Hypertension Brother     No Known Problems Father     Raynaud syndrome Sister     Breast cancer Neg Hx     Ovarian cancer Neg Hx     Colon cancer Neg Hx     Stomach cancer Neg Hx     Esophageal cancer Neg Hx     Rectal cancer Neg Hx     Irritable bowel syndrome Neg Hx     Ulcerative colitis Neg Hx     Crohn's disease Neg Hx     Celiac disease Neg Hx     Melanoma Neg Hx     Cirrhosis Neg Hx      She has no known family history of liver disease.     Social History:  reports that she has never smoked. She has never used smokeless tobacco. She reports previous alcohol use. She reports that she does not use  drugs.    She has significant history of Alcohol     Allergies:  Review of patient's allergies indicates:   Allergen Reactions    Dobutamine Hives    Benadryl [diphenhydramine hcl] Anxiety     Pt states she feels jumpy and anxious when taking.    Darvon [propoxyphene] Nausea Only    Shellfish containing products Hives    Iodinated contrast media Hives    Lisinopril Other (See Comments)     cough    Propoxyphene hcl      Itchy (skin)^    Tizanidine Other (See Comments)     Sweaty, difficulty swallowing    Statins-hmg-coa reductase inhibitors Anxiety and Other (See Comments)     Myalgia, fatigue, palpitations, anxiety       Medications:  Current Outpatient Medications   Medication Sig Dispense Refill    allopurinoL (ZYLOPRIM) 100 MG tablet Take 1 tablet (100 mg total) by mouth once daily. 30 tablet 2    blood sugar diagnostic Strp Use 1-2 times daily to check blood sugar as directed. 100 strip 11    blood-glucose meter kit Use as instructed to check blood sugar 1-2 times a day 1 each 0    cholecalciferol, vitamin D3, (VITAMIN D3) 25 mcg (1,000 unit) capsule Take 1 capsule (1,000 Units total) by mouth once daily.  0    furosemide (LASIX) 20 MG tablet Take 1 tablet (20 mg total) by mouth once daily. 30 tablet 11    lancets Misc 1 lancet by Misc.(Non-Drug; Combo Route) route 2 (two) times daily with meals. 100 each 11    lancing device Misc 1 Device by Misc.(Non-Drug; Combo Route) route 2 (two) times daily with meals. 1 each 0    losartan (COZAAR) 50 MG tablet TAKE 1 TABLET(50 MG) BY MOUTH EVERY DAY 90 tablet 1    metFORMIN (GLUCOPHAGE-XR) 500 MG ER 24hr tablet Take 1 tablet after breakfast and 2 tablets after dinner every day 270 tablet 3    metoprolol tartrate (LOPRESSOR) 25 MG tablet Take 1 tablet (25 mg total) by mouth 2 (two) times daily. 60 tablet 11    omeprazole (PRILOSEC) 40 MG capsule Take 1 capsule (40 mg total) by mouth 2 (two) times daily. Take first thing in the morning and then again 30  minutes before dinner 60 capsule 3    betamethasone dipropionate (DIPROLENE) 0.05 % cream Apply topically 2 (two) times daily. To affected areas on lower back (Patient not taking: Reported on 1/19/2022) 45 g 1    clotrimazole (LOTRIMIN) 1 % cream APPLY TO THE AFFECTED AREA OF RASH ON BREASTS AND IN FOLDS OF SKIN AS DIRECTED TWICE DAILY (Patient not taking: Reported on 1/19/2022) 60 g 1    fluocinonide (LIDEX) 0.05 % external solution AAA scalp qday - bid prn pruritus (Patient not taking: Reported on 1/19/2022) 60 mL 3    triamcinolone acetonide 0.025% (KENALOG) 0.025 % cream AAA under breasts and groin bid after cool blow dry prn (Patient not taking: Reported on 1/19/2022) 30 g 3     No current facility-administered medications for this visit.       Review of Systems   Constitutional: Negative for fatigue, fever and unexpected weight change.   HENT: Negative for ear pain, nosebleeds and trouble swallowing.    Eyes: Negative for discharge and redness.   Respiratory: Negative for cough and shortness of breath.    Cardiovascular: Negative for palpitations and leg swelling.   Gastrointestinal: Positive for abdominal pain. Negative for abdominal distention, diarrhea and vomiting.   Endocrine: Negative for cold intolerance and polyuria.   Genitourinary: Negative for flank pain and hematuria.   Musculoskeletal: Negative for back pain.   Skin: Negative for pallor.   Neurological: Negative for seizures and headaches.   Hematological: Bruises/bleeds easily.   Psychiatric/Behavioral: Negative for confusion and hallucinations.       MELD-Na score: 9 at 12/9/2021  4:59 AM  MELD score: 9 at 12/9/2021  4:59 AM  Calculated from:  Serum Creatinine: 1.2 mg/dL at 12/9/2021  4:59 AM  Serum Sodium: 141 mmol/L (Using max of 137 mmol/L) at 12/9/2021  4:59 AM  Total Bilirubin: 0.9 mg/dL (Using min of 1 mg/dL) at 12/7/2021  6:50 PM  INR(ratio): 1.1 at 12/8/2021  4:07 AM  Age: 71 years     Objective:      Vitals:   Vitals:    01/19/22  "0944   BP: (!) 122/58   BP Location: Right arm   Patient Position: Sitting   BP Method: Medium (Automatic)   Pulse: 78   Resp: 18   Temp: 97 °F (36.1 °C)   TempSrc: Oral   SpO2: 97%   Weight: 68 kg (149 lb 14.6 oz)   Height: 4' 11" (1.499 m)       Physical Exam  Constitutional:       Appearance: Normal appearance.   HENT:      Head: Normocephalic and atraumatic.      Right Ear: External ear normal.      Left Ear: External ear normal.      Mouth/Throat:      Mouth: Mucous membranes are moist.   Eyes:      Extraocular Movements: Extraocular movements intact.      Pupils: Pupils are equal, round, and reactive to light.   Cardiovascular:      Rate and Rhythm: Normal rate and regular rhythm.      Pulses: Normal pulses.      Heart sounds: Normal heart sounds.   Pulmonary:      Effort: Pulmonary effort is normal.      Breath sounds: Normal breath sounds.   Abdominal:      General: Bowel sounds are normal. There is no distension.      Palpations: Abdomen is soft. There is no mass.      Tenderness: There is no abdominal tenderness.   Musculoskeletal:         General: Normal range of motion.      Cervical back: Normal range of motion and neck supple.   Neurological:      General: No focal deficit present.      Mental Status: She is alert and oriented to person, place, and time.         Laboratory Data:  No visits with results within 1 Week(s) from this visit.   Latest known visit with results is:   Lab Visit on 01/03/2022   Component Date Value Ref Range Status    Sodium 01/03/2022 134* 136 - 145 mmol/L Final    Potassium 01/03/2022 4.6  3.5 - 5.1 mmol/L Final    Chloride 01/03/2022 107  95 - 110 mmol/L Final    CO2 01/03/2022 20* 23 - 29 mmol/L Final    Glucose 01/03/2022 176* 70 - 110 mg/dL Final    BUN 01/03/2022 12  8 - 23 mg/dL Final    Creatinine 01/03/2022 0.9  0.5 - 1.4 mg/dL Final    Calcium 01/03/2022 9.3  8.7 - 10.5 mg/dL Final    Anion Gap 01/03/2022 7* 8 - 16 mmol/L Final    eGFR if African American " 01/03/2022 >60.0  >60 mL/min/1.73 m^2 Final    eGFR if non African American 01/03/2022 >60.0  >60 mL/min/1.73 m^2 Final    Comment: Calculation used to obtain the estimated glomerular filtration  rate (eGFR) is the CKD-EPI equation.       BNP 01/03/2022 150* 0 - 99 pg/mL Final    Values of less than 100 pg/ml are consistent with non-CHF populations.          I personally reviewed imaging studies and outside records..    Imaging study:   US Impression:     1. Morphologic changes of cirrhosis with superimposed steatosis.  2. Splenomegaly.        Electronically signed by: Hima Govea MD  Date:                                            09/24/2021  Time:                                           08:30    Assessment:       1. Fatty liver    2. Alcohol abuse    3. Cirrhosis of liver without ascites, unspecified hepatic cirrhosis type    4. Portal hypertension    5. Type 2 diabetes mellitus without complication, without long-term current use of insulin    6. Primary hypertension                 Plan:     Tammi was seen today for hepatic disease.    Diagnoses and all orders for this visit:    Fatty liver    Alcohol abuse    Cirrhosis of liver without ascites, unspecified hepatic cirrhosis type  -     Ambulatory referral/consult to Hepatology    Portal hypertension    Type 2 diabetes mellitus without complication, without long-term current use of insulin    Primary hypertension       --Cirrhosis  Liver disease:                   GARCIA/JOANIE  MELD-Na:                         9  Child-Jones Class:             A  Cirrhosis is compensated w  Transplant status:             not under evaluation  Transplant evaluation not warranted given low MELD           - Varices: EGD surveillance as per GI recommendation.   --HCC surveillance: CLIMB study    Fatty liver  1. Weight loss goal of 15-20 lbs  2. Low carb/sugar, high fiber and protein diet  3. Exercise, 5 days per week, 30 minutes per day, as tolerated  4. Recommend good cholesterol,  blood pressure, blood sugar levels      Diabetes  Chronic; stable on current treatment plan; follow up with PCP    Hypertension  Chronic; stable on current treatment plan; follow up with PCP    Epistaxis  Follow up hematology and ENT    Anemia and coagulopathy  Follow up hematology    Cirrhosis Counseling  - strict abstinence of alcohol use  - avoid non-steroidal anti-inflammatory drugs (NSAIDs) such as ibuprofen, Motrin, naprosyn, Alleve due to the risk of kidney damage  - can take acetaminophen (Tylenol), no more than 2000 mg per day  - low sodium (salt) 2 gram per day diet  - nutrition: 25-30kcal (calorie per body body weight in kilogram) per day  - no need to restrict protein in diet  - high protein diet: 1.2-1.5 gram/kg (protein per body weight in kilogram) per day to prevent muscle mass loss  - avoid fasting  -I recommend you do NOT drive a car or operate machinery currently considering risk of Hepatic encephalopathy  - Late night snack with 50 gram of complex carbohydrates and protein  - resistance exercises for muscle strength  - avoid raw seafoods due to the risk of fatal Vibrio vulnificus infection  - ultrasound or MRI of the liver every 6 months for liver cancer screening (you are at risk of developing liver cancer due to scar tissue in the liver)  - Upper endoscopy every 1-2 years to screen for varices in the stomach and foodpipe which can burst and cause fatal bleeding        Return to clinic in 6 months.    I have sent communication to the referring physician and/or primary care provider.      Time Statement  A total of 40  minutes were spent   Time Includes preparing to see patient, reviewing  diagnostic studies and records, direct face-to-face visit, completing orders, medications , reconciliation, prescription management, and care coordination    We discussed in depth the nature of the patient's disease, the management plan in details. I have provided the patient with an opportunity to ask questions  and have all questions answered to his satisfaction.       Gabo Oh MD  Transplant Hepatologist and Gastroenterologist  Hepatology and Liver Transplant  [facility]  [facility] [place]

## 2022-01-19 NOTE — TELEPHONE ENCOUNTER
----- Message from Gabo Oh MD sent at 1/19/2022  2:42 PM CST -----  Results reviewed. No change in plan

## 2022-01-20 NOTE — TELEPHONE ENCOUNTER
Received message from the patient she was unable to get the Rx for Mephyton filled at the [facility] on [address].    Call placed to [facility] on [address] [phone]. Spoke with Cecy. The Rx has been transferred over to the [facility] on [address] in [place]. It will be ready at 12 pm.    Call placed to the patient. Message relayed. She stated yes they have called me. My  and I will go and pick it up in [place].

## 2022-01-21 NOTE — TELEPHONE ENCOUNTER
Call returned to the patient. Patient informed Dr Oh sent a new Rx in for the Mephyton (vitamin K1) oral to the [facility] on [address].    Patient stated they do not have it. Does the pharmacy there have it?  Call placed to [facility] location [phone].  They checked and do have the medication in stock. Will transfer the Rx over from [facility] on [address].    Patient called and informed they are working on transferring the Rx and filling it for you.  Voiced understanding.

## 2022-01-21 NOTE — TELEPHONE ENCOUNTER
----- Message from Gabo Oh MD sent at 1/21/2022  9:42 AM CST -----  Regarding: RE: speak to staff  Contact: Tammi  Sent    ----- Message -----  From: Sarai Dominguez RN  Sent: 1/21/2022   8:53 AM CST  To: Gabo Oh MD  Subject: FW: speak to staff                                 Patient will need a new prescription sent out to the Pharmacy.    Thanks    ----- Message -----  From: Gabo Oh MD  Sent: 1/21/2022   8:28 AM CST  To: Sarai Dominguez RN  Subject: RE: speak to staff                               Yes she needs to take 5 tablets daily for atleast 3-5 days    ----- Message -----  From: Sarai Dominguez RN  Sent: 1/20/2022   2:05 PM CST  To: Gabo Oh MD  Subject: FW: speak to staff                               Please clarify.  We will reach out to the patient.    Thanks    ----- Message -----  From: Anaid Dubose  Sent: 1/20/2022   1:58 PM CST  To: Adriano Ayon V Staff  Subject: speak to staff                                   Patient called to speak to staff regarding her Vitamin K medication. She states Dr. Oh told her take 5 pills for 5 days, but the pharmacy only gave her 1 pill.     Contact: [phone]

## 2022-01-26 NOTE — TELEPHONE ENCOUNTER
----- Message from Aminata Echeverria sent at 1/26/2022 11:55 AM CST -----  Contact: Tania [phone]  Patient would like to get a referral.  Referral to what specialty:  Hematology  Does the patient want the referral with a specific physician: no   [facility]  Reason (be specific):  nose bleeds  Does the patient already have the specialty clinic appointment scheduled:    If yes, what date is the appointment scheduled:     Is the insurance listed in Epic correct? (this is important for a referral):  yes  Advised patient that once provider approves this either a nurse or  will return their call?: yes  Would the patient like a call back, or a response through their [facility] portal?: call  Comments:

## 2022-01-26 NOTE — TELEPHONE ENCOUNTER
----- Message from Alicia Maguire sent at 1/26/2022  4:22 PM CST -----  Regarding: schedule  Contact: [phone]  Request Appt    Appt Type: Second opinion   Call Back Number:[phone]  Additional Information:

## 2022-01-28 NOTE — TELEPHONE ENCOUNTER
Called pt to review results of holter monitor which showed predominantly sinus rhythm with rare PACs, rare PVCs, and 1 episode nonsustained AT. Pt has been tolerating metoprolol well without side effects and no recent episodes of palpitations. Will continue current therapy for now.       Dorys Hernandez MD  Cardiology, PGY VI  Pager: [phone]

## 2022-01-31 NOTE — TELEPHONE ENCOUNTER
Tammi calling states feeling SOB, states she feels like she is struggling for each breath and sounds short winded. Advised pt per triage protocol call  now.     Reason for Disposition   SEVERE difficulty breathing (e.g., struggling for each breath, speaks in single words, pulse > 120)    Protocols used: BREATHING DIFFICULTY-A-OH

## 2022-01-31 NOTE — TELEPHONE ENCOUNTER
Former patient of  calling to scheduled an appointment. LVM for patient to contact clinic to schedule.

## 2022-01-31 NOTE — TELEPHONE ENCOUNTER
----- Message from Gabo Oh MD sent at 1/31/2022 11:21 AM CST -----  Imaging does not suggest liver cancer.   Continue liver cancer surveillance with AFP and Imaging of abdomen every 6 months.

## 2022-01-31 NOTE — TELEPHONE ENCOUNTER
----- Message from Kayla Griffin sent at 1/31/2022 11:22 AM CST -----  Contact: Patient  Type: Appointment Request    Name of Caller:Patient   Would the patient rather a call back or a response via [facility]? Call back   Best Call Back Number:[phone]  Additional Information: Please assist     Referral on Active Request

## 2022-02-01 PROBLEM — N17.9 ACUTE RENAL FAILURE SUPERIMPOSED ON STAGE 2 CHRONIC KIDNEY DISEASE: Status: ACTIVE | Noted: 2022-01-01

## 2022-02-01 PROBLEM — D69.6 THROMBOCYTOPENIA: Chronic | Status: ACTIVE | Noted: 2021-02-21

## 2022-02-01 PROBLEM — E87.5 HYPERKALEMIA: Status: ACTIVE | Noted: 2022-01-01

## 2022-02-01 PROBLEM — E87.20 METABOLIC ACIDOSIS: Status: ACTIVE | Noted: 2021-01-01

## 2022-02-01 PROBLEM — N18.2 ACUTE RENAL FAILURE SUPERIMPOSED ON STAGE 2 CHRONIC KIDNEY DISEASE: Status: ACTIVE | Noted: 2022-01-01

## 2022-02-01 PROBLEM — K74.60 CIRRHOSIS OF LIVER WITHOUT ASCITES: Chronic | Status: ACTIVE | Noted: 2017-12-27

## 2022-02-01 PROBLEM — G47.30 SLEEP APNEA: Chronic | Status: ACTIVE | Noted: 2021-01-01

## 2022-02-01 PROBLEM — R74.01 TRANSAMINITIS: Status: ACTIVE | Noted: 2022-01-01

## 2022-02-01 PROBLEM — R79.89 POSITIVE D DIMER: Status: ACTIVE | Noted: 2022-01-01

## 2022-02-01 PROBLEM — R06.02 SHORTNESS OF BREATH: Status: ACTIVE | Noted: 2022-01-01

## 2022-02-01 NOTE — HPI
71 y.o. female with HTN, HLD, GERD, cirrhosis of liver, thrombocytopenia, and sleep apnea presents with a complaint of cough and congestion since October.  She is chronically dyspneic, exacerbated by exertion, has seen Cardiology and was prescribed lasix and metoprolol.  Her sister has now noted that the patient has very low urine output and is constipated.  Also follows with Hepatology for chronic liver disease.  Patient denies fever, chills, chest pain, palpitations, orthopnea, PND, dizziness, syncope, n/v/d, abdominal pain, or dysuria.  In the ED, labs reveal ANI, hyperkalemia, metabolic acidosis with elevated lactic acid, elevated liver enzymes, elevated BNP, elevated d-dimer, thrombocytopenia, and markedly concentrated urine.  Echo December 2021 showed preserved EF, no evidence of heart failure.  Chest xray without acute abnormality. No convincing evidence of infectious process.

## 2022-02-01 NOTE — ED PROVIDER NOTES
"Encounter Date: 2/1/2022    SCRIBE #1 NOTE: I, Moira Chakraborty, am scribing for, and in the presence of,  Nataly Carroll DO. I have scribed the following portions of the note - Other sections scribed: HPI; ROS; PE.       History     Chief Complaint   Patient presents with    Cough     Pt has been having cough and congestion since October. Pt has been seeing a dr for these symptoms and they give her diuretics. Pt had echo within the last month and it was normal.      The Patient is a 71 year old female with DM, HTN, Cirrhosis, and Anxiety who presents to the ED with a cough, shortness of breath, restlessness, and chest "pressure" onset 4 days ago. Patient's sister also reports that the patient has a decreased appetite and has been feeling winded. Patient states that she hasn't slept in 4 days and is anxious. She has not attempted treatment at home. Patient denies taking medications for anxiety. She is taking Lasix 20 mg as prescribed by her Cardiologist.  Patient's sister states she has not been making much urine in the last few days.  Patient's sister reports patient's heart rate was 181 bpm 2 weeks ago which is why they went to the heart doctor.   Patient's sister is at bedside and provides a good portion of the HPI.    The history is provided by the patient and a relative. No  was used.     Review of patient's allergies indicates:   Allergen Reactions    Dobutamine Hives    Benadryl [diphenhydramine hcl] Anxiety     Pt states she feels jumpy and anxious when taking.    Darvon [propoxyphene] Nausea Only    Shellfish containing products Hives    Iodinated contrast media Hives    Lisinopril Other (See Comments)     cough    Propoxyphene hcl      Itchy (skin)^    Tizanidine Other (See Comments)     Sweaty, difficulty swallowing    Statins-hmg-coa reductase inhibitors Anxiety and Other (See Comments)     Myalgia, fatigue, palpitations, anxiety     Past Medical History:   Diagnosis Date    " Allergy     Anxiety     Basal cell carcinoma     Cirrhosis of liver without ascites 12/27/2017    Depression     Diabetes mellitus, type 2     Disorder of kidney and ureter     Endometriosis     GERD (gastroesophageal reflux disease)     Hyperlipidemia     Hypertension     Joint pain     Psoriasis      Past Surgical History:   Procedure Laterality Date    abdominal laproscopic surgery      APPENDECTOMY      CARPAL TUNNEL RELEASE      right    CHOLECYSTECTOMY  04/2015    COLONOSCOPY N/A 2/8/2019    Procedure: COLONOSCOPY;  Surgeon: Celso Salas MD;  [facility]  Service: Endoscopy;  Laterality: N/A;    ESOPHAGOGASTRODUODENOSCOPY N/A 2/8/2019    Procedure: EGD (ESOPHAGOGASTRODUODENOSCOPY);  Surgeon: Celso Salas MD;  [facility]  Service: Endoscopy;  Laterality: N/A;  varices screening, labs ordered prior    HYSTERECTOMY  age 25    JOSEFA/BSO (endometriosis)    OOPHORECTOMY      SKIN CANCER DESTRUCTION      UPPER GASTROINTESTINAL ENDOSCOPY       Family History   Problem Relation Age of Onset    Arthritis Mother     Hypertension Mother     Hypertension Sister     Asthma Brother     Hyperlipidemia Brother     Hypertension Brother     No Known Problems Father     Raynaud syndrome Sister     Breast cancer Neg Hx     Ovarian cancer Neg Hx     Colon cancer Neg Hx     Stomach cancer Neg Hx     Esophageal cancer Neg Hx     Rectal cancer Neg Hx     Irritable bowel syndrome Neg Hx     Ulcerative colitis Neg Hx     Crohn's disease Neg Hx     Celiac disease Neg Hx     Melanoma Neg Hx     Cirrhosis Neg Hx      Social History     Tobacco Use    Smoking status: Never Smoker    Smokeless tobacco: Never Used   Substance Use Topics    Alcohol use: Not Currently    Drug use: No     Review of Systems   Constitutional: Positive for appetite change.   HENT: Negative for congestion.    Eyes: Negative for visual disturbance.   Respiratory: Positive for  cough and shortness of breath.    Cardiovascular: Negative for chest pain.        Positive for chest pressure.   Gastrointestinal: Negative for abdominal pain.   Endocrine: Negative for polyuria.   Musculoskeletal: Negative for arthralgias.   Neurological: Negative for dizziness.   Psychiatric/Behavioral: Positive for sleep disturbance. The patient is nervous/anxious.    All other systems reviewed and are negative.      Physical Exam     Initial Vitals [22 0733]   BP Pulse Resp Temp SpO2   (!) 143/67 71 18 98.4 °F (36.9 °C) 97 %      MAP       --         Patient gave consent to have physical exam performed.  Physical Exam    Nursing note and vitals reviewed.  Constitutional: She appears well-developed and well-nourished.   HENT:   Head: Normocephalic and atraumatic.   Right Ear: External ear normal.   Left Ear: External ear normal.   Eyes: Conjunctivae and EOM are normal. Pupils are equal, round, and reactive to light. Right eye exhibits no discharge. Left eye exhibits no discharge.   Neck: Neck supple.   Normal range of motion.  Cardiovascular: Normal rate, regular rhythm and intact distal pulses. Exam reveals no gallop and no friction rub.    Murmur heard.  Pulmonary/Chest: No respiratory distress. She has decreased breath sounds. She has no wheezes. She has no rhonchi. She has rales in the right lower field and the left lower field. She exhibits no tenderness.   Abdominal: Abdomen is soft. Bowel sounds are normal. She exhibits no distension and no mass. There is no abdominal tenderness.   No CVA tenderness There is no rebound and no guarding.   Musculoskeletal:         General: No tenderness. Normal range of motion.      Cervical back: Normal range of motion and neck supple.      Right lower le+ Edema present.      Left lower le+ Edema present.     Neurological: She is alert and oriented to person, place, and time.   Skin: Skin is warm and dry.   Psychiatric: Her mood appears anxious. She expresses  no homicidal and no suicidal ideation.         ED Course   Critical Care    Date/Time: 2/1/2022 2:12 PM  Performed by: Nataly Carroll DO  Authorized by: Chadwick Quan MD   Direct patient critical care time: 6 minutes  Additional history critical care time: 6 minutes  Ordering / reviewing critical care time: 6 minutes  Documentation critical care time: 6 minutes  Consulting other physicians critical care time: 6 minutes  Consult with family critical care time: 6 minutes  Total critical care time (exclusive of procedural time) : 36 minutes  Critical care was necessary to treat or prevent imminent or life-threatening deterioration of the following conditions: circulatory failure and renal failure.  Critical care was time spent personally by me on the following activities: evaluation of patient's response to treatment, examination of patient, obtaining history from patient or surrogate, ordering and performing treatments and interventions, ordering and review of laboratory studies, ordering and review of radiographic studies, pulse oximetry, re-evaluation of patient's condition and review of old charts.        Labs Reviewed   SODIUM, URINE, RANDOM - Abnormal; Notable for the following components:       Result Value    Sodium, Urine <20 (*)     All other components within normal limits    Narrative:     Specimen Source->Urine   POCT URINALYSIS W/O SCOPE - Abnormal; Notable for the following components:    Spec Grav UA >=1.030 (*)     Protein, UA 2+ (*)     All other components within normal limits   POCT GLUCOSE - Abnormal; Notable for the following components:    POCT Glucose 140 (*)     All other components within normal limits   ISTAT PROCEDURE - Abnormal; Notable for the following components:    POC PTWBT 15.2 (*)     POC PTINR 1.3 (*)     All other components within normal limits   POCT CMP - Abnormal; Notable for the following components:    ALT (SGPT), POC 56 (*)     AST (SGOT),  (*)     POC BUN 73 (*)      POC Creatinine 2.7 (*)     POC Glucose 145 (*)     POC Potassium 5.2 (*)     POC TCO2 17 (*)     Protein, UA 8.4 (*)     All other components within normal limits   POCT B-TYPE NATRIURETIC PEPTIDE (BNP) - Abnormal; Notable for the following components:    POC B-Type Natriuretic Peptide 1160 (*)     All other components within normal limits   POCT D DIMER - Abnormal; Notable for the following components:    POC D-DI 3940 (*)     All other components within normal limits   ISTAT PROCEDURE - Abnormal; Notable for the following components:    POC PCO2 22.5 (*)     POC PO2 28 (*)     POC HCO3 13.8 (*)     POC SATURATED O2 56 (*)     POC Lactate 2.34 (*)     POC TCO2 14 (*)     All other components within normal limits   CULTURE, BLOOD   CULTURE, BLOOD   TROPONIN ISTAT   CREATININE, URINE, RANDOM    Narrative:     Specimen Source->Urine   PROCALCITONIN   UREA NITROGEN, URINE, RANDOM   HEMOGLOBIN A1C   SARS-COV-2 RDRP GENE    Narrative:     This test utilizes isothermal nucleic acid amplification   technology to detect the SARS-CoV-2 RdRp nucleic acid segment.   The analytical sensitivity (limit of detection) is 125 genome   equivalents/mL.   A POSITIVE result implies infection with the SARS-CoV-2 virus;   the patient is presumed to be contagious.     A NEGATIVE result means that SARS-CoV-2 nucleic acids are not   present above the limit of detection. A NEGATIVE result should be   treated as presumptive. It does not rule out the possibility of   COVID-19 and should not be the sole basis for treatment decisions.   If COVID-19 is strongly suspected based on clinical and exposure   history, re-testing using an alternate molecular assay should be   considered.   This test is only for use under the Food and Drug   Administration s Emergency Use Authorization (EUA).   Commercial kits are provided by [employer].   Performance characteristics of the EUA have been independently   verified by [facility]  Department of   Pathology and Laboratory Medicine.   _________________________________________________________________   The authorized Fact Sheet for Healthcare Providers and the authorized Fact   Sheet for Patients of the ID NOW COVID-19 are available on the FDA   website:     https://www.fda.gov/media/664797/download  https://www.fda.gov/media/988519/download       POCT CBC   POCT URINALYSIS W/O SCOPE   POCT GLUCOSE, HAND-HELD DEVICE   POCT INFLUENZA A/B MOLECULAR   POCT CMP   POCT PROTIME-INR   POCT TROPONIN   POCT B-TYPE NATRIURETIC PEPTIDE (BNP)   POCT D DIMER   POCT RAPID INFLUENZA A/B   POCT TROPONIN     EKG Readings: (Independently Interpreted)   No STEMI. Rate of 65. Normal Sinus Rhythm. Normal Axis. Abnormal EKG. QTc normal at 438. When compared to prior EKG dated 11/12/22 rate decreased by 32 bpm.      ECG Results          EKG 12-lead (Final result)  Result time 02/01/22 12:35:46    Final result by Interface, Lab In [facility] (02/01/22 12:35:46)                 Narrative:    Test Reason : sob    Vent. Rate : 065 BPM     Atrial Rate : 065 BPM     P-R Int : 110 ms          QRS Dur : 070 ms      QT Int : 422 ms       P-R-T Axes : 040 044 058 degrees     QTc Int : 438 ms    Sinus rhythm with short NV  Low voltage QRS  Nonspecific ST and T wave abnormality  Abnormal ECG  When compared with ECG of 12-JAN-2022 07:38,  Vent. rate has decreased BY  32 BPM  Nonspecific T wave abnormality now evident in Inferior leads  Nonspecific T wave abnormality, worse in Lateral leads  Confirmed by James HARPER, Rashid ([id number]) on 2/1/2022 12:35:36 PM    Referred By: [email or web address]   SELF           Confirmed By:Rashid Ramirez MD                            Imaging Results          US Lower Extremity Veins Bilateral (Final result)  Result time 02/01/22 10:32:52   Procedure changed from US Lower Extremity Veins Bilateral     Final result by Polo Carr MD (02/01/22 10:32:52)                 Impression:      1. No sonographic evidence  of DVT in the bilateral lower extremities.      Electronically signed by: Polo Carr  Date:    02/01/2022  Time:    10:32             Narrative:    EXAMINATION:  US LOWER EXTREMITY VEINS BILATERAL    CLINICAL HISTORY:  cough and congestion since October; Cough, unspecified    TECHNIQUE:  Duplex and color flow Doppler and dynamic compression was performed of the bilateral lower extremity veins was performed.    COMPARISON:  None    FINDINGS:  Right lower extremity    Common femoral, superficial femoral, popliteal, upper greater saphenous and deep femoral veins demonstrate normal compressibility, phasic flow and augmentation response without evidence of filling defect. Visualized calf veins are patent.    Left lower extremity    Common femoral, superficial femoral, popliteal, upper greater saphenous and deep femoral veins demonstrate normal compressibility, phasic flow and augmentation response without evidence of filling defect. Visualized calf veins are patent.                               X-Ray Chest PA And Lateral (Final result)  Result time 02/01/22 09:39:05    Final result by Brandon Fry MD (02/01/22 09:39:05)                 Impression:      No significant changes.      Electronically signed by: Brandon Fry MD  Date:    02/01/2022  Time:    09:39             Narrative:    EXAMINATION:  XR CHEST PA AND LATERAL    CLINICAL HISTORY:  Chest Pain;    TECHNIQUE:  PA and lateral views of the chest were performed.    COMPARISON:  02/01/2022 at 08:08 hours, 12/13/2021    FINDINGS:  Enlarged cardiac silhouette, similar to the prior exam.  There are mild perihilar and right basilar lung opacities, not significantly changed from the earlier exam, possible cardiogenic/noncardiogenic pulmonary edema, pneumonia, or aspiration.  Superimposed chronic interstitial changes may be present, as well.  The opacities have not significantly changed from the earlier exam.  Mildly elevated right hemidiaphragm, stable.  No  pneumothorax.  No pleural effusions.                               X-Ray Chest AP Portable (Final result)  Result time 02/01/22 08:28:48    Final result by Kemal Latham MD (02/01/22 08:28:48)                 Impression:      Nonspecific interstitial fibrotic changes stable and unchanged allowing for differences in inspiratory effort and technique.  Clinical correlation requested.    Epic abnormal flag      Electronically signed by: Kemal Latham MD  Date:    02/01/2022  Time:    08:28             Narrative:    EXAMINATION:  XR CHEST AP PORTABLE    CLINICAL HISTORY:  Cough, unspecified    TECHNIQUE:  Single frontal view of the chest was performed.    COMPARISON:  12/13/2021    FINDINGS:  Increasing perihilar interstitial infiltrates, some volume loss of anterior segment of elevation right minor fissure, elevation right hemidiaphragm with compression atelectasis of medial basilar segment right lower lobe.  Top-normal size heart.  Large body size degrades exam.  Pulmonary vascular tree borderline prominent.                                 Medications   iohexoL (OMNIPAQUE 350) injection 100 mL (100 mLs Intravenous Not Given 2/1/22 1019)   sodium polystyrene 15 gram/60 mL suspension 15 g (0 g Oral Hold 2/1/22 1100)   ondansetron injection 4 mg (has no administration in time range)   aspirin tablet 325 mg (325 mg Oral Given 2/1/22 0919)   hydrOXYzine pamoate capsule 25 mg (25 mg Oral Given 2/1/22 0921)   lorazepam (ATIVAN) injection 0.5 mg (0.5 mg Intravenous Given 2/1/22 1120)   enoxaparin injection 70 mg (70 mg Subcutaneous Given 2/1/22 1121)   phenylephrine HCL 0.5% nasal spray 1 spray (1 spray Each Nostril Given 2/1/22 1114)   albuterol sulfate nebulizer solution 10 mg (10 mg Nebulization Given 2/1/22 1100)   calcium gluconate 1g in dextrose 5% 100mL (ready to mix system) (0 g Intravenous Stopped 2/1/22 1233)   sodium bicarbonate 8.4 % (1 mEq/mL) injection 50 mEq (50 mEq Intravenous Given 2/1/22 1127)  "  cefTRIAXone (ROCEPHIN) 1 g/50 mL D5W IVPB (0 g Intravenous Stopped 2/1/22 1154)   furosemide injection 40 mg (40 mg Intravenous Given 2/1/22 1344)     Medical Decision Making:   History:   Old Medical Records: I decided to obtain old medical records.  Independently Interpreted Test(s):   I have ordered and independently interpreted X-rays - see prior notes.  Clinical Tests:   Lab Tests: Ordered and Reviewed  Radiological Study: Ordered and Reviewed    Medical decision making   Chief complaint: cough, restlessness, sob and chest "pressure" onset 4 days ago.  Differential diagnosis: Hyperglycemia. DKA. STEMI. NSTEMI. Viral Illness. COVID-19. Bronchitis. Pneumonia. PE.  Cardiac Arrhythmia. Renal Failure. Hyperkalemia.   Treatment in the ED Physical Exam,   hydrOXYzine pamoate capsule 25 mg    aspirin tablet 325 mg   On 01/19/2022 patient's creatinine was 0.9 and BUN was 19.  Today patient's creatinine is 2.7 and BUN is 73.  Anion gap is 14  Secondary to acute renal failure unable to perform CT PE study to rule out a PE.  Will give Lovenox secondary to complaint of chest pain, anxiety and shortness of breath.    Patient recently started on Lasix.  This may be related to acute renal failure appreciated today on labs.      Patient reports feeling better after treatment in the ER.    Discussed labs, imaging results, diagnosis, and need for further evaluation and services not available at this facility with the patient.      Patient and family are agreeable to transfer & admission if necessary at [facility].    I contacted Fatou with [facility] at 10:49 am.  Requesting consultation with hospital medicine for services not available at this facility.   Discussed patient's presentation, past medical history, physical exam, labs, radiology results, vital signs, and ED course.  Consultation with DR Quan for transfer and admission at 11:47 a.m. specialist is requesting Lasix, urine studies, retroperitoneal ultrasound, " procalcitonin, and bladder scan.  If patient is retaining urine reasonable to place a Yao.  Bladder scan shows retained urine of 300 cc.  Yao placed for close monitoring of urine output.  At this time patient will be transferred via EMS to accepting facility.     At time of transfer patient is awake alert oriented x4 speaking clearly in full sentences and moving all 4 extremities.          Scribe Attestation:   Scribe #1: I performed the above scribed service and the documentation accurately describes the services I performed. I attest to the accuracy of the note.                I, Dr. Nataly Carroll, personally performed the services described in this documentation. This document was produced by a scribe under my direction and in my presence. All medical record entries made by the scribe were at my direction and in my presence.  I have reviewed the chart and agree that the record reflects my personal performance and is accurate and complete. Nataly Carroll DO.     02/01/2022 2:12 PM    Clinical Impression:   Final diagnoses:  [R05.9] Cough  [R07.9] Chest pain  [M79.89] Leg swelling  [N17.9] Acute renal failure, unspecified acute renal failure type (Primary)  [E87.5] Hyperkalemia  [N17.9] ARF (acute renal failure)          ED Disposition Condition    Admit               Nataly Carroll DO  02/01/22 [id number]

## 2022-02-01 NOTE — SUBJECTIVE & OBJECTIVE
Past Medical History:   Diagnosis Date    Allergy     Anxiety     Basal cell carcinoma     Cirrhosis of liver without ascites 12/27/2017    Depression     Diabetes mellitus, type 2     Disorder of kidney and ureter     Endometriosis     GERD (gastroesophageal reflux disease)     Hyperlipidemia     Hypertension     Joint pain     Psoriasis        Past Surgical History:   Procedure Laterality Date    abdominal laproscopic surgery      APPENDECTOMY      CARPAL TUNNEL RELEASE      right    CHOLECYSTECTOMY  04/2015    COLONOSCOPY N/A 2/8/2019    Procedure: COLONOSCOPY;  Surgeon: Celso Salas MD;  Location: [address]);  Service: Endoscopy;  Laterality: N/A;    ESOPHAGOGASTRODUODENOSCOPY N/A 2/8/2019    Procedure: EGD (ESOPHAGOGASTRODUODENOSCOPY);  Surgeon: Celso Salas MD;  Location: [address]);  Service: Endoscopy;  Laterality: N/A;  varices screening, labs ordered prior    HYSTERECTOMY  age 25    JOSEFA/BSO (endometriosis)    OOPHORECTOMY      SKIN CANCER DESTRUCTION      UPPER GASTROINTESTINAL ENDOSCOPY         Review of patient's allergies indicates:   Allergen Reactions    Dobutamine Hives    Benadryl [diphenhydramine hcl] Anxiety     Pt states she feels jumpy and anxious when taking.    Darvon [propoxyphene] Nausea Only    Shellfish containing products Hives    Iodinated contrast media Hives    Lisinopril Other (See Comments)     cough    Propoxyphene hcl      Itchy (skin)^    Tizanidine Other (See Comments)     Sweaty, difficulty swallowing    Statins-hmg-coa reductase inhibitors Anxiety and Other (See Comments)     Myalgia, fatigue, palpitations, anxiety       No current facility-administered medications on file prior to encounter.     Current Outpatient Medications on File Prior to Encounter   Medication Sig    allopurinoL (ZYLOPRIM) 100 MG tablet Take 1 tablet (100 mg total) by mouth once daily.    betamethasone dipropionate (DIPROLENE) 0.05 % cream  Apply topically 2 (two) times daily. To affected areas on lower back (Patient not taking: Reported on 1/19/2022)    blood sugar diagnostic Strp Use 1-2 times daily to check blood sugar as directed.    blood-glucose meter kit Use as instructed to check blood sugar 1-2 times a day    cholecalciferol, vitamin D3, (VITAMIN D3) 25 mcg (1,000 unit) capsule Take 1 capsule (1,000 Units total) by mouth once daily.    clotrimazole (LOTRIMIN) 1 % cream APPLY TO THE AFFECTED AREA OF RASH ON BREASTS AND IN FOLDS OF SKIN AS DIRECTED TWICE DAILY (Patient not taking: Reported on 1/19/2022)    fluocinonide (LIDEX) 0.05 % external solution AAA scalp qday - bid prn pruritus (Patient not taking: Reported on 1/19/2022)    furosemide (LASIX) 20 MG tablet Take 1 tablet (20 mg total) by mouth once daily.    lancets Misc 1 lancet by Misc.(Non-Drug; Combo Route) route 2 (two) times daily with meals.    lancing device Misc 1 Device by Misc.(Non-Drug; Combo Route) route 2 (two) times daily with meals.    losartan (COZAAR) 50 MG tablet TAKE 1 TABLET(50 MG) BY MOUTH EVERY DAY    metFORMIN (GLUCOPHAGE-XR) 500 MG ER 24hr tablet Take 1 tablet after breakfast and 2 tablets after dinner every day    metoprolol tartrate (LOPRESSOR) 25 MG tablet Take 1 tablet (25 mg total) by mouth 2 (two) times daily.    omeprazole (PRILOSEC) 40 MG capsule Take 1 capsule (40 mg total) by mouth 2 (two) times daily. Take first thing in the morning and then again 30 minutes before dinner    triamcinolone acetonide 0.025% (KENALOG) 0.025 % cream AAA under breasts and groin bid after cool blow dry prn (Patient not taking: Reported on 1/19/2022)     Family History     Problem Relation (Age of Onset)    Arthritis Mother    Asthma Brother    Hyperlipidemia Brother    Hypertension Mother, Sister, Brother    No Known Problems Father    Raynaud syndrome Sister        Tobacco Use    Smoking status: Never Smoker    Smokeless tobacco: Never Used   Substance and  Sexual Activity    Alcohol use: Not Currently    Drug use: No    Sexual activity: Yes     Partners: Male     Birth control/protection: Surgical     Review of Systems   Constitutional: Positive for fatigue. Negative for chills and fever.   HENT: Positive for congestion.    Eyes: Negative for photophobia and visual disturbance.   Respiratory: Positive for cough and shortness of breath.    Cardiovascular: Negative for chest pain, palpitations and leg swelling.   Gastrointestinal: Positive for constipation. Negative for abdominal pain, diarrhea, nausea and vomiting.   Genitourinary: Positive for decreased urine volume. Negative for dysuria, frequency and urgency.   Skin: Negative for pallor, rash and wound.   Neurological: Negative for light-headedness and headaches.   Psychiatric/Behavioral: Negative for confusion and decreased concentration.     Objective:     Vital Signs (Most Recent):  Temp: 97.7 °F (36.5 °C) (02/01/22 1556)  Pulse: 76 (02/01/22 1556)  Resp: (!) 24 (02/01/22 1556)  BP: (!) 100/49 (02/01/22 1556)  SpO2: 95 % (02/01/22 1556) Vital Signs (24h Range):  Temp:  [97.7 °F (36.5 °C)-98.9 °F (37.2 °C)] 97.7 °F (36.5 °C)  Pulse:  [65-83] 76  Resp:  [18-24] 24  SpO2:  [95 %-99 %] 95 %  BP: (100-146)/(49-77) 100/49     Weight: 70 kg (154 lb 6.4 oz)  Body mass index is 33.41 kg/m².    Physical Exam  Vitals and nursing note reviewed.   Constitutional:       General: She is not in acute distress.     Appearance: She is well-developed and well-nourished. She is ill-appearing.      Interventions: Nasal cannula in place.   HENT:      Head: Normocephalic and atraumatic.      Right Ear: External ear normal.      Left Ear: External ear normal.      Nose: Nose normal.      Mouth/Throat:      Mouth: Oropharynx is clear and moist. Mucous membranes are dry.   Eyes:      Extraocular Movements: EOM normal.      Conjunctiva/sclera: Conjunctivae normal.      Pupils: Pupils are equal, round, and reactive to light.    Cardiovascular:      Rate and Rhythm: Normal rate and regular rhythm.      Pulses: Intact distal pulses.   Pulmonary:      Effort: Pulmonary effort is normal. Tachypnea present. No respiratory distress.      Breath sounds: Normal breath sounds. No wheezing.   Abdominal:      General: Bowel sounds are normal. There is no distension.      Palpations: Abdomen is soft.      Tenderness: There is no abdominal tenderness.      Comments: No palpable hepatomegaly or splenomegaly    Musculoskeletal:         General: No tenderness or edema. Normal range of motion.      Cervical back: Normal range of motion and neck supple.   Skin:     General: Skin is warm and dry.   Neurological:      Mental Status: She is oriented to person, place, and time. She is lethargic.   Psychiatric:         Mood and Affect: Mood and affect normal.         Thought Content: Thought content normal.           CRANIAL NERVES     CN III, IV, VI   Pupils are equal, round, and reactive to light.  Extraocular motions are normal.        Significant Labs: All pertinent labs within the past 24 hours have been reviewed.    Significant Imaging: I have reviewed all pertinent imaging results/findings within the past 24 hours.

## 2022-02-02 PROBLEM — R53.81 DEBILITY: Status: ACTIVE | Noted: 2022-01-01

## 2022-02-02 NOTE — ASSESSMENT & PLAN NOTE
Followed by Hepatology, recent abdominal US, thrombocytopenia, elevated liver enzymes and hypoalbuminemia, avoid hepatotoxins and monitor.

## 2022-02-02 NOTE — PT/OT/SLP PROGRESS
Physical Therapy      Patient Name:  Tammi Farris   MRN:  [id number]    Patient not seen at this time for PT eval secondary to Unavailable (Pt off unit to nuclear medicine, pending V/Q scan to r/o PE.  Pt also with low H/H 6.6/20.9.). Will follow-up as appropriate.

## 2022-02-02 NOTE — SUBJECTIVE & OBJECTIVE
Past Medical History:   Diagnosis Date    Allergy     Anxiety     Basal cell carcinoma     Cirrhosis of liver without ascites 12/27/2017    Depression     Diabetes mellitus, type 2     Disorder of kidney and ureter     Endometriosis     GERD (gastroesophageal reflux disease)     Hyperlipidemia     Hypertension     Joint pain     Psoriasis        Past Surgical History:   Procedure Laterality Date    abdominal laproscopic surgery      APPENDECTOMY      CARPAL TUNNEL RELEASE      right    CHOLECYSTECTOMY  04/2015    COLONOSCOPY N/A 2/8/2019    Procedure: COLONOSCOPY;  Surgeon: Celso Salas MD;  Location: [address]);  Service: Endoscopy;  Laterality: N/A;    ESOPHAGOGASTRODUODENOSCOPY N/A 2/8/2019    Procedure: EGD (ESOPHAGOGASTRODUODENOSCOPY);  Surgeon: Celso Salas MD;  Location: [address]);  Service: Endoscopy;  Laterality: N/A;  varices screening, labs ordered prior    HYSTERECTOMY  age 25    JOSEFA/BSO (endometriosis)    OOPHORECTOMY      SKIN CANCER DESTRUCTION      UPPER GASTROINTESTINAL ENDOSCOPY         Review of patient's allergies indicates:   Allergen Reactions    Dobutamine Hives    Benadryl [diphenhydramine hcl] Anxiety     Pt states she feels jumpy and anxious when taking.    Darvon [propoxyphene] Nausea Only    Shellfish containing products Hives    Iodinated contrast media Hives    Lisinopril Other (See Comments)     cough    Propoxyphene hcl      Itchy (skin)^    Tizanidine Other (See Comments)     Sweaty, difficulty swallowing    Statins-hmg-coa reductase inhibitors Anxiety and Other (See Comments)     Myalgia, fatigue, palpitations, anxiety       No current facility-administered medications on file prior to encounter.     Current Outpatient Medications on File Prior to Encounter   Medication Sig    allopurinoL (ZYLOPRIM) 100 MG tablet Take 1 tablet (100 mg total) by mouth once daily.    betamethasone dipropionate (DIPROLENE) 0.05 % cream  Apply topically 2 (two) times daily. To affected areas on lower back (Patient not taking: Reported on 1/19/2022)    blood sugar diagnostic Strp Use 1-2 times daily to check blood sugar as directed.    blood-glucose meter kit Use as instructed to check blood sugar 1-2 times a day    cholecalciferol, vitamin D3, (VITAMIN D3) 25 mcg (1,000 unit) capsule Take 1 capsule (1,000 Units total) by mouth once daily.    clotrimazole (LOTRIMIN) 1 % cream APPLY TO THE AFFECTED AREA OF RASH ON BREASTS AND IN FOLDS OF SKIN AS DIRECTED TWICE DAILY (Patient not taking: Reported on 1/19/2022)    fluocinonide (LIDEX) 0.05 % external solution AAA scalp qday - bid prn pruritus (Patient not taking: Reported on 1/19/2022)    furosemide (LASIX) 20 MG tablet Take 1 tablet (20 mg total) by mouth once daily.    lancets Misc 1 lancet by Misc.(Non-Drug; Combo Route) route 2 (two) times daily with meals.    lancing device Misc 1 Device by Misc.(Non-Drug; Combo Route) route 2 (two) times daily with meals.    losartan (COZAAR) 50 MG tablet TAKE 1 TABLET(50 MG) BY MOUTH EVERY DAY    metFORMIN (GLUCOPHAGE-XR) 500 MG ER 24hr tablet Take 1 tablet after breakfast and 2 tablets after dinner every day    metoprolol tartrate (LOPRESSOR) 25 MG tablet Take 1 tablet (25 mg total) by mouth 2 (two) times daily.    omeprazole (PRILOSEC) 40 MG capsule Take 1 capsule (40 mg total) by mouth 2 (two) times daily. Take first thing in the morning and then again 30 minutes before dinner    triamcinolone acetonide 0.025% (KENALOG) 0.025 % cream AAA under breasts and groin bid after cool blow dry prn (Patient not taking: Reported on 1/19/2022)     Family History     Problem Relation (Age of Onset)    Arthritis Mother    Asthma Brother    Hyperlipidemia Brother    Hypertension Mother, Sister, Brother    No Known Problems Father    Raynaud syndrome Sister        Tobacco Use    Smoking status: Never Smoker    Smokeless tobacco: Never Used   Substance and  Sexual Activity    Alcohol use: Not Currently    Drug use: No    Sexual activity: Yes     Partners: Male     Birth control/protection: Surgical     Review of Systems   Unable to perform ROS: mental status change     Objective:     Vital Signs (Most Recent):  Temp: 97.7 °F (36.5 °C) (02/01/22 1556)  Pulse: 76 (02/01/22 1556)  Resp: (!) 24 (02/01/22 1556)  BP: (!) 100/49 (02/01/22 1556)  SpO2: 95 % (02/01/22 1556) Vital Signs (24h Range):  Temp:  [97.7 °F (36.5 °C)-98.9 °F (37.2 °C)] 97.7 °F (36.5 °C)  Pulse:  [65-83] 76  Resp:  [18-24] 24  SpO2:  [95 %-99 %] 95 %  BP: (100-146)/(49-77) 100/49     Weight: 70 kg (154 lb 6.4 oz)  Body mass index is 33.41 kg/m².    SpO2: 95 %  O2 Device (Oxygen Therapy): room air      Intake/Output Summary (Last 24 hours) at 2/1/2022 1916  Last data filed at 2/1/2022 1745  Gross per 24 hour   Intake 150 ml   Output 270 ml   Net -120 ml       Lines/Drains/Airways     Drain                 Urethral Catheter 02/01/22 1352 Latex 16 Fr. <1 day          Peripheral Intravenous Line                 Peripheral IV - Single Lumen 02/01/22 0941 20 G Left Antecubital <1 day         Peripheral IV - Single Lumen 02/01/22 1124 20 G Right Antecubital <1 day                Physical Exam  Constitutional:       General: She is not in acute distress.     Appearance: She is well-developed and well-nourished. She is ill-appearing and toxic-appearing. She is not diaphoretic.   HENT:      Head: Normocephalic and atraumatic.   Eyes:      General: No scleral icterus.     Extraocular Movements: Extraocular movements intact and EOM normal.      Conjunctiva/sclera: Conjunctivae normal.      Pupils: Pupils are equal, round, and reactive to light.   Neck:      Vascular: No JVD.      Trachea: No tracheal deviation.   Cardiovascular:      Rate and Rhythm: Normal rate and regular rhythm.      Heart sounds: S1 normal and S2 normal. No murmur heard.  No friction rub. No gallop.    Pulmonary:      Effort: Pulmonary effort  is normal. No respiratory distress.      Breath sounds: Normal breath sounds. No stridor. No wheezing, rhonchi or rales.   Chest:      Chest wall: No tenderness.   Abdominal:      General: There is no distension.      Palpations: Abdomen is soft.      Tenderness: There is no abdominal tenderness.   Musculoskeletal:         General: No swelling, tenderness or edema. Normal range of motion.      Cervical back: Normal range of motion and neck supple. No rigidity.      Right lower leg: No edema.      Left lower leg: No edema.   Skin:     General: Skin is warm and dry.      Coloration: Skin is not jaundiced.   Neurological:      General: No focal deficit present.      Mental Status: She is alert and oriented to person, place, and time.      Cranial Nerves: No cranial nerve deficit.   Psychiatric:         Mood and Affect: Mood and affect and mood normal.         Behavior: Behavior normal.         Judgment: Judgment normal.         Current Medications:   [START ON 2/2/2022] enoxaparin  1 mg/kg Subcutaneous Daily    metoprolol tartrate  25 mg Oral BID    mupirocin   Nasal BID    polyethylene glycol  17 g Oral BID    sodium chloride 0.9%  500 mL Intravenous Once    sodium polystyrene  15 g Oral ED 1 Time       acetaminophen, bisacodyL, dextrose 50%, dextrose 50%, glucagon (human recombinant), glucose, glucose, iohexoL, melatonin, naloxone, ondansetron, sodium chloride 0.9%    Laboratory (all labs reviewed):  CBC:  Recent Labs   Lab 11/30/21 2023 12/08/21  0407 12/08/21  1848 12/09/21  0459 12/13/21  1613 12/21/21  1020 01/19/22  1122   WBC  --  9.08  --  6.80 6.68 6.95 6.62   Hemoglobin  --  9.0 L  --  8.0 L 8.9 L 8.8 L 9.2 L   POC Hematocrit   < >  --  28 L  --   --   --   --    Hematocrit  --  28.6 L  --  25.1 L 28.0 L 27.1 L 29.0 L   Platelets  --  103 L  --  99 L 131 L 114 L 103 L    < > = values in this interval not displayed.       CHEMISTRIES:  Recent Labs   Lab 08/28/19  0730 10/07/19  1107 12/13/21  1613  12/21/21  1020 12/22/21  0939 01/03/22  1118 01/19/22  1122   Glucose 124 H   < > 111 H 87 102 176 H 125 H   Sodium 141   < > 137 141 140 134 L 137   Potassium 4.5   < > 4.0 4.1 4.1 4.6 4.9   BUN 26 H   < > 16 14 12 12 19   Creatinine 1.2   < > 0.9 0.8 0.8 0.9 0.9   eGFR if  53.3 A   < > >60.0 >60 >60.0 >60.0 >60.0   eGFR if non  46.2 A   < > >60.0 >60 >60.0 >60.0 >60.0   Calcium 10.2   < > 9.3 9.1 9.5 9.3 9.0   Magnesium 1.9  --   --   --   --   --   --     < > = values in this interval not displayed.       CARDIAC BIOMARKERS:  Recent Labs   Lab 11/30/21 2008 12/07/21  1850 12/07/21  2258 12/08/21  0407   Troponin I 0.008 0.016 0.018 0.020       COAGS:  Recent Labs   Lab 03/22/21  0703 09/24/21  0739 12/07/21  2258 12/08/21  0407 01/19/22  1122   INR 1.0 1.0 1.1 1.1 1.1       LIPIDS/LFTS:  Recent Labs   Lab 08/28/19  0730 10/07/19  1107 09/18/20  0945 10/13/20  0739 03/22/21  0705 06/22/21  0806 11/12/21  1310 11/30/21 2008 12/07/21  1850 12/13/21  1613 01/19/22  1122   Cholesterol 184  --   --  161 151  --   --   --   --   --   --    Triglycerides 212 H  --   --  189 H 101  --   --   --   --   --   --    HDL 33 L  --   --  28 L 33 L  --   --   --   --   --   --    LDL Cholesterol 108.6  --   --  95.2 97.8  --   --   --   --   --   --    Non-HDL Cholesterol 151  --   --  133 118  --   --   --   --   --   --    AST 39   < >   < >  --  47 H   < > 50 H 51 H 69 H 51 H 74 H   ALT 41   < >   < >  --  35   < > 24 25 28 26 39    < > = values in this interval not displayed.       BNP:  Recent Labs   Lab 11/30/21 2008 12/07/21  1850 12/22/21  0939 01/03/22  1118    H 297 H 107 H 150 H       TSH:  Recent Labs   Lab 01/28/20  1226 03/22/21  0705 12/20/21  1031 02/01/22  1620   TSH 0.413 0.877 2.262 2.323       Free T4:  Recent Labs   Lab 12/20/21  1031   Free T4 0.97       POC labs 02/01/2022  D-dimer 3940  BNP 1160  Troponin 0.02  INR 1.3    ALT 56  Creatinine 2.7  Potassium  5.2  Hemoglobin 8.1  Platelets 106    Diagnostic Results:  ECG (personally reviewed and interpreted tracing(s)):  2/1/22 1006 SR 65, low volt, NSSTTW changes    Chest X-Ray (personally reviewed and interpreted image(s)): 2/1/22  Enlarged cardiac silhouette, similar to the prior exam.  There are mild perihilar and right basilar lung opacities, not significantly changed from the earlier exam, possible cardiogenic/noncardiogenic pulmonary edema, pneumonia, or aspiration.  Superimposed chronic interstitial changes may be present, as well.  The opacities have not significantly changed from the earlier exam.  Mildly elevated right hemidiaphragm, stable.  No pneumothorax.  No pleural effusions.    LE venous US 2/1/22  No sonographic evidence of DVT in the bilateral lower extremities.    Holter 1/19/22  Sinus rhythm with rare ectopy.  No sustained arrhythmias.  No symptoms reported.    Stress Test: DSE 12/7/21  · The maximal doses of pharmaceutical stress agents were administered.  · There were no arrhythmias during stress.  · The ECG portion of this study is suspicious for myocardial ischemia.  · The left ventricle is normal in size with concentric remodeling and normal systolic function.  · The estimated ejection fraction is 68%.  · Indeterminate left ventricular diastolic function.  · Normal right ventricular size with normal right ventricular systolic function.  · Mild right atrial enlargement.  · Mild mitral regurgitation.  · Mild tricuspid regurgitation.  · Normal central venous pressure (3 mmHg).  · The estimated PA systolic pressure is 34 mmHg.  · Trivial anterior pericardial effusion.  · The stress echo portion of this study is negative for myocardial ischemia despite the ECG changes and prolonged hypotensin and abdominal pain.

## 2022-02-02 NOTE — ASSESSMENT & PLAN NOTE
Mgmt per IM  ?dehydration  Stop lasix and rehydrate  EF normal, pt appears volume depleted on exam (certainly not vol overloaded)  ?sepsis picture with rigors

## 2022-02-02 NOTE — PT/OT/SLP PROGRESS
Occupational Therapy      Patient Name:  Tammi Farris   MRN:  [id number]    Patient not seen today secondary to  (Pt off unit to nuclear medicine, pending V/Q scan to r/o PE.  Pt also with low H/H 6.6/20.9). Will follow-up later as able.    2/2/2022

## 2022-02-02 NOTE — ASSESSMENT & PLAN NOTE
With hyperkalemia and metabolic acidosis.  Concentrated urine, decreased urine output, constipation, dry mucous membranes in setting of diuretic use suggest dehydration.  Hold diuretic and give cautious IV fluids, strict I/O's, monitor renal function and electrolytes, avoid nephrotoxic agents.

## 2022-02-02 NOTE — H&P
[facility] Medicine  History & Physical    Patient Name: Tammi Farris  MRN: [id number]  Patient Class: IP- Inpatient  Admission Date: 2/1/2022  Attending Physician: Chadwick Quan MD   Primary Care Provider: Ann-Marie Hartman MD         Patient information was obtained from patient, past medical records and ER records.     Subjective:     Principal Problem:Acute renal failure superimposed on stage 2 chronic kidney disease    Chief Complaint:   Chief Complaint   Patient presents with    Cough     Pt has been having cough and congestion since October. Pt has been seeing a dr for these symptoms and they give her diuretics. Pt had echo within the last month and it was normal.         HPI: 71 y.o. female with HTN, HLD, GERD, cirrhosis of liver, thrombocytopenia, and sleep apnea presents with a complaint of cough and congestion since October.  She is chronically dyspneic, exacerbated by exertion, has seen Cardiology and was prescribed lasix and metoprolol.  Her sister has now noted that the patient has very low urine output and is constipated.  Also follows with Hepatology for chronic liver disease.  Patient denies fever, chills, chest pain, palpitations, orthopnea, PND, dizziness, syncope, n/v/d, abdominal pain, or dysuria.  In the ED, labs reveal ANI, hyperkalemia, metabolic acidosis with elevated lactic acid, elevated liver enzymes, elevated BNP, elevated d-dimer, thrombocytopenia, and markedly concentrated urine.  Echo December 2021 showed preserved EF, no evidence of heart failure.  Chest xray without acute abnormality. No convincing evidence of infectious process.      Past Medical History:   Diagnosis Date    Allergy     Anxiety     Basal cell carcinoma     Cirrhosis of liver without ascites 12/27/2017    Depression     Diabetes mellitus, type 2     Disorder of kidney and ureter     Endometriosis     GERD (gastroesophageal reflux disease)     Hyperlipidemia     Hypertension     Joint  pain     Psoriasis        Past Surgical History:   Procedure Laterality Date    abdominal laproscopic surgery      APPENDECTOMY      CARPAL TUNNEL RELEASE      right    CHOLECYSTECTOMY  04/2015    COLONOSCOPY N/A 2/8/2019    Procedure: COLONOSCOPY;  Surgeon: Celso Salas MD;  [facility]  Service: Endoscopy;  Laterality: N/A;    ESOPHAGOGASTRODUODENOSCOPY N/A 2/8/2019    Procedure: EGD (ESOPHAGOGASTRODUODENOSCOPY);  Surgeon: Celso Salas MD;  [facility]  Service: Endoscopy;  Laterality: N/A;  varices screening, labs ordered prior    HYSTERECTOMY  age 25    JOSEFA/BSO (endometriosis)    OOPHORECTOMY      SKIN CANCER DESTRUCTION      UPPER GASTROINTESTINAL ENDOSCOPY         Review of patient's allergies indicates:   Allergen Reactions    Dobutamine Hives    Benadryl [diphenhydramine hcl] Anxiety     Pt states she feels jumpy and anxious when taking.    Darvon [propoxyphene] Nausea Only    Shellfish containing products Hives    Iodinated contrast media Hives    Lisinopril Other (See Comments)     cough    Propoxyphene hcl      Itchy (skin)^    Tizanidine Other (See Comments)     Sweaty, difficulty swallowing    Statins-hmg-coa reductase inhibitors Anxiety and Other (See Comments)     Myalgia, fatigue, palpitations, anxiety       No current facility-administered medications on file prior to encounter.     Current Outpatient Medications on File Prior to Encounter   Medication Sig    allopurinoL (ZYLOPRIM) 100 MG tablet Take 1 tablet (100 mg total) by mouth once daily.    betamethasone dipropionate (DIPROLENE) 0.05 % cream Apply topically 2 (two) times daily. To affected areas on lower back (Patient not taking: Reported on 1/19/2022)    blood sugar diagnostic Strp Use 1-2 times daily to check blood sugar as directed.    blood-glucose meter kit Use as instructed to check blood sugar 1-2 times a day    cholecalciferol, vitamin D3, (VITAMIN D3) 25 mcg  (1,000 unit) capsule Take 1 capsule (1,000 Units total) by mouth once daily.    clotrimazole (LOTRIMIN) 1 % cream APPLY TO THE AFFECTED AREA OF RASH ON BREASTS AND IN FOLDS OF SKIN AS DIRECTED TWICE DAILY (Patient not taking: Reported on 1/19/2022)    fluocinonide (LIDEX) 0.05 % external solution AAA scalp qday - bid prn pruritus (Patient not taking: Reported on 1/19/2022)    furosemide (LASIX) 20 MG tablet Take 1 tablet (20 mg total) by mouth once daily.    lancets Misc 1 lancet by Misc.(Non-Drug; Combo Route) route 2 (two) times daily with meals.    lancing device Misc 1 Device by Misc.(Non-Drug; Combo Route) route 2 (two) times daily with meals.    losartan (COZAAR) 50 MG tablet TAKE 1 TABLET(50 MG) BY MOUTH EVERY DAY    metFORMIN (GLUCOPHAGE-XR) 500 MG ER 24hr tablet Take 1 tablet after breakfast and 2 tablets after dinner every day    metoprolol tartrate (LOPRESSOR) 25 MG tablet Take 1 tablet (25 mg total) by mouth 2 (two) times daily.    omeprazole (PRILOSEC) 40 MG capsule Take 1 capsule (40 mg total) by mouth 2 (two) times daily. Take first thing in the morning and then again 30 minutes before dinner    triamcinolone acetonide 0.025% (KENALOG) 0.025 % cream AAA under breasts and groin bid after cool blow dry prn (Patient not taking: Reported on 1/19/2022)     Family History     Problem Relation (Age of Onset)    Arthritis Mother    Asthma Brother    Hyperlipidemia Brother    Hypertension Mother, Sister, Brother    No Known Problems Father    Raynaud syndrome Sister        Tobacco Use    Smoking status: Never Smoker    Smokeless tobacco: Never Used   Substance and Sexual Activity    Alcohol use: Not Currently    Drug use: No    Sexual activity: Yes     Partners: Male     Birth control/protection: Surgical     Review of Systems   Constitutional: Positive for fatigue. Negative for chills and fever.   HENT: Positive for congestion.    Eyes: Negative for photophobia and visual disturbance.    Respiratory: Positive for cough and shortness of breath.    Cardiovascular: Negative for chest pain, palpitations and leg swelling.   Gastrointestinal: Positive for constipation. Negative for abdominal pain, diarrhea, nausea and vomiting.   Genitourinary: Positive for decreased urine volume. Negative for dysuria, frequency and urgency.   Skin: Negative for pallor, rash and wound.   Neurological: Negative for light-headedness and headaches.   Psychiatric/Behavioral: Negative for confusion and decreased concentration.     Objective:     Vital Signs (Most Recent):  Temp: 97.7 °F (36.5 °C) (02/01/22 1556)  Pulse: 76 (02/01/22 1556)  Resp: (!) 24 (02/01/22 1556)  BP: (!) 100/49 (02/01/22 1556)  SpO2: 95 % (02/01/22 1556) Vital Signs (24h Range):  Temp:  [97.7 °F (36.5 °C)-98.9 °F (37.2 °C)] 97.7 °F (36.5 °C)  Pulse:  [65-83] 76  Resp:  [18-24] 24  SpO2:  [95 %-99 %] 95 %  BP: (100-146)/(49-77) 100/49     Weight: 70 kg (154 lb 6.4 oz)  Body mass index is 33.41 kg/m².    Physical Exam  Vitals and nursing note reviewed.   Constitutional:       General: She is not in acute distress.     Appearance: She is well-developed and well-nourished. She is ill-appearing.      Interventions: Nasal cannula in place.   HENT:      Head: Normocephalic and atraumatic.      Right Ear: External ear normal.      Left Ear: External ear normal.      Nose: Nose normal.      Mouth/Throat:      Mouth: Oropharynx is clear and moist. Mucous membranes are dry.   Eyes:      Extraocular Movements: EOM normal.      Conjunctiva/sclera: Conjunctivae normal.      Pupils: Pupils are equal, round, and reactive to light.   Cardiovascular:      Rate and Rhythm: Normal rate and regular rhythm.      Pulses: Intact distal pulses.   Pulmonary:      Effort: Pulmonary effort is normal. Tachypnea present. No respiratory distress.      Breath sounds: Normal breath sounds. No wheezing.   Abdominal:      General: Bowel sounds are normal. There is no distension.       Palpations: Abdomen is soft.      Tenderness: There is no abdominal tenderness.      Comments: No palpable hepatomegaly or splenomegaly    Musculoskeletal:         General: No tenderness or edema. Normal range of motion.      Cervical back: Normal range of motion and neck supple.   Skin:     General: Skin is warm and dry.   Neurological:      Mental Status: She is oriented to person, place, and time. She is lethargic.   Psychiatric:         Mood and Affect: Mood and affect normal.         Thought Content: Thought content normal.           CRANIAL NERVES     CN III, IV, VI   Pupils are equal, round, and reactive to light.  Extraocular motions are normal.        Significant Labs: All pertinent labs within the past 24 hours have been reviewed.    Significant Imaging: I have reviewed all pertinent imaging results/findings within the past 24 hours.    Assessment/Plan:     * Acute renal failure superimposed on stage 2 chronic kidney disease  With hyperkalemia and metabolic acidosis.  Concentrated urine, decreased urine output, constipation, dry mucous membranes in setting of diuretic use suggest dehydration.  Hold diuretic and give cautious IV fluids, strict I/O's, monitor renal function and electrolytes, avoid nephrotoxic agents.     Metabolic acidosis  Suspect secondary to ANI, management as above    Transaminitis  Likely secondary to chronic liver disease, continue to monitor    Positive D dimer  Bilateral lower ext US neg for DVT, V/Q scan pending    Shortness of breath  Breath sounds are clear, CXR without acute abnormality, no peripheral edema, recent echo with normal EF, elevated BNP likely secondary to renal failure.  She is tachypneic with pCO2 of 22.5, in compensated metabolic acidosis.  Treat acidosis and hopefully respiratory status will improve.    Hyperkalemia  Mild, no EKG changes, monitor on tele, repeat in am    Sleep apnea  CPAP qhs    Thrombocytopenia  Likely secondary to chronic liver disease, no  evidence of acute bleeding    Cirrhosis of liver without ascites  Followed by Hepatology, recent abdominal US, thrombocytopenia, elevated liver enzymes and hypoalbuminemia, avoid hepatotoxins and monitor.    Gastroesophageal reflux disease  Stable, no acute issue    HTN (hypertension)  Borderline hypotension with ANI, hold ARB and monitor blood pressure, adjust as needed.    Hyperlipidemia  Not on statin, continue cardiac diet      VTE Risk Mitigation (From admission, onward)         Ordered     enoxaparin injection 70 mg  Daily         02/01/22 1557     IP VTE HIGH RISK PATIENT  Once         02/01/22 1557     Place sequential compression device  Until discontinued         02/01/22 1557              Bryan Jacobson Jr., APRN, AG[facility]-BC  Hospitalist - Department of Hospital Medicine  [facility] [address] [place] [address]  Office #: [phone]; Pager #: [phone]

## 2022-02-02 NOTE — NURSING
Patient left unit via wheelchair with transporter going to [employer] .  Portable O2 tank with patient.  No acute distress noted.

## 2022-02-02 NOTE — CONSULTS
"[facility] [place] [facility]  Cardiology  Consult Note    Patient Name: Tammi Farris  MRN: [id number]  Admission Date: 2/1/2022  Hospital Length of Stay: 0 days  Code Status: Full Code   Attending Provider: Chadwick Quan MD   Consulting Provider: Rashid Ramirez MD  Primary Care Physician: Ann-Marie Hartman MD  Principal Problem:Acute renal failure superimposed on stage 2 chronic kidney disease    Patient information was obtained from patient, relative(s) and ER records.     Inpatient consult to Cardiology  Consult performed by: Rashid Ramirez MD  Consult ordered by: Chadwick Quan MD  Reason for consult: fluid overload and renal failure after starting lasix as outpatient        Subjective:     Chief Complaint:  SOB     HPI:   71 y.o. female with HTN, HLD, GERD, cirrhosis of liver, thrombocytopenia, and sleep apnea presents with a complaint of cough and congestion since October.  She is chronically dyspneic, exacerbated by exertion, has seen Cardiology and was prescribed lasix and metoprolol.  Her sister has now noted that the patient has very low urine output and is constipated.  Also follows with Hepatology for chronic liver disease.  Patient denies fever, chills, chest pain, palpitations, orthopnea, PND, dizziness, syncope, n/v/d, abdominal pain, or dysuria.  In the ED, labs reveal ANI, hyperkalemia, metabolic acidosis with elevated lactic acid, elevated liver enzymes, elevated BNP, elevated d-dimer, thrombocytopenia, and markedly concentrated urine.  Echo December 2021 showed preserved EF, no evidence of heart failure.  Chest xray without acute abnormality. No convincing evidence of infectious process.    Follows with Dr. Mares/Fellow clinic 1/12/22.    Cardiology consulted for: "fluid overload and renal failure after starting lasix as outpatient."    The patient is examined in the presence of her family.  She denies any chest pain or shortness of breath.  She appears to be shivering and is complaining of being " cold.  She also points to her belly when asked if she has any pain.  She denies any melena, or hematuria.  She has had no lower extremity edema.  The exam is otherwise limited by somewhat depressed mental status.  I have discussed this case with Bryan Jacobson NP.  No clear source of infection.  The patient does not appear to be volume overloaded on my examination.  Her influenza a and COVID tests were negative.  Procalcitonin is also negative.  I am wondering if this may be some other sort of viral infection.      Past Medical History:   Diagnosis Date    Allergy     Anxiety     Basal cell carcinoma     Cirrhosis of liver without ascites 12/27/2017    Depression     Diabetes mellitus, type 2     Disorder of kidney and ureter     Endometriosis     GERD (gastroesophageal reflux disease)     Hyperlipidemia     Hypertension     Joint pain     Psoriasis        Past Surgical History:   Procedure Laterality Date    abdominal laproscopic surgery      APPENDECTOMY      CARPAL TUNNEL RELEASE      right    CHOLECYSTECTOMY  04/2015    COLONOSCOPY N/A 2/8/2019    Procedure: COLONOSCOPY;  Surgeon: Celso Salas MD;  Location: [address]);  Service: Endoscopy;  Laterality: N/A;    ESOPHAGOGASTRODUODENOSCOPY N/A 2/8/2019    Procedure: EGD (ESOPHAGOGASTRODUODENOSCOPY);  Surgeon: Celso Salas MD;  Location: [address]);  Service: Endoscopy;  Laterality: N/A;  varices screening, labs ordered prior    HYSTERECTOMY  age 25    JOSEFA/BSO (endometriosis)    OOPHORECTOMY      SKIN CANCER DESTRUCTION      UPPER GASTROINTESTINAL ENDOSCOPY         Review of patient's allergies indicates:   Allergen Reactions    Dobutamine Hives    Benadryl [diphenhydramine hcl] Anxiety     Pt states she feels jumpy and anxious when taking.    Darvon [propoxyphene] Nausea Only    Shellfish containing products Hives    Iodinated contrast media Hives    Lisinopril Other (See Comments)     cough    Propoxyphene  hcl      Itchy (skin)^    Tizanidine Other (See Comments)     Sweaty, difficulty swallowing    Statins-hmg-coa reductase inhibitors Anxiety and Other (See Comments)     Myalgia, fatigue, palpitations, anxiety       No current facility-administered medications on file prior to encounter.     Current Outpatient Medications on File Prior to Encounter   Medication Sig    allopurinoL (ZYLOPRIM) 100 MG tablet Take 1 tablet (100 mg total) by mouth once daily.    betamethasone dipropionate (DIPROLENE) 0.05 % cream Apply topically 2 (two) times daily. To affected areas on lower back (Patient not taking: Reported on 1/19/2022)    blood sugar diagnostic Strp Use 1-2 times daily to check blood sugar as directed.    blood-glucose meter kit Use as instructed to check blood sugar 1-2 times a day    cholecalciferol, vitamin D3, (VITAMIN D3) 25 mcg (1,000 unit) capsule Take 1 capsule (1,000 Units total) by mouth once daily.    clotrimazole (LOTRIMIN) 1 % cream APPLY TO THE AFFECTED AREA OF RASH ON BREASTS AND IN FOLDS OF SKIN AS DIRECTED TWICE DAILY (Patient not taking: Reported on 1/19/2022)    fluocinonide (LIDEX) 0.05 % external solution AAA scalp qday - bid prn pruritus (Patient not taking: Reported on 1/19/2022)    furosemide (LASIX) 20 MG tablet Take 1 tablet (20 mg total) by mouth once daily.    lancets Misc 1 lancet by Misc.(Non-Drug; Combo Route) route 2 (two) times daily with meals.    lancing device Misc 1 Device by Misc.(Non-Drug; Combo Route) route 2 (two) times daily with meals.    losartan (COZAAR) 50 MG tablet TAKE 1 TABLET(50 MG) BY MOUTH EVERY DAY    metFORMIN (GLUCOPHAGE-XR) 500 MG ER 24hr tablet Take 1 tablet after breakfast and 2 tablets after dinner every day    metoprolol tartrate (LOPRESSOR) 25 MG tablet Take 1 tablet (25 mg total) by mouth 2 (two) times daily.    omeprazole (PRILOSEC) 40 MG capsule Take 1 capsule (40 mg total) by mouth 2 (two) times daily. Take first thing in the morning and  then again 30 minutes before dinner    triamcinolone acetonide 0.025% (KENALOG) 0.025 % cream AAA under breasts and groin bid after cool blow dry prn (Patient not taking: Reported on 1/19/2022)     Family History     Problem Relation (Age of Onset)    Arthritis Mother    Asthma Brother    Hyperlipidemia Brother    Hypertension Mother, Sister, Brother    No Known Problems Father    Raynaud syndrome Sister        Tobacco Use    Smoking status: Never Smoker    Smokeless tobacco: Never Used   Substance and Sexual Activity    Alcohol use: Not Currently    Drug use: No    Sexual activity: Yes     Partners: Male     Birth control/protection: Surgical     Review of Systems   Unable to perform ROS: mental status change     Objective:     Vital Signs (Most Recent):  Temp: 97.7 °F (36.5 °C) (02/01/22 1556)  Pulse: 76 (02/01/22 1556)  Resp: (!) 24 (02/01/22 1556)  BP: (!) 100/49 (02/01/22 1556)  SpO2: 95 % (02/01/22 1556) Vital Signs (24h Range):  Temp:  [97.7 °F (36.5 °C)-98.9 °F (37.2 °C)] 97.7 °F (36.5 °C)  Pulse:  [65-83] 76  Resp:  [18-24] 24  SpO2:  [95 %-99 %] 95 %  BP: (100-146)/(49-77) 100/49     Weight: 70 kg (154 lb 6.4 oz)  Body mass index is 33.41 kg/m².    SpO2: 95 %  O2 Device (Oxygen Therapy): room air      Intake/Output Summary (Last 24 hours) at 2/1/2022 1916  Last data filed at 2/1/2022 1745  Gross per 24 hour   Intake 150 ml   Output 270 ml   Net -120 ml       Lines/Drains/Airways     Drain                 Urethral Catheter 02/01/22 1352 Latex 16 Fr. <1 day          Peripheral Intravenous Line                 Peripheral IV - Single Lumen 02/01/22 0941 20 G Left Antecubital <1 day         Peripheral IV - Single Lumen 02/01/22 1124 20 G Right Antecubital <1 day                Physical Exam  Constitutional:       General: She is not in acute distress.     Appearance: She is well-developed and well-nourished. She is ill-appearing and toxic-appearing. She is not diaphoretic.   HENT:      Head: Normocephalic  and atraumatic.   Eyes:      General: No scleral icterus.     Extraocular Movements: Extraocular movements intact and EOM normal.      Conjunctiva/sclera: Conjunctivae normal.      Pupils: Pupils are equal, round, and reactive to light.   Neck:      Vascular: No JVD.      Trachea: No tracheal deviation.   Cardiovascular:      Rate and Rhythm: Normal rate and regular rhythm.      Heart sounds: S1 normal and S2 normal. No murmur heard.  No friction rub. No gallop.    Pulmonary:      Effort: Pulmonary effort is normal. No respiratory distress.      Breath sounds: Normal breath sounds. No stridor. No wheezing, rhonchi or rales.   Chest:      Chest wall: No tenderness.   Abdominal:      General: There is no distension.      Palpations: Abdomen is soft.      Tenderness: There is no abdominal tenderness.   Musculoskeletal:         General: No swelling, tenderness or edema. Normal range of motion.      Cervical back: Normal range of motion and neck supple. No rigidity.      Right lower leg: No edema.      Left lower leg: No edema.   Skin:     General: Skin is warm and dry.      Coloration: Skin is not jaundiced.   Neurological:      General: No focal deficit present.      Mental Status: She is alert and oriented to person, place, and time.      Cranial Nerves: No cranial nerve deficit.   Psychiatric:         Mood and Affect: Mood and affect and mood normal.         Behavior: Behavior normal.         Judgment: Judgment normal.         Current Medications:   [START ON 2/2/2022] enoxaparin  1 mg/kg Subcutaneous Daily    metoprolol tartrate  25 mg Oral BID    mupirocin   Nasal BID    polyethylene glycol  17 g Oral BID    sodium chloride 0.9%  500 mL Intravenous Once    sodium polystyrene  15 g Oral ED 1 Time       acetaminophen, bisacodyL, dextrose 50%, dextrose 50%, glucagon (human recombinant), glucose, glucose, iohexoL, melatonin, naloxone, ondansetron, sodium chloride 0.9%    Laboratory (all labs  reviewed):  CBC:  Recent Labs   Lab 11/30/21 2023 12/08/21  0407 12/08/21  1848 12/09/21  0459 12/13/21  1613 12/21/21  1020 01/19/22  1122   WBC  --  9.08  --  6.80 6.68 6.95 6.62   Hemoglobin  --  9.0 L  --  8.0 L 8.9 L 8.8 L 9.2 L   POC Hematocrit   < >  --  28 L  --   --   --   --    Hematocrit  --  28.6 L  --  25.1 L 28.0 L 27.1 L 29.0 L   Platelets  --  103 L  --  99 L 131 L 114 L 103 L    < > = values in this interval not displayed.       CHEMISTRIES:  Recent Labs   Lab 08/28/19  0730 10/07/19  1107 12/13/21  1613 12/21/21  1020 12/22/21  0939 01/03/22  1118 01/19/22  1122   Glucose 124 H   < > 111 H 87 102 176 H 125 H   Sodium 141   < > 137 141 140 134 L 137   Potassium 4.5   < > 4.0 4.1 4.1 4.6 4.9   BUN 26 H   < > 16 14 12 12 19   Creatinine 1.2   < > 0.9 0.8 0.8 0.9 0.9   eGFR if  53.3 A   < > >60.0 >60 >60.0 >60.0 >60.0   eGFR if non  46.2 A   < > >60.0 >60 >60.0 >60.0 >60.0   Calcium 10.2   < > 9.3 9.1 9.5 9.3 9.0   Magnesium 1.9  --   --   --   --   --   --     < > = values in this interval not displayed.       CARDIAC BIOMARKERS:  Recent Labs   Lab 11/30/21 2008 12/07/21  1850 12/07/21 2258 12/08/21 0407   Troponin I 0.008 0.016 0.018 0.020       COAGS:  Recent Labs   Lab 03/22/21  0703 09/24/21  0739 12/07/21 2258 12/08/21  0407 01/19/22  1122   INR 1.0 1.0 1.1 1.1 1.1       LIPIDS/LFTS:  Recent Labs   Lab 08/28/19  0730 10/07/19  1107 09/18/20  0945 10/13/20  0739 03/22/21  0705 06/22/21  0806 11/12/21  1310 11/30/21  2008 12/07/21  1850 12/13/21  1613 01/19/22  1122   Cholesterol 184  --   --  161 151  --   --   --   --   --   --    Triglycerides 212 H  --   --  189 H 101  --   --   --   --   --   --    HDL 33 L  --   --  28 L 33 L  --   --   --   --   --   --    LDL Cholesterol 108.6  --   --  95.2 97.8  --   --   --   --   --   --    Non-HDL Cholesterol 151  --   --  133 118  --   --   --   --   --   --    AST 39   < >   < >  --  47 H   < > 50 H 51 H 69 H 51 H  74 H   ALT 41   < >   < >  --  35   < > 24 25 28 26 39    < > = values in this interval not displayed.       BNP:  Recent Labs   Lab 11/30/21 2008 12/07/21  1850 12/22/21  0939 01/03/22  1118    H 297 H 107 H 150 H       TSH:  Recent Labs   Lab 01/28/20  1226 03/22/21  0705 12/20/21  1031 02/01/22  1620   TSH 0.413 0.877 2.262 2.323       Free T4:  Recent Labs   Lab 12/20/21  1031   Free T4 0.97       POC labs 02/01/2022  D-dimer 3940  BNP 1160  Troponin 0.02  INR 1.3    ALT 56  Creatinine 2.7  Potassium 5.2  Hemoglobin 8.1  Platelets 106    Diagnostic Results:  ECG (personally reviewed and interpreted tracing(s)):  2/1/22 1006 SR 65, low volt, NSSTTW changes    Chest X-Ray (personally reviewed and interpreted image(s)): 2/1/22  Enlarged cardiac silhouette, similar to the prior exam.  There are mild perihilar and right basilar lung opacities, not significantly changed from the earlier exam, possible cardiogenic/noncardiogenic pulmonary edema, pneumonia, or aspiration.  Superimposed chronic interstitial changes may be present, as well.  The opacities have not significantly changed from the earlier exam.  Mildly elevated right hemidiaphragm, stable.  No pneumothorax.  No pleural effusions.    LE venous US 2/1/22  No sonographic evidence of DVT in the bilateral lower extremities.    Holter 1/19/22  Sinus rhythm with rare ectopy.  No sustained arrhythmias.  No symptoms reported.    Stress Test: DSE 12/7/21  · The maximal doses of pharmaceutical stress agents were administered.  · There were no arrhythmias during stress.  · The ECG portion of this study is suspicious for myocardial ischemia.  · The left ventricle is normal in size with concentric remodeling and normal systolic function.  · The estimated ejection fraction is 68%.  · Indeterminate left ventricular diastolic function.  · Normal right ventricular size with normal right ventricular systolic function.  · Mild right atrial enlargement.  · Mild  mitral regurgitation.  · Mild tricuspid regurgitation.  · Normal central venous pressure (3 mmHg).  · The estimated PA systolic pressure is 34 mmHg.  · Trivial anterior pericardial effusion.  · The stress echo portion of this study is negative for myocardial ischemia despite the ECG changes and prolonged hypotensin and abdominal pain.        Assessment and Plan:     * Acute renal failure superimposed on stage 2 chronic kidney disease  Mgmt per IM  ?dehydration  Stop lasix and rehydrate  EF normal, pt appears volume depleted on exam (certainly not vol overloaded)  ?sepsis picture with rigors    Metabolic acidosis  As above    Positive D dimer  Mgmt per IM    Hyperlipidemia  Cont statin    HTN (hypertension)  Med rx on hold        VTE Risk Mitigation (From admission, onward)         Ordered     enoxaparin injection 70 mg  Daily         02/01/22 1557     IP VTE HIGH RISK PATIENT  Once         02/01/22 1557     Place sequential compression device  Until discontinued         02/01/22 1557              No further cardiac testing planned.  Case d/w Bryan Jacobson NP.    Thank you for your consult. I will sign off. Please contact us if you have any additional questions.    Rashid Ramirez MD  Cardiology   [facility]

## 2022-02-02 NOTE — ASSESSMENT & PLAN NOTE
Breath sounds are clear, CXR without acute abnormality, no peripheral edema, recent echo with normal EF, elevated BNP likely secondary to renal failure.  She is tachypneic with pCO2 of 22.5, in compensated metabolic acidosis.  Treat acidosis and hopefully respiratory status will improve.

## 2022-02-02 NOTE — HPI
"71 y.o. female with HTN, HLD, GERD, cirrhosis of liver, thrombocytopenia, and sleep apnea presents with a complaint of cough and congestion since October.  She is chronically dyspneic, exacerbated by exertion, has seen Cardiology and was prescribed lasix and metoprolol.  Her sister has now noted that the patient has very low urine output and is constipated.  Also follows with Hepatology for chronic liver disease.  Patient denies fever, chills, chest pain, palpitations, orthopnea, PND, dizziness, syncope, n/v/d, abdominal pain, or dysuria.  In the ED, labs reveal ANI, hyperkalemia, metabolic acidosis with elevated lactic acid, elevated liver enzymes, elevated BNP, elevated d-dimer, thrombocytopenia, and markedly concentrated urine.  Echo December 2021 showed preserved EF, no evidence of heart failure.  Chest xray without acute abnormality. No convincing evidence of infectious process.    Follows with Dr. Mares/[facility] clinic 1/12/22.    Cardiology consulted for: "fluid overload and renal failure after starting lasix as outpatient."    The patient is examined in the presence of her family.  She denies any chest pain or shortness of breath.  She appears to be shivering and is complaining of being cold.  She also points to her belly when asked if she has any pain.  She denies any melena, or hematuria.  She has had no lower extremity edema.  The exam is otherwise limited by somewhat depressed mental status.  I have discussed this case with Bryan Jacobson, MEETA.  No clear source of infection.  The patient does not appear to be volume overloaded on my examination.  Her influenza a and COVID tests were negative.  Procalcitonin is also negative.  I am wondering if this may be some other sort of viral infection.  "

## 2022-02-03 PROBLEM — J96.01 ACUTE RESPIRATORY FAILURE WITH HYPOXIA: Status: ACTIVE | Noted: 2022-01-01

## 2022-02-03 NOTE — PT/OT/SLP EVAL
Occupational Therapy   Evaluation    Name: Tmami Farris  MRN: [id number]  Admitting Diagnosis:  Acute renal failure superimposed on stage 2 chronic kidney disease  Recent Surgery: * No surgery found *      Recommendations:     Discharge Recommendations: home health OT (with family supervision/assistance)  Discharge Equipment Recommendations:   (pediatric RW)  Barriers to discharge:  None    Assessment:     Tammi Farris is a 71 y.o. female with a medical diagnosis of Acute renal failure superimposed on stage 2 chronic kidney disease. Performance deficits affecting function: weakness,impaired endurance,impaired self care skills,impaired functional mobilty,impaired balance,impaired cardiopulmonary response to activity,decreased safety awareness,decreased upper extremity function,decreased lower extremity function,impaired skin.      Rehab Prognosis: Good; patient would benefit from acute skilled OT services to address these deficits and reach maximum level of function.       Plan:     Patient to be seen  (3-5x/week) to address the above listed problems via self-care/home management,therapeutic activities,therapeutic exercises  · Plan of Care Expires: 02/17/22  · Plan of Care Reviewed with: patient,sibling    Subjective     Chief Complaint: sleepy   Patient/Family Comments/goals: needed to have a BM, but the BSC is too tall on the lowest setting     Occupational Profile:  Living Environment: Pt lives with a friend and 2 renters in a H with 0 YISEL. Bathroom set-up: tub/shower combo.  Previous level of function: Pt was fully independent and driving PTA. Pt does not wear O2 at baseline.  Equipment Used at Home:  none  Assistance upon Discharge: Pt's sister (present) [place] and the pt has no children; however, sister reports supportive, big family with a sister, brother, and nephew's wife local and able to assist pt    Pain/Comfort:  · Pain Rating 1:  (c/o neck pain in the bed)    Patients cultural,  spiritual, Anabaptist conflicts given the current situation: no    Objective:     Communicated with: nurseJinny, prior to session.  Patient found HOB elevated with bed alarm,oxygen,telemetry,peripheral IV upon OT entry to room.    General Precautions: Standard, fall,respiratory   Orthopedic Precautions:N/A   Braces: N/A  Respiratory Status: Nasal cannula, flow 2 L/min    Occupational Performance:    Bed Mobility:    · Patient completed Scooting anteriorly with minimum assistance  · Patient completed Supine to Sit with minimum assistance  · Patient completed Sit to Supine with contact guard assistance    Functional Mobility/Transfers:  · Patient completed Sit <> Stand Transfer with minimum assistance  with  pediatric RW   · Patient completed Toilet Transfer Step Transfer technique with contact guard assistance with  pediatric RW  · Functional Mobility: pt completed in-room and bathroom functional mobility on 2L O2 with pediatric RW and CGA. Min verbal cues for increased alertness 2* fatigue. spO2 92% on 2L NC post ambulation.     Activities of Daily Living:  · Grooming: stand by assistance standing at the sink to wash her hands after toileting  · Toileting: stand by assistance seated on commode for pericare after BM    Cognitive/Visual Perceptual:  Cognitive/Psychosocial Skills:     -       Oriented to: Person, Place and Time   -       Follows Commands/attention:follows most simple commands  -       Communication: clear/fluent  -       Memory: Poor immediate recall  -       Safety awareness/insight to disability: mildly impaired 2* lethargic   -       Mood/Affect/Coping skills/emotional control: Cooperative, Lethargic and Pleasant  Visual/Perceptual:      -Intact  R/L discrimination      Physical Exam:  Balance:    -       seated: SBA; standing: SBA/CGA with pediatric RW  Postural examination/scapula alignment:    -       No postural abnormalities identified  Skin integrity: bruise L calf, dried scratches/cuts R  shoulder (pt reports from allergic reaction); redness to backside (pt reports to be eczema)  Edema:  Mild BUE  Sensation:    -       Intact  light/touch BUE  Upper Extremity Range of Motion:     -       Right Upper Extremity: WFL  -       Left Upper Extremity: WFL  Upper Extremity Strength:    -       Right Upper Extremity: WFL  -       Left Upper Extremity: WFL   Strength:    -       Right Upper Extremity: WFL  -       Left Upper Extremity: WFL  Fine Motor Coordination:    -       Intact  Left hand, manipulation of objects and Right hand, manipulation of objects  Gross motor coordination:   WFL    AMPAC 6 Click ADL:  AMPAC Total Score: 21    Treatment & Education:  · Pt educated on OT role/POC.   · Importance of OOB activity with staff assistance.  · Safety during functional t/f and mobility   · White board updated; informed nurse to use pediatric RW at the nurses station to assist pt to the bathroom   · D/t pt fatigue, therapy offered pt to the chair but not EOB for safety and pt requested bed in chair position   · Multiple self-care tasks/functional mobility completed- assistance level noted above   · Seated EOB, pt completed x10 elbow flex/ext and shoulder flex/ext AROM; cervical rotation L<>R x5 and static stretch for levator scapulae bringing L ear towards shoulder ~15 sec then repeating on R side with no added pressure   · All questions/concerns answered within OT scope of practice     Education:    Patient left with bed in chair position with all lines intact, call button in reach, bed alarm on and all needs met/within reach. NurseJinny, notified via secure message.     GOALS:   Multidisciplinary Problems     Occupational Therapy Goals        Problem: Occupational Therapy Goal    Goal Priority Disciplines Outcome Interventions   Occupational Therapy Goal     OT, PT/OT Ongoing, Progressing    Description: Goals to be met by: 02/17/22     Patient will increase functional independence with ADLs by  performing:    UE Dressing with Modified [place].  LE Dressing with Modified [place].  Grooming while standing at sink with Modified [place].  Toileting from toilet with Modified J[place] for hygiene and clothing management.   Supine to sit with Modified Jera[place].  Step transfer with Modified Jerauld  Toilet transfer to toilet with Modified Jerauld.  Upper extremity exercise program x15 reps per handout, with independence.                     History:     Past Medical History:   Diagnosis Date    Allergy     Anxiety     Basal cell carcinoma     Cirrhosis of liver without ascites 12/27/2017    Depression     Diabetes mellitus, type 2     Disorder of kidney and ureter     Endometriosis     GERD (gastroesophageal reflux disease)     Hyperlipidemia     Hypertension     Joint pain     Psoriasis        Past Surgical History:   Procedure Laterality Date    abdominal laproscopic surgery      APPENDECTOMY      CARPAL TUNNEL RELEASE      right    CHOLECYSTECTOMY  04/2015    COLONOSCOPY N/A 2/8/2019    Procedure: COLONOSCOPY;  Surgeon: Celso Salas MD;  Location: [email or web address] ([address] [place] [address]);  Service: Endoscopy;  Laterality: N/A;    ESOPHAGOGASTRODUODENOSCOPY N/A 2/8/2019    Procedure: EGD (ESOPHAGOGASTRODUODENOSCOPY);  Surgeon: Celso Salas MD;  [email or web address]  Service: Endoscopy;  Laterality: N/A;  varices screening, labs ordered prior    HYSTERECTOMY  age 25    JOSEFA/BSO (endometriosis)    OOPHORECTOMY      SKIN CANCER DESTRUCTION      UPPER GASTROINTESTINAL ENDOSCOPY         Time Tracking:     OT Date of Treatment: 02/03/22  OT Start Time: 1326  OT Stop Time: 1351  OT Total Time (min): 25 min    Billable Minutes:Evaluation 15 min  Self Care/Home Management 10 min  Total Time 25 min (co-eval with PT)    2/3/2022

## 2022-02-03 NOTE — PT/OT/SLP PROGRESS
Physical Therapy      Patient Name:  Tammi Farris   MRN:  [id number]    Patient not seen today secondary to  (Unable to fully arouse to participate). Will follow-up .

## 2022-02-03 NOTE — PT/OT/SLP PROGRESS
Occupational Therapy      Patient Name:  Tammi Farris   MRN:  [id number]    Patient not seen today secondary to: d/w nurse who reports pt is still sleeping with the CPAP on and did not sleep much last night. Upon OT arrival, pt's sister was trying to remove CPAP to assist pt to eat breakfast, but the patient was still sleeping. Sister reports that she woke up enough to say she was hungry then went back to sleep. edu sister to call for nursing assistance to transfer from CPAP to NC and it is not safe to assist pt with eating when pt is not fully alert. Nurse, Jinny, updated and reported that she will go check on the patient. Sister sternal rub to pt to tell her therapy is here, but pt opened her eyes briefly then went back to sleep while sister informed therapy of pt's PLOF. Will follow-up later as able.     Pt lives with a friend and 2 renters in a [facility] with 0 YISEL. Bathroom set-up: tub/shower combo. Pt was fully independent and driving PTA. Pt does not wear O2 at baseline. Pt's sister (present) lives on the [facility] and the pt has no children; however, sister reports supportive, big family with a sister, brother, and nephew's wife local and able to assist pt.     2/3/2022

## 2022-02-03 NOTE — ASSESSMENT & PLAN NOTE
-Patient with persistent significant shortness of breath  -On admit had clear breath sounds but on 2/2 has significant pulmonary crackles  -CXR showed possible pulmonary edema and BNP is rather elevated  -Also noted significantly elevated D-dimer on admit.  V/Q scan shows very low probability of pulmonary embolism.  Stopped full dose lovenox 2/2  -Noted low Hb today without evidence of significant bleeding.  Transfuse 1 unit PRBC and give one dose of lasix after that  -She appears volume overloaded with pulmonary crackles.  Noted echo 12/7 with EF 68 and indeterminate diastolic function.  Will hold IV fluids for now.  May need further diuresis if not improved.  -Continue supplemental O2.

## 2022-02-03 NOTE — ASSESSMENT & PLAN NOTE
-D-dimer rather elevated in ER and short of breath  -Doppler us of legs without evidence of DVT  -V/Q very low probability for PE.

## 2022-02-03 NOTE — PROGRESS NOTES
[facility]  Progress Note    Patient Name: Tammi Farris  MRN: [id number]  Patient Class: IP- Inpatient   Admission Date: 2/1/2022  Length of Stay: 1 days  Attending Physician: Chadwick Quan MD  Primary Care Provider: Ann-Marie Hartman MD        Subjective:     Principal Problem:Acute renal failure superimposed on stage 2 chronic kidney disease        HPI:  71 y.o. female with HTN, HLD, GERD, cirrhosis of liver, thrombocytopenia, and sleep apnea presents with a complaint of cough and congestion since October.  She is chronically dyspneic, exacerbated by exertion, has seen Cardiology and was prescribed lasix and metoprolol.  Her sister has now noted that the patient has very low urine output and is constipated.  Also follows with Hepatology for chronic liver disease.  Patient denies fever, chills, chest pain, palpitations, orthopnea, PND, dizziness, syncope, n/v/d, abdominal pain, or dysuria.  In the ED, labs reveal ANI, hyperkalemia, metabolic acidosis with elevated lactic acid, elevated liver enzymes, elevated BNP, elevated d-dimer, thrombocytopenia, and markedly concentrated urine.  Echo December 2021 showed preserved EF, no evidence of heart failure.  Chest xray without acute abnormality. No convincing evidence of infectious process.      Overview/Hospital Course:  No notes on file    Interval History: No acute events overnight.  Still feels very weak and short of breath without any improvement.  Notes some gingival bleeding and has been having this when brushing teeth for about 3 weeks.  Also reports some nose bleeding today.  Denies any other bleeding.  Denies chest pain.  Family at bedside.  All questions answered and patient had no further complaints.    Review of Systems   Constitutional: Positive for fatigue. Negative for chills and fever.   HENT: Positive for congestion and nosebleeds.    Eyes: Negative for photophobia and visual disturbance.   Respiratory: Positive for cough  and shortness of breath.    Cardiovascular: Negative for chest pain, palpitations and leg swelling.   Gastrointestinal: Positive for constipation. Negative for abdominal pain, diarrhea, nausea and vomiting.   Genitourinary: Positive for decreased urine volume. Negative for dysuria, frequency and urgency.   Skin: Negative for pallor, rash and wound.   Neurological: Positive for weakness. Negative for light-headedness and headaches.   Psychiatric/Behavioral: Negative for confusion and decreased concentration.     Objective:     Vital Signs (Most Recent):  Temp: 98.2 °F (36.8 °C) (02/02/22 1635)  Pulse: 67 (02/02/22 1635)  Resp: (!) 22 (02/02/22 1635)  BP: (!) 105/51 (02/02/22 1635)  SpO2: 97 % (02/02/22 1635) Vital Signs (24h Range):  Temp:  [97.7 °F (36.5 °C)-98.4 °F (36.9 °C)] 98.2 °F (36.8 °C)  Pulse:  [65-82] 67  Resp:  [18-24] 22  SpO2:  [94 %-98 %] 97 %  BP: (104-139)/(51-57) 105/51     Weight: 70 kg (154 lb 6.4 oz)  Body mass index is 33.41 kg/m².    Intake/Output Summary (Last 24 hours) at 2/2/2022 1803  Last data filed at 2/2/2022 1753  Gross per 24 hour   Intake 907.57 ml   Output 800 ml   Net 107.57 ml      Physical Exam  Vitals and nursing note reviewed.   Constitutional:       General: She is not in acute distress.     Appearance: She is well-developed. She is ill-appearing and toxic-appearing. She is not diaphoretic.      Interventions: Nasal cannula in place.   HENT:      Head: Normocephalic and atraumatic.      Right Ear: External ear normal.      Left Ear: External ear normal.      Nose: Nose normal.      Mouth/Throat:      Mouth: Mucous membranes are moist.   Eyes:      Extraocular Movements: Extraocular movements intact.      Conjunctiva/sclera: Conjunctivae normal.   Cardiovascular:      Rate and Rhythm: Normal rate and regular rhythm.   Pulmonary:      Effort: Tachypnea present. No respiratory distress.      Breath sounds: No wheezing.      Comments: Mild increase work of breathing.  Significant  pulmonary crackles  Abdominal:      General: Bowel sounds are normal. There is no distension.      Palpations: Abdomen is soft.      Tenderness: There is no abdominal tenderness.      Comments: No palpable hepatomegaly or splenomegaly    Musculoskeletal:         General: No tenderness. Normal range of motion.      Cervical back: Normal range of motion and neck supple.      Right lower leg: Edema present.      Left lower leg: Edema present.   Skin:     General: Skin is warm and dry.   Neurological:      Mental Status: She is alert and oriented to person, place, and time.      Comments: Moves all extremities but is diffusely weak   Psychiatric:         Thought Content: Thought content normal.         Significant Labs: All pertinent labs within the past 24 hours have been reviewed.    Significant Imaging: I have reviewed all pertinent imaging results/findings within the past 24 hours.      Assessment/Plan:      * Acute renal failure superimposed on stage 2 chronic kidney disease  -Admitted to inpatient status  -Baseline Cr 0.9  -On admit Cr 2.7  -Reports being stated on lasix 1/8 by her cardiologist and over several days PTA had decreased UOP and diminished PO intake  -BNP elevated at 1160 and with metabolic acidosis and mild hyperkalemia on admission.    -Complex situation to clearly determine volume status and initially felt to be volume deplete.  -Held lasix and gave IV fluids overnight.  Cr a bit worse today and she is still very short of breath with significant pulmonary crackles and mild peripheral edema.  Lactic acid has normalized.  -Avoid nephrotoxic agents and renally dose meds  -FEUrea 7.1% suggestive of pre-renal sources  -Consulted and discussed with nephrology.  -Will hold fluids for now.  Given difficulty ascertaining true volume status will not give lasix for now.  -Repeat BMP in AM.    Shortness of breath  -Patient with persistent significant shortness of breath  -On admit had clear breath sounds but  on 2/2 has significant pulmonary crackles  -CXR showed possible pulmonary edema and BNP is rather elevated  -Also noted significantly elevated D-dimer on admit.  V/Q scan shows very low probability of pulmonary embolism.  Stopped full dose lovenox 2/2  -Noted low Hb today without evidence of significant bleeding.  Transfuse 1 unit PRBC and give one dose of lasix after that  -She appears volume overloaded with pulmonary crackles.  Noted echo 12/7 with EF 68 and indeterminate diastolic function.  Will hold IV fluids for now.  May need further diuresis if not improved.  -Continue supplemental O2.    Positive D dimer  -D-dimer rather elevated in ER and short of breath  -Doppler us of legs without evidence of DVT  -V/Q very low probability for PE.    Hyperkalemia  -Noted on admit and mild  -No EKG changes  -Stable today after hydration  -Repeat bmp in AM    Transaminitis  -Mild elevations noted on admit and slightly improved today  -These are chronic and note history of cirrhosis and alcohol abuse   -Follows with hepatology and was seen last month  -Abdominal US on day prior to admit showed hepatic cirrhosis/steatosis with sonographic findings of portal hypertension including trace ascites and splenomegaly.  -Repeat cmp in AM    Thrombocytopenia  -Likely secondary to chronic liver disease, no evidence of acute bleeding    Cirrhosis of liver without ascites  -Followed by Hepatology  -Noted mild transaminitis and thrombocytopenia  -Likely contributes to her volume overload    Debility  -When more stable will consult PT/OT      Metabolic acidosis  -Suspect secondary to ANI, management as above    Sleep apnea  -CPAP qhs    Gastroesophageal reflux disease  -Stable, no acute issue    HTN (hypertension)  -BP low normal  -Continue metoprolol with hold parameters    Hyperlipidemia  -History noted  -Not on statin due to cirrhosis      VTE Risk Mitigation (From admission, onward)         Ordered     heparin (porcine) injection  5,000 Units  Every 8 hours         02/02/22 1825     IP VTE HIGH RISK PATIENT  Once         02/01/22 1557     Place sequential compression device  Until discontinued         02/01/22 1557                Discharge Planning   CLAUDETTE:      Code Status: Full Code   Is the patient medically ready for discharge?:     Reason for patient still in hospital (select all that apply): Treatment                     Chadwick Quan MD  Department of Hospital Medicine   [facility]

## 2022-02-03 NOTE — CONSULTS
[facility]    CC: anemia, cirrhosis    HPI 71 y.o. female with GARCIA cirrhosis, HTN, HLD, DM2 admitted with shortness of breath and ANI on CKD. GI consulted for anemia in this patient with cirrhosis. Nephrology is also following and evaluation her for vasculitis.    Of note, patient's hemoglobin is 8-9 at baseline. She had a value of 6.6 and 6.9 and this increased to 7.9 without transfusion. She has not received any blood transfusions at this time. Her family states it's difficult to match her blood and therefore she did not get transfused.    Patient states she has daily epistaxis that lasts 2-3 minutes long along with oozing from her gums. Her main complaint at this time is shortness of breath which she says has not improved since admission.     She denies any black or maroon/red stools. Says they have been brown/green in color. Denies any hematemesis.    Per chart review, patient follows with ENT and hematology outpatient. She has had right nasal cautery in the past for epistaxis. Hematology has recommended bone marrow biopsy for her anemia however patient preferred monitoring.    Chart reviewed and summarized here.    Past Medical History  Past Medical History:   Diagnosis Date    Allergy     Anxiety     Basal cell carcinoma     Cirrhosis of liver without ascites 12/27/2017    Depression     Diabetes mellitus, type 2     Disorder of kidney and ureter     Endometriosis     GERD (gastroesophageal reflux disease)     Hyperlipidemia     Hypertension     Joint pain     Psoriasis        Past Surgical History  Past Surgical History:   Procedure Laterality Date    abdominal laproscopic surgery      APPENDECTOMY      CARPAL TUNNEL RELEASE      right    CHOLECYSTECTOMY  04/2015    COLONOSCOPY N/A 2/8/2019    Procedure: COLONOSCOPY;  Surgeon: Celso Salas MD;  Location: [address];  Service: Endoscopy;  Laterality: N/A;    ESOPHAGOGASTRODUODENOSCOPY N/A 2/8/2019     Procedure: EGD (ESOPHAGOGASTRODUODENOSCOPY);  Surgeon: Celso Salas MD;  Location: [facility] ([address] [place] [address]);  Service: Endoscopy;  Laterality: N/A;  varices screening, labs ordered prior    HYSTERECTOMY  age 25    JOSEFA/BSO (endometriosis)    OOPHORECTOMY      SKIN CANCER DESTRUCTION      UPPER GASTROINTESTINAL ENDOSCOPY         Social History  Social History     Tobacco Use    Smoking status: Never Smoker    Smokeless tobacco: Never Used   Substance Use Topics    Alcohol use: Not Currently    Drug use: No       Family History  Family History   Problem Relation Age of Onset    Arthritis Mother     Hypertension Mother     Hypertension Sister     Asthma Brother     Hyperlipidemia Brother     Hypertension Brother     No Known Problems Father     Raynaud syndrome Sister     Breast cancer Neg Hx     Ovarian cancer Neg Hx     Colon cancer Neg Hx     Stomach cancer Neg Hx     Esophageal cancer Neg Hx     Rectal cancer Neg Hx     Irritable bowel syndrome Neg Hx     Ulcerative colitis Neg Hx     Crohn's disease Neg Hx     Celiac disease Neg Hx     Melanoma Neg Hx     Cirrhosis Neg Hx        Review of Systems  General ROS: negative for chills, fever or weight loss  Psychological ROS: negative for hallucination, depression or suicidal ideation  Ophthalmic ROS: negative for blurry vision, photophobia or eye pain  ENT ROS: negative for epistaxis, sore throat or rhinorrhea  Respiratory ROS: no cough, shortness of breath, or wheezing  Cardiovascular ROS: no chest pain or dyspnea on exertion  Gastrointestinal ROS: no abdominal pain, change in bowel habits, or black/ bloody stools  Genito-Urinary ROS: no dysuria, trouble voiding, or hematuria  Musculoskeletal ROS: negative for gait disturbance or muscular weakness  Neurological ROS: no syncope or seizures; no ataxia  Dermatological ROS: negative for pruritis, rash and jaundice    Physical Examination  /68 (Patient Position: Lying)   Pulse 62   Temp  "98.3 °F (36.8 °C) (Oral)   Resp 20   Ht 4' 9" (1.448 m)   Wt 73.1 kg (161 lb 2.5 oz)   SpO2 99%   BMI 34.87 kg/m²   General appearance: mild distress from SOB, alert and oriented  HENT: Normocephalic, atraumatic, neck symmetrical, no nasal discharge ; 2L NC  Eyes: conjunctivae/corneas clear, PERRL, EOM's intact  Lungs: clear to auscultation bilaterally, no dullness to percussion bilaterally  Heart: regular rate and rhythm without rub; no displacement of the PMI   Abdomen: soft, non-tender; bowel sounds normoactive; no organomegaly  Extremities: extremities symmetric; no clubbing, cyanosis, or edema  Integument: Skin color, texture, turgor normal; no rashes; hair distrubution normal  Neurologic: Alert and oriented X 3, normal strength, normal coordination and gait  Psychiatric: no pressured speech; normal affect; no evidence of impaired cognition     Labs:  Lab Results   Component Value Date    WBC 7.59 02/03/2022    HGB 7.9 (L) 02/03/2022    HCT 25.5 (L) 02/03/2022     (H) 02/03/2022    PLT 92 (L) 02/03/2022         CMP  Sodium   Date Value Ref Range Status   02/03/2022 137 136 - 145 mmol/L Final     Potassium   Date Value Ref Range Status   02/03/2022 5.8 (H) 3.5 - 5.1 mmol/L Final     Chloride   Date Value Ref Range Status   02/03/2022 109 95 - 110 mmol/L Final     CO2   Date Value Ref Range Status   02/03/2022 12 (L) 23 - 29 mmol/L Final     Glucose   Date Value Ref Range Status   02/03/2022 119 (H) 70 - 110 mg/dL Final     BUN   Date Value Ref Range Status   02/03/2022 76 (H) 8 - 23 mg/dL Final     Creatinine   Date Value Ref Range Status   02/03/2022 3.4 (H) 0.5 - 1.4 mg/dL Final     Calcium   Date Value Ref Range Status   02/03/2022 9.4 8.7 - 10.5 mg/dL Final     Total Protein   Date Value Ref Range Status   02/03/2022 8.8 (H) 6.0 - 8.4 g/dL Final     Albumin   Date Value Ref Range Status   02/03/2022 4.0 3.5 - 5.2 g/dL Final     Total Bilirubin   Date Value Ref Range Status   02/03/2022 1.3 (H) " 0.1 - 1.0 mg/dL Final     Comment:     For infants and newborns, interpretation of results should be based  on gestational age, weight and in agreement with clinical  observations.    Premature Infant recommended reference ranges:  Up to 24 hours.............<8.0 mg/dL  Up to 48 hours............<12.0 mg/dL  3-5 days..................<15.0 mg/dL  6-29 days.................<15.0 mg/dL       Alkaline Phosphatase   Date Value Ref Range Status   02/03/2022 89 55 - 135 U/L Final     AST   Date Value Ref Range Status   02/03/2022 56 (H) 10 - 40 U/L Final     ALT   Date Value Ref Range Status   02/03/2022 41 10 - 44 U/L Final     Anion Gap   Date Value Ref Range Status   02/03/2022 16 8 - 16 mmol/L Final     eGFR if    Date Value Ref Range Status   02/03/2022 15 (A) >60 mL/min/1.73 m^2 Final     eGFR if non    Date Value Ref Range Status   02/03/2022 13 (A) >60 mL/min/1.73 m^2 Final     Comment:     Calculation used to obtain the estimated glomerular filtration  rate (eGFR) is the CKD-EPI equation.          Imaging:  CT ABDOMEN PELVIS 2/1/2022  Stigmata of cirrhosis including mild splenomegaly, small abdominopelvic ascites, varices, and anasarca.     Diverticular disease.     Small pericardial effusion.     Trace right pleural effusion.     Ground-glass opacities at the lung bases.  A pneumonic process cannot be entirely excluded.    I have personally reviewed and interpreted these images.    PROCEDURES  COLONOSCOPY 2019  Impression:           - Preparation of the colon was fair.                         - Diverticulosis in the entire examined colon.                         - One colon polyp was resected and retrieved.  1. TUBULAR ADENOMA  2. FRAGMENT OF NON-NEOPLASTIC COLONIC MUCOSA    EGD 2019  Impression:           - No evidence of esophageal or gastric varices.     Assessment/Plan:   71 y.o. female with GARCIA cirrhosis, HTN, HLD, DM2 admitted with shortness of breath and ANI on CKD. GI  consulted for anemia in this patient with cirrhosis.     Patient reports daily epistaxis lasting 2-3 minutes long along with oozing from her gums. She follows with ENT and hematology outpatient. She has had nasal cautery in the past for her epistaxis. Hematology has recommended bone marrow bx for anemia evaluation but patient deferred at the time. The epistaxis, bleeding gums are likely leading her to chronic anemia (along with her CKD and advanced liver disease)    Currently, she is having brown/green stools. There is no active hematemesis, melena or hematochezia. She is supposed to have outpatient EGD on 2/25 for variceal surveillance but this may have been cancelled due to patient hesitancy to proceed with scheduling. She is now agreeable to have upper endoscopy  - I will message the schedulers to re-schedule her as an outpatient.     Additionally, she is complaining of shortness of breath and requiring 2 L NC.  Therefore, would recommend EGD as an outpatient once she is medically optimized and these acute issues are resolved.    Please page GI with questions.  Will sign off at this time.     Annette Dalton MD

## 2022-02-03 NOTE — PLAN OF CARE
Problem: Adult Inpatient Plan of Care  Goal: Plan of Care Review  Outcome: Ongoing, Progressing  Flowsheets (Taken 2/2/2022 1945)  Plan of Care Reviewed With:   patient   family  Goal: Patient-Specific Goal (Individualized)  Outcome: Ongoing, Progressing  Goal: Absence of Hospital-Acquired Illness or Injury  Outcome: Ongoing, Progressing  Intervention: Identify and Manage Fall Risk  Flowsheets (Taken 2/2/2022 1945)  Safety Promotion/Fall Prevention:   bed alarm set   assistive device/personal item within reach   Fall Risk reviewed with patient/family   high risk medications identified   medications reviewed   nonskid shoes/socks when out of bed   room near unit station   side rails raised x 2   instructed to call staff for mobility  Intervention: Prevent and Manage VTE (Venous Thromboembolism) Risk  Flowsheets (Taken 2/2/2022 1945)  Activity Management:   Ambulated to bathroom - L4   Ankle pumps - L1   Arm raise - L1   Ambulated in room - L4   Rolling - L1   Straight leg raise - L1  VTE Prevention/Management:   ambulation promoted   bleeding precations maintained   bleeding risk assessed  Range of Motion: active ROM (range of motion) encouraged  Goal: Optimal Comfort and Wellbeing  Outcome: Ongoing, Progressing  Goal: Readiness for Transition of Care  Outcome: Ongoing, Progressing     Problem: Infection  Goal: Absence of Infection Signs and Symptoms  Outcome: Ongoing, Progressing  Intervention: Prevent or Manage Infection  Flowsheets (Taken 2/2/2022 1945)  Infection Management: aseptic technique maintained     Problem: Diabetes Comorbidity  Goal: Blood Glucose Level Within Targeted Range  Outcome: Ongoing, Progressing     Problem: Fluid and Electrolyte Imbalance (Acute Kidney Injury/Impairment)  Goal: Fluid and Electrolyte Balance  Outcome: Ongoing, Progressing     Problem: Oral Intake Inadequate (Acute Kidney Injury/Impairment)  Goal: Optimal Nutrition Intake  Outcome: Ongoing, Progressing     Problem: Renal  Function Impairment (Acute Kidney Injury/Impairment)  Goal: Effective Renal Function  Outcome: Ongoing, Progressing  Intervention: Monitor and Support Renal Function  Flowsheets (Taken 2/2/2022 1945)  Medication Review/Management:   medications reviewed   high-risk medications identified   Pt alert able to make needs known,elena meds well,no s/s adverse reaction noted,reposition self q 2hrs,pain controlled by prn pain medication,POC explained,remains free from falls and pressure injuries,safety maintained,continue monitoring.

## 2022-02-03 NOTE — PT/OT/SLP EVAL
Physical Therapy Evaluation    Patient Name:  Tammi Farris   MRN:  [id number]    Recommendations:     Discharge Recommendations:  home health PT (Family support)   Discharge Equipment Recommendations:  (Pediatric/Tomi RW)   Barriers to discharge: Pt is ot functioning at baseline    Assessment:     Tammi Farris is a 71 y.o. female admitted with a medical diagnosis of Acute renal failure superimposed on stage 2 chronic kidney disease.  She presents with the following impairments/functional limitations:  weakness,impaired balance,decreased safety awareness,impaired skin,impaired endurance,pain,impaired cardiopulmonary response to activity,impaired self care skills,decreased coordination,impaired functional mobilty,decreased upper extremity function,impaired coordination,gait instability .    Rehab Prognosis: Good; patient would benefit from acute skilled PT services to address these deficits and reach maximum level of function.    Recent Surgery: * No surgery found *      Plan:     During this hospitalization, patient to be seen  (3-5x/wk) to address the identified rehab impairments via gait training,therapeutic activities,therapeutic exercises and progress toward the following goals:    · Plan of Care Expires:  02/17/22    Subjective     Chief Complaint: pain, SOB, fatigue  Patient/Family Comments/goals: Pt would like to ambulae to the bathroom  Pain/Comfort:  · Pain Rating 1:  (Not rated)  · Location - Orientation 1:  (Neck)  · Pain Addressed 1: Reposition,Distraction,Cessation of Activity    Patients cultural, spiritual, Scientologist conflicts given the current situation: no    Living Environment:  Pt lives with a friend in a [facility] with no concerns.  She has 2 other individuals that rent rooms in the home  Prior to admission, patients level of function was Independent.  Equipment used at home: none.  DME owned (not currently used): none.  Upon discharge, patient will have assistance from Family members.    Objective:  "    Communicated with nsg prior to session.  Patient found HOB elevated with telemetry,oxygen,bed alarm  upon PT entry to room.    General Precautions: Standard, fall,respiratory   Orthopedic Precautions:N/A   Braces: N/A  Respiratory Status: Nasal cannula, flow 2 L/min    Exams:  · Cognitive Exam:  Patient is oriented to Person and Place  · Gross Motor Coordination:  impaired 2/2 gen weakness and deconditioining  · Postural Exam:  Patient presented with the following abnormalities:    · -       Rounded shoulders  · -       Forward head  · Sensation:    · -       Intact  light/touch B LE's  · Skin Integrity/Edema:      · -       Skin integrity: multiple scratches, abrasions, bruises and "Excema" on back  · RLE ROM: WFL  · RLE Strength: WFL  · LLE ROM: WFL  · LLE Strength: WFL    Functional Mobility:  · Bed Mobility:     · Scooting: minimum assistance and to HOb in supine and to EOB in sitting   · Bridging: contact guard assistance  · Supine to Sit: minimum assistance  · Sit to Supine: contact guard assistance  · Transfers:     · Sit to Stand:  minimum assistance with pediatric RW  · Gait: 2x12' with PRW and CGA  · Balance: FAir+ sit, fair stand    Therapeutic Activities and Exercises:   SPO2 on 2LO2 92-95%.  O2 tubing extended sot that pt can get to bathroom with Nursing staff    AM-PAC 6 CLICK MOBILITY  Total Score:17     Patient left with bed in chair position with all lines intact, call button in reach, bed alarm on and nsg notified.    GOALS:   Multidisciplinary Problems     Physical Therapy Goals     Not on file                History:     Past Medical History:   Diagnosis Date    Allergy     Anxiety     Basal cell carcinoma     Cirrhosis of liver without ascites 12/27/2017    Depression     Diabetes mellitus, type 2     Disorder of kidney and ureter     Endometriosis     GERD (gastroesophageal reflux disease)     Hyperlipidemia     Hypertension     Joint pain     Psoriasis        Past Surgical " History:   Procedure Laterality Date    abdominal laproscopic surgery      APPENDECTOMY      CARPAL TUNNEL RELEASE      right    CHOLECYSTECTOMY  04/2015    COLONOSCOPY N/A 2/8/2019    Procedure: COLONOSCOPY;  Surgeon: Celso Salas MD;  Location: [address]);  Service: Endoscopy;  Laterality: N/A;    ESOPHAGOGASTRODUODENOSCOPY N/A 2/8/2019    Procedure: EGD (ESOPHAGOGASTRODUODENOSCOPY);  Surgeon: Celso Salas MD;  Location: [address]);  Service: Endoscopy;  Laterality: N/A;  varices screening, labs ordered prior    HYSTERECTOMY  age 25    JOSEFA/BSO (endometriosis)    OOPHORECTOMY      SKIN CANCER DESTRUCTION      UPPER GASTROINTESTINAL ENDOSCOPY         Time Tracking:     PT Received On: 02/03/22  PT Start Time: 1326     PT Stop Time: 1351  PT Total Time (min): 25 min     Billable Minutes: Evaluation 15 co-evaluation with OT      02/03/2022

## 2022-02-03 NOTE — ASSESSMENT & PLAN NOTE
-Mild elevations noted on admit and slightly improved today  -These are chronic and note history of cirrhosis and alcohol abuse   -Follows with hepatology and was seen last month  -Abdominal US on day prior to admit showed hepatic cirrhosis/steatosis with sonographic findings of portal hypertension including trace ascites and splenomegaly.  -Repeat cmp in AM

## 2022-02-03 NOTE — ASSESSMENT & PLAN NOTE
-Admitted to inpatient status  -Baseline Cr 0.9  -On admit Cr 2.7  -Reports being stated on lasix 1/8 by her cardiologist and over several days PTA had decreased UOP and diminished PO intake  -BNP elevated at 1160 and with metabolic acidosis and mild hyperkalemia on admission.    -Complex situation to clearly determine volume status and initially felt to be volume deplete.  -Held lasix and gave IV fluids overnight.  Cr a bit worse today and she is still very short of breath with significant pulmonary crackles and mild peripheral edema.  Lactic acid has normalized.  -Avoid nephrotoxic agents and renally dose meds  -FEUrea 7.1% suggestive of pre-renal sources  -Consulted and discussed with nephrology.  -Will hold fluids for now.  Given difficulty ascertaining true volume status will not give lasix for now.  -Repeat BMP in AM.

## 2022-02-03 NOTE — PLAN OF CARE
Problem: Occupational Therapy Goal  Goal: Occupational Therapy Goal  Description: Goals to be met by: 02/17/22     Patient will increase functional independence with ADLs by performing:    UE Dressing with Modified [place].  LE Dressing with Modified [place].  Grooming while standing at sink with Modified [place].  Toileting from toilet with Modified [place] for hygiene and clothing management.   Supine to sit with Modified [place].  Step transfer with Modified [place]  Toilet transfer to toilet with Modified [place].  Upper extremity exercise program x15 reps per handout, with independence.    Outcome: Ongoing, Progressing     OT rec HHOT with family supervision/assistance at d/c with pediatric RW in order to increase safety and independence with ADLs and all aspects of functional mobility.

## 2022-02-03 NOTE — TELEPHONE ENCOUNTER
[facility] ENDO SCHEDULERS,    Patient is scheduled for EGD 2/25 but I am not sure if this is currently cancelled? Patient had expressed some hesitancy to have EGD but is now agreeable.  She is currently hospitalized at [facility] - please arrange for her to have outpatient EGD this month.    Thank you  Annette Dalton MD

## 2022-02-03 NOTE — PLAN OF CARE
Chart check complete.  Pt AAOx4, able to verbalize needs.  2L NC tolerated will with humidification.  Pt states she has difficulty breathing, CPAP worn at night for 2 hours, then pt wanted nasal cannula back on for comfort.  Tele box monitored.  IV flushed and saline locked., Yao in place to gravity.  BG monitored, WNL.  No BM this shift, stool unable to be collected.  No acute distress noted, PRN breathing treatments ordered for comfort.  Pt free from falls or injury this shift. Bed in low position, wheels locked, call light in reach for assistance, will continue to monitor.        Problem: Adult Inpatient Plan of Care  Goal: Plan of Care Review  Outcome: Ongoing, Progressing     Problem: Adult Inpatient Plan of Care  Goal: Patient-Specific Goal (Individualized)  Outcome: Ongoing, Progressing     Problem: Adult Inpatient Plan of Care  Goal: Absence of Hospital-Acquired Illness or Injury  Outcome: Ongoing, Progressing     Problem: Adult Inpatient Plan of Care  Goal: Optimal Comfort and Wellbeing  Outcome: Ongoing, Progressing     Problem: Infection  Goal: Absence of Infection Signs and Symptoms  Outcome: Ongoing, Progressing     Problem: Diabetes Comorbidity  Goal: Blood Glucose Level Within Targeted Range  Outcome: Ongoing, Progressing     Problem: Fluid and Electrolyte Imbalance (Acute Kidney Injury/Impairment)  Goal: Fluid and Electrolyte Balance  Outcome: Ongoing, Progressing     Problem: Renal Function Impairment (Acute Kidney Injury/Impairment)  Goal: Effective Renal Function  Outcome: Ongoing, Progressing

## 2022-02-03 NOTE — PLAN OF CARE
[facility] - Med Surg  Initial Discharge Assessment  Tania Liu (Sister)   [phone] (Mobile)     Primary Care Provider: Ann-Marie Hartman MD    Admission Diagnosis: Cough [R05.9]  Hyperkalemia [E87.5]  ARF (acute renal failure) [N17.9]  Leg swelling [M79.89]  Edema of both legs [R60.0]  Chest pain [R07.9]  Acute renal failure, unspecified acute renal failure type [N17.9]    Admission Date: 2/1/2022  Expected Discharge Date:          Payor: HU[employer] MANAGED MEDICARE / Plan: HUMANA TOTAL CARE ADVANTAGE / Product Type: Medicare Advantage /     Extended Emergency Contact Information  Primary Emergency Contact: Tania Liu   [facility]  Home Phone: [phone]  Mobile Phone: [phone]  Relation: Sister  Preferred language: Yi    Discharge Plan A: Home with family  Discharge Plan B: Home with family,Home Health      [id number] [place] - [address] AT [facility] & [address]  [address]  [place] [address]  Phone: [phone] Fax: [phone]    [facility] [address]  [address] [address]  Phone: [phone] Fax: [phone]      Initial Assessment (most recent)     Adult Discharge Assessment - 02/02/22 1332        Discharge Assessment    Assessment Type Discharge Planning Assessment     Source of Information health record     Communicated CLAUDETTE with patient/caregiver Date not available/Unable to determine     Lives With sibling(s)     Do you expect to return to your current living situation? Yes     Do you have help at home or someone to help you manage your care at home? Yes     Who are your caregiver(s) and their phone number(s)? Tania Liu (Sister)   [phone] (Mobile)     Prior to hospitilization cognitive status: Alert/Oriented     Current cognitive status: Alert/Oriented     Equipment Currently Used at Home other (see comments)     Readmission within 30 days? No     Patient  currently being followed by outpatient case management? Unable to determine (comments)     Do you currently have service(s) that help you manage your care at home? No     Do you take prescription medications? Yes     Do you have prescription coverage? Yes     Coverage [employer] medicare     Do you have any problems affording any of your prescribed medications? No     Who is going to help you get home at discharge? Tania Liu (Sister)   [phone] (Mobile)     Are you on dialysis? No     Do you take coumadin? No     Discharge Plan A Home with family     Discharge Plan B Home with family;Home Health        Relationship/Environment    Name(s) of Who Lives With Patient Tania Liu (Sister)   [phone] (Mobile)

## 2022-02-03 NOTE — ASSESSMENT & PLAN NOTE
-Followed by Hepatology  -Noted mild transaminitis and thrombocytopenia  -Likely contributes to her volume overload

## 2022-02-03 NOTE — SUBJECTIVE & OBJECTIVE
Interval History: No acute events overnight.  Still feels very weak and short of breath without any improvement.  Notes some gingival bleeding and has been having this when brushing teeth for about 3 weeks.  Also reports some nose bleeding today.  Denies any other bleeding.  Denies chest pain.  Family at bedside.  All questions answered and patient had no further complaints.    Review of Systems   Constitutional: Positive for fatigue. Negative for chills and fever.   HENT: Positive for congestion and nosebleeds.    Eyes: Negative for photophobia and visual disturbance.   Respiratory: Positive for cough and shortness of breath.    Cardiovascular: Negative for chest pain, palpitations and leg swelling.   Gastrointestinal: Positive for constipation. Negative for abdominal pain, diarrhea, nausea and vomiting.   Genitourinary: Positive for decreased urine volume. Negative for dysuria, frequency and urgency.   Skin: Negative for pallor, rash and wound.   Neurological: Positive for weakness. Negative for light-headedness and headaches.   Psychiatric/Behavioral: Negative for confusion and decreased concentration.     Objective:     Vital Signs (Most Recent):  Temp: 98.2 °F (36.8 °C) (02/02/22 1635)  Pulse: 67 (02/02/22 1635)  Resp: (!) 22 (02/02/22 1635)  BP: (!) 105/51 (02/02/22 1635)  SpO2: 97 % (02/02/22 1635) Vital Signs (24h Range):  Temp:  [97.7 °F (36.5 °C)-98.4 °F (36.9 °C)] 98.2 °F (36.8 °C)  Pulse:  [65-82] 67  Resp:  [18-24] 22  SpO2:  [94 %-98 %] 97 %  BP: (104-139)/(51-57) 105/51     Weight: 70 kg (154 lb 6.4 oz)  Body mass index is 33.41 kg/m².    Intake/Output Summary (Last 24 hours) at 2/2/2022 1803  Last data filed at 2/2/2022 1753  Gross per 24 hour   Intake 907.57 ml   Output 800 ml   Net 107.57 ml      Physical Exam  Vitals and nursing note reviewed.   Constitutional:       General: She is not in acute distress.     Appearance: She is well-developed. She is ill-appearing and toxic-appearing. She is not  diaphoretic.      Interventions: Nasal cannula in place.   HENT:      Head: Normocephalic and atraumatic.      Right Ear: External ear normal.      Left Ear: External ear normal.      Nose: Nose normal.      Mouth/Throat:      Mouth: Mucous membranes are moist.   Eyes:      Extraocular Movements: Extraocular movements intact.      Conjunctiva/sclera: Conjunctivae normal.   Cardiovascular:      Rate and Rhythm: Normal rate and regular rhythm.   Pulmonary:      Effort: Tachypnea present. No respiratory distress.      Breath sounds: No wheezing.      Comments: Mild increase work of breathing.  Significant pulmonary crackles  Abdominal:      General: Bowel sounds are normal. There is no distension.      Palpations: Abdomen is soft.      Tenderness: There is no abdominal tenderness.      Comments: No palpable hepatomegaly or splenomegaly    Musculoskeletal:         General: No tenderness. Normal range of motion.      Cervical back: Normal range of motion and neck supple.      Right lower leg: Edema present.      Left lower leg: Edema present.   Skin:     General: Skin is warm and dry.   Neurological:      Mental Status: She is alert and oriented to person, place, and time.      Comments: Moves all extremities but is diffusely weak   Psychiatric:         Thought Content: Thought content normal.         Significant Labs: All pertinent labs within the past 24 hours have been reviewed.    Significant Imaging: I have reviewed all pertinent imaging results/findings within the past 24 hours.

## 2022-02-04 PROBLEM — D53.9 MACROCYTIC ANEMIA: Status: ACTIVE | Noted: 2020-11-24

## 2022-02-04 NOTE — EICU
Eicu brief admission review note.  Pt was examined on video, notes, images, labs  reviewed.  72 y/o with h/o HTN, PAULINA, liver cirrosis  admiteed on 2/1 with respiratory failure 2/2 COVID 19PNA, ANI on CKD, she was transferred to ICU after she had an episode of desaturation after coming off bipap to eat dinner,no gross aspiratoon noted, no significant change in CXR.  Acute on chronic respiratory failure on bipap 15/5 fio40% pulling TVs 550-550 ccs, RR 28-30.Labs reordered after desaturation episode  ANI on CKD-on bicarb qtt and lasix qtt now, may need HD  Liver cirrhosis- with portal htn, splenomegaly, trombocytopenia- noted, following labs  Positive D- dimer- VQ low prob, LE US- no DVT  lovenox changed to heparin sq  2/2 ANI  Worsening anemia- PRBC ordered, awaiting blood 2/2 complex antibodies situation  DVT proph- heparin sq, monitoring Plt  D/w RN

## 2022-02-04 NOTE — ASSESSMENT & PLAN NOTE
Possibly secondary to non-COVID related viral infection. procalcitonin wnl, covid19 negative.  Viral cause of her multiorgan failure is also a reasonable cause contributing to her worsening cytopenias.  -blood cx ngtd  -can consider respiratory infection panel (PCR)/ID consult if patient continues to deteriorate  -Management per primary team

## 2022-02-04 NOTE — PT/OT/SLP PROGRESS
Occupational Therapy      Patient Name:  Tammi Farris   MRN:  [id number]    Patient not seen today secondary to pt transferred to ICU w/ increased O2 demands and anemia. Will follow-up.    2/4/2022

## 2022-02-04 NOTE — NURSING
Has been on and off bipap most of shift and desats when off bipap. Removed bipap so pt could eat dinner and desat to 86% with labored breathing and respirations at 28-32.  Rapid Response called.

## 2022-02-04 NOTE — CONSULTS
[facility] - Intensive Care  Hematology/Oncology  Consult Note    Patient Name: Tammi Farris  MRN: [id number]  Admission Date: 2/1/2022  Hospital Length of Stay: 3 days  Code Status: Full Code   Attending Provider: Chadwick Quan MD  Consulting Provider: Adeel Sauer MD  Primary Care Physician: Ann-Marie Hartman MD  Principal Problem:Acute renal failure superimposed on stage 2 chronic kidney disease    Inpatient consult to Hematology/Oncology - [facility]  Consult performed by: Adeel Sauer MD  Consult ordered by: HOWARD Cash MD    Inpatient consult to Oncology  Consult performed by: Adeel Sauer MD  Consult ordered by: Lupe Case MD        Subjective:     HPI:  71 y.o. female with HTN, HLD, GERD, cirrhosis of liver, thrombocytopenia, and sleep apnea presents with a complaint of cough and congestion since October.  She is chronically dyspneic, exacerbated by exertion, has seen Cardiology and was prescribed lasix and metoprolol.  Her sister has now noted that the patient has very low urine output and is constipated.  Also follows with Hepatology for chronic liver disease.  Patient denies fever, chills, chest pain, palpitations, orthopnea, PND, dizziness, syncope, n/v/d, abdominal pain, or dysuria.  In the ED, labs reveal ANI, hyperkalemia, metabolic acidosis with elevated lactic acid, elevated liver enzymes, elevated BNP, elevated d-dimer, thrombocytopenia, and markedly concentrated urine.  Echo December 2021 showed preserved EF, no evidence of heart failure.  Chest xray without acute abnormality. No convincing evidence of infectious process.  Admitted for acute hypoxic respiratory failure secondary to viral infection (procalcitonin wnl, covid19 negative) complicated by ANI and worsening cytopenias.  Heme onc consulted regarding anemia and thrombocytopenia.            Oncology Treatment Plan:   [No treatment plan]    Medications:  Continuous Infusions:   furosemide (LASIX) 2 mg/mL continuous infusion (non-titrating)  5 mg/hr (02/04/22 0701)    sodium bicarbonate drip Stopped (02/03/22 1538)     Scheduled Meds:   albuterol-ipratropium  3 mL Nebulization Q6H    metoprolol tartrate  25 mg Oral BID    mupirocin   Nasal BID    polyethylene glycol  17 g Oral BID    sodium zirconium cyclosilicate  10 g Oral Daily     PRN Meds:sodium chloride, acetaminophen, bisacodyL, dextrose 50%, dextrose 50%, glucagon (human recombinant), glucose, glucose, iohexoL, melatonin, naloxone, ondansetron, simethicone, sodium chloride 0.9%     Review of patient's allergies indicates:   Allergen Reactions    Dobutamine Hives    Benadryl [diphenhydramine hcl] Anxiety     Pt states she feels jumpy and anxious when taking.    Darvon [propoxyphene] Nausea Only    Shellfish containing products Hives    Iodinated contrast media Hives    Lisinopril Other (See Comments)     cough    Propoxyphene hcl      Itchy (skin)^    Tizanidine Other (See Comments)     Sweaty, difficulty swallowing    Statins-hmg-coa reductase inhibitors Anxiety and Other (See Comments)     Myalgia, fatigue, palpitations, anxiety        Past Medical History:   Diagnosis Date    Allergy     Anxiety     Basal cell carcinoma     Cirrhosis of liver without ascites 12/27/2017    Depression     Diabetes mellitus, type 2     Disorder of kidney and ureter     Endometriosis     GERD (gastroesophageal reflux disease)     Hyperlipidemia     Hypertension     Joint pain     Psoriasis      Past Surgical History:   Procedure Laterality Date    abdominal laproscopic surgery      APPENDECTOMY      CARPAL TUNNEL RELEASE      right    CHOLECYSTECTOMY  04/2015    COLONOSCOPY N/A 2/8/2019    Procedure: COLONOSCOPY;  Surgeon: Celso Salas MD;  Location: [facility] ([address] [place] [address]);  Service: Endoscopy;  Laterality: N/A;    ESOPHAGOGASTRODUODENOSCOPY N/A 2/8/2019    Procedure: EGD (ESOPHAGOGASTRODUODENOSCOPY);  Surgeon: Celso Salas MD;  Location: [facility] ([address] [place] [address]);  Service:  Endoscopy;  Laterality: N/A;  varices screening, labs ordered prior    HYSTERECTOMY  age 25    JOSEFA/BSO (endometriosis)    OOPHORECTOMY      SKIN CANCER DESTRUCTION      UPPER GASTROINTESTINAL ENDOSCOPY       Family History     Problem Relation (Age of Onset)    Arthritis Mother    Asthma Brother    Hyperlipidemia Brother    Hypertension Mother, Sister, Brother    No Known Problems Father    Raynaud syndrome Sister        Tobacco Use    Smoking status: Never Smoker    Smokeless tobacco: Never Used   Substance and Sexual Activity    Alcohol use: Not Currently    Drug use: No    Sexual activity: Yes     Partners: Male     Birth control/protection: Surgical       Review of Systems   Constitutional: Positive for fatigue. Negative for chills and fever.   HENT: Positive for congestion and nosebleeds.    Eyes: Negative for photophobia and visual disturbance.   Respiratory: Positive for cough and shortness of breath.    Cardiovascular: Negative for chest pain, palpitations and leg swelling.   Gastrointestinal: Positive for constipation. Negative for abdominal pain, diarrhea, nausea and vomiting.   Genitourinary: Positive for decreased urine volume. Negative for dysuria, frequency and urgency.   Skin: Negative for pallor, rash and wound.   Neurological: Positive for weakness. Negative for light-headedness and headaches.   Psychiatric/Behavioral: Negative for confusion and decreased concentration.     Objective:     Vital Signs (Most Recent):  Temp: 97.8 °F (36.6 °C) (02/04/22 0701)  Pulse: 66 (02/04/22 0823)  Resp: 20 (02/04/22 0823)  BP: 130/69 (02/04/22 0823)  SpO2: 100 % (02/04/22 0823) Vital Signs (24h Range):  Temp:  [97.8 °F (36.6 °C)-98.6 °F (37 °C)] 97.8 °F (36.6 °C)  Pulse:  [60-74] 66  Resp:  [12-36] 20  SpO2:  [94 %-100 %] 100 %  BP: ()/(50-69) 130/69     Weight: 73.1 kg (161 lb 2.5 oz)  Body mass index is 34.87 kg/m².  Body surface area is 1.71 meters squared.      Intake/Output Summary (Last 24  hours) at 2/4/2022 1008  Last data filed at 2/4/2022 0701  Gross per 24 hour   Intake 550.87 ml   Output 240 ml   Net 310.87 ml       Physical Exam  Vitals and nursing note reviewed.   Constitutional:       General: She is not in acute distress.     Appearance: She is well-developed. She is ill-appearing. She is not toxic-appearing or diaphoretic.      Interventions: Nasal cannula in place.   HENT:      Head: Normocephalic and atraumatic.      Right Ear: External ear normal.      Left Ear: External ear normal.      Nose: Nose normal.      Mouth/Throat:      Mouth: Mucous membranes are moist.   Eyes:      Extraocular Movements: Extraocular movements intact.      Conjunctiva/sclera: Conjunctivae normal.   Cardiovascular:      Rate and Rhythm: Normal rate and regular rhythm.   Pulmonary:      Effort: Tachypnea present. No respiratory distress.      Breath sounds: No wheezing.      Comments: Improved work of breathing and looks comfortable.  Still with significant pulmonary crackles  Abdominal:      General: Bowel sounds are normal. There is no distension.      Palpations: Abdomen is soft.      Tenderness: There is no abdominal tenderness.      Comments: No palpable hepatomegaly or splenomegaly    Musculoskeletal:         General: No tenderness. Normal range of motion.      Cervical back: Normal range of motion and neck supple.      Right lower leg: Edema present.      Left lower leg: Edema present.   Skin:     General: Skin is warm and dry.   Neurological:      Mental Status: She is alert and oriented to person, place, and time.      Comments: Moves all extremities but is diffusely weak   Psychiatric:         Thought Content: Thought content normal.         Significant Labs:   .  Recent Results (from the past 168 hour(s))   POCT glucose    Collection Time: 02/01/22  7:54 AM   Result Value Ref Range    POCT Glucose 140 (H) 70 - 110 mg/dL   POCT COVID-19 Rapid Screening    Collection Time: 02/01/22  7:55 AM   Result  Value Ref Range    POC Rapid COVID Negative Negative     Acceptable Yes    POCT URINALYSIS W/O SCOPE    Collection Time: 02/01/22  9:21 AM   Result Value Ref Range    Glucose, UA Negative     Bilirubin, UA Negative     Ketones, UA Negative     Spec Grav UA >=1.030 (>)     Blood, UA Negative     PH, UA 5.0     Protein, UA 2+ (A)     Urobilinogen, UA 0.2 E.U./dL    Nitrite, UA Negative     Leukocytes, UA Negative     Color, UA Yellow     Clarity, UA Clear    POCT CMP    Collection Time: 02/01/22  9:40 AM   Result Value Ref Range    Albumin, POC 3.1 3.3 - 5.5 g/dL    Alkaline Phosphatase, POC 98 42 - 141 U/L    ALT (SGPT), POC 56 (H) 10 - 47 U/L    AST (SGOT),  (H) 11 - 38 U/L    POC BUN 73 (H) 7 - 22 mg/dL    Calcium, POC 9.5 8.0 - 10.3 mg/dL    POC Chloride 105 98 - 108 mmol/L    POC Creatinine 2.7 (H) 0.6 - 1.2 mg/dL    POC Glucose 145 (H) 73 - 118 mg/dL    POC Potassium 5.2 (H) 3.6 - 5.1 mmol/L    POC Sodium 136 128 - 145 mmol/L    Bilirubin, UA 1.1 0.2 - 1.6 mg/dL    POC TCO2 17 (L) 18 - 33 mmol/L    Protein, UA 8.4 (H) 6.4 - 8.1 g/dL   ISTAT PROCEDURE    Collection Time: 02/01/22  9:42 AM   Result Value Ref Range    POC PTWBT 15.2 (H) 9.7 - 14.3 sec    POC PTINR 1.3 (H) 0.9 - 1.2    Sample unknown    Troponin ISTAT    Collection Time: 02/01/22  9:42 AM   Result Value Ref Range    POC Cardiac Troponin I 0.02 <0.09 ng/mL    Sample unknown    POCT B-type natriuretic peptide (BNP)    Collection Time: 02/01/22 10:06 AM   Result Value Ref Range    POC B-Type Natriuretic Peptide 1160 (H) 0.0 - 100.0 pg/mL   POCT D Dimer    Collection Time: 02/01/22 10:10 AM   Result Value Ref Range    POC D-DI 3940 (H) 0.0 - 450.0 ng/mL   Blood Culture #2 **CANNOT BE ORDERED STAT**    Collection Time: 02/01/22 11:20 AM    Specimen: Peripheral, Antecubital, Left; Blood   Result Value Ref Range    Blood Culture, Routine No Growth to date     Blood Culture, Routine No Growth to date     Blood Culture, Routine No  Growth to date    Blood Culture #1 **CANNOT BE ORDERED STAT**    Collection Time: 02/01/22 11:21 AM    Specimen: Peripheral, Antecubital, Right; Blood   Result Value Ref Range    Blood Culture, Routine No Growth to date     Blood Culture, Routine No Growth to date     Blood Culture, Routine No Growth to date    ISTAT PROCEDURE    Collection Time: 02/01/22 11:32 AM   Result Value Ref Range    POC PH 7.395 7.35 - 7.45    POC PCO2 22.5 (L) 35 - 45 mmHg    POC PO2 28 (L) 40 - 60 mmHg    POC HCO3 13.8 (L) 24 - 28 mmol/L    POC BE -10 -2 to 2 mmol/L    POC SATURATED O2 56 (L) 95 - 100 %    POC Lactate 2.34 (H) 0.5 - 2.2 mmol/L    POC TCO2 14 (L) 24 - 29 mmol/L    Sample VENOUS     Site Other     Allens Test N/A    Sodium, urine, random    Collection Time: 02/01/22 12:01 PM   Result Value Ref Range    Sodium, Urine <20 (A) 20 - 250 mmol/L   Urea nitrogen, urine    Collection Time: 02/01/22 12:01 PM   Result Value Ref Range    Urine Urea Nitrogen 445 140 - 1050 mg/dL   Creatinine, urine, random    Collection Time: 02/01/22 12:01 PM   Result Value Ref Range    Creatinine, Urine 231.5 15.0 - 325.0 mg/dL   Procalcitonin    Collection Time: 02/01/22 12:32 PM   Result Value Ref Range    Procalcitonin 0.23 <0.25 ng/mL   Hemoglobin A1c    Collection Time: 02/01/22 12:40 PM   Result Value Ref Range    Hemoglobin A1C 5.9 (H) 4.0 - 5.6 %    Estimated Avg Glucose 123 68 - 131 mg/dL   POCT Rapid Influenza A/B    Collection Time: 02/01/22 12:44 PM   Result Value Ref Range    Influenza B Ag negative Positive/Negative    Inflenza A Ag negative Positive/Negative   POCT glucose    Collection Time: 02/01/22  3:53 PM   Result Value Ref Range    POCT Glucose 117 (H) 70 - 110 mg/dL   TSH    Collection Time: 02/01/22  4:20 PM   Result Value Ref Range    TSH 2.323 0.400 - 4.000 uIU/mL   Troponin I    Collection Time: 02/01/22 10:42 PM   Result Value Ref Range    Troponin I 0.034 (H) 0.000 - 0.026 ng/mL   Lactic Acid, Plasma    Collection Time:  02/01/22 10:42 PM   Result Value Ref Range    Lactate (Lactic Acid) 2.3 (H) 0.5 - 2.2 mmol/L   Comprehensive Metabolic Panel (CMP)    Collection Time: 02/02/22  5:14 AM   Result Value Ref Range    Sodium 138 136 - 145 mmol/L    Potassium 5.2 (H) 3.5 - 5.1 mmol/L    Chloride 110 95 - 110 mmol/L    CO2 15 (L) 23 - 29 mmol/L    Glucose 112 (H) 70 - 110 mg/dL    BUN 74 (H) 8 - 23 mg/dL    Creatinine 3.0 (H) 0.5 - 1.4 mg/dL    Calcium 8.9 8.7 - 10.5 mg/dL    Total Protein 7.6 6.0 - 8.4 g/dL    Albumin 2.8 (L) 3.5 - 5.2 g/dL    Total Bilirubin 0.8 0.1 - 1.0 mg/dL    Alkaline Phosphatase 90 55 - 135 U/L    AST 74 (H) 10 - 40 U/L    ALT 47 (H) 10 - 44 U/L    Anion Gap 13 8 - 16 mmol/L    eGFR if African American 17 (A) >60 mL/min/1.73 m^2    eGFR if non African American 15 (A) >60 mL/min/1.73 m^2   CBC with Automated Differential    Collection Time: 02/02/22  5:14 AM   Result Value Ref Range    WBC 8.08 3.90 - 12.70 K/uL    RBC 1.82 (L) 4.00 - 5.40 M/uL    Hemoglobin 6.6 (L) 12.0 - 16.0 g/dL    Hematocrit 20.9 (L) 37.0 - 48.5 %     (H) 82 - 98 fL    MCH 36.3 (H) 27.0 - 31.0 pg    MCHC 31.6 (L) 32.0 - 36.0 g/dL    RDW 17.2 (H) 11.5 - 14.5 %    Platelets 88 (L) 150 - 450 K/uL    MPV 13.4 (H) 9.2 - 12.9 fL    Immature Granulocytes 0.2 0.0 - 0.5 %    Gran # (ANC) 3.2 1.8 - 7.7 K/uL    Immature Grans (Abs) 0.02 0.00 - 0.04 K/uL    Lymph # 3.7 1.0 - 4.8 K/uL    Mono # 0.7 0.3 - 1.0 K/uL    Eos # 0.5 0.0 - 0.5 K/uL    Baso # 0.02 0.00 - 0.20 K/uL    nRBC 0 0 /100 WBC    Gran % 40.0 38.0 - 73.0 %    Lymph % 45.2 18.0 - 48.0 %    Mono % 8.2 4.0 - 15.0 %    Eosinophil % 6.2 0.0 - 8.0 %    Basophil % 0.2 0.0 - 1.9 %    Differential Method Automated    POCT glucose    Collection Time: 02/02/22  7:05 AM   Result Value Ref Range    POCT Glucose 76 70 - 110 mg/dL   POCT glucose    Collection Time: 02/02/22  7:10 AM   Result Value Ref Range    POCT Glucose 105 70 - 110 mg/dL   Type & Screen    Collection Time: 02/02/22  9:15 AM    Result Value Ref Range    Group & Rh O POS     Indirect Jacob NEG    Direct antiglobulin test    Collection Time: 02/02/22  9:15 AM   Result Value Ref Range    Direct Jacob (VALENTINO) NEG    Indirect Antiglobulin Test    Collection Time: 02/02/22  9:15 AM   Result Value Ref Range    Antibody Screen POS    Hemoglobin    Collection Time: 02/02/22  9:21 AM   Result Value Ref Range    Hemoglobin 6.9 (L) 12.0 - 16.0 g/dL   Iron and TIBC    Collection Time: 02/02/22  9:21 AM   Result Value Ref Range    Iron 54 30 - 160 ug/dL    Transferrin 287 200 - 375 mg/dL    TIBC 425 250 - 450 ug/dL    Saturated Iron 13 (L) 20 - 50 %   Ferritin    Collection Time: 02/02/22  9:21 AM   Result Value Ref Range    Ferritin 43 20.0 - 300.0 ng/mL   Vitamin B12    Collection Time: 02/02/22  9:21 AM   Result Value Ref Range    Vitamin B-12 1401 (H) 210 - 950 pg/mL   Folate    Collection Time: 02/02/22  9:21 AM   Result Value Ref Range    Folate 14.0 4.0 - 24.0 ng/mL   Lactic Acid, Plasma    Collection Time: 02/02/22  9:21 AM   Result Value Ref Range    Lactate (Lactic Acid) 1.6 0.5 - 2.2 mmol/L   POCT glucose    Collection Time: 02/02/22  1:05 PM   Result Value Ref Range    POCT Glucose 131 (H) 70 - 110 mg/dL   POCT glucose    Collection Time: 02/02/22  4:32 PM   Result Value Ref Range    POCT Glucose 137 (H) 70 - 110 mg/dL   POCT glucose    Collection Time: 02/02/22  8:33 PM   Result Value Ref Range    POCT Glucose 120 (H) 70 - 110 mg/dL   Comprehensive Metabolic Panel (CMP)    Collection Time: 02/03/22  4:26 AM   Result Value Ref Range    Sodium 137 136 - 145 mmol/L    Potassium 5.8 (H) 3.5 - 5.1 mmol/L    Chloride 109 95 - 110 mmol/L    CO2 12 (L) 23 - 29 mmol/L    Glucose 119 (H) 70 - 110 mg/dL    BUN 76 (H) 8 - 23 mg/dL    Creatinine 3.4 (H) 0.5 - 1.4 mg/dL    Calcium 9.4 8.7 - 10.5 mg/dL    Total Protein 8.8 (H) 6.0 - 8.4 g/dL    Albumin 4.0 3.5 - 5.2 g/dL    Total Bilirubin 1.3 (H) 0.1 - 1.0 mg/dL    Alkaline Phosphatase 89 55 - 135 U/L     AST 56 (H) 10 - 40 U/L    ALT 41 10 - 44 U/L    Anion Gap 16 8 - 16 mmol/L    eGFR if African American 15 (A) >60 mL/min/1.73 m^2    eGFR if non African American 13 (A) >60 mL/min/1.73 m^2   CBC with Automated Differential    Collection Time: 02/03/22  4:27 AM   Result Value Ref Range    WBC 7.59 3.90 - 12.70 K/uL    RBC 2.21 (L) 4.00 - 5.40 M/uL    Hemoglobin 7.9 (L) 12.0 - 16.0 g/dL    Hematocrit 25.5 (L) 37.0 - 48.5 %     (H) 82 - 98 fL    MCH 35.7 (H) 27.0 - 31.0 pg    MCHC 31.0 (L) 32.0 - 36.0 g/dL    RDW 21.3 (H) 11.5 - 14.5 %    Platelets 92 (L) 150 - 450 K/uL    MPV 13.2 (H) 9.2 - 12.9 fL    Immature Granulocytes 0.3 0.0 - 0.5 %    Gran # (ANC) 3.2 1.8 - 7.7 K/uL    Immature Grans (Abs) 0.02 0.00 - 0.04 K/uL    Lymph # 2.5 1.0 - 4.8 K/uL    Mono # 0.9 0.3 - 1.0 K/uL    Eos # 0.3 0.0 - 0.5 K/uL    Baso # 0.01 0.00 - 0.20 K/uL    nRBC 0 0 /100 WBC    Gran % 45.9 38.0 - 73.0 %    Lymph % 36.4 18.0 - 48.0 %    Mono % 13.1 4.0 - 15.0 %    Eosinophil % 4.2 0.0 - 8.0 %    Basophil % 0.1 0.0 - 1.9 %    Platelet Estimate Decreased (A)     Aniso Slight     Differential Method Automated    POCT glucose    Collection Time: 02/03/22  7:56 AM   Result Value Ref Range    POCT Glucose 123 (H) 70 - 110 mg/dL   POCT glucose    Collection Time: 02/03/22 11:45 AM   Result Value Ref Range    POCT Glucose 141 (H) 70 - 110 mg/dL   C-reactive protein    Collection Time: 02/03/22  1:27 PM   Result Value Ref Range    CRP 20.9 (H) 0.0 - 8.2 mg/L   Sedimentation rate    Collection Time: 02/03/22  1:28 PM   Result Value Ref Range    Sed Rate >140 (H) 0 - 20 mm/Hr   Rapid HIV    Collection Time: 02/03/22  1:28 PM   Result Value Ref Range    HIV Rapid Testing Non-Reactive Negative   Protime-INR    Collection Time: 02/03/22  1:28 PM   Result Value Ref Range    Prothrombin Time 13.8 (H) 9.0 - 12.5 sec    INR 1.3 (H) 0.8 - 1.2   POCT glucose    Collection Time: 02/03/22  4:56 PM   Result Value Ref Range    POCT Glucose 189 (H) 70 -  110 mg/dL   ISTAT PROCEDURE    Collection Time: 02/03/22  7:00 PM   Result Value Ref Range    POC PH 7.419 7.35 - 7.45    POC PCO2 28.2 (LL) 35 - 45 mmHg    POC PO2 221 (H) 80 - 100 mmHg    POC HCO3 18.3 (L) 24 - 28 mmol/L    POC BE -5 -2 to 2 mmol/L    POC SATURATED O2 100 95 - 100 %    POC TCO2 19 (L) 23 - 27 mmol/L    Sample ARTERIAL     Site RR     Allens Test Pass     DelSys CPAP/BiPAP     Mode CPAP     PS 10     FiO2 60     Spont Rate 22     Min Vol 12.5    Comprehensive Metabolic Panel    Collection Time: 02/03/22  7:07 PM   Result Value Ref Range    Sodium 139 136 - 145 mmol/L    Potassium 4.9 3.5 - 5.1 mmol/L    Chloride 105 95 - 110 mmol/L    CO2 18 (L) 23 - 29 mmol/L    Glucose 161 (H) 70 - 110 mg/dL    BUN 86 (H) 8 - 23 mg/dL    Creatinine 4.2 (H) 0.5 - 1.4 mg/dL    Calcium 9.5 8.7 - 10.5 mg/dL    Total Protein 8.8 (H) 6.0 - 8.4 g/dL    Albumin 4.7 3.5 - 5.2 g/dL    Total Bilirubin 1.1 (H) 0.1 - 1.0 mg/dL    Alkaline Phosphatase 76 55 - 135 U/L    AST 46 (H) 10 - 40 U/L    ALT 31 10 - 44 U/L    Anion Gap 16 8 - 16 mmol/L    eGFR if African American 12 (A) >60 mL/min/1.73 m^2    eGFR if non African American 10 (A) >60 mL/min/1.73 m^2   Troponin I    Collection Time: 02/03/22  7:07 PM   Result Value Ref Range    Troponin I 0.085 (H) 0.000 - 0.026 ng/mL   Lactic Acid, Plasma    Collection Time: 02/03/22  7:07 PM   Result Value Ref Range    Lactate (Lactic Acid) 1.7 0.5 - 2.2 mmol/L   CBC Auto Differential    Collection Time: 02/03/22  7:08 PM   Result Value Ref Range    WBC 6.10 3.90 - 12.70 K/uL    RBC 1.64 (L) 4.00 - 5.40 M/uL    Hemoglobin 6.4 (L) 12.0 - 16.0 g/dL    Hematocrit 19.3 (LL) 37.0 - 48.5 %     (H) 82 - 98 fL    MCH 39.0 (H) 27.0 - 31.0 pg    MCHC 33.2 32.0 - 36.0 g/dL    RDW 17.6 (H) 11.5 - 14.5 %    Platelets 92 (L) 150 - 450 K/uL    MPV 12.9 9.2 - 12.9 fL    Immature Granulocytes 0.5 0.0 - 0.5 %    Gran # (ANC) 2.9 1.8 - 7.7 K/uL    Immature Grans (Abs) 0.03 0.00 - 0.04 K/uL    Lymph  # 2.4 1.0 - 4.8 K/uL    Mono # 0.5 0.3 - 1.0 K/uL    Eos # 0.2 0.0 - 0.5 K/uL    Baso # 0.01 0.00 - 0.20 K/uL    nRBC 0 0 /100 WBC    Gran % 46.8 38.0 - 73.0 %    Lymph % 40.0 18.0 - 48.0 %    Mono % 8.9 4.0 - 15.0 %    Eosinophil % 3.6 0.0 - 8.0 %    Basophil % 0.2 0.0 - 1.9 %    Differential Method Automated    Brain natriuretic peptide    Collection Time: 02/03/22  7:08 PM   Result Value Ref Range    BNP 1,812 (H) 0 - 99 pg/mL   POCT glucose    Collection Time: 02/03/22  8:04 PM   Result Value Ref Range    POCT Glucose 165 (H) 70 - 110 mg/dL   Protein / creatinine ratio, urine    Collection Time: 02/03/22  8:57 PM   Result Value Ref Range    Protein, Urine Random >1500 mg/dL    Creatinine, Urine 275.8 15.0 - 325.0 mg/dL    Prot/Creat Ratio, Urine Unable to calculate 0.00 - 0.20   Urinalysis    Collection Time: 02/03/22  9:00 PM   Result Value Ref Range    Specimen UA Urine, Clean Catch     Color, UA Yellow Yellow, Straw, Portia    Appearance, UA Cloudy (A) Clear    pH, UA 6.0 5.0 - 8.0    Specific Gravity, UA 1.030 1.005 - 1.030    Protein, UA 3+ (A) Negative    Glucose, UA Negative Negative    Ketones, UA Trace (A) Negative    Bilirubin (UA) Negative Negative    Occult Blood UA 3+ (A) Negative    Nitrite, UA Negative Negative    Urobilinogen, UA Negative <2.0 EU/dL    Leukocytes, UA 3+ (A) Negative   Urinalysis Microscopic    Collection Time: 02/03/22  9:00 PM   Result Value Ref Range    RBC, UA >100 (H) 0 - 4 /hpf    WBC, UA 40 (H) 0 - 5 /hpf    WBC Clumps, UA Rare None-Rare    Bacteria Moderate (A) None-Occ /hpf    Yeast, UA Many (A) None    Squam Epithel, UA 7 /hpf    Non-Squam Epith 1 (A) <1/hpf /hpf    Hyaline Casts, UA 0 0-1/lpf /lpf    Unclass Yudith UA None None-Moderate    Microscopic Comment SEE COMMENT    Comprehensive Metabolic Panel (CMP)    Collection Time: 02/04/22  2:16 AM   Result Value Ref Range    Sodium 139 136 - 145 mmol/L    Potassium 5.2 (H) 3.5 - 5.1 mmol/L    Chloride 106 95 - 110 mmol/L     CO2 15 (L) 23 - 29 mmol/L    Glucose 111 (H) 70 - 110 mg/dL    BUN 83 (H) 8 - 23 mg/dL    Creatinine 4.5 (H) 0.5 - 1.4 mg/dL    Calcium 9.2 8.7 - 10.5 mg/dL    Total Protein 8.4 6.0 - 8.4 g/dL    Albumin 4.3 3.5 - 5.2 g/dL    Total Bilirubin 1.2 (H) 0.1 - 1.0 mg/dL    Alkaline Phosphatase 72 55 - 135 U/L    AST 43 (H) 10 - 40 U/L    ALT 26 10 - 44 U/L    Anion Gap 18 (H) 8 - 16 mmol/L    eGFR if African American 11 (A) >60 mL/min/1.73 m^2    eGFR if non African American 9 (A) >60 mL/min/1.73 m^2   CBC with Automated Differential    Collection Time: 02/04/22  2:16 AM   Result Value Ref Range    WBC 6.95 3.90 - 12.70 K/uL    RBC 1.75 (L) 4.00 - 5.40 M/uL    Hemoglobin 6.9 (L) 12.0 - 16.0 g/dL    Hematocrit 21.3 (L) 37.0 - 48.5 %     (H) 82 - 98 fL    MCH 39.4 (H) 27.0 - 31.0 pg    MCHC 32.4 32.0 - 36.0 g/dL    RDW 17.4 (H) 11.5 - 14.5 %    Platelets 33 (LL) 150 - 450 K/uL    MPV 12.5 9.2 - 12.9 fL    Immature Granulocytes 0.3 0.0 - 0.5 %    Gran # (ANC) 3.2 1.8 - 7.7 K/uL    Immature Grans (Abs) 0.02 0.00 - 0.04 K/uL    Lymph # 2.7 1.0 - 4.8 K/uL    Mono # 0.7 0.3 - 1.0 K/uL    Eos # 0.4 0.0 - 0.5 K/uL    Baso # 0.01 0.00 - 0.20 K/uL    nRBC 0 0 /100 WBC    Gran % 45.6 38.0 - 73.0 %    Lymph % 38.3 18.0 - 48.0 %    Mono % 10.4 4.0 - 15.0 %    Eosinophil % 5.3 0.0 - 8.0 %    Basophil % 0.1 0.0 - 1.9 %    Differential Method Automated    Fibrinogen    Collection Time: 02/04/22  6:07 AM   Result Value Ref Range    Fibrinogen 196 182 - 400 mg/dL   Lactate dehydrogenase    Collection Time: 02/04/22  6:07 AM   Result Value Ref Range     110 - 260 U/L   Reticulocytes    Collection Time: 02/04/22  6:08 AM   Result Value Ref Range    Retic 2.1 0.5 - 2.5 %   POCT glucose    Collection Time: 02/04/22  7:33 AM   Result Value Ref Range    POCT Glucose 133 (H) 70 - 110 mg/dL       Diagnostic Results:  I have reviewed all pertinent imaging results/findings within the past 24 hours.   X-Ray Chest 1 View    Result Date:  2/3/2022  EXAMINATION: XR CHEST 1 VIEW CLINICAL HISTORY: change in loc difficulty breathing; TECHNIQUE: Single frontal view of the chest was performed. COMPARISON: 02/01/2022 FINDINGS: The cardiomediastinal silhouette is prominent, similar to the previous examination noting magnification by technique..  There is no pleural effusion.  The trachea is midline.  The lungs are symmetrically expanded bilaterally with prominent central hilar interstitial attenuation, similar to the previous exam.  No new large focal consolidation seen.  There is no pneumothorax.  The osseous structures are remarkable for degenerative changes..     As above Electronically signed by: Fracisco Esposito MD Date:    02/03/2022 Time:    18:59    X-Ray Chest PA And Lateral    Result Date: 2/1/2022  EXAMINATION: XR CHEST PA AND LATERAL CLINICAL HISTORY: Chest Pain; TECHNIQUE: PA and lateral views of the chest were performed. COMPARISON: 02/01/2022 at 08:08 hours, 12/13/2021 FINDINGS: Enlarged cardiac silhouette, similar to the prior exam.  There are mild perihilar and right basilar lung opacities, not significantly changed from the earlier exam, possible cardiogenic/noncardiogenic pulmonary edema, pneumonia, or aspiration.  Superimposed chronic interstitial changes may be present, as well.  The opacities have not significantly changed from the earlier exam.  Mildly elevated right hemidiaphragm, stable.  No pneumothorax.  No pleural effusions.     No significant changes. Electronically signed by: Brandon Fry MD Date:    02/01/2022 Time:    09:39    X-Ray Chest AP Portable    Result Date: 2/1/2022  EXAMINATION: XR CHEST AP PORTABLE CLINICAL HISTORY: Cough, unspecified TECHNIQUE: Single frontal view of the chest was performed. COMPARISON: 12/13/2021 FINDINGS: Increasing perihilar interstitial infiltrates, some volume loss of anterior segment of elevation right minor fissure, elevation right hemidiaphragm with compression atelectasis of medial basilar  segment right lower lobe.  Top-normal size heart.  Large body size degrades exam.  Pulmonary vascular tree borderline prominent.     Nonspecific interstitial fibrotic changes stable and unchanged allowing for differences in inspiratory effort and technique.  Clinical correlation requested. Epic abnormal flag Electronically signed by: Kemal Latham MD Date:    02/01/2022 Time:    08:28    US Lower Extremity Veins Bilateral    Result Date: 2/1/2022  EXAMINATION: US LOWER EXTREMITY VEINS BILATERAL CLINICAL HISTORY: cough and congestion since October; Cough, unspecified TECHNIQUE: Duplex and color flow Doppler and dynamic compression was performed of the bilateral lower extremity veins was performed. COMPARISON: None FINDINGS: Right lower extremity Common femoral, superficial femoral, popliteal, upper greater saphenous and deep femoral veins demonstrate normal compressibility, phasic flow and augmentation response without evidence of filling defect. Visualized calf veins are patent. Left lower extremity Common femoral, superficial femoral, popliteal, upper greater saphenous and deep femoral veins demonstrate normal compressibility, phasic flow and augmentation response without evidence of filling defect. Visualized calf veins are patent.     1. No sonographic evidence of DVT in the bilateral lower extremities. Electronically signed by: Polo Carr Date:    02/01/2022 Time:    10:32    Holter monitor - 48 hour    Result Date: 1/24/2022  Sinus rhythm with rare ectopy. No sustained arrhythmias. No symptoms reported.    CT Abdomen Pelvis  Without Contrast    Result Date: 2/1/2022  EXAMINATION: CT ABDOMEN PELVIS WITHOUT CLINICAL HISTORY: Abdominal pain. TECHNIQUE: 5 mm unenhanced axial images from the lung bases through the greater trochanters were performed.  Coronal and sagittal reformatted images were provided. COMPARISON: CTA chest from 11/30/2021 FINDINGS: Within the limits of a noncontrast examination, lobular  margins of the liver seen suggesting cirrhosis.  The spleen is mildly enlarged.  There are abdominal varices. Spleen, pancreas, kidneys, and adrenal glands are unremarkable.  The gallbladder is surgically absent. Small abdominopelvic fluid is present.  Anasarca is present.  There are tiny subcentimeter retroperitoneal lymph nodes.  There is no gross abdominal adenopathy or ascites. There is tiny fat containing umbilical hernia. There is extensive colonic diverticulosis.  There are no pelvic masses or adenopathy.  There is no free pelvic fluid.  The urinary bladder is decompressed by Yao catheter.  The uterus is surgically absent.  The appendix is not visualized with certainty. At the lung bases, there is small pericardial effusion.  There is trace right pleural effusion with associated compressive atelectasis.  Increased ground-glass opacities are seen at lung bases and moderate scarring.     Stigmata of cirrhosis including mild splenomegaly, small abdominopelvic ascites, varices, and anasarca. Diverticular disease. Small pericardial effusion. Trace right pleural effusion. Ground-glass opacities at the lung bases.  A pneumonic process cannot be entirely excluded. Electronically signed by: Charla Combs Date:    02/01/2022 Time:    21:19    NM Lung Scan Ventilation Perfusion    Result Date: 2/2/2022  EXAMINATION: NM LUNG VENTILATION AND PERFUSION IMAGING CLINICAL HISTORY: Pulmonary embolism (PE) suspected, positive D-dimer; TECHNIQUE: Following the IV administration of 5.3 mCi of Tc-99m-MAA, multiple images of the thorax were obtained in various projections.  Ventilation images were obtained utilizing 20.0 mCi of xenon 133 gas administered by inhalation with imaging performed in the posterior projection. COMPARISON: Correlation is made with the patient's chest x-ray dated 02/01/2022 FINDINGS: Perfusion: There is a small subsegmental perfusion defect involving the left upper lung. Ventilation: There is homogeneous  distribution of tracer activity throughout both lungs.  No air trapping is identified on delayed imaging.. CXR there is mild cardiomegaly with pulmonary vascular congestion and probable pulmonary edema.  Linear opacity projecting over the mid right lung likely represents a small amount of fluid tracking along the right major fissure and/or a band of atelectasis..     This represents a very low probability of pulmonary embolism. Electronically signed by: Juan Mendoza MD Date:    02/02/2022 Time:    13:03    US Abdomen Complete with Doppler (xpd)    Result Date: 1/31/2022  EXAMINATION: US ABDOMEN COMP WITH DOPPLER (XPD) CLINICAL HISTORY: Fatty (change of) liver, not elsewhere classified TECHNIQUE: Complete abdominal ultrasound (including pancreas, liver, gallbladder, common bile duct, spleen, aorta, IVC, and kidneys) was performed. Complete abdominal doppler exam was also performed. COMPARISON: Abdominal ultrasound 09/24/2021, 03/22/2021, 09/23/2020. FINDINGS: Liver: Normal in size, measuring 14.5 cm. Nodular contour suggesting cirrhosis. No solid mass. Gallbladder: The gallbladder is surgically absent. Biliary system: The common duct is not dilated, measuring 5 mm.  No intrahepatic ductal dilatation. Spleen: Slightly enlarged in size with a homogeneous echotexture, measuring 12.6 x 4.3 cm. Pancreas: The visualized portions of pancreas appear normal. Right kidney: Normal in size with no hydronephrosis, measuring 10.5 cm. Left kidney: Normal in size with no hydronephrosis, measuring 9.3 cm. Aorta: Obscured by overlying bowel gas. Inferior vena cava: Normal in appearance. Miscellaneous: Trace ascites. Main hepatic artery: Patent. Main portal vein: Patent with proper directional flow. Left portal vein:  Patent with proper directional flow. Right portal vein:  Patent with proper directional flow. SMV:  Patent with proper directional flow. IVC: Patent Left, middle, and right hepatic veins: Patent. Umbilical vein: Not  patent. Celiac artery: Not visualized. Splenic Vein: Patent with proper directional flow.     Hepatic cirrhosis/steatosis with sonographic findings of portal hypertension including trace ascites and splenomegaly. Electronically signed by resident: Rashid Parish Date:    01/31/2022 Time:    10:56 Electronically signed by: Rustam Damon MD Date:    01/31/2022 Time:    11:08      Assessment/Plan:     Acute respiratory failure with hypoxia  Possibly secondary to non-COVID related viral infection. procalcitonin wnl, covid19 negative.  Viral cause of her multiorgan failure is also a reasonable cause contributing to her worsening cytopenias.  -blood cx ngtd  -can consider respiratory infection panel (PCR)/ID consult if patient continues to deteriorate  -Management per primary team    Thrombocytopenia  Baseline thrombocytopenia secondary to known cirrhosis. Worsening thrombocytopenia may be due to acute illness. Differentials include consumption with evidence of varices on CT scan (last EGD 2019 did not show varices). 4T score = 2, low probability of HIT. PLASMIC score = 4, low.  -pending HIPAb  -transfuse for plt <10 or if she becomes hemodynamically unstable due to significant hemorrhage  -limit blood draws to daily at most if possible.  -will defer on bone marrow biopsy at this time    Macrocytic anemia  Chronic macrocytic anemia since 2019, presumed secondary to cirrhosis.  Anemia worsening.  LDH WNL, mild elevated bilirubin.  Overall low concern for acute hemolytic process as primary reason for anemia.  Suspect anemia worsening related to marrow suppression from acute illness with possible worsening if she has GI bleed (risk cirrhosis).  Development of acute renal failure also another contributing factor to anemia.  No other reversible causes at this time.  retic inappropriately normal confirming other elements of marrow suppression likely at play.  -limit lab draws if possible.  -pending VALENTINO  -check SPEP/SHERI  (anemia, acute renal failure)  -transfuse for hemoglobin <7 or if hemodynamically unstable due to hemorrhage.  -will defer on bone marrow biopsy at this time.      Thank you for your consult. I will sign off. Please contact us if you have any additional questions.    Adeel Sauer MD  Hematology/Oncology  [facility] [place] - Intensive Care

## 2022-02-04 NOTE — PROGRESS NOTES
[facility] Rapid Response Follow-up Note     Followed up with patient for proactive rounding.     Patient lying in bed, labored breathing noted, difficult to catch breath when speaking, appears diaphoretic, crackles heard upon ausculation bilaterally & regular heart rhythm noted.  Discussed with Shailesh HARPER - will move to ICU for closer observation.       Please call Rapid Response RN with any questions or concerns at  [phone]

## 2022-02-04 NOTE — RESPIRATORY THERAPY
Patient transferred via bipap to .Current settings 15/5, rate 12, FI02 40%. Will continue to monitor.

## 2022-02-04 NOTE — ASSESSMENT & PLAN NOTE
Chronic macrocytic anemia since 2019, presumed secondary to cirrhosis.  Anemia worsening.  LDH WNL, mild elevated bilirubin.  Overall low concern for acute hemolytic process as primary reason for anemia.  Suspect anemia worsening related to marrow suppression from acute illness with possible worsening if she has GI bleed (risk cirrhosis).  Development of acute renal failure also another contributing factor to anemia.  No other reversible causes at this time.  retic inappropriately normal confirming other elements of marrow suppression likely at play.  -limit lab draws if possible.  -pending VALENTINO  -check SPEP/SHERI (anemia, acute renal failure)  -transfuse for hemoglobin <7 or if hemodynamically unstable due to hemorrhage.  -will defer on bone marrow biopsy at this time.

## 2022-02-04 NOTE — PLAN OF CARE
Pt remains in ICU on BIPAP 40% FiO2. VSS. NSR on the monitor. Pt A&Ox4. Lasix drip infusing. Pt with minimal UO, MD aware. Plan of care reviewed with patient and family. No falls, injuries or skin breakdown this shift.

## 2022-02-04 NOTE — ASSESSMENT & PLAN NOTE
-Admitted to inpatient status  -Baseline Cr 0.9  -On admit Cr 2.7  -Reports being stated on lasix 1/8 by her cardiologist and over several days PTA had decreased UOP and diminished PO intake  -BNP elevated at 1160 and with metabolic acidosis and mild hyperkalemia on admission.    -Avoid nephrotoxic agents and renally dose meds  -FEUrea 7.1% suggestive of pre-renal sources  -Consulted and discussed with nephrology.  -Complex situation to clearly determine volume status and initially felt to be volume deplete.  While multiple signs of fluid overload - I discussed extensively with Dr. Case who believes she is hypovolemic and that there may be a vasculitis present  -Per nephrology - will continue with gentle IV fluids and initiate vasculitic workup.  If any decompensation would consider changing course and diuresing.  -Repeat BMP in AM.

## 2022-02-04 NOTE — CONSULTS
Date of Admit: 2/1/2022  Length of Stay: 2   Days  Consulting Staff: MD Kadeem    Date of Consult: 2/3/2022    Reason for Consultation     acute on ckdII with mild hyperkalemia    Subjective:      History of Present Illness:  Tammi Farris is a 71 y.o. year old female with a past medical history significant for dm2, cirrhosis, and anemia. Pt was evaluated in the past by heme for MM. Pt had negative spep/SHERI in past. Pt had normal k/l ratio in past. Pt didn't have a bm biopsy. Pt had a creatinine of 0.9 1/1/9/2022. Pt had no proteinuria noted last mo. Pt was placed on lasix recently by [facility]  Cards. Pt  Found here with navneet, hyperkalemia, anemia.  PT denies nsaids use, on arb.    Past Medical History:  Past Medical History:   Diagnosis Date    Allergy     Anxiety     Basal cell carcinoma     Cirrhosis of liver without ascites 12/27/2017    Depression     Diabetes mellitus, type 2     Disorder of kidney and ureter     Endometriosis     GERD (gastroesophageal reflux disease)     Hyperlipidemia     Hypertension     Joint pain     Psoriasis        Past Surgical History:  Past Surgical History:   Procedure Laterality Date    abdominal laproscopic surgery      APPENDECTOMY      CARPAL TUNNEL RELEASE      right    CHOLECYSTECTOMY  04/2015    COLONOSCOPY N/A 2/8/2019    Procedure: COLONOSCOPY;  Surgeon: Celso Salas MD;  Location: [address]);  Service: Endoscopy;  Laterality: N/A;    ESOPHAGOGASTRODUODENOSCOPY N/A 2/8/2019    Procedure: EGD (ESOPHAGOGASTRODUODENOSCOPY);  Surgeon: Celso Salas MD;  Location: [address]);  Service: Endoscopy;  Laterality: N/A;  varices screening, labs ordered prior    HYSTERECTOMY  age 25    JOSEFA/BSO (endometriosis)    OOPHORECTOMY      SKIN CANCER DESTRUCTION      UPPER GASTROINTESTINAL ENDOSCOPY         Allergies:  Review of patient's allergies indicates:   Allergen Reactions    Dobutamine Hives    Benadryl [diphenhydramine hcl] Anxiety      Pt states she feels jumpy and anxious when taking.    Darvon [propoxyphene] Nausea Only    Shellfish containing products Hives    Iodinated contrast media Hives    Lisinopril Other (See Comments)     cough    Propoxyphene hcl      Itchy (skin)^    Tizanidine Other (See Comments)     Sweaty, difficulty swallowing    Statins-hmg-coa reductase inhibitors Anxiety and Other (See Comments)     Myalgia, fatigue, palpitations, anxiety       Home Medications:    No current facility-administered medications on file prior to encounter.     Current Outpatient Medications on File Prior to Encounter   Medication Sig Dispense Refill    allopurinoL (ZYLOPRIM) 100 MG tablet Take 1 tablet (100 mg total) by mouth once daily. 30 tablet 2    betamethasone dipropionate (DIPROLENE) 0.05 % cream Apply topically 2 (two) times daily. To affected areas on lower back (Patient not taking: Reported on 1/19/2022) 45 g 1    blood sugar diagnostic Strp Use 1-2 times daily to check blood sugar as directed. 100 strip 11    blood-glucose meter kit Use as instructed to check blood sugar 1-2 times a day 1 each 0    cholecalciferol, vitamin D3, (VITAMIN D3) 25 mcg (1,000 unit) capsule Take 1 capsule (1,000 Units total) by mouth once daily.  0    clotrimazole (LOTRIMIN) 1 % cream APPLY TO THE AFFECTED AREA OF RASH ON BREASTS AND IN FOLDS OF SKIN AS DIRECTED TWICE DAILY (Patient not taking: Reported on 1/19/2022) 60 g 1    fluocinonide (LIDEX) 0.05 % external solution AAA scalp qday - bid prn pruritus (Patient not taking: Reported on 1/19/2022) 60 mL 3    furosemide (LASIX) 20 MG tablet Take 1 tablet (20 mg total) by mouth once daily. 30 tablet 11    lancets Misc 1 lancet by Misc.(Non-Drug; Combo Route) route 2 (two) times daily with meals. 100 each 11    lancing device Misc 1 Device by Misc.(Non-Drug; Combo Route) route 2 (two) times daily with meals. 1 each 0    losartan (COZAAR) 50 MG tablet TAKE 1 TABLET(50 MG) BY MOUTH EVERY DAY 90  tablet 1    metFORMIN (GLUCOPHAGE-XR) 500 MG ER 24hr tablet Take 1 tablet after breakfast and 2 tablets after dinner every day 270 tablet 3    metoprolol tartrate (LOPRESSOR) 25 MG tablet Take 1 tablet (25 mg total) by mouth 2 (two) times daily. 60 tablet 11    omeprazole (PRILOSEC) 40 MG capsule Take 1 capsule (40 mg total) by mouth 2 (two) times daily. Take first thing in the morning and then again 30 minutes before dinner 60 capsule 3    triamcinolone acetonide 0.025% (KENALOG) 0.025 % cream AAA under breasts and groin bid after cool blow dry prn (Patient not taking: Reported on 1/19/2022) 30 g 3       Family History:  Family History   Problem Relation Age of Onset    Arthritis Mother     Hypertension Mother     Hypertension Sister     Asthma Brother     Hyperlipidemia Brother     Hypertension Brother     No Known Problems Father     Raynaud syndrome Sister     Breast cancer Neg Hx     Ovarian cancer Neg Hx     Colon cancer Neg Hx     Stomach cancer Neg Hx     Esophageal cancer Neg Hx     Rectal cancer Neg Hx     Irritable bowel syndrome Neg Hx     Ulcerative colitis Neg Hx     Crohn's disease Neg Hx     Celiac disease Neg Hx     Melanoma Neg Hx     Cirrhosis Neg Hx      No kidney dx    Social History:  Social History     Tobacco Use    Smoking status: Never Smoker    Smokeless tobacco: Never Used   Substance Use Topics    Alcohol use: Not Currently    Drug use: No       Review of Systems:10pt ros: sob, bautista       Objective:     Scheduled Meds:   albumin human 25%  25 g Intravenous QID    albuterol-ipratropium  3 mL Nebulization Q6H    heparin (porcine)  5,000 Units Subcutaneous Q8H    metoprolol tartrate  25 mg Oral BID    mupirocin   Nasal BID    polyethylene glycol  17 g Oral BID    sodium zirconium cyclosilicate  10 g Oral Daily     Continuous Infusions:   sodium bicarbonate drip 75 mL/hr at 02/03/22 1508     PRN Meds:.sodium chloride, acetaminophen, bisacodyL, dextrose 50%,  "dextrose 50%, glucagon (human recombinant), glucose, glucose, iohexoL, melatonin, naloxone, ondansetron, simethicone, sodium chloride 0.9%      Physical Examination:    Vitals: BP (!) 143/63 (Patient Position: Lying)   Pulse 74   Temp 98 °F (36.7 °C) (Oral)   Resp (!) 21   Ht 4' 9" (1.448 m)   Wt 73.1 kg (161 lb 2.5 oz)   SpO2 96%   BMI 34.87 kg/m²    I/O last 3 completed shifts:  In: 1627.6 [P.O.:1200; IV Piggyback:427.6]  Out: 1050 [Urine:1050]  No intake/output data recorded.      General: wd female in nad  HEENT:ncat,eomi,mm  CVS:s1s2 regular  PULM:rales  ABD:bs, soft  EXT:no leg edema  NEURO:awake  Laboratory:  Recent Results (from the past 24 hour(s))   POCT glucose    Collection Time: 02/02/22  8:33 PM   Result Value Ref Range    POCT Glucose 120 (H) 70 - 110 mg/dL   Comprehensive Metabolic Panel (CMP)    Collection Time: 02/03/22  4:26 AM   Result Value Ref Range    Sodium 137 136 - 145 mmol/L    Potassium 5.8 (H) 3.5 - 5.1 mmol/L    Chloride 109 95 - 110 mmol/L    CO2 12 (L) 23 - 29 mmol/L    Glucose 119 (H) 70 - 110 mg/dL    BUN 76 (H) 8 - 23 mg/dL    Creatinine 3.4 (H) 0.5 - 1.4 mg/dL    Calcium 9.4 8.7 - 10.5 mg/dL    Total Protein 8.8 (H) 6.0 - 8.4 g/dL    Albumin 4.0 3.5 - 5.2 g/dL    Total Bilirubin 1.3 (H) 0.1 - 1.0 mg/dL    Alkaline Phosphatase 89 55 - 135 U/L    AST 56 (H) 10 - 40 U/L    ALT 41 10 - 44 U/L    Anion Gap 16 8 - 16 mmol/L    eGFR if African American 15 (A) >60 mL/min/1.73 m^2    eGFR if non African American 13 (A) >60 mL/min/1.73 m^2   CBC with Automated Differential    Collection Time: 02/03/22  4:27 AM   Result Value Ref Range    WBC 7.59 3.90 - 12.70 K/uL    RBC 2.21 (L) 4.00 - 5.40 M/uL    Hemoglobin 7.9 (L) 12.0 - 16.0 g/dL    Hematocrit 25.5 (L) 37.0 - 48.5 %     (H) 82 - 98 fL    MCH 35.7 (H) 27.0 - 31.0 pg    MCHC 31.0 (L) 32.0 - 36.0 g/dL    RDW 21.3 (H) 11.5 - 14.5 %    Platelets 92 (L) 150 - 450 K/uL    MPV 13.2 (H) 9.2 - 12.9 fL    Immature Granulocytes 0.3 " 0.0 - 0.5 %    Gran # (ANC) 3.2 1.8 - 7.7 K/uL    Immature Grans (Abs) 0.02 0.00 - 0.04 K/uL    Lymph # 2.5 1.0 - 4.8 K/uL    Mono # 0.9 0.3 - 1.0 K/uL    Eos # 0.3 0.0 - 0.5 K/uL    Baso # 0.01 0.00 - 0.20 K/uL    nRBC 0 0 /100 WBC    Gran % 45.9 38.0 - 73.0 %    Lymph % 36.4 18.0 - 48.0 %    Mono % 13.1 4.0 - 15.0 %    Eosinophil % 4.2 0.0 - 8.0 %    Basophil % 0.1 0.0 - 1.9 %    Platelet Estimate Decreased (A)     Aniso Slight     Differential Method Automated    POCT glucose    Collection Time: 02/03/22  7:56 AM   Result Value Ref Range    POCT Glucose 123 (H) 70 - 110 mg/dL   POCT glucose    Collection Time: 02/03/22 11:45 AM   Result Value Ref Range    POCT Glucose 141 (H) 70 - 110 mg/dL   C-reactive protein    Collection Time: 02/03/22  1:27 PM   Result Value Ref Range    CRP 20.9 (H) 0.0 - 8.2 mg/L   Sedimentation rate    Collection Time: 02/03/22  1:28 PM   Result Value Ref Range    Sed Rate >140 (H) 0 - 20 mm/Hr   Rapid HIV    Collection Time: 02/03/22  1:28 PM   Result Value Ref Range    HIV Rapid Testing Non-Reactive Negative   Protime-INR    Collection Time: 02/03/22  1:28 PM   Result Value Ref Range    Prothrombin Time 13.8 (H) 9.0 - 12.5 sec    INR 1.3 (H) 0.8 - 1.2   POCT glucose    Collection Time: 02/03/22  4:56 PM   Result Value Ref Range    POCT Glucose 189 (H) 70 - 110 mg/dL               Diagnostic Tests:     CT Abdomen Pelvis Without Contrast [[id number]] Resulted: 02/01/22 2119   Order Status: Completed Updated: 02/01/22 2122   Narrative:     EXAMINATION:   CT ABDOMEN PELVIS WITHOUT     CLINICAL HISTORY:   Abdominal pain.     TECHNIQUE:   5 mm unenhanced axial images from the lung bases through the greater trochanters were performed.  Coronal and sagittal reformatted images were provided.     COMPARISON:   CTA chest from 11/30/2021     FINDINGS:   Within the limits of a noncontrast examination, lobular margins of the liver seen suggesting cirrhosis.  The spleen is mildly enlarged.  There are  abdominal varices.     Spleen, pancreas, kidneys, and adrenal glands are unremarkable.  The gallbladder is surgically absent.     Small abdominopelvic fluid is present.  Anasarca is present.  There are tiny subcentimeter retroperitoneal lymph nodes.  There is no gross abdominal adenopathy or ascites. There is tiny fat containing umbilical hernia.     There is extensive colonic diverticulosis.  There are no pelvic masses or adenopathy.  There is no free pelvic fluid.  The urinary bladder is decompressed by Yao catheter.  The uterus is surgically absent.  The appendix is not visualized with certainty.     At the lung bases, there is small pericardial effusion.  There is trace right pleural effusion with associated compressive atelectasis.  Increased ground-glass opacities are seen at lung bases and moderate scarring.    Impression:       Stigmata of cirrhosis including mild splenomegaly, small abdominopelvic ascites, varices, and anasarca.     Diverticular disease.     Small pericardial effusion.     Trace right pleural effusion.     Ground-glass opacities at the lung bases.  A pneumonic process cannot be entirely excluded.       Electronically signed by: Charla Combs   Date: 02/01/2022   Time: 21:19          Assessment/Plan:     Tammi Farris is a 71 y.o. female admitted with:  1.navneet. creatinine worse. Holding arb. Ct scan shows no hydro. Anemia concerning with navneet. Vasculitis, gbm , mm? Farrukh serology. Can also be associated with cirhosis. Doubt hrs. She looks intravascularly dry.  2. Proteinuria. Ck ratio. New,.  3.hyperkalemia. add lokelma.  4.metabolic acidosis .bicarb gtt.   5.anemia. farrukh spep/if. Consult heme. Bm?  6.htn. no arb.  7.dm3. Following sugars.      Lupe Case

## 2022-02-04 NOTE — ASSESSMENT & PLAN NOTE
-Followed by Hepatology  -Noted mild transaminitis and thrombocytopenia  -Likely contributes to her volume overload  -These are chronic and note history of cirrhosis and alcohol abuse   -Follows with hepatology and was seen last month  -Abdominal US on day prior to admit showed hepatic cirrhosis/steatosis with sonographic findings of portal hypertension including trace ascites and splenomegaly.  -Repeat cmp in AM   He will follow up with his vascular surgeon.  Initially he will be referred to the cardiologist for clearance prior to having a colonoscopy.  He will continue his other medications diet and activities.

## 2022-02-04 NOTE — ASSESSMENT & PLAN NOTE
-Hb 9.2 on admit - chronic secondary to cirrhosis.  -Dropped to <7 on 2/2  -No evidence of GI bleeding but has noted epistaxis and bleeding gums at times for weeks.  -Folate and B12 replete  -She is iron deficient.  -1 unit PRBC ordered 2/2 but transfusion delayed due to complex antibody matching.  Hb has been stable and is 6.9 today.  PRBC are available today and will transfuse 1 unit.  -LDH normal.  Mild Tbili elevation.  Retic is not elevated as should be.  -Await VALENTINO, SPEP, SHERI  -GI consulted and input appreciated.  Needs EGD, but no acute need in hospital.  Planned as outpatient later this month.  -Heme/onc consulted today and input appreciated.  Doubt hemolysis.  More likely marrow suppression from acute illness and renal failure.  -Requesting BM biopsy if possible.

## 2022-02-04 NOTE — ASSESSMENT & PLAN NOTE
-Followed by Hepatology  -Noted mild transaminitis and thrombocytopenia  -Likely contributes to her volume overload  -These are chronic and note history of cirrhosis and alcohol abuse   -Follows with hepatology and was seen last month  -Abdominal US on day prior to admit showed hepatic cirrhosis/steatosis with sonographic findings of portal hypertension including trace ascites and splenomegaly.  -Repeat cmp in AM

## 2022-02-04 NOTE — ASSESSMENT & PLAN NOTE
-Noted on admit and mild  -No EKG changes  -K still elevated - treating with lokelma  -Repeat bmp in AM

## 2022-02-04 NOTE — HPI
Ms. Farris is a 72 yo with HTN, GERD, GARCIA cirrhosis that presents with shortness of breath.. She has also had decreased urine output. She was admitted with hypoxia and ANI. She become more hypoxic on the floor with worsening urine output and was transferred to the ICU.  She has had a constellation of symptoms that have been evaluated over the last few months. She reports that she started having nose bleeds in November and her shortness of breath has been present even longer but has worsened significantly over the last few months. Her kidney function has continued to worsen as well. She has no known history of lung disease prior to this and no known history of kidney disease.

## 2022-02-04 NOTE — PROGRESS NOTES
Date of Admission:2/1/2022    SUBJECTIVE: breathing better    Current Facility-Administered Medications   Medication    0.9%  NaCl infusion (for blood administration)    acetaminophen tablet 650 mg    albuterol-ipratropium 2.5 mg-0.5 mg/3 mL nebulizer solution 3 mL    bisacodyL suppository 10 mg    dextrose 50% injection 12.5 g    dextrose 50% injection 25 g    furosemide (LASIX) 200 mg in dextrose 5 % 100 mL continuous infusion (conc: 2 mg/mL)    glucagon (human recombinant) injection 1 mg    glucose chewable tablet 16 g    glucose chewable tablet 24 g    iohexoL (OMNIPAQUE 350) injection 100 mL    melatonin tablet 6 mg    metoprolol tartrate (LOPRESSOR) tablet 25 mg    mupirocin 2 % ointment    naloxone 0.4 mg/mL injection 0.02 mg    ondansetron injection 4 mg    polyethylene glycol packet 17 g    simethicone chewable tablet 80 mg    sodium bicarbonate solution 50 mEq    sodium chloride 0.9% flush 10 mL    sodium zirconium cyclosilicate packet 10 g       Wt Readings from Last 3 Encounters:   02/02/22 73.1 kg (161 lb 2.5 oz)   01/19/22 68 kg (149 lb 14.6 oz)   01/12/22 66.8 kg (147 lb 4.3 oz)     Temp Readings from Last 3 Encounters:   02/04/22 97.6 °F (36.4 °C) (Oral)   01/19/22 97 °F (36.1 °C) (Oral)   12/15/21 98.1 °F (36.7 °C) (Oral)     BP Readings from Last 3 Encounters:   02/04/22 (!) 111/56   01/19/22 (!) 122/58   01/12/22 (!) 142/65     Pulse Readings from Last 3 Encounters:   02/04/22 67   01/19/22 78   01/12/22 95       Intake/Output Summary (Last 24 hours) at 2/4/2022 1244  Last data filed at 2/4/2022 0701  Gross per 24 hour   Intake 550.87 ml   Output 240 ml   Net 310.87 ml       PE:  GEN:wd female in nad  HEENT:ncat,eomi,mm  CVS:s1s2 regular  PULM:decrease bases  ABD:+bs,soft  EXT:no thigh edema  NEURO:awake    Recent Labs   Lab 02/04/22  0216   *      K 5.2*      CO2 15*   BUN 83*   CREATININE 4.5*   CALCIUM 9.2       Lab Results   Component Value Date     CALCIUM 9.2 02/04/2022       Recent Labs   Lab 02/04/22  0216   WBC 6.95   RBC 1.75*   HGB 6.9*   HCT 21.3*   PLT 33*   *   MCH 39.4*   MCHC 32.4           A/P:  1.navneet. creatinine worse. Holding arb. Ct scan shows no hydro. Anemia concerning with navneet. Vasculitis, gbm , mm? Farrukh serology. Can also be associated with cirhosis. Doubt hrs. She looks intravascularly dry. Stopping ivfs. Do not think lasix gtt will help. HD soon. Most likely will need biopsy.  2. Proteinuria. Nephrotic. Needs a biopsy. Will need to order one. Plts?  3.hyperkalemia. cont lokelma.  4.metabolic acidosis .bicarb gtt stop. Change to pushes.  5.anemia. farrukh spep/if. Saw by heme. bm?  6.htn. no arb.  7.dm3. Following sugars.  8.uti. ck cx.

## 2022-02-04 NOTE — SUBJECTIVE & OBJECTIVE
Interval History: No acute events overnight.  Still short of breath but less so today.  Did not receive her PRBC ordered but Hb has improved without it.  Still very weak.  Denies any other bleeding.  Denies chest pain.  Family at bedside.  All questions answered and patient had no further complaints.    Review of Systems   Constitutional: Positive for fatigue. Negative for chills and fever.   HENT: Positive for congestion and nosebleeds.    Eyes: Negative for photophobia and visual disturbance.   Respiratory: Positive for cough and shortness of breath.    Cardiovascular: Negative for chest pain, palpitations and leg swelling.   Gastrointestinal: Positive for constipation. Negative for abdominal pain, diarrhea, nausea and vomiting.   Genitourinary: Positive for decreased urine volume. Negative for dysuria, frequency and urgency.   Skin: Negative for pallor, rash and wound.   Neurological: Positive for weakness. Negative for light-headedness and headaches.   Psychiatric/Behavioral: Negative for confusion and decreased concentration.     Objective:     Vital Signs (Most Recent):  Temp: 98 °F (36.7 °C) (02/03/22 1645)  Pulse: 74 (02/03/22 1645)  Resp: (!) 21 (02/03/22 1645)  BP: (!) 143/63 (02/03/22 1645)  SpO2: 96 % (02/03/22 1645) Vital Signs (24h Range):  Temp:  [97.7 °F (36.5 °C)-98.3 °F (36.8 °C)] 98 °F (36.7 °C)  Pulse:  [60-79] 74  Resp:  [16-24] 21  SpO2:  [93 %-100 %] 96 %  BP: (109-143)/(52-68) 143/63     Weight: 73.1 kg (161 lb 2.5 oz)  Body mass index is 34.87 kg/m².    Intake/Output Summary (Last 24 hours) at 2/3/2022 1828  Last data filed at 2/3/2022 0600  Gross per 24 hour   Intake 720 ml   Output 250 ml   Net 470 ml      Physical Exam  Vitals and nursing note reviewed.   Constitutional:       General: She is not in acute distress.     Appearance: She is well-developed. She is ill-appearing. She is not toxic-appearing or diaphoretic.      Interventions: Nasal cannula in place.   HENT:      Head:  Normocephalic and atraumatic.      Right Ear: External ear normal.      Left Ear: External ear normal.      Nose: Nose normal.      Mouth/Throat:      Mouth: Mucous membranes are moist.   Eyes:      Extraocular Movements: Extraocular movements intact.      Conjunctiva/sclera: Conjunctivae normal.   Cardiovascular:      Rate and Rhythm: Normal rate and regular rhythm.   Pulmonary:      Effort: Tachypnea present. No respiratory distress.      Breath sounds: No wheezing.      Comments: Improved work of breathing and looks comfortable.  Still with significant pulmonary crackles  Abdominal:      General: Bowel sounds are normal. There is no distension.      Palpations: Abdomen is soft.      Tenderness: There is no abdominal tenderness.      Comments: No palpable hepatomegaly or splenomegaly    Musculoskeletal:         General: No tenderness. Normal range of motion.      Cervical back: Normal range of motion and neck supple.      Right lower leg: Edema present.      Left lower leg: Edema present.   Skin:     General: Skin is warm and dry.   Neurological:      Mental Status: She is alert and oriented to person, place, and time.      Comments: Moves all extremities but is diffusely weak   Psychiatric:         Thought Content: Thought content normal.         Significant Labs: All pertinent labs within the past 24 hours have been reviewed.    Significant Imaging: I have reviewed all pertinent imaging results/findings within the past 24 hours.

## 2022-02-04 NOTE — ASSESSMENT & PLAN NOTE
-Admitted to inpatient status  -Baseline Cr 0.9  -On admit Cr 2.7  -Reports being stated on lasix 1/8 by her cardiologist and over several days PTA had decreased UOP and diminished PO intake  -BNP elevated at 1160 and with metabolic acidosis and mild hyperkalemia on admission.    -Avoid nephrotoxic agents and renally dose meds  -FEUrea 7.1% suggestive of pre-renal sources  -Consulted and discussed with nephrology.  -Complex situation to clearly determine volume status and initially felt to be volume deplete.  While multiple signs of fluid overload - I discussed extensively with Dr. Case who believes she is more likely hypovolemic and that there may be a vasculitis present  -Decompensated and transferred to ICU overnight and lasix drip given but no urine output so discontinued.  Cr slowly worsening and may need dialysis soon.    -Needs kidney biopsy - IR consulted - likely delayed until Monday.  -Per nephrology - will continue with bicarb and lokelma. Await workup for vasculitis and MM.    -Given risk of vasculitic pulmonary-renal syndrome - likely needs steroids.  Will discuss with renal and pulmonary.  -Repeat BMP in AM.infusion.

## 2022-02-04 NOTE — SUBJECTIVE & OBJECTIVE
Past Medical History:   Diagnosis Date    Allergy     Anxiety     Basal cell carcinoma     Cirrhosis of liver without ascites 12/27/2017    Depression     Diabetes mellitus, type 2     Disorder of kidney and ureter     Endometriosis     GERD (gastroesophageal reflux disease)     Hyperlipidemia     Hypertension     Joint pain     Psoriasis        Past Surgical History:   Procedure Laterality Date    abdominal laproscopic surgery      APPENDECTOMY      CARPAL TUNNEL RELEASE      right    CHOLECYSTECTOMY  04/2015    COLONOSCOPY N/A 2/8/2019    Procedure: COLONOSCOPY;  Surgeon: Celso Salas MD;  Location: [facility] ([address] [place] [address]);  Service: Endoscopy;  Laterality: N/A;    ESOPHAGOGASTRODUODENOSCOPY N/A 2/8/2019    Procedure: EGD (ESOPHAGOGASTRODUODENOSCOPY);  Surgeon: Celso Salas MD;  Location: [facility] ([address] [place] [address]);  Service: Endoscopy;  Laterality: N/A;  varices screening, labs ordered prior    HYSTERECTOMY  age 25    JOSEFA/BSO (endometriosis)    OOPHORECTOMY      SKIN CANCER DESTRUCTION      UPPER GASTROINTESTINAL ENDOSCOPY         Review of patient's allergies indicates:   Allergen Reactions    Dobutamine Hives    Benadryl [diphenhydramine hcl] Anxiety     Pt states she feels jumpy and anxious when taking.    Darvon [propoxyphene] Nausea Only    Shellfish containing products Hives    Iodinated contrast media Hives    Lisinopril Other (See Comments)     cough    Propoxyphene hcl      Itchy (skin)^    Tizanidine Other (See Comments)     Sweaty, difficulty swallowing    Statins-hmg-coa reductase inhibitors Anxiety and Other (See Comments)     Myalgia, fatigue, palpitations, anxiety       Family History     Problem Relation (Age of Onset)    Arthritis Mother    Asthma Brother    Hyperlipidemia Brother    Hypertension Mother, Sister, Brother    No Known Problems Father    Raynaud syndrome Sister        Tobacco Use    Smoking status: Never Smoker    Smokeless tobacco: Never Used    Substance and Sexual Activity    Alcohol use: Not Currently    Drug use: No    Sexual activity: Yes     Partners: Male     Birth control/protection: Surgical         Review of Systems   Constitutional: Positive for activity change and fatigue. Negative for diaphoresis.   HENT: Positive for congestion, facial swelling, nosebleeds and rhinorrhea. Negative for mouth sores.    Eyes: Negative for pain and redness.   Respiratory: Positive for cough, chest tightness and shortness of breath.    Gastrointestinal: Positive for constipation and nausea. Negative for abdominal pain, anal bleeding and vomiting.   Endocrine: Negative.    Genitourinary: Positive for difficulty urinating. Negative for dysuria and hematuria.   Musculoskeletal: Negative for arthralgias, myalgias and neck pain.   Skin: Negative.    Neurological: Positive for weakness. Negative for tremors, speech difficulty and numbness.   Hematological: Negative.    Psychiatric/Behavioral: Negative.      Objective:     Vital Signs (Most Recent):  Temp: 98 °F (36.7 °C) (02/04/22 1700)  Pulse: 66 (02/04/22 1700)  Resp: (!) 37 (02/04/22 1700)  BP: (!) 104/53 (02/04/22 1700)  SpO2: 97 % (02/04/22 1700) Vital Signs (24h Range):  Temp:  [97.6 °F (36.4 °C)-98.6 °F (37 °C)] 98 °F (36.7 °C)  Pulse:  [58-72] 66  Resp:  [12-98] 37  SpO2:  [24 %-100 %] 97 %  BP: ()/(45-69) 104/53     Weight: 73.1 kg (161 lb 2.5 oz)  Body mass index is 34.87 kg/m².      Intake/Output Summary (Last 24 hours) at 2/4/2022 1707  Last data filed at 2/4/2022 1400  Gross per 24 hour   Intake 565.37 ml   Output 240 ml   Net 325.37 ml       Physical Exam  Constitutional:       General: She is not in acute distress.     Appearance: She is obese. She is ill-appearing. She is not toxic-appearing.   HENT:      Head: Normocephalic.      Right Ear: Tympanic membrane normal.      Nose: Nose normal.   Eyes:      Extraocular Movements: Extraocular movements intact.      Conjunctiva/sclera: Conjunctivae  normal.      Pupils: Pupils are equal, round, and reactive to light.   Cardiovascular:      Rate and Rhythm: Normal rate and regular rhythm.      Pulses: Normal pulses.      Heart sounds: Normal heart sounds.   Pulmonary:      Effort: Pulmonary effort is normal. No respiratory distress.      Breath sounds: Rales present. No wheezing.   Abdominal:      General: Abdomen is flat. Bowel sounds are normal.      Palpations: Abdomen is soft.   Musculoskeletal:         General: Swelling present. No deformity.      Cervical back: Normal range of motion and neck supple.   Skin:     General: Skin is warm and dry.      Capillary Refill: Capillary refill takes less than 2 seconds.      Findings: No rash.   Neurological:      General: No focal deficit present.      Mental Status: She is alert and oriented to person, place, and time. Mental status is at baseline.   Psychiatric:         Mood and Affect: Mood normal.         Vents:  Oxygen Concentration (%): 10 (02/04/22 0756)    Lines/Drains/Airways     Drain                 Urethral Catheter 02/01/22 1352 Latex 16 Fr. 3 days          Peripheral Intravenous Line                 Peripheral IV - Single Lumen 02/01/22 0941 20 G Left Antecubital 3 days         Peripheral IV - Single Lumen 02/04/22 0528 22 G Right Antecubital <1 day                Significant Labs:    CBC/Anemia Profile:  Recent Labs   Lab 02/03/22 0427 02/03/22 1908 02/04/22  0216 02/04/22  0608 02/04/22  1439   WBC 7.59 6.10 6.95  --   --    HGB 7.9* 6.4* 6.9*  --   --    HCT 25.5* 19.3* 21.3*  --   --    PLT 92* 92* 33*  --   --    * 118* 122*  --   --    RDW 21.3* 17.6* 17.4*  --   --    RETIC  --   --   --  2.1  --    OCCULTBLOOD  --   --   --   --  Negative        Chemistries:  Recent Labs   Lab 02/03/22 0426 02/03/22 1907 02/04/22  0216    139 139   K 5.8* 4.9 5.2*    105 106   CO2 12* 18* 15*   BUN 76* 86* 83*   CREATININE 3.4* 4.2* 4.5*   CALCIUM 9.4 9.5 9.2   ALBUMIN 4.0 4.7 4.3   PROT  8.8* 8.8* 8.4   BILITOT 1.3* 1.1* 1.2*   ALKPHOS 89 76 72   ALT 41 31 26   AST 56* 46* 43*       All pertinent labs within the past 24 hours have been reviewed.    Significant Imaging:   I have reviewed all pertinent imaging results/findings within the past 24 hours.

## 2022-02-04 NOTE — ASSESSMENT & PLAN NOTE
-D-dimer rather elevated in ER and short of breath  -Doppler us of legs without evidence of DVT  -V/Q very low probability for PE.  -Stopped lovenox 2/2

## 2022-02-04 NOTE — SUBJECTIVE & OBJECTIVE
Interval History: Patient decompensated after removing bipap to eat last night.  Started on lasix drip and transferred to ICU.  Almost no urine output despite lasix drip.  Feeling better this morning and on 3L NC O2.  Still awaiting for PRBC ordered 2/1 due to complex antibody matching.  Still very weak.  Denies any other bleeding.  Denies chest pain.  Family at bedside.  All questions answered and patient had no further complaints.    Review of Systems   Constitutional: Positive for fatigue. Negative for chills and fever.   HENT: Positive for congestion and nosebleeds.    Eyes: Negative for photophobia and visual disturbance.   Respiratory: Positive for cough and shortness of breath.    Cardiovascular: Negative for chest pain, palpitations and leg swelling.   Gastrointestinal: Positive for constipation. Negative for abdominal pain, diarrhea, nausea and vomiting.   Genitourinary: Positive for decreased urine volume. Negative for dysuria, frequency and urgency.   Skin: Negative for pallor, rash and wound.   Neurological: Positive for weakness. Negative for light-headedness and headaches.   Psychiatric/Behavioral: Negative for confusion and decreased concentration.     Objective:     Vital Signs (Most Recent):  Temp: 97.6 °F (36.4 °C) (02/04/22 1101)  Pulse: 67 (02/04/22 1400)  Resp: (!) 35 (02/04/22 1400)  BP: 104/65 (02/04/22 1400)  SpO2: 97 % (02/04/22 1400) Vital Signs (24h Range):  Temp:  [97.6 °F (36.4 °C)-98.6 °F (37 °C)] 97.6 °F (36.4 °C)  Pulse:  [58-74] 67  Resp:  [12-98] 35  SpO2:  [24 %-100 %] 97 %  BP: ()/(45-69) 104/65     Weight: 73.1 kg (161 lb 2.5 oz)  Body mass index is 34.87 kg/m².    Intake/Output Summary (Last 24 hours) at 2/4/2022 1418  Last data filed at 2/4/2022 0701  Gross per 24 hour   Intake 550.87 ml   Output 240 ml   Net 310.87 ml      Physical Exam  Vitals and nursing note reviewed.   Constitutional:       General: She is not in acute distress.     Appearance: She is well-developed.  She is ill-appearing. She is not toxic-appearing or diaphoretic.      Interventions: Nasal cannula in place.   HENT:      Head: Normocephalic and atraumatic.      Right Ear: External ear normal.      Left Ear: External ear normal.      Nose: Nose normal.      Mouth/Throat:      Mouth: Mucous membranes are moist.   Eyes:      Extraocular Movements: Extraocular movements intact.      Conjunctiva/sclera: Conjunctivae normal.   Cardiovascular:      Rate and Rhythm: Normal rate and regular rhythm.   Pulmonary:      Effort: Tachypnea present. No respiratory distress.      Breath sounds: No wheezing.      Comments: Improved work of breathing and looks comfortable.  Still with significant pulmonary crackles  Abdominal:      General: Bowel sounds are normal. There is no distension.      Palpations: Abdomen is soft.      Tenderness: There is no abdominal tenderness.      Comments: No palpable hepatomegaly or splenomegaly    Musculoskeletal:         General: No tenderness. Normal range of motion.      Cervical back: Normal range of motion and neck supple.      Right lower leg: Edema present.      Left lower leg: Edema present.   Skin:     General: Skin is warm and dry.   Neurological:      Mental Status: She is alert and oriented to person, place, and time.      Comments: Moves all extremities but is diffusely weak   Psychiatric:         Thought Content: Thought content normal.         Significant Labs: All pertinent labs within the past 24 hours have been reviewed.    Significant Imaging: I have reviewed all pertinent imaging results/findings within the past 24 hours.

## 2022-02-04 NOTE — CODE/ RAPID DOCUMENTATION
"I responded to a Rapid Response on Ms. Farris (I happened to be next to her room when the nurse activated the RR).  Hospital course and events of the day noted.  I discussed the case with the patient's nurse.  Apparently she came off of CPAP in order to eat dinner.  Shortly after this she began to have paroxysms of cough and an abrupt drop in her O2 saturations along with tachypnea to the 30's.  She was immediately placed back on her CPAP and her sats recovered back up to 99% on her original settings.      On my entering the room, she tells me she feels about the same amount of shortness of breath as always.  On asking when she began to feel short of breath and like things weren't well, she responds, "several days ago."  She doesn't have the feeling that anything is particularly worse or different from her standpoint currently.  The nurse, however, feels her appearance is a little worse.    Assessment and Plan:  1) Acute respiratory distress   -Check BMP, BNP, CBC, LA, ABG, CXR, trop    -This was likely precipitated by coming of of her NIPPV with paroxysmal coughing spell  -She is rapidly returning to normal and baseline back on her CPAP  -Underlying cause for this is likely multifactorial:   -SOB from primary metabolic acidosis   -PAULINA   -Possible volume overload from ANI   -Cirrhosis/Hepatopulmonary (Orthodeoxia and platypnea?)  PTE - ruled out by low probability VQ  CHF - ruled out by echo 2/7/21 (EF 68%, no diastolic dysfunction mentioned, PAP 34)  If no improvement by am, consider Pulmonary/GI input for cirrhosis induced HRS and hepatopulmonary sx      BP (!) 143/63 (Patient Position: Lying)   Pulse 74   Temp 98 °F (36.7 °C) (Oral)   Resp (!) 21   Ht 4' 9" (1.448 m)   Wt 73.1 kg (161 lb 2.5 oz)   SpO2 96%   BMI 34.87 kg/m²   Alert but listless  She is able to make good eye contact and respond to all questions appropriately with no confusion   RRR distant S1/S2  Few crackes (not severe)  Tachypnea to 25 " on CPAP currently  No edema to legs  Abd soft, NT

## 2022-02-04 NOTE — SUBJECTIVE & OBJECTIVE
Oncology Treatment Plan:   [No treatment plan]    Medications:  Continuous Infusions:   furosemide (LASIX) 2 mg/mL continuous infusion (non-titrating) 5 mg/hr (02/04/22 0701)    sodium bicarbonate drip Stopped (02/03/22 2218)     Scheduled Meds:   albuterol-ipratropium  3 mL Nebulization Q6H    metoprolol tartrate  25 mg Oral BID    mupirocin   Nasal BID    polyethylene glycol  17 g Oral BID    sodium zirconium cyclosilicate  10 g Oral Daily     PRN Meds:sodium chloride, acetaminophen, bisacodyL, dextrose 50%, dextrose 50%, glucagon (human recombinant), glucose, glucose, iohexoL, melatonin, naloxone, ondansetron, simethicone, sodium chloride 0.9%     Review of patient's allergies indicates:   Allergen Reactions    Dobutamine Hives    Benadryl [diphenhydramine hcl] Anxiety     Pt states she feels jumpy and anxious when taking.    Darvon [propoxyphene] Nausea Only    Shellfish containing products Hives    Iodinated contrast media Hives    Lisinopril Other (See Comments)     cough    Propoxyphene hcl      Itchy (skin)^    Tizanidine Other (See Comments)     Sweaty, difficulty swallowing    Statins-hmg-coa reductase inhibitors Anxiety and Other (See Comments)     Myalgia, fatigue, palpitations, anxiety        Past Medical History:   Diagnosis Date    Allergy     Anxiety     Basal cell carcinoma     Cirrhosis of liver without ascites 12/27/2017    Depression     Diabetes mellitus, type 2     Disorder of kidney and ureter     Endometriosis     GERD (gastroesophageal reflux disease)     Hyperlipidemia     Hypertension     Joint pain     Psoriasis      Past Surgical History:   Procedure Laterality Date    abdominal laproscopic surgery      APPENDECTOMY      CARPAL TUNNEL RELEASE      right    CHOLECYSTECTOMY  04/2015    COLONOSCOPY N/A 2/8/2019    Procedure: COLONOSCOPY;  Surgeon: Celso Salas MD;  Location: [address]);  Service: Endoscopy;  Laterality: N/A;     ESOPHAGOGASTRODUODENOSCOPY N/A 2/8/2019    Procedure: EGD (ESOPHAGOGASTRODUODENOSCOPY);  Surgeon: Celso Salas MD;  Location: [address]);  Service: Endoscopy;  Laterality: N/A;  varices screening, labs ordered prior    HYSTERECTOMY  age 25    JOSEFA/BSO (endometriosis)    OOPHORECTOMY      SKIN CANCER DESTRUCTION      UPPER GASTROINTESTINAL ENDOSCOPY       Family History     Problem Relation (Age of Onset)    Arthritis Mother    Asthma Brother    Hyperlipidemia Brother    Hypertension Mother, Sister, Brother    No Known Problems Father    Raynaud syndrome Sister        Tobacco Use    Smoking status: Never Smoker    Smokeless tobacco: Never Used   Substance and Sexual Activity    Alcohol use: Not Currently    Drug use: No    Sexual activity: Yes     Partners: Male     Birth control/protection: Surgical       Review of Systems   Constitutional: Positive for fatigue. Negative for chills and fever.   HENT: Positive for congestion and nosebleeds.    Eyes: Negative for photophobia and visual disturbance.   Respiratory: Positive for cough and shortness of breath.    Cardiovascular: Negative for chest pain, palpitations and leg swelling.   Gastrointestinal: Positive for constipation. Negative for abdominal pain, diarrhea, nausea and vomiting.   Genitourinary: Positive for decreased urine volume. Negative for dysuria, frequency and urgency.   Skin: Negative for pallor, rash and wound.   Neurological: Positive for weakness. Negative for light-headedness and headaches.   Psychiatric/Behavioral: Negative for confusion and decreased concentration.     Objective:     Vital Signs (Most Recent):  Temp: 97.8 °F (36.6 °C) (02/04/22 0701)  Pulse: 66 (02/04/22 0823)  Resp: 20 (02/04/22 0823)  BP: 130/69 (02/04/22 0823)  SpO2: 100 % (02/04/22 0823) Vital Signs (24h Range):  Temp:  [97.8 °F (36.6 °C)-98.6 °F (37 °C)] 97.8 °F (36.6 °C)  Pulse:  [60-74] 66  Resp:  [12-36] 20  SpO2:  [94 %-100 %] 100 %  BP:  ()/(50-69) 130/69     Weight: 73.1 kg (161 lb 2.5 oz)  Body mass index is 34.87 kg/m².  Body surface area is 1.71 meters squared.      Intake/Output Summary (Last 24 hours) at 2/4/2022 1008  Last data filed at 2/4/2022 0701  Gross per 24 hour   Intake 550.87 ml   Output 240 ml   Net 310.87 ml       Physical Exam  Vitals and nursing note reviewed.   Constitutional:       General: She is not in acute distress.     Appearance: She is well-developed. She is ill-appearing. She is not toxic-appearing or diaphoretic.      Interventions: Nasal cannula in place.   HENT:      Head: Normocephalic and atraumatic.      Right Ear: External ear normal.      Left Ear: External ear normal.      Nose: Nose normal.      Mouth/Throat:      Mouth: Mucous membranes are moist.   Eyes:      Extraocular Movements: Extraocular movements intact.      Conjunctiva/sclera: Conjunctivae normal.   Cardiovascular:      Rate and Rhythm: Normal rate and regular rhythm.   Pulmonary:      Effort: Tachypnea present. No respiratory distress.      Breath sounds: No wheezing.      Comments: Improved work of breathing and looks comfortable.  Still with significant pulmonary crackles  Abdominal:      General: Bowel sounds are normal. There is no distension.      Palpations: Abdomen is soft.      Tenderness: There is no abdominal tenderness.      Comments: No palpable hepatomegaly or splenomegaly    Musculoskeletal:         General: No tenderness. Normal range of motion.      Cervical back: Normal range of motion and neck supple.      Right lower leg: Edema present.      Left lower leg: Edema present.   Skin:     General: Skin is warm and dry.   Neurological:      Mental Status: She is alert and oriented to person, place, and time.      Comments: Moves all extremities but is diffusely weak   Psychiatric:         Thought Content: Thought content normal.         Significant Labs:   .  Recent Results (from the past 168 hour(s))   POCT glucose    Collection  Time: 02/01/22  7:54 AM   Result Value Ref Range    POCT Glucose 140 (H) 70 - 110 mg/dL   POCT COVID-19 Rapid Screening    Collection Time: 02/01/22  7:55 AM   Result Value Ref Range    POC Rapid COVID Negative Negative     Acceptable Yes    POCT URINALYSIS W/O SCOPE    Collection Time: 02/01/22  9:21 AM   Result Value Ref Range    Glucose, UA Negative     Bilirubin, UA Negative     Ketones, UA Negative     Spec Grav UA >=1.030 (>)     Blood, UA Negative     PH, UA 5.0     Protein, UA 2+ (A)     Urobilinogen, UA 0.2 E.U./dL    Nitrite, UA Negative     Leukocytes, UA Negative     Color, UA Yellow     Clarity, UA Clear    POCT CMP    Collection Time: 02/01/22  9:40 AM   Result Value Ref Range    Albumin, POC 3.1 3.3 - 5.5 g/dL    Alkaline Phosphatase, POC 98 42 - 141 U/L    ALT (SGPT), POC 56 (H) 10 - 47 U/L    AST (SGOT),  (H) 11 - 38 U/L    POC BUN 73 (H) 7 - 22 mg/dL    Calcium, POC 9.5 8.0 - 10.3 mg/dL    POC Chloride 105 98 - 108 mmol/L    POC Creatinine 2.7 (H) 0.6 - 1.2 mg/dL    POC Glucose 145 (H) 73 - 118 mg/dL    POC Potassium 5.2 (H) 3.6 - 5.1 mmol/L    POC Sodium 136 128 - 145 mmol/L    Bilirubin, UA 1.1 0.2 - 1.6 mg/dL    POC TCO2 17 (L) 18 - 33 mmol/L    Protein, UA 8.4 (H) 6.4 - 8.1 g/dL   ISTAT PROCEDURE    Collection Time: 02/01/22  9:42 AM   Result Value Ref Range    POC PTWBT 15.2 (H) 9.7 - 14.3 sec    POC PTINR 1.3 (H) 0.9 - 1.2    Sample unknown    Troponin ISTAT    Collection Time: 02/01/22  9:42 AM   Result Value Ref Range    POC Cardiac Troponin I 0.02 <0.09 ng/mL    Sample unknown    POCT B-type natriuretic peptide (BNP)    Collection Time: 02/01/22 10:06 AM   Result Value Ref Range    POC B-Type Natriuretic Peptide 1160 (H) 0.0 - 100.0 pg/mL   POCT D Dimer    Collection Time: 02/01/22 10:10 AM   Result Value Ref Range    POC D-DI 3940 (H) 0.0 - 450.0 ng/mL   Blood Culture #2 **CANNOT BE ORDERED STAT**    Collection Time: 02/01/22 11:20 AM    Specimen: Peripheral,  Antecubital, Left; Blood   Result Value Ref Range    Blood Culture, Routine No Growth to date     Blood Culture, Routine No Growth to date     Blood Culture, Routine No Growth to date    Blood Culture #1 **CANNOT BE ORDERED STAT**    Collection Time: 02/01/22 11:21 AM    Specimen: Peripheral, Antecubital, Right; Blood   Result Value Ref Range    Blood Culture, Routine No Growth to date     Blood Culture, Routine No Growth to date     Blood Culture, Routine No Growth to date    ISTAT PROCEDURE    Collection Time: 02/01/22 11:32 AM   Result Value Ref Range    POC PH 7.395 7.35 - 7.45    POC PCO2 22.5 (L) 35 - 45 mmHg    POC PO2 28 (L) 40 - 60 mmHg    POC HCO3 13.8 (L) 24 - 28 mmol/L    POC BE -10 -2 to 2 mmol/L    POC SATURATED O2 56 (L) 95 - 100 %    POC Lactate 2.34 (H) 0.5 - 2.2 mmol/L    POC TCO2 14 (L) 24 - 29 mmol/L    Sample VENOUS     Site Other     Allens Test N/A    Sodium, urine, random    Collection Time: 02/01/22 12:01 PM   Result Value Ref Range    Sodium, Urine <20 (A) 20 - 250 mmol/L   Urea nitrogen, urine    Collection Time: 02/01/22 12:01 PM   Result Value Ref Range    Urine Urea Nitrogen 445 140 - 1050 mg/dL   Creatinine, urine, random    Collection Time: 02/01/22 12:01 PM   Result Value Ref Range    Creatinine, Urine 231.5 15.0 - 325.0 mg/dL   Procalcitonin    Collection Time: 02/01/22 12:32 PM   Result Value Ref Range    Procalcitonin 0.23 <0.25 ng/mL   Hemoglobin A1c    Collection Time: 02/01/22 12:40 PM   Result Value Ref Range    Hemoglobin A1C 5.9 (H) 4.0 - 5.6 %    Estimated Avg Glucose 123 68 - 131 mg/dL   POCT Rapid Influenza A/B    Collection Time: 02/01/22 12:44 PM   Result Value Ref Range    Influenza B Ag negative Positive/Negative    Inflenza A Ag negative Positive/Negative   POCT glucose    Collection Time: 02/01/22  3:53 PM   Result Value Ref Range    POCT Glucose 117 (H) 70 - 110 mg/dL   TSH    Collection Time: 02/01/22  4:20 PM   Result Value Ref Range    TSH 2.323 0.400 - 4.000  uIU/mL   Troponin I    Collection Time: 02/01/22 10:42 PM   Result Value Ref Range    Troponin I 0.034 (H) 0.000 - 0.026 ng/mL   Lactic Acid, Plasma    Collection Time: 02/01/22 10:42 PM   Result Value Ref Range    Lactate (Lactic Acid) 2.3 (H) 0.5 - 2.2 mmol/L   Comprehensive Metabolic Panel (CMP)    Collection Time: 02/02/22  5:14 AM   Result Value Ref Range    Sodium 138 136 - 145 mmol/L    Potassium 5.2 (H) 3.5 - 5.1 mmol/L    Chloride 110 95 - 110 mmol/L    CO2 15 (L) 23 - 29 mmol/L    Glucose 112 (H) 70 - 110 mg/dL    BUN 74 (H) 8 - 23 mg/dL    Creatinine 3.0 (H) 0.5 - 1.4 mg/dL    Calcium 8.9 8.7 - 10.5 mg/dL    Total Protein 7.6 6.0 - 8.4 g/dL    Albumin 2.8 (L) 3.5 - 5.2 g/dL    Total Bilirubin 0.8 0.1 - 1.0 mg/dL    Alkaline Phosphatase 90 55 - 135 U/L    AST 74 (H) 10 - 40 U/L    ALT 47 (H) 10 - 44 U/L    Anion Gap 13 8 - 16 mmol/L    eGFR if African American 17 (A) >60 mL/min/1.73 m^2    eGFR if non African American 15 (A) >60 mL/min/1.73 m^2   CBC with Automated Differential    Collection Time: 02/02/22  5:14 AM   Result Value Ref Range    WBC 8.08 3.90 - 12.70 K/uL    RBC 1.82 (L) 4.00 - 5.40 M/uL    Hemoglobin 6.6 (L) 12.0 - 16.0 g/dL    Hematocrit 20.9 (L) 37.0 - 48.5 %     (H) 82 - 98 fL    MCH 36.3 (H) 27.0 - 31.0 pg    MCHC 31.6 (L) 32.0 - 36.0 g/dL    RDW 17.2 (H) 11.5 - 14.5 %    Platelets 88 (L) 150 - 450 K/uL    MPV 13.4 (H) 9.2 - 12.9 fL    Immature Granulocytes 0.2 0.0 - 0.5 %    Gran # (ANC) 3.2 1.8 - 7.7 K/uL    Immature Grans (Abs) 0.02 0.00 - 0.04 K/uL    Lymph # 3.7 1.0 - 4.8 K/uL    Mono # 0.7 0.3 - 1.0 K/uL    Eos # 0.5 0.0 - 0.5 K/uL    Baso # 0.02 0.00 - 0.20 K/uL    nRBC 0 0 /100 WBC    Gran % 40.0 38.0 - 73.0 %    Lymph % 45.2 18.0 - 48.0 %    Mono % 8.2 4.0 - 15.0 %    Eosinophil % 6.2 0.0 - 8.0 %    Basophil % 0.2 0.0 - 1.9 %    Differential Method Automated    POCT glucose    Collection Time: 02/02/22  7:05 AM   Result Value Ref Range    POCT Glucose 76 70 - 110 mg/dL    POCT glucose    Collection Time: 02/02/22  7:10 AM   Result Value Ref Range    POCT Glucose 105 70 - 110 mg/dL   Type & Screen    Collection Time: 02/02/22  9:15 AM   Result Value Ref Range    Group & Rh O POS     Indirect Jacob NEG    Direct antiglobulin test    Collection Time: 02/02/22  9:15 AM   Result Value Ref Range    Direct Jacob (VALENTINO) NEG    Indirect Antiglobulin Test    Collection Time: 02/02/22  9:15 AM   Result Value Ref Range    Antibody Screen POS    Hemoglobin    Collection Time: 02/02/22  9:21 AM   Result Value Ref Range    Hemoglobin 6.9 (L) 12.0 - 16.0 g/dL   Iron and TIBC    Collection Time: 02/02/22  9:21 AM   Result Value Ref Range    Iron 54 30 - 160 ug/dL    Transferrin 287 200 - 375 mg/dL    TIBC 425 250 - 450 ug/dL    Saturated Iron 13 (L) 20 - 50 %   Ferritin    Collection Time: 02/02/22  9:21 AM   Result Value Ref Range    Ferritin 43 20.0 - 300.0 ng/mL   Vitamin B12    Collection Time: 02/02/22  9:21 AM   Result Value Ref Range    Vitamin B-12 1401 (H) 210 - 950 pg/mL   Folate    Collection Time: 02/02/22  9:21 AM   Result Value Ref Range    Folate 14.0 4.0 - 24.0 ng/mL   Lactic Acid, Plasma    Collection Time: 02/02/22  9:21 AM   Result Value Ref Range    Lactate (Lactic Acid) 1.6 0.5 - 2.2 mmol/L   POCT glucose    Collection Time: 02/02/22  1:05 PM   Result Value Ref Range    POCT Glucose 131 (H) 70 - 110 mg/dL   POCT glucose    Collection Time: 02/02/22  4:32 PM   Result Value Ref Range    POCT Glucose 137 (H) 70 - 110 mg/dL   POCT glucose    Collection Time: 02/02/22  8:33 PM   Result Value Ref Range    POCT Glucose 120 (H) 70 - 110 mg/dL   Comprehensive Metabolic Panel (CMP)    Collection Time: 02/03/22  4:26 AM   Result Value Ref Range    Sodium 137 136 - 145 mmol/L    Potassium 5.8 (H) 3.5 - 5.1 mmol/L    Chloride 109 95 - 110 mmol/L    CO2 12 (L) 23 - 29 mmol/L    Glucose 119 (H) 70 - 110 mg/dL    BUN 76 (H) 8 - 23 mg/dL    Creatinine 3.4 (H) 0.5 - 1.4 mg/dL    Calcium 9.4 8.7  - 10.5 mg/dL    Total Protein 8.8 (H) 6.0 - 8.4 g/dL    Albumin 4.0 3.5 - 5.2 g/dL    Total Bilirubin 1.3 (H) 0.1 - 1.0 mg/dL    Alkaline Phosphatase 89 55 - 135 U/L    AST 56 (H) 10 - 40 U/L    ALT 41 10 - 44 U/L    Anion Gap 16 8 - 16 mmol/L    eGFR if African American 15 (A) >60 mL/min/1.73 m^2    eGFR if non African American 13 (A) >60 mL/min/1.73 m^2   CBC with Automated Differential    Collection Time: 02/03/22  4:27 AM   Result Value Ref Range    WBC 7.59 3.90 - 12.70 K/uL    RBC 2.21 (L) 4.00 - 5.40 M/uL    Hemoglobin 7.9 (L) 12.0 - 16.0 g/dL    Hematocrit 25.5 (L) 37.0 - 48.5 %     (H) 82 - 98 fL    MCH 35.7 (H) 27.0 - 31.0 pg    MCHC 31.0 (L) 32.0 - 36.0 g/dL    RDW 21.3 (H) 11.5 - 14.5 %    Platelets 92 (L) 150 - 450 K/uL    MPV 13.2 (H) 9.2 - 12.9 fL    Immature Granulocytes 0.3 0.0 - 0.5 %    Gran # (ANC) 3.2 1.8 - 7.7 K/uL    Immature Grans (Abs) 0.02 0.00 - 0.04 K/uL    Lymph # 2.5 1.0 - 4.8 K/uL    Mono # 0.9 0.3 - 1.0 K/uL    Eos # 0.3 0.0 - 0.5 K/uL    Baso # 0.01 0.00 - 0.20 K/uL    nRBC 0 0 /100 WBC    Gran % 45.9 38.0 - 73.0 %    Lymph % 36.4 18.0 - 48.0 %    Mono % 13.1 4.0 - 15.0 %    Eosinophil % 4.2 0.0 - 8.0 %    Basophil % 0.1 0.0 - 1.9 %    Platelet Estimate Decreased (A)     Aniso Slight     Differential Method Automated    POCT glucose    Collection Time: 02/03/22  7:56 AM   Result Value Ref Range    POCT Glucose 123 (H) 70 - 110 mg/dL   POCT glucose    Collection Time: 02/03/22 11:45 AM   Result Value Ref Range    POCT Glucose 141 (H) 70 - 110 mg/dL   C-reactive protein    Collection Time: 02/03/22  1:27 PM   Result Value Ref Range    CRP 20.9 (H) 0.0 - 8.2 mg/L   Sedimentation rate    Collection Time: 02/03/22  1:28 PM   Result Value Ref Range    Sed Rate >140 (H) 0 - 20 mm/Hr   Rapid HIV    Collection Time: 02/03/22  1:28 PM   Result Value Ref Range    HIV Rapid Testing Non-Reactive Negative   Protime-INR    Collection Time: 02/03/22  1:28 PM   Result Value Ref Range     Prothrombin Time 13.8 (H) 9.0 - 12.5 sec    INR 1.3 (H) 0.8 - 1.2   POCT glucose    Collection Time: 02/03/22  4:56 PM   Result Value Ref Range    POCT Glucose 189 (H) 70 - 110 mg/dL   ISTAT PROCEDURE    Collection Time: 02/03/22  7:00 PM   Result Value Ref Range    POC PH 7.419 7.35 - 7.45    POC PCO2 28.2 (LL) 35 - 45 mmHg    POC PO2 221 (H) 80 - 100 mmHg    POC HCO3 18.3 (L) 24 - 28 mmol/L    POC BE -5 -2 to 2 mmol/L    POC SATURATED O2 100 95 - 100 %    POC TCO2 19 (L) 23 - 27 mmol/L    Sample ARTERIAL     Site RR     Allens Test Pass     DelSys CPAP/BiPAP     Mode CPAP     PS 10     FiO2 60     Spont Rate 22     Min Vol 12.5    Comprehensive Metabolic Panel    Collection Time: 02/03/22  7:07 PM   Result Value Ref Range    Sodium 139 136 - 145 mmol/L    Potassium 4.9 3.5 - 5.1 mmol/L    Chloride 105 95 - 110 mmol/L    CO2 18 (L) 23 - 29 mmol/L    Glucose 161 (H) 70 - 110 mg/dL    BUN 86 (H) 8 - 23 mg/dL    Creatinine 4.2 (H) 0.5 - 1.4 mg/dL    Calcium 9.5 8.7 - 10.5 mg/dL    Total Protein 8.8 (H) 6.0 - 8.4 g/dL    Albumin 4.7 3.5 - 5.2 g/dL    Total Bilirubin 1.1 (H) 0.1 - 1.0 mg/dL    Alkaline Phosphatase 76 55 - 135 U/L    AST 46 (H) 10 - 40 U/L    ALT 31 10 - 44 U/L    Anion Gap 16 8 - 16 mmol/L    eGFR if African American 12 (A) >60 mL/min/1.73 m^2    eGFR if non African American 10 (A) >60 mL/min/1.73 m^2   Troponin I    Collection Time: 02/03/22  7:07 PM   Result Value Ref Range    Troponin I 0.085 (H) 0.000 - 0.026 ng/mL   Lactic Acid, Plasma    Collection Time: 02/03/22  7:07 PM   Result Value Ref Range    Lactate (Lactic Acid) 1.7 0.5 - 2.2 mmol/L   CBC Auto Differential    Collection Time: 02/03/22  7:08 PM   Result Value Ref Range    WBC 6.10 3.90 - 12.70 K/uL    RBC 1.64 (L) 4.00 - 5.40 M/uL    Hemoglobin 6.4 (L) 12.0 - 16.0 g/dL    Hematocrit 19.3 (LL) 37.0 - 48.5 %     (H) 82 - 98 fL    MCH 39.0 (H) 27.0 - 31.0 pg    MCHC 33.2 32.0 - 36.0 g/dL    RDW 17.6 (H) 11.5 - 14.5 %    Platelets 92  (L) 150 - 450 K/uL    MPV 12.9 9.2 - 12.9 fL    Immature Granulocytes 0.5 0.0 - 0.5 %    Gran # (ANC) 2.9 1.8 - 7.7 K/uL    Immature Grans (Abs) 0.03 0.00 - 0.04 K/uL    Lymph # 2.4 1.0 - 4.8 K/uL    Mono # 0.5 0.3 - 1.0 K/uL    Eos # 0.2 0.0 - 0.5 K/uL    Baso # 0.01 0.00 - 0.20 K/uL    nRBC 0 0 /100 WBC    Gran % 46.8 38.0 - 73.0 %    Lymph % 40.0 18.0 - 48.0 %    Mono % 8.9 4.0 - 15.0 %    Eosinophil % 3.6 0.0 - 8.0 %    Basophil % 0.2 0.0 - 1.9 %    Differential Method Automated    Brain natriuretic peptide    Collection Time: 02/03/22  7:08 PM   Result Value Ref Range    BNP 1,812 (H) 0 - 99 pg/mL   POCT glucose    Collection Time: 02/03/22  8:04 PM   Result Value Ref Range    POCT Glucose 165 (H) 70 - 110 mg/dL   Protein / creatinine ratio, urine    Collection Time: 02/03/22  8:57 PM   Result Value Ref Range    Protein, Urine Random >1500 mg/dL    Creatinine, Urine 275.8 15.0 - 325.0 mg/dL    Prot/Creat Ratio, Urine Unable to calculate 0.00 - 0.20   Urinalysis    Collection Time: 02/03/22  9:00 PM   Result Value Ref Range    Specimen UA Urine, Clean Catch     Color, UA Yellow Yellow, Straw, Portia    Appearance, UA Cloudy (A) Clear    pH, UA 6.0 5.0 - 8.0    Specific Gravity, UA 1.030 1.005 - 1.030    Protein, UA 3+ (A) Negative    Glucose, UA Negative Negative    Ketones, UA Trace (A) Negative    Bilirubin (UA) Negative Negative    Occult Blood UA 3+ (A) Negative    Nitrite, UA Negative Negative    Urobilinogen, UA Negative <2.0 EU/dL    Leukocytes, UA 3+ (A) Negative   Urinalysis Microscopic    Collection Time: 02/03/22  9:00 PM   Result Value Ref Range    RBC, UA >100 (H) 0 - 4 /hpf    WBC, UA 40 (H) 0 - 5 /hpf    WBC Clumps, UA Rare None-Rare    Bacteria Moderate (A) None-Occ /hpf    Yeast, UA Many (A) None    Squam Epithel, UA 7 /hpf    Non-Squam Epith 1 (A) <1/hpf /hpf    Hyaline Casts, UA 0 0-1/lpf /lpf    Unclass Yudith UA None None-Moderate    Microscopic Comment SEE COMMENT    Comprehensive Metabolic  Panel (CMP)    Collection Time: 02/04/22  2:16 AM   Result Value Ref Range    Sodium 139 136 - 145 mmol/L    Potassium 5.2 (H) 3.5 - 5.1 mmol/L    Chloride 106 95 - 110 mmol/L    CO2 15 (L) 23 - 29 mmol/L    Glucose 111 (H) 70 - 110 mg/dL    BUN 83 (H) 8 - 23 mg/dL    Creatinine 4.5 (H) 0.5 - 1.4 mg/dL    Calcium 9.2 8.7 - 10.5 mg/dL    Total Protein 8.4 6.0 - 8.4 g/dL    Albumin 4.3 3.5 - 5.2 g/dL    Total Bilirubin 1.2 (H) 0.1 - 1.0 mg/dL    Alkaline Phosphatase 72 55 - 135 U/L    AST 43 (H) 10 - 40 U/L    ALT 26 10 - 44 U/L    Anion Gap 18 (H) 8 - 16 mmol/L    eGFR if African American 11 (A) >60 mL/min/1.73 m^2    eGFR if non African American 9 (A) >60 mL/min/1.73 m^2   CBC with Automated Differential    Collection Time: 02/04/22  2:16 AM   Result Value Ref Range    WBC 6.95 3.90 - 12.70 K/uL    RBC 1.75 (L) 4.00 - 5.40 M/uL    Hemoglobin 6.9 (L) 12.0 - 16.0 g/dL    Hematocrit 21.3 (L) 37.0 - 48.5 %     (H) 82 - 98 fL    MCH 39.4 (H) 27.0 - 31.0 pg    MCHC 32.4 32.0 - 36.0 g/dL    RDW 17.4 (H) 11.5 - 14.5 %    Platelets 33 (LL) 150 - 450 K/uL    MPV 12.5 9.2 - 12.9 fL    Immature Granulocytes 0.3 0.0 - 0.5 %    Gran # (ANC) 3.2 1.8 - 7.7 K/uL    Immature Grans (Abs) 0.02 0.00 - 0.04 K/uL    Lymph # 2.7 1.0 - 4.8 K/uL    Mono # 0.7 0.3 - 1.0 K/uL    Eos # 0.4 0.0 - 0.5 K/uL    Baso # 0.01 0.00 - 0.20 K/uL    nRBC 0 0 /100 WBC    Gran % 45.6 38.0 - 73.0 %    Lymph % 38.3 18.0 - 48.0 %    Mono % 10.4 4.0 - 15.0 %    Eosinophil % 5.3 0.0 - 8.0 %    Basophil % 0.1 0.0 - 1.9 %    Differential Method Automated    Fibrinogen    Collection Time: 02/04/22  6:07 AM   Result Value Ref Range    Fibrinogen 196 182 - 400 mg/dL   Lactate dehydrogenase    Collection Time: 02/04/22  6:07 AM   Result Value Ref Range     110 - 260 U/L   Reticulocytes    Collection Time: 02/04/22  6:08 AM   Result Value Ref Range    Retic 2.1 0.5 - 2.5 %   POCT glucose    Collection Time: 02/04/22  7:33 AM   Result Value Ref Range     POCT Glucose 133 (H) 70 - 110 mg/dL       Diagnostic Results:  I have reviewed all pertinent imaging results/findings within the past 24 hours.   X-Ray Chest 1 View    Result Date: 2/3/2022  EXAMINATION: XR CHEST 1 VIEW CLINICAL HISTORY: change in loc difficulty breathing; TECHNIQUE: Single frontal view of the chest was performed. COMPARISON: 02/01/2022 FINDINGS: The cardiomediastinal silhouette is prominent, similar to the previous examination noting magnification by technique..  There is no pleural effusion.  The trachea is midline.  The lungs are symmetrically expanded bilaterally with prominent central hilar interstitial attenuation, similar to the previous exam.  No new large focal consolidation seen.  There is no pneumothorax.  The osseous structures are remarkable for degenerative changes..     As above Electronically signed by: Fracisco Esposito MD Date:    02/03/2022 Time:    18:59    X-Ray Chest PA And Lateral    Result Date: 2/1/2022  EXAMINATION: XR CHEST PA AND LATERAL CLINICAL HISTORY: Chest Pain; TECHNIQUE: PA and lateral views of the chest were performed. COMPARISON: 02/01/2022 at 08:08 hours, 12/13/2021 FINDINGS: Enlarged cardiac silhouette, similar to the prior exam.  There are mild perihilar and right basilar lung opacities, not significantly changed from the earlier exam, possible cardiogenic/noncardiogenic pulmonary edema, pneumonia, or aspiration.  Superimposed chronic interstitial changes may be present, as well.  The opacities have not significantly changed from the earlier exam.  Mildly elevated right hemidiaphragm, stable.  No pneumothorax.  No pleural effusions.     No significant changes. Electronically signed by: Brandon Fry MD Date:    02/01/2022 Time:    09:39    X-Ray Chest AP Portable    Result Date: 2/1/2022  EXAMINATION: XR CHEST AP PORTABLE CLINICAL HISTORY: Cough, unspecified TECHNIQUE: Single frontal view of the chest was performed. COMPARISON: 12/13/2021 FINDINGS: Increasing  perihilar interstitial infiltrates, some volume loss of anterior segment of elevation right minor fissure, elevation right hemidiaphragm with compression atelectasis of medial basilar segment right lower lobe.  Top-normal size heart.  Large body size degrades exam.  Pulmonary vascular tree borderline prominent.     Nonspecific interstitial fibrotic changes stable and unchanged allowing for differences in inspiratory effort and technique.  Clinical correlation requested. Epic abnormal flag Electronically signed by: Kemal Latham MD Date:    02/01/2022 Time:    08:28    US Lower Extremity Veins Bilateral    Result Date: 2/1/2022  EXAMINATION: US LOWER EXTREMITY VEINS BILATERAL CLINICAL HISTORY: cough and congestion since October; Cough, unspecified TECHNIQUE: Duplex and color flow Doppler and dynamic compression was performed of the bilateral lower extremity veins was performed. COMPARISON: None FINDINGS: Right lower extremity Common femoral, superficial femoral, popliteal, upper greater saphenous and deep femoral veins demonstrate normal compressibility, phasic flow and augmentation response without evidence of filling defect. Visualized calf veins are patent. Left lower extremity Common femoral, superficial femoral, popliteal, upper greater saphenous and deep femoral veins demonstrate normal compressibility, phasic flow and augmentation response without evidence of filling defect. Visualized calf veins are patent.     1. No sonographic evidence of DVT in the bilateral lower extremities. Electronically signed by: Polo Carr Date:    02/01/2022 Time:    10:32    Holter monitor - 48 hour    Result Date: 1/24/2022  Sinus rhythm with rare ectopy. No sustained arrhythmias. No symptoms reported.    CT Abdomen Pelvis  Without Contrast    Result Date: 2/1/2022  EXAMINATION: CT ABDOMEN PELVIS WITHOUT CLINICAL HISTORY: Abdominal pain. TECHNIQUE: 5 mm unenhanced axial images from the lung bases through the greater  trochanters were performed.  Coronal and sagittal reformatted images were provided. COMPARISON: CTA chest from 11/30/2021 FINDINGS: Within the limits of a noncontrast examination, lobular margins of the liver seen suggesting cirrhosis.  The spleen is mildly enlarged.  There are abdominal varices. Spleen, pancreas, kidneys, and adrenal glands are unremarkable.  The gallbladder is surgically absent. Small abdominopelvic fluid is present.  Anasarca is present.  There are tiny subcentimeter retroperitoneal lymph nodes.  There is no gross abdominal adenopathy or ascites. There is tiny fat containing umbilical hernia. There is extensive colonic diverticulosis.  There are no pelvic masses or adenopathy.  There is no free pelvic fluid.  The urinary bladder is decompressed by Yao catheter.  The uterus is surgically absent.  The appendix is not visualized with certainty. At the lung bases, there is small pericardial effusion.  There is trace right pleural effusion with associated compressive atelectasis.  Increased ground-glass opacities are seen at lung bases and moderate scarring.     Stigmata of cirrhosis including mild splenomegaly, small abdominopelvic ascites, varices, and anasarca. Diverticular disease. Small pericardial effusion. Trace right pleural effusion. Ground-glass opacities at the lung bases.  A pneumonic process cannot be entirely excluded. Electronically signed by: Charla Combs Date:    02/01/2022 Time:    21:19    NM Lung Scan Ventilation Perfusion    Result Date: 2/2/2022  EXAMINATION: NM LUNG VENTILATION AND PERFUSION IMAGING CLINICAL HISTORY: Pulmonary embolism (PE) suspected, positive D-dimer; TECHNIQUE: Following the IV administration of 5.3 mCi of Tc-99m-MAA, multiple images of the thorax were obtained in various projections.  Ventilation images were obtained utilizing 20.0 mCi of xenon 133 gas administered by inhalation with imaging performed in the posterior projection. COMPARISON: Correlation  is made with the patient's chest x-ray dated 02/01/2022 FINDINGS: Perfusion: There is a small subsegmental perfusion defect involving the left upper lung. Ventilation: There is homogeneous distribution of tracer activity throughout both lungs.  No air trapping is identified on delayed imaging.. CXR there is mild cardiomegaly with pulmonary vascular congestion and probable pulmonary edema.  Linear opacity projecting over the mid right lung likely represents a small amount of fluid tracking along the right major fissure and/or a band of atelectasis..     This represents a very low probability of pulmonary embolism. Electronically signed by: Juan Mendoza MD Date:    02/02/2022 Time:    13:03    US Abdomen Complete with Doppler (xpd)    Result Date: 1/31/2022  EXAMINATION: US ABDOMEN COMP WITH DOPPLER (XPD) CLINICAL HISTORY: Fatty (change of) liver, not elsewhere classified TECHNIQUE: Complete abdominal ultrasound (including pancreas, liver, gallbladder, common bile duct, spleen, aorta, IVC, and kidneys) was performed. Complete abdominal doppler exam was also performed. COMPARISON: Abdominal ultrasound 09/24/2021, 03/22/2021, 09/23/2020. FINDINGS: Liver: Normal in size, measuring 14.5 cm. Nodular contour suggesting cirrhosis. No solid mass. Gallbladder: The gallbladder is surgically absent. Biliary system: The common duct is not dilated, measuring 5 mm.  No intrahepatic ductal dilatation. Spleen: Slightly enlarged in size with a homogeneous echotexture, measuring 12.6 x 4.3 cm. Pancreas: The visualized portions of pancreas appear normal. Right kidney: Normal in size with no hydronephrosis, measuring 10.5 cm. Left kidney: Normal in size with no hydronephrosis, measuring 9.3 cm. Aorta: Obscured by overlying bowel gas. Inferior vena cava: Normal in appearance. Miscellaneous: Trace ascites. Main hepatic artery: Patent. Main portal vein: Patent with proper directional flow. Left portal vein:  Patent with proper directional  flow. Right portal vein:  Patent with proper directional flow. SMV:  Patent with proper directional flow. IVC: Patent Left, middle, and right hepatic veins: Patent. Umbilical vein: Not patent. Celiac artery: Not visualized. Splenic Vein: Patent with proper directional flow.     Hepatic cirrhosis/steatosis with sonographic findings of portal hypertension including trace ascites and splenomegaly. Electronically signed by resident: Rashid Parish Date:    01/31/2022 Time:    10:56 Electronically signed by: Rustam Damon MD Date:    01/31/2022 Time:    11:08

## 2022-02-04 NOTE — RESPIRATORY THERAPY
ABG obtained and analyzed. Patient on CPAP+10, FI02 60%. Decreased FI02 40% post ABG resuluts.  Results for ARMEN ZELAYA (MRN [id number]) as of 2/3/2022 19:13   Ref. Range 2/3/2022 19:00   POC PH Latest Ref Range: 7.35 - 7.45  7.419   POC PCO2 Latest Ref Range: 35 - 45 mmHg 28.2 (LL)   POC PO2 Latest Ref Range: 80 - 100 mmHg 221 (H)   POC BE Latest Ref Range: -2 to 2 mmol/L -5   POC HCO3 Latest Ref Range: 24 - 28 mmol/L 18.3 (L)   POC SATURATED O2 Latest Ref Range: 95 - 100 % 100   POC TCO2 Latest Ref Range: 23 - 27 mmol/L 19 (L)   Sample Unknown ARTERIAL   DelSys Unknown CPAP/BiPAP   Allens Test Unknown Pass   Site Unknown RR   Mode Unknown CPAP

## 2022-02-04 NOTE — CLINICAL REVIEW
Although Ms. Farris is sustaining her O2 sats well on her original settings of CPAP, she appears to be tiring out and with significant tachypnea, so she has been moved to the ICU to watch more closely.    Total critical care time with Rapid response, exam, clinical stabilization and review of labs and radiographs: 60 minutes    Her CXR appears more volume overloaded, so I have stopped her fluids.  I am adding Lasix with care due to borderline low BP and significant anemia.   -She may ultimately need CRRT   -Will defer to nephro in the am  As noted below, her Cr is rapidly worsening and her UOP is dropping off as well   -I added lasix 40mg iv x 1 then 5mg/hr  She is also significantly anemic, for which 1 unit PRBC was ordered earlier today   -Unfortunately antibody difficulties for type and cross have delayed her getting her transfusion          Ref. Range 1/19/2022 11:22 2/2/2022 05:14 2/3/2022 04:26 2/3/2022 19:07 2/4/2022 02:16   Creatinine Latest Ref Range: 0.5 - 1.4 mg/dL 0.9 3.0 (H) 3.4 (H) 4.2 (H) 4.5 (H)     Recent Results (from the past 10 hour(s))   ISTAT PROCEDURE    Collection Time: 02/03/22  7:00 PM   Result Value Ref Range    POC PH 7.419 7.35 - 7.45    POC PCO2 28.2 (LL) 35 - 45 mmHg    POC PO2 221 (H) 80 - 100 mmHg    POC HCO3 18.3 (L) 24 - 28 mmol/L    POC BE -5 -2 to 2 mmol/L    POC SATURATED O2 100 95 - 100 %    POC TCO2 19 (L) 23 - 27 mmol/L    Sample ARTERIAL     Site RR     Allens Test Pass     DelSys CPAP/BiPAP     Mode CPAP     PS 10     FiO2 60     Spont Rate 22     Min Vol 12.5    Comprehensive Metabolic Panel    Collection Time: 02/03/22  7:07 PM   Result Value Ref Range    Sodium 139 136 - 145 mmol/L    Potassium 4.9 3.5 - 5.1 mmol/L    Chloride 105 95 - 110 mmol/L    CO2 18 (L) 23 - 29 mmol/L    Glucose 161 (H) 70 - 110 mg/dL    BUN 86 (H) 8 - 23 mg/dL    Creatinine 4.2 (H) 0.5 - 1.4 mg/dL    Calcium 9.5 8.7 - 10.5 mg/dL    Total Protein 8.8 (H) 6.0 - 8.4 g/dL    Albumin 4.7 3.5 - 5.2  g/dL    Total Bilirubin 1.1 (H) 0.1 - 1.0 mg/dL    Alkaline Phosphatase 76 55 - 135 U/L    AST 46 (H) 10 - 40 U/L    ALT 31 10 - 44 U/L    Anion Gap 16 8 - 16 mmol/L    eGFR if African American 12 (A) >60 mL/min/1.73 m^2    eGFR if non African American 10 (A) >60 mL/min/1.73 m^2   Troponin I    Collection Time: 02/03/22  7:07 PM   Result Value Ref Range    Troponin I 0.085 (H) 0.000 - 0.026 ng/mL   Lactic Acid, Plasma    Collection Time: 02/03/22  7:07 PM   Result Value Ref Range    Lactate (Lactic Acid) 1.7 0.5 - 2.2 mmol/L   CBC Auto Differential    Collection Time: 02/03/22  7:08 PM   Result Value Ref Range    WBC 6.10 3.90 - 12.70 K/uL    RBC 1.64 (L) 4.00 - 5.40 M/uL    Hemoglobin 6.4 (L) 12.0 - 16.0 g/dL    Hematocrit 19.3 (LL) 37.0 - 48.5 %     (H) 82 - 98 fL    MCH 39.0 (H) 27.0 - 31.0 pg    MCHC 33.2 32.0 - 36.0 g/dL    RDW 17.6 (H) 11.5 - 14.5 %    Platelets 92 (L) 150 - 450 K/uL    MPV 12.9 9.2 - 12.9 fL    Immature Granulocytes 0.5 0.0 - 0.5 %    Gran # (ANC) 2.9 1.8 - 7.7 K/uL    Immature Grans (Abs) 0.03 0.00 - 0.04 K/uL    Lymph # 2.4 1.0 - 4.8 K/uL    Mono # 0.5 0.3 - 1.0 K/uL    Eos # 0.2 0.0 - 0.5 K/uL    Baso # 0.01 0.00 - 0.20 K/uL    nRBC 0 0 /100 WBC    Gran % 46.8 38.0 - 73.0 %    Lymph % 40.0 18.0 - 48.0 %    Mono % 8.9 4.0 - 15.0 %    Eosinophil % 3.6 0.0 - 8.0 %    Basophil % 0.2 0.0 - 1.9 %    Differential Method Automated    Brain natriuretic peptide    Collection Time: 02/03/22  7:08 PM   Result Value Ref Range    BNP 1,812 (H) 0 - 99 pg/mL   POCT glucose    Collection Time: 02/03/22  8:04 PM   Result Value Ref Range    POCT Glucose 165 (H) 70 - 110 mg/dL   Protein / creatinine ratio, urine    Collection Time: 02/03/22  8:57 PM   Result Value Ref Range    Protein, Urine Random >1500 mg/dL    Creatinine, Urine 275.8 15.0 - 325.0 mg/dL    Prot/Creat Ratio, Urine Unable to calculate 0.00 - 0.20   Urinalysis    Collection Time: 02/03/22  9:00 PM   Result Value Ref Range    Specimen UA  Urine, Clean Catch     Color, UA Yellow Yellow, Straw, Portia    Appearance, UA Cloudy (A) Clear    pH, UA 6.0 5.0 - 8.0    Specific Gravity, UA 1.030 1.005 - 1.030    Protein, UA 3+ (A) Negative    Glucose, UA Negative Negative    Ketones, UA Trace (A) Negative    Bilirubin (UA) Negative Negative    Occult Blood UA 3+ (A) Negative    Nitrite, UA Negative Negative    Urobilinogen, UA Negative <2.0 EU/dL    Leukocytes, UA 3+ (A) Negative   Urinalysis Microscopic    Collection Time: 02/03/22  9:00 PM   Result Value Ref Range    RBC, UA >100 (H) 0 - 4 /hpf    WBC, UA 40 (H) 0 - 5 /hpf    WBC Clumps, UA Rare None-Rare    Bacteria Moderate (A) None-Occ /hpf    Yeast, UA Many (A) None    Squam Epithel, UA 7 /hpf    Non-Squam Epith 1 (A) <1/hpf /hpf    Hyaline Casts, UA 0 0-1/lpf /lpf    Unclass Yudith UA None None-Moderate    Microscopic Comment SEE COMMENT    Comprehensive Metabolic Panel (CMP)    Collection Time: 02/04/22  2:16 AM   Result Value Ref Range    Sodium 139 136 - 145 mmol/L    Potassium 5.2 (H) 3.5 - 5.1 mmol/L    Chloride 106 95 - 110 mmol/L    CO2 15 (L) 23 - 29 mmol/L    Glucose 111 (H) 70 - 110 mg/dL    BUN 83 (H) 8 - 23 mg/dL    Creatinine 4.5 (H) 0.5 - 1.4 mg/dL    Calcium 9.2 8.7 - 10.5 mg/dL    Total Protein 8.4 6.0 - 8.4 g/dL    Albumin 4.3 3.5 - 5.2 g/dL    Total Bilirubin 1.2 (H) 0.1 - 1.0 mg/dL    Alkaline Phosphatase 72 55 - 135 U/L    AST 43 (H) 10 - 40 U/L    ALT 26 10 - 44 U/L    Anion Gap 18 (H) 8 - 16 mmol/L    eGFR if African American 11 (A) >60 mL/min/1.73 m^2    eGFR if non African American 9 (A) >60 mL/min/1.73 m^2     CXR from 2/1:      CXR from 2/3:

## 2022-02-04 NOTE — ASSESSMENT & PLAN NOTE
Concern for combination of underlying ILD vs pulmonary edema given her kidney failure and nephrotic range proteinuria.   - bipap nightly, PRN o2 throughout the day with goal Sp02 of 90 and above.   - will monitor and plan to start steroids Sunday to try and preserve the integrity of the kidney bx.     + ARMEN and + anti smith

## 2022-02-04 NOTE — ASSESSMENT & PLAN NOTE
Baseline thrombocytopenia secondary to known cirrhosis. Worsening thrombocytopenia may be due to acute illness. Differentials include consumption with evidence of varices on CT scan (last EGD 2019 did not show varices). 4T score = 2, low probability of HIT. PLASMIC score = 4, low.  -pending HIPAb  -transfuse for plt <10 or if she becomes hemodynamically unstable due to significant hemorrhage  -limit blood draws to daily at most if possible.  -will defer on bone marrow biopsy at this time

## 2022-02-04 NOTE — CLINICAL REVIEW
Worsening anemia and thrombocytopenia  Check [id number], haptoglobin, LDH, Fibrinogen, HIT panel, VALENTINO  Discontinue SQ heparin 5000 U Q8  Consult Heme-Onc  Vasculitis/Auto-immune work up   -ARMEN, Anti-DS, CCP, RF, ANCA, Complement: total, C3, C4, cryoglobulin        Ref. Range 2/3/2022 04:27 2/3/2022 13:28 2/3/2022 19:08 2/4/2022 02:16   WBC Latest Ref Range: 3.90 - 12.70 K/uL 7.59  6.10 6.95   RBC Latest Ref Range: 4.00 - 5.40 M/uL 2.21 (L)  1.64 (L) 1.75 (L)   Hemoglobin Latest Ref Range: 12.0 - 16.0 g/dL 7.9 (L)  6.4 (L) 6.9 (L)   Hematocrit Latest Ref Range: 37.0 - 48.5 % 25.5 (L)  19.3 (LL) 21.3 (L)   MCV Latest Ref Range: 82 - 98 fL 115 (H)  118 (H) 122 (H)   MCH Latest Ref Range: 27.0 - 31.0 pg 35.7 (H)  39.0 (H) 39.4 (H)   MCHC Latest Ref Range: 32.0 - 36.0 g/dL 31.0 (L)  33.2 32.4   RDW Latest Ref Range: 11.5 - 14.5 % 21.3 (H)  17.6 (H) 17.4 (H)   Platelets Latest Ref Range: 150 - 450 K/uL 92 (L)  92 (L) 33 (LL)

## 2022-02-04 NOTE — NURSING
Has been on and off cpap most of shift and desats when off cpap. Removed cpap so pt could eat dinner and desat to 86% with labored breathing and respirations at 28-32. Rapid Response called.

## 2022-02-04 NOTE — RESPIRATORY THERAPY
Patient received from ED on BIPAP. Settings: Ipap-15, Epap-5, Set Rate-12, Fio2-40%. Patient will remian on Bipap for rest of night, and Breathing treatment will be given at scheduled time. Will continue to monitor.

## 2022-02-04 NOTE — ASSESSMENT & PLAN NOTE
-Chronic and at baseline secondary to cirrhosis  -Now worsening.  -Heme/onc consulted.  Doubt HIT (await HIT Ab).  -PPlan is transfuse for platelets <10 or if significant hemorrhage.  -Discuss with heme need for bone marrow biopsy.

## 2022-02-04 NOTE — HPI
71 years old female patient with HTN, DM2. CKD2, Liver cirrhosis ?2/2 GARCIA, chronic thrombocytopenia, who was admitted to [facility] on 02/1/22 on the account of acute hypoxic resp failure and ANI. Nephrology was consulted, and there was a concern of GN as she had low complements, and active sediments in urine. She was started on on pulse dose steroids 02/05 - 02/07, then transitioned to PO prednisone. However, she required HD 02/07 due to volume overload. She was transferred to [facility] for higher level of care. She met diagnostic criteria for SLE, as +ARMEN, +thrombocytopenia,+ proteinuria, + low C3, + low C4, + pericardial effusion. She is started on Cellcept by nephrology.   Hematology was consulted for worsening thrombocytopenia. Reviewing her charts, she has chronic thrombocytopenia ranging in the low 100s since 2020. This was attribued to her liver cirrhosis. On the current presentation, her PLT counts were 88,000/cmm. It progressively got worse to low of 26,000, before stabilizing at low 40s.

## 2022-02-04 NOTE — ASSESSMENT & PLAN NOTE
-Patient with persistent significant shortness of breath and hypoxia requiring supplemental O2  -Not on oxygen at home  -On admit had clear breath sounds but on 2/2 has significant pulmonary crackles  -CXR showed possible pulmonary edema and BNP is rather elevated  -Also noted significantly elevated D-dimer on admit.  V/Q scan shows very low probability of pulmonary embolism.  Stopped full dose lovenox 2/2  -Noted low Hb 2/2 and ordered PRBC, but due to complex antibody matching has not received this.  Hb remains stable but below 7.  Will receive 1 unit PRBC today 2/4.  -Transferred to icu on evening of 2/3 due to respiratory distress.  Treated with bipap overnight and much better this morning.  Now on 3L NCO2.  -Pulmonology consulted and discussed with Dr. Garrett.  -Await vasculitic workup. Check RIP.   -Did not respond to lasix drip at all so will discontinue.  -Continue supplemental O2.

## 2022-02-04 NOTE — CONSULTS
[facility] Intensive Care  Critical Care Medicine  Consult Note    Patient Name: Tammi Farris  MRN: [id number]  Admission Date: 2/1/2022  Hospital Length of Stay: 3 days  Code Status: Full Code  Attending Physician: Chadwick Quan MD   Primary Care Provider: Ann-Marie Hartman MD   Principal Problem: Acute renal failure superimposed on stage 2 chronic kidney disease    [unfilled]  Subjective:     HPI:  Ms. Farris is a 72 yo with HTN, GERD, GARCIA cirrhosis that presents with shortness of breath.. She has also had decreased urine output. She was admitted with hypoxia and ANI. She become more hypoxic on the floor with worsening urine output and was transferred to the ICU.  She has had a constellation of symptoms that have been evaluated over the last few months. She reports that she started having nose bleeds in November and her shortness of breath has been present even longer but has worsened significantly over the last few months. Her kidney function has continued to worsen as well. She has no known history of lung disease prior to this and no known history of kidney disease.       Hospital/ICU Course:  No notes on file    Past Medical History:   Diagnosis Date    Allergy     Anxiety     Basal cell carcinoma     Cirrhosis of liver without ascites 12/27/2017    Depression     Diabetes mellitus, type 2     Disorder of kidney and ureter     Endometriosis     GERD (gastroesophageal reflux disease)     Hyperlipidemia     Hypertension     Joint pain     Psoriasis        Past Surgical History:   Procedure Laterality Date    abdominal laproscopic surgery      APPENDECTOMY      CARPAL TUNNEL RELEASE      right    CHOLECYSTECTOMY  04/2015    COLONOSCOPY N/A 2/8/2019    Procedure: COLONOSCOPY;  Surgeon: Celso Salas MD;  Location: [address];  Service: Endoscopy;  Laterality: N/A;    ESOPHAGOGASTRODUODENOSCOPY N/A 2/8/2019    Procedure: EGD (ESOPHAGOGASTRODUODENOSCOPY);  Surgeon: Celso ZHENG  MD Josue;  Location: [facility] ([facility]);  Service: Endoscopy;  Laterality: N/A;  varices screening, labs ordered prior    HYSTERECTOMY  age 25    JOSEFA/BSO (endometriosis)    OOPHORECTOMY      SKIN CANCER DESTRUCTION      UPPER GASTROINTESTINAL ENDOSCOPY         Review of patient's allergies indicates:   Allergen Reactions    Dobutamine Hives    Benadryl [diphenhydramine hcl] Anxiety     Pt states she feels jumpy and anxious when taking.    Darvon [propoxyphene] Nausea Only    Shellfish containing products Hives    Iodinated contrast media Hives    Lisinopril Other (See Comments)     cough    Propoxyphene hcl      Itchy (skin)^    Tizanidine Other (See Comments)     Sweaty, difficulty swallowing    Statins-hmg-coa reductase inhibitors Anxiety and Other (See Comments)     Myalgia, fatigue, palpitations, anxiety       Family History     Problem Relation (Age of Onset)    Arthritis Mother    Asthma Brother    Hyperlipidemia Brother    Hypertension Mother, Sister, Brother    No Known Problems Father    Raynaud syndrome Sister        Tobacco Use    Smoking status: Never Smoker    Smokeless tobacco: Never Used   Substance and Sexual Activity    Alcohol use: Not Currently    Drug use: No    Sexual activity: Yes     Partners: Male     Birth control/protection: Surgical         Review of Systems   Constitutional: Positive for activity change and fatigue. Negative for diaphoresis.   HENT: Positive for congestion, facial swelling, nosebleeds and rhinorrhea. Negative for mouth sores.    Eyes: Negative for pain and redness.   Respiratory: Positive for cough, chest tightness and shortness of breath.    Gastrointestinal: Positive for constipation and nausea. Negative for abdominal pain, anal bleeding and vomiting.   Endocrine: Negative.    Genitourinary: Positive for difficulty urinating. Negative for dysuria and hematuria.   Musculoskeletal: Negative for arthralgias, myalgias and neck pain.   Skin: Negative.     Neurological: Positive for weakness. Negative for tremors, speech difficulty and numbness.   Hematological: Negative.    Psychiatric/Behavioral: Negative.      Objective:     Vital Signs (Most Recent):  Temp: 98 °F (36.7 °C) (02/04/22 1700)  Pulse: 66 (02/04/22 1700)  Resp: (!) 37 (02/04/22 1700)  BP: (!) 104/53 (02/04/22 1700)  SpO2: 97 % (02/04/22 1700) Vital Signs (24h Range):  Temp:  [97.6 °F (36.4 °C)-98.6 °F (37 °C)] 98 °F (36.7 °C)  Pulse:  [58-72] 66  Resp:  [12-98] 37  SpO2:  [24 %-100 %] 97 %  BP: ()/(45-69) 104/53     Weight: 73.1 kg (161 lb 2.5 oz)  Body mass index is 34.87 kg/m².      Intake/Output Summary (Last 24 hours) at 2/4/2022 1707  Last data filed at 2/4/2022 1400  Gross per 24 hour   Intake 565.37 ml   Output 240 ml   Net 325.37 ml       Physical Exam  Constitutional:       General: She is not in acute distress.     Appearance: She is obese. She is ill-appearing. She is not toxic-appearing.   HENT:      Head: Normocephalic.      Right Ear: Tympanic membrane normal.      Nose: Nose normal.   Eyes:      Extraocular Movements: Extraocular movements intact.      Conjunctiva/sclera: Conjunctivae normal.      Pupils: Pupils are equal, round, and reactive to light.   Cardiovascular:      Rate and Rhythm: Normal rate and regular rhythm.      Pulses: Normal pulses.      Heart sounds: Normal heart sounds.   Pulmonary:      Effort: Pulmonary effort is normal. No respiratory distress.      Breath sounds: Rales present. No wheezing.   Abdominal:      General: Abdomen is flat. Bowel sounds are normal.      Palpations: Abdomen is soft.   Musculoskeletal:         General: Swelling present. No deformity.      Cervical back: Normal range of motion and neck supple.   Skin:     General: Skin is warm and dry.      Capillary Refill: Capillary refill takes less than 2 seconds.      Findings: No rash.   Neurological:      General: No focal deficit present.      Mental Status: She is alert and oriented to  person, place, and time. Mental status is at baseline.   Psychiatric:         Mood and Affect: Mood normal.         Vents:  Oxygen Concentration (%): 10 (02/04/22 0756)    Lines/Drains/Airways     Drain                 Urethral Catheter 02/01/22 1352 Latex 16 Fr. 3 days          Peripheral Intravenous Line                 Peripheral IV - Single Lumen 02/01/22 0941 20 G Left Antecubital 3 days         Peripheral IV - Single Lumen 02/04/22 0528 22 G Right Antecubital <1 day                Significant Labs:    CBC/Anemia Profile:  Recent Labs   Lab 02/03/22 0427 02/03/22 1908 02/04/22 0216 02/04/22  0608 02/04/22  1439   WBC 7.59 6.10 6.95  --   --    HGB 7.9* 6.4* 6.9*  --   --    HCT 25.5* 19.3* 21.3*  --   --    PLT 92* 92* 33*  --   --    * 118* 122*  --   --    RDW 21.3* 17.6* 17.4*  --   --    RETIC  --   --   --  2.1  --    OCCULTBLOOD  --   --   --   --  Negative        Chemistries:  Recent Labs   Lab 02/03/22 0426 02/03/22 1907 02/04/22 0216    139 139   K 5.8* 4.9 5.2*    105 106   CO2 12* 18* 15*   BUN 76* 86* 83*   CREATININE 3.4* 4.2* 4.5*   CALCIUM 9.4 9.5 9.2   ALBUMIN 4.0 4.7 4.3   PROT 8.8* 8.8* 8.4   BILITOT 1.3* 1.1* 1.2*   ALKPHOS 89 76 72   ALT 41 31 26   AST 56* 46* 43*       All pertinent labs within the past 24 hours have been reviewed.    Significant Imaging:   I have reviewed all pertinent imaging results/findings within the past 24 hours.      ABG  Recent Labs   Lab 02/03/22 1900   PH 7.419   PO2 221*   PCO2 28.2*   HCO3 18.3*   BE -5     Assessment/Plan:     Pulmonary  Acute respiratory failure with hypoxia  Concern for combination of underlying ILD vs pulmonary edema given her kidney failure and nephrotic range proteinuria.   - bipap nightly, PRN o2 throughout the day with goal Sp02 of 90 and above.   - will monitor and plan to start steroids Sunday to try and preserve the integrity of the kidney bx.     + ARMEN and + anti smith    Renal/  * Acute renal failure  superimposed on stage 2 chronic kidney disease  Nephrology following, repeat BMP this afternoon  Monitoring UOP  Concern for a possible pulmonary- renal syndrome    Hyperkalemia  See renal failure  Repeat BMP     Metabolic acidosis  Secondary to renal failure  - bicarb, montioring labs, will likely need RRT soon.     Hematology  Thrombocytopenia  Worsening thrombocytopenia, unsure of cause- concern for an autoimmune disorder vs bone marrow issue vs hemolysis  - TTp labs pending but no other convincing evidence of ttp or ITP that would explain her other symptoms.        Critical Care Time: 50 minutes  Critical secondary to Patient has a condition that poses threat to life and bodily function: Severe Respiratory Distress and Acute Renal Failure     Critical care was time spent personally by me on the following activities: development of treatment plan with patient or surrogate and bedside caregivers, discussions with consultants, evaluation of patient's response to treatment, examination of patient, ordering and performing treatments and interventions, ordering and review of laboratory studies, ordering and review of radiographic studies, pulse oximetry, re-evaluation of patient's condition. This critical care time did not overlap with that of any other provider or involve time for any procedures.    Thank you for your consult. I will follow-up with patient. Please contact us if you have any additional questions.     Марина Garrett MD  Critical Care Medicine  [facility] - Intensive Care

## 2022-02-04 NOTE — ASSESSMENT & PLAN NOTE
Worsening thrombocytopenia, unsure of cause- concern for an autoimmune disorder vs bone marrow issue vs hemolysis  - TTp labs pending but no other convincing evidence of ttp or ITP that would explain her other symptoms.

## 2022-02-04 NOTE — PT/OT/SLP PROGRESS
Physical Therapy      Patient Name:  Tammi Farris   MRN:  [id number]    Patient not seen today secondary to Patient ill (Comment) (Transfer to ICU with Anemia and increased O2 demand). Will follow-up .

## 2022-02-04 NOTE — ASSESSMENT & PLAN NOTE
-Noted on admit and mild  -No EKG changes  -K still elevated - will treat with lokelma  -Repeat bmp in AM

## 2022-02-04 NOTE — PROGRESS NOTES
[facility] Medicine  Progress Note    Patient Name: Tammi Farris  MRN: [id number]  Patient Class: IP- Inpatient   Admission Date: 2/1/2022  Length of Stay: 3 days  Attending Physician: Chadwick Quan MD  Primary Care Provider: Ann-Marie Hartman MD        Subjective:     Principal Problem:Acute renal failure superimposed on stage 2 chronic kidney disease        HPI:  71 y.o. female with HTN, HLD, GERD, cirrhosis of liver, thrombocytopenia, and sleep apnea presents with a complaint of cough and congestion since October.  She is chronically dyspneic, exacerbated by exertion, has seen Cardiology and was prescribed lasix and metoprolol.  Her sister has now noted that the patient has very low urine output and is constipated.  Also follows with Hepatology for chronic liver disease.  Patient denies fever, chills, chest pain, palpitations, orthopnea, PND, dizziness, syncope, n/v/d, abdominal pain, or dysuria.  In the ED, labs reveal ANI, hyperkalemia, metabolic acidosis with elevated lactic acid, elevated liver enzymes, elevated BNP, elevated d-dimer, thrombocytopenia, and markedly concentrated urine.  Echo December 2021 showed preserved EF, no evidence of heart failure.  Chest xray without acute abnormality. No convincing evidence of infectious process.      Overview/Hospital Course:  No notes on file    Interval History: Patient decompensated after removing bipap to eat last night.  Started on lasix drip and transferred to ICU.  Almost no urine output despite lasix drip.  Feeling better this morning and on 3L NC O2.  Still awaiting for PRBC ordered 2/1 due to complex antibody matching.  Still very weak.  Denies any other bleeding.  Denies chest pain.  Family at bedside.  All questions answered and patient had no further complaints.    Review of Systems   Constitutional: Positive for fatigue. Negative for chills and fever.   HENT: Positive for congestion and nosebleeds.    Eyes: Negative for  photophobia and visual disturbance.   Respiratory: Positive for cough and shortness of breath.    Cardiovascular: Negative for chest pain, palpitations and leg swelling.   Gastrointestinal: Positive for constipation. Negative for abdominal pain, diarrhea, nausea and vomiting.   Genitourinary: Positive for decreased urine volume. Negative for dysuria, frequency and urgency.   Skin: Negative for pallor, rash and wound.   Neurological: Positive for weakness. Negative for light-headedness and headaches.   Psychiatric/Behavioral: Negative for confusion and decreased concentration.     Objective:     Vital Signs (Most Recent):  Temp: 97.6 °F (36.4 °C) (02/04/22 1101)  Pulse: 67 (02/04/22 1400)  Resp: (!) 35 (02/04/22 1400)  BP: 104/65 (02/04/22 1400)  SpO2: 97 % (02/04/22 1400) Vital Signs (24h Range):  Temp:  [97.6 °F (36.4 °C)-98.6 °F (37 °C)] 97.6 °F (36.4 °C)  Pulse:  [58-74] 67  Resp:  [12-98] 35  SpO2:  [24 %-100 %] 97 %  BP: ()/(45-69) 104/65     Weight: 73.1 kg (161 lb 2.5 oz)  Body mass index is 34.87 kg/m².    Intake/Output Summary (Last 24 hours) at 2/4/2022 1418  Last data filed at 2/4/2022 0701  Gross per 24 hour   Intake 550.87 ml   Output 240 ml   Net 310.87 ml      Physical Exam  Vitals and nursing note reviewed.   Constitutional:       General: She is not in acute distress.     Appearance: She is well-developed. She is ill-appearing. She is not toxic-appearing or diaphoretic.      Interventions: Nasal cannula in place.   HENT:      Head: Normocephalic and atraumatic.      Right Ear: External ear normal.      Left Ear: External ear normal.      Nose: Nose normal.      Mouth/Throat:      Mouth: Mucous membranes are moist.   Eyes:      Extraocular Movements: Extraocular movements intact.      Conjunctiva/sclera: Conjunctivae normal.   Cardiovascular:      Rate and Rhythm: Normal rate and regular rhythm.   Pulmonary:      Effort: Tachypnea present. No respiratory distress.      Breath sounds: No  wheezing.      Comments: Improved work of breathing and looks comfortable.  Still with significant pulmonary crackles  Abdominal:      General: Bowel sounds are normal. There is no distension.      Palpations: Abdomen is soft.      Tenderness: There is no abdominal tenderness.      Comments: No palpable hepatomegaly or splenomegaly    Musculoskeletal:         General: No tenderness. Normal range of motion.      Cervical back: Normal range of motion and neck supple.      Right lower leg: Edema present.      Left lower leg: Edema present.   Skin:     General: Skin is warm and dry.   Neurological:      Mental Status: She is alert and oriented to person, place, and time.      Comments: Moves all extremities but is diffusely weak   Psychiatric:         Thought Content: Thought content normal.         Significant Labs: All pertinent labs within the past 24 hours have been reviewed.    Significant Imaging: I have reviewed all pertinent imaging results/findings within the past 24 hours.      Assessment/Plan:      * Acute renal failure superimposed on stage 2 chronic kidney disease  -Admitted to inpatient status  -Baseline Cr 0.9  -On admit Cr 2.7  -Reports being stated on lasix 1/8 by her cardiologist and over several days PTA had decreased UOP and diminished PO intake  -BNP elevated at 1160 and with metabolic acidosis and mild hyperkalemia on admission.    -Avoid nephrotoxic agents and renally dose meds  -FEUrea 7.1% suggestive of pre-renal sources  -Consulted and discussed with nephrology.  -Complex situation to clearly determine volume status and initially felt to be volume deplete.  While multiple signs of fluid overload - I discussed extensively with Dr. Case who believes she is more likely hypovolemic and that there may be a vasculitis present  -Decompensated and transferred to ICU overnight and lasix drip given but no urine output so discontinued.  Cr slowly worsening and may need dialysis soon.    -Needs kidney  biopsy - IR consulted - likely delayed until Monday.  -Per nephrology - will continue with bicarb and lokelma. Await workup for vasculitis and MM.    -Given risk of vasculitic pulmonary-renal syndrome - likely needs steroids.  Will discuss with renal and pulmonary.  -Repeat BMP in AM.infusion.      Acute respiratory failure with hypoxia  -Patient with persistent significant shortness of breath and hypoxia requiring supplemental O2  -Not on oxygen at home  -On admit had clear breath sounds but on 2/2 has significant pulmonary crackles  -CXR showed possible pulmonary edema and BNP is rather elevated  -Also noted significantly elevated D-dimer on admit.  V/Q scan shows very low probability of pulmonary embolism.  Stopped full dose lovenox 2/2  -Noted low Hb 2/2 and ordered PRBC, but due to complex antibody matching has not received this.  Hb remains stable but below 7.  Will receive 1 unit PRBC today 2/4.  -Transferred to icu on evening of 2/3 due to respiratory distress.  Treated with bipap overnight and much better this morning.  Now on 3L NCO2.  -Pulmonology consulted and discussed with Dr. Garrett.  -Await vasculitic workup. Check RIP.   -Did not respond to lasix drip at all so will discontinue.  -Continue supplemental O2.    Cirrhosis of liver without ascites  -Followed by Hepatology  -Noted mild transaminitis and thrombocytopenia  -Likely contributes to her volume overload  -These are chronic and note history of cirrhosis and alcohol abuse   -Follows with hepatology and was seen last month  -Abdominal US on day prior to admit showed hepatic cirrhosis/steatosis with sonographic findings of portal hypertension including trace ascites and splenomegaly.  -Repeat cmp in AM    Macrocytic anemia  -Hb 9.2 on admit - chronic secondary to cirrhosis.  -Dropped to <7 on 2/2  -No evidence of GI bleeding but has noted epistaxis and bleeding gums at times for weeks.  -Folate and B12 replete  -She is iron deficient.  -1 unit PRBC  ordered 2/2 but transfusion delayed due to complex antibody matching.  Hb has been stable and is 6.9 today.  PRBC are available today and will transfuse 1 unit.  -LDH normal.  Mild Tbili elevation.  Retic is not elevated as should be.  -Await VALENTINO, SPEP, SHERI  -GI consulted and input appreciated.  Needs EGD, but no acute need in hospital.  Planned as outpatient later this month.  -Heme/onc consulted today and input appreciated.  Doubt hemolysis.  More likely marrow suppression from acute illness and renal failure.  -Requesting BM biopsy if possible.    Thrombocytopenia  -Chronic and at baseline secondary to cirrhosis  -Now worsening.  -Heme/onc consulted.  Doubt HIT (await HIT Ab).  -PPlan is transfuse for platelets <10 or if significant hemorrhage.  -Discuss with heme need for bone marrow biopsy.    Positive D dimer  -D-dimer rather elevated in ER and short of breath  -Doppler us of legs without evidence of DVT  -V/Q very low probability for PE.  -Stopped lovenox 2/2    Hyperkalemia  -Noted on admit and mild  -No EKG changes  -K still elevated - treating with lokelma  -Repeat bmp in AM    Debility  -When more stable will consult PT/OT      Metabolic acidosis  -Suspect secondary to ANI, management as above    Sleep apnea  -Continue bipap qhs    Gastroesophageal reflux disease  -Stable, no acute issue    HTN (hypertension)  -BP low normal  -Continue metoprolol with hold parameters    Hyperlipidemia  -History noted  -Not on statin due to cirrhosis      VTE Risk Mitigation (From admission, onward)         Ordered     IP VTE HIGH RISK PATIENT  Once         02/01/22 1557     Place sequential compression device  Until discontinued         02/01/22 1557                Discharge Planning   CLAUDETTE:      Code Status: Full Code   Is the patient medically ready for discharge?:     Reason for patient still in hospital (select all that apply): Treatment  Discharge Plan A: Home with family            Critical care time spent on the  evaluation and treatment of severe organ dysfunction, review of pertinent labs and imaging studies, discussions with consulting providers and discussions with patient/family: 40 minutes.      Chadwick Quan MD  Department of Hospital Medicine   [facility] - Intensive Care

## 2022-02-04 NOTE — ASSESSMENT & PLAN NOTE
-Patient with persistent significant shortness of breath and hypoxia requiring supplemental O2  -Not on oxygen at home  -On admit had clear breath sounds but on 2/2 has significant pulmonary crackles  -CXR showed possible pulmonary edema and BNP is rather elevated  -Also noted significantly elevated D-dimer on admit.  V/Q scan shows very low probability of pulmonary embolism.  Stopped full dose lovenox 2/2  -Noted low Hb 2/2 and ordered PRBC, but due to antibodies has not received this.  Hb has improved without transfusion today.  -To me she appears volume overloaded but after extensive discussion with nephrology will not diurese and will treat with gentle Na bicarb infusion  -Continue supplemental O2.

## 2022-02-04 NOTE — CARE UPDATE
RAPID RESPONSE NURSE NOTE        Admit Date: 2022  LOS: 2  Code Status: Full Code   Date of Consult: 2022  : 1950  Age: 71 y.o.  Weight:   Wt Readings from Last 1 Encounters:   22 73.1 kg (161 lb 2.5 oz)     Sex: female  Race: White   Bed: [facility] A:   MRN: [id number]  Time Rapid Response Team page Received:   Time Rapid Response Team at Bedside:   Time Rapid Response Team left Bedside:   Was the patient discharged from an ICU this admission? No   Was the patient discharged from a PACU within last 24 hours? No   Did the patient receive conscious sedation/general anesthesia in last 24 hours? No   Was the patient in the ED within the past 24 hours? No   Was the patient on NIPPV within the past 24 hours? Yes   Did this progress into an ARC or CPA:  N/A  Attending Physician: Chadwick Quan MD  Primary Service: Hospitalist     SITUATION     Notified by overhead page.  Reason for alert: SOB and low sat   Called to evaluate the patient for Respiratory    Why is the patient in the hospital?: Acute renal failure superimposed on stage 2 chronic kidney disease    Patient has a past medical history of Allergy, Anxiety, Basal cell carcinoma, Cirrhosis of liver without ascites, Depression, Diabetes mellitus, type 2, Disorder of kidney and ureter, Endometriosis, GERD (gastroesophageal reflux disease), Hyperlipidemia, Hypertension, Joint pain, and Psoriasis.    Last Vitals:  Temp: 98 °F (36.7 °C) (1645)  Pulse: 74 (1645)  Resp: 21 (1645)  BP: 143/63 (1645)  SpO2: 96 % (1645)    24 Hours Vitals Range:  Temp:  [97.7 °F (36.5 °C)-98.3 °F (36.8 °C)]   Pulse:  [60-79]   Resp:  [16-24]   BP: (109-143)/(52-68)   SpO2:  [93 %-100 %]     Labs:  Recent Labs     22  0514 22  0514 22  0921 22  0427 22  1908   WBC 8.08  --   --  7.59 6.10   HGB 6.6*   < > 6.9* 7.9* 6.4*   HCT 20.9*  --   --  25.5* 19.3*   PLT 88*  --   --  92* 92*    < > = values in  this interval not displayed.       Recent Labs     02/02/22  0514 02/03/22  0426    137   K 5.2* 5.8*    109   CO2 15* 12*   CREATININE 3.0* 3.4*   * 119*        Recent Labs     02/01/22  1132 02/03/22  1900   PH 7.395 7.419   PCO2 22.5* 28.2*   PO2 28* 221*   HCO3 13.8* 18.3*   POCSATURATED 56* 100   BE -10 -5        ASSESSMENT/INTERVENTIONS        Rapid response paged for  SOB and Low sat  O2_NC while patient eating, patient placed back on BiPAP, bilateral lung crackles heard ausculation, Dr. Cash at bed side, ordered chest x-ray and ABG, he will see if patient needs to transfer to ICU, currently patient stable and stated that she feels much better   The patient was seen for a Respiratory problem. Staff concerns included tachypnea. The following interventions were performed: Bipap.-    RECOMMENDATIONS  Notified Dr. Cash that chest x-ray and ABG completed, pt on IVF and for 12hr UOP was 75cc,   We recommend: may need to transfer to ICU     Discussed plan of care with bedside RNJinny     PROVIDER ESCALATION    Orders received and case discussed with Dr. Cash .    Disposition: Remain in room 411    FOLLOW UP  Call the Rapid Response NurseAlina at x [phone] for additional questions or concerns.

## 2022-02-05 NOTE — SUBJECTIVE & OBJECTIVE
Interval History: unchanged from a respiratory status.     Objective:     Vital Signs (Most Recent):  Temp: 98.2 °F (36.8 °C) (02/05/22 0701)  Pulse: 82 (02/05/22 1030)  Resp: (!) 32 (02/05/22 1030)  BP: (!) 121/57 (02/05/22 1030)  SpO2: 99 % (02/05/22 1030) Vital Signs (24h Range):  Temp:  [96.7 °F (35.9 °C)-98.6 °F (37 °C)] 98.2 °F (36.8 °C)  Pulse:  [59-88] 82  Resp:  [17-98] 32  SpO2:  [24 %-100 %] 99 %  BP: ()/(44-65) 121/57     Weight: 73.1 kg (161 lb 2.5 oz)  Body mass index is 34.87 kg/m².      Intake/Output Summary (Last 24 hours) at 2/5/2022 1147  Last data filed at 2/5/2022 0600  Gross per 24 hour   Intake 358.75 ml   Output 50 ml   Net 308.75 ml       Physical Exam  Constitutional:       General: She is not in acute distress.     Appearance: She is obese. She is not ill-appearing or toxic-appearing.   HENT:      Head: Normocephalic.      Right Ear: Tympanic membrane normal.      Nose: Nose normal.   Eyes:      Extraocular Movements: Extraocular movements intact.      Conjunctiva/sclera: Conjunctivae normal.      Pupils: Pupils are equal, round, and reactive to light.   Cardiovascular:      Rate and Rhythm: Normal rate and regular rhythm.      Pulses: Normal pulses.      Heart sounds: Normal heart sounds.   Pulmonary:      Effort: Pulmonary effort is normal. No respiratory distress.      Breath sounds: Rales present. No wheezing.   Abdominal:      General: Abdomen is flat. Bowel sounds are normal.      Palpations: Abdomen is soft.   Musculoskeletal:         General: Swelling present. No deformity.      Cervical back: Normal range of motion and neck supple.   Skin:     General: Skin is warm and dry.      Capillary Refill: Capillary refill takes less than 2 seconds.      Findings: No rash.   Neurological:      General: No focal deficit present.      Mental Status: She is alert and oriented to person, place, and time. Mental status is at baseline.   Psychiatric:         Mood and Affect: Mood normal.          Vents:  Oxygen Concentration (%): 40 (02/05/22 0131)    Lines/Drains/Airways     Drain                 Urethral Catheter 02/01/22 1352 Latex 16 Fr. 3 days          Peripheral Intravenous Line                 Peripheral IV - Single Lumen 02/04/22 0700 20 G Anterior;Left;Proximal Forearm 1 day         Peripheral IV - Single Lumen 02/05/22 0056 20 G Anterior;Right Forearm <1 day                Significant Labs:    CBC/Anemia Profile:  Recent Labs   Lab 02/03/22  1908 02/04/22 0216 02/04/22  0608 02/04/22  1439 02/05/22 0424   WBC 6.10 6.95  --   --  7.78   HGB 6.4* 6.9*  --   --  8.4*   HCT 19.3* 21.3*  --   --  24.9*   PLT 92* 33*  --   --  94*   * 122*  --   --  111*   RDW 17.6* 17.4*  --   --  23.0*   RETIC  --   --  2.1  --   --    OCCULTBLOOD  --   --   --  Negative  --         Chemistries:  Recent Labs   Lab 02/03/22 1907 02/03/22 1907 02/04/22 0216 02/04/22  2012 02/05/22  0424      < > 139 137 137   K 4.9   < > 5.2* 4.8 4.7      < > 106 103 103   CO2 18*   < > 15* 17* 18*   BUN 86*   < > 83* 88* 88*   CREATININE 4.2*   < > 4.5* 5.0* 5.1*   CALCIUM 9.5   < > 9.2 9.4 9.5   ALBUMIN 4.7  --  4.3  --  4.1   PROT 8.8*  --  8.4  --  8.2   BILITOT 1.1*  --  1.2*  --  1.6*   ALKPHOS 76  --  72  --  80   ALT 31  --  26  --  25   AST 46*  --  43*  --  38   PHOS  --   --   --   --  7.8*    < > = values in this interval not displayed.       All pertinent labs within the past 24 hours have been reviewed.    Significant Imaging:  I have reviewed all pertinent imaging results/findings within the past 24 hours.

## 2022-02-05 NOTE — CARE UPDATE
[facility] Intensive Care  ICU Shift Summary  Date: 2/4/2022      Prehospitalization: Home  Admit Date / LOS : 2/1/2022/ 3 days    Diagnosis: Acute renal failure superimposed on stage 2 chronic kidney disease    Consults:        Active: GI, Heme Onc, IR and Nephro       Needed: N/A     Code Status: Full Code   Advanced Directive: <no information>    LDA: Yao and PIV       Central Lines/Site/Justification:Patient Does Not Have Central Line       Urinary Cath/Order/Justification:Urinary Retention and Critically Ill in the ICU and requiring intensive monitoring    Vasopressors/Infusions:        GOALS: Volume/ Hemodynamic: N/A                     RASS: 0  alert and calm    Pain Management: none       Pain Controlled: not applicable     Rhythm: NSR    Respiratory Device: Nasal Cannula    Oxygen Concentration (%):  [10-40] 10             Most Recent SBT/ SAT: N/A       MOVE Screen: PASS  ICU Liberation: not applicable    VTE Prophylaxis: Ambulation  Mobility: OOB to Chair  Stress Ulcer Prophylaxis: No    Isolation: No active isolations  Dietary: PO  Tolerance: yes  Advancement: @ goal    I & O (24h):    Intake/Output Summary (Last 24 hours) at 2/4/2022 1925  Last data filed at 2/4/2022 1400  Gross per 24 hour   Intake 565.37 ml   Output 5 ml   Net 560.37 ml        Restraints: No    Significant Dates:  Post Op Date: N/A  Rescue Date: N/A  Imaging/ Diagnostics: N/A    Noteworthy Labs:  See chart     COVID Test: (--)  CBC/Anemia Labs: Coags:    Recent Labs   Lab 02/02/22  0921 02/03/22  0427 02/03/22  1908 02/04/22  0216 02/04/22  0608 02/04/22  1439   WBC  --    < > 6.10 6.95  --   --    HGB 6.9*   < > 6.4* 6.9*  --   --    HCT  --    < > 19.3* 21.3*  --   --    PLT  --    < > 92* 33*  --   --    MCV  --    < > 118* 122*  --   --    RDW  --    < > 17.6* 17.4*  --   --    IRON 54  --   --   --   --   --    FERRITIN 43  --   --   --   --   --    RETIC  --   --   --   --  2.1  --    FOLATE 14.0  --   --   --   --   --     [id number] 1401*  --   --   --   --   --    OCCULTBLOOD  --   --   --   --   --  Negative    < > = values in this interval not displayed.    Recent Labs   Lab 02/03/22  1328   INR 1.3*        Chemistries:   Recent Labs   Lab 02/03/22  1907 02/04/22  0216    139   K 4.9 5.2*    106   CO2 18* 15*   BUN 86* 83*   CREATININE 4.2* 4.5*   CALCIUM 9.5 9.2   PROT 8.8* 8.4   BILITOT 1.1* 1.2*   ALKPHOS 76 72   ALT 31 26   AST 46* 43*        Cardiac Enzymes: Ejection Fractions:    Recent Labs     02/01/22  2242 02/03/22  1907   TROPONINI 0.034* 0.085*    EF   Date Value Ref Range Status   12/07/2021 68 % Final        POCT Glucose: HbA1c:    Recent Labs   Lab 02/04/22  0733 02/04/22  1117 02/04/22  1706   POCTGLUCOSE 133* 189* 188*    Hemoglobin A1C   Date Value Ref Range Status   02/01/2022 5.9 (H) 4.0 - 5.6 % Final     Comment:     ADA Screening Guidelines:  5.7-6.4%  Consistent with prediabetes  >or=6.5%  Consistent with diabetes    High levels of fetal hemoglobin interfere with the HbA1C  assay. Heterozygous hemoglobin variants (HbS, HgC, etc)do  not significantly interfere with this assay.   However, presence of multiple variants may affect accuracy.             ICU LOS 20h  Level of Care: Critical Care    Chart Check: 12 HR Done  Shift Summary/Plan for the shift: See Care Plan note.

## 2022-02-05 NOTE — ASSESSMENT & PLAN NOTE
-Noted on admit and mild  -No EKG changes  -K has normalized - treating with lokelma  -Repeat bmp in AM

## 2022-02-05 NOTE — NURSING TRANSFER
Nursing Transfer Note      2/5/2022     Reason patient is being transferred: Pt requiring less critical care    Transfer To: 428    Transfer via bed    Transfer with O2, cardiac monitoring    Transported by transportation and RN     Medicines sent: N/A    Any special needs or follow-up needed: finish 24HR urine collect    Chart send with patient: Yes    Notified: family    Patient reassessed at: 2/5/2022, 1430    Upon arrival to floor: cardiac monitor applied, patient oriented to room, call bell in reach and bed in lowest position.

## 2022-02-05 NOTE — SUBJECTIVE & OBJECTIVE
Interval History: No acute events overnight.  Didn't sleep well.  Small amount of gingival bleeding noted.  States she feels she is breathing OK.  Scant urine output overnight.  Still very weak.  Denies any other bleeding.  Denies chest pain.  Family at bedside.  All questions answered and patient had no further complaints.    Review of Systems   Constitutional: Positive for fatigue. Negative for chills and fever.   HENT: Positive for congestion and nosebleeds.    Eyes: Negative for photophobia and visual disturbance.   Respiratory: Positive for cough and shortness of breath.    Cardiovascular: Negative for chest pain, palpitations and leg swelling.   Gastrointestinal: Positive for constipation. Negative for abdominal pain, diarrhea, nausea and vomiting.   Genitourinary: Positive for decreased urine volume. Negative for dysuria, frequency and urgency.   Skin: Negative for pallor, rash and wound.   Neurological: Positive for weakness. Negative for light-headedness and headaches.   Psychiatric/Behavioral: Negative for confusion and decreased concentration.     Objective:     Vital Signs (Most Recent):  Temp: 98.1 °F (36.7 °C) (02/05/22 1101)  Pulse: 80 (02/05/22 1227)  Resp: 20 (02/05/22 1227)  BP: (!) 119/56 (02/05/22 1200)  SpO2: 99 % (02/05/22 1227) Vital Signs (24h Range):  Temp:  [96.7 °F (35.9 °C)-98.6 °F (37 °C)] 98.1 °F (36.7 °C)  Pulse:  [59-88] 80  Resp:  [17-47] 20  SpO2:  [94 %-100 %] 99 %  BP: ()/(44-65) 119/56     Weight: 73.1 kg (161 lb 2.5 oz)  Body mass index is 34.87 kg/m².    Intake/Output Summary (Last 24 hours) at 2/5/2022 1324  Last data filed at 2/5/2022 0600  Gross per 24 hour   Intake 354.39 ml   Output 50 ml   Net 304.39 ml      Physical Exam  Vitals and nursing note reviewed.   Constitutional:       General: She is not in acute distress.     Appearance: She is well-developed. She is ill-appearing. She is not toxic-appearing or diaphoretic.      Interventions: Nasal cannula in place.    HENT:      Head: Normocephalic and atraumatic.      Right Ear: External ear normal.      Left Ear: External ear normal.      Nose: Nose normal.      Mouth/Throat:      Mouth: Mucous membranes are moist.      Comments: Small amount of gingival bleeding/oozing  Eyes:      Extraocular Movements: Extraocular movements intact.      Conjunctiva/sclera: Conjunctivae normal.   Cardiovascular:      Rate and Rhythm: Normal rate and regular rhythm.   Pulmonary:      Effort: Tachypnea present. No respiratory distress.      Breath sounds: No wheezing.      Comments: Improved work of breathing and looks comfortable.  Still with significant pulmonary crackles, albeit a bit improved vs yesterday  Abdominal:      General: Bowel sounds are normal. There is no distension.      Palpations: Abdomen is soft.      Tenderness: There is no abdominal tenderness.      Comments: No palpable hepatomegaly or splenomegaly    Musculoskeletal:         General: No tenderness. Normal range of motion.      Cervical back: Normal range of motion and neck supple.      Right lower leg: Edema present.      Left lower leg: Edema present.   Skin:     General: Skin is warm and dry.   Neurological:      Mental Status: She is alert and oriented to person, place, and time.      Comments: Moves all extremities but is diffusely weak   Psychiatric:         Thought Content: Thought content normal.         Significant Labs: All pertinent labs within the past 24 hours have been reviewed.    Significant Imaging: I have reviewed all pertinent imaging results/findings within the past 24 hours.

## 2022-02-05 NOTE — PLAN OF CARE
Pt remains in ICU on 2L of nasal cannula. Pt receiving 1 unit of RBC. Poor urine output per hall.Free of injury, fall, and skin breakdown on this shift. Pt and Pt sister updated of plan of care per MD.

## 2022-02-05 NOTE — RESPIRATORY THERAPY
Patient received on NC 2 LPM. Aerosol treatment given as ordered. BIPAP on standby at this time until nightly placement.

## 2022-02-05 NOTE — PLAN OF CARE
[facility] - Intensive Care  Discharge Reassessment    Primary Care Provider: Ann-Marie Hartman MD    Expected Discharge Date:  TBD    Reassessment (most recent)       Discharge Reassessment - 02/04/22 1834          Discharge Reassessment    Assessment Type Discharge Planning Reassessment     Did the patient's condition or plan change since previous assessment? Yes     Communicated CLAUDETTE with patient/caregiver No     Discharge Plan A Home Health   Patient has been evaluated by PT.  Recommendation a this time if for HH with a Pediatric/Tomi RW.    Discharge Plan B Home with family     DME Needed Upon Discharge  walker, rolling   [facility] Pediatric walker.    Discharge Barriers Identified None     Why the patient remains in the hospital Requires continued medical care        Post-Acute Status    Coverage [employer]     Discharge Delays None known at this time

## 2022-02-05 NOTE — ASSESSMENT & PLAN NOTE
-Hb 9.2 on admit - chronic secondary to cirrhosis.  -Dropped to <7 on 2/2  -No evidence of GI bleeding but has noted epistaxis and bleeding gums at times since before hospitalization.  -Folate and B12 replete.  She is iron deficient.  -1 unit PRBC ordered 2/2 but transfusion delayed due to complex antibody matching.  Blood finally available and she received 1 unit PRBC 2/4 and Hb improved to 8.9 today.    -LDH normal.  Mild Tbili elevation.  Retic is not elevated as should be.  -Await VALENTINO, SPEP, SHERI  -GI consulted and input appreciated.  Needs EGD, but no acute need in hospital.  Planned as outpatient later this month.  -Heme/onc consulted 2/4 and input appreciated.  Doubt hemolysis.  More likely marrow suppression from acute illness and renal failure.  BM biopsy Monday?  -Check cbc q48 for now to minimize effect of phlebotomy.

## 2022-02-05 NOTE — ASSESSMENT & PLAN NOTE
-Chronic and at baseline on admit secondary to cirrhosis  -Decreased to 33k on 2/4 but back to baseline today 94k.  -Heme/onc consulted and input appreciated  -Hit Ab negative.  Await [id number] but doubt ttp/itp.  -Plan is transfuse for platelets <10 or if significant hemorrhage.  -Discuss with heme need for bone marrow biopsy.

## 2022-02-05 NOTE — PROGRESS NOTES
[facility] [place] Intensive Care  Pulmonology  Progress Note    Patient Name: Tammi Farris  MRN: [id number]  Admission Date: 2/1/2022  Hospital Length of Stay: 4 days  Code Status: Full Code  Attending Provider: Chadwick Quan MD  Primary Care Provider: Ann-Marie Hartman MD   Principal Problem: Acute renal failure superimposed on stage 2 chronic kidney disease    Subjective:     Interval History: unchanged from a respiratory status.     Objective:     Vital Signs (Most Recent):  Temp: 98.2 °F (36.8 °C) (02/05/22 0701)  Pulse: 82 (02/05/22 1030)  Resp: (!) 32 (02/05/22 1030)  BP: (!) 121/57 (02/05/22 1030)  SpO2: 99 % (02/05/22 1030) Vital Signs (24h Range):  Temp:  [96.7 °F (35.9 °C)-98.6 °F (37 °C)] 98.2 °F (36.8 °C)  Pulse:  [59-88] 82  Resp:  [17-98] 32  SpO2:  [24 %-100 %] 99 %  BP: ()/(44-65) 121/57     Weight: 73.1 kg (161 lb 2.5 oz)  Body mass index is 34.87 kg/m².      Intake/Output Summary (Last 24 hours) at 2/5/2022 1147  Last data filed at 2/5/2022 0600  Gross per 24 hour   Intake 358.75 ml   Output 50 ml   Net 308.75 ml       Physical Exam  Constitutional:       General: She is not in acute distress.     Appearance: She is obese. She is not ill-appearing or toxic-appearing.   HENT:      Head: Normocephalic.      Right Ear: Tympanic membrane normal.      Nose: Nose normal.   Eyes:      Extraocular Movements: Extraocular movements intact.      Conjunctiva/sclera: Conjunctivae normal.      Pupils: Pupils are equal, round, and reactive to light.   Cardiovascular:      Rate and Rhythm: Normal rate and regular rhythm.      Pulses: Normal pulses.      Heart sounds: Normal heart sounds.   Pulmonary:      Effort: Pulmonary effort is normal. No respiratory distress.      Breath sounds: Rales present. No wheezing.   Abdominal:      General: Abdomen is flat. Bowel sounds are normal.      Palpations: Abdomen is soft.   Musculoskeletal:         General: Swelling present. No deformity.      Cervical back: Normal range  of motion and neck supple.   Skin:     General: Skin is warm and dry.      Capillary Refill: Capillary refill takes less than 2 seconds.      Findings: No rash.   Neurological:      General: No focal deficit present.      Mental Status: She is alert and oriented to person, place, and time. Mental status is at baseline.   Psychiatric:         Mood and Affect: Mood normal.         Vents:  Oxygen Concentration (%): 40 (02/05/22 0131)    Lines/Drains/Airways     Drain                 Urethral Catheter 02/01/22 1352 Latex 16 Fr. 3 days          Peripheral Intravenous Line                 Peripheral IV - Single Lumen 02/04/22 0700 20 G Anterior;Left;Proximal Forearm 1 day         Peripheral IV - Single Lumen 02/05/22 0056 20 G Anterior;Right Forearm <1 day                Significant Labs:    CBC/Anemia Profile:  Recent Labs   Lab 02/03/22 1908 02/04/22 0216 02/04/22  0608 02/04/22  1439 02/05/22  0424   WBC 6.10 6.95  --   --  7.78   HGB 6.4* 6.9*  --   --  8.4*   HCT 19.3* 21.3*  --   --  24.9*   PLT 92* 33*  --   --  94*   * 122*  --   --  111*   RDW 17.6* 17.4*  --   --  23.0*   RETIC  --   --  2.1  --   --    OCCULTBLOOD  --   --   --  Negative  --         Chemistries:  Recent Labs   Lab 02/03/22 1907 02/03/22 1907 02/04/22 0216 02/04/22 2012 02/05/22  0424      < > 139 137 137   K 4.9   < > 5.2* 4.8 4.7      < > 106 103 103   CO2 18*   < > 15* 17* 18*   BUN 86*   < > 83* 88* 88*   CREATININE 4.2*   < > 4.5* 5.0* 5.1*   CALCIUM 9.5   < > 9.2 9.4 9.5   ALBUMIN 4.7  --  4.3  --  4.1   PROT 8.8*  --  8.4  --  8.2   BILITOT 1.1*  --  1.2*  --  1.6*   ALKPHOS 76  --  72  --  80   ALT 31  --  26  --  25   AST 46*  --  43*  --  38   PHOS  --   --   --   --  7.8*    < > = values in this interval not displayed.       All pertinent labs within the past 24 hours have been reviewed.    Significant Imaging:  I have reviewed all pertinent imaging results/findings within the past 24 hours.      ABG  Recent  Labs   Lab 02/03/22  1900   PH 7.419   PO2 221*   PCO2 28.2*   HCO3 18.3*   BE -5     Assessment/Plan:     * Acute renal failure superimposed on stage 2 chronic kidney disease  Nephrology following, electrolytes are stable   Monitoring UOP        Acute respiratory failure with hypoxia  Concern for combination of underlying ILD vs pulmonary edema given her kidney failure and nephrotic range proteinuria.   - bipap nightly, PRN o2 throughout the day with goal Sp02 of 90 and above.   - starting pulse dose steroids today per nephrology     + ARMEN and + anti smith    Hyperkalemia  Improved today    Metabolic acidosis  Secondary to renal failure- stable   - bicarb, montioring labs, will likely need RRT soon.     Thrombocytopenia  Improved thrombocytopnia                Марина Garrett MD  Pulmonology  [facility] [place] [facility]

## 2022-02-05 NOTE — ASSESSMENT & PLAN NOTE
-Patient with persistent significant shortness of breath and hypoxia requiring supplemental O2  -Not on oxygen at home  -On admit had clear breath sounds but on 2/2 has significant pulmonary crackles and increased work of breathing  -CXR showed possible pulmonary edema and BNP is rather elevated  -Also noted significantly elevated D-dimer on admit.  V/Q scan showed very low probability of pulmonary embolism.  Stopped full dose lovenox 2/2  -Noted low Hb 2/2 and ordered PRBC, but due to complex antibody matching did not receive until 2/4.  Hb improved.    -While had some signs of fluid overload - ultimately believed to be third spacing and not fluid overload.  Lasix drip not helpful and near anuric so it was stopped.  -Transferred to icu on evening of 2/3 due to respiratory distress.  Treated with bipap QHS/PRN and supplemental O2 and now stable > 24 hours.  Step down to the floor on 2/5.  -Pulmonology consulted and discussed with Dr. Garrett.  Concerned for possible pulmonary renal syndrome.  Await vasculitic workup. Await RIP.   -Continue supplemental O2 and bipap qhs/prn.

## 2022-02-05 NOTE — ASSESSMENT & PLAN NOTE
-Admitted to inpatient status  -Baseline Cr 0.9.  On admit Cr 2.7  -Reports being started on lasix 1/8 by her cardiologist and over several days PTA had decreased UOP and diminished PO intake  -On admit BNP elevated at 1160 and with metabolic acidosis and mild hyperkalemia   -Avoid nephrotoxic agents and renally dose meds  -FEUrea 7.1% suggestive of pre-renal sources  -Consulted and discussed with nephrology.  -Complex situation to clearly determine volume status and initially felt to be volume deplete.  Was treated with IV fluids initially, then lasix drip.  Ultimately after extensive discussion with nephrology was felt to be third spacing with intravascular volume depletion +/- vasculitis or pulmonary renal syndrome.  -Decompensated and transferred to ICU 2/3-2/4 and lasix drip given but no urine output so discontinued.  Cr slowly worsening and may need dialysis soon.   -Ongoing workup for pulmonary/renal syndrome and vasculitis.  Noted Positive ARMEN in 2019  -C3 and C4 are low.  RF and CCP are negative.  Await SPEP/UPEP, ANCA and repeat ARMEN , Anti dsDNA, GBM Ab.  -Needs kidney biopsy - IR consulted -planning on biopsy Monday.  -Discussed with Dr. Alvarado today.  Will start pulse dose steroids.  Continue lokelma.  -May need dialysis soon if no improvement.  -Repeat BMP in AM.

## 2022-02-05 NOTE — PROGRESS NOTES
Tammi Farris is a 71 y.o. female patient.    Follow for ANI    Patient reportedly feeling OK  Comfortable    Scheduled Meds:   albuterol-ipratropium  3 mL Nebulization Q6H    famotidine  20 mg Oral Daily    metoprolol tartrate  25 mg Oral BID    mupirocin   Nasal BID    polyethylene glycol  17 g Oral BID    sodium bicarbonate  50 mEq Intravenous Q6H    sodium zirconium cyclosilicate  10 g Oral Daily       Review of patient's allergies indicates:   Allergen Reactions    Dobutamine Hives    Benadryl [diphenhydramine hcl] Anxiety     Pt states she feels jumpy and anxious when taking.    Darvon [propoxyphene] Nausea Only    Shellfish containing products Hives    Iodinated contrast media Hives    Lisinopril Other (See Comments)     cough    Propoxyphene hcl      Itchy (skin)^    Tizanidine Other (See Comments)     Sweaty, difficulty swallowing    Statins-hmg-coa reductase inhibitors Anxiety and Other (See Comments)     Myalgia, fatigue, palpitations, anxiety         Vital Signs Range (Last 24H):  Temp:  [96.7 °F (35.9 °C)-98.6 °F (37 °C)]   Pulse:  [59-88]   Resp:  [17-98]   BP: ()/(44-65)   SpO2:  [24 %-100 %]     I & O (Last 24H):    Intake/Output Summary (Last 24 hours) at 2/5/2022 1102  Last data filed at 2/5/2022 0600  Gross per 24 hour   Intake 358.75 ml   Output 50 ml   Net 308.75 ml           Physical Exam:  General appearance: well developed, well nourished, no distress  Lungs:  clear to auscultation bilaterally and normal respiratory effort  Heart: regular rate and rhythm  Abdomen: soft, non-tender non-distented; bowel sounds normal; no masses,  no organomegaly  Extremities: no cyanosis or edema, or clubbing    Laboratory:  I have reviewed all pertinent lab results within the past 24 hours.  CBC:   Recent Labs   Lab 02/05/22 0424   WBC 7.78   RBC 2.25*   HGB 8.4*   HCT 24.9*   PLT 94*   *   MCH 37.3*   MCHC 33.7     CMP:   Recent Labs   Lab 02/05/22 0424   *    CALCIUM 9.5   ALBUMIN 4.1   PROT 8.2      K 4.7   CO2 18*      BUN 88*   CREATININE 5.1*   ALKPHOS 80   ALT 25   AST 38   BILITOT 1.6*     Recent Labs   Lab 02/01/22  0921 02/03/22  2100   COLORU Yellow Yellow   CLARITYU Clear  --    SPECGRAV >=1.030* 1.030   PHUR  --  6.0   PROTEINUA  --  3+*   BACTERIA  --  Moderate*   NITRITE Negative Negative   LEUKOCYTESUR Negative 3+*   UROBILINOGEN 0.2 Negative   HYALINECASTS  --  0       Imp/Plan    ANI - complement C3 and C4 are low and urine is active, 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

## 2022-02-05 NOTE — ASSESSMENT & PLAN NOTE
-Followed by Hepatology  -Noted mild transaminitis and thrombocytopenia which are not new.  Noted epistaxis and gingival bleeding which has been ongoing for several months.  These are chronic and note history of cirrhosis and alcohol abuse   -Follows with hepatology and was seen last month  -Abdominal US on day prior to admit showed hepatic cirrhosis/steatosis with sonographic findings of portal hypertension including trace ascites and splenomegaly.  -Repeat cmp in AM

## 2022-02-05 NOTE — NURSING
Requesting Cpap SPO2 98% on 3 lpm via nasal cannula.Informed her  That SPO2 is 98% don't need Cpap at this time.

## 2022-02-05 NOTE — ASSESSMENT & PLAN NOTE
Concern for combination of underlying ILD vs pulmonary edema given her kidney failure and nephrotic range proteinuria.   - bipap nightly, PRN o2 throughout the day with goal Sp02 of 90 and above.   - starting pulse dose steroids today per nephrology     + ARMEN and + anti smith

## 2022-02-05 NOTE — CARE UPDATE
[facility]  ICU Shift Summary  Date: 2/5/2022      Prehospitalization: Home  Admit Date / LOS : 2/1/2022/ 4 days    Diagnosis: Acute renal failure superimposed on stage 2 chronic kidney disease    Consults:        Active: GI, Heme Onc, IR and Nephro       Needed: N/A     Code Status: Full Code   Advanced Directive: <no information>    LDA: BIPAP, Yao and PIV       Central Lines/Site/Justification:Patient Does Not Have Central Line       Urinary Cath/Order/Justification:Critically Ill in the ICU and requiring intensive monitoring    Vasopressors/Infusions:        GOALS: Volume/ Hemodynamic: N/A                     RASS: N/A    Pain Management: PO       Pain Controlled: yes     Rhythm: NSR    Respiratory Device: Nasal Cannula and Bipap    Oxygen Concentration (%):  [10-40] 40             Most Recent SBT/ SAT: N/A       MOVE Screen: PASS  ICU Liberation: not applicable    VTE Prophylaxis: Mechanical  Mobility: Bedrest and OOB to Chair  Stress Ulcer Prophylaxis: Yes    Isolation: No active isolations  Dietary: PO  Tolerance: yes  Advancement: @ goal    I & O (24h):    Intake/Output Summary (Last 24 hours) at 2/5/2022 0333  Last data filed at 2/4/2022 2100  Gross per 24 hour   Intake 911.92 ml   Output 5 ml   Net 906.92 ml        Restraints: No    Significant Dates:  Post Op Date: N/A  Rescue Date: 2/4/22  Imaging/ Diagnostics: N/A    Noteworthy Labs:  See below    COVID Test: (--)  CBC/Anemia Labs: Coags:    Recent Labs   Lab 02/02/22  0921 02/03/22  0427 02/03/22  1908 02/04/22  0216 02/04/22  0608 02/04/22  1439   WBC  --    < > 6.10 6.95  --   --    HGB 6.9*   < > 6.4* 6.9*  --   --    HCT  --    < > 19.3* 21.3*  --   --    PLT  --    < > 92* 33*  --   --    MCV  --    < > 118* 122*  --   --    RDW  --    < > 17.6* 17.4*  --   --    IRON 54  --   --   --   --   --    FERRITIN 43  --   --   --   --   --    RETIC  --   --   --   --  2.1  --    FOLATE 14.0  --   --   --   --   --    [id number] 1401*   --   --   --   --   --    OCCULTBLOOD  --   --   --   --   --  Negative    < > = values in this interval not displayed.    Recent Labs   Lab 02/03/22  1328   INR 1.3*        Chemistries:   Recent Labs   Lab 02/03/22  1907 02/03/22  1907 02/04/22  0216 02/04/22 2012      < > 139 137   K 4.9   < > 5.2* 4.8      < > 106 103   CO2 18*   < > 15* 17*   BUN 86*   < > 83* 88*   CREATININE 4.2*   < > 4.5* 5.0*   CALCIUM 9.5   < > 9.2 9.4   PROT 8.8*  --  8.4  --    BILITOT 1.1*  --  1.2*  --    ALKPHOS 76  --  72  --    ALT 31  --  26  --    AST 46*  --  43*  --     < > = values in this interval not displayed.        Cardiac Enzymes: Ejection Fractions:    Recent Labs     02/03/22 1907   TROPONINI 0.085*    EF   Date Value Ref Range Status   12/07/2021 68 % Final        POCT Glucose: HbA1c:    Recent Labs   Lab 02/04/22  1117 02/04/22  1706 02/04/22  2132   POCTGLUCOSE 189* 188* 126*    Hemoglobin A1C   Date Value Ref Range Status   02/01/2022 5.9 (H) 4.0 - 5.6 % Final     Comment:     ADA Screening Guidelines:  5.7-6.4%  Consistent with prediabetes  >or=6.5%  Consistent with diabetes    High levels of fetal hemoglobin interfere with the HbA1C  assay. Heterozygous hemoglobin variants (HbS, HgC, etc)do  not significantly interfere with this assay.   However, presence of multiple variants may affect accuracy.             ICU LOS 1d 4h  Level of Care: Critical Care    Chart Check: 12 HR Done  Shift Summary/Plan for the shift: see care plan note

## 2022-02-05 NOTE — CARE UPDATE
[facility]  Nurses Note -- 4 Eyes    Patient Name: Tammi Farris  MRN: [id number]  Attending Provider:  Chadwick Quan MD  2/4/2022       Wounds on : Q Shift    [x] No   [x]Prevention Measures Documented    [] Yes    []LDA's Charted   []Wound Care Consulted (optional)   []Photo Taken (optional)   []MD Notified    Attending RN:  Nataly Guerrero RN     Second RN:  Laura Prieto RN

## 2022-02-05 NOTE — PLAN OF CARE
Pt remains in ICU on BIPAP overnight. VSS. NSR on the monitor. 24 hour urine, with minimal UO, MD aware. 1 unit PRBC transfused. Family at bedside. Plan of care reviewed. No falls, injuries or skin breakdown this shift.

## 2022-02-05 NOTE — PROGRESS NOTES
[facility]  Progress Note    Patient Name: Tammi Farris  MRN: [id number]  Patient Class: IP- Inpatient   Admission Date: 2/1/2022  Length of Stay: 4 days  Attending Physician: Chadwick Quan MD  Primary Care Provider: Ann-Marie Hartman MD        Subjective:     Principal Problem:Acute renal failure superimposed on stage 2 chronic kidney disease        HPI:  71 y.o. female with HTN, HLD, GERD, cirrhosis of liver, thrombocytopenia, and sleep apnea presents with a complaint of cough and congestion since October.  She is chronically dyspneic, exacerbated by exertion, has seen Cardiology and was prescribed lasix and metoprolol.  Her sister has now noted that the patient has very low urine output and is constipated.  Also follows with Hepatology for chronic liver disease.  Patient denies fever, chills, chest pain, palpitations, orthopnea, PND, dizziness, syncope, n/v/d, abdominal pain, or dysuria.  In the ED, labs reveal ANI, hyperkalemia, metabolic acidosis with elevated lactic acid, elevated liver enzymes, elevated BNP, elevated d-dimer, thrombocytopenia, and markedly concentrated urine.  Echo December 2021 showed preserved EF, no evidence of heart failure.  Chest xray without acute abnormality. No convincing evidence of infectious process.      Overview/Hospital Course:  No notes on file    Interval History: No acute events overnight.  Didn't sleep well.  Small amount of gingival bleeding noted.  States she feels she is breathing OK.  Scant urine output overnight.  Still very weak.  Denies any other bleeding.  Denies chest pain.  Family at bedside.  All questions answered and patient had no further complaints.    Review of Systems   Constitutional: Positive for fatigue. Negative for chills and fever.   HENT: Positive for congestion and nosebleeds.    Eyes: Negative for photophobia and visual disturbance.   Respiratory: Positive for cough and shortness of breath.    Cardiovascular:  Negative for chest pain, palpitations and leg swelling.   Gastrointestinal: Positive for constipation. Negative for abdominal pain, diarrhea, nausea and vomiting.   Genitourinary: Positive for decreased urine volume. Negative for dysuria, frequency and urgency.   Skin: Negative for pallor, rash and wound.   Neurological: Positive for weakness. Negative for light-headedness and headaches.   Psychiatric/Behavioral: Negative for confusion and decreased concentration.     Objective:     Vital Signs (Most Recent):  Temp: 98.1 °F (36.7 °C) (02/05/22 1101)  Pulse: 80 (02/05/22 1227)  Resp: 20 (02/05/22 1227)  BP: (!) 119/56 (02/05/22 1200)  SpO2: 99 % (02/05/22 1227) Vital Signs (24h Range):  Temp:  [96.7 °F (35.9 °C)-98.6 °F (37 °C)] 98.1 °F (36.7 °C)  Pulse:  [59-88] 80  Resp:  [17-47] 20  SpO2:  [94 %-100 %] 99 %  BP: ()/(44-65) 119/56     Weight: 73.1 kg (161 lb 2.5 oz)  Body mass index is 34.87 kg/m².    Intake/Output Summary (Last 24 hours) at 2/5/2022 1324  Last data filed at 2/5/2022 0600  Gross per 24 hour   Intake 354.39 ml   Output 50 ml   Net 304.39 ml      Physical Exam  Vitals and nursing note reviewed.   Constitutional:       General: She is not in acute distress.     Appearance: She is well-developed. She is ill-appearing. She is not toxic-appearing or diaphoretic.      Interventions: Nasal cannula in place.   HENT:      Head: Normocephalic and atraumatic.      Right Ear: External ear normal.      Left Ear: External ear normal.      Nose: Nose normal.      Mouth/Throat:      Mouth: Mucous membranes are moist.      Comments: Small amount of gingival bleeding/oozing  Eyes:      Extraocular Movements: Extraocular movements intact.      Conjunctiva/sclera: Conjunctivae normal.   Cardiovascular:      Rate and Rhythm: Normal rate and regular rhythm.   Pulmonary:      Effort: Tachypnea present. No respiratory distress.      Breath sounds: No wheezing.      Comments: Improved work of breathing and looks  comfortable.  Still with significant pulmonary crackles, albeit a bit improved vs yesterday  Abdominal:      General: Bowel sounds are normal. There is no distension.      Palpations: Abdomen is soft.      Tenderness: There is no abdominal tenderness.      Comments: No palpable hepatomegaly or splenomegaly    Musculoskeletal:         General: No tenderness. Normal range of motion.      Cervical back: Normal range of motion and neck supple.      Right lower leg: Edema present.      Left lower leg: Edema present.   Skin:     General: Skin is warm and dry.   Neurological:      Mental Status: She is alert and oriented to person, place, and time.      Comments: Moves all extremities but is diffusely weak   Psychiatric:         Thought Content: Thought content normal.         Significant Labs: All pertinent labs within the past 24 hours have been reviewed.    Significant Imaging: I have reviewed all pertinent imaging results/findings within the past 24 hours.      Assessment/Plan:      * Acute renal failure superimposed on stage 2 chronic kidney disease  -Admitted to inpatient status  -Baseline Cr 0.9.  On admit Cr 2.7  -Reports being started on lasix 1/8 by her cardiologist and over several days PTA had decreased UOP and diminished PO intake  -On admit BNP elevated at 1160 and with metabolic acidosis and mild hyperkalemia   -Avoid nephrotoxic agents and renally dose meds  -FEUrea 7.1% suggestive of pre-renal sources  -Consulted and discussed with nephrology.  -Complex situation to clearly determine volume status and initially felt to be volume deplete.  Was treated with IV fluids initially, then lasix drip.  Ultimately after extensive discussion with nephrology was felt to be third spacing with intravascular volume depletion +/- vasculitis or pulmonary renal syndrome.  -Decompensated and transferred to ICU 2/3-2/4 and lasix drip given but no urine output so discontinued.  Cr slowly worsening and may need dialysis soon.    -Ongoing workup for pulmonary/renal syndrome and vasculitis.  Noted Positive ARMEN in 2019  -C3 and C4 are low.  RF and CCP are negative.  Await SPEP/UPEP, ANCA and repeat ARMEN , Anti dsDNA, GBM Ab.  -Needs kidney biopsy - IR consulted -planning on biopsy Monday.  -Discussed with Dr. Alvarado today.  Will start pulse dose steroids.  Continue lokelma.  -May need dialysis soon if no improvement.  -Repeat BMP in AM.    Acute respiratory failure with hypoxia  -Patient with persistent significant shortness of breath and hypoxia requiring supplemental O2  -Not on oxygen at home  -On admit had clear breath sounds but on 2/2 has significant pulmonary crackles and increased work of breathing  -CXR showed possible pulmonary edema and BNP is rather elevated  -Also noted significantly elevated D-dimer on admit.  V/Q scan showed very low probability of pulmonary embolism.  Stopped full dose lovenox 2/2  -Noted low Hb 2/2 and ordered PRBC, but due to complex antibody matching did not receive until 2/4.  Hb improved.    -While had some signs of fluid overload - ultimately believed to be third spacing and not fluid overload.  Lasix drip not helpful and near anuric so it was stopped.  -Transferred to icu on evening of 2/3 due to respiratory distress.  Treated with bipap QHS/PRN and supplemental O2 and now stable > 24 hours.  Step down to the floor on 2/5.  -Pulmonology consulted and discussed with Dr. Garrett.  Concerned for possible pulmonary renal syndrome.  Await vasculitic workup. Await RIP.   -Continue supplemental O2 and bipap qhs/prn.    Cirrhosis of liver without ascites  -Followed by Hepatology  -Noted mild transaminitis and thrombocytopenia which are not new.  Noted epistaxis and gingival bleeding which has been ongoing for several months.  These are chronic and note history of cirrhosis and alcohol abuse   -Follows with hepatology and was seen last month  -Abdominal US on day prior to admit showed hepatic cirrhosis/steatosis with  sonographic findings of portal hypertension including trace ascites and splenomegaly.  -Repeat cmp in AM    Macrocytic anemia  -Hb 9.2 on admit - chronic secondary to cirrhosis.  -Dropped to <7 on 2/2  -No evidence of GI bleeding but has noted epistaxis and bleeding gums at times since before hospitalization.  -Folate and B12 replete.  She is iron deficient.  -1 unit PRBC ordered 2/2 but transfusion delayed due to complex antibody matching.  Blood finally available and she received 1 unit PRBC 2/4 and Hb improved to 8.9 today.    -LDH normal.  Mild Tbili elevation.  Retic is not elevated as should be.  -Await VALENTINO, SPEP, SHERI  -GI consulted and input appreciated.  Needs EGD, but no acute need in hospital.  Planned as outpatient later this month.  -Heme/onc consulted 2/4 and input appreciated.  Doubt hemolysis.  More likely marrow suppression from acute illness and renal failure.  BM biopsy Monday?  -Check cbc q48 for now to minimize effect of phlebotomy.    Thrombocytopenia  -Chronic and at baseline on admit secondary to cirrhosis  -Decreased to 33k on 2/4 but back to baseline today 94k.  -Heme/onc consulted and input appreciated  -Hit Ab negative.  Await [id number] but doubt ttp/itp.  -Plan is transfuse for platelets <10 or if significant hemorrhage.  -Discuss with heme need for bone marrow biopsy.    Positive D dimer  -D-dimer rather elevated in ER and short of breath  -Doppler us of legs without evidence of DVT  -V/Q very low probability for PE.  -Stopped lovenox 2/2    Hyperkalemia  -Noted on admit and mild  -No EKG changes  -K has normalized - treating with lokelma  -Repeat bmp in AM    Debility  -When more stable will consult PT/OT      Metabolic acidosis  -Suspect secondary to ANI, management as above    Sleep apnea  -Continue bipap qhs    Gastroesophageal reflux disease  -Stable, no acute issue    HTN (hypertension)  -BP low normal  -Continue metoprolol with hold parameters    Hyperlipidemia  -History  noted  -Not on statin due to cirrhosis      VTE Risk Mitigation (From admission, onward)         Ordered     IP VTE HIGH RISK PATIENT  Once         02/01/22 1557     Place sequential compression device  Until discontinued         02/01/22 1557                Discharge Planning   CLAUDETTE:      Code Status: Full Code   Is the patient medically ready for discharge?:     Reason for patient still in hospital (select all that apply): Treatment  Discharge Plan A: Home Health (Patient has been evaluated by PT.  Recommendation a this time if for HH with a Pediatric/Tomi RW.)   Discharge Delays: None known at this time        Critical care time spent on the evaluation and treatment of severe organ dysfunction, review of pertinent labs and imaging studies, discussions with consulting providers and discussions with patient/family: 35 minutes.      Chadwick Quan MD  Department of Hospital Medicine   [facility]

## 2022-02-06 PROBLEM — F41.1 GAD (GENERALIZED ANXIETY DISORDER): Status: ACTIVE | Noted: 2022-01-01

## 2022-02-06 NOTE — NURSING
Complain of nervousness. States hands are trembling because of procedure tomorrow. MD notified Ativan 0.5 mg given as ordered patient became calmer immediately. Transferred to chair. Sitting with family at side

## 2022-02-06 NOTE — PLAN OF CARE
Problem: Adult Inpatient Plan of Care  Goal: Plan of Care Review  Outcome: Ongoing, Progressing  Flowsheets (Taken 2/6/2022 0403)  Plan of Care Reviewed With: patient       Pt free from falls, injury or any further trauma throughout shift. Pt AAO x 4. Continued medications as ordered. No complaints of pain throughout shift. Bipap continued throughout shift, pt wanted to be placed back on NC at 3:30am. CBG monitored throughout shift, PRN insulin administered per orders. Pt in no distress. Will cont to monitor.

## 2022-02-06 NOTE — PROGRESS NOTES
Tammi Farris is a 71 y.o. female patient.     Follow for ANI    Reportedly feeling better  Comfortable    Scheduled Meds:   albuterol-ipratropium  3 mL Nebulization Q6H    famotidine  20 mg Oral Daily    methylPREDNISolone sodium succinate injection  1,000 mg Intravenous Daily    metoprolol tartrate  25 mg Oral BID    mupirocin   Nasal BID    polyethylene glycol  17 g Oral BID    sodium zirconium cyclosilicate  10 g Oral Daily       Review of patient's allergies indicates:   Allergen Reactions    Dobutamine Hives    Benadryl [diphenhydramine hcl] Anxiety     Pt states she feels jumpy and anxious when taking.    Darvon [propoxyphene] Nausea Only    Shellfish containing products Hives    Iodinated contrast media Hives    Lisinopril Other (See Comments)     cough    Propoxyphene hcl      Itchy (skin)^    Tizanidine Other (See Comments)     Sweaty, difficulty swallowing    Statins-hmg-coa reductase inhibitors Anxiety and Other (See Comments)     Myalgia, fatigue, palpitations, anxiety         Vital Signs Range (Last 24H):  Temp:  [97.5 °F (36.4 °C)-98.5 °F (36.9 °C)]   Pulse:  [71-85]   Resp:  [17-47]   BP: (104-145)/(52-64)   SpO2:  [93 %-100 %]     I & O (Last 24H):    Intake/Output Summary (Last 24 hours) at 2/6/2022 0855  Last data filed at 2/5/2022 1600  Gross per 24 hour   Intake 459.61 ml   Output --   Net 459.61 ml           Physical Exam:  General appearance: well developed, well nourished, no distress  Lungs:  clear to auscultation bilaterally and normal respiratory effort  Heart: regular rate and rhythm  Abdomen: soft, non-tender non-distented; bowel sounds normal; no masses,  no organomegaly  Extremities: edema trace    Laboratory:  I have reviewed all pertinent lab results within the past 24 hours.  CBC:   Recent Labs   Lab 02/05/22 0424   WBC 7.78   RBC 2.25*   HGB 8.4*   HCT 24.9*   PLT 94*   *   MCH 37.3*   MCHC 33.7     CMP:   Recent Labs   Lab 02/05/22 0424   *    CALCIUM 9.5   ALBUMIN 4.1   PROT 8.2      K 4.7   CO2 18*      BUN 88*   CREATININE 5.1*   ALKPHOS 80   ALT 25   AST 38   BILITOT 1.6*     Recent Labs   Lab 02/01/22  0921 02/03/22  2100   COLORU Yellow Yellow   CLARITYU Clear  --    SPECGRAV >=1.030* 1.030   PHUR  --  6.0   PROTEINUA  --  3+*   BACTERIA  --  Moderate*   NITRITE Negative Negative   LEUKOCYTESUR Negative 3+*   UROBILINOGEN 0.2 Negative   HYALINECASTS  --  0       Imp/Plan    ANI - suspected acute GN, on pulse steroid, kidney biopsy in am  Nephritic syndrome  Metabolic acidosis  HTN  DM type 2  Anemia    Add oral bicarb  Consult IR for kidney biopsy  Follow up on lab results, pending at this time  Continue present Rx      Trac T Jenny  2/6/2022

## 2022-02-06 NOTE — NURSING
, secure chat sent to NP, awaiting orders to be placed. PT educated that insulin would need to be administered, pt stating she just ate. Will recheck and cont to monitor.

## 2022-02-06 NOTE — NURSING
Report received and patient care assumed. No  change from daily assessment. Oxygen infusing at 3 LPM via nasal cannula

## 2022-02-07 NOTE — PROGRESS NOTES
Received report per SHALOM Garcia RN/pt arrived to [facility] via bed per 2 hd nurses. Pt lethargic at this time upon assessment. Right chest wall perm cvc aspirated/flushed without difficulty noted. Acute hemodialysis started x 2.5 hours as ordered. Plan is to remove 1 L as tolerated. Tolerating tx well at this time.

## 2022-02-07 NOTE — ASSESSMENT & PLAN NOTE
-Admitted to inpatient status  -Baseline Cr 0.9.  On admit Cr 2.7  -Reports being started on lasix 1/8 by her cardiologist and over several days PTA had decreased UOP and diminished PO intake  -On admit BNP elevated at 1160 and with metabolic acidosis and mild hyperkalemia   -Avoid nephrotoxic agents and renally dose meds  -FEUrea 7.1% suggestive of pre-renal sources  -Consulted and discussed with nephrology.  -Complex situation to clearly determine volume status and initially felt to be volume deplete.  Was treated with IV fluids initially, then lasix drip.  Ultimately after extensive discussion with nephrology was felt to be third spacing with intravascular volume depletion +/- vasculitis or pulmonary renal syndrome.  -Respiratory decompensation onovernight 2/3-2/4 and transferred to ICU and lasix drip given but no urine output so discontinued.  Stabilized and stepped down on 2/5.  -Ongoing workup for pulmonary/renal syndrome and vasculitis.  Noted Positive ARMEN in 2019  -C3 and C4 are low.  RF and CCP are negative.  Await SPEP/UPEP, ANCA and repeat ARMEN , Anti dsDNA, GBM Ab.  -Needs kidney biopsy - IR consulted -planning on biopsy Monday.   -Cr continues to worsen.  -Discussed with Dr. Alvarado.  On 2/5 started pulse dose steroids.  Continue lokelma.  -May need dialysis soon if no improvement.  -Repeat BMP in AM.

## 2022-02-07 NOTE — ASSESSMENT & PLAN NOTE
-Chronic and at baseline on admit secondary to cirrhosis  -Decreased to 33k on 2/4 but back to baseline - today 105k.  -Heme/onc consulted and input appreciated  -Hit Ab negative.  Await [id number] but doubt ttp/itp.  -Plan is transfuse for platelets <10 or if significant hemorrhage.  -Discussed with heme need for bone marrow biopsy.

## 2022-02-07 NOTE — ASSESSMENT & PLAN NOTE
-Patient with persistent significant shortness of breath and hypoxia requiring supplemental O2  -Not on oxygen at home  -On admit had clear breath sounds but on 2/2 has significant pulmonary crackles and increased work of breathing  -CXR showed possible pulmonary edema and BNP is rather elevated  -Also noted significantly elevated D-dimer on admit.  V/Q scan showed very low probability of pulmonary embolism.  Stopped full dose lovenox 2/2  -Noted low Hb 2/2 and ordered PRBC, but due to complex antibody matching did not receive until 2/4.  Hb improved.    -While had some signs of fluid overload - ultimately believed to be third spacing and not fluid overload.  Lasix drip not helpful and near anuric so it was stopped.  -Transferred to icu on evening of 2/3 due to respiratory distress.  Treated with bipap QHS/PRN and supplemental O2 and now stable > 24 hours.  Step down to the floor on 2/5.  -Pulmonology consulted and discussed with Dr. Garrett.  Concerned for possible pulmonary renal syndrome.  Vasculitis workup as above.  Await RIP.  -Continue supplemental O2 and bipap qhs/prn.

## 2022-02-07 NOTE — PLAN OF CARE
Problem: Adult Inpatient Plan of Care  Goal: Plan of Care Review  Outcome: Ongoing, Progressing  Flowsheets (Taken 2/7/2022 0332)  Plan of Care Reviewed With: patient          Pt free from falls, injury or any further trauma throughout shift. Pt AAO x 4. Continued medications as ordered. No complaints of pain throughout shift. Bipap continued throughout shift. CBG monitored per orders. Pt in no distress. Will cont to monitor.

## 2022-02-07 NOTE — PLAN OF CARE
Tn spoke with patient and family at bedside regarding discharge planning. Pending possible new outpatient dialysis set up if appropriate. Therapy initial recommendation for Home health with MADDIE BAZAN to continue to follow.    02/07/22 1632   Discharge Reassessment   Assessment Type Discharge Planning Reassessment   Did the patient's condition or plan change since previous assessment? Yes   Discharge Plan discussed with: Patient;Sibling   Communicated CLAUDETTE with patient/caregiver Date not available/Unable to determine   Discharge Plan A Home Health   Discharge Plan B Home with family   DME Needed Upon Discharge  walker, rolling   Discharge Barriers Identified None   Why the patient remains in the hospital Requires continued medical care   Post-Acute Status   Coverage [employer]   Discharge Delays (!) Post-Acute Set-up

## 2022-02-07 NOTE — NURSING
Report received and care assumed. Returned to unit via bed with oxygen infusing@ 3 LPM via nasal cannula.SPO2 100%  Aroused but returned to sleep

## 2022-02-07 NOTE — SUBJECTIVE & OBJECTIVE
Interval History: No acute events overnight.  Mildly confused this morning when I saw her.  Breathing comfortably on 3L still with crackles on exam.  Still very weak.  Denies chest pain.  Family at bedside.  All questions answered and patient had no further complaints.    Review of Systems   Constitutional: Positive for fatigue. Negative for chills and fever.   HENT: Positive for congestion.    Eyes: Negative for photophobia and visual disturbance.   Respiratory: Positive for cough and shortness of breath.    Cardiovascular: Negative for chest pain, palpitations and leg swelling.   Gastrointestinal: Negative for abdominal pain, constipation, diarrhea, nausea and vomiting.   Genitourinary: Positive for decreased urine volume. Negative for dysuria, frequency and urgency.   Skin: Negative for pallor, rash and wound.   Neurological: Positive for weakness. Negative for light-headedness and headaches.   Psychiatric/Behavioral: Positive for confusion. Negative for decreased concentration. The patient is nervous/anxious.      Objective:     Vital Signs (Most Recent):  Temp: 97.4 °F (36.3 °C) (02/07/22 1123)  Pulse: (!) 54 (02/07/22 1545)  Resp: 17 (02/07/22 1545)  BP: (!) 97/51 (02/07/22 1545)  SpO2: 100 % (02/07/22 1545) Vital Signs (24h Range):  Temp:  [97.3 °F (36.3 °C)-98 °F (36.7 °C)] 97.4 °F (36.3 °C)  Pulse:  [51-67] 54  Resp:  [12-22] 17  SpO2:  [9 %-100 %] 100 %  BP: ()/(46-71) 97/51     Weight: 73.1 kg (161 lb 2.5 oz)  Body mass index is 34.87 kg/m².    Intake/Output Summary (Last 24 hours) at 2/7/2022 1719  Last data filed at 2/7/2022 0600  Gross per 24 hour   Intake --   Output 500 ml   Net -500 ml      Physical Exam  Vitals and nursing note reviewed.   Constitutional:       General: She is not in acute distress.     Appearance: She is well-developed. She is ill-appearing. She is not toxic-appearing or diaphoretic.      Interventions: Nasal cannula in place.   HENT:      Head: Normocephalic and atraumatic.       Right Ear: External ear normal.      Left Ear: External ear normal.      Nose: Nose normal.      Mouth/Throat:      Mouth: Mucous membranes are moist.   Eyes:      Extraocular Movements: Extraocular movements intact.      Conjunctiva/sclera: Conjunctivae normal.   Cardiovascular:      Rate and Rhythm: Normal rate and regular rhythm.   Pulmonary:      Effort: No tachypnea or respiratory distress.      Breath sounds: No wheezing.      Comments: Improved work of breathing and looks comfortable.  Still with significant pulmonary crackles, stable over 24 hours  Abdominal:      General: Bowel sounds are normal. There is no distension.      Palpations: Abdomen is soft.      Tenderness: There is no abdominal tenderness.      Comments: No palpable hepatomegaly or splenomegaly    Musculoskeletal:         General: No tenderness. Normal range of motion.      Cervical back: Normal range of motion and neck supple.      Right lower leg: Edema present.      Left lower leg: Edema present.   Skin:     General: Skin is warm and dry.   Neurological:      Mental Status: She is alert and oriented to person, place, and time.      Comments: Mildly confused.  Moves all extremities but is diffusely weak   Psychiatric:         Thought Content: Thought content normal.         Significant Labs: All pertinent labs within the past 24 hours have been reviewed.    Significant Imaging: I have reviewed all pertinent imaging results/findings within the past 24 hours.

## 2022-02-07 NOTE — NURSING
Pt with bilateral coarse breath sounds noted pt on 3 L NC O2 sat 91-93%, with 250cc UOP in 24 hr. BUN/Cr 100/5.8. Secure chat sent to NP for further orders, no additional orders given at this time, Bipap qhs as ordered and PRN breathing tx. Will cont to monitor.

## 2022-02-07 NOTE — H&P
Inpatient Radiology Pre-procedure Note    History of Present Illness:  Tammi Farris is a 71 y.o. female who presents for THDC placement. Patient initially scheduled for kidney biopsy due to ANI on CKD 2.  Patient is felt to be uremic and in need of urgent dialysis.  Admission H&P reviewed.  Past Medical History:   Diagnosis Date    Allergy     Anxiety     Basal cell carcinoma     Cirrhosis of liver without ascites 12/27/2017    Depression     Diabetes mellitus, type 2     Disorder of kidney and ureter     Endometriosis     GERD (gastroesophageal reflux disease)     Hyperlipidemia     Hypertension     Joint pain     Psoriasis      Past Surgical History:   Procedure Laterality Date    abdominal laproscopic surgery      APPENDECTOMY      CARPAL TUNNEL RELEASE      right    CHOLECYSTECTOMY  04/2015    COLONOSCOPY N/A 2/8/2019    Procedure: COLONOSCOPY;  Surgeon: Celso Salas MD;  [facility]  Service: Endoscopy;  Laterality: N/A;    ESOPHAGOGASTRODUODENOSCOPY N/A 2/8/2019    Procedure: EGD (ESOPHAGOGASTRODUODENOSCOPY);  Surgeon: Celso Salas MD;  Location[facility]  Service: Endoscopy;  Laterality: N/A;  varices screening, labs ordered prior    HYSTERECTOMY  age 25    JOSEFA/BSO (endometriosis)    OOPHORECTOMY      SKIN CANCER DESTRUCTION      UPPER GASTROINTESTINAL ENDOSCOPY         Review of Systems:   As documented in primary team H&P    Home Meds:   Prior to Admission medications    Medication Sig Start Date End Date Taking? Authorizing Provider   allopurinoL (ZYLOPRIM) 100 MG tablet Take 1 tablet (100 mg total) by mouth once daily. 12/29/21   Ann-Marie Hartman MD   betamethasone dipropionate (DIPROLENE) 0.05 % cream Apply topically 2 (two) times daily. To affected areas on lower back  Patient not taking: Reported on 1/19/2022 8/7/19   Breann Orellana MD   blood sugar diagnostic Strp Use 1-2 times daily to check blood sugar as directed. 1/25/22    Ann-Marie Hartman MD   blood-glucose meter kit Use as instructed to check blood sugar 1-2 times a day 1/12/21 1/12/22  Ariel Mullins MD   cholecalciferol, vitamin D3, (VITAMIN D3) 25 mcg (1,000 unit) capsule Take 1 capsule (1,000 Units total) by mouth once daily. 3/24/21   Armando Hu MD   clotrimazole (LOTRIMIN) 1 % cream APPLY TO THE AFFECTED AREA OF RASH ON BREASTS AND IN FOLDS OF SKIN AS DIRECTED TWICE DAILY  Patient not taking: Reported on 1/19/2022 2/18/21   Ariel Mullins MD   fluocinonide (LIDEX) 0.05 % external solution AAA scalp qday - bid prn pruritus  Patient not taking: Reported on 1/19/2022 8/10/21   Kari Norris MD   furosemide (LASIX) 20 MG tablet Take 1 tablet (20 mg total) by mouth once daily. 12/22/21 12/22/22  Dorys Hernandez MD   lancets Misc 1 lancet by Misc.(Non-Drug; Combo Route) route 2 (two) times daily with meals. 1/12/21   Ariel Mullins MD   lancing device Misc 1 Device by Misc.(Non-Drug; Combo Route) route 2 (two) times daily with meals. 1/12/21 1/12/22  Ariel Mullins MD   losartan (COZAAR) 50 MG tablet TAKE 1 TABLET(50 MG) BY MOUTH EVERY DAY 9/9/21   Jolie Alexandra DNP   metFORMIN (GLUCOPHAGE-XR) 500 MG ER 24hr tablet Take 1 tablet after breakfast and 2 tablets after dinner every day 9/26/21   Ann-Marie Hartman MD   metoprolol tartrate (LOPRESSOR) 25 MG tablet Take 1 tablet (25 mg total) by mouth 2 (two) times daily. 1/12/22 1/12/23  Dorys Hernandez MD   omeprazole (PRILOSEC) 40 MG capsule Take 1 capsule (40 mg total) by mouth 2 (two) times daily. Take first thing in the morning and then again 30 minutes before dinner 8/27/21 8/27/22  Elena Ponce MD   triamcinolone acetonide 0.025% (KENALOG) 0.025 % cream AAA under breasts and groin bid after cool blow dry prn  Patient not taking: Reported on 1/19/2022 8/10/21   Kari Norris MD     Scheduled Meds:    albuterol-ipratropium  3 mL Nebulization Q6H    famotidine  20 mg Oral Daily    insulin  detemir U-100  5 Units Subcutaneous QHS    metoprolol tartrate  25 mg Oral BID    polyethylene glycol  17 g Oral BID    sodium zirconium cyclosilicate  10 g Oral Daily     Continuous Infusions:   PRN Meds:sodium chloride, acetaminophen, benzonatate, bisacodyL, dextrose 50%, dextrose 50%, glucagon (human recombinant), glucose, glucose, guaiFENesin 100 mg/5 ml, insulin aspart U-100, iohexoL, melatonin, naloxone, ondansetron, simethicone, sodium chloride 0.9%  Anticoagulants/Antiplatelets: no anticoagulation    Allergies:   Review of patient's allergies indicates:   Allergen Reactions    Dobutamine Hives    Benadryl [diphenhydramine hcl] Anxiety     Pt states she feels jumpy and anxious when taking.    Darvon [propoxyphene] Nausea Only    Shellfish containing products Hives    Iodinated contrast media Hives    Lisinopril Other (See Comments)     cough    Propoxyphene hcl      Itchy (skin)^    Tizanidine Other (See Comments)     Sweaty, difficulty swallowing    Statins-hmg-coa reductase inhibitors Anxiety and Other (See Comments)     Myalgia, fatigue, palpitations, anxiety     Sedation Hx: have not been any systemic reactions    Labs:  Recent Labs   Lab 02/03/22  1328   INR 1.3*       Recent Labs   Lab 02/07/22  0759   WBC 9.02   HGB 8.5*   HCT 26.1*   *   *      Recent Labs   Lab 02/07/22  0443   *      K 5.1   CL 99   CO2 17*   *   CREATININE 6.4*   CALCIUM 9.3   ALT 22   AST 31   ALBUMIN 3.8   BILITOT 1.3*         Vitals:  Temp: 97.4 °F (36.3 °C) (02/07/22 1123)  Pulse: 60 (02/07/22 1123)  Resp: 18 (02/07/22 1123)  BP: (!) 113/46 (02/07/22 1123)  SpO2: 100 % (02/07/22 1123)     Physical Exam:  ASA: 3  Mallampati: 2    General: no acute distress, somewhat somnolent  Mental Status: oriented to person, place and time  HEENT: normocephalic, atraumatic  Chest: unlabored breathing  Heart: regular heart rate  Abdomen: nondistended  Extremity: moves all extremities    Plan:  proceed with THDC placement  Sedation Plan: up to moderate    Aaron Mitchell MD  Staff Radiologist  Department of Radiology  Pager: [phone]

## 2022-02-07 NOTE — ASSESSMENT & PLAN NOTE
-Hb 9.2 on admit - chronic secondary to cirrhosis.  -Dropped to <7 on 2/2  -No evidence of GI bleeding but has noted epistaxis and bleeding gums at times since before hospitalization.  -Folate and B12 replete.  She is iron deficient.  -1 unit PRBC ordered 2/2 but transfusion delayed due to complex antibody matching.  Blood finally available and she received 1 unit PRBC 2/4.  Hb 8.3 today  -LDH normal.  Mild Tbili elevation.  Retic is not elevated as should be.  -Await VALENTINO, SPEP, SHERI  -GI consulted and input appreciated.  Needs EGD, but no acute need in hospital.  Planned as outpatient later this month.  -Heme/onc consulted 2/4 and input appreciated.  Doubt hemolysis.  More likely marrow suppression from acute illness and renal failure.  BM biopsy Monday?  -Check cbc q48 for now to minimize effect of phlebotomy.

## 2022-02-07 NOTE — ASSESSMENT & PLAN NOTE
-Followed by Hepatology  -Noted mild transaminitis and thrombocytopenia which are not new.  Noted epistaxis and gingival bleeding which has been ongoing for several months.  These are chronic and note history of cirrhosis and alcohol abuse   -Follows with hepatology and was seen last month  -Abdominal US on day prior to admit showed hepatic cirrhosis/steatosis with sonographic findings of portal hypertension including trace ascites and splenomegaly.  -Repeat cmp in AM   continue ASA, Plavix. atorvastatin, Cardizem

## 2022-02-07 NOTE — ASSESSMENT & PLAN NOTE
-Admitted to inpatient status  -Baseline Cr 0.9.  On admit Cr 2.7  -Reports being started on lasix 1/8 by her cardiologist and over several days PTA had decreased UOP and diminished PO intake  -On admit BNP elevated at 1160 and with metabolic acidosis and mild hyperkalemia   -Avoid nephrotoxic agents and renally dose meds  -FEUrea 7.1% suggestive of pre-renal sources  -Consulted and discussed with nephrology.  -Complex situation to clearly determine volume status and initially felt to be volume deplete.  Was treated with IV fluids initially, then lasix drip.  Ultimately after extensive discussion with nephrology was felt to be third spacing with intravascular volume depletion +/- vasculitis or pulmonary renal syndrome.  -Respiratory decompensation onovernight 2/3-2/4 and transferred to ICU and lasix drip given but no urine output so discontinued.  Stabilized and stepped down on 2/5.  -Ongoing workup for pulmonary/renal syndrome and vasculitis.  Noted Positive ARMEN in 2019  -C3 and C4 are low.  RF and CCP are negative.  c and p ANCA negative.  GBM Ab negative  DS DNA negative.  Await ARMEN.  -SPEP shows elevated K/L ratio of 2.1.  Discussed with Dr. Maldonado - unclear if due to renal failure or not.  Await immunofixation results.  Dr. Maldonado will followup tomorrow.  -Needs kidney biopsy - IR consulted and planned for today but felt to unstable.  -Cr continues to worsen and end encephalopathic today.  IR placed THDC and HD initiated.  -Today is day 3 of pulse dose steroids for suspected glomerulonephritis vs pulm/renal syndrome.  Will change to prednisone 60mg starting tomorrow and anticipate taper - pending full workup.  -Repeat BMP in AM.

## 2022-02-07 NOTE — PT/OT/SLP PROGRESS
Occupational Therapy      Patient Name:  Tammi Farris   MRN:  [id number]    Patient not seen today secondary to this AM, observed up in chair w/ nurse providing care; this PM, pt BRIANDA to IR. Will follow-up as able.    2/7/2022

## 2022-02-07 NOTE — PROCEDURES
[facility]  Interventional Radiology  High Risk Procedure - Inpatient      Date: 02/07/2022 Time: 2:49 PM    Pre-Op Diagnosis: ANI on CKD 2, uremia in need of urgent dialysis    Post-Op Diagnosis: same    Procedure Performed by: Paula    Assistant: N/A    Procedure: THDC placement    Specimen/Tissue Removed: N/A    Estimated Blood Loss: less than 50 mL    Procedure Note/Findings: 15.5 F 19 cm long glidepath catheter placed. Catheter tip at cavoatrial junction.  Ready to use.        Please refer to dictated report for additional details.

## 2022-02-07 NOTE — SUBJECTIVE & OBJECTIVE
Interval History: No acute events overnight.  Very anxious.  Breathing comfortably but with crackles on exam.  No bleeding.  Scant urine output overnight.  Still very weak.  Denies chest pain.  Family at bedside.  All questions answered and patient had no further complaints.    Review of Systems   Constitutional: Positive for fatigue. Negative for chills and fever.   HENT: Positive for congestion.    Eyes: Negative for photophobia and visual disturbance.   Respiratory: Positive for cough and shortness of breath.    Cardiovascular: Negative for chest pain, palpitations and leg swelling.   Gastrointestinal: Negative for abdominal pain, constipation, diarrhea, nausea and vomiting.   Genitourinary: Positive for decreased urine volume. Negative for dysuria, frequency and urgency.   Skin: Negative for pallor, rash and wound.   Neurological: Positive for weakness. Negative for light-headedness and headaches.   Psychiatric/Behavioral: Negative for confusion and decreased concentration. The patient is nervous/anxious.      Objective:     Vital Signs (Most Recent):  Temp: 97.4 °F (36.3 °C) (02/06/22 1636)  Pulse: (!) 58 (02/06/22 1636)  Resp: 18 (02/06/22 1636)  BP: (!) 104/52 (02/06/22 1636)  SpO2: 97 % (02/06/22 1636) Vital Signs (24h Range):  Temp:  [97.4 °F (36.3 °C)-98.5 °F (36.9 °C)] 97.4 °F (36.3 °C)  Pulse:  [58-85] 58  Resp:  [16-20] 18  SpO2:  [93 %-100 %] 97 %  BP: (104-130)/(52-60) 104/52     Weight: 73.1 kg (161 lb 2.5 oz)  Body mass index is 34.87 kg/m².    Intake/Output Summary (Last 24 hours) at 2/6/2022 1809  Last data filed at 2/6/2022 1700  Gross per 24 hour   Intake 960 ml   Output 250 ml   Net 710 ml      Physical Exam  Vitals and nursing note reviewed.   Constitutional:       General: She is not in acute distress.     Appearance: She is well-developed. She is ill-appearing. She is not toxic-appearing or diaphoretic.      Interventions: Nasal cannula in place.   HENT:      Head: Normocephalic and  atraumatic.      Right Ear: External ear normal.      Left Ear: External ear normal.      Nose: Nose normal.      Mouth/Throat:      Mouth: Mucous membranes are moist.   Eyes:      Extraocular Movements: Extraocular movements intact.      Conjunctiva/sclera: Conjunctivae normal.   Cardiovascular:      Rate and Rhythm: Normal rate and regular rhythm.   Pulmonary:      Effort: No tachypnea or respiratory distress.      Breath sounds: No wheezing.      Comments: Improved work of breathing and looks comfortable.  Still with significant pulmonary crackles, stable over 24 hours  Abdominal:      General: Bowel sounds are normal. There is no distension.      Palpations: Abdomen is soft.      Tenderness: There is no abdominal tenderness.      Comments: No palpable hepatomegaly or splenomegaly    Musculoskeletal:         General: No tenderness. Normal range of motion.      Cervical back: Normal range of motion and neck supple.      Right lower leg: Edema present.      Left lower leg: Edema present.   Skin:     General: Skin is warm and dry.   Neurological:      Mental Status: She is alert and oriented to person, place, and time.      Comments: Moves all extremities but is diffusely weak   Psychiatric:         Thought Content: Thought content normal.         Significant Labs: All pertinent labs within the past 24 hours have been reviewed.    Significant Imaging: I have reviewed all pertinent imaging results/findings within the past 24 hours.

## 2022-02-07 NOTE — ASSESSMENT & PLAN NOTE
-Chronic and at baseline on admit secondary to cirrhosis  -Decreased to 33k on 2/4 but back to baseline - today 98k.  -Heme/onc consulted and input appreciated  -Hit Ab negative.  Await [id number] but doubt ttp/itp.  -Plan is transfuse for platelets <10 or if significant hemorrhage.  -Discussed with heme need for bone marrow biopsy.

## 2022-02-07 NOTE — PROGRESS NOTES
[facility] - [facility] Medicine  Progress Note    Patient Name: Tammi Farris  MRN: [id number]  Patient Class: IP- Inpatient   Admission Date: 2/1/2022  Length of Stay: 5 days  Attending Physician: Chadwick Quan MD  Primary Care Provider: Ann-Marie Hartman MD        Subjective:     Principal Problem:Acute renal failure superimposed on stage 2 chronic kidney disease        HPI:  71 y.o. female with HTN, HLD, GERD, cirrhosis of liver, thrombocytopenia, and sleep apnea presents with a complaint of cough and congestion since October.  She is chronically dyspneic, exacerbated by exertion, has seen Cardiology and was prescribed lasix and metoprolol.  Her sister has now noted that the patient has very low urine output and is constipated.  Also follows with Hepatology for chronic liver disease.  Patient denies fever, chills, chest pain, palpitations, orthopnea, PND, dizziness, syncope, n/v/d, abdominal pain, or dysuria.  In the ED, labs reveal ANI, hyperkalemia, metabolic acidosis with elevated lactic acid, elevated liver enzymes, elevated BNP, elevated d-dimer, thrombocytopenia, and markedly concentrated urine.  Echo December 2021 showed preserved EF, no evidence of heart failure.  Chest xray without acute abnormality. No convincing evidence of infectious process.      Overview/Hospital Course:  No notes on file    Interval History: No acute events overnight.  Very anxious.  Breathing comfortably but with crackles on exam.  No bleeding.  Scant urine output overnight.  Still very weak.  Denies chest pain.  Family at bedside.  All questions answered and patient had no further complaints.    Review of Systems   Constitutional: Positive for fatigue. Negative for chills and fever.   HENT: Positive for congestion.    Eyes: Negative for photophobia and visual disturbance.   Respiratory: Positive for cough and shortness of breath.    Cardiovascular: Negative for chest pain, palpitations and leg swelling.    Gastrointestinal: Negative for abdominal pain, constipation, diarrhea, nausea and vomiting.   Genitourinary: Positive for decreased urine volume. Negative for dysuria, frequency and urgency.   Skin: Negative for pallor, rash and wound.   Neurological: Positive for weakness. Negative for light-headedness and headaches.   Psychiatric/Behavioral: Negative for confusion and decreased concentration. The patient is nervous/anxious.      Objective:     Vital Signs (Most Recent):  Temp: 97.4 °F (36.3 °C) (02/06/22 1636)  Pulse: (!) 58 (02/06/22 1636)  Resp: 18 (02/06/22 1636)  BP: (!) 104/52 (02/06/22 1636)  SpO2: 97 % (02/06/22 1636) Vital Signs (24h Range):  Temp:  [97.4 °F (36.3 °C)-98.5 °F (36.9 °C)] 97.4 °F (36.3 °C)  Pulse:  [58-85] 58  Resp:  [16-20] 18  SpO2:  [93 %-100 %] 97 %  BP: (104-130)/(52-60) 104/52     Weight: 73.1 kg (161 lb 2.5 oz)  Body mass index is 34.87 kg/m².    Intake/Output Summary (Last 24 hours) at 2/6/2022 1809  Last data filed at 2/6/2022 1700  Gross per 24 hour   Intake 960 ml   Output 250 ml   Net 710 ml      Physical Exam  Vitals and nursing note reviewed.   Constitutional:       General: She is not in acute distress.     Appearance: She is well-developed. She is ill-appearing. She is not toxic-appearing or diaphoretic.      Interventions: Nasal cannula in place.   HENT:      Head: Normocephalic and atraumatic.      Right Ear: External ear normal.      Left Ear: External ear normal.      Nose: Nose normal.      Mouth/Throat:      Mouth: Mucous membranes are moist.   Eyes:      Extraocular Movements: Extraocular movements intact.      Conjunctiva/sclera: Conjunctivae normal.   Cardiovascular:      Rate and Rhythm: Normal rate and regular rhythm.   Pulmonary:      Effort: No tachypnea or respiratory distress.      Breath sounds: No wheezing.      Comments: Improved work of breathing and looks comfortable.  Still with significant pulmonary crackles, stable over 24 hours  Abdominal:       General: Bowel sounds are normal. There is no distension.      Palpations: Abdomen is soft.      Tenderness: There is no abdominal tenderness.      Comments: No palpable hepatomegaly or splenomegaly    Musculoskeletal:         General: No tenderness. Normal range of motion.      Cervical back: Normal range of motion and neck supple.      Right lower leg: Edema present.      Left lower leg: Edema present.   Skin:     General: Skin is warm and dry.   Neurological:      Mental Status: She is alert and oriented to person, place, and time.      Comments: Moves all extremities but is diffusely weak   Psychiatric:         Thought Content: Thought content normal.         Significant Labs: All pertinent labs within the past 24 hours have been reviewed.    Significant Imaging: I have reviewed all pertinent imaging results/findings within the past 24 hours.      Assessment/Plan:      * Acute renal failure superimposed on stage 2 chronic kidney disease  -Admitted to inpatient status  -Baseline Cr 0.9.  On admit Cr 2.7  -Reports being started on lasix 1/8 by her cardiologist and over several days PTA had decreased UOP and diminished PO intake  -On admit BNP elevated at 1160 and with metabolic acidosis and mild hyperkalemia   -Avoid nephrotoxic agents and renally dose meds  -FEUrea 7.1% suggestive of pre-renal sources  -Consulted and discussed with nephrology.  -Complex situation to clearly determine volume status and initially felt to be volume deplete.  Was treated with IV fluids initially, then lasix drip.  Ultimately after extensive discussion with nephrology was felt to be third spacing with intravascular volume depletion +/- vasculitis or pulmonary renal syndrome.  -Respiratory decompensation onovernight 2/3-2/4 and transferred to ICU and lasix drip given but no urine output so discontinued.  Stabilized and stepped down on 2/5.  -Ongoing workup for pulmonary/renal syndrome and vasculitis.  Noted Positive ARMEN in 2019  -C3  and C4 are low.  RF and CCP are negative.  Await SPEP/UPEP, ANCA and repeat ARMEN , Anti dsDNA, GBM Ab.  -Needs kidney biopsy - IR consulted -planning on biopsy Monday.   -Cr continues to worsen.  -Discussed with Dr. Alvarado.  On 2/5 started pulse dose steroids.  Continue lokelma.  -May need dialysis soon if no improvement.  -Repeat BMP in AM.    Acute respiratory failure with hypoxia  -Patient with persistent significant shortness of breath and hypoxia requiring supplemental O2  -Not on oxygen at home  -On admit had clear breath sounds but on 2/2 has significant pulmonary crackles and increased work of breathing  -CXR showed possible pulmonary edema and BNP is rather elevated  -Also noted significantly elevated D-dimer on admit.  V/Q scan showed very low probability of pulmonary embolism.  Stopped full dose lovenox 2/2  -Noted low Hb 2/2 and ordered PRBC, but due to complex antibody matching did not receive until 2/4.  Hb improved.    -While had some signs of fluid overload - ultimately believed to be third spacing and not fluid overload.  Lasix drip not helpful and near anuric so it was stopped.  -Transferred to icu on evening of 2/3 due to respiratory distress.  Treated with bipap QHS/PRN and supplemental O2 and now stable > 24 hours.  Step down to the floor on 2/5.  -Pulmonology consulted and discussed with Dr. Garrett.  Concerned for possible pulmonary renal syndrome.  Await vasculitic workup. Await RIP.   -Continue supplemental O2 and bipap qhs/prn.    Cirrhosis of liver without ascites  -Followed by Hepatology  -Noted mild transaminitis and thrombocytopenia which are not new.  Noted epistaxis and gingival bleeding which has been ongoing for several months.  These are chronic and note history of cirrhosis and alcohol abuse   -Follows with hepatology and was seen last month  -Abdominal US on day prior to admit showed hepatic cirrhosis/steatosis with sonographic findings of portal hypertension including trace ascites and  splenomegaly.  -Repeat cmp in AM    Macrocytic anemia  -Hb 9.2 on admit - chronic secondary to cirrhosis.  -Dropped to <7 on 2/2  -No evidence of GI bleeding but has noted epistaxis and bleeding gums at times since before hospitalization.  -Folate and B12 replete.  She is iron deficient.  -1 unit PRBC ordered 2/2 but transfusion delayed due to complex antibody matching.  Blood finally available and she received 1 unit PRBC 2/4.  Hb 8.3 today  -LDH normal.  Mild Tbili elevation.  Retic is not elevated as should be.  -Await VALENTINO, SPEP, SHERI  -GI consulted and input appreciated.  Needs EGD, but no acute need in hospital.  Planned as outpatient later this month.  -Heme/onc consulted 2/4 and input appreciated.  Doubt hemolysis.  More likely marrow suppression from acute illness and renal failure.  BM biopsy Monday?  -Check cbc q48 for now to minimize effect of phlebotomy.    Thrombocytopenia  -Chronic and at baseline on admit secondary to cirrhosis  -Decreased to 33k on 2/4 but back to baseline - today 98k.  -Heme/onc consulted and input appreciated  -Hit Ab negative.  Await SERTRR39 but doubt ttp/itp.  -Plan is transfuse for platelets <10 or if significant hemorrhage.  -Discussed with heme need for bone marrow biopsy.    Positive D dimer  -D-dimer rather elevated in ER and short of breath  -Doppler us of legs without evidence of DVT  -V/Q very low probability for PE.  -Stopped lovenox 2/2    Hyperkalemia  -Noted on admit and mild  -No EKG changes  -K has normalized - treating with lokelma  -Repeat bmp in AM    MANUEL (generalized anxiety disorder)  -Very anxious today  -Will give one time low dose ativan PO - watch respiratory status very closely.    Debility  -When more stable will consult PT/OT    Metabolic acidosis  -Suspect secondary to ANI, management as above    Sleep apnea  -Continue bipap qhs    Gastroesophageal reflux disease  -Stable, no acute issue    HTN (hypertension)  -BP low normal  -Continue metoprolol with  hold parameters    Hyperlipidemia  -History noted  -Not on statin due to cirrhosis      VTE Risk Mitigation (From admission, onward)         Ordered     IP VTE HIGH RISK PATIENT  Once         02/01/22 1557     Place sequential compression device  Until discontinued         02/01/22 1557                Discharge Planning   CLAUDETTE:      Code Status: Full Code   Is the patient medically ready for discharge?:     Reason for patient still in hospital (select all that apply): Treatment  Discharge Plan A: Home Health (Patient has been evaluated by PT.  Recommendation a this time if for HH with a Pediatric/Tomi RW.)   Discharge Delays: None known at this time              Chadwick Quan MD  Department of Hospital Medicine   [facility]

## 2022-02-07 NOTE — ASSESSMENT & PLAN NOTE
-Hb 9.2 on admit - chronic secondary to cirrhosis.  -Dropped to <7 on 2/2  -No evidence of GI bleeding but has noted epistaxis and bleeding gums at times since before hospitalization.  -Folate and B12 replete.  She is iron deficient.  -1 unit PRBC ordered 2/2 but transfusion delayed due to complex antibody matching.  Blood finally available and she received 1 unit PRBC 2/4.  Hb 8.5 today  -LDH normal.  Mild Tbili elevation.  Retic is not elevated as should be.  -GI consulted and input appreciated.  Needs EGD, but no acute need in hospital.  Planned as outpatient later this month.  -Heme/onc consulted 2/4 and input appreciated.  Doubt hemolysis.  More likely marrow suppression from acute illness and renal failure.    -Abnormal K/L ratio on SPEP - discussed with Dr. Maldonado who will follow up tomorrow.  Unclear significance.  -Check cbc q48 for now to minimize effect of phlebotomy.

## 2022-02-07 NOTE — ASSESSMENT & PLAN NOTE
-Very anxious today  -Will give one time low dose ativan PO - watch respiratory status very closely.

## 2022-02-07 NOTE — PROGRESS NOTES
Renal Progress Note    Date of Admission:  2/1/2022  7:35 AM    Length of Stay: 6  Days    Subjective: n/a    Objective:    Current Facility-Administered Medications   Medication    0.9%  NaCl infusion (for blood administration)    acetaminophen tablet 650 mg    albuterol-ipratropium 2.5 mg-0.5 mg/3 mL nebulizer solution 3 mL    benzonatate capsule 100 mg    bisacodyL suppository 10 mg    dextrose 50% injection 12.5 g    dextrose 50% injection 25 g    famotidine tablet 20 mg    glucagon (human recombinant) injection 1 mg    glucose chewable tablet 16 g    glucose chewable tablet 24 g    guaiFENesin 100 mg/5 ml syrup 200 mg    insulin aspart U-100 pen 0-5 Units    insulin detemir U-100 pen 5 Units    iohexoL (OMNIPAQUE 350) injection 100 mL    melatonin tablet 6 mg    methylPREDNISolone sodium succinate (SOLU-MEDROL) 1,000 mg in dextrose 5 % 100 mL IVPB    metoprolol tartrate (LOPRESSOR) tablet 25 mg    naloxone 0.4 mg/mL injection 0.02 mg    ondansetron injection 4 mg    polyethylene glycol packet 17 g    simethicone chewable tablet 80 mg    sodium chloride 0.9% flush 10 mL    sodium zirconium cyclosilicate packet 10 g       Vitals:    02/07/22 0341 02/07/22 0455 02/07/22 0813 02/07/22 0823   BP: (!) 115/54  (!) 126/53    BP Location:       Patient Position: Lying      Pulse: 63  62 67   Resp: 20  18 20   Temp: 97.4 °F (36.3 °C)  98 °F (36.7 °C)    TempSrc: Oral      SpO2: 97% 100% 98% 97%   Weight:       Height:           I/O last 3 completed shifts:  In: 960 [P.O.:960]  Out: 750 [Urine:750]  No intake/output data recorded.      Physical Exam:     General: NAD  Neck: supple  Heart: RRR  Lungs: unlabored breathing  Abdomen: n/a  Limbs: some echymosis upper extremities  Neurologic: confused-obtunded      Laboratories:    Recent Labs   Lab 02/06/22  0913   WBC 7.12   RBC 2.39*   HGB 8.3*   HCT 25.3*   PLT 98*   *   MCH 34.7*   MCHC 32.8       Recent Labs   Lab  "02/07/22  0443   CALCIUM 9.3   PROT 8.1      K 5.1   CO2 17*   CL 99   *   CREATININE 6.4*   ALKPHOS 65   ALT 22   AST 31   BILITOT 1.3*       No results for input(s): COLORU, CLARITYU, SPECGRAV, PHUR, PROTEINUA, GLUCOSEU, BLOODU, WBCU, RBCU, BACTERIA, MUCUS in the last 24 hours.    Invalid input(s):  BILIRUBINCON    Microbiology Results (last 7 days)     Procedure Component Value Units Date/Time    Urine Culture High Risk [[id number]]  (Abnormal) Collected: 02/04/22 1322    Order Status: Completed Specimen: Urine, Catheterized Updated: 02/06/22 0911     Urine Culture, Routine YEAST   > 100,000 cfu/ml  Identification pending      Narrative:      Indicated criteria for high risk culture:->Other  Other (specify):->ani    Blood Culture #2 **CANNOT BE ORDERED STAT** [[id number]] Collected: 02/01/22 1120    Order Status: Completed Specimen: Blood from Peripheral, Antecubital, Left Updated: 02/05/22 1303     Blood Culture, Routine No Growth after 4 days.     Blood Culture #1 **CANNOT BE ORDERED STAT** [[id number]] Collected: 02/01/22 1121    Order Status: Completed Specimen: Blood from Peripheral, Antecubital, Right Updated: 02/05/22 1303     Blood Culture, Routine No Growth after 4 days.     Respiratory Infection Panel (PCR), Nasopharyngeal [[id number]] Collected: 02/04/22 1645    Order Status: Completed Specimen: Nasopharyngeal Swab Updated: 02/04/22 1658     Respiratory Infection Panel Source NP Swab    Narrative:      Nasal Swab Only - for all other respiratory sources, order  [id number] - Respiratory Viral Panel by PCR (RSPFA)            Diagnostic Tests: n/a        Assessment:    72 y/o female with Hx. Cirrhosis of Liver admitted with:    - ANI et? Suspected GN (low complements and microscopic hematuria) already started on "pulse" IV Medrol per Dr. Alvarado, creatinine climbing  - Abnormal SPEP   - HyperK+ stable  - HAGMA  - s/p Acute respiratory failure with hypoxia  - HTN  - Macrocytic anemia with Fe++ def.  - " Chronic thrombocytopenia  - Yeast UTI  - GERD  - PAULINA  - HLD          Plan:    - Pte. Needs Dialysis today  - Consult IR for THDC ASAP  - Postpone Kidney Bx. Until more stable  - Na+ zirconium for K+ control  - Hematology Consulted  - Following Yhw-auzzr-rhwig-acid/base  - Other problems per admitting    Above discussed with Pte.'s Family at bedside, they are in agreement to proceed with Dialysis.

## 2022-02-08 PROBLEM — R79.89 POSITIVE D DIMER: Status: RESOLVED | Noted: 2022-01-01 | Resolved: 2022-01-01

## 2022-02-08 NOTE — ASSESSMENT & PLAN NOTE
-Followed by Hepatology  -Noted mild transaminitis and thrombocytopenia which are not new.  Noted epistaxis and gingival bleeding which has been ongoing for several months.  These are chronic and note history of cirrhosis and alcohol abuse   -Follows with hepatology and was seen last month  -Abdominal US on day prior to admit showed hepatic cirrhosis/steatosis with sonographic findings of portal hypertension including trace ascites and splenomegaly.

## 2022-02-08 NOTE — PROGRESS NOTES
[facility] [place] [facility] Medicine  Progress Note    Patient Name: Tammi Farris  MRN: [id number]  Patient Class: IP- Inpatient   Admission Date: 2/1/2022  Length of Stay: 6 days  Attending Physician: Chadwick Quan MD  Primary Care Provider: Ann-Marie Hartman MD        Subjective:     Principal Problem:Acute renal failure superimposed on stage 2 chronic kidney disease        HPI:  71 y.o. female with HTN, HLD, GERD, cirrhosis of liver, thrombocytopenia, and sleep apnea presents with a complaint of cough and congestion since October.  She is chronically dyspneic, exacerbated by exertion, has seen Cardiology and was prescribed lasix and metoprolol.  Her sister has now noted that the patient has very low urine output and is constipated.  Also follows with Hepatology for chronic liver disease.  Patient denies fever, chills, chest pain, palpitations, orthopnea, PND, dizziness, syncope, n/v/d, abdominal pain, or dysuria.  In the ED, labs reveal ANI, hyperkalemia, metabolic acidosis with elevated lactic acid, elevated liver enzymes, elevated BNP, elevated d-dimer, thrombocytopenia, and markedly concentrated urine.  Echo December 2021 showed preserved EF, no evidence of heart failure.  Chest xray without acute abnormality. No convincing evidence of infectious process.      Overview/Hospital Course:  No notes on file    Interval History: No acute events overnight.  Mildly confused this morning when I saw her.  Breathing comfortably on 3L still with crackles on exam.  Still very weak.  Denies chest pain.  Family at bedside.  All questions answered and patient had no further complaints.    Review of Systems   Constitutional: Positive for fatigue. Negative for chills and fever.   HENT: Positive for congestion.    Eyes: Negative for photophobia and visual disturbance.   Respiratory: Positive for cough and shortness of breath.    Cardiovascular: Negative for chest pain, palpitations and leg swelling.   Gastrointestinal:  Negative for abdominal pain, constipation, diarrhea, nausea and vomiting.   Genitourinary: Positive for decreased urine volume. Negative for dysuria, frequency and urgency.   Skin: Negative for pallor, rash and wound.   Neurological: Positive for weakness. Negative for light-headedness and headaches.   Psychiatric/Behavioral: Positive for confusion. Negative for decreased concentration. The patient is nervous/anxious.      Objective:     Vital Signs (Most Recent):  Temp: 97.4 °F (36.3 °C) (02/07/22 1123)  Pulse: (!) 54 (02/07/22 1545)  Resp: 17 (02/07/22 1545)  BP: (!) 97/51 (02/07/22 1545)  SpO2: 100 % (02/07/22 1545) Vital Signs (24h Range):  Temp:  [97.3 °F (36.3 °C)-98 °F (36.7 °C)] 97.4 °F (36.3 °C)  Pulse:  [51-67] 54  Resp:  [12-22] 17  SpO2:  [9 %-100 %] 100 %  BP: ()/(46-71) 97/51     Weight: 73.1 kg (161 lb 2.5 oz)  Body mass index is 34.87 kg/m².    Intake/Output Summary (Last 24 hours) at 2/7/2022 1719  Last data filed at 2/7/2022 0600  Gross per 24 hour   Intake --   Output 500 ml   Net -500 ml      Physical Exam  Vitals and nursing note reviewed.   Constitutional:       General: She is not in acute distress.     Appearance: She is well-developed. She is ill-appearing. She is not toxic-appearing or diaphoretic.      Interventions: Nasal cannula in place.   HENT:      Head: Normocephalic and atraumatic.      Right Ear: External ear normal.      Left Ear: External ear normal.      Nose: Nose normal.      Mouth/Throat:      Mouth: Mucous membranes are moist.   Eyes:      Extraocular Movements: Extraocular movements intact.      Conjunctiva/sclera: Conjunctivae normal.   Cardiovascular:      Rate and Rhythm: Normal rate and regular rhythm.   Pulmonary:      Effort: No tachypnea or respiratory distress.      Breath sounds: No wheezing.      Comments: Improved work of breathing and looks comfortable.  Still with significant pulmonary crackles, stable over 24 hours  Abdominal:      General: Bowel sounds  are normal. There is no distension.      Palpations: Abdomen is soft.      Tenderness: There is no abdominal tenderness.      Comments: No palpable hepatomegaly or splenomegaly    Musculoskeletal:         General: No tenderness. Normal range of motion.      Cervical back: Normal range of motion and neck supple.      Right lower leg: Edema present.      Left lower leg: Edema present.   Skin:     General: Skin is warm and dry.   Neurological:      Mental Status: She is alert and oriented to person, place, and time.      Comments: Mildly confused.  Moves all extremities but is diffusely weak   Psychiatric:         Thought Content: Thought content normal.         Significant Labs: All pertinent labs within the past 24 hours have been reviewed.    Significant Imaging: I have reviewed all pertinent imaging results/findings within the past 24 hours.      Assessment/Plan:      * Acute renal failure superimposed on stage 2 chronic kidney disease  -Admitted to inpatient status  -Baseline Cr 0.9.  On admit Cr 2.7  -Reports being started on lasix 1/8 by her cardiologist and over several days PTA had decreased UOP and diminished PO intake  -On admit BNP elevated at 1160 and with metabolic acidosis and mild hyperkalemia   -Avoid nephrotoxic agents and renally dose meds  -FEUrea 7.1% suggestive of pre-renal sources  -Consulted and discussed with nephrology.  -Complex situation to clearly determine volume status and initially felt to be volume deplete.  Was treated with IV fluids initially, then lasix drip.  Ultimately after extensive discussion with nephrology was felt to be third spacing with intravascular volume depletion +/- vasculitis or pulmonary renal syndrome.  -Respiratory decompensation onovernight 2/3-2/4 and transferred to ICU and lasix drip given but no urine output so discontinued.  Stabilized and stepped down on 2/5.  -Ongoing workup for pulmonary/renal syndrome and vasculitis.  Noted Positive ARMEN in 2019  -C3 and C4  are low.  RF and CCP are negative.  c and p ANCA negative.  GBM Ab negative  DS DNA negative.  Await ARMEN.  -SPEP shows elevated K/L ratio of 2.1.  Discussed with Dr. Maldonado - unclear if due to renal failure or not.  Await immunofixation results.  Dr. Maldonado will followup tomorrow.  -Needs kidney biopsy - IR consulted and planned for today but felt to unstable.  -Cr continues to worsen and end encephalopathic today.  IR placed THDC and HD initiated.  -Today is day 3 of pulse dose steroids for suspected glomerulonephritis vs pulm/renal syndrome.  Will change to prednisone 60mg starting tomorrow and anticipate taper - pending full workup.  -Repeat BMP in AM.    Acute respiratory failure with hypoxia  -Patient with persistent significant shortness of breath and hypoxia requiring supplemental O2  -Not on oxygen at home  -On admit had clear breath sounds but on 2/2 has significant pulmonary crackles and increased work of breathing  -CXR showed possible pulmonary edema and BNP is rather elevated  -Also noted significantly elevated D-dimer on admit.  V/Q scan showed very low probability of pulmonary embolism.  Stopped full dose lovenox 2/2  -Noted low Hb 2/2 and ordered PRBC, but due to complex antibody matching did not receive until 2/4.  Hb improved.    -While had some signs of fluid overload - ultimately believed to be third spacing and not fluid overload.  Lasix drip not helpful and near anuric so it was stopped.  -Transferred to icu on evening of 2/3 due to respiratory distress.  Treated with bipap QHS/PRN and supplemental O2 and now stable > 24 hours.  Step down to the floor on 2/5.  -Pulmonology consulted and discussed with Dr. Garrett.  Concerned for possible pulmonary renal syndrome.  Vasculitis workup as above.  Await RIP.  -Continue supplemental O2 and bipap qhs/prn.    Cirrhosis of liver without ascites  -Followed by Hepatology  -Noted mild transaminitis and thrombocytopenia which are not new.  Noted epistaxis  and gingival bleeding which has been ongoing for several months.  These are chronic and note history of cirrhosis and alcohol abuse   -Follows with hepatology and was seen last month  -Abdominal US on day prior to admit showed hepatic cirrhosis/steatosis with sonographic findings of portal hypertension including trace ascites and splenomegaly.  -Repeat cmp in AM    Macrocytic anemia  -Hb 9.2 on admit - chronic secondary to cirrhosis.  -Dropped to <7 on 2/2  -No evidence of GI bleeding but has noted epistaxis and bleeding gums at times since before hospitalization.  -Folate and B12 replete.  She is iron deficient.  -1 unit PRBC ordered 2/2 but transfusion delayed due to complex antibody matching.  Blood finally available and she received 1 unit PRBC 2/4.  Hb 8.5 today  -LDH normal.  Mild Tbili elevation.  Retic is not elevated as should be.  -GI consulted and input appreciated.  Needs EGD, but no acute need in hospital.  Planned as outpatient later this month.  -Heme/onc consulted 2/4 and input appreciated.  Doubt hemolysis.  More likely marrow suppression from acute illness and renal failure.    -Abnormal K/L ratio on SPEP - discussed with Dr. Maldonado who will follow up tomorrow.  Unclear significance.  -Check cbc q48 for now to minimize effect of phlebotomy.    Thrombocytopenia  -Chronic and at baseline on admit secondary to cirrhosis  -Decreased to 33k on 2/4 but back to baseline - today 105k.  -Heme/onc consulted and input appreciated  -Hit Ab negative.  Await [id number] but doubt ttp/itp.  -Plan is transfuse for platelets <10 or if significant hemorrhage.  -Discussed with heme need for bone marrow biopsy.    Positive D dimer  -D-dimer rather elevated in ER and short of breath  -Doppler us of legs without evidence of DVT  -V/Q very low probability for PE.  -Stopped lovenox 2/2    Hyperkalemia  -Noted on admit and mild  -No EKG changes  -K has normalized - treating with lokelma - now HD.  -Repeat bmp in AM    MANUEL  (generalized anxiety disorder)  -Very anxious today  -On 2/6 gave one time low dose ativan PO - watch respiratory status very closely.    Debility  -When more stable will consult PT/OT    Metabolic acidosis  -Suspect secondary to ANI, management as above    Sleep apnea  -Continue bipap qhs    Gastroesophageal reflux disease  -Stable, no acute issue    HTN (hypertension)  -BP low normal  -Continue metoprolol with hold parameters    Hyperlipidemia  -History noted  -Not on statin due to cirrhosis      VTE Risk Mitigation (From admission, onward)         Ordered     IP VTE HIGH RISK PATIENT  Once         02/01/22 1557     Place sequential compression device  Until discontinued         02/01/22 1557                Discharge Planning   CLAUDETTE:      Code Status: Full Code   Is the patient medically ready for discharge?:     Reason for patient still in hospital (select all that apply): Treatment  Discharge Plan A: Home Health   Discharge Delays: (!) Post-Acute Set-up              Chadwick Quan MD  Department of Hospital Medicine   [facility] [place]

## 2022-02-08 NOTE — PT/OT/SLP PROGRESS
Physical Therapy      Patient Name:  Tammi Farris   MRN:  [id number]    Patient not seen today for PT tx secondary to Dialysis. Pt receiving HD in the room, per HD nurse dialysis just getting started and will need ~2.5 hrs. Will follow-up as able.

## 2022-02-08 NOTE — CARE UPDATE
Responded to rapid response.  Monitor had shown HR in 170's.  EKG done showing afib with RVR with HR in 130's.  5 mg of IV Lopressor given with improvement of HR.  Vital signs looking more stable, but patient's mentation is definitely different from this morning.  Trying to climb out of bed.  Monitor showing episodes of Vtach.  Patient needs closer monitoring.  Will transfer to ICU.

## 2022-02-08 NOTE — PT/OT/SLP PROGRESS
Occupational Therapy      Patient Name:  Tammi Farris   MRN:  [id number]    Patient not seen today secondary to Dialysis (Pt receiving HD in the room, per HD nurse dialysis just getting started and will need ~2.5 hrs). Will follow-up later as able.     2/8/2022

## 2022-02-08 NOTE — NURSING
RAPID RESPONSE NURSE NOTE        Admit Date: 2022  LOS: 7  Code Status: Full Code   Date of Consult: 2022  : 1950  Age: 71 y.o.  Weight:   Wt Readings from Last 1 Encounters:   22 73.1 kg (161 lb 2.5 oz)     Sex: female  Race: White   Bed: [place] [address] B:   MRN: [id number]  Time Rapid Response Team page Received:1522Time Rapid Response Team at Bedside: 1526  Time Rapid Response Team left Bedside: 1555  Was the patient discharged from an ICU this admission? Yes   Was the patient discharged from a PACU within last 24 hours? No   Did the patient receive conscious sedation/general anesthesia in last 24 hours? No   Was the patient in the ED within the past 24 hours? No   Was the patient on NIPPV within the past 24 hours? Yes   Did this progress into an ARC or CPA:  no  Attending Physician: Jose Martin Renee MD  Primary Service: Hospitalist     SITUATION     Notified by overhead page.  Reason for alert: Tachycardia  Called to evaluate the patient for Dysrhythmia    Why is the patient in the hospital?: Acute renal failure superimposed on stage 2 chronic kidney disease    Patient has a past medical history of Allergy, Anxiety, Basal cell carcinoma, Cirrhosis of liver without ascites, Depression, Diabetes mellitus, type 2, Disorder of kidney and ureter, Endometriosis, GERD (gastroesophageal reflux disease), Hyperlipidemia, Hypertension, Joint pain, and Psoriasis.    Last Vitals:  Temp: 97.9 °F (36.6 °C) ( 1527)  Pulse: 89 ( 1455)  Resp: 22 ( 1420)  BP: 118/50 ( 1455)  SpO2: 96 % ( 1311)    24 Hours Vitals Range:  Temp:  [97.3 °F (36.3 °C)-98 °F (36.7 °C)]   Pulse:  [52-94]   Resp:  [12-26]   BP: (100-148)/(43-79)   SpO2:  [93 %-100 %]     Labs:  Recent Labs     22  0759 22  0516   WBC 9.02 10.24 9.15   HGB 8.5* 8.0* 8.3*   HCT 26.1* 23.8* 25.9*   * 82* 74*       Recent Labs     22  0534 22  0913 22  0443 22  195  02/08/22  0516   NA  --    < > 137 137 137   K  --    < > 5.1 4.5 4.8   CL  --    < > 99 98 100   CO2  --    < > 17* 18* 18*   CREATININE  --    < > 6.4* 4.6* 5.3*   GLU  --    < > 173* 150* 163*   PHOS 8.6*  --   --   --   --    MG  --   --   --  2.3  --     < > = values in this interval not displayed.        Recent Labs     02/07/22  1931   PH 7.505*   PCO2 28.6*   PO2 76*   HCO3 22.5*   POCSATURATED 96   BE 0        ASSESSMENT/INTERVENTIONS  EKG    RECOMMENDATIONS    We recommend:possible transfer to ICU    Discussed plan of care with charge RNSydnie    PROVIDER ESCALATION    Orders received and case discussed with Dr. Renee and Dr. Oliva.    Disposition: awaiting disposition    FOLLOW UP  Call the Rapid Response NurseRose at x [phone] for additional questions or concerns.

## 2022-02-08 NOTE — ASSESSMENT & PLAN NOTE
Baseline thrombocytopenia secondary to known cirrhosis. Worsening thrombocytopenia may be due to acute illness. Differentials include consumption with evidence of varices on CT scan (last EGD 2019 did not show varices). 4T score = 2, low probability of HIT. PLASMIC score = 4, low.   HIPAb 0.35 OD HIT unlikely   -transfuse for plt <10 or if she becomes hemodynamically unstable due to significant hemorrhage  -limit blood draws to daily at most if possible.  -will defer on bone marrow biopsy at this time

## 2022-02-08 NOTE — PROGRESS NOTES
Hemodialysis done x 2.5 hours/pt reinfused. Pt more alert in comparison to pre-assessment. Pt unable to voice any needs or concerns. Unable to assess orientation at this time. Frequent squirming in bed, with numerous redirecting required. Flushed right chest wall perm cvc, heparin locked, caps applied. Report given to SHALOM Garcia RN via secure chat/pt returned to room 428 via bed per transporter.    Net fluid removed 1000 ml as tolerated. VSS remained stable during tx.

## 2022-02-08 NOTE — NURSING
Monitor tech called that pt's HR in the 170s on SVT while on ongoing Dialysis at bedside, pt is restless.. Rapid response called, , T 97.9, /50, on 3L NC.. 12 lead EKG done, New orders placed for Labs and IV Lopressor, awaiting transfer to ICU.

## 2022-02-08 NOTE — PROGRESS NOTES
[facility]  Rapid Response Nurse Intervention/ Task    Date of Visit: 02/08/2022  Time of Visit: 1919         INTERVENTIONS/ TASK Completed:     Rapid Response Called in dialysis room on 4th floor.   Patients patient is lethargic, able to open eyes to painful stimuli, unable to understand speech, vss as charted, rapid team immediately at bedside(Dr. Cash, ICU Charge Nurse, & Respiratory therapists), Narcan 0.2mg IVP, labs drawn, ABG obtained, and portable chest xray done.  Patient slightly more awake after Narcan administered. Patient will remain on 4th floor.

## 2022-02-08 NOTE — ASSESSMENT & PLAN NOTE
-Noted on admit and mild  -No EKG changes  -K has normalized - treating with lokelma - now HD.  -Repeat bmp in AM

## 2022-02-08 NOTE — PROGRESS NOTES
[facility]  Hematology/Oncology  Progress Note    Patient Name: Tammi Farris  Admission Date: 2/1/2022  Hospital Length of Stay: 7 days  Code Status: Full Code     Subjective:     HPI:  71 y.o. female with HTN, HLD, GERD, cirrhosis of liver, thrombocytopenia, and sleep apnea presents with a complaint of cough and congestion since October.  She is chronically dyspneic, exacerbated by exertion, has seen Cardiology and was prescribed lasix and metoprolol.  Her sister has now noted that the patient has very low urine output and is constipated.  Also follows with Hepatology for chronic liver disease.  Patient denies fever, chills, chest pain, palpitations, orthopnea, PND, dizziness, syncope, n/v/d, abdominal pain, or dysuria.  In the ED, labs reveal ANI, hyperkalemia, metabolic acidosis with elevated lactic acid, elevated liver enzymes, elevated BNP, elevated d-dimer, thrombocytopenia, and markedly concentrated urine.  Echo December 2021 showed preserved EF, no evidence of heart failure.  Chest xray without acute abnormality. No convincing evidence of infectious process.  Admitted for acute hypoxic respiratory failure secondary to viral infection (procalcitonin wnl, covid19 negative) complicated by ANI and worsening cytopenias.  Heme onc consulted regarding anemia and thrombocytopenia.            Interval History: Pt not talkative, friend reports pt needs to go to bathroom and in pain.       VIRTUAL TELENOTE    Start time:4:05pm  Chief complaint: f/u anemia  The patient location is: [facility]  The patient arrived at: 4: 07pm  Present with the patient at the time of the telemed/virtual assessment:friend    End time:  4:10 pm    Total time spent with patient: 5min    The attending portion of this evaluation, treatment, and documentation was performed per Vidya Maldonado MD via Telemedicine AudioVisual using the secure tic software platform with 2 way audio/video. The provider was located off-site and the  patient is located in the hospital. The aforementioned video software was utilized to document the relevant history and physical exam.  Oncology Treatment Plan:   [No treatment plan]    Medications:  Continuous Infusions:  Scheduled Meds:   albuterol-ipratropium  3 mL Nebulization Q6H    famotidine  20 mg Oral Daily    insulin detemir U-100  5 Units Subcutaneous QHS    lactulose  200 g Rectal TID    metoprolol tartrate  25 mg Oral BID    polyethylene glycol  17 g Oral BID    predniSONE  60 mg Oral Daily    sodium zirconium cyclosilicate  10 g Oral Daily     PRN Meds:sodium chloride, acetaminophen, benzonatate, bisacodyL, dextrose 50%, dextrose 50%, glucagon (human recombinant), glucose, glucose, guaiFENesin 100 mg/5 ml, heparin (porcine), insulin aspart U-100, iohexoL, melatonin, naloxone, ondansetron, simethicone, sodium chloride 0.9%     Review of Systems   Unable to perform ROS: Other     Objective:     Vital Signs (Most Recent):  Temp: 97.9 °F (36.6 °C) (02/08/22 1527)  Pulse: 89 (02/08/22 1455)  Resp: (!) 22 (02/08/22 1420)  BP: (!) 118/50 (02/08/22 1455)  SpO2: 96 % (02/08/22 1311) Vital Signs (24h Range):  Temp:  [97.3 °F (36.3 °C)-98 °F (36.7 °C)] 97.9 °F (36.6 °C)  Pulse:  [52-94] 89  Resp:  [12-26] 22  SpO2:  [93 %-100 %] 96 %  BP: (100-148)/(43-79) 118/50     Weight: 73.1 kg (161 lb 2.5 oz)  Body mass index is 34.87 kg/m².  Body surface area is 1.71 meters squared.      Intake/Output Summary (Last 24 hours) at 2/8/2022 1608  Last data filed at 2/8/2022 1500  Gross per 24 hour   Intake 500 ml   Output 1501 ml   Net -1001 ml       Physical Exam    Significant Labs:   All pertinent labs within the past 24 hours have been reviewed    Diagnostic Results:  I have reviewed and interpreted all pertinent imaging results/findings within the past 24 hours    Assessment/Plan:     Acute respiratory failure with hypoxia  Possibly secondary to non-COVID related viral infection. procalcitonin wnl, covid19  negative.  Viral cause of her multiorgan failure is also a reasonable cause contributing to her worsening cytopenias.  -blood cx ngtd  -can consider respiratory infection panel (PCR)/ID consult if patient continues to deteriorate  -Management per primary team    Thrombocytopenia  Baseline thrombocytopenia secondary to known cirrhosis. Worsening thrombocytopenia may be due to acute illness. Differentials include consumption with evidence of varices on CT scan (last EGD 2019 did not show varices). 4T score = 2, low probability of HIT. PLASMIC score = 4, low.   HIPAb 0.35 OD HIT unlikely   -transfuse for plt <10 or if she becomes hemodynamically unstable due to significant hemorrhage  -limit blood draws to daily at most if possible.  -will defer on bone marrow biopsy at this time    Macrocytic anemia  Chronic macrocytic anemia since 2019, presumed secondary to cirrhosis.  Anemia worsening.  LDH WNL, mild elevated bilirubin.  Overall low concern for acute hemolytic process as primary reason for anemia.  Suspect anemia worsening related to marrow suppression from acute illness with possible worsening if she has GI bleed (risk cirrhosis).  Development of acute renal failure also another contributing factor to anemia.  No other reversible causes at this time.  retic inappropriately normal confirming other elements of marrow suppression likely at play.  -limit lab draws if possible.  - VALNETINO pos 2/2  cold autob, haptoglobin and LDH wnl  - plan cold agglutinin titer  - Hb 8.3g/dL   - SPEP/SHERI reveals no monoclonal peaks   - Serum KFLC 43.2mg/dL LFLC 20.6mg/dl K/L FLCR 2.1 likely 2/2 renal impairment vs MGUS with renal impairment   -transfuse for hemoglobin <7 or if hemodynamically unstable due to hemorrhage.  -will defer on bone marrow biopsy at this time.            Vidya Maldonado MD  Hematology/Oncology  [facility] [place]

## 2022-02-08 NOTE — PROGRESS NOTES
Renal Progress Note    Date of Admission:  2/1/2022  7:35 AM    Length of Stay: 7  Days    Subjective: n/a    Objective:    Current Facility-Administered Medications   Medication    0.9%  NaCl infusion (for blood administration)    acetaminophen tablet 650 mg    albuterol-ipratropium 2.5 mg-0.5 mg/3 mL nebulizer solution 3 mL    benzonatate capsule 100 mg    bisacodyL suppository 10 mg    dextrose 50% injection 12.5 g    dextrose 50% injection 25 g    famotidine tablet 20 mg    glucagon (human recombinant) injection 1 mg    glucose chewable tablet 16 g    glucose chewable tablet 24 g    guaiFENesin 100 mg/5 ml syrup 200 mg    heparin (porcine) injection 3,200 Units    insulin aspart U-100 pen 0-5 Units    insulin detemir U-100 pen 5 Units    iohexoL (OMNIPAQUE 350) injection 100 mL    lactulose 10 gram/15 mL enema solution (inpatient use) 200 g    melatonin tablet 6 mg    metoprolol tartrate (LOPRESSOR) tablet 25 mg    naloxone 0.4 mg/mL injection 0.02 mg    ondansetron injection 4 mg    polyethylene glycol packet 17 g    predniSONE tablet 60 mg    simethicone chewable tablet 80 mg    sodium chloride 0.9% flush 10 mL    sodium zirconium cyclosilicate packet 10 g       Vitals:    02/08/22 0400 02/08/22 0559 02/08/22 0733 02/08/22 0815   BP:  (!) 118/50  (!) 110/53   BP Location:  Left arm     Patient Position:  Lying     Pulse: 67 71 71 76   Resp: 19 20 18 17   Temp:  97.4 °F (36.3 °C)  97.3 °F (36.3 °C)   TempSrc:  Oral     SpO2: 100% 97% 96% 97%   Weight:       Height:           I/O last 3 completed shifts:  In: 500 [Other:500]  Out: 2000 [Urine:500; Other:1500]  No intake/output data recorded.      Physical Exam:     General: NAD  Neck: supple  Heart: RRR  Lungs: unlabored breathing  Abdomen: n/a  Limbs: some echymosis upper extremities  Neurologic: obtunded      Laboratories:    Recent Labs   Lab 02/08/22  0516   WBC 9.15   RBC 2.46*   HGB 8.3*   HCT 25.9*   PLT  74*   *   MCH 33.7*   MCHC 32.0       Recent Labs   Lab 02/08/22  0516   CALCIUM 9.4   PROT 7.7      K 4.8   CO2 18*      BUN 89*   CREATININE 5.3*   ALKPHOS 68   ALT 22   AST 29   BILITOT 1.5*       No results for input(s): COLORU, CLARITYU, SPECGRAV, PHUR, PROTEINUA, GLUCOSEU, BLOODU, WBCU, RBCU, BACTERIA, MUCUS in the last 24 hours.    Invalid input(s):  BILIRUBINCON    Microbiology Results (last 7 days)     Procedure Component Value Units Date/Time    Urine Culture High Risk [[id number]]  (Abnormal) Collected: 02/04/22 1322    Order Status: Completed Specimen: Urine, Catheterized Updated: 02/07/22 1149     Urine Culture, Routine CIRA GLABRATA  > 100,000 cfu/ml  Treatment of asymptomatic candiduria is not recommended (except for   specific populations). Candida isolated in the urine typically   represents colonization. If an indwelling urinary catheter is present  it should be removed or replaced.      Narrative:      Indicated criteria for high risk culture:->Other  Other (specify):->navneet    Blood Culture #2 **CANNOT BE ORDERED STAT** [[id number]] Collected: 02/01/22 1120    Order Status: Completed Specimen: Blood from Peripheral, Antecubital, Left Updated: 02/05/22 1303     Blood Culture, Routine No Growth after 4 days.     Blood Culture #1 **CANNOT BE ORDERED STAT** [[id number]] Collected: 02/01/22 1121    Order Status: Completed Specimen: Blood from Peripheral, Antecubital, Right Updated: 02/05/22 1303     Blood Culture, Routine No Growth after 4 days.     Respiratory Infection Panel (PCR), Nasopharyngeal [[id number]] Collected: 02/04/22 1645    Order Status: Completed Specimen: Nasopharyngeal Swab Updated: 02/04/22 1658     Respiratory Infection Panel Source NP Swab    Narrative:      Nasal Swab Only - for all other respiratory sources, order  [id number] - Respiratory Viral Panel by PCR (RSPFA)            Diagnostic Tests:     CXR:    FINDINGS:   The tip of the central venous line is in the  distal superior vena cava.  The cardiac silhouette is prominent and may be exaggerated by mildly low lung volumes.  There is persistent increased interstitial attenuation, which appears to be greater on the right than the left.  There is no focal consolidation, pneumothorax, or pleural effusion.    Impression:       As above described.       Electronically signed by: Charla Combs   Date: 02/07/2022              Assessment:    72 y/o female with Hx. Cirrhosis of Liver admitted with:    - ANI et? Suspected GN (low complements and microscopic hematuria but neg. ANCA and anti-GBM) already started on Prednisone per Dr. Alvarado but creatinine climbing as of yesterday therefore Dialysis started.  - Abnormal SPEP   - HyperK+  RESOLVED  - HAGMA  - s/p Acute respiratory failure with hypoxia  - HTN  - Macrocytic anemia with Fe++ def.  - Chronic thrombocytopenia  - Yeast UTI  - GERD  - PAULINA  - HLD          Plan:    - Dialysis today again and daily for now  - Kidney Bx. Postponed Until more stable  - Hematology following  - Following Qgw-fiagi-qwius-acid/base  - Other problems per admitting    Above discussed with Family member.

## 2022-02-08 NOTE — RESPIRATORY THERAPY
Results reported to Dr Cash.     Results for ARMEN ZELAYA (MRN [id number]) as of 2/7/2022 22:51   Ref. Range 2/7/2022 19:31   POC PH Latest Ref Range: 7.35 - 7.45  7.505 (H)   POC PCO2 Latest Ref Range: 35 - 45 mmHg 28.6 (LL)   POC PO2 Latest Ref Range: 80 - 100 mmHg 76 (L)   POC BE Latest Ref Range: -2 to 2 mmol/L 0   POC HCO3 Latest Ref Range: 24 - 28 mmol/L 22.5 (L)   POC SATURATED O2 Latest Ref Range: 95 - 100 % 96   POC TCO2 Latest Ref Range: 23 - 27 mmol/L 23   FiO2 Unknown 36   Flow Unknown 4   Sample Unknown ARTERIAL   DelSys Unknown Nasal Can   Allens Test Unknown Pass   Site Unknown RR   Mode Unknown SPONT

## 2022-02-08 NOTE — SUBJECTIVE & OBJECTIVE
Interval History: Pt not talkative, friend reports pt needs to go to bathroom and in pain.       VIRTUAL TELENOTE    Start time:4:05pm  Chief complaint: f/u anemia  The patient location is: [facility]  The patient arrived at: 4: 07pm  Present with the patient at the time of the telemed/virtual assessment:friend    End time:  4:10 pm    Total time spent with patient: 5min    The attending portion of this evaluation, treatment, and documentation was performed per Vidya Maldonado MD via Telemedicine AudioVisual using the secure [employer] software platform with 2 way audio/video. The provider was located off-site and the patient is located in the hospital. The aforementioned video software was utilized to document the relevant history and physical exam.  Oncology Treatment Plan:   [No treatment plan]    Medications:  Continuous Infusions:  Scheduled Meds:   albuterol-ipratropium  3 mL Nebulization Q6H    famotidine  20 mg Oral Daily    insulin detemir U-100  5 Units Subcutaneous QHS    lactulose  200 g Rectal TID    metoprolol tartrate  25 mg Oral BID    polyethylene glycol  17 g Oral BID    predniSONE  60 mg Oral Daily    sodium zirconium cyclosilicate  10 g Oral Daily     PRN Meds:sodium chloride, acetaminophen, benzonatate, bisacodyL, dextrose 50%, dextrose 50%, glucagon (human recombinant), glucose, glucose, guaiFENesin 100 mg/5 ml, heparin (porcine), insulin aspart U-100, iohexoL, melatonin, naloxone, ondansetron, simethicone, sodium chloride 0.9%     Review of Systems   Unable to perform ROS: Other     Objective:     Vital Signs (Most Recent):  Temp: 97.9 °F (36.6 °C) (02/08/22 1527)  Pulse: 89 (02/08/22 1455)  Resp: (!) 22 (02/08/22 1420)  BP: (!) 118/50 (02/08/22 1455)  SpO2: 96 % (02/08/22 1311) Vital Signs (24h Range):  Temp:  [97.3 °F (36.3 °C)-98 °F (36.7 °C)] 97.9 °F (36.6 °C)  Pulse:  [52-94] 89  Resp:  [12-26] 22  SpO2:  [93 %-100 %] 96 %  BP: (100-148)/(43-79) 118/50     Weight: 73.1 kg (161 lb 2.5  oz)  Body mass index is 34.87 kg/m².  Body surface area is 1.71 meters squared.      Intake/Output Summary (Last 24 hours) at 2/8/2022 1608  Last data filed at 2/8/2022 1500  Gross per 24 hour   Intake 500 ml   Output 1501 ml   Net -1001 ml       Physical Exam    Significant Labs:   All pertinent labs within the past 24 hours have been reviewed    Diagnostic Results:  I have reviewed and interpreted all pertinent imaging results/findings within the past 24 hours

## 2022-02-08 NOTE — CONSULTS
TN spoke with patient and pt's brother, Alfredo at bedside regarding outpt HD dialysis. Patient accepting to [facility] and would prefer MWF afternoon if possible.     TN sent outpatient HD referral in [facility] portal. Acute heb panel, chest xray and flowsheets submitted for review.     2:35pm Per [facility] portal, chair time held for TTS afternoon at [facility], pending clinical and financial review. Request for Heb B core antibody and Heb B surface antibody, physician notified.      02/08/22 1316   Post-Acute Status   Post-Acute Authorization Dialysis   Diaylsis Status Pending medical clearance/testing   Coverage [employer]   Patient choice form signed by patient/caregiver List from [facility]   Discharge Delays (!) Dialysis Set-up   Discharge Plan   Discharge Plan A Home Health   Discharge Plan B Home with family

## 2022-02-08 NOTE — PROGRESS NOTES
[facility] Medicine  Progress Note    Patient Name: Tammi Farris  MRN: [id number]  Patient Class: IP- Inpatient   Admission Date: 2/1/2022  Length of Stay: 7 days  Attending Physician: Jose Martin Renee MD  Primary Care Provider: Ann-Marei Hartman MD        Subjective:     Principal Problem:Acute renal failure superimposed on stage 2 chronic kidney disease        HPI:  71 y.o. female with HTN, HLD, GERD, cirrhosis of liver, thrombocytopenia, and sleep apnea presents with a complaint of cough and congestion since October.  She is chronically dyspneic, exacerbated by exertion, has seen Cardiology and was prescribed lasix and metoprolol.  Her sister has now noted that the patient has very low urine output and is constipated.  Also follows with Hepatology for chronic liver disease.  Patient denies fever, chills, chest pain, palpitations, orthopnea, PND, dizziness, syncope, n/v/d, abdominal pain, or dysuria.  In the ED, labs reveal ANI, hyperkalemia, metabolic acidosis with elevated lactic acid, elevated liver enzymes, elevated BNP, elevated d-dimer, thrombocytopenia, and markedly concentrated urine.  Echo December 2021 showed preserved EF, no evidence of heart failure.  Chest xray without acute abnormality. No convincing evidence of infectious process.      Overview/Hospital Course:  71 year old woman with HTN, HLD, GERD, CKDII, cirrhosis and PAULINA presented for sob and has been diagnosed with ani/CKDII and acute hypoxic respiratory failure.  Complex patient and presentation with very difficult to ascertain volume status.  Was given lasix and then fluids and then attempt at lasix again.  Suspect a pulmonary/renal vasculitis but not clearly diagnosed (Prior TAMMI positive 2019, low C3 and C4 and abnormal SPEP with elevated kappa/lambda ratio).  Had planned renal biopsy, but encephalopathic, anuric and worsening acidosis so THDC placed and initiated HD on 2/7.  Has completed 3 days pulse dose steroids  (1g daily) and will be on prednisone 60 mg.       Interval History: More awake and less confused this morning.    Review of Systems   HENT: Negative for ear discharge and ear pain.    Eyes: Negative for discharge and itching.   Endocrine: Negative for cold intolerance and heat intolerance.   Neurological: Negative for seizures and syncope.     Objective:     Vital Signs (Most Recent):  Temp: 98 °F (36.7 °C) (02/08/22 1114)  Pulse: 89 (02/08/22 1455)  Resp: (!) 22 (02/08/22 1420)  BP: (!) 118/50 (02/08/22 1455)  SpO2: 96 % (02/08/22 1311) Vital Signs (24h Range):  Temp:  [97.3 °F (36.3 °C)-98 °F (36.7 °C)] 98 °F (36.7 °C)  Pulse:  [52-94] 89  Resp:  [12-26] 22  SpO2:  [93 %-100 %] 96 %  BP: ()/(43-79) 118/50     Weight: 73.1 kg (161 lb 2.5 oz)  Body mass index is 34.87 kg/m².    Intake/Output Summary (Last 24 hours) at 2/8/2022 1506  Last data filed at 2/7/2022 1900  Gross per 24 hour   Intake 500 ml   Output 1500 ml   Net -1000 ml      Physical Exam  Vitals and nursing note reviewed.   Constitutional:       General: She is not in acute distress.     Appearance: She is well-developed. She is ill-appearing. She is not toxic-appearing or diaphoretic.      Interventions: Nasal cannula in place.   HENT:      Head: Normocephalic and atraumatic.      Right Ear: External ear normal.      Left Ear: External ear normal.      Nose: Nose normal.      Mouth/Throat:      Mouth: Mucous membranes are moist.   Eyes:      Extraocular Movements: Extraocular movements intact.      Conjunctiva/sclera: Conjunctivae normal.   Cardiovascular:      Rate and Rhythm: Normal rate and regular rhythm.   Pulmonary:      Effort: No tachypnea or respiratory distress.      Breath sounds: No wheezing.      Comments: Improved work of breathing and looks comfortable.  Still with significant pulmonary crackles, stable over 24 hours  Abdominal:      General: Bowel sounds are normal. There is no distension.      Palpations: Abdomen is soft.       Tenderness: There is no abdominal tenderness.      Comments: No palpable hepatomegaly or splenomegaly    Musculoskeletal:         General: No tenderness. Normal range of motion.      Cervical back: Normal range of motion and neck supple.      Right lower leg: Edema present.      Left lower leg: Edema present.   Skin:     General: Skin is warm and dry.   Neurological:      Mental Status: She is alert and oriented to person, place, and time.      Comments: Mildly confused.  Moves all extremities but is diffusely weak   Psychiatric:         Thought Content: Thought content normal.         Significant Labs:   All pertinent labs within the past 24 hours have been reviewed.  BMP:   Recent Labs   Lab 02/07/22 1958 02/07/22 1958 02/08/22 0516   *   < > 163*      < > 137   K 4.5   < > 4.8   CL 98   < > 100   CO2 18*   < > 18*   BUN 74*   < > 89*   CREATININE 4.6*   < > 5.3*   CALCIUM 9.4   < > 9.4   MG 2.3  --   --     < > = values in this interval not displayed.     CBC:   Recent Labs   Lab 02/07/22 0759 02/07/22 2039 02/08/22  0516   WBC 9.02 10.24 9.15   HGB 8.5* 8.0* 8.3*   HCT 26.1* 23.8* 25.9*   * 82* 74*       Significant Imaging: I have reviewed all pertinent imaging results/findings within the past 24 hours.      Assessment/Plan:      * Acute renal failure superimposed on stage 2 chronic kidney disease  -Admitted to inpatient status  -Baseline Cr 0.9.  On admit Cr 2.7  -Reports being started on lasix 1/8 by her cardiologist and over several days PTA had decreased UOP and diminished PO intake  -On admit BNP elevated at 1160 and with metabolic acidosis and mild hyperkalemia   -Avoid nephrotoxic agents and renally dose meds  -FEUrea 7.1% suggestive of pre-renal sources  -Consulted and discussed with nephrology.  -Complex situation to clearly determine volume status and initially felt to be volume deplete.  Was treated with IV fluids initially, then lasix drip.  Ultimately after extensive  discussion with nephrology was felt to be third spacing with intravascular volume depletion +/- vasculitis or pulmonary renal syndrome.  -Respiratory decompensation onovernight 2/3-2/4 and transferred to ICU and lasix drip given but no urine output so discontinued.  Stabilized and stepped down on 2/5.  -Ongoing workup for pulmonary/renal syndrome and vasculitis.  Noted Positive ARMEN in 2019  -C3 and C4 are low.  RF and CCP are negative.  c and p ANCA negative.  GBM Ab negative  DS DNA negative.  Await ARMEN.  -SPEP shows elevated K/L ratio of 2.1.  Discussed with Dr. Maldonado - unclear if due to renal failure or not.  Await immunofixation results.   -Needs kidney biopsy - IR consulted and planned for today but felt to unstable.  -Cr continues to worsen and end encephalopathic today.  IR placed THDC and HD initiated.  Completed 3 days of pulse dose steroids for suspected glomerulonephritis vs pulm/renal syndrome.  Changed to prednisone 60mg  and anticipate taper - pending full workup.    MANUEL (generalized anxiety disorder)  -Very anxious today  -On 2/6 gave one time low dose ativan PO - watch respiratory status very closely.    Debility  -When more stable will consult PT/OT    Acute respiratory failure with hypoxia  -Patient with persistent significant shortness of breath and hypoxia requiring supplemental O2  -Not on oxygen at home  -On admit had clear breath sounds but on 2/2 has significant pulmonary crackles and increased work of breathing  -CXR showed possible pulmonary edema and BNP is rather elevated  -Also noted significantly elevated D-dimer on admit.  V/Q scan showed very low probability of pulmonary embolism.  Stopped full dose lovenox 2/2  -Noted low Hb 2/2 and ordered PRBC, but due to complex antibody matching did not receive until 2/4.  Hb improved.    -While had some signs of fluid overload - ultimately believed to be third spacing and not fluid overload.  Lasix drip not helpful and near anuric so it was  stopped.  -Transferred to icu on evening of 2/3 due to respiratory distress.  Treated with bipap QHS/PRN and supplemental O2 and now stable > 24 hours.  Step down to the floor on 2/5.  -Pulmonology consulted and discussed with Dr. Garrett.  Concerned for possible pulmonary renal syndrome.  Vasculitis workup as above.  Await RIP.  -Continue supplemental O2 and bipap qhs/prn.    Hyperkalemia  -Noted on admit and mild  -No EKG changes  -K has normalized - treating with lokelma - now HD.    Metabolic acidosis  -Suspect secondary to ANI, management as above    Sleep apnea  -Continue bipap qhs    Thrombocytopenia  -Chronic and at baseline on admit secondary to cirrhosis  -Decreased to 33k on 2/4 but back to baseline - today 105k.  -Heme/onc consulted and input appreciated  -Hit Ab negative.  Await [id number] but doubt ttp/itp.  -Plan is transfuse for platelets <10 or if significant hemorrhage.  -Discussed with heme need for bone marrow biopsy.    Macrocytic anemia  -Hb 9.2 on admit - chronic secondary to cirrhosis.  -Dropped to <7 on 2/2  -No evidence of GI bleeding but has noted epistaxis and bleeding gums at times since before hospitalization.  -Folate and B12 replete.  She is iron deficient.  -1 unit PRBC ordered 2/2 but transfusion delayed due to complex antibody matching.  Blood finally available and she received 1 unit PRBC 2/4.  Hb 8.5 today  -LDH normal.  Mild Tbili elevation.  Retic is not elevated as should be.  -GI consulted and input appreciated.  Needs EGD, but no acute need in hospital.  Planned as outpatient later this month.  -Heme/onc consulted 2/4 and input appreciated.  Doubt hemolysis.  More likely marrow suppression from acute illness and renal failure.    -Abnormal K/L ratio on SPEP - discussed with Dr. Maldonado who will follow up tomorrow.  Unclear significance.  -Check cbc q48 for now to minimize effect of phlebotomy.    Cirrhosis of liver without ascites  -Followed by Hepatology  -Noted mild  transaminitis and thrombocytopenia which are not new.  Noted epistaxis and gingival bleeding which has been ongoing for several months.  These are chronic and note history of cirrhosis and alcohol abuse   -Follows with hepatology and was seen last month  -Abdominal US on day prior to admit showed hepatic cirrhosis/steatosis with sonographic findings of portal hypertension including trace ascites and splenomegaly.    Gastroesophageal reflux disease  -Stable, no acute issue    HTN (hypertension)  -BP low normal  -Continue metoprolol with hold parameters    Hyperlipidemia  -History noted  -Not on statin due to cirrhosis      VTE Risk Mitigation (From admission, onward)         Ordered     heparin (porcine) injection 3,200 Units  As needed (PRN)         02/07/22 1834     IP VTE HIGH RISK PATIENT  Once         02/01/22 1557     Place sequential compression device  Until discontinued         02/01/22 1557                Discharge Planning   CLAUDETTE:      Code Status: Full Code   Is the patient medically ready for discharge?:     Reason for patient still in hospital (select all that apply): Treatment  Discharge Plan A: Home Health   Discharge Delays: (!) Dialysis Set-up              Jose Martin Renee MD  Department of Hospital Medicine   [facility]

## 2022-02-08 NOTE — ASSESSMENT & PLAN NOTE
-Very anxious today  -On 2/6 gave one time low dose ativan PO - watch respiratory status very closely.

## 2022-02-08 NOTE — AI DETERIORATION ALERT
Artificial Intelligence Notification      Admit Date: 2022  LOS: 7  Code Status: Full Code   Date of Consult: 2022  : 1950  Age: 71 y.o.  Weight:   Wt Readings from Last 1 Encounters:   22 73.1 kg (161 lb 2.5 oz)     Sex: female  Bed: [facility]8/[facility]8 B:   MRN: [id number]  Attending Physician: Chadwick Quan MD  Primary Service: Networked reference to record PCT   Time AI Alert Received: 12:11am     Total critical care time with examination, discussion with nurses, dialysis and lab review: 1 hour           Patient found finishing HD with confusion.  She was able to open her eyes, but unable to cooperate with exam.  She has apparently been very encephalopathic for some time.  She did receive some Fentanyl earlier, so I gave one dose of Narcan 0.2mg with some improvement.  Her ABG did not show any hypercapnia.      I suspect her progressive encephalopathy is both uremic, metabolic, and hepatic (NH3 is up).  Ideally I would like to get CT head, but she is too agitated at the moment, so will try when she settles down.    She had her first (short) session of HD today, so hopefully the uremia will gradually improve.  She has no fever or leukocytosis.    Will start lactulose enemas.      Vital Signs (Most Recent):  Temp: 97.3 °F (36.3 °C) (22)  Pulse: 61 (22)  Resp: 12 (22)  BP: (!) 100/44 (22)  SpO2: 100 % (22) Vital Signs (24h Range):  Temp:  [97.3 °F (36.3 °C)-98 °F (36.7 °C)] 97.3 °F (36.3 °C)  Pulse:  [51-93] 61  Resp:  [12-26] 12  SpO2:  [9 %-100 %] 100 %  BP: ()/(44-79) 100/44     Confused, lethargic and encephalopathic   Moving all extremities  Tachycardia  Bilateral rhonchi    Recent Results (from the past 6 hour(s))   ISTAT PROCEDURE    Collection Time: 22  7:31 PM   Result Value Ref Range    POC PH 7.505 (H) 7.35 - 7.45    POC PCO2 28.6 (LL) 35 - 45 mmHg    POC PO2 76 (L) 80 - 100 mmHg    POC HCO3 22.5 (L) 24 - 28 mmol/L    POC  BE 0 -2 to 2 mmol/L    POC SATURATED O2 96 95 - 100 %    POC TCO2 23 23 - 27 mmol/L    Sample ARTERIAL     Site RR     Allens Test Pass     DelSys Nasal Can     Mode SPONT     Flow 4     FiO2 36     Sp02 96    Magnesium    Collection Time: 02/07/22  7:58 PM   Result Value Ref Range    Magnesium 2.3 1.6 - 2.6 mg/dL   Comprehensive metabolic panel    Collection Time: 02/07/22  7:58 PM   Result Value Ref Range    Sodium 137 136 - 145 mmol/L    Potassium 4.5 3.5 - 5.1 mmol/L    Chloride 98 95 - 110 mmol/L    CO2 18 (L) 23 - 29 mmol/L    Glucose 150 (H) 70 - 110 mg/dL    BUN 74 (H) 8 - 23 mg/dL    Creatinine 4.6 (H) 0.5 - 1.4 mg/dL    Calcium 9.4 8.7 - 10.5 mg/dL    Total Protein 8.5 (H) 6.0 - 8.4 g/dL    Albumin 4.1 3.5 - 5.2 g/dL    Total Bilirubin 1.5 (H) 0.1 - 1.0 mg/dL    Alkaline Phosphatase 73 55 - 135 U/L    AST 33 10 - 40 U/L    ALT 24 10 - 44 U/L    Anion Gap 21 (H) 8 - 16 mmol/L    eGFR if African American 10 (A) >60 mL/min/1.73 m^2    eGFR if non African American 9 (A) >60 mL/min/1.73 m^2   Ammonia    Collection Time: 02/07/22  7:58 PM   Result Value Ref Range    Ammonia 75 (H) 10 - 50 umol/L   APTT    Collection Time: 02/07/22  8:39 PM   Result Value Ref Range    aPTT 31.3 21.0 - 32.0 sec   CBC auto differential    Collection Time: 02/07/22  8:39 PM   Result Value Ref Range    WBC 10.24 3.90 - 12.70 K/uL    RBC 2.20 (L) 4.00 - 5.40 M/uL    Hemoglobin 8.0 (L) 12.0 - 16.0 g/dL    Hematocrit 23.8 (L) 37.0 - 48.5 %     (H) 82 - 98 fL    MCH 36.4 (H) 27.0 - 31.0 pg    MCHC 33.6 32.0 - 36.0 g/dL    RDW 20.9 (H) 11.5 - 14.5 %    Platelets 82 (L) 150 - 450 K/uL    MPV 13.0 (H) 9.2 - 12.9 fL    Immature Granulocytes 0.4 0.0 - 0.5 %    Gran # (ANC) 8.4 (H) 1.8 - 7.7 K/uL    Immature Grans (Abs) 0.04 0.00 - 0.04 K/uL    Lymph # 1.4 1.0 - 4.8 K/uL    Mono # 0.4 0.3 - 1.0 K/uL    Eos # 0.0 0.0 - 0.5 K/uL    Baso # 0.01 0.00 - 0.20 K/uL    nRBC 0 0 /100 WBC    Gran % 82.0 (H) 38.0 - 73.0 %    Lymph % 14.1 (L) 18.0  - 48.0 %    Mono % 3.4 (L) 4.0 - 15.0 %    Eosinophil % 0.0 0.0 - 8.0 %    Basophil % 0.1 0.0 - 1.9 %    Differential Method Automated    Protime-INR    Collection Time: 02/07/22  8:39 PM   Result Value Ref Range    Prothrombin Time 17.6 (H) 9.0 - 12.5 sec    INR 1.7 (H) 0.8 - 1.2     CHEST ONE VIEW   FINDINGS:  The tip of the central venous line is in the distal superior vena cava.  The cardiac silhouette is prominent and may be exaggerated by mildly low lung volumes.  There is persistent increased interstitial attenuation, which appears to be greater on the right than the left.  There is no focal consolidation, pneumothorax, or pleural effusion.       Electronically signed by: Charla Combs  Date:                                            02/07/2022  Time:                                           20:25      This encounter was triggered by an Artificial Intelligence Notification.

## 2022-02-08 NOTE — NURSING
Rapid called, dialysis nurse concerned about patients mental status, patient is lethargic, able to open eyes to painful stimuli, unable to understand speech, vss as charted, rapid team immediately at bedside, narcan 0.2mg given ivp, labs drawn, portable chest xray done, patient slightly more awake after narcan, return to room to monitor.

## 2022-02-08 NOTE — NURSING
Rapid response called at 15:20 during HD 2/2 pt's HR- monitor tech called to notify RN of pt's HR in the 170's, cycled new BP and HR at this time on HD machine and got a reading of /61 . HD terminated at time of calling rapid response. Notified nephrologist of event. Pt received 1 hour of HD. Net even. CVC lines heparin locked, clamped, and capped. Report given to primary RN.

## 2022-02-08 NOTE — ASSESSMENT & PLAN NOTE
Chronic macrocytic anemia since 2019, presumed secondary to cirrhosis.  Anemia worsening.  LDH WNL, mild elevated bilirubin.  Overall low concern for acute hemolytic process as primary reason for anemia.  Suspect anemia worsening related to marrow suppression from acute illness with possible worsening if she has GI bleed (risk cirrhosis).  Development of acute renal failure also another contributing factor to anemia.  No other reversible causes at this time.  retic inappropriately normal confirming other elements of marrow suppression likely at play.  -limit lab draws if possible.  - VALENTINO pos 2/2  cold autob, haptoglobin and LDH wnl  - plan cold agglutinin titer  - Hb 8.3g/dL   - SPEP/SHERI reveals no monoclonal peaks   - Serum KFLC 43.2mg/dL LFLC 20.6mg/dl K/L FLCR 2.1 likely 2/2 renal impairment vs MGUS with renal impairment   -transfuse for hemoglobin <7 or if hemodynamically unstable due to hemorrhage.  -will defer on bone marrow biopsy at this time.

## 2022-02-08 NOTE — SUBJECTIVE & OBJECTIVE
Interval History: More awake and less confused this morning.    Review of Systems   HENT: Negative for ear discharge and ear pain.    Eyes: Negative for discharge and itching.   Endocrine: Negative for cold intolerance and heat intolerance.   Neurological: Negative for seizures and syncope.     Objective:     Vital Signs (Most Recent):  Temp: 98 °F (36.7 °C) (02/08/22 1114)  Pulse: 89 (02/08/22 1455)  Resp: (!) 22 (02/08/22 1420)  BP: (!) 118/50 (02/08/22 1455)  SpO2: 96 % (02/08/22 1311) Vital Signs (24h Range):  Temp:  [97.3 °F (36.3 °C)-98 °F (36.7 °C)] 98 °F (36.7 °C)  Pulse:  [52-94] 89  Resp:  [12-26] 22  SpO2:  [93 %-100 %] 96 %  BP: ()/(43-79) 118/50     Weight: 73.1 kg (161 lb 2.5 oz)  Body mass index is 34.87 kg/m².    Intake/Output Summary (Last 24 hours) at 2/8/2022 1506  Last data filed at 2/7/2022 1900  Gross per 24 hour   Intake 500 ml   Output 1500 ml   Net -1000 ml      Physical Exam  Vitals and nursing note reviewed.   Constitutional:       General: She is not in acute distress.     Appearance: She is well-developed. She is ill-appearing. She is not toxic-appearing or diaphoretic.      Interventions: Nasal cannula in place.   HENT:      Head: Normocephalic and atraumatic.      Right Ear: External ear normal.      Left Ear: External ear normal.      Nose: Nose normal.      Mouth/Throat:      Mouth: Mucous membranes are moist.   Eyes:      Extraocular Movements: Extraocular movements intact.      Conjunctiva/sclera: Conjunctivae normal.   Cardiovascular:      Rate and Rhythm: Normal rate and regular rhythm.   Pulmonary:      Effort: No tachypnea or respiratory distress.      Breath sounds: No wheezing.      Comments: Improved work of breathing and looks comfortable.  Still with significant pulmonary crackles, stable over 24 hours  Abdominal:      General: Bowel sounds are normal. There is no distension.      Palpations: Abdomen is soft.      Tenderness: There is no abdominal tenderness.       Comments: No palpable hepatomegaly or splenomegaly    Musculoskeletal:         General: No tenderness. Normal range of motion.      Cervical back: Normal range of motion and neck supple.      Right lower leg: Edema present.      Left lower leg: Edema present.   Skin:     General: Skin is warm and dry.   Neurological:      Mental Status: She is alert and oriented to person, place, and time.      Comments: Mildly confused.  Moves all extremities but is diffusely weak   Psychiatric:         Thought Content: Thought content normal.         Significant Labs:   All pertinent labs within the past 24 hours have been reviewed.  BMP:   Recent Labs   Lab 02/07/22 1958 02/07/22 1958 02/08/22 0516   *   < > 163*      < > 137   K 4.5   < > 4.8   CL 98   < > 100   CO2 18*   < > 18*   BUN 74*   < > 89*   CREATININE 4.6*   < > 5.3*   CALCIUM 9.4   < > 9.4   MG 2.3  --   --     < > = values in this interval not displayed.     CBC:   Recent Labs   Lab 02/07/22  0759 02/07/22 2039 02/08/22  0516   WBC 9.02 10.24 9.15   HGB 8.5* 8.0* 8.3*   HCT 26.1* 23.8* 25.9*   * 82* 74*       Significant Imaging: I have reviewed all pertinent imaging results/findings within the past 24 hours.

## 2022-02-08 NOTE — ASSESSMENT & PLAN NOTE
-Admitted to inpatient status  -Baseline Cr 0.9.  On admit Cr 2.7  -Reports being started on lasix 1/8 by her cardiologist and over several days PTA had decreased UOP and diminished PO intake  -On admit BNP elevated at 1160 and with metabolic acidosis and mild hyperkalemia   -Avoid nephrotoxic agents and renally dose meds  -FEUrea 7.1% suggestive of pre-renal sources  -Consulted and discussed with nephrology.  -Complex situation to clearly determine volume status and initially felt to be volume deplete.  Was treated with IV fluids initially, then lasix drip.  Ultimately after extensive discussion with nephrology was felt to be third spacing with intravascular volume depletion +/- vasculitis or pulmonary renal syndrome.  -Respiratory decompensation onovernight 2/3-2/4 and transferred to ICU and lasix drip given but no urine output so discontinued.  Stabilized and stepped down on 2/5.  -Ongoing workup for pulmonary/renal syndrome and vasculitis.  Noted Positive ARMEN in 2019  -C3 and C4 are low.  RF and CCP are negative.  c and p ANCA negative.  GBM Ab negative  DS DNA negative.  Await ARMEN.  -SPEP shows elevated K/L ratio of 2.1.  Discussed with Dr. Maldonado - unclear if due to renal failure or not.  Await immunofixation results.   -Needs kidney biopsy - IR consulted and planned for today but felt to unstable.  -Cr continues to worsen and end encephalopathic today.  IR placed THDC and HD initiated.  Completed 3 days of pulse dose steroids for suspected glomerulonephritis vs pulm/renal syndrome.  Changed to prednisone 60mg  and anticipate taper - pending full workup.

## 2022-02-08 NOTE — HOSPITAL COURSE
71 year old woman with HTN, HLD, GERD, CKDII, cirrhosis and PAULINA presented for sob and has been diagnosed with navneet/CKDII and acute hypoxic respiratory failure.  Complex patient and presentation with very difficult to ascertain volume status.  Was given lasix and then fluids and then attempt at lasix again.  Suspect a pulmonary/renal vasculitis but not clearly diagnosed (Prior ARMEN positive 2019, low C3 and C4 and abnormal SPEP with elevated kappa/lambda ratio).  Had planned renal biopsy, but encephalopathic, anuric and worsening acidosis so THDC placed and initiated HD on 2/7.  Has completed 3 days pulse dose steroids (1g daily) and will be on prednisone 60 mg.  Patient more lethargic on 2/8 and noted to have episode of rapid AFib.  Moved to ICU.  Noted to have increased ammonia levels and started on lactulose.  Mentation slowly improving with no further arrhythmias.  Poor oral intake.  NGT placed and started on tube feedings.  NGT removed and ST consulted.  Severely debilitated and PT/OT consulted.    Patient transferred to [facility] for rheumatology evaluation and possible renal biopsy.     [facility]  After transfer, Nephrology and Rheumatology consulted, IR consulted to evaluate for renal biopsy.   Renal biopsy felt to be too high risk in the setting of patient's coagulopathic risk factors.  Rheumatology started hydroxychloroquine 200mg daily, nephrology starting CellCept Liquid as part of DMARD therapy for SLE with nephritis.   Patient in guarded condition with hypoxemia, atrial fib/flutter on IR metoprolol, lethargy.    2/23 On 2L oxygen, Patient w/o acute complaints. Will d/c hall - 24hr urine finished collection per nursing.    Labs show hyperkalemia, Nephrology recommends shifting with insulin dextrose and will give potassium binders.  Surveillance lab studies ordered   BUN is 119 she is awake and alert but does appear fatigued and somewhat lethargic but is interactive on interview  CRP is rising  but has not been checked in almost 3 weeks. s/p hematology f/u  chronic thrombocytopenia that is worsening in the setting of active lupus and acute illness.     2/24 Lasix infusion was increased from 5 mg an hour to 10 mg/h overnight.  Repeat potassium around midnight decreased to 5.3.  Will continue current Lasix infusion and monitor serum potassium levels.Has been typed and screened and consented would transfuse for hemoglobin greater than 7 and absolute PLT greater than 10, if any bleeding keep platelets above 50. Presented in AFib versus flutter, not discussed in [place] notes, anticoagulation likely contraindicated given coagulopathy titrate beta-blocker as tolerated. renal function not improving she is having GI symptoms. increase Urea and Creatinine, possible HD session by tomorrow      2/25 K shifted overnight

## 2022-02-08 NOTE — ASSESSMENT & PLAN NOTE
-Chronic and at baseline on admit secondary to cirrhosis  -Decreased to 33k on 2/4 but back to baseline - today 105k.  -Heme/onc consulted and input appreciated  -Hit Ab negative.  Await [id number] but doubt ttp/itp.  -Plan is transfuse for platelets <10 or if significant hemorrhage.  -Discussed with heme need for bone marrow biopsy.

## 2022-02-09 PROBLEM — E87.5 HYPERKALEMIA: Status: RESOLVED | Noted: 2022-01-01 | Resolved: 2022-01-01

## 2022-02-09 NOTE — PROGRESS NOTES
[facility] Medicine  Progress Note    Patient Name: Tammi Farris  MRN: [id number]  Patient Class: IP- Inpatient   Admission Date: 2/1/2022  Length of Stay: 8 days  Attending Physician: Jose Martin Renee MD  Primary Care Provider: Ann-Marie Hartman MD        Subjective:     Principal Problem:Acute renal failure superimposed on stage 2 chronic kidney disease        HPI:  71 y.o. female with HTN, HLD, GERD, cirrhosis of liver, thrombocytopenia, and sleep apnea presents with a complaint of cough and congestion since October.  She is chronically dyspneic, exacerbated by exertion, has seen Cardiology and was prescribed lasix and metoprolol.  Her sister has now noted that the patient has very low urine output and is constipated.  Also follows with Hepatology for chronic liver disease.  Patient denies fever, chills, chest pain, palpitations, orthopnea, PND, dizziness, syncope, n/v/d, abdominal pain, or dysuria.  In the ED, labs reveal ANI, hyperkalemia, metabolic acidosis with elevated lactic acid, elevated liver enzymes, elevated BNP, elevated d-dimer, thrombocytopenia, and markedly concentrated urine.  Echo December 2021 showed preserved EF, no evidence of heart failure.  Chest xray without acute abnormality. No convincing evidence of infectious process.      Overview/Hospital Course:  71 year old woman with HTN, HLD, GERD, CKDII, cirrhosis and PAULINA presented for sob and has been diagnosed with ani/CKDII and acute hypoxic respiratory failure.  Complex patient and presentation with very difficult to ascertain volume status.  Was given lasix and then fluids and then attempt at lasix again.  Suspect a pulmonary/renal vasculitis but not clearly diagnosed (Prior TAMMI positive 2019, low C3 and C4 and abnormal SPEP with elevated kappa/lambda ratio).  Had planned renal biopsy, but encephalopathic, anuric and worsening acidosis so THDC placed and initiated HD on 2/7.  Has completed 3 days pulse dose steroids  (1g daily) and will be on prednisone 60 mg.  Patient more lethargic on 2/8 and noted to have episode of rapid AFib.  Moved to ICU.  Noted to have increased ammonia levels and started on lactulose.      Interval History: Move to ICU.  More awake and alert this morning.  No further episodes of rapid Afib.    Review of Systems   HENT: Negative for ear discharge and ear pain.    Eyes: Negative for discharge and itching.   Endocrine: Negative for cold intolerance and heat intolerance.   Neurological: Negative for seizures and syncope.     Objective:     Vital Signs (Most Recent):  Temp: 97.8 °F (36.6 °C) (02/09/22 1200)  Pulse: 71 (02/09/22 1500)  Resp: 17 (02/09/22 1500)  BP: (!) 129/90 (02/09/22 1500)  SpO2: (!) 94 % (02/09/22 1500) Vital Signs (24h Range):  Temp:  [97.6 °F (36.4 °C)-109.4 °F (43 °C)] 97.8 °F (36.6 °C)  Pulse:  [] 71  Resp:  [11-37] 17  SpO2:  [73 %-100 %] 94 %  BP: (100-144)/(46-90) 129/90     Weight: 73.1 kg (161 lb 2.5 oz)  Body mass index is 34.87 kg/m².    Intake/Output Summary (Last 24 hours) at 2/9/2022 1529  Last data filed at 2/9/2022 0600  Gross per 24 hour   Intake 600 ml   Output 1552 ml   Net -952 ml      Physical Exam  Vitals and nursing note reviewed.   Constitutional:       General: She is not in acute distress.     Appearance: She is well-developed. She is ill-appearing. She is not toxic-appearing or diaphoretic.      Interventions: Nasal cannula in place.      Comments: Uncomfortable   HENT:      Head: Normocephalic and atraumatic.      Right Ear: External ear normal.      Left Ear: External ear normal.      Nose: Nose normal.      Mouth/Throat:      Mouth: Mucous membranes are moist.   Eyes:      Extraocular Movements: Extraocular movements intact.      Conjunctiva/sclera: Conjunctivae normal.   Cardiovascular:      Rate and Rhythm: Normal rate and regular rhythm.   Pulmonary:      Effort: No tachypnea or respiratory distress.      Breath sounds: No wheezing.   Abdominal:       General: Bowel sounds are normal. There is no distension.      Palpations: Abdomen is soft.      Tenderness: There is no abdominal tenderness.      Comments: No palpable hepatomegaly or splenomegaly    Musculoskeletal:         General: No tenderness. Normal range of motion.      Cervical back: Normal range of motion and neck supple.      Right lower leg: Edema present.      Left lower leg: Edema present.   Skin:     General: Skin is warm and dry.   Neurological:      Mental Status: She is alert.      Comments: Lethargic, but awakes to verbal stimuli.  Answers simple questions and follows commands.   Psychiatric:         Thought Content: Thought content normal.         Significant Labs:   All pertinent labs within the past 24 hours have been reviewed.  BMP:   Recent Labs   Lab 02/07/22 1958 02/08/22  0516 02/09/22  0951   *   < > 190*      < > 143   K 4.5   < > 4.0   CL 98   < > 105   CO2 18*   < > 19*   BUN 74*   < > 99*   CREATININE 4.6*   < > 6.4*   CALCIUM 9.4   < > 10.0   MG 2.3  --   --     < > = values in this interval not displayed.     CBC:   Recent Labs   Lab 02/07/22 2039 02/08/22 0516 02/09/22  0951   WBC 10.24 9.15 11.30   HGB 8.0* 8.3* 8.1*   HCT 23.8* 25.9* 26.4*   PLT 82* 74* 62*       Significant Imaging: I have reviewed all pertinent imaging results/findings within the past 24 hours.      Assessment/Plan:      * Acute renal failure superimposed on stage 2 chronic kidney disease  -Admitted to inpatient status  -Baseline Cr 0.9.  On admit Cr 2.7  -Reports being started on lasix 1/8 by her cardiologist and over several days PTA had decreased UOP and diminished PO intake  -On admit BNP elevated at 1160 and with metabolic acidosis and mild hyperkalemia   Appreciate Nephrology input.  Continued worsening of renal function.   IR consulted for kidney biopsy, but patient more decompensated.  -Cr continues to worsen and end encephalopathic today.  IR placed THDC and HD initiated.  Completed 3  days of pulse dose steroids for suspected glomerulonephritis vs pulm/renal syndrome.  Changed to prednisone 60mg  and anticipate taper - pending full workup.    MANUEL (generalized anxiety disorder)  -Very anxious today  -On 2/6 gave one time low dose ativan PO - watch respiratory status very closely.    Debility  -When more stable will consult PT/OT    Acute respiratory failure with hypoxia  -Patient with persistent significant shortness of breath and hypoxia requiring supplemental O2  -Not on oxygen at home  -On admit had clear breath sounds but on 2/2 has significant pulmonary crackles and increased work of breathing  -CXR showed possible pulmonary edema and BNP is rather elevated  -Also noted significantly elevated D-dimer on admit.  V/Q scan showed very low probability of pulmonary embolism.  Stopped full dose lovenox 2/2   -While had some signs of fluid overload - ultimately believed to be third spacing and not fluid overload.  Lasix drip not helpful and near anuric so it was stopped.  -Transferred to icu on evening of 2/3 due to respiratory distress.  Treated with bipap QHS/PRN and supplemental O2.  -Continue supplemental O2 and bipap qhs/prn.    Metabolic acidosis  -Suspect secondary to ANI, management as above    Sleep apnea  -Continue bipap qhs    Thrombocytopenia  -Chronic and at baseline on admit secondary to cirrhosis  -Heme/onc consulted and input appreciated  -Plan is transfuse for platelets <10 or if significant hemorrhage.    Macrocytic anemia  -Hb 9.2 on admit - chronic secondary to cirrhosis.  -Dropped to <7 on 2/2  -No evidence of GI bleeding but had noted epistaxis and bleeding gums at times since before hospitalization.  -Folate and B12 replete.  She is iron deficient.  -1 unit PRBC ordered 2/2 but transfusion delayed due to complex antibody matching.  Blood finally available and she received 1 unit PRBC 2/4.   H/H has remained relatively stable.      Cirrhosis of liver without ascites  -Noted  mild transaminitis and thrombocytopenia which are not new.  Noted epistaxis and gingival bleeding which has been ongoing for several months.  These are chronic and note history of cirrhosis and alcohol abuse   -Follows with hepatology  -Abdominal US on day prior to admit showed hepatic cirrhosis/steatosis with sonographic findings of portal hypertension including trace ascites and splenomegaly.  Noted to be more lethargic with elevation of ammonia level.  Hepatic Encephalopathy (not POA) and started on lactulose.    Gastroesophageal reflux disease  -Stable, no acute issue    HTN (hypertension)  -BP low normal  -Continue metoprolol with hold parameters    Hyperlipidemia  -History noted  -Not on statin due to cirrhosis      VTE Risk Mitigation (From admission, onward)         Ordered     heparin (porcine) injection 3,200 Units  As needed (PRN)         02/07/22 1834     IP VTE HIGH RISK PATIENT  Once         02/01/22 1557     Place sequential compression device  Until discontinued         02/01/22 1557                Discharge Planning   CLAUDETTE:      Code Status: Full Code   Is the patient medically ready for discharge?:     Reason for patient still in hospital (select all that apply): Patient unstable  Discharge Plan A: Home Health   Discharge Delays: (!) Other (Patient not medically stable for discharge.)        Critical care time spent on the evaluation and treatment of severe organ dysfunction, review of pertinent labs and imaging studies, discussions with consulting providers and discussions with patient/family: 40 minutes.      Jose Martin Renee MD  Department of Hospital Medicine   [facility] - Intensive Care

## 2022-02-09 NOTE — CARE UPDATE
[facility]  Nurses Note -- 4 Eyes    Patient Name: Tammi Farris  MRN: [id number]  Attending Provider:  Jose Martin Renee MD  2/9/2022       Wounds on : Wound Wednesday    [x] No   [x]Prevention Measures Documented    [] Yes    []LDA's Charted   []Wound Care Consulted (optional)   []Photo Taken (optional)   []MD Notified    Attending RN:  Munira Min RN     Second RN:  Lucie Singer RN

## 2022-02-09 NOTE — SUBJECTIVE & OBJECTIVE
Interval History: Move to ICU.  More awake and alert this morning.  No further episodes of rapid Afib.    Review of Systems   HENT: Negative for ear discharge and ear pain.    Eyes: Negative for discharge and itching.   Endocrine: Negative for cold intolerance and heat intolerance.   Neurological: Negative for seizures and syncope.     Objective:     Vital Signs (Most Recent):  Temp: 97.8 °F (36.6 °C) (02/09/22 1200)  Pulse: 71 (02/09/22 1500)  Resp: 17 (02/09/22 1500)  BP: (!) 129/90 (02/09/22 1500)  SpO2: (!) 94 % (02/09/22 1500) Vital Signs (24h Range):  Temp:  [97.6 °F (36.4 °C)-109.4 °F (43 °C)] 97.8 °F (36.6 °C)  Pulse:  [] 71  Resp:  [11-37] 17  SpO2:  [73 %-100 %] 94 %  BP: (100-144)/(46-90) 129/90     Weight: 73.1 kg (161 lb 2.5 oz)  Body mass index is 34.87 kg/m².    Intake/Output Summary (Last 24 hours) at 2/9/2022 1529  Last data filed at 2/9/2022 0600  Gross per 24 hour   Intake 600 ml   Output 1552 ml   Net -952 ml      Physical Exam  Vitals and nursing note reviewed.   Constitutional:       General: She is not in acute distress.     Appearance: She is well-developed. She is ill-appearing. She is not toxic-appearing or diaphoretic.      Interventions: Nasal cannula in place.      Comments: Uncomfortable   HENT:      Head: Normocephalic and atraumatic.      Right Ear: External ear normal.      Left Ear: External ear normal.      Nose: Nose normal.      Mouth/Throat:      Mouth: Mucous membranes are moist.   Eyes:      Extraocular Movements: Extraocular movements intact.      Conjunctiva/sclera: Conjunctivae normal.   Cardiovascular:      Rate and Rhythm: Normal rate and regular rhythm.   Pulmonary:      Effort: No tachypnea or respiratory distress.      Breath sounds: No wheezing.   Abdominal:      General: Bowel sounds are normal. There is no distension.      Palpations: Abdomen is soft.      Tenderness: There is no abdominal tenderness.      Comments: No palpable hepatomegaly or splenomegaly     Musculoskeletal:         General: No tenderness. Normal range of motion.      Cervical back: Normal range of motion and neck supple.      Right lower leg: Edema present.      Left lower leg: Edema present.   Skin:     General: Skin is warm and dry.   Neurological:      Mental Status: She is alert.      Comments: Lethargic, but awakes to verbal stimuli.  Answers simple questions and follows commands.   Psychiatric:         Thought Content: Thought content normal.         Significant Labs:   All pertinent labs within the past 24 hours have been reviewed.  BMP:   Recent Labs   Lab 02/07/22 1958 02/08/22 0516 02/09/22  0951   *   < > 190*      < > 143   K 4.5   < > 4.0   CL 98   < > 105   CO2 18*   < > 19*   BUN 74*   < > 99*   CREATININE 4.6*   < > 6.4*   CALCIUM 9.4   < > 10.0   MG 2.3  --   --     < > = values in this interval not displayed.     CBC:   Recent Labs   Lab 02/07/22 2039 02/08/22 0516 02/09/22  0951   WBC 10.24 9.15 11.30   HGB 8.0* 8.3* 8.1*   HCT 23.8* 25.9* 26.4*   PLT 82* 74* 62*       Significant Imaging: I have reviewed all pertinent imaging results/findings within the past 24 hours.

## 2022-02-09 NOTE — PT/OT/SLP RE-EVAL
Occupational Therapy   Re-evaluation    Name: Tammi Farris  MRN: [id number]  Admitting Diagnosis:  Acute renal failure superimposed on stage 2 chronic kidney disease  Recent Surgery: * No surgery found *      Recommendations:     Discharge Recommendations: rehabilitation facility  Discharge Equipment Recommendations:   (TBD)  Barriers to discharge:   (not at PLOF; was fully independent PTA and now MAX A x2 for bed mobility and assist with all ADLs)    Assessment:     Tammi Farris is a 71 y.o. female with a medical diagnosis of Acute renal failure superimposed on stage 2 chronic kidney disease. Performance deficits affecting function are weakness,impaired endurance,impaired cognition,decreased ROM,decreased coordination,impaired self care skills,decreased upper extremity function,impaired fine motor,impaired skin,decreased lower extremity function,impaired functional mobilty,gait instability,decreased safety awareness,edema,impaired balance,impaired cardiopulmonary response to activity.      Pt unable to follow simple commands nor identify familiar object. Pt was lethargic and calm and took a few sidesteps at EOB with BUE supported with MIN A x2. Unable to initiate movements without assist.     Rehab Prognosis:  Good; patient would benefit from acute skilled OT services to address these deficits and reach maximum level of function.       Plan:     Patient to be seen 5 x/week to address the above listed problems via self-care/home management,therapeutic activities,therapeutic exercises  · Plan of Care Expires: 03/03/22  · Plan of Care Reviewed with: patient    Subjective     Chief Complaint: pt unable to vocalize any complaints; pt did not appear in distress   Patient/Family stated goals: calm sitting EOB; sister arrived at end of session     Pain/Comfort:  · Pain Rating 1:  (pt unable to verbalize any pain; did not appear in pain during session)    Objective:     Communicated with: nurseMunira, prior to session.   Patient found HOB elevated with: bed alarm,blood pressure cuff,telemetry,oxygen,pulse ox (continuous),peripheral IV,bowel management system (HD cath) upon OT entry to room.    General Precautions: Standard, fall,respiratory   Orthopedic Precautions:N/A   Braces: N/A  Respiratory Status: Nasal cannula, flow 4 L/min     V/s stable on 4L during session.   Post tx: 136/54, 94% on 4L, 72 bpm     Occupational Performance:    Bed Mobility:    · Patient completed Scooting in supine to HOB with total assistance and 2 persons  · Patient completed Supine to Sit with maximal assistance, 2 persons and HOB elevated  · Patient completed Sit to Supine with maximal assistance and 2 persons  · Patient completed Bridging voluntarily/not on command with stand-by assistance     Functional Mobility/Transfers:  · Patient completed Sit <> Stand Transfer with minimum assistance and of 2 persons  with  BUE supported   · Functional Mobility: pt took a few sidesteps to the HOB with MIN A x2 with BUE supported and verbal/tactile cueing d/t impaired initiation.     Activities of Daily Living:  · Grooming: held pt's comb at midline and pt unable to identify item. pt with weak grasp using RUE comb and hand-over-hand assist to comb R side of hair; pt unable to continue motions without max assist      Cognitive/Visual Perceptual:  Cognitive/Psychosocial Skills:     -       Oriented to: pt unable to verbalize anything intelligible   -       Follows Commands/attention:poor   -       Communication: impaired  -       Memory: impaired  -       Safety awareness/insight to disability: impaired   -       Mood/Affect/Coping skills/emotional control: lethargic, calm   Visual/Perceptual:      -unable to assess 2* mental status      Physical Exam:  Balance:    -       seated: CGA/MIN; standing: MIN A x2  Postural examination/scapula alignment:    -       Rounded shoulders  -       Forward head  Skin integrity: Bruising of BUE, dried scratches to R shoulder    Edema:  Moderate BUE  Upper Extremity Range of Motion:     -       R/L Upper Extremity: pt unable to follow any commands for assessment nor to observe through functional observation; PROM WFL  Upper Extremity Strength:    -       R/L Upper Extremity: unable to assess 2* unable to follow any commands    Strength:    -       R/L Upper Extremity: impaired  Fine Motor Coordination:    -       Impaired  Left hand, manipulation of objects   and Right hand, manipulation of objects    Gross motor coordination:   impaired 2* confusion    AMPAC 6 Click:  AMPAC Total Score: 6    Treatment & Education:  · Education:  · Pt educated on OT role- no learning evident. edu sister on our role at the end of the session briefly prior to MD arrival.   · Importance of OOB activity with therapy assistance.  · Safety during functional t/f and mobility   · Self-care tasks/functional mobility completed- assistance level noted above   · All questions/concerns answered within OT scope of practice       Patient left HOB elevated with all lines intact, call button in reach, bed alarm on, nurse, Munira, notified, Dr. Shirley and pt's sister present and all needs met/within reach    GOALS:   Multidisciplinary Problems     Occupational Therapy Goals        Problem: Occupational Therapy Goal    Goal Priority Disciplines Outcome Interventions   Occupational Therapy Goal     OT, PT/OT Ongoing, Progressing    Description: Goals to be met by: 03/03/22     Patient will increase functional independence with ADLs by performing:    UE Dressing with Stand-by assistance.  LE Dressing with Minimal assistance.  Grooming while seated with Stand-by assistance.  Toileting from bedside commode with minimal assistance for hygiene and clothing management.   Supine to sit with Stand-by assistance.  Step transfer with stand-by assistance  Toilet transfer to bedside commode with stand-by assistance.  Upper extremity exercise program x15 reps per handout, with  independence.                     History:     Past Medical History:   Diagnosis Date    Allergy     Anxiety     Basal cell carcinoma     Cirrhosis of liver without ascites 12/27/2017    Depression     Diabetes mellitus, type 2     Disorder of kidney and ureter     Endometriosis     GERD (gastroesophageal reflux disease)     Hyperlipidemia     Hypertension     Joint pain     Psoriasis        Past Surgical History:   Procedure Laterality Date    abdominal laproscopic surgery      APPENDECTOMY      CARPAL TUNNEL RELEASE      right    CHOLECYSTECTOMY  04/2015    COLONOSCOPY N/A 2/8/2019    Procedure: COLONOSCOPY;  Surgeon: Celso Salas MD;  Location: [facility] ([address] [place] [address]);  Service: Endoscopy;  Laterality: N/A;    ESOPHAGOGASTRODUODENOSCOPY N/A 2/8/2019    Procedure: EGD (ESOPHAGOGASTRODUODENOSCOPY);  Surgeon: Celso Salas MD;  Location: [facility] ([address] [place] [address]);  Service: Endoscopy;  Laterality: N/A;  varices screening, labs ordered prior    HYSTERECTOMY  age 25    JOSEFA/BSO (endometriosis)    OOPHORECTOMY      SKIN CANCER DESTRUCTION      UPPER GASTROINTESTINAL ENDOSCOPY         Time Tracking:     OT Date of Treatment: 02/09/22  OT Start Time: 1345  OT Stop Time: 1406  OT Total Time (min): 21 min    Billable Minutes:Re-eval 21 min (co- re-eval with PT)    2/9/2022

## 2022-02-09 NOTE — ASSESSMENT & PLAN NOTE
-Hb 9.2 on admit - chronic secondary to cirrhosis.  -Dropped to <7 on 2/2  -No evidence of GI bleeding but had noted epistaxis and bleeding gums at times since before hospitalization.  -Folate and B12 replete.  She is iron deficient.  -1 unit PRBC ordered 2/2 but transfusion delayed due to complex antibody matching.  Blood finally available and she received 1 unit PRBC 2/4.   H/H has remained relatively stable.

## 2022-02-09 NOTE — CONSULTS
[facility] - Intensive Care  Adult Nutrition  Consult Note    SUMMARY     Recommendations  1) When medically able, initate tube feeds of Novasource Renal @ 10 mL/hr and gradually advance to goal rate of 35 mL/hr as tolerated   Will provide 1680 kcal, 76 g protein, 602 mL free water.    Meets 100% protein and kcal needs.   - FWF per MD   - Hold for residuals > 500 cc.     2) Continue 2000 kcal Diabetic/Renal diet as medically appropriate    Goals: Enteral nutrition initiated by RD follow up  Nutrition Goal Status: new  Communication of RD Recs: reviewed with physician    Assessment and Plan  Nutrition Problem  Inadequate oral intake    Related to (etiology):   lethargy    Signs and Symptoms (as evidenced by):   Pt only taking bites of meals with encouragement, NGT placed for alternative means of nutrition    Interventions(treatment strategy):  Collaboration with other providers  Carbohydrate modified diet  Renal diet    Nutrition Diagnosis Status:   New    Malnutrition Assessment  LISSETH NFPE due to remote assessment    Reason for Assessment  Reason For Assessment: consult,new tube feeding,length of stay  Diagnosis: other (see comments) (Acute renal failure superimposed on stage 2 CKD)  Relevant Medical History: HTN, HLD< GERD< cirrhosis of liver, sleep apnea  Interdisciplinary Rounds: did not attend  General Information Comments: Remote assessment for coverage, spoke with RN, reports pt only nibbles at meals with encouragement, falling asleep frequently. NGT placed today, consult recieved for TF recs. With acute renal failure superimposed on CKD2, recieved HD 2/7. Noted loose stool today, -160 lbs per chart, current wt slightly above UBW which is likely related to fluid. Unable to determine malnutirtion status at this time, NFPE needed. RD to monitor and follow up.  Nutrition Discharge Planning: Pending clinical course    Nutrition Risk Screen  Nutrition Risk Screen: no indicators present    Nutrition/Diet  "History  Typical Food/Fluid Intake: LISSETH  Spiritual, Cultural Beliefs, Taoism Practices, Values that Affect Care: no    Anthropometrics  Temp: 97.7 °F (36.5 °C)  Height Method: Stated  Height: 4' 9" (144.8 cm)  Height (inches): 57 in  Weight Method: Bed Scale  Weight: 73.1 kg (161 lb 2.5 oz)  Weight (lb): 161.16 lb  Ideal Body Weight (IBW), Female: 85 lb  % Ideal Body Weight, Female (lb): 181.65 %  BMI (Calculated): 34.9  Usual Body Weight (UBW), k.45 kg  % Usual Body Weight: 103.98     Lab/Procedures/Meds  Pertinent Labs Reviewed: reviewed  Pertinent Labs Comments: H/H 8.1, 26.4, , BUN 99, Cr 6.4, GFR 6, Glu 190, Hgb A1c 5.9 on 22  Pertinent Medications Reviewed: reviewed  Pertinent Medications Comments: famotidine, insulin, polyethylene glycol, prednisone, sodium zirconium cyclosilicate    Physical Findings/Assessment   incision to R chest    Estimated/Assessed Needs  Weight Used For Calorie Calculations: 73 kg (161 lb)  Energy Calorie Requirements (kcal): 4900-6513 kcal/day based on 25 kcal/kg  Energy Need Method: Kcal/kg  Protein Requirements: 73-88 g/day based on 1-1.2 g/kg for ANI with dialysis, critical care  Weight Used For Protein Calculations: 73 kg (161 lb)  Fluid Requirements (mL): 500 mL + UOP for ANI  Estimated Fluid Requirement Method: other (see comments)  RDA Method (mL): 1460     Nutrition Prescription Ordered  Current Diet Order: 2000 diabetes, renal    Evaluation of Received Nutrient/Fluid Intake  Energy Calories Required: not meeting needs  Protein Required: not meeting needs  Fluid Required: not meeting needs  Comments: LBM   Tolerance: not tolerating (too lethargic for meals)  % Intake of Estimated Energy Needs: 0 - 25 %  % Meal Intake: 0 - 25 %    Nutrition Risk  Level of Risk/Frequency of Follow-up: high (2x weekly)     Monitor and Evaluation  Food and Nutrient Intake: food and beverage intake,energy intake,enteral nutrition intake  Food and Nutrient Adminstration: diet " order,enteral and parenteral nutrition administration  Knowledge/Beliefs/Attitudes: food and nutrition knowledge/skill  Physical Activity and Function: nutrition-related ADLs and IADLs  Anthropometric Measurements: weight change  Biochemical Data, Medical Tests and Procedures: lipid profile,electrolyte and renal panel,gastrointestinal profile,glucose/endocrine profile,inflammatory profile  Nutrition-Focused Physical Findings: overall appearance,skin,extremities, muscles and bones     Nutrition Follow-Up  RD Follow-up?: Yes

## 2022-02-09 NOTE — PLAN OF CARE
Problem: Physical Therapy Goal  Goal: Physical Therapy Goal  Description: Goals to be met by: 22     Patient will increase functional independence with mobility by performin. Supine to sit with Modified [place]  2. Rolling to Left and Right with Modified [place]  3. Sit to stand transfer with Modified [place] using RW  4. Bed to chair transfer with Modified [place] using Rolling Walker  5. Gait >50 feet with Modified [place] using Rolling Walker   6. Lower extremity exercise program 2 sets x10 reps per handout, with independence    Outcome: Ongoing, Progressing    Pt with limited participation sitting EOB, lethargic and confused at this time.  PT will continue to assess.

## 2022-02-09 NOTE — SUBJECTIVE & OBJECTIVE
"Palliative Encounter:  Palliative medicine consult received for advance care planning/goals of care.  Per chart review, pt is currently full code/full treatment.  No advance directives on file.     Patient lethargic, not able to answer questions.  Her sister, Tania, was at bedside and receptive to Palliative visit.  Pt is , has no children.  She has two sisters and three brothers (all live nearby except one brother in [place]).  No advance directives.  Tania has previously attempted to discuss pt's medical wishes at the end of life but pt has never engaged in those discussions or answered questions regarding what her wishes would be. Tania tells me that she understands the importance of code status and she plans to discuss with pt's siblings tonight.      Advance Care Planning     Date: 02/09/2022    Code Status  In light of the patients advanced and life limiting illness,I engaged the the family in a conversation about the patient's preferences for care  at the very end of life. Tania tells me that she does not want to "give up too early" and does not want pt to suffer at the same time.  Prior to hospitalization, pt was independent and active.  Pt's family is aware of the significance of her condition.  We discussed the options for code status, including full code status vs DNR and the risks and benefits of each.  We discussed some common misconceptions regarding code status, including the misconception that if a patient elects DNR status that would mean do not treat.  Discussed that if patient's family were to chose DNR status, the patient would still be able to continue aggressive care that aligned with their goals and DNR would only come into play in the event of cardiopulmonary arrest.  Tania expresses understanding, stating that she has discussed this with the medical team earlier today.  She discusses the difficulty in making decisions for pt when pt did not express any wishes prior.  Emotional support " provided.        Acute renal failure superimposed on stage 2 chronic kidney disease  -continue current management as per primary team/nephrology    Debility  -continue current management as per primary team/nephrology    Acute respiratory failure with hypoxia  -continue current management as per primary team/nephrology    Hypokalemia  -continue current management as per primary team/nephrology    Thrombocytopenia  -continue current management as per primary team/nephrology    Cirrhosis of liver without ascites  -continue current management as per primary team/nephrology      Past Medical History:   Diagnosis Date    Allergy     Anxiety     Basal cell carcinoma     Cirrhosis of liver without ascites 12/27/2017    Depression     Diabetes mellitus, type 2     Disorder of kidney and ureter     Endometriosis     GERD (gastroesophageal reflux disease)     Hyperlipidemia     Hypertension     Joint pain     Psoriasis        Past Surgical History:   Procedure Laterality Date    abdominal laproscopic surgery      APPENDECTOMY      CARPAL TUNNEL RELEASE      right    CHOLECYSTECTOMY  04/2015    COLONOSCOPY N/A 2/8/2019    Procedure: COLONOSCOPY;  Surgeon: Celso Slaas MD;  Location: [address]);  Service: Endoscopy;  Laterality: N/A;    ESOPHAGOGASTRODUODENOSCOPY N/A 2/8/2019    Procedure: EGD (ESOPHAGOGASTRODUODENOSCOPY);  Surgeon: Celso Salas MD;  Location: [address]);  Service: Endoscopy;  Laterality: N/A;  varices screening, labs ordered prior    HYSTERECTOMY  age 25    JOSEFA/BSO (endometriosis)    OOPHORECTOMY      SKIN CANCER DESTRUCTION      UPPER GASTROINTESTINAL ENDOSCOPY         Review of patient's allergies indicates:   Allergen Reactions    Dobutamine Hives    Benadryl [diphenhydramine hcl] Anxiety     Pt states she feels jumpy and anxious when taking.    Darvon [propoxyphene] Nausea Only    Shellfish containing products Hives    Iodinated contrast media Hives     Lisinopril Other (See Comments)     cough    Propoxyphene hcl      Itchy (skin)^    Tizanidine Other (See Comments)     Sweaty, difficulty swallowing    Statins-hmg-coa reductase inhibitors Anxiety and Other (See Comments)     Myalgia, fatigue, palpitations, anxiety       Medications:  Continuous Infusions:  Scheduled Meds:   albuterol-ipratropium  3 mL Nebulization Q6H    famotidine  20 mg Oral Daily    insulin detemir U-100  5 Units Subcutaneous QHS    lactulose  10 g Oral BID    metoprolol  5 mg Intravenous Once    metoprolol tartrate  25 mg Oral BID    polyethylene glycol  17 g Oral BID    predniSONE  60 mg Oral Daily    sodium zirconium cyclosilicate  10 g Oral Daily     PRN Meds:sodium chloride, acetaminophen, benzonatate, bisacodyL, dextrose 50%, dextrose 50%, glucagon (human recombinant), glucose, glucose, guaiFENesin 100 mg/5 ml, heparin (porcine), insulin aspart U-100, iohexoL, melatonin, naloxone, ondansetron, simethicone, sodium chloride 0.9%    Family History     Problem Relation (Age of Onset)    Arthritis Mother    Asthma Brother    Hyperlipidemia Brother    Hypertension Mother, Sister, Brother    No Known Problems Father    Raynaud syndrome Sister        Tobacco Use    Smoking status: Never Smoker    Smokeless tobacco: Never Used   Substance and Sexual Activity    Alcohol use: Not Currently    Drug use: No    Sexual activity: Yes     Partners: Male     Birth control/protection: Surgical       Review of Systems   Unable to perform ROS: Acuity of condition     Objective:     Vital Signs (Most Recent):  Temp: 97.8 °F (36.6 °C) (02/09/22 1200)  Pulse: 64 (02/09/22 1200)  Resp: (!) 21 (02/09/22 1200)  BP: (!) 144/62 (02/09/22 1200)  SpO2: 95 % (02/09/22 1200) Vital Signs (24h Range):  Temp:  [97.6 °F (36.4 °C)-109.4 °F (43 °C)] 97.8 °F (36.6 °C)  Pulse:  [] 64  Resp:  [11-37] 21  SpO2:  [73 %-100 %] 95 %  BP: (100-144)/(43-89) 144/62     Weight: 73.1 kg (161 lb 2.5 oz)  Body  mass index is 34.87 kg/m².    Physical Exam  Vitals and nursing note reviewed.   Constitutional:       General: She is not in acute distress.     Appearance: She is ill-appearing.   HENT:      Head: Normocephalic and atraumatic.      Right Ear: External ear normal.      Left Ear: External ear normal.      Nose: Nose normal. No congestion or rhinorrhea.   Eyes:      General:         Right eye: No discharge.         Left eye: No discharge.   Cardiovascular:      Rate and Rhythm: Normal rate.   Pulmonary:      Effort: Pulmonary effort is normal. No respiratory distress.   Abdominal:      Palpations: Abdomen is soft.      Comments: NGT in place   Musculoskeletal:         General: No signs of injury.      Cervical back: Neck supple.   Skin:     General: Skin is warm and dry.   Neurological:      Mental Status: She is lethargic.         Review of Symptoms    Symptom Assessment (ESAS 0-10 Scale)  Unable to complete assessment due to Acuity of condition         Pain Assessment in Advanced Demential Scale (PAINAD)   Breathing - Independent of vocalization:  0  Negative vocalization:  0  Facial expression:  0  Body language:  0  Consolability:  0  Total:  0    Modified Ernestina Scale:  0    Performance Status:  30    Living Arrangements:  Lives alone        Advance Care Planning   Advance Directives:   Living Will: No    LaPOST: No    Do Not Resuscitate Status: No    Medical Power of : No      Decision Making:  Family answered questions and Patient unable to communicate due to disease severity/cognitive impairment         Significant Labs: All pertinent labs within the past 24 hours have been reviewed.  CBC:   Recent Labs   Lab 02/09/22 0951   WBC 11.30   HGB 8.1*   HCT 26.4*   *   PLT 62*     BMP:  Recent Labs   Lab 02/09/22 0951   *      K 4.0      CO2 19*   BUN 99*   CREATININE 6.4*   CALCIUM 10.0     LFT:  Lab Results   Component Value Date    AST 37 02/09/2022    ALKPHOS 76 02/09/2022     BILITOT 1.6 (H) 02/09/2022     Albumin:   Albumin   Date Value Ref Range Status   02/09/2022 3.7 3.5 - 5.2 g/dL Final     Protein:   Total Protein   Date Value Ref Range Status   02/09/2022 7.6 6.0 - 8.4 g/dL Final     Lactic acid:   Lab Results   Component Value Date    LACTATE 1.7 02/03/2022    LACTATE 1.6 02/02/2022       Significant Imaging: I have reviewed all pertinent imaging results/findings within the past 24 hours.   CT Head Without Contrast  Narrative: EXAMINATION:  CT HEAD WITHOUT CONTRAST    CLINICAL HISTORY:  Mental status change, unknown cause;    TECHNIQUE:  Low dose axial CT images obtained throughout the head without intravenous contrast. Sagittal and coronal reconstructions were performed.    COMPARISON:  None available.    FINDINGS:  Ventricles and sulci are normal in size for age without evidence of hydrocephalus. No extra-axial blood or fluid collections.  There is mild periventricular white matter hypoattenuation, nonspecific but can be seen with chronic microvascular ischemic change.  No parenchymal mass, hemorrhage, edema or major vascular distribution infarct.    No calvarial fracture.  The scalp is unremarkable.  Bilateral paranasal sinuses and mastoid air cells are clear.  Impression: No acute intracranial abnormality.    Mild periventricular white matter hypoattenuation, nonspecific but can be seen with chronic microvascular ischemic change.    Electronically signed by: Jitendra Prieto  Date:    02/08/2022  Time:    09:14    EKG 2/1/22 report reviewed

## 2022-02-09 NOTE — PROGRESS NOTES
Renal ICU Progress Note    Date of Admission:  2/1/2022  7:35 AM    Length of Stay: 8  Days    Interim History:     Required RR during Dialysis yesterday when HR went up to 170s (Rhythm consistent with A-fib)   Pte. Transferred to ICU    Objective:    Current Facility-Administered Medications   Medication    0.9%  NaCl infusion (for blood administration)    acetaminophen tablet 650 mg    albuterol-ipratropium 2.5 mg-0.5 mg/3 mL nebulizer solution 3 mL    benzonatate capsule 100 mg    bisacodyL suppository 10 mg    dextrose 50% injection 12.5 g    dextrose 50% injection 25 g    famotidine tablet 20 mg    glucagon (human recombinant) injection 1 mg    glucose chewable tablet 16 g    glucose chewable tablet 24 g    guaiFENesin 100 mg/5 ml syrup 200 mg    heparin (porcine) injection 3,200 Units    insulin aspart U-100 pen 0-5 Units    insulin detemir U-100 pen 5 Units    iohexoL (OMNIPAQUE 350) injection 100 mL    lactulose 10 gram/15 mL enema solution (inpatient use) 200 g    melatonin tablet 6 mg    metoprolol tartrate (LOPRESSOR) tablet 25 mg    naloxone 0.4 mg/mL injection 0.02 mg    ondansetron injection 4 mg    polyethylene glycol packet 17 g    predniSONE tablet 60 mg    simethicone chewable tablet 80 mg    sodium chloride 0.9% flush 10 mL    sodium zirconium cyclosilicate packet 10 g       Vitals:    02/09/22 0500 02/09/22 0600 02/09/22 0700 02/09/22 0736   BP: (!) 100/46 128/89 (!) 118/58    Pulse: 73 72 71 71   Resp: (!) 30 15 15 18   Temp:       TempSrc:       SpO2: 98% (!) 73% 95% (!) 94%   Weight:       Height:           I/O last 3 completed shifts:  In: 600 [P.O.:100; Other:500]  Out: 1553 [Other:552; Stool:1001]  No intake/output data recorded.      Physical Exam:     General: NAD  Neck: supple  Heart: RRR  Lungs: unlabored breathing  Abdomen: n/a  Limbs: some echymosis upper extremities  Neurologic: obtunded      Laboratories:    No results for input(s):  WBC, RBC, HGB, HCT, PLT, MCV, MCH, MCHC in the last 24 hours.    No results for input(s): GLUCOSE, CALCIUM, PROT, NA, K, CO2, CL, BUN, CREATININE, ALKPHOS, ALT, AST, BILITOT in the last 24 hours.    Invalid input(s):  ALBUMIN    No results for input(s): COLORU, CLARITYU, SPECGRAV, PHUR, PROTEINUA, GLUCOSEU, BLOODU, WBCU, RBCU, BACTERIA, MUCUS in the last 24 hours.    Invalid input(s):  BILIRUBINCON    Microbiology Results (last 7 days)     Procedure Component Value Units Date/Time    Respiratory Infection Panel (PCR), Nasopharyngeal [[id number]] Collected: 02/04/22 1645    Order Status: Completed Specimen: Nasopharyngeal Swab Updated: 02/08/22 0900     Respiratory Infection Panel Source NP Swab     Adenovirus Not Detected     Coronavirus 229E, Common Cold Virus Not Detected     Coronavirus HKU1, Common Cold Virus Not Detected     Coronavirus NL63, Common Cold Virus Not Detected     Coronavirus OC43, Common Cold Virus Not Detected     Comment: The Coronavirus strains detected in this test cause the common cold.  These strains are not the COVID-19 (novel Coronavirus)strain   associated with the respiratory disease outbreak.          SARS-CoV2 (COVID-19) Qualitative PCR Not Detected     Human Metapneumovirus Not Detected     Human Rhinovirus/Enterovirus Not Detected     Influenza A (subtypes H1, H1-2009,H3) Not Detected     Influenza B Not Detected     Parainfluenza Virus 1 Not Detected     Parainfluenza Virus 2 Not Detected     Parainfluenza Virus 3 Not Detected     Parainfluenza Virus 4 Not Detected     Respiratory Syncytial Virus Not Detected     Bordetella Parapertussis (DK2202) Not Detected     Bordetella pertussis (ptxP) Not Detected     Chlamydia pneumoniae Not Detected     Mycoplasma pneumoniae Not Detected    Narrative:      Nasal Swab Only - for all other respiratory sources, order  [id number] - Respiratory Viral Panel by PCR (RSPFA)    Urine Culture High Risk [[id number]]  (Abnormal) Collected: 02/04/22 1322     Order Status: Completed Specimen: Urine, Catheterized Updated: 02/07/22 1149     Urine Culture, Routine CIRA GLABRATA  > 100,000 cfu/ml  Treatment of asymptomatic candiduria is not recommended (except for   specific populations). Candida isolated in the urine typically   represents colonization. If an indwelling urinary catheter is present  it should be removed or replaced.      Narrative:      Indicated criteria for high risk culture:->Other  Other (specify):->ani    Blood Culture #2 **CANNOT BE ORDERED STAT** [[id number]] Collected: 02/01/22 1120    Order Status: Completed Specimen: Blood from Peripheral, Antecubital, Left Updated: 02/05/22 1303     Blood Culture, Routine No Growth after 4 days.     Blood Culture #1 **CANNOT BE ORDERED STAT** [[id number]] Collected: 02/01/22 1121    Order Status: Completed Specimen: Blood from Peripheral, Antecubital, Right Updated: 02/05/22 1303     Blood Culture, Routine No Growth after 4 days.             Diagnostic Tests:     CXR:    FINDINGS:   The tip of the central venous line is in the distal superior vena cava.  The cardiac silhouette is prominent and may be exaggerated by mildly low lung volumes.  There is persistent increased interstitial attenuation, which appears to be greater on the right than the left.  There is no focal consolidation, pneumothorax, or pleural effusion.    Impression:       As above described.       Electronically signed by: Charla Combs   Date: 02/07/2022              Assessment:    72 y/o female with Hx. Cirrhosis of Liver admitted with:    - ANI et? R/o Lupus GN (low complements and microscopic hematuria also + ARMEN with titers of  1:1280 and homogeneous pattern) already started on Prednisone per Dr. Alvarado but creatinine climbing as of yesterday therefore Dialysis started.  - Abnormal SPEP r/o MGUS  - Hyperphosphatemia  - HyperK+  RESOLVED  - HAGMA  - s/p Acute respiratory failure with hypoxia  - HTN  - Hx. HFpEF  - Macrocytic anemia with Fe++  def.  - Chronic thrombocytopenia  - Hx. Liver Cirrhosis  - Yeast UTI  - GERD  - PAULINA  - HLD          Plan:    - Dialysis today and as needed  - A-fib management per Cardiology  - Kidney Bx. Postponed Until more stable  - Hematology following  - Recommend some form of enteral nutrition  - Following Ytg-sdjty-ffqlh-acid/base  - Other problems per admitting

## 2022-02-09 NOTE — PLAN OF CARE
[facility] faxed Hep B panel from 2/3/22 to [facility], chair time and placement still pending at this time.

## 2022-02-09 NOTE — PLAN OF CARE
Pt remains in the ICU on 4L NC.  NSR on the monitor with HR 60s-70s.  Afebrile.  Pt drowsy and moans but opens eyes to voice. Flexiseal in place with loose brown/red stool noted.  Dialysis orders placed.  NG tube placed per order, MD verified placement.  Family at the bedside, plan of care reviewed.  No injuries, falls or skin breakdown occurred this shift.      Plan: If and when flaring, patient may apply fluocinonide solution 0.05% bidx2 weeks, qdx1 week then discontinue. \\n\\nDiscussed with patient risks, benefits, adverse effects. all questions answered. Detail Level: Zone Plan: Wash scalp with ketoconazole shampoo 2% -2-3 times a week, prn flares

## 2022-02-09 NOTE — CARE UPDATE
[facility] Intensive Care  ICU Shift Summary  Date: 2/9/2022      Prehospitalization: Home  Admit Date / LOS : 2/1/2022/ 8 days    Diagnosis: Acute renal failure superimposed on stage 2 chronic kidney disease    Consults:        Active: Heme Onc, Nephro, OT, Palliative and PT       Needed: N/A     Code Status: Full Code   Advanced Directive: <no information>    LDA: Flexiseal, NG, PIV and Miguel Ángel       Central Lines/Site/Justification:Patient Does Not Have Central Line       Urinary Cath/Order/Justification:Patient Does Not Have Urinary Catheter    Vasopressors/Infusions:        GOALS: Volume/ Hemodynamic: N/A                     RASS: 0  alert and calm    Pain Management: none       Pain Controlled: not applicable     Rhythm: NSR    Respiratory Device: Nasal Cannula                  Most Recent SBT/ SAT: N/A       MOVE Screen: PT Consult  ICU Liberation: not applicable    VTE Prophylaxis: Mechanical  Mobility: Bedrest  Stress Ulcer Prophylaxis: Yes    Isolation: No active isolations  Dietary: PO  Tolerance: yes  Advancement: @ goal    I & O (24h):    Intake/Output Summary (Last 24 hours) at 2/9/2022 1725  Last data filed at 2/9/2022 0600  Gross per 24 hour   Intake 100 ml   Output 1000 ml   Net -900 ml        Restraints: Yes    Significant Dates:  Post Op Date: N/A  Rescue Date: N/A  Imaging/ Diagnostics: N/A    Noteworthy Labs:  See labs    COVID Test: (--)  CBC/Anemia Labs: Coags:    Recent Labs   Lab 02/04/22  0608 02/04/22  1439 02/05/22  0424 02/08/22  0516 02/08/22  1833 02/09/22  0951   WBC  --   --    < > 9.15  --  11.30   HGB  --   --    < > 8.3*  --  8.1*   HCT  --   --    < > 25.9*  --  26.4*   PLT  --   --    < > 74*  --  62*   MCV  --   --    < > 105*  --  110*   RDW  --   --    < > 19.3*  --  20.0*   RETIC 2.1  --   --   --   --   --    OCCULTBLOOD  --  Negative  --   --  Positive*  --     < > = values in this interval not displayed.    Recent Labs   Lab 02/03/22  1328 02/07/22 2039   INR 1.3* 1.7*    APTT  --  31.3        Chemistries:   Recent Labs   Lab 02/05/22  0424 02/06/22  0534 02/06/22  0913 02/07/22 1958 02/07/22 1958 02/08/22  0516 02/09/22  0951     --    < > 137   < > 137 143   K 4.7  --    < > 4.5   < > 4.8 4.0     --    < > 98   < > 100 105   CO2 18*  --    < > 18*   < > 18* 19*   BUN 88*  --    < > 74*   < > 89* 99*   CREATININE 5.1*  --    < > 4.6*   < > 5.3* 6.4*   CALCIUM 9.5  --    < > 9.4   < > 9.4 10.0   PROT 8.2  --    < > 8.5*   < > 7.7 7.6   BILITOT 1.6*  --    < > 1.5*   < > 1.5* 1.6*   ALKPHOS 80  --    < > 73   < > 68 76   ALT 25  --    < > 24   < > 22 23   AST 38  --    < > 33   < > 29 37   MG  --   --   --  2.3  --   --   --    PHOS 7.8* 8.6*  --   --   --   --   --     < > = values in this interval not displayed.        Cardiac Enzymes: Ejection Fractions:    No results for input(s): CPK, CPKMB, MB, TROPONINI in the last 72 hours. EF   Date Value Ref Range Status   12/07/2021 68 % Final        POCT Glucose: HbA1c:    Recent Labs   Lab 02/09/22  0821 02/09/22  1133 02/09/22  1612   POCTGLUCOSE 208* 230* 194*    Hemoglobin A1C   Date Value Ref Range Status   02/01/2022 5.9 (H) 4.0 - 5.6 % Final     Comment:     ADA Screening Guidelines:  5.7-6.4%  Consistent with prediabetes  >or=6.5%  Consistent with diabetes    High levels of fetal hemoglobin interfere with the HbA1C  assay. Heterozygous hemoglobin variants (HbS, HgC, etc)do  not significantly interfere with this assay.   However, presence of multiple variants may affect accuracy.             ICU LOS 23h  Level of Care: Critical Care    Chart Check: 12 HR Done  Shift Summary/Plan for the shift: see care plan note

## 2022-02-09 NOTE — PLAN OF CARE
[facility] - Intensive Care  Discharge Reassessment    Primary Care Provider: Ann-Marie Hartman MD    Expected Discharge Date:  TBD    Reassessment (most recent)       Discharge Reassessment - 02/09/22 1140          Discharge Reassessment    Assessment Type Discharge Planning Reassessment     Did the patient's condition or plan change since previous assessment? Yes     Discharge Plan discussed with: --   chart review    Communicated CLAUDETTE with patient/caregiver No     Discharge Plan A Home Health     Discharge Plan B Other     DME Needed Upon Discharge  walker, rolling   Tomi walker needed at discharge.    Discharge Barriers Identified None     Why the patient remains in the hospital Requires continued medical care        Post-Acute Status    Diaylsis Status Set-up Complete/Auth obtained     Coverage [employer]     Discharge Delays Other   Patient not medically stable for discharge.                  o This patient has been screened for Case Management needs.  Based on (documentation in medical record), patient transferred into ICU on 02/08/2022 following a Rapid Response.    o   Disharge planning:   discharge to home with home health. OP HD arranged @ [facility].    Case Management/Social Work remains available if a need arises, please enter consult for assistance.  For urgent needs contact Case Management Department/on-call at:  [phone]

## 2022-02-09 NOTE — ASSESSMENT & PLAN NOTE
-Noted mild transaminitis and thrombocytopenia which are not new.  Noted epistaxis and gingival bleeding which has been ongoing for several months.  These are chronic and note history of cirrhosis and alcohol abuse   -Follows with hepatology  -Abdominal US on day prior to admit showed hepatic cirrhosis/steatosis with sonographic findings of portal hypertension including trace ascites and splenomegaly.  Noted to be more lethargic with elevation of ammonia level.  Hepatic Encephalopathy (not POA) and started on lactulose.

## 2022-02-09 NOTE — HPI
Ms. Farris is a 71 y.o. female with HTN, HLD, GERD, cirrhosis of liver, thrombocytopenia, and sleep apnea who presented to ED with a complaint of cough and congestion since October.  She is chronically dyspneic, exacerbated by exertion, has seen Cardiology and was prescribed lasix and metoprolol.  Her sister has now noted that the patient has very low urine output and is constipated.  Also follows with Hepatology for chronic liver disease.  Patient denies fever, chills, chest pain, palpitations, orthopnea, PND, dizziness, syncope, n/v/d, abdominal pain, or dysuria.  In the ED, labs reveal ANI, hyperkalemia, metabolic acidosis with elevated lactic acid, elevated liver enzymes, elevated BNP, elevated d-dimer, thrombocytopenia, and markedly concentrated urine.  Echo December 2021 showed preserved EF, no evidence of heart failure.  Chest xray without acute abnormality. No convincing evidence of infectious process.    Patient was transferred to [facility] for kidney bx with concerns for lupus nephritis. Patient has not been able to get bx due to thrombocytopenia. Now requiring pressors with multisystem organ failure. Currently not tolerating CRRT. Family requesting to follow up with palliative medicine. Met with palliative medicine at the [facility]

## 2022-02-09 NOTE — ASSESSMENT & PLAN NOTE
-Admitted to inpatient status  -Baseline Cr 0.9.  On admit Cr 2.7  -Reports being started on lasix 1/8 by her cardiologist and over several days PTA had decreased UOP and diminished PO intake  -On admit BNP elevated at 1160 and with metabolic acidosis and mild hyperkalemia   Appreciate Nephrology input.  Continued worsening of renal function.   IR consulted for kidney biopsy, but patient more decompensated.  -Cr continues to worsen and end encephalopathic today.  IR placed THDC and HD initiated.  Completed 3 days of pulse dose steroids for suspected glomerulonephritis vs pulm/renal syndrome.  Changed to prednisone 60mg  and anticipate taper - pending full workup.

## 2022-02-09 NOTE — PLAN OF CARE
Problem: Occupational Therapy Goal  Goal: Occupational Therapy Goal  Description: Goals to be met by: 03/03/22     Patient will increase functional independence with ADLs by performing:    UE Dressing with Stand-by assistance.  LE Dressing with Minimal assistance.  Grooming while seated with Stand-by assistance.  Toileting from bedside commode with minimal assistance for hygiene and clothing management.   Supine to sit with Stand-by assistance.  Step transfer with stand-by assistance  Toilet transfer to bedside commode with stand-by assistance.  Upper extremity exercise program x15 reps per handout, with independence.    Outcome: Ongoing, Progressing     Re-eval completed and goals adjusted. OT rec inpatient rehab at d/c in order to increase safety and independence with ADLs and all aspects of functional mobility.

## 2022-02-09 NOTE — PT/OT/SLP PROGRESS
Physical Therapy Re-Evaluation    Patient Name:  Tammi Farris   MRN:  [id number]    Recommendations:     Discharge Recommendations:  rehabilitation facility   Discharge Equipment Recommendations:  (Pediatric/Tomi RW)   Barriers to discharge home: Pt with AMS and decreased functional mobility.  Pt new to HD on this admission.      Assessment:     Tammi Farris is a 71 y.o. female admitted with a medical diagnosis of Acute renal failure superimposed on stage 2 chronic kidney disease.  She presents with the following impairments/functional limitations:  weakness,impaired self care skills,impaired functional mobilty,gait instability,impaired balance,impaired cognition,decreased coordination,decreased upper extremity function,decreased lower extremity function,decreased safety awareness,edema,impaired cardiopulmonary response to activity.    Rehab Prognosis: Fair+; patient would benefit from acute skilled PT services to address these deficits and reach maximum level of function.    Recent Surgery: * No surgery found *  ; Pt with transfers to ICU from floor x2 on this admission.      Plan:     During this hospitalization, patient to be seen 6 x/week to address the identified rehab impairments via gait training,therapeutic activities,therapeutic exercises and progress toward the following goals:    · Plan of Care Expires:  02/23/22    Subjective     Chief Complaint: N/A  Patient/Family Comments/goals: Pt was not resisting therapy, able to participate.   Pain/Comfort:  Pain Rating 1: 0/10      Objective:     Communicated with ICU nurse Munira prior to session.  Patient found HOB elevated with bed alarm,blood pressure cuff,pulse ox (continuous),telemetry,oxygen,bowel management system,peripheral IV (R chest HD access) upon PT entry to room.     General Precautions: Standard, fall,respiratory   Orthopedic Precautions:N/A   Braces: N/A  Respiratory Status: Nasal cannula, flow 4 L/min     Functional Mobility:  Pt seen in ICU  and was lethargic, required mod-max VC's to maintain alertness.  Pt's speech was unintelligible.  Pt was unable to follow simple commands, did not resist assistance to sit EOB and stand by bedside.  V/S stable during therapy.  PT will continue to assess.    · Bed Mobility:     · Scooting: minimum assistance and moderate assistance for anterior scooting  · Bridging: stand by assistance   · Supine to Sit: maximal assistance and of 2 persons with HOB elevated   · Sit to Supine: maximal assistance and of 2 persons  · Transfers:     · Sit to Stand:  minimum assistance and of 2 persons with no AD and hand-held assist  · Gait: Pt ambulated ~6 small sidesteps along bedside with min A of 2 persons using no AD and on 4L O2 NC.  Pt with narrow BRAD, decreased weight shifting, decreased foot clearance, and decreased step length.    · Balance: Pt with fair static sit/stand balance.      AM-PAC 6 CLICK MOBILITY  Turning over in bed (including adjusting bedclothes, sheets and blankets)?: 3  Sitting down on and standing up from a chair with arms (e.g., wheelchair, bedside commode, etc.): 3  Moving from lying on back to sitting on the side of the bed?: 2  Moving to and from a bed to a chair (including a wheelchair)?: 3  Need to walk in hospital room?: 2  Climbing 3-5 steps with a railing?: 1  Basic Mobility Total Score: 14       Therapeutic Activities and Exercises:  Pt unable to receive any education at this time.  PT will continue to assess.     Patient left left sidelying and HOB elevated with all lines intact, call button in reach, bed alarm on and ICU nurse Munira notified and Dr. Shirley and sister present.    GOALS:   Multidisciplinary Problems     Physical Therapy Goals        Problem: Physical Therapy Goal    Goal Priority Disciplines Outcome Goal Variances Interventions   Physical Therapy Goal     PT, PT/OT      Description: Goals to be met by: 2/23/22     Patient will increase functional independence with mobility by  performin. Supine to sit with Modified Lillian  2. Rolling to Left and Right with Modified Lillian  3. Sit to stand transfer with Modified Lillian using RW  4. Bed to chair transfer with Modified Lillian using Rolling Walker  5. Gait >50 feet with Modified Lillian using Rolling Walker   6. Lower extremity exercise program 2 sets x10 reps per handout, with independence                     Time Tracking:     PT Received On: 22  PT Start Time: 1345     PT Stop Time: 1406  PT Total Time (min): 21 min     Billable Minutes: Re-eval 21 min co-eval with OT    Treatment Type: Re-evaluation        PTA Visit Number: 0     2022

## 2022-02-09 NOTE — ASSESSMENT & PLAN NOTE
-Chronic and at baseline on admit secondary to cirrhosis  -Heme/onc consulted and input appreciated  -Plan is transfuse for platelets <10 or if significant hemorrhage.

## 2022-02-09 NOTE — ASSESSMENT & PLAN NOTE
-Patient with persistent significant shortness of breath and hypoxia requiring supplemental O2  -Not on oxygen at home  -On admit had clear breath sounds but on 2/2 has significant pulmonary crackles and increased work of breathing  -CXR showed possible pulmonary edema and BNP is rather elevated  -Also noted significantly elevated D-dimer on admit.  V/Q scan showed very low probability of pulmonary embolism.  Stopped full dose lovenox 2/2   -While had some signs of fluid overload - ultimately believed to be third spacing and not fluid overload.  Lasix drip not helpful and near anuric so it was stopped.  -Transferred to icu on evening of 2/3 due to respiratory distress.  Treated with bipap QHS/PRN and supplemental O2.  -Continue supplemental O2 and bipap qhs/prn.

## 2022-02-09 NOTE — PHYSICIAN QUERY
"PT Name: Tammi Farris  MR #: [id number]     DOCUMENTATION CLARIFICATION     CDS/: Donald Calvert RN, CCDS      Contact information:   [email or web address]      This form is a permanent document in the medical record.     Query Date: February 9, 2022    By submitting this query, we are merely seeking further clarification of documentation.  Please utilize your independent clinical judgment when addressing the question(s) below.      The Medical Record contains the following:    Clinical Information Location in Medical Records   presents to the ED with a cough, shortness of breath, restlessness, and chest "pressure" onset 4 days ago.  -pt's sister reports : decreased appetite; feeling winded; not been making much urine; heart rate 181 2 weeks ago which is why they went to heart doctor;     Neuro/ psych:   AOx3; anxious    2/1  ED provider note    Neuro: mental status:  AOx3; lethargic ;  Acute renal failure superimposed on stage 2 chronic kidney disease;  Metabolic acidosis; Transaminitis... 2/1   H&P, Hospital medicine ()   2/7  HM: -Cr continues to worsen and end encephalopathic today    2/8 HM :apparently been very encephalopathic for some time.   ---I suspect her progressive encephalopathy is both uremic, metabolic, and hepatic (NH3 is up).  ----Confused, lethargic and encephalopathic ;    2/8:  Had planned renal biopsy, but encephalopathic, anuric and worsening acidosis so THDC placed and initiated HD on 2/7.      2/7  MD Kadeem-      2/8  MD Shailesh--         2/8 MD Víctor--        According to coding guidelines, Present on Admission is defined as present at the time the order for inpatient admission occurs. Conditions that develop during an outpatient encounter, including emergency department, observation, or outpatient surgery, are considered as present on admission.       Please clarify the Present on Admission (POA) status of the diagnosis: ___encephalopathy ______    [ x ] Yes (Y) "   [  ] No (N)   [  ] Documentation insufficient to determine if condition is POA (U)   [  ] Clinically Undetermined (W)     Reference:  ICD-10-CM Official Guidelines for Coding and Reporting FY 2021. (2020). Retrieved October 21, 2020, from https://www.cdc.gov/nchs/data/icd/10cmguidelines-FY2021.pdf?fbclid=[email or web address]    Form No. [id number]

## 2022-02-10 NOTE — PT/OT/SLP PROGRESS
Occupational Therapy      Patient Name:  Tammi Farris   MRN:  [id number]    Patient not seen today secondary to Dialysis. Will follow-up later as able.    2/10/2022

## 2022-02-10 NOTE — PROGRESS NOTES
[facility] - Intensive Care  Palliative Medicine  Progress Note    Patient Name: Tammi Farris  MRN: [id number]  Admission Date: 2/1/2022  Hospital Length of Stay: 8 days  Code Status: Full Code   Attending Provider: Jose Martin Renee MD  Consulting Provider: Laura Murcia NP  Primary Care Physician: Ann-Marie Hartman MD  Principal Problem:Acute renal failure superimposed on stage 2 chronic kidney disease    Patient information was obtained from patient, relative(s), past medical records and primary team.      Assessment/Plan:     Palliative Encounter:  -Remains full code/full treatment status  -Pt is unable to participate in GOC.  Surrogate medical decision makers:  Pt has three brothers and two sisters who share equally  in decisions.   -Discussed code status options with pt's sister.  Family will discussed together tonight  -Palliative team will continue to follow for ongoing GOC discussions and family support        Subjective:     Chief Complaint:   Chief Complaint   Patient presents with    Cough     Pt has been having cough and congestion since October. Pt has been seeing a dr for these symptoms and they give her diuretics. Pt had echo within the last month and it was normal.        HPI:   HPI per chart review: HPI: Ms. Farris is a 71 y.o. female with HTN, HLD, GERD, cirrhosis of liver, thrombocytopenia, and sleep apnea who presented to ED with a complaint of cough and congestion since October.  She is chronically dyspneic, exacerbated by exertion, has seen Cardiology and was prescribed lasix and metoprolol.  Her sister has now noted that the patient has very low urine output and is constipated.  Also follows with Hepatology for chronic liver disease.  Patient denies fever, chills, chest pain, palpitations, orthopnea, PND, dizziness, syncope, n/v/d, abdominal pain, or dysuria.  In the ED, labs reveal ANI, hyperkalemia, metabolic acidosis with elevated lactic acid, elevated liver enzymes, elevated BNP,  elevated d-dimer, thrombocytopenia, and markedly concentrated urine.  Echo December 2021 showed preserved EF, no evidence of heart failure.  Chest xray without acute abnormality. No convincing evidence of infectious process.        Overview/Hospital Course:  71 year old woman with HTN, HLD, GERD, CKDII, cirrhosis and PAULINA presented for sob and has been diagnosed with navneet/CKDII and acute hypoxic respiratory failure.  Complex patient and presentation with very difficult to ascertain volume status.  Was given lasix and then fluids and then attempt at lasix again.  Suspect a pulmonary/renal vasculitis but not clearly diagnosed (Prior ARMEN positive 2019, low C3 and C4 and abnormal SPEP with elevated kappa/lambda ratio).  Had planned renal biopsy, but encephalopathic, anuric and worsening acidosis so THDC placed and initiated HD on 2/7.  Has completed 3 days pulse dose steroids (1g daily) and will be on prednisone 60 mg.      On 2/8/22, rapid response was called due to 's, EKG showed atrial fibrillation with RVR with HR in 130s.  Lopressor IV was given with improvement in HR. Pt with mental status change, attempting to climb out of bed.  Pt was transferred to ICU for intensive monitoring.     2/9/22 - Palliative medicine consulted.       Hospital Course:  No notes on file    Palliative Encounter:  Palliative medicine consult received for advance care planning/goals of care.  Per chart review, pt is currently full code/full treatment.  No advance directives on file.     Patient lethargic, not able to answer questions.  Her sister, Tania, was at bedside and receptive to Palliative visit.  Pt is , has no children.  She has two sisters and three brothers (all live nearby except one brother in [place]).  No advance directives.  Tania has previously attempted to discuss pt's medical wishes at the end of life but pt has never engaged in those discussions or answered questions regarding what her wishes would be. Tania  "tells me that she understands the importance of code status and she plans to discuss with pt's siblings tonight.      Advance Care Planning     Date: 02/09/2022    Code Status  In light of the patients advanced and life limiting illness,I engaged the the family in a conversation about the patient's preferences for care  at the very end of life. Tania tells me that she does not want to "give up too early" and does not want pt to suffer at the same time.  Prior to hospitalization, pt was independent and active.  Pt's family is aware of the significance of her condition.  We discussed the options for code status, including full code status vs DNR and the risks and benefits of each.  We discussed some common misconceptions regarding code status, including the misconception that if a patient elects DNR status that would mean do not treat.  Discussed that if patient's family were to chose DNR status, the patient would still be able to continue aggressive care that aligned with their goals and DNR would only come into play in the event of cardiopulmonary arrest.  Tania expresses understanding, stating that she has discussed this with the medical team earlier today.  She discusses the difficulty in making decisions for pt when pt did not express any wishes prior.  Emotional support provided.        Acute renal failure superimposed on stage 2 chronic kidney disease  -continue current management as per primary team/nephrology    Debility  -continue current management as per primary team/nephrology    Acute respiratory failure with hypoxia  -continue current management as per primary team/nephrology    Hypokalemia  -continue current management as per primary team/nephrology    Thrombocytopenia  -continue current management as per primary team/nephrology    Cirrhosis of liver without ascites  -continue current management as per primary team/nephrology      Past Medical History:   Diagnosis Date    Allergy     Anxiety     " Basal cell carcinoma     Cirrhosis of liver without ascites 12/27/2017    Depression     Diabetes mellitus, type 2     Disorder of kidney and ureter     Endometriosis     GERD (gastroesophageal reflux disease)     Hyperlipidemia     Hypertension     Joint pain     Psoriasis        Past Surgical History:   Procedure Laterality Date    abdominal laproscopic surgery      APPENDECTOMY      CARPAL TUNNEL RELEASE      right    CHOLECYSTECTOMY  04/2015    COLONOSCOPY N/A 2/8/2019    Procedure: COLONOSCOPY;  Surgeon: Celso Salas MD;  Location: [facility] ([address] [place] [address]);  Service: Endoscopy;  Laterality: N/A;    ESOPHAGOGASTRODUODENOSCOPY N/A 2/8/2019    Procedure: EGD (ESOPHAGOGASTRODUODENOSCOPY);  Surgeon: Celso Salas MD;  Location: [facility] ([address] [place] [address]);  Service: Endoscopy;  Laterality: N/A;  varices screening, labs ordered prior    HYSTERECTOMY  age 25    JOSEFA/BSO (endometriosis)    OOPHORECTOMY      SKIN CANCER DESTRUCTION      UPPER GASTROINTESTINAL ENDOSCOPY         Review of patient's allergies indicates:   Allergen Reactions    Dobutamine Hives    Benadryl [diphenhydramine hcl] Anxiety     Pt states she feels jumpy and anxious when taking.    Darvon [propoxyphene] Nausea Only    Shellfish containing products Hives    Iodinated contrast media Hives    Lisinopril Other (See Comments)     cough    Propoxyphene hcl      Itchy (skin)^    Tizanidine Other (See Comments)     Sweaty, difficulty swallowing    Statins-hmg-coa reductase inhibitors Anxiety and Other (See Comments)     Myalgia, fatigue, palpitations, anxiety       Medications:  Continuous Infusions:  Scheduled Meds:   albuterol-ipratropium  3 mL Nebulization Q6H    famotidine  20 mg Oral Daily    insulin detemir U-100  5 Units Subcutaneous QHS    lactulose  10 g Oral BID    metoprolol  5 mg Intravenous Once    metoprolol tartrate  25 mg Oral BID    polyethylene glycol  17 g Oral BID    predniSONE  60 mg Oral Daily     sodium zirconium cyclosilicate  10 g Oral Daily     PRN Meds:sodium chloride, acetaminophen, benzonatate, bisacodyL, dextrose 50%, dextrose 50%, glucagon (human recombinant), glucose, glucose, guaiFENesin 100 mg/5 ml, heparin (porcine), insulin aspart U-100, iohexoL, melatonin, naloxone, ondansetron, simethicone, sodium chloride 0.9%    Family History     Problem Relation (Age of Onset)    Arthritis Mother    Asthma Brother    Hyperlipidemia Brother    Hypertension Mother, Sister, Brother    No Known Problems Father    Raynaud syndrome Sister        Tobacco Use    Smoking status: Never Smoker    Smokeless tobacco: Never Used   Substance and Sexual Activity    Alcohol use: Not Currently    Drug use: No    Sexual activity: Yes     Partners: Male     Birth control/protection: Surgical       Review of Systems   Unable to perform ROS: Acuity of condition     Objective:     Vital Signs (Most Recent):  Temp: 97.8 °F (36.6 °C) (02/09/22 1200)  Pulse: 64 (02/09/22 1200)  Resp: (!) 21 (02/09/22 1200)  BP: (!) 144/62 (02/09/22 1200)  SpO2: 95 % (02/09/22 1200) Vital Signs (24h Range):  Temp:  [97.6 °F (36.4 °C)-109.4 °F (43 °C)] 97.8 °F (36.6 °C)  Pulse:  [] 64  Resp:  [11-37] 21  SpO2:  [73 %-100 %] 95 %  BP: (100-144)/(43-89) 144/62     Weight: 73.1 kg (161 lb 2.5 oz)  Body mass index is 34.87 kg/m².    Physical Exam  Vitals and nursing note reviewed.   Constitutional:       General: She is not in acute distress.     Appearance: She is ill-appearing.   HENT:      Head: Normocephalic and atraumatic.      Right Ear: External ear normal.      Left Ear: External ear normal.      Nose: Nose normal. No congestion or rhinorrhea.   Eyes:      General:         Right eye: No discharge.         Left eye: No discharge.   Cardiovascular:      Rate and Rhythm: Normal rate.   Pulmonary:      Effort: Pulmonary effort is normal. No respiratory distress.   Abdominal:      Palpations: Abdomen is soft.      Comments: NGT in place    Musculoskeletal:         General: No signs of injury.      Cervical back: Neck supple.   Skin:     General: Skin is warm and dry.   Neurological:      Mental Status: She is lethargic.         Review of Symptoms    Symptom Assessment (ESAS 0-10 Scale)  Unable to complete assessment due to Acuity of condition         Pain Assessment in Advanced Demential Scale (PAINAD)   Breathing - Independent of vocalization:  0  Negative vocalization:  0  Facial expression:  0  Body language:  0  Consolability:  0  Total:  0    Modified Ernestina Scale:  0    Performance Status:  30    Living Arrangements:  Lives alone        Advance Care Planning   Advance Directives:   Living Will: No    LaPOST: No    Do Not Resuscitate Status: No    Medical Power of : No      Decision Making:  Family answered questions and Patient unable to communicate due to disease severity/cognitive impairment         Significant Labs: All pertinent labs within the past 24 hours have been reviewed.  CBC:   Recent Labs   Lab 02/09/22  0951   WBC 11.30   HGB 8.1*   HCT 26.4*   *   PLT 62*     BMP:  Recent Labs   Lab 02/09/22 0951   *      K 4.0      CO2 19*   BUN 99*   CREATININE 6.4*   CALCIUM 10.0     LFT:  Lab Results   Component Value Date    AST 37 02/09/2022    ALKPHOS 76 02/09/2022    BILITOT 1.6 (H) 02/09/2022     Albumin:   Albumin   Date Value Ref Range Status   02/09/2022 3.7 3.5 - 5.2 g/dL Final     Protein:   Total Protein   Date Value Ref Range Status   02/09/2022 7.6 6.0 - 8.4 g/dL Final     Lactic acid:   Lab Results   Component Value Date    LACTATE 1.7 02/03/2022    LACTATE 1.6 02/02/2022       Significant Imaging: I have reviewed all pertinent imaging results/findings within the past 24 hours.   CT Head Without Contrast  Narrative: EXAMINATION:  CT HEAD WITHOUT CONTRAST    CLINICAL HISTORY:  Mental status change, unknown cause;    TECHNIQUE:  Low dose axial CT images obtained throughout the head without  intravenous contrast. Sagittal and coronal reconstructions were performed.    COMPARISON:  None available.    FINDINGS:  Ventricles and sulci are normal in size for age without evidence of hydrocephalus. No extra-axial blood or fluid collections.  There is mild periventricular white matter hypoattenuation, nonspecific but can be seen with chronic microvascular ischemic change.  No parenchymal mass, hemorrhage, edema or major vascular distribution infarct.    No calvarial fracture.  The scalp is unremarkable.  Bilateral paranasal sinuses and mastoid air cells are clear.  Impression: No acute intracranial abnormality.    Mild periventricular white matter hypoattenuation, nonspecific but can be seen with chronic microvascular ischemic change.    Electronically signed by: Jitendra Prieto  Date:    02/08/2022  Time:    09:14    EKG 2/1/22 report reviewed      30 min spent in advance care planning    Laura Murcia NP  Palliative Medicine  [facility] - [place] - Intensive Care

## 2022-02-10 NOTE — PROGRESS NOTES
[facility] Medicine  Progress Note    Patient Name: Tammi Farris  MRN: [id number]  Patient Class: IP- Inpatient   Admission Date: 2/1/2022  Length of Stay: 9 days  Attending Physician: Jose Martin Renee MD  Primary Care Provider: Ann-Marie Hartman MD        Subjective:     Principal Problem:Acute renal failure superimposed on stage 2 chronic kidney disease        HPI:  71 y.o. female with HTN, HLD, GERD, cirrhosis of liver, thrombocytopenia, and sleep apnea presents with a complaint of cough and congestion since October.  She is chronically dyspneic, exacerbated by exertion, has seen Cardiology and was prescribed lasix and metoprolol.  Her sister has now noted that the patient has very low urine output and is constipated.  Also follows with Hepatology for chronic liver disease.  Patient denies fever, chills, chest pain, palpitations, orthopnea, PND, dizziness, syncope, n/v/d, abdominal pain, or dysuria.  In the ED, labs reveal ANI, hyperkalemia, metabolic acidosis with elevated lactic acid, elevated liver enzymes, elevated BNP, elevated d-dimer, thrombocytopenia, and markedly concentrated urine.  Echo December 2021 showed preserved EF, no evidence of heart failure.  Chest xray without acute abnormality. No convincing evidence of infectious process.      Overview/Hospital Course:  71 year old woman with HTN, HLD, GERD, CKDII, cirrhosis and PAULINA presented for sob and has been diagnosed with ani/CKDII and acute hypoxic respiratory failure.  Complex patient and presentation with very difficult to ascertain volume status.  Was given lasix and then fluids and then attempt at lasix again.  Suspect a pulmonary/renal vasculitis but not clearly diagnosed (Prior TAMMI positive 2019, low C3 and C4 and abnormal SPEP with elevated kappa/lambda ratio).  Had planned renal biopsy, but encephalopathic, anuric and worsening acidosis so THDC placed and initiated HD on 2/7.  Has completed 3 days pulse dose steroids  (1g daily) and will be on prednisone 60 mg.  Patient more lethargic on 2/8 and noted to have episode of rapid AFib.  Moved to ICU.  Noted to have increased ammonia levels and started on lactulose.      Interval History: more awake and looking more comfortable.    Review of Systems   HENT: Negative for ear discharge and ear pain.    Eyes: Negative for discharge and itching.   Endocrine: Negative for cold intolerance and heat intolerance.   Neurological: Negative for seizures and syncope.     Objective:     Vital Signs (Most Recent):  Temp: 98 °F (36.7 °C) (02/10/22 1200)  Pulse: 67 (02/10/22 1530)  Resp: 20 (02/10/22 1530)  BP: (!) 107/53 (02/10/22 1530)  SpO2: 98 % (02/10/22 1530) Vital Signs (24h Range):  Temp:  [97.5 °F (36.4 °C)-98 °F (36.7 °C)] 98 °F (36.7 °C)  Pulse:  [58-91] 67  Resp:  [11-35] 20  SpO2:  [84 %-100 %] 98 %  BP: ()/(44-68) 107/53     Weight: 70.2 kg (154 lb 12.2 oz)  Body mass index is 33.49 kg/m².    Intake/Output Summary (Last 24 hours) at 2/10/2022 1609  Last data filed at 2/10/2022 1355  Gross per 24 hour   Intake 1360 ml   Output 3077 ml   Net -1717 ml      Physical Exam  Vitals and nursing note reviewed.   Constitutional:       General: She is not in acute distress.     Appearance: She is well-developed. She is ill-appearing. She is not toxic-appearing or diaphoretic.      Interventions: Nasal cannula in place.   HENT:      Head: Normocephalic and atraumatic.      Right Ear: External ear normal.      Left Ear: External ear normal.      Nose: Nose normal.      Mouth/Throat:      Mouth: Mucous membranes are moist.   Eyes:      Extraocular Movements: Extraocular movements intact.      Conjunctiva/sclera: Conjunctivae normal.   Cardiovascular:      Rate and Rhythm: Normal rate and regular rhythm.   Pulmonary:      Effort: No tachypnea or respiratory distress.      Breath sounds: No wheezing.   Abdominal:      General: Bowel sounds are normal. There is no distension.      Palpations:  Abdomen is soft.      Tenderness: There is no abdominal tenderness.      Comments: No palpable hepatomegaly or splenomegaly    Musculoskeletal:         General: No tenderness. Normal range of motion.      Cervical back: Normal range of motion and neck supple.      Right lower leg: Edema present.      Left lower leg: Edema present.   Skin:     General: Skin is warm and dry.   Neurological:      Mental Status: She is alert and oriented to person, place, and time.      Comments: Awake and alert today   Psychiatric:         Thought Content: Thought content normal.         Significant Labs:   All pertinent labs within the past 24 hours have been reviewed.  BMP:   Recent Labs   Lab 02/10/22  0324   *      K 4.0   CL 99   CO2 24   BUN 61*   CREATININE 4.7*   CALCIUM 9.9     CBC:   Recent Labs   Lab 02/09/22  0951   WBC 11.30   HGB 8.1*   HCT 26.4*   PLT 62*       Significant Imaging: I have reviewed all pertinent imaging results/findings within the past 24 hours.      Assessment/Plan:      * Acute renal failure superimposed on stage 2 chronic kidney disease  -Admitted to inpatient status  -Baseline Cr 0.9.  On admit Cr 2.7  -Reports being started on lasix 1/8 by her cardiologist and over several days PTA had decreased UOP and diminished PO intake  -On admit BNP elevated at 1160 and with metabolic acidosis and mild hyperkalemia   Appreciate Nephrology input.  Continued worsening of renal function.   IR consulted for kidney biopsy, but patient more decompensated.  -Cr continues to worsen and end encephalopathic today.  IR placed THDC and HD initiated.  Completed 3 days of pulse dose steroids for suspected glomerulonephritis vs pulm/renal syndrome.  Changed to prednisone 60mg  and anticipate taper - pending full workup.    MANUEL (generalized anxiety disorder)  -Very anxious today  -On 2/6 gave one time low dose ativan PO - watch respiratory status very closely.    Debility  -When more stable will consult  PT/OT    Acute respiratory failure with hypoxia  -Patient with persistent significant shortness of breath and hypoxia requiring supplemental O2  -Not on oxygen at home  -On admit had clear breath sounds but on 2/2 has significant pulmonary crackles and increased work of breathing  -CXR showed possible pulmonary edema and BNP is rather elevated  -Also noted significantly elevated D-dimer on admit.  V/Q scan showed very low probability of pulmonary embolism.  Stopped full dose lovenox 2/2   -While had some signs of fluid overload - ultimately believed to be third spacing and not fluid overload.  Lasix drip not helpful and near anuric so it was stopped.  -Transferred to icu on evening of 2/3 due to respiratory distress.  Treated with bipap QHS/PRN and supplemental O2.  -Continue supplemental O2 and bipap qhs/prn.    Metabolic acidosis  -Suspect secondary to ANI, management as above    Sleep apnea  -Continue bipap qhs    Thrombocytopenia  -Chronic and at baseline on admit secondary to cirrhosis  -Heme/onc consulted and input appreciated  -Plan is transfuse for platelets <10 or if significant hemorrhage.    Macrocytic anemia  -Hb 9.2 on admit - chronic secondary to cirrhosis.  -Dropped to <7 on 2/2  -No evidence of GI bleeding but had noted epistaxis and bleeding gums at times since before hospitalization.  -Folate and B12 replete.  She is iron deficient.  -1 unit PRBC ordered 2/2 but transfusion delayed due to complex antibody matching.  Blood finally available and she received 1 unit PRBC 2/4.   H/H has remained relatively stable.      Cirrhosis of liver without ascites  -Noted mild transaminitis and thrombocytopenia which are not new.  Noted epistaxis and gingival bleeding which has been ongoing for several months.  These are chronic and note history of cirrhosis and alcohol abuse   -Follows with hepatology  -Abdominal US on day prior to admit showed hepatic cirrhosis/steatosis with sonographic findings of portal  hypertension including trace ascites and splenomegaly.  Noted to be more lethargic with elevation of ammonia level.  Hepatic Encephalopathy (not POA) and started on lactulose.    Gastroesophageal reflux disease  -Stable, no acute issue    HTN (hypertension)  -BP low normal  -Continue metoprolol with hold parameters    Hyperlipidemia  -History noted  -Not on statin due to cirrhosis      VTE Risk Mitigation (From admission, onward)         Ordered     heparin (porcine) injection 3,200 Units  As needed (PRN)         02/07/22 1834     IP VTE HIGH RISK PATIENT  Once         02/01/22 1557     Place sequential compression device  Until discontinued         02/01/22 1557                Discharge Planning   CLAUDETTE:      Code Status: Full Code   Is the patient medically ready for discharge?:     Reason for patient still in hospital (select all that apply): Patient unstable  Discharge Plan A: Home Health   Discharge Delays: (!) Other (Patient not medically stable for discharge.)        Critical care time spent on the evaluation and treatment of severe organ dysfunction, review of pertinent labs and imaging studies, discussions with consulting providers and discussions with patient/family: 40 minutes.      Jose Martin Renee MD  Department of Hospital Medicine   [facility] - Intensive Care

## 2022-02-10 NOTE — PLAN OF CARE
Recommendations  1) When medically able, initate tube feeds of Novasource Renal @ 10 mL/hr and gradually advance to goal rate of 35 mL/hr as tolerated   Will provide 1680 kcal, 76 g protein, 602 mL free water.    Meets 100% protein and kcal needs.   - FWF per MD   - Hold for residuals > 500 cc.     2) Continue 2000 kcal Diabetic/Renal diet as medically appropriate    Goals: Enteral nutrition initiated by RD follow up  Nutrition Goal Status: new  Communication of RD Recs: reviewed with physician    Assessment and Plan  Nutrition Problem  Inadequate oral intake    Related to (etiology):   lethargy    Signs and Symptoms (as evidenced by):   Pt only taking bites of meals with encouragement, NGT placed for alternative means of nutrition    Interventions(treatment strategy):  Collaboration with other providers  Carbohydrate modified diet  Renal diet    Nutrition Diagnosis Status:   New

## 2022-02-10 NOTE — PT/OT/SLP PROGRESS
Physical Therapy      Patient Name:  Tammi Farris   MRN:  [id number]    Patient not seen today secondary to Dialysis. Will follow-up as able.    Dino Schofield, PTA

## 2022-02-10 NOTE — PLAN OF CARE
Pt remains in the ICU on 5L NC.  NSR on the monitor.  Afebrile.  Aox4.  Restraints removed.  NG tube with no residuals, tube feeds at 30mL/hr.  Flexiseal in place with 15mL claimed this shift.  Dialysis pulled 617mL off per dialysis RN.  Family at the bedside, plan of care reviewed.  No injuries, falls or skin breakdown occurred this shift.

## 2022-02-10 NOTE — SUBJECTIVE & OBJECTIVE
Interval History: more awake and looking more comfortable.    Review of Systems   HENT: Negative for ear discharge and ear pain.    Eyes: Negative for discharge and itching.   Endocrine: Negative for cold intolerance and heat intolerance.   Neurological: Negative for seizures and syncope.     Objective:     Vital Signs (Most Recent):  Temp: 98 °F (36.7 °C) (02/10/22 1200)  Pulse: 67 (02/10/22 1530)  Resp: 20 (02/10/22 1530)  BP: (!) 107/53 (02/10/22 1530)  SpO2: 98 % (02/10/22 1530) Vital Signs (24h Range):  Temp:  [97.5 °F (36.4 °C)-98 °F (36.7 °C)] 98 °F (36.7 °C)  Pulse:  [58-91] 67  Resp:  [11-35] 20  SpO2:  [84 %-100 %] 98 %  BP: ()/(44-68) 107/53     Weight: 70.2 kg (154 lb 12.2 oz)  Body mass index is 33.49 kg/m².    Intake/Output Summary (Last 24 hours) at 2/10/2022 1609  Last data filed at 2/10/2022 1355  Gross per 24 hour   Intake 1360 ml   Output 3077 ml   Net -1717 ml      Physical Exam  Vitals and nursing note reviewed.   Constitutional:       General: She is not in acute distress.     Appearance: She is well-developed. She is ill-appearing. She is not toxic-appearing or diaphoretic.      Interventions: Nasal cannula in place.   HENT:      Head: Normocephalic and atraumatic.      Right Ear: External ear normal.      Left Ear: External ear normal.      Nose: Nose normal.      Mouth/Throat:      Mouth: Mucous membranes are moist.   Eyes:      Extraocular Movements: Extraocular movements intact.      Conjunctiva/sclera: Conjunctivae normal.   Cardiovascular:      Rate and Rhythm: Normal rate and regular rhythm.   Pulmonary:      Effort: No tachypnea or respiratory distress.      Breath sounds: No wheezing.   Abdominal:      General: Bowel sounds are normal. There is no distension.      Palpations: Abdomen is soft.      Tenderness: There is no abdominal tenderness.      Comments: No palpable hepatomegaly or splenomegaly    Musculoskeletal:         General: No tenderness. Normal range of motion.       Cervical back: Normal range of motion and neck supple.      Right lower leg: Edema present.      Left lower leg: Edema present.   Skin:     General: Skin is warm and dry.   Neurological:      Mental Status: She is alert and oriented to person, place, and time.      Comments: Awake and alert today   Psychiatric:         Thought Content: Thought content normal.         Significant Labs:   All pertinent labs within the past 24 hours have been reviewed.  BMP:   Recent Labs   Lab 02/10/22  0324   *      K 4.0   CL 99   CO2 24   BUN 61*   CREATININE 4.7*   CALCIUM 9.9     CBC:   Recent Labs   Lab 02/09/22  0951   WBC 11.30   HGB 8.1*   HCT 26.4*   PLT 62*       Significant Imaging: I have reviewed all pertinent imaging results/findings within the past 24 hours.

## 2022-02-10 NOTE — CARE UPDATE
[facility]  ICU Shift Summary  Date: 2/10/2022      Prehospitalization: Home  Admit Date / LOS : 2/1/2022/ 9 days    Diagnosis: Acute renal failure superimposed on stage 2 chronic kidney disease    Consults:        Active: Nephro, OT and PT       Needed: N/A     Code Status: Full Code   Advanced Directive: <no information>    LDA: Flexiseal, NG, PIV and Miguel Ángel       Central Lines/Site/Justification:Patient Does Not Have Central Line       Urinary Cath/Order/Justification:Patient Does Not Have Urinary Catheter    Vasopressors/Infusions:        GOALS: Volume/ Hemodynamic: N/A                     RASS: 0  alert and calm    Pain Management: none       Pain Controlled: not applicable     Rhythm: NSR    Respiratory Device: Nasal Cannula                  Most Recent SBT/ SAT: N/A       MOVE Screen: PT Consult  ICU Liberation: not applicable    VTE Prophylaxis: Mechanical  Mobility: Bedrest  Stress Ulcer Prophylaxis: Yes    Isolation: No active isolations  Dietary: TF  Tolerance: yes  Advancement: yes    I & O (24h):    Intake/Output Summary (Last 24 hours) at 2/10/2022 1727  Last data filed at 2/10/2022 1700  Gross per 24 hour   Intake 1390 ml   Output 3092 ml   Net -1702 ml        Restraints: No    Significant Dates:  Post Op Date: N/A  Rescue Date: N/A  Imaging/ Diagnostics: N/A    Noteworthy Labs:  See labs    COVID Test: (--)  CBC/Anemia Labs: Coags:    Recent Labs   Lab 02/04/22  0608 02/04/22  1439 02/05/22  0424 02/08/22  0516 02/08/22  1833 02/09/22  0951   WBC  --   --    < > 9.15  --  11.30   HGB  --   --    < > 8.3*  --  8.1*   HCT  --   --    < > 25.9*  --  26.4*   PLT  --   --    < > 74*  --  62*   MCV  --   --    < > 105*  --  110*   RDW  --   --    < > 19.3*  --  20.0*   RETIC 2.1  --   --   --   --   --    OCCULTBLOOD  --  Negative  --   --  Positive*  --     < > = values in this interval not displayed.    Recent Labs   Lab 02/07/22  2039 02/10/22  0324   INR 1.7* 1.7*   APTT 31.3  --          Chemistries:   Recent Labs   Lab 02/05/22  0424 02/06/22  0534 02/06/22  0913 02/07/22 1958 02/07/22 1958 02/08/22  0516 02/08/22  0516 02/09/22  0951 02/10/22  0324     --    < > 137   < > 137   < > 143 141   K 4.7  --    < > 4.5   < > 4.8   < > 4.0 4.0     --    < > 98   < > 100   < > 105 99   CO2 18*  --    < > 18*   < > 18*   < > 19* 24   BUN 88*  --    < > 74*   < > 89*   < > 99* 61*   CREATININE 5.1*  --    < > 4.6*   < > 5.3*   < > 6.4* 4.7*   CALCIUM 9.5  --    < > 9.4   < > 9.4   < > 10.0 9.9   PROT 8.2  --    < > 8.5*   < > 7.7  --  7.6  --    BILITOT 1.6*  --    < > 1.5*   < > 1.5*  --  1.6*  --    ALKPHOS 80  --    < > 73   < > 68  --  76  --    ALT 25  --    < > 24   < > 22  --  23  --    AST 38  --    < > 33   < > 29  --  37  --    MG  --   --   --  2.3  --   --   --   --   --    PHOS 7.8* 8.6*  --   --   --   --   --   --   --     < > = values in this interval not displayed.        Cardiac Enzymes: Ejection Fractions:    No results for input(s): CPK, CPKMB, MB, TROPONINI in the last 72 hours. EF   Date Value Ref Range Status   12/07/2021 68 % Final        POCT Glucose: HbA1c:    Recent Labs   Lab 02/10/22  0657 02/10/22  1144 02/10/22  1717   POCTGLUCOSE 231* 202* 176*    Hemoglobin A1C   Date Value Ref Range Status   02/01/2022 5.9 (H) 4.0 - 5.6 % Final     Comment:     ADA Screening Guidelines:  5.7-6.4%  Consistent with prediabetes  >or=6.5%  Consistent with diabetes    High levels of fetal hemoglobin interfere with the HbA1C  assay. Heterozygous hemoglobin variants (HbS, HgC, etc)do  not significantly interfere with this assay.   However, presence of multiple variants may affect accuracy.             ICU LOS 1d 23h  Level of Care: Critical Care    Chart Check: 12 HR Done  Shift Summary/Plan for the shift: see care plan note

## 2022-02-10 NOTE — PROGRESS NOTES
Renal ICU Progress Note    Date of Admission:  2/1/2022  7:35 AM    Length of Stay: 9  Days    Interim History:     Had Epistaxis earlier    Objective:    Current Facility-Administered Medications   Medication    0.9%  NaCl infusion (for blood administration)    acetaminophen tablet 650 mg    albuterol-ipratropium 2.5 mg-0.5 mg/3 mL nebulizer solution 3 mL    benzonatate capsule 100 mg    bisacodyL suppository 10 mg    dextrose 50% injection 12.5 g    dextrose 50% injection 25 g    famotidine tablet 20 mg    glucagon (human recombinant) injection 1 mg    glucose chewable tablet 16 g    glucose chewable tablet 24 g    guaiFENesin 100 mg/5 ml syrup 200 mg    heparin (porcine) injection 3,200 Units    insulin aspart U-100 pen 0-5 Units    insulin detemir U-100 pen 5 Units    iohexoL (OMNIPAQUE 350) injection 100 mL    lactulose 20 gram/30 mL solution Soln 10 g    melatonin tablet 6 mg    metoprolol injection 5 mg    metoprolol tartrate (LOPRESSOR) tablet 25 mg    naloxone 0.4 mg/mL injection 0.02 mg    ondansetron injection 4 mg    polyethylene glycol packet 17 g    predniSONE tablet 60 mg    simethicone chewable tablet 80 mg    sodium chloride 0.9% flush 10 mL    sodium zirconium cyclosilicate packet 10 g       Vitals:    02/10/22 0600 02/10/22 0615 02/10/22 0630 02/10/22 0700   BP: (!) 124/59 (!) 124/58 (!) 132/59 132/60   Pulse: 68 70 70 68   Resp: (!) 21 20 (!) 29 17   Temp:       TempSrc:       SpO2: 97% 97% 98% 97%   Weight: 70.2 kg (154 lb 12.2 oz)      Height:           I/O last 3 completed shifts:  In: 1230 [P.O.:100; Other:750; NG/GT:380]  Out: 3460 [Drains:10; Other:1750; Stool:1700]  No intake/output data recorded.      Physical Exam:     General: NAD seen during Dialysis  Neck: supple  Heart: RRR  Lungs: unlabored breathing  Abdomen: n/a  Limbs: some echymosis upper extremities  Neurologic: more awake and alert, answers simple  questions      Laboratories:    Recent Labs   Lab 02/09/22  0951   WBC 11.30   RBC 2.40*   HGB 8.1*   HCT 26.4*   PLT 62*   *   MCH 33.8*   MCHC 30.7*       Recent Labs   Lab 02/09/22  0951 02/09/22  0951 02/10/22  0324   CALCIUM 10.0   < > 9.9   PROT 7.6  --   --       < > 141   K 4.0   < > 4.0   CO2 19*   < > 24      < > 99   BUN 99*   < > 61*   CREATININE 6.4*   < > 4.7*   ALKPHOS 76  --   --    ALT 23  --   --    AST 37  --   --    BILITOT 1.6*  --   --     < > = values in this interval not displayed.       No results for input(s): COLORU, CLARITYU, SPECGRAV, PHUR, PROTEINUA, GLUCOSEU, BLOODU, WBCU, RBCU, BACTERIA, MUCUS in the last 24 hours.    Invalid input(s):  BILIRUBINCON    Microbiology Results (last 7 days)     Procedure Component Value Units Date/Time    Blood culture [[id number]]     Order Status: Canceled Specimen: Blood     Respiratory Infection Panel (PCR), Nasopharyngeal [[id number]] Collected: 02/04/22 1645    Order Status: Completed Specimen: Nasopharyngeal Swab Updated: 02/08/22 0900     Respiratory Infection Panel Source NP Swab     Adenovirus Not Detected     Coronavirus 229E, Common Cold Virus Not Detected     Coronavirus HKU1, Common Cold Virus Not Detected     Coronavirus NL63, Common Cold Virus Not Detected     Coronavirus OC43, Common Cold Virus Not Detected     Comment: The Coronavirus strains detected in this test cause the common cold.  These strains are not the COVID-19 (novel Coronavirus)strain   associated with the respiratory disease outbreak.          SARS-CoV2 (COVID-19) Qualitative PCR Not Detected     Human Metapneumovirus Not Detected     Human Rhinovirus/Enterovirus Not Detected     Influenza A (subtypes H1, H1-2009,H3) Not Detected     Influenza B Not Detected     Parainfluenza Virus 1 Not Detected     Parainfluenza Virus 2 Not Detected     Parainfluenza Virus 3 Not Detected     Parainfluenza Virus 4 Not Detected     Respiratory Syncytial Virus Not Detected      Bordetella Parapertussis (NA9408) Not Detected     Bordetella pertussis (ptxP) Not Detected     Chlamydia pneumoniae Not Detected     Mycoplasma pneumoniae Not Detected    Narrative:      Nasal Swab Only - for all other respiratory sources, order  [id number] - Respiratory Viral Panel by PCR (RSPFA)    Urine Culture High Risk [[id number]]  (Abnormal) Collected: 02/04/22 1322    Order Status: Completed Specimen: Urine, Catheterized Updated: 02/07/22 1149     Urine Culture, Routine CIRA GLABRATA  > 100,000 cfu/ml  Treatment of asymptomatic candiduria is not recommended (except for   specific populations). Candida isolated in the urine typically   represents colonization. If an indwelling urinary catheter is present  it should be removed or replaced.      Narrative:      Indicated criteria for high risk culture:->Other  Other (specify):->ani    Blood Culture #2 **CANNOT BE ORDERED STAT** [[id number]] Collected: 02/01/22 1120    Order Status: Completed Specimen: Blood from Peripheral, Antecubital, Left Updated: 02/05/22 1303     Blood Culture, Routine No Growth after 4 days.     Blood Culture #1 **CANNOT BE ORDERED STAT** [[id number]] Collected: 02/01/22 1121    Order Status: Completed Specimen: Blood from Peripheral, Antecubital, Right Updated: 02/05/22 1303     Blood Culture, Routine No Growth after 4 days.             Diagnostic Tests:     CXR:    FINDINGS:   The tip of the central venous line is in the distal superior vena cava.  The cardiac silhouette is prominent and may be exaggerated by mildly low lung volumes.  There is persistent increased interstitial attenuation, which appears to be greater on the right than the left.  There is no focal consolidation, pneumothorax, or pleural effusion.    Impression:       As above described.       Electronically signed by: Charla Combs   Date: 02/07/2022              Assessment:    70 y/o female with Hx. Cirrhosis of Liver admitted with:    - ANI et? R/o Lupus GN (low  complements and microscopic hematuria also + ARMEN with titers of  1:1280 and homogeneous pattern) already started on Prednisone per Dr. Alvarado but creatinine climbing as of yesterday therefore Dialysis started.  - Abnormal SPEP r/o MGUS  - Hyperphosphatemia  - HyperK+  RESOLVED  - HAGMA RESOLVED  - s/p Acute respiratory failure with hypoxia  - HTN  - Hx. HFpEF  - Macrocytic anemia with Fe++ def.  - Chronic thrombocytopenia  - Hx. Liver Cirrhosis  - Yeast UTI  - GERD  - PAULINA  - HLD          Plan:    - Dialysis today and as needed  - A-fib management per Cardiology  - Kidney Bx. Postponed Until more stable  - Hematology following  - Recommend Rheumatology Consult  - Enteral nutrition  - PO4- binders with above  - Following Iju-kuflx-ojcgz-acid/base  - Other problems per admitting

## 2022-02-10 NOTE — NURSING
While bathing and turning patient, she started bleeding from her nose and mouth. Bright red blood. Patient is upright in bed and no more bleeding noted.

## 2022-02-11 PROBLEM — J96.01 ACUTE RESPIRATORY FAILURE WITH HYPOXIA: Status: RESOLVED | Noted: 2022-01-01 | Resolved: 2022-01-01

## 2022-02-11 NOTE — PROGRESS NOTES
"[facility] [place] [facility]  Adult Nutrition  Progress Note    SUMMARY     Recommendations  1) Continue to advance TF of Novasource Renal to goal rate of 35 mL/hr. FWF per MD, hold for residuals > 400 cc.   2) Continue Renal/diabetic diet as medically appropriate  3) Monitor GI symptoms and tolerance, RD to follow up    Goals: Enteral nutrition initiated by RD follow up  Nutrition Goal Status: goal met  Communication of RD Recs: other (comment) (POC)    Assessment and Plan  Nutrition Problem  Inadequate oral intake     Related to (etiology):   lethargy     Signs and Symptoms (as evidenced by):   Pt only taking bites of meals with encouragement, NGT placed for alternative means of nutrition     Interventions(treatment strategy):  Collaboration with other providers  Carbohydrate modified diet  Renal diet  Enteral Nutrition     Nutrition Diagnosis Status:   Continues    Malnutrition Assessment    LISSETH NFPE due to remote assessment    Reason for Assessment  Reason For Assessment: RD follow-up  Diagnosis: other (see comments) (Acute renal failure superimposed on stage 2 CKD)  Relevant Medical History: HTN, HLD< GERD< cirrhosis of liver, sleep apnea  Interdisciplinary Rounds: did not attend  General Information Comments: Remote assessment for coverage, attempted to call RN - unable to get through. TF initiated, held 2/10 evening due to nausea/dry heaving, given Bentyl and nausea improved per RN note, TF resumed. LBM 2/11. Wt stable during admit. Per I/Os 0% meal intake. Continue TF as tolerated until pt able to demonstrate adequate meal intake.  Nutrition Discharge Planning: Pending clinical course    Nutrition Risk Screen  Nutrition Risk Screen: no indicators present    Nutrition/Diet History    Typical Food/Fluid Intake: LISSETH  Spiritual, Cultural Beliefs, Worship Practices, Values that Affect Care: no    Anthropometrics    Temp: 98.4 °F (36.9 °C)  Height Method: Stated  Height: 4' 9" (144.8 cm)  Height (inches): 57 " in  Weight Method: Bed Scale  Weight: 70.1 kg (154 lb 8.7 oz)  Weight (lb): 154.54 lb  Ideal Body Weight (IBW), Female: 85 lb  % Ideal Body Weight, Female (lb): 181.65 %  BMI (Calculated): 33.4  Usual Body Weight (UBW), k.45 kg  % Usual Body Weight: 103.98     Lab/Procedures/Meds  Pertinent Labs Reviewed: reviewed  Pertinent Labs Comments: Ha 135, BUN 49, GFR 12, POC -193, Hgb A1c 5.9 on 22  Pertinent Medications Reviewed: reviewed  Pertinent Medications Comments: famotidine, insulin, lactulose, polyethylene glycol, prednisone    Physical Findings/Assessment  Incision to chest     Estimated/Assessed Needs  Weight Used For Calorie Calculations: 73 kg (161 lb)  Energy Calorie Requirements (kcal): 3513-3972 kcal/day based on 25 kcal/kg  Energy Need Method: Kcal/kg  Protein Requirements: 73-88 g/day based on 1-1.2 g/kg for ANI with dialysis, critical care  Weight Used For Protein Calculations: 73 kg (161 lb)  Fluid Requirements (mL): 500 mL + UOP for ANI  Estimated Fluid Requirement Method: other (see comments)  RDA Method (mL): 1460     Nutrition Prescription Ordered  Current Diet Order: Renal/Diabetic  Current Nutrition Support Formula Ordered: Novasource Renal  Current Nutrition Support Rate Ordered: 35 (ml) (goal)  Current Nutrition Support Frequency Ordered: continuous    Evaluation of Received Nutrient/Fluid Intake  Energy Calories Required: meeting needs  Protein Required: meeting needs  Fluid Required: meeting needs  Comments: LBM   Tolerance:  (fair, TF held for nausea)  % Intake of Estimated Energy Needs: LISSETH (TF was held)  % Meal Intake: NPO    Nutrition Risk  Level of Risk/Frequency of Follow-up: high (2x weekly)     Monitor and Evaluation  Food and Nutrient Intake: food and beverage intake,energy intake,enteral nutrition intake  Food and Nutrient Adminstration: diet order,enteral and parenteral nutrition administration  Knowledge/Beliefs/Attitudes: food and nutrition  knowledge/skill  Physical Activity and Function: nutrition-related ADLs and IADLs  Anthropometric Measurements: weight change  Biochemical Data, Medical Tests and Procedures: lipid profile,electrolyte and renal panel,gastrointestinal profile,glucose/endocrine profile,inflammatory profile  Nutrition-Focused Physical Findings: overall appearance,skin,extremities, muscles and bones     Nutrition Follow-Up  RD Follow-up?: Yes

## 2022-02-11 NOTE — PLAN OF CARE
[place] - Intensive Care  Discharge Reassessment    Primary Care Provider: Ann-Marie Hartman MD    Expected Discharge Date:  TBD    Reassessment (most recent)       Discharge Reassessment - 02/11/22 1224          Discharge Reassessment    Assessment Type Discharge Planning Reassessment     Did the patient's condition or plan change since previous assessment? Yes     Discharge Plan discussed with: Sibling     Name(s) and Number(s) Tania Liu;  sister; [phone]     Communicated CLAUDETTE with patient/caregiver Date not available/Unable to determine     Discharge Plan A Rehab   Family would prefer [facility] if possible.    Discharge Plan B Other   TBD    DME Needed Upon Discharge  walker, rolling   Tomi Walker needed.    Discharge Barriers Identified None     Why the patient remains in the hospital Requires continued medical care        Post-Acute Status    Coverage [employer]     Discharge Delays Change in Medical Condition                 This patient has been screened for Case Management needs.  Based on (documentation in medical record)  patient remains in ICU with treatment ongoing.  Patient has been evaluated by therapy.  Rehab has been recommended.  SW has spoken with patient's sister, Tania, to advise of the recommendation.  The sister would prefer a location on the [facility] so that family might visit. Patient will also need OP HD @ discharge.  Dialysis has been arranged. Patient not yet medically stable for discharge.    Case Management/Social Work remains available if a need arises, please enter consult for assistance.  For urgent needs contact Case Management Department/on-call at:  [phone]

## 2022-02-11 NOTE — ASSESSMENT & PLAN NOTE
-Noted mild transaminitis and thrombocytopenia which are not new.  Noted epistaxis and gingival bleeding which has been ongoing for several months.  These are chronic and note history of cirrhosis and alcohol abuse   -Follows with hepatology  -Abdominal US on day prior to admit showed hepatic cirrhosis/steatosis with sonographic findings of portal hypertension including trace ascites and splenomegaly.  Noted to be more lethargic with elevation of ammonia level.  Hepatic Encephalopathy (not POA) and started on lactulose.  Improving mentation.

## 2022-02-11 NOTE — PT/OT/SLP PROGRESS
Occupational Therapy   Treatment    Name: Tammi Farris  MRN: [id number]  Admitting Diagnosis:  Acute renal failure superimposed on stage 2 chronic kidney disease       Recommendations:     Discharge Recommendations: rehabilitation facility  Discharge Equipment Recommendations:   (TBD)  Barriers to discharge:   (not at PLOF; was fully independent PTA)    Assessment:     Tammi Farris is a 71 y.o. female with a medical diagnosis of Acute renal failure superimposed on stage 2 chronic kidney disease. Performance deficits affecting function are weakness,gait instability,decreased upper extremity function,decreased ROM,impaired cardiopulmonary response to activity,impaired endurance,impaired balance,decreased lower extremity function,decreased safety awareness,impaired fine motor,pain,impaired skin,impaired self care skills,impaired cognition,impaired functional mobilty,decreased coordination,edema.     Pt with improved mental status compared to session on 02/09/22. Pt able to identify ADL objects, complete grooming ADL, count reps of exercises, communicate more needs today, and get OOB to chair with MIN A.     Rehab Prognosis:  Good; patient would benefit from acute skilled OT services to address these deficits and reach maximum level of function.       Plan:     Patient to be seen 5 x/week to address the above listed problems via self-care/home management,therapeutic activities,therapeutic exercises  · Plan of Care Expires: 03/03/22  · Plan of Care Reviewed with: patient,sibling    Subjective     Chief complaint: some dizziness, fatigues quickly   Patient/family comments/ goals: enjoys sitting up in the chair     Pain/Comfort:  · Pain Rating 1:  (some stomach discomfort)  · Pain Addressed 1: Nurse notified    Objective:     Communicated with: nurseMonroe, prior to session.  Patient found HOB elevated L sidelying with blood pressure cuff,telemetry,peripheral IV,pulse ox (continuous),oxygen,bowel management system,NG  tube (R chest HD access) upon OT entry to room.    General Precautions: Standard, fall,respiratory   Orthopedic Precautions:N/A   Braces: N/A  Respiratory Status: Nasal cannula, flow 3 L/min     Occupational Performance:     Activities limited by pt's c/o nausea and constant coughing.  Pt with scant bleeding to R nare, did stop and nurseMonroe, notified.      Bed Mobility:    · Patient completed Scooting anteriorly with minimum assistance  · Patient completed Supine to Sit with minimum assistance with HOB elevated     Functional Mobility/Transfers:  · Patient completed Sit <> Stand Transfer with minimum assistance  with  BUE supported x2 trials   · Patient completed Bed <> Chair Transfer using Step Transfer technique with minimum assistance with BUE supported  · Functional Mobility: ~4 steps bed>chair with BUE supported on 3L O2 NC with verbal cueing for safety.    Activities of Daily Living:  · Grooming: stand by assistance at bed level to comb her hair- pt required rest breaks d/t fatigue       AMPAC 6 Click ADL: 13    Treatment & Education:  · Pt re-educated on OT role/POC.   · Importance of OOB activity with staff assistance.  · Safety during functional t/f and mobility   · At bed level, pt completed the following BUE ROM therapeutic exercises:   · x10 digits flex/ext, forearm supination/pronation, elbow flex/ext AROM  · x10 shoulder horizontal ab/dduction, shoulder flex/ext   · x20 forward punches AROM   · Self-care tasks/functional mobility completed- assistance level noted above   · All questions/concerns answered within OT scope of practice       Patient left reclined in the chair with all lines intact, call button in reach, nurseMonroe, notified, sister present and bedside table in front of pt; blinds opened, curtain opened, all needs met/within reachEducation:      GOALS:   Multidisciplinary Problems     Occupational Therapy Goals        Problem: Occupational Therapy Goal    Goal Priority Disciplines  Outcome Interventions   Occupational Therapy Goal     OT, PT/OT Ongoing, Progressing    Description: Goals to be met by: 03/03/22     Patient will increase functional independence with ADLs by performing:    UE Dressing with Stand-by assistance.  LE Dressing with Minimal assistance.  Grooming while seated with Stand-by assistance.  Toileting from bedside commode with minimal assistance for hygiene and clothing management.   Supine to sit with Stand-by assistance.  Step transfer with stand-by assistance  Toilet transfer to bedside commode with stand-by assistance.  Upper extremity exercise program x15 reps per handout, with independence.                     Time Tracking:     OT Date of Treatment: 02/11/22  OT Start Time: 0949  OT Stop Time: 1021  OT Total Time (min): 32 min     OT only 3124-8444 (8 min)   Co-tx: 4664-2810 (23 min)    Billable Minutes:Therapeutic Activity 8 min  Therapeutic Exercise 13 min  Total Time 32 min (partial co-treat with PT)    OT/MYRA: OT          2/11/2022

## 2022-02-11 NOTE — PROGRESS NOTES
[facility] Medicine  Progress Note    Patient Name: Tammi Farris  MRN: [id number]  Patient Class: IP- Inpatient   Admission Date: 2/1/2022  Length of Stay: 10 days  Attending Physician: Jose Martin Renee MD  Primary Care Provider: Ann-Marie Hartman MD        Subjective:     Principal Problem:Acute renal failure superimposed on stage 2 chronic kidney disease        HPI:  71 y.o. female with HTN, HLD, GERD, cirrhosis of liver, thrombocytopenia, and sleep apnea presents with a complaint of cough and congestion since October.  She is chronically dyspneic, exacerbated by exertion, has seen Cardiology and was prescribed lasix and metoprolol.  Her sister has now noted that the patient has very low urine output and is constipated.  Also follows with Hepatology for chronic liver disease.  Patient denies fever, chills, chest pain, palpitations, orthopnea, PND, dizziness, syncope, n/v/d, abdominal pain, or dysuria.  In the ED, labs reveal ANI, hyperkalemia, metabolic acidosis with elevated lactic acid, elevated liver enzymes, elevated BNP, elevated d-dimer, thrombocytopenia, and markedly concentrated urine.  Echo December 2021 showed preserved EF, no evidence of heart failure.  Chest xray without acute abnormality. No convincing evidence of infectious process.      Overview/Hospital Course:  71 year old woman with HTN, HLD, GERD, CKDII, cirrhosis and PAULINA presented for sob and has been diagnosed with ani/CKDII and acute hypoxic respiratory failure.  Complex patient and presentation with very difficult to ascertain volume status.  Was given lasix and then fluids and then attempt at lasix again.  Suspect a pulmonary/renal vasculitis but not clearly diagnosed (Prior TAMMI positive 2019, low C3 and C4 and abnormal SPEP with elevated kappa/lambda ratio).  Had planned renal biopsy, but encephalopathic, anuric and worsening acidosis so THDC placed and initiated HD on 2/7.  Has completed 3 days pulse dose steroids  (1g daily) and will be on prednisone 60 mg.  Patient more lethargic on 2/8 and noted to have episode of rapid AFib.  Moved to ICU.  Noted to have increased ammonia levels and started on lactulose.  Mentation slowly improving with no further arrhythmias.  Poor oral intake.  NGT placed and started on tube feedings.      Interval History: complaining of abdominal discomfort.    Review of Systems   HENT: Negative for ear discharge and ear pain.    Eyes: Negative for discharge and itching.   Endocrine: Negative for cold intolerance and heat intolerance.   Neurological: Negative for seizures and syncope.     Objective:     Vital Signs (Most Recent):  Temp: 98.4 °F (36.9 °C) (02/11/22 0800)  Pulse: 61 (02/11/22 1100)  Resp: 18 (02/11/22 1100)  BP: (!) 115/56 (02/11/22 1100)  SpO2: 99 % (02/11/22 1100) Vital Signs (24h Range):  Temp:  [97.9 °F (36.6 °C)-98.8 °F (37.1 °C)] 98.4 °F (36.9 °C)  Pulse:  [] 61  Resp:  [13-34] 18  SpO2:  [90 %-100 %] 99 %  BP: ()/(43-74) 115/56     Weight: 70.1 kg (154 lb 8.7 oz)  Body mass index is 33.44 kg/m².    Intake/Output Summary (Last 24 hours) at 2/11/2022 1335  Last data filed at 2/11/2022 1000  Gross per 24 hour   Intake 780 ml   Output 647 ml   Net 133 ml      Physical Exam  Vitals and nursing note reviewed.   Constitutional:       General: She is not in acute distress.     Appearance: She is well-developed. She is ill-appearing. She is not toxic-appearing or diaphoretic.      Interventions: Nasal cannula in place.   HENT:      Head: Normocephalic and atraumatic.      Right Ear: External ear normal.      Left Ear: External ear normal.      Nose: Nose normal.      Mouth/Throat:      Mouth: Mucous membranes are moist.   Eyes:      Extraocular Movements: Extraocular movements intact.      Conjunctiva/sclera: Conjunctivae normal.   Cardiovascular:      Rate and Rhythm: Normal rate and regular rhythm.   Pulmonary:      Effort: No tachypnea or respiratory distress.      Breath  sounds: No wheezing.   Abdominal:      General: Bowel sounds are normal. There is no distension.      Palpations: Abdomen is soft.      Tenderness: There is no abdominal tenderness.      Comments: No palpable hepatomegaly or splenomegaly    Musculoskeletal:         General: No tenderness. Normal range of motion.      Cervical back: Normal range of motion and neck supple.      Right lower leg: Edema present.      Left lower leg: Edema present.   Skin:     General: Skin is warm and dry.   Neurological:      Mental Status: She is alert and oriented to person, place, and time.      Comments: Awake and alert today   Psychiatric:         Thought Content: Thought content normal.         Significant Labs:   All pertinent labs within the past 24 hours have been reviewed.  BMP:   Recent Labs   Lab 02/11/22  0413   *   *   K 4.1      CO2 22*   BUN 49*   CREATININE 4.0*   CALCIUM 10.0     CBC:   Recent Labs   Lab 02/11/22 0413   WBC 13.95*   HGB 7.9*   HCT 24.0*   PLT 26*       Significant Imaging: I have reviewed all pertinent imaging results/findings within the past 24 hours.      Assessment/Plan:      * Acute renal failure superimposed on stage 2 chronic kidney disease  -Admitted to inpatient status  -Baseline Cr 0.9.  On admit Cr 2.7  -Reports being started on lasix 1/8 by her cardiologist and over several days PTA had decreased UOP and diminished PO intake  -On admit BNP elevated at 1160 and with metabolic acidosis and mild hyperkalemia   Appreciate Nephrology input.  Continued worsening of renal function.   IR consulted for kidney biopsy, but patient more decompensated.  -Cr continues to worsen and end encephalopathic today.  IR placed THDC and HD initiated.  Completed 3 days of pulse dose steroids for suspected glomerulonephritis vs pulm/renal syndrome.  Changed to prednisone 60mg  and anticipate taper    MANUEL (generalized anxiety disorder)  -Very anxious today  -On 2/6 gave one time low dose ativan PO  - watch respiratory status very closely.    Debility  PT/OT evaluation.    Metabolic acidosis  -Suspect secondary to ANI, management as above    Sleep apnea  -Continue bipap qhs    Thrombocytopenia  -Chronic and at baseline on admit secondary to cirrhosis  -Heme/onc consulted and input appreciated  -Plan is transfuse for platelets <10 or if significant hemorrhage.  Noted drop in Plts today.  Closely monitor.    Macrocytic anemia  -Hb 9.2 on admit - chronic secondary to cirrhosis.  -Dropped to <7 on 2/2  -No evidence of GI bleeding but had noted epistaxis and bleeding gums at times since before hospitalization.  -Folate and B12 replete.  She is iron deficient.  -1 unit PRBC ordered 2/2 but transfusion delayed due to complex antibody matching.  Blood finally available and she received 1 unit PRBC 2/4.   H/H has remained relatively stable.      Cirrhosis of liver without ascites  -Noted mild transaminitis and thrombocytopenia which are not new.  Noted epistaxis and gingival bleeding which has been ongoing for several months.  These are chronic and note history of cirrhosis and alcohol abuse   -Follows with hepatology  -Abdominal US on day prior to admit showed hepatic cirrhosis/steatosis with sonographic findings of portal hypertension including trace ascites and splenomegaly.  Noted to be more lethargic with elevation of ammonia level.  Hepatic Encephalopathy (not POA) and started on lactulose.  Improving mentation.    Gastroesophageal reflux disease  -Stable, no acute issue    HTN (hypertension)  -BP low normal  -Continue metoprolol with hold parameters    Hyperlipidemia  -History noted  -Not on statin due to cirrhosis      VTE Risk Mitigation (From admission, onward)         Ordered     heparin (porcine) injection 3,200 Units  As needed (PRN)         02/07/22 1834     IP VTE HIGH RISK PATIENT  Once         02/01/22 4187     Place sequential compression device  Until discontinued         02/01/22 8178                 Discharge Planning   CLAUDETTE:      Code Status: Full Code   Is the patient medically ready for discharge?:     Reason for patient still in hospital (select all that apply): Treatment  Discharge Plan A: Rehab (Family would prefer [facility] if possible.)   Discharge Delays: (!) Change in Medical Condition          Jose Martin Renee MD  Department of Hospital Medicine   [facility]

## 2022-02-11 NOTE — PROGRESS NOTES
Patient's sister, Paula, at the bedside.  SW met with her briefly.  Language line , Erlinda ID#[id number] used.    Patient up in chair. Still has NG tube in place.   Therapy's post dischargerecommendation is rehab.  to assist with disharge planning.  Communication board updated.

## 2022-02-11 NOTE — PLAN OF CARE
Problem: Physical Therapy Goal  Goal: Physical Therapy Goal  Description: Goals to be met by: 22     Patient will increase functional independence with mobility by performin. Supine to sit with Modified [place]  2. Rolling to Left and Right with Modified [place]  3. Sit to stand transfer with Modified [place] using RW  4. Bed to chair transfer with Modified [place] using Rolling Walker  5. Gait >50 feet with Modified [place] using Rolling Walker   6. Lower extremity exercise program 2 sets x10 reps per handout, with independence    Outcome: Ongoing, Progressing     Pt OOB>chair with min A, currently on 3L O2 NC.

## 2022-02-11 NOTE — PROGRESS NOTES
Hematology/Oncology update    Pt remains in ICU  Labs reveals wbc 04379/mm3 Hb 8.1g/dLMCV 110 plt 62k     Thrombocytopenia  Plt ct 74k to  62k , monitor, no bleeding   Baseline thrombocytopenia secondary to known cirrhosis. Worsening thrombocytopenia may be due to acute illness. Differentials include consumption with evidence of varices on CT scan (last EGD 2019 did not show varices).    HIPAb 0.35 OD HIT unlikely   -transfuse for plt <10 or if she becomes hemodynamically unstable due to significant hemorrhage  -limit blood draws to daily at most if possible.  -will defer on bone marrow biopsy at this time     Macrocytic anemia  Chronic macrocytic anemia since 2019, presumed secondary to cirrhosis.    Hb 9. 2g/dL on admit   Anemia worsening during hospitalization   S/p 1uprbc transfusion 2/4/22   Hb remains 8g/dL range since prbc transfusion on 2/4/22   LDH WNL, mild elevated bilirubin, haptoglobin wnl  - VALENTINO pos 2/2  cold autob  - cold agglutinin titer pending    - SPEP/SHERI reveals no monoclonal peaks   - Serum KFLC 43.2mg/dL LFLC 20.6mg/dl K/L FLCR 2.1 likely 2/2 renal impairment vs MGUS with renal impairment    Development of acute renal failure also another      contributing factor to anemia. retic inappropriately normal  -transfuse for hemoglobin <7 or if hemodynamically unstable due to hemorrhage.  -will defer on bone marrow biopsy at this time.     Vidya Maldonado MD  Hematology/Oncology  [facility]  [address] [facility] [address]  [address]  [place] [address]

## 2022-02-11 NOTE — PLAN OF CARE
Problem: Occupational Therapy Goal  Goal: Occupational Therapy Goal  Description: Goals to be met by: 03/03/22     Patient will increase functional independence with ADLs by performing:    UE Dressing with Stand-by assistance.  LE Dressing with Minimal assistance.  Grooming while seated with Stand-by assistance.  Toileting from bedside commode with minimal assistance for hygiene and clothing management.   Supine to sit with Stand-by assistance.  Step transfer with stand-by assistance  Toilet transfer to bedside commode with stand-by assistance.  Upper extremity exercise program x15 reps per handout, with independence.    Outcome: Ongoing, Progressing     MIN A bed>chair with BUE supported on 3L O2.

## 2022-02-11 NOTE — SUBJECTIVE & OBJECTIVE
Interval History: complaining of abdominal discomfort.    Review of Systems   HENT: Negative for ear discharge and ear pain.    Eyes: Negative for discharge and itching.   Endocrine: Negative for cold intolerance and heat intolerance.   Neurological: Negative for seizures and syncope.     Objective:     Vital Signs (Most Recent):  Temp: 98.4 °F (36.9 °C) (02/11/22 0800)  Pulse: 61 (02/11/22 1100)  Resp: 18 (02/11/22 1100)  BP: (!) 115/56 (02/11/22 1100)  SpO2: 99 % (02/11/22 1100) Vital Signs (24h Range):  Temp:  [97.9 °F (36.6 °C)-98.8 °F (37.1 °C)] 98.4 °F (36.9 °C)  Pulse:  [] 61  Resp:  [13-34] 18  SpO2:  [90 %-100 %] 99 %  BP: ()/(43-74) 115/56     Weight: 70.1 kg (154 lb 8.7 oz)  Body mass index is 33.44 kg/m².    Intake/Output Summary (Last 24 hours) at 2/11/2022 1335  Last data filed at 2/11/2022 1000  Gross per 24 hour   Intake 780 ml   Output 647 ml   Net 133 ml      Physical Exam  Vitals and nursing note reviewed.   Constitutional:       General: She is not in acute distress.     Appearance: She is well-developed. She is ill-appearing. She is not toxic-appearing or diaphoretic.      Interventions: Nasal cannula in place.   HENT:      Head: Normocephalic and atraumatic.      Right Ear: External ear normal.      Left Ear: External ear normal.      Nose: Nose normal.      Mouth/Throat:      Mouth: Mucous membranes are moist.   Eyes:      Extraocular Movements: Extraocular movements intact.      Conjunctiva/sclera: Conjunctivae normal.   Cardiovascular:      Rate and Rhythm: Normal rate and regular rhythm.   Pulmonary:      Effort: No tachypnea or respiratory distress.      Breath sounds: No wheezing.   Abdominal:      General: Bowel sounds are normal. There is no distension.      Palpations: Abdomen is soft.      Tenderness: There is no abdominal tenderness.      Comments: No palpable hepatomegaly or splenomegaly    Musculoskeletal:         General: No tenderness. Normal range of motion.       Cervical back: Normal range of motion and neck supple.      Right lower leg: Edema present.      Left lower leg: Edema present.   Skin:     General: Skin is warm and dry.   Neurological:      Mental Status: She is alert and oriented to person, place, and time.      Comments: Awake and alert today   Psychiatric:         Thought Content: Thought content normal.         Significant Labs:   All pertinent labs within the past 24 hours have been reviewed.  BMP:   Recent Labs   Lab 02/11/22 0413   *   *   K 4.1      CO2 22*   BUN 49*   CREATININE 4.0*   CALCIUM 10.0     CBC:   Recent Labs   Lab 02/11/22 0413   WBC 13.95*   HGB 7.9*   HCT 24.0*   PLT 26*       Significant Imaging: I have reviewed all pertinent imaging results/findings within the past 24 hours.

## 2022-02-11 NOTE — ASSESSMENT & PLAN NOTE
-Chronic and at baseline on admit secondary to cirrhosis  -Heme/onc consulted and input appreciated  -Plan is transfuse for platelets <10 or if significant hemorrhage.  Noted drop in Plts today.  Closely monitor.

## 2022-02-11 NOTE — PROGRESS NOTES
Awake alert oriented NAD    Denies CNS ENT CP GI  RHEUM OR DERM SX  Past Medical History:   Diagnosis Date    Allergy     Anxiety     Basal cell carcinoma     Cirrhosis of liver without ascites 12/27/2017    Depression     Diabetes mellitus, type 2     Disorder of kidney and ureter     Endometriosis     GERD (gastroesophageal reflux disease)     Hyperlipidemia     Hypertension     Joint pain     Psoriasis      Past Surgical History:   Procedure Laterality Date    abdominal laproscopic surgery      APPENDECTOMY      CARPAL TUNNEL RELEASE      right    CHOLECYSTECTOMY  04/2015    COLONOSCOPY N/A 2/8/2019    Procedure: COLONOSCOPY;  Surgeon: Celso Salas MD;  [facility]  Service: Endoscopy;  Laterality: N/A;    ESOPHAGOGASTRODUODENOSCOPY N/A 2/8/2019    Procedure: EGD (ESOPHAGOGASTRODUODENOSCOPY);  Surgeon: Celso Salas MD;  [facility]  Service: Endoscopy;  Laterality: N/A;  varices screening, labs ordered prior    HYSTERECTOMY  age 25    JOSEFA/BSO (endometriosis)    OOPHORECTOMY      SKIN CANCER DESTRUCTION      UPPER GASTROINTESTINAL ENDOSCOPY       Review of patient's allergies indicates:   Allergen Reactions    Dobutamine Hives    Benadryl [diphenhydramine hcl] Anxiety     Pt states she feels jumpy and anxious when taking.    Darvon [propoxyphene] Nausea Only    Shellfish containing products Hives    Iodinated contrast media Hives    Lisinopril Other (See Comments)     cough    Propoxyphene hcl      Itchy (skin)^    Tizanidine Other (See Comments)     Sweaty, difficulty swallowing    Statins-hmg-coa reductase inhibitors Anxiety and Other (See Comments)     Myalgia, fatigue, palpitations, anxiety       Current Facility-Administered Medications   Medication    0.9%  NaCl infusion (for blood administration)    acetaminophen tablet 650 mg    albuterol-ipratropium 2.5 mg-0.5 mg/3 mL nebulizer solution 3 mL    benzonatate capsule 100 mg     bisacodyL suppository 10 mg    dextrose 50% injection 12.5 g    dextrose 50% injection 25 g    famotidine tablet 20 mg    glucagon (human recombinant) injection 1 mg    glucose chewable tablet 16 g    glucose chewable tablet 24 g    guaiFENesin 100 mg/5 ml syrup 200 mg    heparin (porcine) injection 3,200 Units    insulin aspart U-100 pen 0-5 Units    insulin detemir U-100 pen 5 Units    iohexoL (OMNIPAQUE 350) injection 100 mL    lactulose 20 gram/30 mL solution Soln 10 g    lanthanum chewable tablet 500 mg    melatonin tablet 6 mg    metoprolol injection 5 mg    metoprolol tartrate (LOPRESSOR) tablet 25 mg    naloxone 0.4 mg/mL injection 0.02 mg    ondansetron injection 4 mg    polyethylene glycol packet 17 g    predniSONE tablet 60 mg    simethicone chewable tablet 80 mg    sodium chloride 0.9% flush 10 mL       LABS    Recent Results (from the past 24 hour(s))   POCT glucose    Collection Time: 02/10/22  5:17 PM   Result Value Ref Range    POCT Glucose 176 (H) 70 - 110 mg/dL   POCT glucose    Collection Time: 02/10/22 10:15 PM   Result Value Ref Range    POCT Glucose 180 (H) 70 - 110 mg/dL   CBC auto differential    Collection Time: 02/11/22  4:13 AM   Result Value Ref Range    WBC 13.95 (H) 3.90 - 12.70 K/uL    RBC 2.26 (L) 4.00 - 5.40 M/uL    Hemoglobin 7.9 (L) 12.0 - 16.0 g/dL    Hematocrit 24.0 (L) 37.0 - 48.5 %     (H) 82 - 98 fL    MCH 35.0 (H) 27.0 - 31.0 pg    MCHC 32.9 32.0 - 36.0 g/dL    RDW 18.5 (H) 11.5 - 14.5 %    Platelets 26 (LL) 150 - 450 K/uL    MPV SEE COMMENT 9.2 - 12.9 fL    Immature Granulocytes 0.6 (H) 0.0 - 0.5 %    Gran # (ANC) 11.1 (H) 1.8 - 7.7 K/uL    Immature Grans (Abs) 0.08 (H) 0.00 - 0.04 K/uL    Lymph # 1.9 1.0 - 4.8 K/uL    Mono # 0.9 0.3 - 1.0 K/uL    Eos # 0.0 0.0 - 0.5 K/uL    Baso # 0.01 0.00 - 0.20 K/uL    nRBC 0 0 /100 WBC    Gran % 79.4 (H) 38.0 - 73.0 %    Lymph % 13.5 (L) 18.0 - 48.0 %    Mono % 6.4 4.0 - 15.0 %    Eosinophil % 0.0 0.0 -  8.0 %    Basophil % 0.1 0.0 - 1.9 %    Platelet Estimate Decreased (A)     Differential Method Automated    Phosphorus    Collection Time: 02/11/22  4:13 AM   Result Value Ref Range    Phosphorus 4.4 2.7 - 4.5 mg/dL   Comprehensive metabolic panel    Collection Time: 02/11/22  4:13 AM   Result Value Ref Range    Sodium 135 (L) 136 - 145 mmol/L    Potassium 4.1 3.5 - 5.1 mmol/L    Chloride 100 95 - 110 mmol/L    CO2 22 (L) 23 - 29 mmol/L    Glucose 171 (H) 70 - 110 mg/dL    BUN 49 (H) 8 - 23 mg/dL    Creatinine 4.0 (H) 0.5 - 1.4 mg/dL    Calcium 10.0 8.7 - 10.5 mg/dL    Total Protein 7.4 6.0 - 8.4 g/dL    Albumin 3.6 3.5 - 5.2 g/dL    Total Bilirubin 2.0 (H) 0.1 - 1.0 mg/dL    Alkaline Phosphatase 79 55 - 135 U/L    AST 27 10 - 40 U/L    ALT 20 10 - 44 U/L    Anion Gap 13 8 - 16 mmol/L    eGFR if African American 12 (A) >60 mL/min/1.73 m^2    eGFR if non African American 11 (A) >60 mL/min/1.73 m^2   POCT glucose    Collection Time: 02/11/22  6:43 AM   Result Value Ref Range    POCT Glucose 193 (H) 70 - 110 mg/dL   POCT glucose    Collection Time: 02/11/22 11:35 AM   Result Value Ref Range    POCT Glucose 181 (H) 70 - 110 mg/dL   ]    I/O last 3 completed shifts:  In: 1540 [Other:1250; NG/GT:290]  Out: 2407 [Drains:10; Other:2367; Stool:30]    Vitals:    02/11/22 1330 02/11/22 1345 02/11/22 1400 02/11/22 1415   BP: (!) 124/59      Pulse: 63 63 (!) 56 62   Resp: (!) 24 (!) 30 20 20   Temp:       TempSrc:       SpO2: 96% 97% 98% 98%   Weight:       Height:           No Jvd, Thyromegaly or Lymphadenopathy  Lungs: Fairly clear anteriorly and laterally  Cor: RRR no G or rubs  Abd: Soft benign good bowel sounds non tender  Ext: No E C C    A)  Paulo on hd prn   Pos molly  Abnormal spep seen by Heme onc ?? MGUS  Thrombocytopenia  cirrhosis  hyperpo4  PAULINA    P)    Renal Diet  Home meds  Protect access  HD prn  EPO prn  Binders as rx  Adjust all meds to the degree of renal fx  Close follow up I/O and weights  Maintain Hydration

## 2022-02-11 NOTE — ASSESSMENT & PLAN NOTE
-Admitted to inpatient status  -Baseline Cr 0.9.  On admit Cr 2.7  -Reports being started on lasix 1/8 by her cardiologist and over several days PTA had decreased UOP and diminished PO intake  -On admit BNP elevated at 1160 and with metabolic acidosis and mild hyperkalemia   Appreciate Nephrology input.  Continued worsening of renal function.   IR consulted for kidney biopsy, but patient more decompensated.  -Cr continues to worsen and end encephalopathic today.  IR placed THDC and HD initiated.  Completed 3 days of pulse dose steroids for suspected glomerulonephritis vs pulm/renal syndrome.  Changed to prednisone 60mg  and anticipate taper

## 2022-02-11 NOTE — NURSING
Sundeep MARTIN notified of patient discomfort to her abdomen. Patient stated stomach feels upsetted.

## 2022-02-11 NOTE — PROGRESS NOTES
Call back received from patient's sister, Tania.  Family would like to know the cost of rehab, e.g. co-pay/deductilbe, because patient is on a fixed income.   to obtain information from rehab when  referrals are submitted.

## 2022-02-11 NOTE — NURSING
Patient lying in bed with complaints of abdominal discomfort. Cut tube feed off to see if that would help patient stomach. uable to get residual, feeding tube is very small. Family member at bedside

## 2022-02-11 NOTE — CARE UPDATE
[facility]  Nurses Note -- 4 Eyes    Patient Name: Tammi Farris  MRN: [id number]  Attending Provider:  Jose Martin Renee MD  2/10/2022       Wounds on : Q Shift    [x] No   [x]Prevention Measures Documented    [] Yes    []LDA's Charted   []Wound Care Consulted (optional)   []Photo Taken (optional)   []MD Notified    Attending RN:  Munira Min RN     Second RN:  Lucie Singer RN

## 2022-02-11 NOTE — PLAN OF CARE
Recommendations  1) Continue to advance TF of Novasource Renal to goal rate of 35 mL/hr. FWF per MD, hold for residuals > 400 cc.   2) Continue Renal/diabetic diet as medically appropriate  3) Monitor GI symptoms and tolerance, RD to follow up    Goals: Enteral nutrition initiated by RD follow up  Nutrition Goal Status: goal met  Communication of RD Recs: other (comment) (POC)    Assessment and Plan  Nutrition Problem  Inadequate oral intake     Related to (etiology):   lethargy     Signs and Symptoms (as evidenced by):   Pt only taking bites of meals with encouragement, NGT placed for alternative means of nutrition     Interventions(treatment strategy):  Collaboration with other providers  Carbohydrate modified diet  Renal diet  Enteral Nutrition     Nutrition Diagnosis Status:   Continues

## 2022-02-11 NOTE — EICU
NGT was replaced after pt had pulled it. Xray from 22.43 with NG in the stomach in good position, OK to use.

## 2022-02-11 NOTE — PT/OT/SLP PROGRESS
Physical Therapy Treatment    Patient Name:  Tammi Farris   MRN:  [id number]    Recommendations:     Discharge Recommendations:  rehabilitation facility   Discharge Equipment Recommendations:  (Pediatric/Tomi RW)   Barriers to discharge home: Pt still in ICU with decreased functional mobility and ambulation at this time.  Pt new to HD on this admission.      Assessment:     Tammi Farris is a 71 y.o. female admitted with a medical diagnosis of Acute renal failure superimposed on stage 2 chronic kidney disease.  She presents with the following impairments/functional limitations:  weakness,impaired self care skills,impaired functional mobilty,gait instability,impaired balance,impaired cognition,decreased coordination,decreased upper extremity function,decreased lower extremity function,decreased safety awareness,edema,impaired cardiopulmonary response to activity.    Rehab Prognosis: Good; patient would benefit from acute skilled PT services to address these deficits and reach maximum level of function.    Recent Surgery: * No surgery found *      Plan:     During this hospitalization, patient to be seen 6 x/week to address the identified rehab impairments via gait training,therapeutic activities,therapeutic exercises and progress toward the following goals:    · Plan of Care Expires:  02/23/22    Subjective     Chief Complaint: dizziness and nausea  Patient/Family Comments/goals: Pt agreeable to bedside chair, felt better being OOB>chair.   Pain/Comfort:  · Pain Rating 1: 0/10      Objective:     Communicated with nurse Galicia prior to session.  Patient found with bed in chair position with bed alarm,blood pressure cuff,pulse ox (continuous),telemetry,oxygen,bowel management system,peripheral IV (R chest HD access), and NGT upon PT entry to room.     General Precautions: Standard, fall,respiratory, HD   Orthopedic Precautions:N/A   Braces: N/A  Respiratory Status: Nasal cannula, flow 3 L/min     Functional Mobility:   Pt much more alert, less confusion, and speech was a little clearer today.  Pt happy to be OOB>chair.  Activities limited by pt's c/o nausea and constant coughing.  Pt with scant bleeding to R nare, did stopped and nurse Grayson notified.    · Bed Mobility:     · Scooting: minimum assistance  · Supine to Sit: minimum assistance with HOB elevated   · Transfers:     · Sit to Stand:  minimum assistance with no AD and hand-held assist x2 trials   · Bed to Chair: minimum assistance with  no AD and hand-held assist  using  Step Transfer  · Gait: Pt ambulated ~4 steps from bed>chair with min A using no AD and on 3L O2 NC.  Pt with decreased weight shifting, foot clearance, and step length.    · Balance: Pt with fair static sit/stand balance.       AM-PAC 6 CLICK MOBILITY  Turning over in bed (including adjusting bedclothes, sheets and blankets)?: 3  Sitting down on and standing up from a chair with arms (e.g., wheelchair, bedside commode, etc.): 3  Moving from lying on back to sitting on the side of the bed?: 3  Moving to and from a bed to a chair (including a wheelchair)?: 3  Need to walk in hospital room?: 2  Climbing 3-5 steps with a railing?: 1  Basic Mobility Total Score: 15       Therapeutic Activities and Exercises:  BLE seated therex as tolerated: AP, LAQ, and hip flex    Pt educated on the progression of acute skilled PT services and goals while pt is in ICU.  Pt verbalized good understanding.      Patient left up in chair on seat cushion reclined with all lines intact, call button in reach, nurse Grayson notified and sister present.  Tray table close by.      GOALS:   Multidisciplinary Problems     Physical Therapy Goals        Problem: Physical Therapy Goal    Goal Priority Disciplines Outcome Goal Variances Interventions   Physical Therapy Goal     PT, PT/OT Ongoing, Progressing     Description: Goals to be met by: 22     Patient will increase functional independence with mobility by performin. Supine  to sit with Modified Hyde  2. Rolling to Left and Right with Modified Hyde  3. Sit to stand transfer with Modified Hyde using RW  4. Bed to chair transfer with Modified Hyde using Rolling Walker  5. Gait >50 feet with Modified Hyde using Rolling Walker   6. Lower extremity exercise program 2 sets x10 reps per handout, with independence                     Time Tracking:     PT Received On: 02/11/22  PT Start Time: 0958     PT Stop Time: 1021  PT Total Time (min): 23 min     Billable Minutes: Therapeutic Activity 10 min partial co-tx with OT    Treatment Type: Treatment  PT/PTA: PT     PTA Visit Number: 0     02/11/2022

## 2022-02-11 NOTE — CARE UPDATE
PATIENT EXPERIENCING NAUSEA AND DRY HEAVING. BIPAP NOT PLACED ON PATIENT AT THIS TIME. NURSE NOTIFIED.

## 2022-02-12 NOTE — PLAN OF CARE
Since patient was transferred her heart rate has been going from 60's to 150's. Patient was given Dilitiazem 10 mg and heart rate have been in the 60's for the last two hours. Patient stated she is free of pain; However, patient is lethargic and Jaundice. Will continue to monitor

## 2022-02-12 NOTE — SUBJECTIVE & OBJECTIVE
Interval History: Feeling weak.    Review of Systems   HENT: Negative for ear discharge and ear pain.    Eyes: Negative for discharge and itching.   Endocrine: Negative for cold intolerance and heat intolerance.   Neurological: Negative for seizures and syncope.     Objective:     Vital Signs (Most Recent):  Temp: 97.7 °F (36.5 °C) (02/12/22 1129)  Pulse: 76 (02/12/22 1253)  Resp: 19 (02/12/22 1253)  BP: 139/60 (02/12/22 1129)  SpO2: 100 % (02/12/22 1253) Vital Signs (24h Range):  Temp:  [96.4 °F (35.8 °C)-97.7 °F (36.5 °C)] 97.7 °F (36.5 °C)  Pulse:  [] 76  Resp:  [12-30] 19  SpO2:  [94 %-100 %] 100 %  BP: ()/(48-85) 139/60     Weight: 70.6 kg (155 lb 10.3 oz)  Body mass index is 33.68 kg/m².    Intake/Output Summary (Last 24 hours) at 2/12/2022 1320  Last data filed at 2/11/2022 1900  Gross per 24 hour   Intake 90 ml   Output 40 ml   Net 50 ml      Physical Exam  Vitals and nursing note reviewed.   Constitutional:       General: She is not in acute distress.     Appearance: She is well-developed. She is ill-appearing. She is not toxic-appearing or diaphoretic.      Interventions: Nasal cannula in place.   HENT:      Head: Normocephalic and atraumatic.      Right Ear: External ear normal.      Left Ear: External ear normal.      Nose: Nose normal.      Mouth/Throat:      Mouth: Mucous membranes are moist.   Eyes:      Extraocular Movements: Extraocular movements intact.      Conjunctiva/sclera: Conjunctivae normal.   Cardiovascular:      Rate and Rhythm: Normal rate and regular rhythm.   Pulmonary:      Effort: No tachypnea or respiratory distress.      Breath sounds: No wheezing.   Abdominal:      General: Bowel sounds are normal. There is no distension.      Palpations: Abdomen is soft.      Tenderness: There is no abdominal tenderness.      Comments: No palpable hepatomegaly or splenomegaly    Musculoskeletal:         General: No tenderness. Normal range of motion.      Cervical back: Normal range of  motion and neck supple.      Right lower leg: Edema present.      Left lower leg: Edema present.   Skin:     General: Skin is warm and dry.   Neurological:      Mental Status: She is alert and oriented to person, place, and time.      Comments: Awake and alert today   Psychiatric:         Thought Content: Thought content normal.         Significant Labs:   All pertinent labs within the past 24 hours have been reviewed.  BMP:   Recent Labs   Lab 02/11/22 0413 02/11/22 2121 02/12/22 0525   GLU   < >  --  167*   NA   < >  --  135*   K   < > 4.6 4.4   CL   < >  --  100   CO2   < >  --  20*   BUN   < >  --  79*   CREATININE   < >  --  4.8*   CALCIUM   < >  --  9.8   MG  --  2.2  --     < > = values in this interval not displayed.     CBC:   Recent Labs   Lab 02/11/22 0413 02/12/22 0525   WBC 13.95* 13.56*   HGB 7.9* 8.4*   HCT 24.0* 22.8*   PLT 26* 29*       Significant Imaging: I have reviewed all pertinent imaging results/findings within the past 24 hours.

## 2022-02-12 NOTE — EICU
, pt is c/o palpitations, no change in HR after 5 mg of metop was given earlier, /60, sats 99% RR 18.  Pt with h/o A.fib per chart revived. Narrow complex tach,appear regular- A.flutter vs SVT vs sinus tach  Repeat EKG now,  Check K, Mg, if remains symptomatic Cardizem 10 mg x 1  D/w RN     0.10   Returned to sinus, HR 60ties  after Cardizem dose, K, Mg wnl.

## 2022-02-12 NOTE — PT/OT/SLP PROGRESS
Occupational Therapy      Patient Name:  Tammi Farris   MRN:  [id number]    Patient not seen today secondary to Patient fatigue. Pt reports not much sleep last night and coughing keeping her up; requesting for therapy to return tomorrow. OT assisted pt with scooting to HOB with MIN A. While attempting to review BUE AROM HEP, pt was falling asleep. family member present, so OT demo'd and reviewed BUE AROM to all planes for pt to complete once she gets rest, 10-15 reps, 2-3x/day while counting aloud. Will follow-up later as able.    2/12/2022

## 2022-02-12 NOTE — NURSING
Patient lying quietly in bed. Patient stated she feels tired. No complaints of pain. Friend at bedside.

## 2022-02-12 NOTE — NURSING
"Notified Dr Renee that patient became tachycardiac around 1730. Performed a 12-lead EKG to verify that patient was in a sinus rhythm. Ordered 5 mg of Metoprolol IV x 1 and ordered to hold transfer for now. IV in left wrist was infiltrated, therefore, a new iv was established in r forearm under ultrasound. Administered Metoprolol at approx 1805 and HR only came down slightly. Patient's BP remains in the 110"s systolic, but HR remains in the 140s. Pt says she can "feel" her HR beating fast, but otherwise, asymptomatic.Will need to notify covering MD for night service. Request will be made to night RN to call on call MD. Notified sister, Tania, of patient's current status and that MD wants to keep her in the ICU for now. TF remain on hold to rest the bowels due to complaints of stomach pain and "fullness" during administration. Gave patient soup and water for dinner because she states she feels hungry now. Tried to run trickle feeds for approx 1 hour today with same outcome - c/o of abdominal pain. Removed FMS earlier in day due to lack of output after lactulose and miralax administraion. Tried irrigating FMS with 30 ml of NS without success. Patient states she has gas, but still no BM.  "

## 2022-02-12 NOTE — PROGRESS NOTES
[facility] Medicine  Progress Note    Patient Name: Tammi Farris  MRN: [id number]  Patient Class: IP- Inpatient   Admission Date: 2/1/2022  Length of Stay: 11 days  Attending Physician: Jsoe Martin Renee MD  Primary Care Provider: Ann-Marie Hartman MD        Subjective:     Principal Problem:Acute renal failure superimposed on stage 2 chronic kidney disease        HPI:  71 y.o. female with HTN, HLD, GERD, cirrhosis of liver, thrombocytopenia, and sleep apnea presents with a complaint of cough and congestion since October.  She is chronically dyspneic, exacerbated by exertion, has seen Cardiology and was prescribed lasix and metoprolol.  Her sister has now noted that the patient has very low urine output and is constipated.  Also follows with Hepatology for chronic liver disease.  Patient denies fever, chills, chest pain, palpitations, orthopnea, PND, dizziness, syncope, n/v/d, abdominal pain, or dysuria.  In the ED, labs reveal ANI, hyperkalemia, metabolic acidosis with elevated lactic acid, elevated liver enzymes, elevated BNP, elevated d-dimer, thrombocytopenia, and markedly concentrated urine.  Echo December 2021 showed preserved EF, no evidence of heart failure.  Chest xray without acute abnormality. No convincing evidence of infectious process.      Overview/Hospital Course:  71 year old woman with HTN, HLD, GERD, CKDII, cirrhosis and PAULINA presented for sob and has been diagnosed with ani/CKDII and acute hypoxic respiratory failure.  Complex patient and presentation with very difficult to ascertain volume status.  Was given lasix and then fluids and then attempt at lasix again.  Suspect a pulmonary/renal vasculitis but not clearly diagnosed (Prior TAMMI positive 2019, low C3 and C4 and abnormal SPEP with elevated kappa/lambda ratio).  Had planned renal biopsy, but encephalopathic, anuric and worsening acidosis so THDC placed and initiated HD on 2/7.  Has completed 3 days pulse dose steroids (1g  daily) and will be on prednisone 60 mg.  Patient more lethargic on 2/8 and noted to have episode of rapid AFib.  Moved to ICU.  Noted to have increased ammonia levels and started on lactulose.  Mentation slowly improving with no further arrhythmias.  Poor oral intake.  NGT placed and started on tube feedings.      Interval History: Feeling weak.    Review of Systems   HENT: Negative for ear discharge and ear pain.    Eyes: Negative for discharge and itching.   Endocrine: Negative for cold intolerance and heat intolerance.   Neurological: Negative for seizures and syncope.     Objective:     Vital Signs (Most Recent):  Temp: 97.7 °F (36.5 °C) (02/12/22 1129)  Pulse: 76 (02/12/22 1253)  Resp: 19 (02/12/22 1253)  BP: 139/60 (02/12/22 1129)  SpO2: 100 % (02/12/22 1253) Vital Signs (24h Range):  Temp:  [96.4 °F (35.8 °C)-97.7 °F (36.5 °C)] 97.7 °F (36.5 °C)  Pulse:  [] 76  Resp:  [12-30] 19  SpO2:  [94 %-100 %] 100 %  BP: ()/(48-85) 139/60     Weight: 70.6 kg (155 lb 10.3 oz)  Body mass index is 33.68 kg/m².    Intake/Output Summary (Last 24 hours) at 2/12/2022 1320  Last data filed at 2/11/2022 1900  Gross per 24 hour   Intake 90 ml   Output 40 ml   Net 50 ml      Physical Exam  Vitals and nursing note reviewed.   Constitutional:       General: She is not in acute distress.     Appearance: She is well-developed. She is ill-appearing. She is not toxic-appearing or diaphoretic.      Interventions: Nasal cannula in place.   HENT:      Head: Normocephalic and atraumatic.      Right Ear: External ear normal.      Left Ear: External ear normal.      Nose: Nose normal.      Mouth/Throat:      Mouth: Mucous membranes are moist.   Eyes:      Extraocular Movements: Extraocular movements intact.      Conjunctiva/sclera: Conjunctivae normal.   Cardiovascular:      Rate and Rhythm: Normal rate and regular rhythm.   Pulmonary:      Effort: No tachypnea or respiratory distress.      Breath sounds: No wheezing.   Abdominal:       General: Bowel sounds are normal. There is no distension.      Palpations: Abdomen is soft.      Tenderness: There is no abdominal tenderness.      Comments: No palpable hepatomegaly or splenomegaly    Musculoskeletal:         General: No tenderness. Normal range of motion.      Cervical back: Normal range of motion and neck supple.      Right lower leg: Edema present.      Left lower leg: Edema present.   Skin:     General: Skin is warm and dry.   Neurological:      Mental Status: She is alert and oriented to person, place, and time.      Comments: Awake and alert today   Psychiatric:         Thought Content: Thought content normal.         Significant Labs:   All pertinent labs within the past 24 hours have been reviewed.  BMP:   Recent Labs   Lab 02/11/22 0413 02/11/22 2121 02/12/22  0525   GLU   < >  --  167*   NA   < >  --  135*   K   < > 4.6 4.4   CL   < >  --  100   CO2   < >  --  20*   BUN   < >  --  79*   CREATININE   < >  --  4.8*   CALCIUM   < >  --  9.8   MG  --  2.2  --     < > = values in this interval not displayed.     CBC:   Recent Labs   Lab 02/11/22 0413 02/12/22  0525   WBC 13.95* 13.56*   HGB 7.9* 8.4*   HCT 24.0* 22.8*   PLT 26* 29*       Significant Imaging: I have reviewed all pertinent imaging results/findings within the past 24 hours.      Assessment/Plan:      * Acute renal failure superimposed on stage 2 chronic kidney disease  -Admitted to inpatient status  -Baseline Cr 0.9.  On admit Cr 2.7  -Reports being started on lasix 1/8 by her cardiologist and over several days PTA had decreased UOP and diminished PO intake  -On admit BNP elevated at 1160 and with metabolic acidosis and mild hyperkalemia   Appreciate Nephrology input.  Continued worsening of renal function.   IR consulted for kidney biopsy, but patient more decompensated.  -Cr continues to worsen and end encephalopathic today.  IR placed THDC and HD initiated.  Completed 3 days of pulse dose steroids for suspected  glomerulonephritis vs pulm/renal syndrome.  Changed to prednisone 60mg  and anticipate taper    MANUEL (generalized anxiety disorder)  -Very anxious today  -On 2/6 gave one time low dose ativan PO - watch respiratory status very closely.    Debility  PT/OT evaluation.    Metabolic acidosis  -Suspect secondary to ANI, management as above    Sleep apnea  -Continue bipap qhs    Thrombocytopenia  -Chronic and at baseline on admit secondary to cirrhosis  -Heme/onc consulted and input appreciated  -Plan is transfuse for platelets <10 or if significant hemorrhage.  Noted drop in Plts today.  Closely monitor.    Macrocytic anemia  -Hb 9.2 on admit - chronic secondary to cirrhosis.  -Dropped to <7 on 2/2  -No evidence of GI bleeding but had noted epistaxis and bleeding gums at times since before hospitalization.  -Folate and B12 replete.  She is iron deficient.  -1 unit PRBC ordered 2/2 but transfusion delayed due to complex antibody matching.  Blood finally available and she received 1 unit PRBC 2/4.   H/H has remained relatively stable.      Cirrhosis of liver without ascites  -Noted mild transaminitis and thrombocytopenia which are not new.  Noted epistaxis and gingival bleeding which has been ongoing for several months.  These are chronic and note history of cirrhosis and alcohol abuse   -Follows with hepatology  -Abdominal US on day prior to admit showed hepatic cirrhosis/steatosis with sonographic findings of portal hypertension including trace ascites and splenomegaly.  Noted to be more lethargic with elevation of ammonia level.  Hepatic Encephalopathy (not POA) and started on lactulose.  Improving mentation.    Gastroesophageal reflux disease  -Stable, no acute issue    HTN (hypertension)  -BP low normal  -Continue metoprolol with hold parameters    Hyperlipidemia  -History noted  -Not on statin due to cirrhosis      VTE Risk Mitigation (From admission, onward)         Ordered     heparin (porcine) injection 3,200 Units   As needed (PRN)         02/07/22 1834     IP VTE HIGH RISK PATIENT  Once         02/01/22 1557     Place sequential compression device  Until discontinued         02/01/22 1557                Discharge Planning   CLAUDETTE:      Code Status: Full Code   Is the patient medically ready for discharge?:     Reason for patient still in hospital (select all that apply): Treatment  Discharge Plan A: Rehab ([facility]   Discharge Delays: (!) Change in Medical Condition              Jose Martin Renee MD  Department of [facility] Medicine   [facility]

## 2022-02-12 NOTE — PLAN OF CARE
Problem: Adult Inpatient Plan of Care  Goal: Plan of Care Review  Outcome: Ongoing, Progressing  Goal: Patient-Specific Goal (Individualized)  Outcome: Ongoing, Progressing  Goal: Absence of Hospital-Acquired Illness or Injury  Outcome: Ongoing, Progressing  Goal: Optimal Comfort and Wellbeing  Outcome: Ongoing, Progressing  Goal: Readiness for Transition of Care  Outcome: Ongoing, Progressing     Problem: Infection  Goal: Absence of Infection Signs and Symptoms  Outcome: Ongoing, Progressing     Problem: Diabetes Comorbidity  Goal: Blood Glucose Level Within Targeted Range  Outcome: Ongoing, Progressing     Problem: Fluid and Electrolyte Imbalance (Acute Kidney Injury/Impairment)  Goal: Fluid and Electrolyte Balance  Outcome: Ongoing, Progressing     Problem: Oral Intake Inadequate (Acute Kidney Injury/Impairment)  Goal: Optimal Nutrition Intake  Outcome: Ongoing, Progressing     Problem: Renal Function Impairment (Acute Kidney Injury/Impairment)  Goal: Effective Renal Function  Outcome: Ongoing, Progressing     Problem: Skin Injury Risk Increased  Goal: Skin Health and Integrity  Outcome: Ongoing, Progressing     Problem: Fall Injury Risk  Goal: Absence of Fall and Fall-Related Injury  Outcome: Ongoing, Progressing     Problem: Device-Related Complication Risk (Hemodialysis)  Goal: Safe, Effective Therapy Delivery  Outcome: Ongoing, Progressing     Problem: Infection (Hemodialysis)  Goal: Absence of Infection Signs and Symptoms  Outcome: Ongoing, Progressing     Problem: Coping Ineffective  Goal: Effective Coping  Outcome: Ongoing, Progressing

## 2022-02-13 NOTE — NURSING
Reported off to oncoming nurse, patient resting in bed, aaox2. Patient can make needs known to staff at times, max assist required for adls and transfers, no acute distress noted, safety precautions maintained. Sister at bedside.      Chart check completed.

## 2022-02-13 NOTE — PT/OT/SLP PROGRESS
Occupational Therapy   Treatment    Name: Tammi Farris  MRN: [id number]  Admitting Diagnosis:  Acute renal failure superimposed on stage 2 chronic kidney disease       Recommendations:     Discharge Recommendations: rehabilitation facility  Discharge Equipment Recommendations:  bedside commode (jewell RW)  Barriers to discharge:   (not at PLOF; was fully independent PTA)    Assessment:     Tammi Farris is a 71 y.o. female with a medical diagnosis of Acute renal failure superimposed on stage 2 chronic kidney disease. Performance deficits affecting function are weakness,impaired endurance,impaired cognition,decreased ROM,decreased coordination,decreased upper extremity function,impaired fine motor,impaired self care skills,decreased lower extremity function,impaired skin,impaired functional mobilty,gait instability,decreased safety awareness,edema,impaired balance,impaired cardiopulmonary response to activity.     Pt participatory with grooming ADLs seated upright in the chair, BUE AROM, and standing tolerance while OT provided total A for pericare after BM. Sister present    Rehab Prognosis:  Good; patient would benefit from acute skilled OT services to address these deficits and reach maximum level of function.       Plan:     Patient to be seen  (5-6x/week) to address the above listed problems via self-care/home management,therapeutic activities,therapeutic exercises  · Plan of Care Expires: 03/03/22  · Plan of Care Reviewed with: patient    Subjective     Chief complaint: worried to have a BM when she stands, but she was already soiled   Patient/family comments/ goals: sister concerned that pt aspirated d/t witnessing how long the NG tube was when pulled by nursing- nurse updated.     Pain/Comfort:  · Pain Rating 1: 0/10    Objective:     Communicated with: nurseClaudia, prior to session.  Patient found reclined in the chair  with peripheral IV,telemetry,oxygen upon OT entry to room.    General Precautions:  Standard, fall,respiratory   Orthopedic Precautions:N/A   Braces: N/A  Respiratory Status: Nasal cannula, flow 3 L/min - 98%, 104 bpm      Occupational Performance:     Bed Mobility:    · NT; pt found/left up in the chair     Functional Mobility/Transfers:  · Patient completed Sit <> Stand Transfer with contact guard assistance  with  rolling walker - 2 trials     Activities of Daily Living:  · Grooming: increased time with multiple attempts using BUE to twist open toothpaste cap. pt brushed her teeth, used mouthwash, and brushed hair with supervision seated. required rest breaks in-between 2* fatigue     · Lower Body Dressing: total assistance to doff soiled brief  · Toileting: total assistance to pericare after BM; pt assisted by standing within RW with SBA    · Feeding: set-up seated to take small sip of apple juice       AMPAC 6 Click ADL: 15    Treatment & Education:  · Pt re-educated on OT role/POC.   · Importance of OOB activity with staff assistance.  · Safety during functional t/f and mobility   · Reviewed BUE AROM HEP with sister present (different sister present yesterday who was educated)   · Seated upright, pt completed the following BUE AROM:   · x10 elbow flex/ext and forward punches AROM  · x10 shoulder horizontal ab/dduction, shoulder flex/ext AAROM   · Pt cued to count aloud with some difficulty coordinating counting with reps   · Rest breaks in-between sets   · Multiple self-care tasks/functional mobility completed- assistance level noted above   · All questions/concerns answered within OT scope of practice       Patient left seated on green air cushion reclined in the chair  with all lines intact, call button in reach, nurseClaudia, notified, sister present and all needs met/within reach; bedside table in front of pt. nurse present/notified that pt has minor drooling on L side after brushing her teeth and some coughing after small sip of apple juiceEducation:      GOALS:   Multidisciplinary  Problems     Occupational Therapy Goals        Problem: Occupational Therapy Goal    Goal Priority Disciplines Outcome Interventions   Occupational Therapy Goal     OT, PT/OT Ongoing, Progressing    Description: Goals to be met by: 03/03/22     Patient will increase functional independence with ADLs by performing:    UE Dressing with Stand-by assistance.  LE Dressing with Minimal assistance.  Grooming while seated with Stand-by assistance.  Toileting from bedside commode with minimal assistance for hygiene and clothing management.   Supine to sit with Stand-by assistance.  Step transfer with stand-by assistance  Toilet transfer to bedside commode with stand-by assistance.  Upper extremity exercise program x15 reps per handout, with independence.                     Time Tracking:     OT Date of Treatment: 02/13/22  OT Start Time: 0950  OT Stop Time: 1023  OT Total Time (min): 33 min    Billable Minutes:Self Care/Home Management 20 min  Therapeutic Exercise 13 min  Total Time 33 min    OT/MYRA: OT          2/13/2022

## 2022-02-13 NOTE — PLAN OF CARE
Problem: Occupational Therapy Goal  Goal: Occupational Therapy Goal  Description: Goals to be met by: 03/03/22     Patient will increase functional independence with ADLs by performing:    UE Dressing with Stand-by assistance.  LE Dressing with Minimal assistance.  Grooming while seated with Stand-by assistance.  Toileting from bedside commode with minimal assistance for hygiene and clothing management.   Supine to sit with Stand-by assistance.  Step transfer with stand-by assistance  Toilet transfer to bedside commode with stand-by assistance.  Upper extremity exercise program x15 reps per handout, with independence.    Outcome: Ongoing, Progressing     Pt participatory with grooming ADLs seated upright in the chair, BUE AROM, and standing tolerance while OT provided total A for pericare after BM. Sister present.

## 2022-02-13 NOTE — NURSING
Received report from MARIE Sanchez. Patient lying in bed resting. NAD noted. Safety Precautions maintained, Will Monitor.

## 2022-02-13 NOTE — PROGRESS NOTES
Awake alert oriented NAD    Denies CNS ENT CP GI  RHEUM OR DERM SX  Past Medical History:   Diagnosis Date    Allergy     Anxiety     Basal cell carcinoma     Cirrhosis of liver without ascites 12/27/2017    Depression     Diabetes mellitus, type 2     Disorder of kidney and ureter     Endometriosis     GERD (gastroesophageal reflux disease)     Hyperlipidemia     Hypertension     Joint pain     Psoriasis      Past Surgical History:   Procedure Laterality Date    abdominal laproscopic surgery      APPENDECTOMY      CARPAL TUNNEL RELEASE      right    CHOLECYSTECTOMY  04/2015    COLONOSCOPY N/A 2/8/2019    Procedure: COLONOSCOPY;  Surgeon: Celso Salas MD;  [facility]  Service: Endoscopy;  Laterality: N/A;    ESOPHAGOGASTRODUODENOSCOPY N/A 2/8/2019    Procedure: EGD (ESOPHAGOGASTRODUODENOSCOPY);  Surgeon: Celso Salas MD;  [facility]  Service: Endoscopy;  Laterality: N/A;  varices screening, labs ordered prior    HYSTERECTOMY  age 25    JOSEFA/BSO (endometriosis)    OOPHORECTOMY      SKIN CANCER DESTRUCTION      UPPER GASTROINTESTINAL ENDOSCOPY       Review of patient's allergies indicates:   Allergen Reactions    Dobutamine Hives    Benadryl [diphenhydramine hcl] Anxiety     Pt states she feels jumpy and anxious when taking.    Darvon [propoxyphene] Nausea Only    Shellfish containing products Hives    Iodinated contrast media Hives    Lisinopril Other (See Comments)     cough    Propoxyphene hcl      Itchy (skin)^    Tizanidine Other (See Comments)     Sweaty, difficulty swallowing    Statins-hmg-coa reductase inhibitors Anxiety and Other (See Comments)     Myalgia, fatigue, palpitations, anxiety       Current Facility-Administered Medications   Medication    0.9%  NaCl infusion (for blood administration)    acetaminophen tablet 650 mg    albuterol-ipratropium 2.5 mg-0.5 mg/3 mL nebulizer solution 3 mL    benzonatate capsule 100 mg     bisacodyL suppository 10 mg    dextrose 50% injection 12.5 g    dextrose 50% injection 25 g    famotidine tablet 20 mg    glucagon (human recombinant) injection 1 mg    glucose chewable tablet 16 g    glucose chewable tablet 24 g    guaiFENesin 100 mg/5 ml syrup 200 mg    heparin (porcine) injection 3,200 Units    insulin aspart U-100 pen 0-5 Units    insulin detemir U-100 pen 5 Units    iohexoL (OMNIPAQUE 350) injection 100 mL    lactulose 20 gram/30 mL solution Soln 10 g    lanthanum chewable tablet 500 mg    melatonin tablet 6 mg    metoprolol injection 5 mg    metoprolol tartrate (LOPRESSOR) tablet 25 mg    naloxone 0.4 mg/mL injection 0.02 mg    ondansetron injection 4 mg    polyethylene glycol packet 17 g    predniSONE tablet 60 mg    simethicone chewable tablet 80 mg    sodium chloride 0.9% flush 10 mL       LABS    Recent Results (from the past 24 hour(s))   POCT glucose    Collection Time: 02/12/22  4:42 PM   Result Value Ref Range    POCT Glucose 265 (H) 70 - 110 mg/dL   POCT glucose    Collection Time: 02/12/22  8:01 PM   Result Value Ref Range    POCT Glucose 268 (H) 70 - 110 mg/dL   POCT glucose    Collection Time: 02/13/22  7:42 AM   Result Value Ref Range    POCT Glucose 246 (H) 70 - 110 mg/dL   POCT glucose    Collection Time: 02/13/22 11:23 AM   Result Value Ref Range    POCT Glucose 288 (H) 70 - 110 mg/dL   ]    I/O last 3 completed shifts:  In: 420 [NG/GT:420]  Out: -     Vitals:    02/13/22 0744 02/13/22 0824 02/13/22 1123 02/13/22 1326   BP: (!) 127/58  (!) 120/58    Pulse: 94 91 88 81   Resp: 19 20 19 20   Temp: 97.6 °F (36.4 °C)  99.1 °F (37.3 °C)    TempSrc:       SpO2: 98% 99% 100% 99%   Weight:       Height:           No Jvd, Thyromegaly or Lymphadenopathy  Lungs: Fairly clear anteriorly and laterally  Cor: RRR no G or rubs  Abd: Soft benign good bowel sounds non tender  Ext: No E C C    A)  Paulo on hd prn creat up   Pos molly  Abnormal spep seen by Heme onc ??  MGUS  Thrombocytopenia  cirrhosis  hyperpo4  PAULINA  anemia better     P)    Renal Diet  Home meds  Protect access  HD in am  EPO prn  Binders as rx  Adjust all meds to the degree of renal fx  Close follow up I/O and weights  Maintain Hydration

## 2022-02-13 NOTE — PROGRESS NOTES
[facility] Medicine  Progress Note    Patient Name: Tammi Farris  MRN: [id number]  Patient Class: IP- Inpatient   Admission Date: 2/1/2022  Length of Stay: 12 days  Attending Physician: Jose Martin Renee MD  Primary Care Provider: Ann-Marie Hartman MD        Subjective:     Principal Problem:Acute renal failure superimposed on stage 2 chronic kidney disease        HPI:  71 y.o. female with HTN, HLD, GERD, cirrhosis of liver, thrombocytopenia, and sleep apnea presents with a complaint of cough and congestion since October.  She is chronically dyspneic, exacerbated by exertion, has seen Cardiology and was prescribed lasix and metoprolol.  Her sister has now noted that the patient has very low urine output and is constipated.  Also follows with Hepatology for chronic liver disease.  Patient denies fever, chills, chest pain, palpitations, orthopnea, PND, dizziness, syncope, n/v/d, abdominal pain, or dysuria.  In the ED, labs reveal ANI, hyperkalemia, metabolic acidosis with elevated lactic acid, elevated liver enzymes, elevated BNP, elevated d-dimer, thrombocytopenia, and markedly concentrated urine.  Echo December 2021 showed preserved EF, no evidence of heart failure.  Chest xray without acute abnormality. No convincing evidence of infectious process.      Overview/Hospital Course:  71 year old woman with HTN, HLD, GERD, CKDII, cirrhosis and PAULINA presented for sob and has been diagnosed with ani/CKDII and acute hypoxic respiratory failure.  Complex patient and presentation with very difficult to ascertain volume status.  Was given lasix and then fluids and then attempt at lasix again.  Suspect a pulmonary/renal vasculitis but not clearly diagnosed (Prior TAMMI positive 2019, low C3 and C4 and abnormal SPEP with elevated kappa/lambda ratio).  Had planned renal biopsy, but encephalopathic, anuric and worsening acidosis so THDC placed and initiated HD on 2/7.  Has completed 3 days pulse dose steroids (1g  daily) and will be on prednisone 60 mg.  Patient more lethargic on 2/8 and noted to have episode of rapid AFib.  Moved to ICU.  Noted to have increased ammonia levels and started on lactulose.  Mentation slowly improving with no further arrhythmias.  Poor oral intake.  NGT placed and started on tube feedings.      Interval History: episode of vomiting this morning.  Feeling better now.    Review of Systems   HENT: Negative for ear discharge and ear pain.    Eyes: Negative for discharge and itching.   Endocrine: Negative for cold intolerance and heat intolerance.   Neurological: Negative for seizures and syncope.     Objective:     Vital Signs (Most Recent):  Temp: 99.1 °F (37.3 °C) (02/13/22 1123)  Pulse: 81 (02/13/22 1326)  Resp: 20 (02/13/22 1326)  BP: (!) 120/58 (02/13/22 1123)  SpO2: 99 % (02/13/22 1326) Vital Signs (24h Range):  Temp:  [97.6 °F (36.4 °C)-99.1 °F (37.3 °C)] 99.1 °F (37.3 °C)  Pulse:  [] 81  Resp:  [14-20] 20  SpO2:  [96 %-100 %] 99 %  BP: (115-139)/(56-62) 120/58     Weight: 70.6 kg (155 lb 10.3 oz)  Body mass index is 33.68 kg/m².    Intake/Output Summary (Last 24 hours) at 2/13/2022 1506  Last data filed at 2/13/2022 0845  Gross per 24 hour   Intake 540 ml   Output --   Net 540 ml      Physical Exam  Vitals and nursing note reviewed.   Constitutional:       General: She is not in acute distress.     Appearance: She is well-developed. She is ill-appearing. She is not toxic-appearing or diaphoretic.      Interventions: Nasal cannula in place.   HENT:      Head: Normocephalic and atraumatic.      Right Ear: External ear normal.      Left Ear: External ear normal.      Nose: Nose normal.      Mouth/Throat:      Mouth: Mucous membranes are moist.   Eyes:      Extraocular Movements: Extraocular movements intact.      Conjunctiva/sclera: Conjunctivae normal.   Cardiovascular:      Rate and Rhythm: Normal rate and regular rhythm.   Pulmonary:      Effort: No tachypnea or respiratory distress.       Breath sounds: No wheezing.   Abdominal:      General: Bowel sounds are normal. There is no distension.      Palpations: Abdomen is soft.      Tenderness: There is no abdominal tenderness.      Comments: No palpable hepatomegaly or splenomegaly    Musculoskeletal:         General: No tenderness. Normal range of motion.      Cervical back: Normal range of motion and neck supple.      Right lower leg: Edema present.      Left lower leg: Edema present.   Skin:     General: Skin is warm and dry.   Neurological:      Mental Status: She is alert and oriented to person, place, and time.      Comments: Awake and alert today   Psychiatric:         Thought Content: Thought content normal.         Significant Labs:   All pertinent labs within the past 24 hours have been reviewed.  BMP:   Recent Labs   Lab 02/11/22 2121 02/11/22 2121 02/12/22 0525   GLU  --   --  167*   NA  --   --  135*   K 4.6   < > 4.4   CL  --   --  100   CO2  --   --  20*   BUN  --   --  79*   CREATININE  --   --  4.8*   CALCIUM  --   --  9.8   MG 2.2  --   --     < > = values in this interval not displayed.     CBC:   Recent Labs   Lab 02/12/22  0525   WBC 13.56*   HGB 8.4*   HCT 22.8*   PLT 29*       Significant Imaging: I have reviewed all pertinent imaging results/findings within the past 24 hours.      Assessment/Plan:      * Acute renal failure superimposed on stage 2 chronic kidney disease  -Admitted to inpatient status  -Baseline Cr 0.9.  On admit Cr 2.7  -Reports being started on lasix 1/8 by her cardiologist and over several days PTA had decreased UOP and diminished PO intake  -On admit BNP elevated at 1160 and with metabolic acidosis and mild hyperkalemia   Appreciate Nephrology input.  Continued worsening of renal function.   IR consulted for kidney biopsy, but patient more decompensated.  -Cr continues to worsen and end encephalopathic today.  IR placed THDC and HD initiated.  Completed 3 days of pulse dose steroids for suspected  glomerulonephritis vs pulm/renal syndrome.  Changed to prednisone 60mg  and anticipate taper    MANUEL (generalized anxiety disorder)  -Very anxious today  -On 2/6 gave one time low dose ativan PO - watch respiratory status very closely.    Debility  PT/OT evaluation.    Metabolic acidosis  -Suspect secondary to ANI, management as above    Sleep apnea  -Continue bipap qhs    Thrombocytopenia  -Chronic and at baseline on admit secondary to cirrhosis  -Heme/onc consulted and input appreciated  -Plan is transfuse for platelets <10 or if significant hemorrhage.  Noted drop in Plts today.  Closely monitor.    Macrocytic anemia  -Hb 9.2 on admit - chronic secondary to cirrhosis.  -Dropped to <7 on 2/2  -No evidence of GI bleeding but had noted epistaxis and bleeding gums at times since before hospitalization.  -Folate and B12 replete.  She is iron deficient.  -1 unit PRBC ordered 2/2 but transfusion delayed due to complex antibody matching.  Blood finally available and she received 1 unit PRBC 2/4.   H/H has remained relatively stable.      Cirrhosis of liver without ascites  -Noted mild transaminitis and thrombocytopenia which are not new.  Noted epistaxis and gingival bleeding which has been ongoing for several months.  These are chronic and note history of cirrhosis and alcohol abuse   -Follows with hepatology  -Abdominal US on day prior to admit showed hepatic cirrhosis/steatosis with sonographic findings of portal hypertension including trace ascites and splenomegaly.  Noted to be more lethargic with elevation of ammonia level.  Hepatic Encephalopathy (not POA) and started on lactulose.  Improving mentation.    Gastroesophageal reflux disease  -Stable, no acute issue    HTN (hypertension)  -BP low normal  -Continue metoprolol with hold parameters    Hyperlipidemia  -History noted  -Not on statin due to cirrhosis      VTE Risk Mitigation (From admission, onward)         Ordered     heparin (porcine) injection 3,200 Units   As needed (PRN)         02/07/22 1834     IP VTE HIGH RISK PATIENT  Once         02/01/22 1557     Place sequential compression device  Until discontinued         02/01/22 1557                Discharge Planning   CLAUDETTE: 2/18/2022     Code Status: Full Code   Is the patient medically ready for discharge?:     Reason for patient still in hospital (select all that apply): Patient trending condition  Discharge Plan A: Rehab ([facility]   Discharge Delays: (!) Change in Medical Condition              Jose Martin Renee MD  Department of [facility] Medicine   [facility]

## 2022-02-13 NOTE — SUBJECTIVE & OBJECTIVE
Interval History: episode of vomiting this morning.  Feeling better now.    Review of Systems   HENT: Negative for ear discharge and ear pain.    Eyes: Negative for discharge and itching.   Endocrine: Negative for cold intolerance and heat intolerance.   Neurological: Negative for seizures and syncope.     Objective:     Vital Signs (Most Recent):  Temp: 99.1 °F (37.3 °C) (02/13/22 1123)  Pulse: 81 (02/13/22 1326)  Resp: 20 (02/13/22 1326)  BP: (!) 120/58 (02/13/22 1123)  SpO2: 99 % (02/13/22 1326) Vital Signs (24h Range):  Temp:  [97.6 °F (36.4 °C)-99.1 °F (37.3 °C)] 99.1 °F (37.3 °C)  Pulse:  [] 81  Resp:  [14-20] 20  SpO2:  [96 %-100 %] 99 %  BP: (115-139)/(56-62) 120/58     Weight: 70.6 kg (155 lb 10.3 oz)  Body mass index is 33.68 kg/m².    Intake/Output Summary (Last 24 hours) at 2/13/2022 1506  Last data filed at 2/13/2022 0845  Gross per 24 hour   Intake 540 ml   Output --   Net 540 ml      Physical Exam  Vitals and nursing note reviewed.   Constitutional:       General: She is not in acute distress.     Appearance: She is well-developed. She is ill-appearing. She is not toxic-appearing or diaphoretic.      Interventions: Nasal cannula in place.   HENT:      Head: Normocephalic and atraumatic.      Right Ear: External ear normal.      Left Ear: External ear normal.      Nose: Nose normal.      Mouth/Throat:      Mouth: Mucous membranes are moist.   Eyes:      Extraocular Movements: Extraocular movements intact.      Conjunctiva/sclera: Conjunctivae normal.   Cardiovascular:      Rate and Rhythm: Normal rate and regular rhythm.   Pulmonary:      Effort: No tachypnea or respiratory distress.      Breath sounds: No wheezing.   Abdominal:      General: Bowel sounds are normal. There is no distension.      Palpations: Abdomen is soft.      Tenderness: There is no abdominal tenderness.      Comments: No palpable hepatomegaly or splenomegaly    Musculoskeletal:         General: No tenderness. Normal range of  motion.      Cervical back: Normal range of motion and neck supple.      Right lower leg: Edema present.      Left lower leg: Edema present.   Skin:     General: Skin is warm and dry.   Neurological:      Mental Status: She is alert and oriented to person, place, and time.      Comments: Awake and alert today   Psychiatric:         Thought Content: Thought content normal.         Significant Labs:   All pertinent labs within the past 24 hours have been reviewed.  BMP:   Recent Labs   Lab 02/11/22 2121 02/11/22 2121 02/12/22 0525   GLU  --   --  167*   NA  --   --  135*   K 4.6   < > 4.4   CL  --   --  100   CO2  --   --  20*   BUN  --   --  79*   CREATININE  --   --  4.8*   CALCIUM  --   --  9.8   MG 2.2  --   --     < > = values in this interval not displayed.     CBC:   Recent Labs   Lab 02/12/22 0525   WBC 13.56*   HGB 8.4*   HCT 22.8*   PLT 29*       Significant Imaging: I have reviewed all pertinent imaging results/findings within the past 24 hours.

## 2022-02-13 NOTE — PLAN OF CARE
Problem: Adult Inpatient Plan of Care  Goal: Plan of Care Review  Outcome: Ongoing, Progressing  Goal: Patient-Specific Goal (Individualized)  Outcome: Ongoing, Progressing  Goal: Absence of Hospital-Acquired Illness or Injury  Outcome: Ongoing, Progressing  Goal: Optimal Comfort and Wellbeing  Outcome: Ongoing, Progressing  Goal: Readiness for Transition of Care  Outcome: Ongoing, Progressing     Problem: Infection  Goal: Absence of Infection Signs and Symptoms  Outcome: Ongoing, Progressing     Problem: Diabetes Comorbidity  Goal: Blood Glucose Level Within Targeted Range  Outcome: Ongoing, Progressing     Problem: Fluid and Electrolyte Imbalance (Acute Kidney Injury/Impairment)  Goal: Fluid and Electrolyte Balance  Outcome: Ongoing, Progressing     Problem: Oral Intake Inadequate (Acute Kidney Injury/Impairment)  Goal: Optimal Nutrition Intake  Outcome: Ongoing, Progressing     Problem: Skin Injury Risk Increased  Goal: Skin Health and Integrity  Outcome: Ongoing, Progressing     Problem: Fall Injury Risk  Goal: Absence of Fall and Fall-Related Injury  Outcome: Ongoing, Progressing     Problem: Infection (Hemodialysis)  Goal: Absence of Infection Signs and Symptoms  Outcome: Ongoing, Progressing     Problem: Coping Ineffective  Goal: Effective Coping  Outcome: Ongoing, Progressing

## 2022-02-13 NOTE — PT/OT/SLP PROGRESS
Physical Therapy Treatment    Patient Name:  Tammi Farris   MRN:  [id number]    Recommendations:     Discharge Recommendations:  rehabilitation facility   Discharge Equipment Recommendations: bedside commode   Barriers to discharge: decreased functional mobility.     Assessment:     Tammi Farris is a 71 y.o. female admitted with a medical diagnosis of Acute renal failure superimposed on stage 2 chronic kidney disease.  She presents with the following impairments/functional limitations:  weakness,impaired endurance,impaired cognition,decreased ROM,decreased coordination,decreased lower extremity function,impaired functional mobilty,gait instability,impaired balance.    Rehab Prognosis: Good; patient would benefit from acute skilled PT services to address these deficits and reach maximum level of function.    Recent Surgery: * No surgery found *      Plan:     During this hospitalization, patient to be seen 6 x/week to address the identified rehab impairments via gait training,therapeutic activities,therapeutic exercises and progress toward the following goals:    · Plan of Care Expires:  02/23/22    Subjective     Chief Complaint: Pt with no complaints at this time.   Patient/Family Comments/goals:   Pain/Comfort:  · Pain Rating 1: 0/10      Objective:     Patient found up in chair with peripheral IV,telemetry,oxygen upon PT entry to room.     General Precautions: Standard, fall,respiratory   Orthopedic Precautions:N/A   Braces: N/A  Respiratory Status: Nasal cannula, flow 3 L/min     Functional Mobility:  · Transfers:     · Sit to Stand:  contact guard assistance with rolling walker  · Gait: Pt ambulated 40 ft with RW, CGA requiring verbal cueing on proper gait mechanics to prevent foot drag and prevent LOB as well as reduce fall risk.   · Balance: Pt with good sitting and fair standing balance.       AM-PAC 6 CLICK MOBILITY  Turning over in bed (including adjusting bedclothes, sheets and blankets)?: 3  Sitting down  on and standing up from a chair with arms (e.g., wheelchair, bedside commode, etc.): 3  Moving from lying on back to sitting on the side of the bed?: 3  Moving to and from a bed to a chair (including a wheelchair)?: 3  Need to walk in hospital room?: 3  Climbing 3-5 steps with a railing?: 1  Basic Mobility Total Score: 16       Therapeutic Activities and Exercises:       Patient left up in chair with all lines intact, call button in reach and family member  present..    GOALS:   Multidisciplinary Problems     Physical Therapy Goals        Problem: Physical Therapy Goal    Goal Priority Disciplines Outcome Goal Variances Interventions   Physical Therapy Goal     PT, PT/OT Ongoing, Progressing     Description: Goals to be met by: 22     Patient will increase functional independence with mobility by performin. Supine to sit with Modified [place]  2. Rolling to Left and Right with Modified [place]  3. Sit to stand transfer with Modified [place] using RW  4. Bed to chair transfer with Modified [place] using Rolling Walker  5. Gait >50 feet with Modified [place] using Rolling Walker   6. Lower extremity exercise program 2 sets x10 reps per handout, with independence                     Time Tracking:     PT Received On: 22  PT Start Time: 1034     PT Stop Time: 1048  PT Total Time (min): 14 min     Billable Minutes: Therapeutic Activity 14    Treatment Type: Treatment  PT/PTA: PTA     PTA Visit Number: 2022

## 2022-02-14 NOTE — NURSING
HD terminated 30 min early 2/2 hypotension. Pt hypotensive throughout tx- unable to tolerate fluid removal. Net even. Pt's BP dropped to 78/43 and tx was ended at that time. BP improved with blood rinseback. Pt now NAD, VSS. CVC dressing changed. CVC lines heparin locked, clamped, and capped. Report given to primary RN.

## 2022-02-14 NOTE — ASSESSMENT & PLAN NOTE
-Chronic and at baseline on admit secondary to cirrhosis  -Heme/onc consulted and input appreciated  -Plan is transfuse for platelets <10 or if significant hemorrhage.  Closely monitor.

## 2022-02-14 NOTE — PROGRESS NOTES
[facility] Medicine  Progress Note    Patient Name: Tammi Farris  MRN: [id number]  Patient Class: IP- Inpatient   Admission Date: 2/1/2022  Length of Stay: 13 days  Attending Physician: Jose Martin Renee MD  Primary Care Provider: Ann-Marie Hartman MD        Subjective:     Principal Problem:Acute renal failure superimposed on stage 2 chronic kidney disease        HPI:  71 y.o. female with HTN, HLD, GERD, cirrhosis of liver, thrombocytopenia, and sleep apnea presents with a complaint of cough and congestion since October.  She is chronically dyspneic, exacerbated by exertion, has seen Cardiology and was prescribed lasix and metoprolol.  Her sister has now noted that the patient has very low urine output and is constipated.  Also follows with Hepatology for chronic liver disease.  Patient denies fever, chills, chest pain, palpitations, orthopnea, PND, dizziness, syncope, n/v/d, abdominal pain, or dysuria.  In the ED, labs reveal ANI, hyperkalemia, metabolic acidosis with elevated lactic acid, elevated liver enzymes, elevated BNP, elevated d-dimer, thrombocytopenia, and markedly concentrated urine.  Echo December 2021 showed preserved EF, no evidence of heart failure.  Chest xray without acute abnormality. No convincing evidence of infectious process.      Overview/Hospital Course:  71 year old woman with HTN, HLD, GERD, CKDII, cirrhosis and PAULINA presented for sob and has been diagnosed with ani/CKDII and acute hypoxic respiratory failure.  Complex patient and presentation with very difficult to ascertain volume status.  Was given lasix and then fluids and then attempt at lasix again.  Suspect a pulmonary/renal vasculitis but not clearly diagnosed (Prior TAMMI positive 2019, low C3 and C4 and abnormal SPEP with elevated kappa/lambda ratio).  Had planned renal biopsy, but encephalopathic, anuric and worsening acidosis so THDC placed and initiated HD on 2/7.  Has completed 3 days pulse dose steroids (1g  daily) and will be on prednisone 60 mg.  Patient more lethargic on 2/8 and noted to have episode of rapid AFib.  Moved to ICU.  Noted to have increased ammonia levels and started on lactulose.  Mentation slowly improving with no further arrhythmias.  Poor oral intake.  NGT placed and started on tube feedings.  NGT removed and ST consulted.  Severely debilitated and PT/OT consulted.      Interval History: NGT removed and tolerating diet.    Review of Systems   HENT: Negative for ear discharge and ear pain.    Eyes: Negative for discharge and itching.   Endocrine: Negative for cold intolerance and heat intolerance.   Neurological: Negative for seizures and syncope.     Objective:     Vital Signs (Most Recent):  Temp: 97.9 °F (36.6 °C) (02/14/22 0746)  Pulse: 91 (02/14/22 1205)  Resp: 20 (02/14/22 1205)  BP: (!) 97/42 (02/14/22 1205)  SpO2: 97 % (02/14/22 0746) Vital Signs (24h Range):  Temp:  [97.5 °F (36.4 °C)-98.3 °F (36.8 °C)] 97.9 °F (36.6 °C)  Pulse:  [] 91  Resp:  [16-20] 20  SpO2:  [96 %-99 %] 97 %  BP: ()/(42-91) 97/42     Weight: 70.6 kg (155 lb 10.3 oz)  Body mass index is 33.68 kg/m².    Intake/Output Summary (Last 24 hours) at 2/14/2022 1606  Last data filed at 2/14/2022 1205  Gross per 24 hour   Intake 650 ml   Output 426 ml   Net 224 ml      Physical Exam  Vitals and nursing note reviewed.   Constitutional:       General: She is not in acute distress.     Appearance: She is well-developed. She is ill-appearing. She is not toxic-appearing or diaphoretic.      Interventions: Nasal cannula in place.   HENT:      Head: Normocephalic and atraumatic.      Right Ear: External ear normal.      Left Ear: External ear normal.      Nose: Nose normal.      Mouth/Throat:      Mouth: Mucous membranes are moist.   Eyes:      Extraocular Movements: Extraocular movements intact.      Conjunctiva/sclera: Conjunctivae normal.   Cardiovascular:      Rate and Rhythm: Normal rate and regular rhythm.   Pulmonary:       Effort: No tachypnea or respiratory distress.      Breath sounds: No wheezing.   Abdominal:      General: Bowel sounds are normal. There is no distension.      Palpations: Abdomen is soft.      Tenderness: There is no abdominal tenderness.      Comments: No palpable hepatomegaly or splenomegaly    Musculoskeletal:         General: No tenderness. Normal range of motion.      Cervical back: Normal range of motion and neck supple.      Right lower leg: Edema present.      Left lower leg: Edema present.   Skin:     General: Skin is warm and dry.   Neurological:      Mental Status: She is alert and oriented to person, place, and time.      Comments: Awake and alert today   Psychiatric:         Thought Content: Thought content normal.         Significant Labs:   All pertinent labs within the past 24 hours have been reviewed.  BMP:   Recent Labs   Lab 02/14/22  0326   *      K 4.3      CO2 24   *   CREATININE 4.9*   CALCIUM 9.4     CBC:   Recent Labs   Lab 02/14/22 0326   WBC 13.06*   HGB 8.3*   HCT 23.7*   PLT 42*       Significant Imaging: I have reviewed all pertinent imaging results/findings within the past 24 hours.      Assessment/Plan:      * Acute renal failure superimposed on stage 2 chronic kidney disease  -Admitted to inpatient status  -Baseline Cr 0.9.  On admit Cr 2.7  -Reports being started on lasix 1/8 by her cardiologist and over several days PTA had decreased UOP and diminished PO intake  -On admit BNP elevated at 1160 and with metabolic acidosis and mild hyperkalemia   Appreciate Nephrology input.  Continued worsening of renal function.   IR consulted for kidney biopsy, but patient more decompensated.  -Cr continues to worsen and end encephalopathic today.  IR placed THDC and HD initiated.  Completed 3 days of pulse dose steroids for suspected glomerulonephritis vs pulm/renal syndrome.  Changed to prednisone 60mg  and anticipate taper.  Can start decrease to 50 mg  tomorrow.    MANUEL (generalized anxiety disorder)  -Very anxious today  -On 2/6 gave one time low dose ativan PO - watch respiratory status very closely.    Debility  PT/OT evaluation.    Metabolic acidosis  -Suspect secondary to ANI, management as above    Sleep apnea  -Continue bipap qhs    Thrombocytopenia  -Chronic and at baseline on admit secondary to cirrhosis  -Heme/onc consulted and input appreciated  -Plan is transfuse for platelets <10 or if significant hemorrhage.  Closely monitor.    Macrocytic anemia  -Hb 9.2 on admit - chronic secondary to cirrhosis.  -Dropped to <7 on 2/2  -No evidence of GI bleeding but had noted epistaxis and bleeding gums at times since before hospitalization.  -Folate and B12 replete.  She is iron deficient.  -1 unit PRBC ordered 2/2 but transfusion delayed due to complex antibody matching.  Blood finally available and she received 1 unit PRBC 2/4.   H/H has remained relatively stable.      Cirrhosis of liver without ascites  -Noted mild transaminitis and thrombocytopenia which are not new.  Noted epistaxis and gingival bleeding which has been ongoing for several months.  These are chronic and note history of cirrhosis and alcohol abuse   -Follows with hepatology  -Abdominal US on day prior to admit showed hepatic cirrhosis/steatosis with sonographic findings of portal hypertension including trace ascites and splenomegaly.  Noted to be more lethargic with elevation of ammonia level.  Hepatic Encephalopathy (not POA) and started on lactulose.  Improving mentation.    Gastroesophageal reflux disease  -Stable, no acute issue    HTN (hypertension)  -BP low normal  -Continue metoprolol with hold parameters    Hyperlipidemia  -History noted  -Not on statin due to cirrhosis      VTE Risk Mitigation (From admission, onward)         Ordered     heparin (porcine) injection 3,200 Units  As needed (PRN)         02/07/22 1144     IP VTE HIGH RISK PATIENT  Once         02/01/22 1557     Place  sequential compression device  Until discontinued         02/01/22 1557                Discharge Planning   CLAUDETTE: 2/18/2022     Code Status: Full Code   Is the patient medically ready for discharge?:     Reason for patient still in hospital (select all that apply): Patient trending condition  Discharge Plan A: Rehab   Discharge Delays: (!) Post-Acute Set-up              Jose Martin Renee MD  Department of [facility] Medicine   [facility]

## 2022-02-14 NOTE — PLAN OF CARE
02/14/22 1212   Medicare Message   Important Message from Medicare regarding Discharge Appeal Rights Given to patient/caregiver;Explained to patient/caregiver;Other (comments)  (Explained IMM to sister. Sister expressed understanding. Copy mailed to address on file. [phone])   Date IMM was signed 02/14/22   Time IMM was signed 1216

## 2022-02-14 NOTE — PLAN OF CARE
Problem: Adult Inpatient Plan of Care  Goal: Plan of Care Review  Outcome: Ongoing, Progressing  Goal: Patient-Specific Goal (Individualized)  Outcome: Ongoing, Progressing  Goal: Absence of Hospital-Acquired Illness or Injury  Outcome: Ongoing, Progressing  Goal: Optimal Comfort and Wellbeing  Outcome: Ongoing, Progressing  Goal: Readiness for Transition of Care  Outcome: Ongoing, Progressing     Problem: Infection  Goal: Absence of Infection Signs and Symptoms  Outcome: Ongoing, Progressing     Problem: Diabetes Comorbidity  Goal: Blood Glucose Level Within Targeted Range  Outcome: Ongoing, Progressing     Problem: Fluid and Electrolyte Imbalance (Acute Kidney Injury/Impairment)  Goal: Fluid and Electrolyte Balance  Outcome: Ongoing, Progressing     Problem: Oral Intake Inadequate (Acute Kidney Injury/Impairment)  Goal: Optimal Nutrition Intake  Outcome: Ongoing, Progressing     Problem: Renal Function Impairment (Acute Kidney Injury/Impairment)  Goal: Effective Renal Function  Outcome: Ongoing, Progressing     Problem: Skin Injury Risk Increased  Goal: Skin Health and Integrity  Outcome: Ongoing, Progressing     Problem: Fall Injury Risk  Goal: Absence of Fall and Fall-Related Injury  Outcome: Ongoing, Progressing     Problem: Device-Related Complication Risk (Hemodialysis)  Goal: Safe, Effective Therapy Delivery  Outcome: Ongoing, Progressing     Problem: Hemodynamic Instability (Hemodialysis)  Goal: Effective Tissue Perfusion  Outcome: Ongoing, Progressing     Problem: Infection (Hemodialysis)  Goal: Absence of Infection Signs and Symptoms  Outcome: Ongoing, Progressing     Problem: Coping Ineffective  Goal: Effective Coping  Outcome: Ongoing, Progressing

## 2022-02-14 NOTE — ASSESSMENT & PLAN NOTE
-Admitted to inpatient status  -Baseline Cr 0.9.  On admit Cr 2.7  -Reports being started on lasix 1/8 by her cardiologist and over several days PTA had decreased UOP and diminished PO intake  -On admit BNP elevated at 1160 and with metabolic acidosis and mild hyperkalemia   Appreciate Nephrology input.  Continued worsening of renal function.   IR consulted for kidney biopsy, but patient more decompensated.  -Cr continues to worsen and end encephalopathic today.  IR placed THDC and HD initiated.  Completed 3 days of pulse dose steroids for suspected glomerulonephritis vs pulm/renal syndrome.  Changed to prednisone 60mg  and anticipate taper.  Can start decrease to 50 mg tomorrow.

## 2022-02-14 NOTE — ASSESSMENT & PLAN NOTE
-Admitted to inpatient status  -Baseline Cr 0.9.  On admit Cr 2.7  -Reports being started on lasix 1/8 by her cardiologist and over several days PTA had decreased UOP and diminished PO intake  -On admit BNP elevated at 1160 and with metabolic acidosis and mild hyperkalemia   -Nephrology consulted and input appreciated  -ARMEN positive.  ANCAs negative.  Complement low.  -IR consulted for kidney biopsy, but patient decompensated further and biopsy delayed.  -On 2/7 Cr continued to worsen and was encephalopathic so IR placed THDC and HD initiated.  -Completed 3 days of pulse dose steroids for suspected glomerulonephritis vs pulm/renal syndrome and continues on prednisone 60mg daily per nephrology.  -Continue HD MWF for now.

## 2022-02-14 NOTE — PT/OT/SLP PROGRESS
Physical Therapy Treatment    Patient Name:  Tammi Farris   MRN:  [id number]    Recommendations:     Discharge Recommendations:  rehabilitation facility   Discharge Equipment Recommendations: bedside commode   Barriers to discharge home: Pt with decreased functional mobility and ambulation at this time.  Pt new to HD on this admission.    Assessment:     Tammi Farris is a 71 y.o. female admitted with a medical diagnosis of Acute renal failure superimposed on stage 2 chronic kidney disease.  She presents with the following impairments/functional limitations:  weakness,impaired self care skills,impaired functional mobilty,gait instability,impaired balance,impaired cognition,decreased coordination,decreased upper extremity function,decreased lower extremity function,decreased safety awareness,edema,impaired cardiopulmonary response to activity.    Rehab Prognosis: Fair+; patient would benefit from acute skilled PT services to address these deficits and reach maximum level of function.    Recent Surgery: * No surgery found *      Plan:     During this hospitalization, patient to be seen 6 x/week to address the identified rehab impairments via gait training,therapeutic activities,therapeutic exercises and progress toward the following goals:    · Plan of Care Expires:  02/23/22    Subjective     Chief Complaint: Pt reported not sleeping well, afraid that she won't wake up.  Pt offered hospital pastoral services and conversation with MD.   Patient/Family Comments/goals: Pt agreeable to bedside chair.   Pain/Comfort:  · Pain Rating 1: 0/10      Objective:     Patient found right sidelying with bed alarm,telemetry,oxygen,peripheral IV (R chest HD access) upon PT entry to room.     General Precautions: Standard, fall,respiratory, HD   Orthopedic Precautions:N/A   Braces: N/A  Respiratory Status: Nasal cannula, flow 3 L/min     Functional Mobility:  Pt not sleeping well, speech still difficult to understand at times.  Pt  motivated to participate in therapy and more comfortable in the chair.  Family is very supportive.     · Bed Mobility:     · Scooting: minimum assistance  · Supine to Sit: moderate assistance with HOB elevated   · Transfers:     · Sit to Stand:  minimum assistance with rolling walker  · Bed to Chair: minimum assistance with  rolling walker  using  Step Transfer  · Gait: Pt ambulated ~20 ft with min A using RW and 3L O2 NC.  Pt with narrow BRAD, decreased foot clearance, decreased step length, and decreased renetta.  Pt required min A with RW management.    · Balance: Pt with fair static sit/stand balance.     V/S: BP in supine 92/44, BP in sitting 112/56, BP after walking (attempted, unable to read), BP in recliner 93/50; Nurse Adams notified.       AM-PAC 6 CLICK MOBILITY  Turning over in bed (including adjusting bedclothes, sheets and blankets)?: 3  Sitting down on and standing up from a chair with arms (e.g., wheelchair, bedside commode, etc.): 3  Moving from lying on back to sitting on the side of the bed?: 3  Moving to and from a bed to a chair (including a wheelchair)?: 3  Need to walk in hospital room?: 3  Climbing 3-5 steps with a railing?: 1  Basic Mobility Total Score: 16       Therapeutic Activities and Exercises:  BLE seated therex x10 reps: AP, LAQ, hip flex    BLE supine therex x10 reps: AP, hip abd/add, SLR    Pt encouraged to perform LE therex throughout the day.  Pt verbalized understanding.     Patient left up in chair on seat cushion reclined with all lines intact, call button in reach, nurse Adams notified and friend/niece present.    GOALS:   Multidisciplinary Problems     Physical Therapy Goals        Problem: Physical Therapy Goal    Goal Priority Disciplines Outcome Goal Variances Interventions   Physical Therapy Goal     PT, PT/OT Ongoing, Progressing     Description: Goals to be met by: 22     Patient will increase functional independence with mobility by performin. Supine to  sit with Modified [place]  2. Rolling to Left and Right with Modified [place]  3. Sit to stand transfer with Modified [place] using RW  4. Bed to chair transfer with Modified [place] using Rolling Walker  5. Gait >50 feet with Modified [place] using Rolling Walker   6. Lower extremity exercise program 2 sets x10 reps per handout, with independence                     Time Tracking:     PT Received On: 02/14/22  PT Start Time: 1359     PT Stop Time: 1426  PT Total Time (min): 27 min     Billable Minutes: Gait Training 14 min and Therapeutic Exercise 13 min    Treatment Type: Treatment  PT/PTA: PT     PTA Visit Number: 0     02/14/2022

## 2022-02-14 NOTE — PROGRESS NOTES
02/13/22 1930   Type of Frequent Check   Type Patient Rounds;Telemetry Monitoring   Safety/Activity   Patient Rounds bed in low position;bed wheels locked;call light in patient/parent reach;clutter free environment maintained;ID band on;placement of personal items at bedside;toileting offered;visualized patient   Safety Promotion/Fall Prevention assistive device/personal item within reach;bed alarm set;Fall Risk reviewed with patient/family   Activity Management Patient unable to perform activities   Pain/Comfort/Sleep   Comfort/Acceptable Pain Level 2   Pain Rating (0-10): Rest 0   Pain Rating (0-10): Activity 0   Vital Signs   Flow (L/min) 3   Assessments (Pre/Post)   Level of Consciousness (AVPU) alert

## 2022-02-14 NOTE — ASSESSMENT & PLAN NOTE
-Chronic and at baseline on admit secondary to cirrhosis  -Heme/onc consulted and input appreciated  -Plan is transfuse for platelets <10 or if significant hemorrhage.  -Closely monitor.

## 2022-02-14 NOTE — PT/OT/SLP EVAL
Speech Language Pathology Evaluation  Bedside Swallow    Patient Name:  Tammi Farris   MRN:  [id number]  Admitting Diagnosis: Acute Renal failure    Recommendations:                 General Recommendations:  Dysphagia therapy  Diet recommendations:  Mechanical soft, Thin   Aspiration Precautions: 1 bite/sip at a time, Remain upright 30 minutes post meal and Small bites/sips crush meds  General Precautions: Standard, aspiration  Communication strategies:  provide increased time to answer    History:     Past Medical History:   Diagnosis Date    Allergy     Anxiety     Basal cell carcinoma     Cirrhosis of liver without ascites 12/27/2017    Depression     Diabetes mellitus, type 2     Disorder of kidney and ureter     Endometriosis     GERD (gastroesophageal reflux disease)     Hyperlipidemia     Hypertension     Joint pain     Psoriasis        Past Surgical History:   Procedure Laterality Date    abdominal laproscopic surgery      APPENDECTOMY      CARPAL TUNNEL RELEASE      right    CHOLECYSTECTOMY  04/2015    COLONOSCOPY N/A 2/8/2019    Procedure: COLONOSCOPY;  Surgeon: Celso Salas MD;  Location: [facility] ([address] [place] [address]);  Service: Endoscopy;  Laterality: N/A;    ESOPHAGOGASTRODUODENOSCOPY N/A 2/8/2019    Procedure: EGD (ESOPHAGOGASTRODUODENOSCOPY);  Surgeon: Celso Salas MD;  Location: [facility] ([address] [place] [address]);  Service: Endoscopy;  Laterality: N/A;  varices screening, labs ordered prior    HYSTERECTOMY  age 25    JOSEFA/BSO (endometriosis)    OOPHORECTOMY      SKIN CANCER DESTRUCTION      UPPER GASTROINTESTINAL ENDOSCOPY         Social History: Patient lives with sister    Modified Barium Swallow: MBS was ordered 7/8/21 secondary to globus sensation in throat after intake of solids however order was never fulfilled, Pt now reporting that globus sensation has since resolved and she no longer has swallowing issues.      Chest X-Rays: 2/7 The tip of the central venous line is in the distal  superior vena cava.  The cardiac silhouette is prominent and may be exaggerated by mildly low lung volumes.  There is persistent increased interstitial attenuation, which appears to be greater on the right than the left.  There is no focal consolidation, pneumothorax, or pleural effusion.    Prior diet: unrestricted     Subjective   Pt reporting she is thirsty. She denies problems swallowing however affirms that last year she has a period of time when she felt as though she had globus sensation in throat post intake of solids only. Yesterday demonstrated coughing and choking after small sips of apple juice with OT  Patient goals: continue po    Pain/Comfort:  · Pain Rating 1: 0/10    Respiratory Status: Nasal cannula, flow 3 L/min    Objective:     Oral Musculature Evaluation  · Oral Musculature: general weakness  · Dentition: teeth in poor condition  · Secretion Management: adequate  · Mucosal Quality: good  · Mandibular Strength and Mobility: WFL  · Oral Labial Strength and Mobility: impaired coordination  · Lingual Strength and Mobility: WFL  · Voice Prior to PO Intake: wfl  · Oral Musculature Comments: grossly symmetrical    Bedside Swallow Eval:   Consistencies Assessed:  · Thin liquids 3oz via straw multipel trials  · Puree X2  · Solids X1 cracker     Oral Phase:   · increased oral prep of solids with min oral residue post swallow, cleared via liquid wash    Pharyngeal Phase:   · no overt clinical signs/symptoms of aspiration    Compensatory Strategies  · None    Treatment: please note silent aspiration cannot be r/o at bedside.     Assessment:     Tammi Farris is a 71 y.o. female with dx of acute renal failure she presents with mild oral dysphagia. She is safe to consume mech soft with thin liquids, her preference is to have oral meds crushed.     Goals:   Multidisciplinary Problems     SLP Goals        Problem: SLP Goal    Goal Priority Disciplines Outcome   SLP Goal    Low SLP Ongoing, Progressing    Description: Pt will tolerate mech soft with thin liquids X2 consecutive sessions (1 of 2 met 2/14/22)                   Plan:     · Patient to be seen:  2 x/week   · Plan of Care expires:     · Plan of Care reviewed with:  patient   · SLP Follow-Up:  Yes       Discharge recommendations:  rehabilitation facility   Barriers to Discharge:  None    Time Tracking:     SLP Treatment Date:   02/14/22  Speech Start Time:  1500  Speech Stop Time:  1515     Speech Total Time (min):  15 min    Billable Minutes: Treatment Swallowing Dysfunction 15    02/14/2022

## 2022-02-14 NOTE — NURSING
Patient report received from off going shift. Patient alert and oriented to name, place and visitor at bedside, able to make needs known. Head of bed elevated. Will continue with current plan of care.

## 2022-02-14 NOTE — ASSESSMENT & PLAN NOTE
-Hb 9.2 on admit - chronic secondary to cirrhosis.  -Dropped to <7 on 2/2  -No evidence of GI bleeding but had noted epistaxis and bleeding gums at times since before hospitalization.  -Folate and B12 replete.  She is iron deficient.  -1 unit PRBC ordered 2/2 but transfusion delayed due to complex antibody matching.  Blood finally available and she received 1 unit PRBC 2/4.   -H/H has remained relatively stable.  -Monitor labs q48h.

## 2022-02-14 NOTE — PLAN OF CARE
PRINCE spoke with patient's sister, Tania about discharge planning. PRINCE inquired about rehab placement. Tania stated that she would like to know what it will cost patient. Patient is on a fixed income, per Tania. PRINCE inquired if the referral could be sent and informed that a message will be sent inquiring about cost to patient. Tania stated that PRINCE could send the referrals.     Referrals sent to [facility].        02/14/22 2505   Post-Acute Status   Post-Acute Authorization Placement  (Rehab)   Post-Acute Placement Status Pending post-acute provider review/more information requested   Coverage [employer]   Patient choice form signed by patient/caregiver List from System Post-Acute Care   Discharge Delays (!) Post-Acute Set-up   Discharge Plan   Discharge Plan A Rehab   Discharge Plan B Home Health

## 2022-02-14 NOTE — SUBJECTIVE & OBJECTIVE
Interval History: NGT removed and tolerating diet.    Review of Systems   HENT: Negative for ear discharge and ear pain.    Eyes: Negative for discharge and itching.   Endocrine: Negative for cold intolerance and heat intolerance.   Neurological: Negative for seizures and syncope.     Objective:     Vital Signs (Most Recent):  Temp: 97.9 °F (36.6 °C) (02/14/22 0746)  Pulse: 91 (02/14/22 1205)  Resp: 20 (02/14/22 1205)  BP: (!) 97/42 (02/14/22 1205)  SpO2: 97 % (02/14/22 0746) Vital Signs (24h Range):  Temp:  [97.5 °F (36.4 °C)-98.3 °F (36.8 °C)] 97.9 °F (36.6 °C)  Pulse:  [] 91  Resp:  [16-20] 20  SpO2:  [96 %-99 %] 97 %  BP: ()/(42-91) 97/42     Weight: 70.6 kg (155 lb 10.3 oz)  Body mass index is 33.68 kg/m².    Intake/Output Summary (Last 24 hours) at 2/14/2022 1606  Last data filed at 2/14/2022 1205  Gross per 24 hour   Intake 650 ml   Output 426 ml   Net 224 ml      Physical Exam  Vitals and nursing note reviewed.   Constitutional:       General: She is not in acute distress.     Appearance: She is well-developed. She is ill-appearing. She is not toxic-appearing or diaphoretic.      Interventions: Nasal cannula in place.   HENT:      Head: Normocephalic and atraumatic.      Right Ear: External ear normal.      Left Ear: External ear normal.      Nose: Nose normal.      Mouth/Throat:      Mouth: Mucous membranes are moist.   Eyes:      Extraocular Movements: Extraocular movements intact.      Conjunctiva/sclera: Conjunctivae normal.   Cardiovascular:      Rate and Rhythm: Normal rate and regular rhythm.   Pulmonary:      Effort: No tachypnea or respiratory distress.      Breath sounds: No wheezing.   Abdominal:      General: Bowel sounds are normal. There is no distension.      Palpations: Abdomen is soft.      Tenderness: There is no abdominal tenderness.      Comments: No palpable hepatomegaly or splenomegaly    Musculoskeletal:         General: No tenderness. Normal range of motion.      Cervical  back: Normal range of motion and neck supple.      Right lower leg: Edema present.      Left lower leg: Edema present.   Skin:     General: Skin is warm and dry.   Neurological:      Mental Status: She is alert and oriented to person, place, and time.      Comments: Awake and alert today   Psychiatric:         Thought Content: Thought content normal.         Significant Labs:   All pertinent labs within the past 24 hours have been reviewed.  BMP:   Recent Labs   Lab 02/14/22 0326   *      K 4.3      CO2 24   *   CREATININE 4.9*   CALCIUM 9.4     CBC:   Recent Labs   Lab 02/14/22 0326   WBC 13.06*   HGB 8.3*   HCT 23.7*   PLT 42*       Significant Imaging: I have reviewed all pertinent imaging results/findings within the past 24 hours.

## 2022-02-14 NOTE — ASSESSMENT & PLAN NOTE
-Noted mild transaminitis and thrombocytopenia which are not new.  Noted epistaxis and gingival bleeding which has been ongoing for several months.  These are chronic and note history of cirrhosis and alcohol abuse   -Follows with hepatology  -Abdominal US on day prior to admit showed hepatic cirrhosis/steatosis with sonographic findings of portal hypertension including trace ascites and splenomegaly.  -Noted to be more lethargic with elevation of ammonia level.  -Hepatic Encephalopathy (not POA) and started on lactulose.  Improving mentation.

## 2022-02-14 NOTE — PLAN OF CARE
Problem: Physical Therapy Goal  Goal: Physical Therapy Goal  Description: Goals to be met by: 22     Patient will increase functional independence with mobility by performin. Supine to sit with Modified [place]  2. Rolling to Left and Right with Modified [place]  3. Sit to stand transfer with Modified [place] using RW  4. Bed to chair transfer with Modified [place] using Rolling Walker  5. Gait >50 feet with Modified [place] using Rolling Walker   6. Lower extremity exercise program 2 sets x10 reps per handout, with independence    Outcome: Ongoing, Progressing     Pt OOB>chair with min A using RW and 3L O2 NC.

## 2022-02-14 NOTE — PLAN OF CARE
02/14/22 1200   Post-Acute Status   Post-Acute Authorization Dialysis   Diaylsis Status   (Hep B Total Core Antibody still processing.)   Coverage Human Medicare   Patient choice form signed by patient/caregiver List from [facility]   Discharge Delays (!) Dialysis Set-up   Discharge Plan   Discharge Plan A Rehab   Discharge Plan B Home Health     SW sent message via [employer] informing that Hep B Total Core Antibody is still processing.

## 2022-02-15 NOTE — PLAN OF CARE
TN received voice messaged from petey Son, Chair Held M/W/F 3:15 PM at [facility], pending final clearance for start date.

## 2022-02-15 NOTE — PROGRESS NOTES
Tammi Farris is a 71 y.o. female patient.    Follow for ANI    No new c/o, comfortable    Scheduled Meds:   dextromethorphan-guaiFENesin  mg/5 ml  5 mL Oral Q6H    famotidine  20 mg Oral Daily    insulin detemir U-100  7 Units Subcutaneous QHS    lactulose  10 g Oral BID    lanthanum  500 mg Per NG tube QID    metoprolol tartrate  25 mg Oral BID    polyethylene glycol  17 g Oral BID    predniSONE  50 mg Oral Daily       Review of patient's allergies indicates:   Allergen Reactions    Dobutamine Hives    Benadryl [diphenhydramine hcl] Anxiety     Pt states she feels jumpy and anxious when taking.    Darvon [propoxyphene] Nausea Only    Shellfish containing products Hives    Iodinated contrast media Hives    Lisinopril Other (See Comments)     cough    Propoxyphene hcl      Itchy (skin)^    Tizanidine Other (See Comments)     Sweaty, difficulty swallowing    Statins-hmg-coa reductase inhibitors Anxiety and Other (See Comments)     Myalgia, fatigue, palpitations, anxiety         Vital Signs Range (Last 24H):  Temp:  [95.9 °F (35.5 °C)-97.8 °F (36.6 °C)]   Pulse:  []   Resp:  [17-20]   BP: ()/(42-64)   SpO2:  [93 %-99 %]     I & O (Last 24H):    Intake/Output Summary (Last 24 hours) at 2/15/2022 1044  Last data filed at 2/15/2022 1000  Gross per 24 hour   Intake 620 ml   Output 626 ml   Net -6 ml           Physical Exam:  General appearance: well developed, well nourished, no distress  Lungs:  coarse breath sounds (B)  Heart: regular rate and rhythm  Abdomen: soft, non-tender non-distented; bowel sounds normal; no masses,  no organomegaly  Extremities: edema trace    Laboratory:  I have reviewed all pertinent lab results within the past 24 hours.  CBC:   Recent Labs   Lab 02/14/22  0326   WBC 13.06*   RBC 2.12*   HGB 8.3*   HCT 23.7*   PLT 42*   *   MCH 39.2*   MCHC 35.0     CMP:   Recent Labs   Lab 02/12/22  0525 02/12/22  0525 02/14/22  0326   *   < > 214*    CALCIUM 9.8   < > 9.4   ALBUMIN 3.4*  --   --    PROT 7.2  --   --    *   < > 139   K 4.4   < > 4.3   CO2 20*   < > 24      < > 101   BUN 79*   < > 120*   CREATININE 4.8*   < > 4.9*   ALKPHOS 94  --   --    ALT 19  --   --    AST 30  --   --    BILITOT 2.3*  --   --     < > = values in this interval not displayed.       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

## 2022-02-15 NOTE — PLAN OF CARE
Recommendations  1) Continue Renal/Diabetic diet - texture modified per ST  2) Add Suplena with Carb Steady oral nutrition supplement BID  3) If nausea continues, consider scheduling zofran vs PRN    Goals: Pt to meet > 50% EEN by RD follow up  Nutrition Goal Status: new  Communication of RD Recs: reviewed with physician    Assessment and Plan  Nutrition Problem  Inadequate oral intake     Related to (etiology):   lethargy     Signs and Symptoms (as evidenced by):   Pt only taking bites of meals with encouragement, NGT placed for alternative means of nutrition     Interventions(treatment strategy):  Collaboration with other providers  Carbohydrate modified diet  Renal diet  Enteral Nutrition     Nutrition Diagnosis Status:   Continues

## 2022-02-15 NOTE — PLAN OF CARE
Adelaida dialysis schedule:     First treatment:  Wednesday, February 16th  03:00 PM    [facility]  [address], [place], [address]  Phone: [phone]  Fax: [phone]    BRING TO YOUR FIRST TREATMENT  2 forms of identification including 1 government issued photo ID  All insurance cards  Warm, loose clothes and a blanket  Headphones to listen to the TV  Books, laptop, tablet, or other entertainment    After your first treatment, your ongoing dialysis schedule is:  Every Monday, Wednesday and Friday at 3:15 PM

## 2022-02-15 NOTE — PLAN OF CARE
Problem: Occupational Therapy Goal  Goal: Occupational Therapy Goal  Description: Goals to be met by: 03/03/22     Patient will increase functional independence with ADLs by performing:    UE Dressing with Stand-by assistance.  LE Dressing with Minimal assistance.  Grooming while seated with Stand-by assistance.  Toileting from bedside commode with minimal assistance for hygiene and clothing management.   Supine to sit with Stand-by assistance.  Step transfer with stand-by assistance  Toilet transfer to bedside commode with stand-by assistance.  Upper extremity exercise program x15 reps per handout, with independence.    Outcome: Ongoing, Progressing     in-room functional mobility using RW with CGA on 3L O2. Pt stood at the sink for grooming ADLs with 1 LOB with MIN A to recover. MOD A for pericare after BM on BSC.

## 2022-02-15 NOTE — PROGRESS NOTES
[facility] Medicine  Progress Note    Patient Name: Tammi Farris  MRN: [id number]  Patient Class: IP- Inpatient   Admission Date: 2/1/2022  Length of Stay: 14 days  Attending Physician: Chadwick Quan MD  Primary Care Provider: Ann-Marie Hartman MD        Subjective:     Principal Problem:Acute renal failure superimposed on stage 2 chronic kidney disease        HPI:  71 y.o. female with HTN, HLD, GERD, cirrhosis of liver, thrombocytopenia, and sleep apnea presents with a complaint of cough and congestion since October.  She is chronically dyspneic, exacerbated by exertion, has seen Cardiology and was prescribed lasix and metoprolol.  Her sister has now noted that the patient has very low urine output and is constipated.  Also follows with Hepatology for chronic liver disease.  Patient denies fever, chills, chest pain, palpitations, orthopnea, PND, dizziness, syncope, n/v/d, abdominal pain, or dysuria.  In the ED, labs reveal ANI, hyperkalemia, metabolic acidosis with elevated lactic acid, elevated liver enzymes, elevated BNP, elevated d-dimer, thrombocytopenia, and markedly concentrated urine.  Echo December 2021 showed preserved EF, no evidence of heart failure.  Chest xray without acute abnormality. No convincing evidence of infectious process.      Overview/Hospital Course:  71 year old woman with HTN, HLD, GERD, CKDII, cirrhosis and PAULINA presented for sob and has been diagnosed with ani/CKDII and acute hypoxic respiratory failure.  Complex patient and presentation with very difficult to ascertain volume status.  Was given lasix and then fluids and then attempt at lasix again.  Suspect a pulmonary/renal vasculitis but not clearly diagnosed (Prior TAMMI positive 2019, low C3 and C4 and abnormal SPEP with elevated kappa/lambda ratio).  Had planned renal biopsy, but encephalopathic, anuric and worsening acidosis so THDC placed and initiated HD on 2/7.  Has completed 3 days pulse dose steroids (1g  daily) and will be on prednisone 60 mg.  Patient more lethargic on 2/8 and noted to have episode of rapid AFib.  Moved to ICU.  Noted to have increased ammonia levels and started on lactulose.  Mentation slowly improving with no further arrhythmias.  Poor oral intake.  NGT placed and started on tube feedings.  NGT removed and ST consulted.  Severely debilitated and PT/OT consulted.      Interval History: No acute events overnight.  Still looks sickly as she did last week.  Doesn't feel good.  Complains of lower abdominal discomfort that is mild and intermittent.  Had BM yesterday.  Denies shortness of breath.      Review of Systems   Constitutional: Positive for activity change, appetite change and fatigue.   HENT: Negative for ear discharge and ear pain.    Eyes: Negative for discharge and itching.   Respiratory: Negative for chest tightness and shortness of breath.    Cardiovascular: Negative for chest pain.   Gastrointestinal: Positive for abdominal pain. Negative for constipation, diarrhea and nausea.   Endocrine: Negative for cold intolerance and heat intolerance.   Genitourinary: Negative for difficulty urinating.   Musculoskeletal: Negative for arthralgias and back pain.   Neurological: Positive for weakness. Negative for seizures and syncope.   Psychiatric/Behavioral: Positive for dysphoric mood. Negative for agitation.     Objective:     Vital Signs (Most Recent):  Temp: 98.6 °F (37 °C) (02/15/22 1606)  Pulse: 97 (02/15/22 1606)  Resp: 18 (02/15/22 1606)  BP: (!) 116/52 (02/15/22 1606)  SpO2: 97 % (02/15/22 1606) Vital Signs (24h Range):  Temp:  [95.9 °F (35.5 °C)-98.6 °F (37 °C)] 98.6 °F (37 °C)  Pulse:  [] 97  Resp:  [17-19] 18  SpO2:  [93 %-100 %] 97 %  BP: (101-131)/(49-64) 116/52     Weight: 70.6 kg (155 lb 10.3 oz)  Body mass index is 33.68 kg/m².    Intake/Output Summary (Last 24 hours) at 2/15/2022 3049  Last data filed at 2/15/2022 1225  Gross per 24 hour   Intake 220 ml   Output 200 ml   Net  20 ml      Physical Exam  Vitals and nursing note reviewed.   Constitutional:       General: She is not in acute distress.     Appearance: She is well-developed. She is ill-appearing. She is not toxic-appearing or diaphoretic.      Interventions: Nasal cannula in place.   HENT:      Head: Normocephalic and atraumatic.      Right Ear: External ear normal.      Left Ear: External ear normal.      Nose: Nose normal.      Mouth/Throat:      Mouth: Mucous membranes are moist.   Eyes:      Extraocular Movements: Extraocular movements intact.      Conjunctiva/sclera: Conjunctivae normal.   Cardiovascular:      Rate and Rhythm: Normal rate and regular rhythm.   Pulmonary:      Effort: No tachypnea or respiratory distress.      Breath sounds: No wheezing.   Abdominal:      General: Bowel sounds are normal. There is no distension.      Palpations: Abdomen is soft.      Tenderness: There is no abdominal tenderness.      Comments: No palpable hepatomegaly or splenomegaly    Musculoskeletal:         General: No tenderness. Normal range of motion.      Cervical back: Normal range of motion and neck supple.      Right lower leg: Edema present.      Left lower leg: Edema present.   Skin:     General: Skin is warm and dry.   Neurological:      Mental Status: She is alert and oriented to person, place, and time.      Comments: Awake and alert today   Psychiatric:         Thought Content: Thought content normal.         Significant Labs: All pertinent labs within the past 24 hours have been reviewed.    Significant Imaging: I have reviewed all pertinent imaging results/findings within the past 24 hours.      Assessment/Plan:      * Acute renal failure superimposed on stage 2 chronic kidney disease  -Admitted to inpatient status  -Baseline Cr 0.9.  On admit Cr 2.7  -Reports being started on lasix 1/8 by her cardiologist and over several days PTA had decreased UOP and diminished PO intake  -On admit BNP elevated at 1160 and with  metabolic acidosis and mild hyperkalemia   -Nephrology consulted and input appreciated  -ARMEN positive.  ANCAs negative.  Complement low.  -IR consulted for kidney biopsy, but patient decompensated further and biopsy delayed.  -On 2/7 Cr continued to worsen and was encephalopathic so IR placed THDC and HD initiated.  -Completed 3 days of pulse dose steroids for suspected glomerulonephritis vs pulm/renal syndrome and continues on prednisone 60mg daily per nephrology.  -Continue HD MWF for now.    Cirrhosis of liver without ascites  -Noted mild transaminitis and thrombocytopenia which are not new.  Noted epistaxis and gingival bleeding which has been ongoing for several months.  These are chronic and note history of cirrhosis and alcohol abuse   -Follows with hepatology  -Abdominal US on day prior to admit showed hepatic cirrhosis/steatosis with sonographic findings of portal hypertension including trace ascites and splenomegaly.  -Noted to be more lethargic with elevation of ammonia level.  -Hepatic Encephalopathy (not POA) and started on lactulose.  Improving mentation.    Macrocytic anemia  -Hb 9.2 on admit - chronic secondary to cirrhosis.  -Dropped to <7 on 2/2  -No evidence of GI bleeding but had noted epistaxis and bleeding gums at times since before hospitalization.  -Folate and B12 replete.  She is iron deficient.  -1 unit PRBC ordered 2/2 but transfusion delayed due to complex antibody matching.  Blood finally available and she received 1 unit PRBC 2/4.   -H/H has remained relatively stable.  -Monitor labs q48h.    Thrombocytopenia  -Chronic and at baseline on admit secondary to cirrhosis  -Heme/onc consulted and input appreciated  -Plan is transfuse for platelets <10 or if significant hemorrhage.  -Closely monitor.    MANUEL (generalized anxiety disorder)  -Very anxious today  -On 2/6 gave one time low dose ativan PO - watch respiratory status very closely.    Debility  -PT/OT consulted and recommend inpatient  rehab at discharge.    Metabolic acidosis  -Suspect secondary to ANI, management as above    Sleep apnea  -Continue bipap qhs    Gastroesophageal reflux disease  -Stable, no acute issue    HTN (hypertension)  -BP low normal  -Continue metoprolol with hold parameters    Hyperlipidemia  -History noted  -Not on statin due to cirrhosis      VTE Risk Mitigation (From admission, onward)         Ordered     heparin (porcine) injection 3,200 Units  As needed (PRN)         02/07/22 1834     IP VTE HIGH RISK PATIENT  Once         02/01/22 1557     Place sequential compression device  Until discontinued         02/01/22 1557                Discharge Planning   CLAUDETTE: 2/18/2022     Code Status: Full Code   Is the patient medically ready for discharge?:     Reason for patient still in hospital (select all that apply): Treatment  Discharge Plan A: Rehab   Discharge Delays: (!) Dialysis Set-up              Chadwick Quan MD  Department of Hospital Medicine   [facility] - Telemetry

## 2022-02-15 NOTE — PROGRESS NOTES
"[facility] - Telemetry  Adult Nutrition  Progress Note    SUMMARY     Recommendations  1) Continue Renal/Diabetic diet - texture modified per ST  2) Add Suplena with Carb Steady oral nutrition supplement BID  3) If nausea continues, consider scheduling zofran vs PRN    Goals: Pt to meet > 50% EEN by RD follow up  Nutrition Goal Status: new  Communication of RD Recs: reviewed with physician    Assessment and Plan  Nutrition Problem  Inadequate oral intake     Related to (etiology):   lethargy     Signs and Symptoms (as evidenced by):   Pt only taking bites of meals with encouragement, NGT placed for alternative means of nutrition     Interventions(treatment strategy):  Collaboration with other providers  Carbohydrate modified diet  Renal diet  Enteral Nutrition     Nutrition Diagnosis Status:   Continues    Malnutrition Assessment    Unable to complete NFPE due to remote assessment    Reason for Assessment  Reason For Assessment: RD follow-up  Diagnosis: other (see comments) (Acute renal failure superimposed on stage 2 CKD)  Relevant Medical History: HTN, HLD, GERD, cirrhosis of liver, sleep apnea  Interdisciplinary Rounds: did not attend  General Information Comments: NGT removed and diet advanced to Level 5 Minced and Moist texture 2/14. Remote assessment for coverage, unable to reach pt on room phone. Spoke with RN (Sofie), reports pt ate 25% breakfast this am, working on lunch now with family at bedside. Reports pt with N/V this am - zofran given. Wt stable during admit, RD to monitor and follow up.  Nutrition Discharge Planning: Pending clinical course    Nutrition Risk Screen  Nutrition Risk Screen: no indicators present    Nutrition/Diet History  Typical Food/Fluid Intake: LISSETH  Spiritual, Cultural Beliefs, Advent Practices, Values that Affect Care: no    Anthropometrics  Temp: 97.7 °F (36.5 °C)  Height Method: Stated  Height: 4' 9" (144.8 cm)  Height (inches): 57 in  Weight Method: Bed Scale  Weight: 70.6 " kg (155 lb 10.3 oz)  Weight (lb): 155.65 lb  Ideal Body Weight (IBW), Female: 85 lb  % Ideal Body Weight, Female (lb): 181.65 %  BMI (Calculated): 33.7  Usual Body Weight (UBW), k.45 kg  % Usual Body Weight: 103.98     Lab/Procedures/Meds  Pertinent Labs Reviewed: reviewed  Pertinent Labs Comments: , Cr 4.9, GFR 8, Gly 214, Phos 5.1, Hgb A1c 5.9 on 22  Pertinent Medications Reviewed: reviewed  Pertinent Medications Comments: famotidine, lactulose, polyethylene glycol, prednisone    Physical Findings/Assessment  Incision to chest     Estimated/Assessed Needs  Weight Used For Calorie Calculations: 73 kg (161 lb)  Energy Calorie Requirements (kcal): 6382-3416 kcal/day based on 25 kcal/kg  Energy Need Method: Kcal/kg  Protein Requirements: 73-88 g/day based on 1-1.2 g/kg for ANI with dialysis, critical care  Weight Used For Protein Calculations: 73 kg (161 lb)  Fluid Requirements (mL): 500 mL + UOP for ANI  Estimated Fluid Requirement Method: other (see comments)  RDA Method (mL): 1460     Nutrition Prescription Ordered  Current Diet Order: Diabetic, renal, Level 5 Minced and Moist  Current Nutrition Support Formula Ordered: Novasource Renal  Current Nutrition Support Rate Ordered: 35 (ml) (goal)  Current Nutrition Support Frequency Ordered: continuous    Evaluation of Received Nutrient/Fluid Intake  Energy Calories Required: not meeting needs  Protein Required: not meeting needs  Fluid Required: meeting needs  Comments: LBM 2/15  Tolerance:  (Fair)  % Intake of Estimated Energy Needs: 25 - 50 %  % Meal Intake: 25 - 50 %    Nutrition Risk  Level of Risk/Frequency of Follow-up: high (2x weekly)   Monitor and Evaluation  Food and Nutrient Intake: food and beverage intake,energy intake,enteral nutrition intake  Food and Nutrient Adminstration: diet order,enteral and parenteral nutrition administration  Knowledge/Beliefs/Attitudes: food and nutrition knowledge/skill  Physical Activity and Function:  nutrition-related ADLs and IADLs  Anthropometric Measurements: weight change  Biochemical Data, Medical Tests and Procedures: lipid profile,electrolyte and renal panel,gastrointestinal profile,glucose/endocrine profile,inflammatory profile  Nutrition-Focused Physical Findings: overall appearance,skin,extremities, muscles and bones     Nutrition Follow-Up  RD Follow-up?: Yes

## 2022-02-15 NOTE — PT/OT/SLP PROGRESS
Occupational Therapy   Treatment    Name: Tammi Farris  MRN: [id number]  Admitting Diagnosis:  Acute renal failure superimposed on stage 2 chronic kidney disease       Recommendations:     Discharge Recommendations: rehabilitation facility  Discharge Equipment Recommendations:  bedside commode (jewell RW)  Barriers to discharge:   (not at PLOF; was fully independent PTA)    Assessment:     Tammi Farris is a 71 y.o. female with a medical diagnosis of Acute renal failure superimposed on stage 2 chronic kidney disease. Performance deficits affecting function are weakness,impaired endurance,decreased ROM,impaired cognition,decreased coordination,impaired fine motor,decreased upper extremity function,impaired self care skills,decreased lower extremity function,impaired skin,impaired functional mobilty,gait instability,decreased safety awareness,edema,pain,impaired balance,impaired cardiopulmonary response to activity.     in-room functional mobility using RW with CGA-MIN A on 3L O2. Pt stood at the sink for grooming ADLs with 1 LOB with MIN A to recover. MOD A for pericare after BM on [facility]    Rehab Prognosis:  Good; patient would benefit from acute skilled OT services to address these deficits and reach maximum level of function.       Plan:     Patient to be seen  (5-6x/week) to address the above listed problems via self-care/home management,therapeutic activities,therapeutic exercises  · Plan of Care Expires: 03/03/22  · Plan of Care Reviewed with: patient,sibling    Subjective     Chief complaint: stomach upset- only feels better after she throws up   Patient/family comments/ goals: needed to have a BM     Pain/Comfort:  · Pain Rating 1: 8/10  · Location 1: abdomen  · Pain Addressed 1: Nurse notified  · Pain Rating Post-Intervention 1: 7/10    Objective:     Communicated with: nurseSofie, during session.  Patient found reclined in the chair with peripheral IV,telemetry,oxygen (HD catheter R chest) upon OT entry to  room.    General Precautions: Standard, fall,aspiration,diabetic,respiratory   Orthopedic Precautions:N/A   Braces: N/A  Respiratory Status: Nasal cannula, flow 3 L/min     Bruising to BUE, R chest.     · Seated upright in the chair pre-tx: 100% on 3L, 71 bpm, 112/56   · On the BSC: 99%, 81 bpm   · Seated upright in the chair post-tx: 99%, 83 bpm, 111/55     Occupational Performance:     Bed Mobility:    · NT; pt found/left reclined in the chair     Functional Mobility/Transfers:  · Patient completed Sit <> Stand Transfer with minimum assistance  with  rolling walker x5 trials  · Patient completed Toilet Transfer Step Transfer technique with minimum assistance with  rolling walker and bedside commode  · Functional Mobility: pt completed 2x ~12 ft with RW within the room with CGA/MIN A on 3L O2. Pt had difficulty following directional cueing requiring tactile cueing.     Activities of Daily Living:  · Feeding:  supervision seated upright to take small sips of water. no coughing afterwards  · Grooming:  Pt stood at the sink for grooming ADLs with 1 LOB with MIN A to recover. pt required MIN A to open twist open toothpaste cap and MIN A to squeeze toothpaste onto the brush. pt brushed her teeth, combed her hair, used mouthwash standing with CGA-MIN A for balance. Supervision seated to wash hands after toileting   · Lower Body Dressing: total assistance to remove soiled brief standing then don clean brief after toileting  · Toileting: pt stood within RW with SBA while OT provided total A for posterior pericare; MIN A for standing balance while pt completed anterior pericare         AMPAC 6 Click ADL: 15    Treatment & Education:  · Pt re-educated on OT role/POC.   · Importance of OOB activity with staff assistance.  · Safety during functional t/f and mobility   · Encouraged pt to complete BUE AROM per HEP later after she rests; sister present.   · Multiple self-care tasks/functional mobility completed- assistance level  noted above  · Increased time required with all ADLs/OOB activities    · All questions/concerns answered within OT scope of practice       Patient left reclined in the chair with bedside table in front of pt with all lines intact, call button in reach, nurse, Sofie, notified, sister present and all needs met/within reachEducation:      GOALS:   Multidisciplinary Problems     Occupational Therapy Goals        Problem: Occupational Therapy Goal    Goal Priority Disciplines Outcome Interventions   Occupational Therapy Goal     OT, PT/OT Ongoing, Progressing    Description: Goals to be met by: 03/03/22     Patient will increase functional independence with ADLs by performing:    UE Dressing with Stand-by assistance.  LE Dressing with Minimal assistance.  Grooming while seated with Stand-by assistance.  Toileting from bedside commode with minimal assistance for hygiene and clothing management.   Supine to sit with Stand-by assistance.  Step transfer with stand-by assistance  Toilet transfer to bedside commode with stand-by assistance.  Upper extremity exercise program x15 reps per handout, with independence.                     Time Tracking:     OT Date of Treatment: 02/15/22  OT Start Time: 1039  OT Stop Time: 1119  OT Total Time (min): 40 min    Billable Minutes:Self Care/Home Management 30 min  Therapeutic Activity 10 min  Total Time 40 min    OT/MYRA: OT          2/15/2022

## 2022-02-15 NOTE — PLAN OF CARE
Pascale from [facility] messaged PRINCE via [employer] stating that patient may benefit from IRF but her medical issues need to be resolved before acceptance and before submitting for auth. Patient would have to be able to tolerate HD. Pascale noted that patient was unable to complete HD run yesterday due to hypotension, nausea resolved/controlled. Pascale stated that if nephrology team wishes to do a Kidney bx, this will have to be done during this admission or after discharge from rehab. Pascale stated that they are are followign along for medical stability but does appear to be a good candidate once medial issues are resolved.     PRINCE notified Dr. Quan via secure chat.     PRINCE also tried to call patient's sister to give her information for insurance company so she can call to find out cost of Rehab for patient. There was no answer. PRINCE unable to leave a message.

## 2022-02-15 NOTE — SUBJECTIVE & OBJECTIVE
Interval History: No acute events overnight.  Still looks sickly as she did last week.  Doesn't feel good.  Complains of lower abdominal discomfort that is mild and intermittent.  Had BM yesterday.  Denies shortness of breath.      Review of Systems   Constitutional: Positive for activity change, appetite change and fatigue.   HENT: Negative for ear discharge and ear pain.    Eyes: Negative for discharge and itching.   Respiratory: Negative for chest tightness and shortness of breath.    Cardiovascular: Negative for chest pain.   Gastrointestinal: Positive for abdominal pain. Negative for constipation, diarrhea and nausea.   Endocrine: Negative for cold intolerance and heat intolerance.   Genitourinary: Negative for difficulty urinating.   Musculoskeletal: Negative for arthralgias and back pain.   Neurological: Positive for weakness. Negative for seizures and syncope.   Psychiatric/Behavioral: Positive for dysphoric mood. Negative for agitation.     Objective:     Vital Signs (Most Recent):  Temp: 98.6 °F (37 °C) (02/15/22 1606)  Pulse: 97 (02/15/22 1606)  Resp: 18 (02/15/22 1606)  BP: (!) 116/52 (02/15/22 1606)  SpO2: 97 % (02/15/22 1606) Vital Signs (24h Range):  Temp:  [95.9 °F (35.5 °C)-98.6 °F (37 °C)] 98.6 °F (37 °C)  Pulse:  [] 97  Resp:  [17-19] 18  SpO2:  [93 %-100 %] 97 %  BP: (101-131)/(49-64) 116/52     Weight: 70.6 kg (155 lb 10.3 oz)  Body mass index is 33.68 kg/m².    Intake/Output Summary (Last 24 hours) at 2/15/2022 1729  Last data filed at 2/15/2022 1225  Gross per 24 hour   Intake 220 ml   Output 200 ml   Net 20 ml      Physical Exam  Vitals and nursing note reviewed.   Constitutional:       General: She is not in acute distress.     Appearance: She is well-developed. She is ill-appearing. She is not toxic-appearing or diaphoretic.      Interventions: Nasal cannula in place.   HENT:      Head: Normocephalic and atraumatic.      Right Ear: External ear normal.      Left Ear: External ear  normal.      Nose: Nose normal.      Mouth/Throat:      Mouth: Mucous membranes are moist.   Eyes:      Extraocular Movements: Extraocular movements intact.      Conjunctiva/sclera: Conjunctivae normal.   Cardiovascular:      Rate and Rhythm: Normal rate and regular rhythm.   Pulmonary:      Effort: No tachypnea or respiratory distress.      Breath sounds: No wheezing.   Abdominal:      General: Bowel sounds are normal. There is no distension.      Palpations: Abdomen is soft.      Tenderness: There is no abdominal tenderness.      Comments: No palpable hepatomegaly or splenomegaly    Musculoskeletal:         General: No tenderness. Normal range of motion.      Cervical back: Normal range of motion and neck supple.      Right lower leg: Edema present.      Left lower leg: Edema present.   Skin:     General: Skin is warm and dry.   Neurological:      Mental Status: She is alert and oriented to person, place, and time.      Comments: Awake and alert today   Psychiatric:         Thought Content: Thought content normal.         Significant Labs: All pertinent labs within the past 24 hours have been reviewed.    Significant Imaging: I have reviewed all pertinent imaging results/findings within the past 24 hours.

## 2022-02-15 NOTE — PLAN OF CARE
Hep B Total Core Antibody lab still pending.         02/15/22 0842   Post-Acute Status   Post-Acute Authorization Dialysis   Diaylsis Status   (Lab still pending)   Coverage [employer]   Discharge Delays (!) Dialysis Set-up   Discharge Plan   Discharge Plan A Rehab   Discharge Plan B Home Health

## 2022-02-15 NOTE — NURSING
Patient report received. Patient awake and alert, able to make needs known. Patient's family member at bedside, assisteng with meals. Patient denies any pain at this time. Will continue with current plan of care.

## 2022-02-16 NOTE — PLAN OF CARE
Problem: Adult Inpatient Plan of Care  Goal: Plan of Care Review  Outcome: Ongoing, Progressing  Goal: Patient-Specific Goal (Individualized)  Outcome: Ongoing, Progressing  Goal: Absence of Hospital-Acquired Illness or Injury  Outcome: Ongoing, Progressing  Goal: Optimal Comfort and Wellbeing  Outcome: Ongoing, Progressing  Goal: Readiness for Transition of Care  Outcome: Ongoing, Progressing     Problem: Infection  Goal: Absence of Infection Signs and Symptoms  Outcome: Ongoing, Progressing     Problem: Diabetes Comorbidity  Goal: Blood Glucose Level Within Targeted Range  Outcome: Ongoing, Progressing     Problem: Skin Injury Risk Increased  Goal: Skin Health and Integrity  Outcome: Ongoing, Progressing     Problem: Device-Related Complication Risk (Hemodialysis)  Goal: Safe, Effective Therapy Delivery  Outcome: Ongoing, Progressing     Problem: Hemodynamic Instability (Hemodialysis)  Goal: Effective Tissue Perfusion  Outcome: Ongoing, Progressing

## 2022-02-16 NOTE — PLAN OF CARE
PRINCE called [facility] to discuss referral. There was no answer. PRINCE left Kori a message. PRINCE also sent Chris Green at [facility] a secure chat.     1:57 pm    PRINCE contacted Norm[facility] to discuss referral. There was no answer. PRINCE left a message.

## 2022-02-16 NOTE — PLAN OF CARE
SW contacted patient's sister, Tania to discuss discharge plans. PRINCE inquired if it would be ok for patient to go to [facility] and then move with her when Rehab has been completed. PRINCE explained that she has been calling [facility] all morning and have not been able to reach anyone. Tania was agreeable. Tania inquired about dialysis. PRINCE informed that facility will be able to provide dialysis. PRINCE informed Tania that she will also get patient's dialysis clinic changed to [place].

## 2022-02-16 NOTE — ASSESSMENT & PLAN NOTE
-BP borderline low after HD today  -Will discontinue metoprolol - did not receive today or yesterday  -Will consider adding midodrine if drops below 90 or any symptoms.

## 2022-02-16 NOTE — PROGRESS NOTES
Received report per LOLA Rascon, RN/pt arrived to [facility] via bed per transporter. Right chest wall perm cvc aspirated/flushed without difficulty noted. Maintenance hemodialysis started x 3 hours. Plan is to remove 1 L as tolerated. Tolerating tx well at this time with no c/o.

## 2022-02-16 NOTE — PLAN OF CARE
Patient's sister, Tania contacted PRINCE to inform that she would like patient to live with her on the [facility]. Tania stated that patient lives alone and will not have anyone to bring her to dialysis. Tania stated that patient really needs the therapy and inquired if she can go to a facility on the [facility]. PRINCE informed Tania that a referral was sent to [facility] on [facility]. Tania inquired about copay. PRINCE gave Tania contact information for insurance company so she could call them and get more information. Tania inquired about location of facility. PRINCE informed that it is located in [place] [facility]. Tania inquired how long patient will be there. PRINCE informed that patient will be in rehab for two weeks. Tania inquired about other options. PRINCE educated Tania on home health services. PRINCE informed that patient will be getting two hours of physical therapy at inpatient rehab. Tania is agreeable to inpatient rehab on the [facility]. Tania gave SW patient's new address for dialysis placement. PRINCE informed Tania that she will call her when everything is in place.        02/16/22 1028   Discharge Reassessment   Assessment Type Discharge Planning Reassessment   Did the patient's condition or plan change since previous assessment? No   Discharge Plan discussed with: Sibling   Name(s) and Number(s) Tania (sister) [phone]   Communicated CLAUDETTE with patient/caregiver Date not available/Unable to determine   Discharge Plan A Rehab   Discharge Plan B Home Health   DME Needed Upon Discharge  walker, rolling   Discharge Barriers Identified None   Why the patient remains in the hospital Requires continued medical care   Post-Acute Status   Post-Acute Authorization Placement;Dialysis  (LTAC)   Diaylsis Status Pending medical clearance/testing   Coverage [employer]   Discharge Delays None known at this time

## 2022-02-16 NOTE — ASSESSMENT & PLAN NOTE
-Admitted to inpatient status  -Baseline Cr 0.9.  On admit Cr 2.7  -Reports being started on lasix 1/8 by her cardiologist and over several days PTA had decreased UOP and diminished PO intake  -On admit BNP elevated at 1160 and with metabolic acidosis and mild hyperkalemia   -Nephrology consulted and input appreciated  -ARMEN positive.  ANCAs negative.  Complement low.  -IR consulted for kidney biopsy, but patient decompensated further and biopsy delayed.  -On 2/7 Cr continued to worsen and was encephalopathic so IR placed THDC and HD initiated.  -Completed 3 days of pulse dose steroids for suspected glomerulonephritis vs pulm/renal syndrome and continues on prednisone.  -Discussed with Dr. Alvarado today.  Suspect lupus nephritis.  Decrease prednisone and added cellcept  -Continue HD MWF for now.  Will likely need HD for several months, but hopefully will not need longterm.

## 2022-02-16 NOTE — PLAN OF CARE
PRINCE contacted Pascale from [facility] to follow up on referral for patient. PRINCE informed Pascale that patient is medically stable. Pascale informed PRINCE that she has to see how many HD beds they have available, will call PRINCE back.        02/16/22 3832   Post-Acute Status   Post-Acute Authorization Placement  (Rehab)   Post-Acute Placement Status Pending Bed Availability   Coverage [employer]   Discharge Delays (!) Post-Acute Set-up   Discharge Plan   Discharge Plan A Rehab   Discharge Plan B [facility]

## 2022-02-16 NOTE — PT/OT/SLP PROGRESS
Physical Therapy Treatment    Patient Name:  Tammi Farris   MRN:  [id number]    Recommendations:     Discharge Recommendations:  rehabilitation facility   Discharge Equipment Recommendations: bedside commode,bath bench (pediatric RW)   Barriers to discharge home: Pt with decreased functional mobility and ambulation at this time.    Assessment:     Tammi Farris is a 71 y.o. female admitted with a medical diagnosis of Acute renal failure superimposed on stage 2 chronic kidney disease.  She presents with the following impairments/functional limitations:  weakness,impaired self care skills,impaired functional mobilty,gait instability,impaired balance,impaired cognition,decreased coordination,decreased upper extremity function,decreased lower extremity function,decreased safety awareness,edema,impaired cardiopulmonary response to activity.    Rehab Prognosis: Good; patient would benefit from acute skilled PT services to address these deficits and reach maximum level of function.    Recent Surgery: * No surgery found *      Plan:     During this hospitalization, patient to be seen 6 x/week to address the identified rehab impairments via gait training,therapeutic activities,therapeutic exercises and progress toward the following goals:    · Plan of Care Expires:  02/23/22    Subjective     Chief Complaint: N/A  Patient/Family Comments/goals: Pt agreeable to therapy, would like to sit up in the chair.  Pain/Comfort:  Pain Rating 1: 0/10      Objective:     Patient found HOB elevated with telemetry,oxygen,peripheral IV (R chest HD access) upon PT entry to room.     General Precautions: Standard, fall,respiratory   Orthopedic Precautions:N/A   Braces: N/A  Respiratory Status: Nasal cannula, flow 2 L/min     Functional Mobility:  Pt feeling better, however with delayed initiation and required extra time to complete tasks.  Pt very motivated and happy with her progress.    · Bed Mobility:     · Scooting: minimum assistance and  moderate assistance for anterior scooting   · Supine to Sit: minimum assistance with HOB elevated   · Transfers:     · Sit to Stand:  minimum assistance with rolling walker x2 trials   · Bed to Chair: minimum assistance-CGA with  rolling walker  using  Step Transfer  · Gait: Pt ambulated ~40 ft x2 trials with min A-CGA using and 2L O2 NC.  Pt with decreased heel>toe push off, decreased foot clearance, decreased step length, and decreased renetta.  Pt required min A with RW management.  spO2 on 2L O2 NC ~98%.    · Balance: Pt with fair dynamic standing balance.       AM-PAC 6 CLICK MOBILITY  Turning over in bed (including adjusting bedclothes, sheets and blankets)?: 3  Sitting down on and standing up from a chair with arms (e.g., wheelchair, bedside commode, etc.): 3  Moving from lying on back to sitting on the side of the bed?: 3  Moving to and from a bed to a chair (including a wheelchair)?: 3  Need to walk in hospital room?: 3  Climbing 3-5 steps with a railing?: 2  Basic Mobility Total Score: 17       Therapeutic Activities and Exercises:  BLE seated therex x10 reps: hip flex, LAQ/HS, and AP.  Pt required min VC's to perform LE therex properly.      Patient left up in chair on seat cushion reclined with all lines intact, call button in reach and family present.    GOALS:   Multidisciplinary Problems     Physical Therapy Goals        Problem: Physical Therapy Goal    Goal Priority Disciplines Outcome Goal Variances Interventions   Physical Therapy Goal     PT, PT/OT Ongoing, Progressing     Description: Goals to be met by: 22     Patient will increase functional independence with mobility by performin. Supine to sit with Modified Dale  2. Rolling to Left and Right with Modified Dale  3. Sit to stand transfer with Modified Dale using RW  4. Bed to chair transfer with Modified Dale using Rolling Walker  5. Gait >50 feet with Modified Dale using Rolling Walker   6.  Lower extremity exercise program 2 sets x10 reps per handout, with independence                     Time Tracking:     PT Received On: 02/16/22  PT Start Time: 1434     PT Stop Time: 1459  PT Total Time (min): 25 min     Billable Minutes: Gait Training 15 min and Therapeutic Exercise 10 min    Treatment Type: Treatment  PT/PTA: PT     PTA Visit Number: 0     02/16/2022

## 2022-02-16 NOTE — SUBJECTIVE & OBJECTIVE
Interval History: No acute events overnight.  Seen in dialysis this morning.  Still looks sickly but states she slept well and feels better today.  Denies abdominal discomfort today.  Denies shortness of breath.  All questions answered and patient had no further complaints.      Review of Systems   Constitutional: Positive for activity change, appetite change and fatigue.   HENT: Negative for ear discharge and ear pain.    Eyes: Negative for discharge and itching.   Respiratory: Negative for chest tightness and shortness of breath.    Cardiovascular: Negative for chest pain.   Gastrointestinal: Negative for abdominal pain, constipation, diarrhea and nausea.   Endocrine: Negative for cold intolerance and heat intolerance.   Genitourinary: Negative for difficulty urinating.   Musculoskeletal: Negative for arthralgias and back pain.   Neurological: Positive for weakness. Negative for seizures and syncope.   Psychiatric/Behavioral: Negative for agitation and dysphoric mood.     Objective:     Vital Signs (Most Recent):  Temp: 97.5 °F (36.4 °C) (02/16/22 1346)  Pulse: 98 (02/16/22 1346)  Resp: 20 (02/16/22 1346)  BP: (!) 94/53 (02/16/22 1346)  SpO2: 99 % (02/16/22 1346) Vital Signs (24h Range):  Temp:  [97.4 °F (36.3 °C)-98.7 °F (37.1 °C)] 97.5 °F (36.4 °C)  Pulse:  [50-98] 98  Resp:  [16-20] 20  SpO2:  [97 %-99 %] 99 %  BP: ()/(34-59) 94/53     Weight: 70.8 kg (156 lb 1.4 oz)  Body mass index is 33.78 kg/m².    Intake/Output Summary (Last 24 hours) at 2/16/2022 1540  Last data filed at 2/16/2022 1236  Gross per 24 hour   Intake 920 ml   Output 600 ml   Net 320 ml      Physical Exam  Vitals and nursing note reviewed.   Constitutional:       General: She is not in acute distress.     Appearance: She is well-developed. She is ill-appearing. She is not toxic-appearing or diaphoretic.      Interventions: Nasal cannula in place.   HENT:      Head: Normocephalic and atraumatic.      Right Ear: External ear normal.       Left Ear: External ear normal.      Nose: Nose normal.      Mouth/Throat:      Mouth: Mucous membranes are moist.   Eyes:      Extraocular Movements: Extraocular movements intact.      Conjunctiva/sclera: Conjunctivae normal.   Cardiovascular:      Rate and Rhythm: Normal rate and regular rhythm.   Pulmonary:      Effort: No tachypnea or respiratory distress.      Breath sounds: No wheezing.   Abdominal:      General: Bowel sounds are normal. There is no distension.      Palpations: Abdomen is soft.      Tenderness: There is no abdominal tenderness.      Comments: No palpable hepatomegaly or splenomegaly    Musculoskeletal:         General: No tenderness. Normal range of motion.      Cervical back: Normal range of motion and neck supple.      Right lower leg: Edema present.      Left lower leg: Edema present.   Skin:     General: Skin is warm and dry.   Neurological:      Mental Status: She is alert and oriented to person, place, and time.      Comments: Lethargic but alert and conversant.  Diffuse weakness.   Psychiatric:         Thought Content: Thought content normal.         Significant Labs: All pertinent labs within the past 24 hours have been reviewed.    Significant Imaging: I have reviewed all pertinent imaging results/findings within the past 24 hours.

## 2022-02-16 NOTE — PT/OT/SLP PROGRESS
Occupational Therapy      Patient Name:  Tammi Farris   MRN:  [id number]    Patient not seen today secondary to pt BRIANDA to dialysis. Will follow-up as able.    2/16/2022

## 2022-02-16 NOTE — PLAN OF CARE
PRINCE spoke with Olamide at [facility] and informed that patient will be moving to [place] and needs to moved to a clinic there. Olamide informed PRINCE that patient will be placed at [facility] second shift. Olamide stated that clinicals are sent, new clinic will have to verify insurance and Medical Director will review and give finalized chair time and start date.     3:10 pm     Patient financially cleared for dialysis. Waiting on clinic clearance.        02/16/22 1421   Post-Acute Status   Post-Acute Authorization Dialysis   Diaylsis Status   (Unit Change)   Coverage [employer] Medicare   Discharge Delays (!) Dialysis Set-up   Discharge Plan   Discharge Plan A Rehab   Discharge Plan B Home Health

## 2022-02-16 NOTE — PLAN OF CARE
Problem: Occupational Therapy Goal  Goal: Occupational Therapy Goal  Description: Goals to be met by: 03/03/22     Patient will increase functional independence with ADLs by performing:    UE Dressing with Stand-by assistance.  LE Dressing with Minimal assistance.  Grooming while seated with Stand-by assistance.  Toileting from bedside commode with minimal assistance for hygiene and clothing management.   Supine to sit with Stand-by assistance.  Step transfer with stand-by assistance  Toilet transfer to bedside commode with stand-by assistance.  Upper extremity exercise program x15 reps per handout, with independence.    Outcome: Ongoing, Progressing

## 2022-02-16 NOTE — PT/OT/SLP PROGRESS
Occupational Therapy   Treatment    Name: Tammi Farris  MRN: [id number]  Admitting Diagnosis:  Acute renal failure superimposed on stage 2 chronic kidney disease       Recommendations:     Discharge Recommendations: rehabilitation facility  Discharge Equipment Recommendations:  bedside commode,bath bench (pediatric RW)  Barriers to discharge:  not at PLOF; was fully independent PTA    Assessment:     Tammi Farris is a 71 y.o. female with a medical diagnosis of Acute renal failure superimposed on stage 2 chronic kidney disease.  Performance deficits affecting function are weakness,impaired endurance,impaired self care skills,gait instability,impaired balance,impaired functional mobilty,decreased upper extremity function,decreased lower extremity function,decreased coordination,impaired cognition,decreased safety awareness,pain,decreased ROM,impaired fine motor,impaired skin,impaired cardiopulmonary response to activity.     Pt pleasant and willing to participate in tx session this date despite complaints of general weakness, requiring rest breaks and reminders for implementing energy conservation strategies into activities. Pt was able to perform standing BUE AROM w/ CGA-SBA to increased overall strength, endurance and tolerance for safe performance w/ ADLs and functional mobility. Pt appeared fatigued but tolerated considerably well w/ rest; SPO2 >95% on 2L O2 NC w/ HR in upper 90s/lower 110s. Pt tolerated tx session well but is limited by decreased endurance; pt will continue to benefit from skilled acute OT services for maximizing functional capacity.     PTA, pt was fully (I) w/ all aspects of care; pt will benefit from multidisciplinary approach in an IPR setting for returning to PLOF.     Rehab Prognosis:  Good; patient would benefit from acute skilled OT services to address these deficits and reach maximum level of function.       Plan:     Patient to be seen  (5-6x/wk) to address the above listed problems via  "self-care/home management,therapeutic activities,therapeutic exercises  · Plan of Care Expires: 03/03/22  · Plan of Care Reviewed with: patient,family    Subjective     "Why does my body feel so weak?"    Pain/Comfort:  · Pain Rating 1: 0/10  · Pain Rating Post-Intervention 1: 0/10    Objective:     Communicated with: Nurse (Luci) prior to session.  Patient found up in chair with peripheral IV,telemetry,oxygen (HD catheter R chest) upon OT entry to room.    General Precautions: Standard, fall,aspiration,diabetic,respiratory   Orthopedic Precautions:N/A   Braces: N/A  Respiratory Status: Nasal cannula, flow 2 L/min     Occupational Performance:     Bed Mobility:    · N/T; pt found/left in chair      Functional Mobility/Transfers:  · Patient completed Sit <> Stand Transfer x 5 trials total with minimum assistance  with  rolling walker  · Pt required cueing for using safe/proper hand placement; pt able to implement technique w/o cueing for trails 4 and 5   · Functional Mobility: Pt was able to ambulate household distances from chair>door and returned to chair w/ min A-CGA and use of RW; min A required for safely managing RW. Pt requires cueing for maintaining safety awareness.     Activities of Daily Living:  · Pt declined ADL needs at this time, reporting that she was fatigued from dialysis and working w/ PT earlier; pt agreeable to participate in standing BUE AROM     [facility] 6 Click ADL: 15    Treatment & Education:  -Pt stood from chair x 5 trials for performing standing BUE AROM for 1 set x 12 reps w/ RW in front for safety and unilateral support as needed; pt required rest breaks in between each set 2* decreased endurance and onset of fatigue:    -shoulder flexion (limited but WFL)    -elbow flexion    -forearm supination/pronation w/ elbows extended    -shoulder abd/add w/ elbow flexed at 90 degrees    -shoulder horizontal abd/add   -By end of last set, pt w/ complaints of abdominal discomfort but was agreeable " to ambulate short household distance in room; pt required assist as noted above.   -Pt educated on proper breathing technique while peroforming standing activity to prevent onset of SOB and maintain desirable SPO2 vitals. Pt demo'd understanding w/ cueing and reinforcement as needed.   -Pt encouraged to remain compliant w/ BUE AROM exercise program per handout (found on table); pt verbalized understanding.   -All questions and concerns addressed within OT scope.     Patient left up in chair with all lines intact, call button in reach, nurse notified and family presentEducation:      GOALS:   Multidisciplinary Problems     Occupational Therapy Goals        Problem: Occupational Therapy Goal    Goal Priority Disciplines Outcome Interventions   Occupational Therapy Goal     OT, PT/OT Ongoing, Progressing    Description: Goals to be met by: 03/03/22     Patient will increase functional independence with ADLs by performing:    UE Dressing with Stand-by assistance.  LE Dressing with Minimal assistance.  Grooming while seated with Stand-by assistance.  Toileting from bedside commode with minimal assistance for hygiene and clothing management.   Supine to sit with Stand-by assistance.  Step transfer with stand-by assistance  Toilet transfer to bedside commode with stand-by assistance.  Upper extremity exercise program x15 reps per handout, with independence.                     Time Tracking:     OT Date of Treatment: 02/16/22  OT Start Time: 1620  OT Stop Time: 1644  OT Total Time (min): 24 min    Billable Minutes:Therapeutic Activity 15  Therapeutic Exercise 9  Total Time 24    OT/MYRA: OT          2/16/2022

## 2022-02-16 NOTE — PLAN OF CARE
Problem: Physical Therapy Goal  Goal: Physical Therapy Goal  Description: Goals to be met by: 22     Patient will increase functional independence with mobility by performin. Supine to sit with Modified [place]  2. Rolling to Left and Right with Modified [place]  3. Sit to stand transfer with Modified [place] using RW  4. Bed to chair transfer with Modified [place] using Rolling Walker  5. Gait >50 feet with Modified [place] using Rolling Walker   6. Lower extremity exercise program 2 sets x10 reps per handout, with independence    Outcome: Ongoing, Progressing     Pt ambulated ~40 ft x2 trials with min A-CGA using RW and 2L O2 NC.

## 2022-02-16 NOTE — ASSESSMENT & PLAN NOTE
-Hb 9.2 on admit - chronic secondary to cirrhosis.  -Dropped to <7 on 2/2  -No evidence of GI bleeding but had noted epistaxis and bleeding gums at times since before hospitalization.  -Folate and B12 replete.  She is iron deficient.  -1 unit PRBC ordered 2/2 but transfusion delayed due to complex antibody matching.  Blood finally available and she received 1 unit PRBC 2/4.   -H/H has remained relatively stable.  8.6 todsy.  -Monitor labs q48h.

## 2022-02-16 NOTE — PT/OT/SLP PROGRESS
Physical Therapy      Patient Name:  Tammi Farris   MRN:  [id number]    Patient not seen at this time for PT tx secondary to Dialysis. Will follow-up as able.

## 2022-02-16 NOTE — NURSING
Report received from MARIE Carrizales. Patient resting comfortably in bed,awake and alert , no acute distress noted. Plan of care reviewed with patient. Instructed patient to call for assistance whenever needed, side rails up x2, bed alarm set, call light in reach, non skid socks in use.  Daughter at bedside. Peripheral IV site, no redness or swelling noted.  Patient verbalized understanding of instructions.  Will continue to monitor.

## 2022-02-16 NOTE — PROGRESS NOTES
[facility] Medicine  Progress Note    Patient Name: Tammi Farris  MRN: [id number]  Patient Class: IP- Inpatient   Admission Date: 2/1/2022  Length of Stay: 15 days  Attending Physician: Chadwick Quan MD  Primary Care Provider: Ann-Marie Hartman MD        Subjective:     Principal Problem:Acute renal failure superimposed on stage 2 chronic kidney disease        HPI:  71 y.o. female with HTN, HLD, GERD, cirrhosis of liver, thrombocytopenia, and sleep apnea presents with a complaint of cough and congestion since October.  She is chronically dyspneic, exacerbated by exertion, has seen Cardiology and was prescribed lasix and metoprolol.  Her sister has now noted that the patient has very low urine output and is constipated.  Also follows with Hepatology for chronic liver disease.  Patient denies fever, chills, chest pain, palpitations, orthopnea, PND, dizziness, syncope, n/v/d, abdominal pain, or dysuria.  In the ED, labs reveal ANI, hyperkalemia, metabolic acidosis with elevated lactic acid, elevated liver enzymes, elevated BNP, elevated d-dimer, thrombocytopenia, and markedly concentrated urine.  Echo December 2021 showed preserved EF, no evidence of heart failure.  Chest xray without acute abnormality. No convincing evidence of infectious process.      Overview/Hospital Course:  71 year old woman with HTN, HLD, GERD, CKDII, cirrhosis and PAULINA presented for sob and has been diagnosed with ani/CKDII and acute hypoxic respiratory failure.  Complex patient and presentation with very difficult to ascertain volume status.  Was given lasix and then fluids and then attempt at lasix again.  Suspect a pulmonary/renal vasculitis but not clearly diagnosed (Prior TAMMI positive 2019, low C3 and C4 and abnormal SPEP with elevated kappa/lambda ratio).  Had planned renal biopsy, but encephalopathic, anuric and worsening acidosis so THDC placed and initiated HD on 2/7.  Has completed 3 days pulse dose steroids (1g  daily) and will be on prednisone 60 mg.  Patient more lethargic on 2/8 and noted to have episode of rapid AFib.  Moved to ICU.  Noted to have increased ammonia levels and started on lactulose.  Mentation slowly improving with no further arrhythmias.  Poor oral intake.  NGT placed and started on tube feedings.  NGT removed and ST consulted.  Severely debilitated and PT/OT consulted.      Interval History: No acute events overnight.  Seen in dialysis this morning.  Still looks sickly but states she slept well and feels better today.  Denies abdominal discomfort today.  Denies shortness of breath.  All questions answered and patient had no further complaints.      Review of Systems   Constitutional: Positive for activity change, appetite change and fatigue.   HENT: Negative for ear discharge and ear pain.    Eyes: Negative for discharge and itching.   Respiratory: Negative for chest tightness and shortness of breath.    Cardiovascular: Negative for chest pain.   Gastrointestinal: Negative for abdominal pain, constipation, diarrhea and nausea.   Endocrine: Negative for cold intolerance and heat intolerance.   Genitourinary: Negative for difficulty urinating.   Musculoskeletal: Negative for arthralgias and back pain.   Neurological: Positive for weakness. Negative for seizures and syncope.   Psychiatric/Behavioral: Negative for agitation and dysphoric mood.     Objective:     Vital Signs (Most Recent):  Temp: 97.5 °F (36.4 °C) (02/16/22 1346)  Pulse: 98 (02/16/22 1346)  Resp: 20 (02/16/22 1346)  BP: (!) 94/53 (02/16/22 1346)  SpO2: 99 % (02/16/22 1346) Vital Signs (24h Range):  Temp:  [97.4 °F (36.3 °C)-98.7 °F (37.1 °C)] 97.5 °F (36.4 °C)  Pulse:  [50-98] 98  Resp:  [16-20] 20  SpO2:  [97 %-99 %] 99 %  BP: ()/(34-59) 94/53     Weight: 70.8 kg (156 lb 1.4 oz)  Body mass index is 33.78 kg/m².    Intake/Output Summary (Last 24 hours) at 2/16/2022 1540  Last data filed at 2/16/2022 1236  Gross per 24 hour   Intake 920  ml   Output 600 ml   Net 320 ml      Physical Exam  Vitals and nursing note reviewed.   Constitutional:       General: She is not in acute distress.     Appearance: She is well-developed. She is ill-appearing. She is not toxic-appearing or diaphoretic.      Interventions: Nasal cannula in place.   HENT:      Head: Normocephalic and atraumatic.      Right Ear: External ear normal.      Left Ear: External ear normal.      Nose: Nose normal.      Mouth/Throat:      Mouth: Mucous membranes are moist.   Eyes:      Extraocular Movements: Extraocular movements intact.      Conjunctiva/sclera: Conjunctivae normal.   Cardiovascular:      Rate and Rhythm: Normal rate and regular rhythm.   Pulmonary:      Effort: No tachypnea or respiratory distress.      Breath sounds: No wheezing.   Abdominal:      General: Bowel sounds are normal. There is no distension.      Palpations: Abdomen is soft.      Tenderness: There is no abdominal tenderness.      Comments: No palpable hepatomegaly or splenomegaly    Musculoskeletal:         General: No tenderness. Normal range of motion.      Cervical back: Normal range of motion and neck supple.      Right lower leg: Edema present.      Left lower leg: Edema present.   Skin:     General: Skin is warm and dry.   Neurological:      Mental Status: She is alert and oriented to person, place, and time.      Comments: Lethargic but alert and conversant.  Diffuse weakness.   Psychiatric:         Thought Content: Thought content normal.         Significant Labs: All pertinent labs within the past 24 hours have been reviewed.    Significant Imaging: I have reviewed all pertinent imaging results/findings within the past 24 hours.      Assessment/Plan:      * Acute renal failure superimposed on stage 2 chronic kidney disease  -Admitted to inpatient status  -Baseline Cr 0.9.  On admit Cr 2.7  -Reports being started on lasix 1/8 by her cardiologist and over several days PTA had decreased UOP and diminished  PO intake  -On admit BNP elevated at 1160 and with metabolic acidosis and mild hyperkalemia   -Nephrology consulted and input appreciated  -ARMEN positive.  ANCAs negative.  Complement low.  -IR consulted for kidney biopsy, but patient decompensated further and biopsy delayed.  -On 2/7 Cr continued to worsen and was encephalopathic so IR placed THDC and HD initiated.  -Completed 3 days of pulse dose steroids for suspected glomerulonephritis vs pulm/renal syndrome and continues on prednisone.  -Discussed with Dr. Alvarado today.  Suspect lupus nephritis.  Decrease prednisone and added cellcept  -Continue HD MWF for now.  Will likely need HD for several months, but hopefully will not need longterm.    Cirrhosis of liver without ascites  -Noted mild transaminitis and thrombocytopenia which are not new.  Noted epistaxis and gingival bleeding which has been ongoing for several months.  These are chronic and note history of cirrhosis and alcohol abuse   -Follows with hepatology  -Abdominal US on day prior to admit showed hepatic cirrhosis/steatosis with sonographic findings of portal hypertension including trace ascites and splenomegaly.  -Noted to be more lethargic with elevation of ammonia level.  -Hepatic Encephalopathy (not POA) and started on lactulose.  Improving mentation.    Macrocytic anemia  -Hb 9.2 on admit - chronic secondary to cirrhosis.  -Dropped to <7 on 2/2  -No evidence of GI bleeding but had noted epistaxis and bleeding gums at times since before hospitalization.  -Folate and B12 replete.  She is iron deficient.  -1 unit PRBC ordered 2/2 but transfusion delayed due to complex antibody matching.  Blood finally available and she received 1 unit PRBC 2/4.   -H/H has remained relatively stable.  8.6 todsy.  -Monitor labs q48h.    Thrombocytopenia  -Chronic and at baseline on admit secondary to cirrhosis  -Heme/onc consulted and input appreciated  -Plan is transfuse for platelets <10 or if significant  hemorrhage.  -Closely monitor.    MANUEL (generalized anxiety disorder)  -Very anxious today  -On 2/6 gave one time low dose ativan PO - watch respiratory status very closely.    Debility  -PT/OT consulted and recommend inpatient rehab at discharge.    Metabolic acidosis  -Suspect secondary to ANI, management as above    Sleep apnea  -Continue bipap qhs    Type 2 diabetes mellitus without complication, without long-term current use of insulin  -A1c 5.9  -Sugars above goal range  -Increase detemir today  -Continue ssi ac/hs      Gastroesophageal reflux disease  -Stable, no acute issue    HTN (hypertension)  -BP borderline low after HD today  -Will discontinue metoprolol - did not receive today or yesterday  -Will consider adding midodrine if drops below 90 or any symptoms.    Hyperlipidemia  -History noted  -Not on statin due to cirrhosis      VTE Risk Mitigation (From admission, onward)         Ordered     heparin (porcine) injection 3,200 Units  As needed (PRN)         02/07/22 1834     IP VTE HIGH RISK PATIENT  Once         02/01/22 1557     Place sequential compression device  Until discontinued         02/01/22 1557                Discharge Planning   CLAUDETTE: 2/18/2022     Code Status: Full Code   Is the patient medically ready for discharge?:     Reason for patient still in hospital (select all that apply): Treatment  Discharge Plan A: Rehab   Discharge Delays: (!) Dialysis Set-up              Chadwick Quan MD  Department of Hospital Medicine   [facility] - Telemetry

## 2022-02-16 NOTE — PROGRESS NOTES
Maintenance hemodialysis done x 3 hours/pt reinfused. Pt hypotensive with sbp <100. Reported to Dr. Alvarado/no net fluid removed. Flushed right chest wall perm cvc without difficulty, saline locked, caps applied. Report given to LOLA Rascon RN/pt returned to room 340 per transporter via bed.    No net fluid removed

## 2022-02-16 NOTE — PLAN OF CARE
Destiny from [facility] contacted SW about referral. Destiny informed SW that patient may not get approved for rehab because she is minimal assist. Destiny stated that patient would benefit from a lower level of care like home health.

## 2022-02-16 NOTE — PT/OT/SLP PROGRESS
Speech Language Pathology      Tammi Farris  MRN: [id number]    Patient not seen today secondary to dialysis. Will follow. Nelli Blackburn, JAYCOB-SLP

## 2022-02-16 NOTE — PROGRESS NOTES
Tammi Farris is a 71 y.o. female patient.    Follow for ANI, dialysis    Patinet seen while on dialysis  No new c/o, comfortable      Scheduled Meds:   dextromethorphan-guaiFENesin  mg/5 ml  5 mL Oral Q6H    famotidine  20 mg Oral Daily    insulin detemir U-100  9 Units Subcutaneous QHS    lactulose  10 g Oral BID    lanthanum  500 mg Per NG tube QID    metoprolol tartrate  25 mg Oral BID    polyethylene glycol  17 g Oral BID    predniSONE  60 mg Oral Daily       Review of patient's allergies indicates:   Allergen Reactions    Dobutamine Hives    Benadryl [diphenhydramine hcl] Anxiety     Pt states she feels jumpy and anxious when taking.    Darvon [propoxyphene] Nausea Only    Shellfish containing products Hives    Iodinated contrast media Hives    Lisinopril Other (See Comments)     cough    Propoxyphene hcl      Itchy (skin)^    Tizanidine Other (See Comments)     Sweaty, difficulty swallowing    Statins-hmg-coa reductase inhibitors Anxiety and Other (See Comments)     Myalgia, fatigue, palpitations, anxiety         Vital Signs Range (Last 24H):  Temp:  [97.4 °F (36.3 °C)-98.7 °F (37.1 °C)]   Pulse:  [72-97]   Resp:  [16-20]   BP: ()/(48-59)   SpO2:  [97 %-100 %]     I & O (Last 24H):    Intake/Output Summary (Last 24 hours) at 2/16/2022 1111  Last data filed at 2/15/2022 2200  Gross per 24 hour   Intake 320 ml   Output --   Net 320 ml           Physical Exam:  General appearance: well developed, well nourished, no distress  Lungs:  clear to auscultation bilaterally and normal respiratory effort  Heart: regular rate and rhythm  Abdomen: soft, non-tender non-distented; bowel sounds normal; no masses,  no organomegaly  Extremities: no cyanosis or edema, or clubbing    Laboratory:  I have reviewed all pertinent lab results within the past 24 hours.  CBC:   Recent Labs   Lab 02/16/22  0417   WBC 13.04*   RBC 2.27*   HGB 8.6*   HCT 24.9*   PLT 52*   *   MCH 37.9*   MCHC 34.5      CMP:   Recent Labs   Lab 02/16/22  0757   *   CALCIUM 9.1   ALBUMIN 2.8*   PROT 6.4   *   K 4.4   CO2 21*      *   CREATININE 4.0*   ALKPHOS 109   ALT 37   AST 46*   BILITOT 2.6*       Imp/Plan    ANI - suspected acute GN, ARMEN (+), low complements suspected lupus nephritis  CKD stage 2  Cirrhosis  HTN  DM type 2  Anemia    Add Cellcept  Reduce Prednisone dose  Biopsy when more stable  Watch renal function closely          Antionette Alvarado  2/16/2022

## 2022-02-17 NOTE — PLAN OF CARE
PRINCE spoke with Pascale at [facility]. Pascale informed PRINCE that there are several HD patients discharging today so she will submit for auth. Pascale instructed PRINCE to let physician know that if auth comes back late in the day, patient will need to be scheduled for an early dialysis on Friday before coming to the facility.     PRINCE notified Dr. Quan via secure chat.        02/17/22 0855   Post-Acute Status   Post-Acute Authorization Placement  (Rehab)   Post-Acute Placement Status Pending payor review/awaiting authorization (if required)   Coverage [facility] Medicare   Discharge Delays (!) Post-Acute Set-up   Discharge Plan   Discharge Plan A Rehab   Discharge Plan B Home Health

## 2022-02-17 NOTE — PLAN OF CARE
Problem: Adult Inpatient Plan of Care  Goal: Plan of Care Review  Outcome: Ongoing, Progressing     Problem: Infection  Goal: Absence of Infection Signs and Symptoms  Outcome: Ongoing, Progressing     Problem: Diabetes Comorbidity  Goal: Blood Glucose Level Within Targeted Range  Outcome: Ongoing, Progressing     Problem: Fluid and Electrolyte Imbalance (Acute Kidney Injury/Impairment)  Goal: Fluid and Electrolyte Balance  Outcome: Ongoing, Progressing     Problem: Fall Injury Risk  Goal: Absence of Fall and Fall-Related Injury  Outcome: Ongoing, Progressing     Problem: Device-Related Complication Risk (Hemodialysis)  Goal: Safe, Effective Therapy Delivery  Outcome: Ongoing, Progressing

## 2022-02-17 NOTE — PLAN OF CARE
Problem: Occupational Therapy Goal  Goal: Occupational Therapy Goal  Description: Goals to be met by: 03/03/22     Patient will increase functional independence with ADLs by performing:    UE Dressing with Stand-by assistance.  LE Dressing with Minimal assistance.  Grooming while seated with Stand-by assistance.  Toileting from bedside commode with minimal assistance for hygiene and clothing management.   Supine to sit with Stand-by assistance.  Step transfer with stand-by assistance  Toilet transfer to bedside commode with stand-by assistance.  Upper extremity exercise program x15 reps per handout, with independence.    Outcome: Ongoing, Progressing     Pt agreeable to bed level ADLs and BUE AROM d/t feeling bad and up all night d/t nausea and vomiting.

## 2022-02-17 NOTE — PLAN OF CARE
Problem: SLP Goal  Goal: SLP Goal  Description: Pt will tolerate mech soft with thin liquids X2 consecutive sessions (1 of 2 met 2/14/22)  Outcome: Ongoing, Progressing   Pt nauseated and not feeling well today.

## 2022-02-17 NOTE — ASSESSMENT & PLAN NOTE
-Noted mild transaminitis and thrombocytopenia which are not new.  Noted epistaxis and gingival bleeding which has been ongoing for several months.  These are chronic and note history of cirrhosis and alcohol abuse   -Follows with hepatology  -Abdominal US on day prior to admit showed hepatic cirrhosis/steatosis with sonographic findings of portal hypertension including trace ascites and splenomegaly.  -Noted to be more lethargic with elevation of ammonia level and was lactulose started on 2/9.    -Mental status improved and she now has significant cramping and nausea/vomiting so stopping lactulose on 2/17.  -Monitor mental status closely.  -Check KUB.

## 2022-02-17 NOTE — ASSESSMENT & PLAN NOTE
-BP low normal today - was in SPB 90s after dialysis yesterday  -Stopped metoprolol 2/16  -Will consider adding midodrine if drops below 90 or any symptoms.

## 2022-02-17 NOTE — PT/OT/SLP PROGRESS
Physical Therapy      Patient Name:  Tammi Farris   MRN:  [id number]    [phone] (6 min) Patient not seen today for PT tx secondary to  (Pt not feeling well today.). Pt very sleepy today, reported not feeling well. spO2 on 1L ~98% and HR ~100-120's bpm at rest. Family present in room. Will follow-up tomorrow.

## 2022-02-17 NOTE — PLAN OF CARE
HD chair placement note:     Tammi Farris  : 1950    Admissions Letter  THANK YOU FOR CHOOSING [facility]  We look forward to helping you feel at home in our clinic. We take pride in  providing a safe, comfortable, and caring environment. Your center is  looking forward to welcoming you! Please contact them at the number  below with any questions.    YOUR FIRST DIALYSIS TREATMENT    10:15 AM    [facility] [address], [place], [place], [address]  Phone: [phone]  Fax: [phone]    BRING TO YOUR FIRST TREATMENT  2 forms of identification including 1 government issued photo ID  All insurance cards  Warm, loose clothes and a blanket  Headphones to listen to the TV  Books, laptop, tablet, or other entertainment    After your first treatment, your ongoing dialysis schedule is:  Every Tuesday, Thursday and Saturday at 10:30 AM    PRINCE Corona notified

## 2022-02-17 NOTE — PT/OT/SLP PROGRESS
"Occupational Therapy   Treatment    Name: Tammi Farris  MRN: [id number]  Admitting Diagnosis:  Acute renal failure superimposed on stage 2 chronic kidney disease       Recommendations:     Discharge Recommendations: rehabilitation facility  Discharge Equipment Recommendations:  bedside commode,bath bench (pediatric RW)  Barriers to discharge:   (not at PLOF; was fully independent PTA)    Assessment:     Tammi Farris is a 71 y.o. female with a medical diagnosis of Acute renal failure superimposed on stage 2 chronic kidney disease. Performance deficits affecting function are weakness,impaired endurance,decreased ROM,decreased coordination,decreased upper extremity function,impaired fine motor,impaired self care skills,impaired skin,decreased lower extremity function,impaired functional mobilty,gait instability,decreased safety awareness,edema,impaired balance,impaired cardiopulmonary response to activity.     Pt agreeable to bed level ADLs and BUE AROM d/t feeling bad and up all night d/t nausea and vomiting    Rehab Prognosis:  Good; patient would benefit from acute skilled OT services to address these deficits and reach maximum level of function.       Plan:     Patient to be seen  (5-6x/wk) to address the above listed problems via self-care/home management,therapeutic activities,therapeutic exercises  · Plan of Care Expires: 03/03/22  · Plan of Care Reviewed with: patient    Subjective     Chief complaint: bad night d/t vomiting   Patient/family comments/ goals: agreeable to bed level activities     Pain/Comfort:  · Pain Rating 1:  (c/o feeling "terrible" d/t nausea/vomiting)  · Pain Addressed 1: Nurse notified    Objective:     Communicated with: nurseVirgie, prior to session. Patient found HOB elevated with peripheral IV,telemetry,oxygen (HD catheter R chest) upon OT entry to room.    General Precautions: Standard, fall   Orthopedic Precautions:N/A   Braces: N/A  Respiratory Status: Nasal cannula, flow 1 " L/min     Post-activities: 100%, 99 bpm      Occupational Performance:     Bed Mobility:    · NT; pt declined.     Functional Mobility/Transfers:  · NT; pt declined.     Activities of Daily Living:  · Grooming: set-up with bed in chair position to brush teeth, use mouthwash, and wash her face         AMPAC 6 Click ADL: 15    Treatment & Education:  · Pt re-educated on OT role/POC.   · Importance of OOB activity with staff assistance.  · Safety during functional t/f and mobility   · With HOB elevated, pt completed the following BUE AROM:   · x10 elbow flex/ext, shoulder flex/ext, shoulder horizontal ab/dduction   · Rest breaks between sets required  · Self-care tasks/functional mobility completed- assistance level noted above   · All questions/concerns answered within OT scope of practice       Patient left HOB elevated with all lines intact, call button in reach, bed alarm on, nurse, Virgie, notified and all needs met/within reach; pt had a wedge positioned under her knees- OT offered assist for sidelying position but pt declined and requested to lay as is. Education:      GOALS:   Multidisciplinary Problems     Occupational Therapy Goals        Problem: Occupational Therapy Goal    Goal Priority Disciplines Outcome Interventions   Occupational Therapy Goal     OT, PT/OT Ongoing, Progressing    Description: Goals to be met by: 03/03/22     Patient will increase functional independence with ADLs by performing:    UE Dressing with Stand-by assistance.  LE Dressing with Minimal assistance.  Grooming while seated with Stand-by assistance.  Toileting from bedside commode with minimal assistance for hygiene and clothing management.   Supine to sit with Stand-by assistance.  Step transfer with stand-by assistance  Toilet transfer to bedside commode with stand-by assistance.  Upper extremity exercise program x15 reps per handout, with independence.                     Time Tracking:     OT Date of Treatment: 02/17/22  OT  Start Time: 1334  OT Stop Time: 1347  OT Total Time (min): 13 min    Billable Minutes:Self Care/Home Management 13 min    OT/MYRA: OT          2/17/2022

## 2022-02-17 NOTE — PROGRESS NOTES
[facility]  Progress Note    Patient Name: Tammi Farris  MRN: [id number]  Patient Class: IP- Inpatient   Admission Date: 2/1/2022  Length of Stay: 16 days  Attending Physician: Chadwick Quan MD  Primary Care Provider: Ann-Marie Hartman MD        Subjective:     Principal Problem:Acute renal failure superimposed on stage 2 chronic kidney disease        HPI:  71 y.o. female with HTN, HLD, GERD, cirrhosis of liver, thrombocytopenia, and sleep apnea presents with a complaint of cough and congestion since October.  She is chronically dyspneic, exacerbated by exertion, has seen Cardiology and was prescribed lasix and metoprolol.  Her sister has now noted that the patient has very low urine output and is constipated.  Also follows with Hepatology for chronic liver disease.  Patient denies fever, chills, chest pain, palpitations, orthopnea, PND, dizziness, syncope, n/v/d, abdominal pain, or dysuria.  In the ED, labs reveal ANI, hyperkalemia, metabolic acidosis with elevated lactic acid, elevated liver enzymes, elevated BNP, elevated d-dimer, thrombocytopenia, and markedly concentrated urine.  Echo December 2021 showed preserved EF, no evidence of heart failure.  Chest xray without acute abnormality. No convincing evidence of infectious process.      Overview/Hospital Course:  71 year old woman with HTN, HLD, GERD, CKDII, cirrhosis and PAULINA presented for sob and has been diagnosed with ani/CKDII and acute hypoxic respiratory failure.  Complex patient and presentation with very difficult to ascertain volume status.  Was given lasix and then fluids and then attempt at lasix again.  Suspect a pulmonary/renal vasculitis but not clearly diagnosed (Prior TAMMI positive 2019, low C3 and C4 and abnormal SPEP with elevated kappa/lambda ratio).  Had planned renal biopsy, but encephalopathic, anuric and worsening acidosis so THDC placed and initiated HD on 2/7.  Has completed 3 days pulse dose steroids (1g  daily) and will be on prednisone 60 mg.  Patient more lethargic on 2/8 and noted to have episode of rapid AFib.  Moved to ICU.  Noted to have increased ammonia levels and started on lactulose.  Mentation slowly improving with no further arrhythmias.  Poor oral intake.  NGT placed and started on tube feedings.  NGT removed and ST consulted.  Severely debilitated and PT/OT consulted.      Interval History: No acute events overnight.  Looks miserable and complains of nausea, vomiting and abdominal cramps.  She and her niece believe this is due to the miralax and lactulose.  Breathing more comfortably.  Denies shortness of breath.  All questions answered and patient had no further complaints.    Review of Systems   Constitutional: Positive for activity change, appetite change and fatigue.   HENT: Negative for ear discharge and ear pain.    Eyes: Negative for discharge and itching.   Respiratory: Negative for chest tightness and shortness of breath.    Cardiovascular: Negative for chest pain.   Gastrointestinal: Positive for abdominal pain, nausea and vomiting. Negative for constipation and diarrhea.   Endocrine: Negative for cold intolerance and heat intolerance.   Genitourinary: Negative for difficulty urinating.   Musculoskeletal: Negative for arthralgias and back pain.   Neurological: Positive for weakness. Negative for seizures and syncope.   Psychiatric/Behavioral: Negative for agitation and dysphoric mood.     Objective:     Vital Signs (Most Recent):  Temp: 97.7 °F (36.5 °C) (02/17/22 0733)  Pulse: 106 (02/17/22 0733)  Resp: 19 (02/17/22 0733)  BP: (!) 112/55 (02/17/22 0733)  SpO2: 96 % (02/17/22 0733) Vital Signs (24h Range):  Temp:  [97.5 °F (36.4 °C)-97.7 °F (36.5 °C)] 97.7 °F (36.5 °C)  Pulse:  [] 106  Resp:  [17-20] 19  SpO2:  [96 %-99 %] 96 %  BP: ()/(34-55) 112/55     Weight: 70.8 kg (156 lb 1.4 oz)  Body mass index is 33.78 kg/m².    Intake/Output Summary (Last 24 hours) at 2/17/2022  1028  Last data filed at 2/16/2022 1236  Gross per 24 hour   Intake 700 ml   Output 600 ml   Net 100 ml      Physical Exam  Vitals and nursing note reviewed.   Constitutional:       General: She is not in acute distress.     Appearance: She is well-developed. She is ill-appearing. She is not toxic-appearing or diaphoretic.      Interventions: Nasal cannula in place.   HENT:      Head: Normocephalic and atraumatic.      Right Ear: External ear normal.      Left Ear: External ear normal.      Nose: Nose normal.      Mouth/Throat:      Mouth: Mucous membranes are moist.   Eyes:      Extraocular Movements: Extraocular movements intact.      Conjunctiva/sclera: Conjunctivae normal.   Cardiovascular:      Rate and Rhythm: Normal rate and regular rhythm.   Pulmonary:      Effort: No tachypnea or respiratory distress.      Breath sounds: No wheezing.   Abdominal:      General: Bowel sounds are normal. There is no distension.      Palpations: Abdomen is soft.      Tenderness: There is no abdominal tenderness.      Comments: No palpable hepatomegaly or splenomegaly    Musculoskeletal:         General: No tenderness. Normal range of motion.      Cervical back: Normal range of motion and neck supple.      Right lower leg: Edema present.      Left lower leg: Edema present.   Skin:     General: Skin is warm and dry.   Neurological:      Mental Status: She is alert and oriented to person, place, and time.      Comments: Lethargic but alert and conversant.  Diffuse weakness.   Psychiatric:         Thought Content: Thought content normal.         Significant Labs: All pertinent labs within the past 24 hours have been reviewed.    Significant Imaging: I have reviewed all pertinent imaging results/findings within the past 24 hours.      Assessment/Plan:      * Acute renal failure superimposed on stage 2 chronic kidney disease  -Admitted to inpatient status  -Baseline Cr 0.9.  On admit Cr 2.7  -Reports being started on lasix 1/8 by her  cardiologist and over several days PTA had decreased UOP and diminished PO intake  -On admit BNP elevated at 1160 and with metabolic acidosis and mild hyperkalemia   -Nephrology consulted and input appreciated  -ARMEN positive.  ANCAs negative.  Complement low.  -IR consulted for kidney biopsy, but patient decompensated further and biopsy delayed.  -On 2/7 Cr continued to worsen and was encephalopathic so IR placed THDC and HD initiated.  -Completed 3 days of pulse dose steroids for suspected glomerulonephritis vs pulm/renal syndrome and continues on prednisone.  -Discussed with Dr. Alvarado 2/16.  Suspect lupus nephritis.  On 2/16 decreased prednisone and added cellcept  -Continue HD MWF for now.  Will likely need HD for several months, but hopefully will not need longterm.    Cirrhosis of liver without ascites  -Noted mild transaminitis and thrombocytopenia which are not new.  Noted epistaxis and gingival bleeding which has been ongoing for several months.  These are chronic and note history of cirrhosis and alcohol abuse   -Follows with hepatology  -Abdominal US on day prior to admit showed hepatic cirrhosis/steatosis with sonographic findings of portal hypertension including trace ascites and splenomegaly.  -Noted to be more lethargic with elevation of ammonia level and was lactulose started on 2/9.    -Mental status improved and she now has significant cramping and nausea/vomiting so stopping lactulose on 2/17.  -Monitor mental status closely.  -Check KUB.    Macrocytic anemia  -Hb 9.2 on admit - chronic secondary to cirrhosis.  -Dropped to <7 on 2/2  -No evidence of GI bleeding but had noted epistaxis and bleeding gums at times since before hospitalization.  -Folate and B12 replete.  She is iron deficient.  -1 unit PRBC ordered 2/2 but transfusion delayed due to complex antibody matching.  Blood finally available and she received 1 unit PRBC 2/4.   -H/H has remained relatively stable.  8.6 yesterday  -Monitor labs  q48h.    Thrombocytopenia  -Chronic and at baseline on admit secondary to cirrhosis  -Heme/onc consulted and input appreciated  -Plan is transfuse for platelets <10 or if significant hemorrhage.  -Closely monitor.    MANUEL (generalized anxiety disorder)  -Very anxious today  -On 2/6 gave one time low dose ativan PO - watch respiratory status very closely.    Debility  -PT/OT consulted and recommend inpatient rehab at discharge.    Metabolic acidosis  -Suspect secondary to ANI, management as above    Sleep apnea  -Continue bipap qhs    Type 2 diabetes mellitus without complication, without long-term current use of insulin  -A1c 5.9  -Sugars above goal range  -Continue detemir insulin and SSI ac/hs      Gastroesophageal reflux disease  -Stable, no acute issue    HTN (hypertension)  -BP low normal today - was in SPB 90s after dialysis yesterday  -Stopped metoprolol 2/16  -Will consider adding midodrine if drops below 90 or any symptoms.    Hyperlipidemia  -History noted  -Not on statin due to cirrhosis      VTE Risk Mitigation (From admission, onward)         Ordered     heparin (porcine) injection 3,200 Units  As needed (PRN)         02/07/22 1834     IP VTE HIGH RISK PATIENT  Once         02/01/22 1557     Place sequential compression device  Until discontinued         02/01/22 1557                Discharge Planning   CLAUDETTE: 2/18/2022     Code Status: Full Code   Is the patient medically ready for discharge?:     Reason for patient still in hospital (select all that apply): Treatment  Discharge Plan A: Rehab   Discharge Delays: (!) Post-Acute Set-up              Chadwick Quan MD  Department of Hospital Medicine   [facility] - Telemetry

## 2022-02-17 NOTE — SUBJECTIVE & OBJECTIVE
Interval History: No acute events overnight.  Looks miserable and complains of nausea, vomiting and abdominal cramps.  She and her niece believe this is due to the miralax and lactulose.  Breathing more comfortably.  Denies shortness of breath.  All questions answered and patient had no further complaints.    Review of Systems   Constitutional: Positive for activity change, appetite change and fatigue.   HENT: Negative for ear discharge and ear pain.    Eyes: Negative for discharge and itching.   Respiratory: Negative for chest tightness and shortness of breath.    Cardiovascular: Negative for chest pain.   Gastrointestinal: Positive for abdominal pain, nausea and vomiting. Negative for constipation and diarrhea.   Endocrine: Negative for cold intolerance and heat intolerance.   Genitourinary: Negative for difficulty urinating.   Musculoskeletal: Negative for arthralgias and back pain.   Neurological: Positive for weakness. Negative for seizures and syncope.   Psychiatric/Behavioral: Negative for agitation and dysphoric mood.     Objective:     Vital Signs (Most Recent):  Temp: 97.7 °F (36.5 °C) (02/17/22 0733)  Pulse: 106 (02/17/22 0733)  Resp: 19 (02/17/22 0733)  BP: (!) 112/55 (02/17/22 0733)  SpO2: 96 % (02/17/22 0733) Vital Signs (24h Range):  Temp:  [97.5 °F (36.4 °C)-97.7 °F (36.5 °C)] 97.7 °F (36.5 °C)  Pulse:  [] 106  Resp:  [17-20] 19  SpO2:  [96 %-99 %] 96 %  BP: ()/(34-55) 112/55     Weight: 70.8 kg (156 lb 1.4 oz)  Body mass index is 33.78 kg/m².    Intake/Output Summary (Last 24 hours) at 2/17/2022 1028  Last data filed at 2/16/2022 1236  Gross per 24 hour   Intake 700 ml   Output 600 ml   Net 100 ml      Physical Exam  Vitals and nursing note reviewed.   Constitutional:       General: She is not in acute distress.     Appearance: She is well-developed. She is ill-appearing. She is not toxic-appearing or diaphoretic.      Interventions: Nasal cannula in place.   HENT:      Head:  Normocephalic and atraumatic.      Right Ear: External ear normal.      Left Ear: External ear normal.      Nose: Nose normal.      Mouth/Throat:      Mouth: Mucous membranes are moist.   Eyes:      Extraocular Movements: Extraocular movements intact.      Conjunctiva/sclera: Conjunctivae normal.   Cardiovascular:      Rate and Rhythm: Normal rate and regular rhythm.   Pulmonary:      Effort: No tachypnea or respiratory distress.      Breath sounds: No wheezing.   Abdominal:      General: Bowel sounds are normal. There is no distension.      Palpations: Abdomen is soft.      Tenderness: There is no abdominal tenderness.      Comments: No palpable hepatomegaly or splenomegaly    Musculoskeletal:         General: No tenderness. Normal range of motion.      Cervical back: Normal range of motion and neck supple.      Right lower leg: Edema present.      Left lower leg: Edema present.   Skin:     General: Skin is warm and dry.   Neurological:      Mental Status: She is alert and oriented to person, place, and time.      Comments: Lethargic but alert and conversant.  Diffuse weakness.   Psychiatric:         Thought Content: Thought content normal.         Significant Labs: All pertinent labs within the past 24 hours have been reviewed.    Significant Imaging: I have reviewed all pertinent imaging results/findings within the past 24 hours.

## 2022-02-17 NOTE — PT/OT/SLP PROGRESS
Speech Language Pathology Treatment    Patient Name:  Tammi Farris   MRN:  [id number]  Admitting Diagnosis: Acute renal failure superimposed on stage 2 chronic kidney disease    Recommendations:                 General Recommendations:  Dysphagia therapy  Diet recommendations:  Mechanical soft, Liquid Diet Level: Thin   Aspiration Precautions: 1 bite/sip at a time, Alternating bites/sips and Small bites/sips   General Precautions: Standard, other (see comments) (mechanical soft)  Communication strategies:  none    Subjective     Pt extremely lethargic, not feeing well upon SLP arrival. Pt's friend at bedside reported pt started vomiting and became weak and lethargic post dialysis yesterday. Pt has refused to eat since before dialysis.      Pain/Comfort:  · Pain Rating 1: 0/10    Respiratory Status: Nasal cannula, flow 2 L/min    Objective:     Has the patient been evaluated by SLP for swallowing?      Keep patient NPO?     Current Respiratory Status:        Pt agreeable to liquid trials for therapeutic purposes. Pt refused solids.   Pt accepted 3 trials of Supplementa via spoon, pt was too weak to siphon liquid through a straw. Pt also accepted 4 trials of ice chips. Pt's swallow initiation was timely with no overt s/s of aspiration post swallow.  JESSIE Garvin notified regarding pt feeling nauseated.    Assessment:     Tammi Farris is a 71 y.o. female with a dx of acute renal failure. She presents with mild oral dysphagia.Pt has been tolerating current diet well, however, pt has been feeling ill since dialysis yesterday refusing to eat. WWill con't ST per POC.    Goals:   Multidisciplinary Problems     SLP Goals        Problem: SLP Goal    Goal Priority Disciplines Outcome   SLP Goal    Low SLP Ongoing, Progressing   Description: Pt will tolerate mech soft with thin liquids X2 consecutive sessions (1 of 2 met 2/14/22)                   Plan:     · Patient to be seen:  2 x/week   · Plan of Care expires:     · Plan of  Care reviewed with:  patient,friend   · SLP Follow-Up:  Yes       Discharge recommendations:  rehabilitation facility   Barriers to Discharge:  None    Time Tracking:     SLP Treatment Date:   02/17/22  Speech Start Time:  0919  Speech Stop Time:  0933     Speech Total Time (min):  14 min    Billable Minutes: Treatment Swallowing Dysfunction 14 min    02/17/2022

## 2022-02-17 NOTE — ASSESSMENT & PLAN NOTE
-Hb 9.2 on admit - chronic secondary to cirrhosis.  -Dropped to <7 on 2/2  -No evidence of GI bleeding but had noted epistaxis and bleeding gums at times since before hospitalization.  -Folate and B12 replete.  She is iron deficient.  -1 unit PRBC ordered 2/2 but transfusion delayed due to complex antibody matching.  Blood finally available and she received 1 unit PRBC 2/4.   -H/H has remained relatively stable.  8.6 yesterday  -Monitor labs q48h.

## 2022-02-17 NOTE — NURSING
"Went to administer scheduled medications. Patient was sitting up in bed with family member "Jered" sipping on Novasource Renal supplement. She appeared to be tolerating it well. Crushed medications: famotidine and  Prednisone and added to applesauce per patient request. Patient tolerated first mixture of medication + applesauce well. Waited a few minutes and then administered second dose; tolerated well. Upon 3rd administration, patient started to gag. Sat patient up and waited. Patient began to vomit; emesis appeared to be applesauce and med mixture.     Stayed with patient until vomiting subsided. Obtain and administered IV dose of Zofran. Patient sitting up at this time and states that she is just hot' room temperature adjusted. MD notified.   "

## 2022-02-17 NOTE — ASSESSMENT & PLAN NOTE
-Admitted to inpatient status  -Baseline Cr 0.9.  On admit Cr 2.7  -Reports being started on lasix 1/8 by her cardiologist and over several days PTA had decreased UOP and diminished PO intake  -On admit BNP elevated at 1160 and with metabolic acidosis and mild hyperkalemia   -Nephrology consulted and input appreciated  -ARMEN positive.  ANCAs negative.  Complement low.  -IR consulted for kidney biopsy, but patient decompensated further and biopsy delayed.  -On 2/7 Cr continued to worsen and was encephalopathic so IR placed THDC and HD initiated.  -Completed 3 days of pulse dose steroids for suspected glomerulonephritis vs pulm/renal syndrome and continues on prednisone.  -Discussed with Dr. Alvarado 2/16.  Suspect lupus nephritis.  On 2/16 decreased prednisone and added cellcept  -Continue HD MWF for now.  Will likely need HD for several months, but hopefully will not need longterm.

## 2022-02-18 NOTE — PROGRESS NOTES
[facility] Medicine  Progress Note    Patient Name: Tammi Farris  MRN: [id number]  Patient Class: IP- Inpatient   Admission Date: 2/1/2022  Length of Stay: 17 days  Attending Physician: Chadwick Quan MD  Primary Care Provider: Ann-Marie Hartman MD        Subjective:     Principal Problem:Acute renal failure superimposed on stage 2 chronic kidney disease        HPI:  71 y.o. female with HTN, HLD, GERD, cirrhosis of liver, thrombocytopenia, and sleep apnea presents with a complaint of cough and congestion since October.  She is chronically dyspneic, exacerbated by exertion, has seen Cardiology and was prescribed lasix and metoprolol.  Her sister has now noted that the patient has very low urine output and is constipated.  Also follows with Hepatology for chronic liver disease.  Patient denies fever, chills, chest pain, palpitations, orthopnea, PND, dizziness, syncope, n/v/d, abdominal pain, or dysuria.  In the ED, labs reveal ANI, hyperkalemia, metabolic acidosis with elevated lactic acid, elevated liver enzymes, elevated BNP, elevated d-dimer, thrombocytopenia, and markedly concentrated urine.  Echo December 2021 showed preserved EF, no evidence of heart failure.  Chest xray without acute abnormality. No convincing evidence of infectious process.      Overview/Hospital Course:  71 year old woman with HTN, HLD, GERD, CKDII, cirrhosis and PAULINA presented for sob and has been diagnosed with ani/CKDII and acute hypoxic respiratory failure.  Complex patient and presentation with very difficult to ascertain volume status.  Was given lasix and then fluids and then attempt at lasix again.  Suspect a pulmonary/renal vasculitis but not clearly diagnosed (Prior TAMMI positive 2019, low C3 and C4 and abnormal SPEP with elevated kappa/lambda ratio).  Had planned renal biopsy, but encephalopathic, anuric and worsening acidosis so THDC placed and initiated HD on 2/7.  Has completed 3 days pulse dose steroids (1g  daily) and will be on prednisone 60 mg.  Patient more lethargic on 2/8 and noted to have episode of rapid AFib.  Moved to ICU.  Noted to have increased ammonia levels and started on lactulose.  Mentation slowly improving with no further arrhythmias.  Poor oral intake.  NGT placed and started on tube feedings.  NGT removed and ST consulted.  Severely debilitated and PT/OT consulted.      Interval History: No acute events overnight.  Seen in HD today.  Looks a bit more comfortable today, but still very ill in appearance.  Nausea is a bit improved.  Primarily taking nutritional shakes as po intake - eating very little.  Breathing more comfortably.  Denies shortness of breath.  All questions answered and patient had no further complaints.    Review of Systems   Constitutional: Positive for activity change, appetite change and fatigue.   HENT: Negative for ear discharge and ear pain.    Eyes: Negative for discharge and itching.   Respiratory: Negative for chest tightness and shortness of breath.    Cardiovascular: Negative for chest pain.   Gastrointestinal: Positive for nausea. Negative for abdominal pain, constipation, diarrhea and vomiting.   Endocrine: Negative for cold intolerance and heat intolerance.   Genitourinary: Negative for difficulty urinating.   Musculoskeletal: Negative for arthralgias and back pain.   Neurological: Positive for weakness. Negative for seizures and syncope.   Psychiatric/Behavioral: Negative for agitation and dysphoric mood.     Objective:     Vital Signs (Most Recent):  Temp: 97 °F (36.1 °C) (02/18/22 1112)  Pulse: 99 (on the telemetry monitor) (02/18/22 1130)  Resp: 19 (02/18/22 1112)  BP: (!) 108/56 (02/18/22 1112)  SpO2: 100 % (02/18/22 1112) Vital Signs (24h Range):  Temp:  [97 °F (36.1 °C)-98.7 °F (37.1 °C)] 97 °F (36.1 °C)  Pulse:  [] 99  Resp:  [16-19] 19  SpO2:  [98 %-100 %] 100 %  BP: (108-143)/(56-61) 108/56     Weight: 70.8 kg (156 lb 1.4 oz)  Body mass index is 33.78  kg/m².    Intake/Output Summary (Last 24 hours) at 2/18/2022 1227  Last data filed at 2/17/2022 1620  Gross per 24 hour   Intake 150 ml   Output --   Net 150 ml      Physical Exam  Vitals and nursing note reviewed.   Constitutional:       General: She is not in acute distress.     Appearance: She is well-developed. She is ill-appearing. She is not toxic-appearing or diaphoretic.      Interventions: Nasal cannula in place.   HENT:      Head: Normocephalic and atraumatic.      Right Ear: External ear normal.      Left Ear: External ear normal.      Nose: Nose normal.      Mouth/Throat:      Mouth: Mucous membranes are moist.   Eyes:      Extraocular Movements: Extraocular movements intact.      Conjunctiva/sclera: Conjunctivae normal.   Cardiovascular:      Rate and Rhythm: Normal rate and regular rhythm.   Pulmonary:      Effort: No tachypnea or respiratory distress.      Breath sounds: No wheezing.   Chest:      Comments: Tunneled HD line in right chest with notable surrounding ecchymosis   Abdominal:      General: Bowel sounds are normal. There is no distension.      Palpations: Abdomen is soft.      Tenderness: There is no abdominal tenderness.      Comments: No palpable hepatomegaly or splenomegaly    Musculoskeletal:         General: No tenderness. Normal range of motion.      Cervical back: Normal range of motion and neck supple.      Right lower leg: Edema present.      Left lower leg: Edema present.   Skin:     General: Skin is warm and dry.   Neurological:      Mental Status: She is alert and oriented to person, place, and time.      Comments: Lethargic but alert and conversant.  Diffuse weakness.   Psychiatric:         Thought Content: Thought content normal.         Significant Labs: All pertinent labs within the past 24 hours have been reviewed.    Significant Imaging: I have reviewed all pertinent imaging results/findings within the past 24 hours.      Assessment/Plan:      * Acute renal failure  superimposed on stage 2 chronic kidney disease  -Admitted to inpatient status  -Baseline Cr 0.9.  On admit Cr 2.7  -Reports being started on lasix 1/8 by her cardiologist and over several days PTA had decreased UOP and diminished PO intake  -On admit BNP elevated at 1160 and with metabolic acidosis and mild hyperkalemia   -Nephrology consulted and input appreciated  -ARMEN positive.  ANCAs negative.  Complement low.  -IR consulted for kidney biopsy, but patient decompensated further and biopsy delayed.  -On 2/7 Cr continued to worsen and was encephalopathic so IR placed THDC and HD initiated.  -Completed 3 days of pulse dose steroids for suspected glomerulonephritis vs pulm/renal syndrome and continues on prednisone.  -Discussed with Dr. Alvarado today.  He suspects lupus nephritis.  On 2/16 decreased prednisone and added cellcept  -Has been on HD MWF (had HD today) and Cr appears to be down trending.  Now 3.5.  May need to hold further HD and monitor.    Cirrhosis of liver without ascites  -Noted mild transaminitis and thrombocytopenia which are not new.  Noted epistaxis and gingival bleeding which has been ongoing for several months.  These are chronic and note history of cirrhosis and alcohol abuse   -Follows with hepatology.  Unclear if this was autoimmune?  -Abdominal US on day prior to admit showed hepatic cirrhosis/steatosis with sonographic findings of portal hypertension including trace ascites and splenomegaly.  -Noted to be more lethargic with elevation of ammonia level and was lactulose started on 2/9.    -Mental status improved and she had significant cramping and nausea/vomiting so stopped lactulose on 2/17.  KUB 2/17 with non-specific bowel gas pattern  -Monitor mental status closely.    Macrocytic anemia  -Hb 9.2 on admit - chronic secondary to cirrhosis.  -Dropped to <7 on 2/2  -No evidence of GI bleeding but had noted epistaxis and bleeding gums at times since before hospitalization.  -Folate and B12  replete.  She is iron deficient.  -1 unit PRBC ordered 2/2 but transfusion delayed due to complex antibody matching.  Blood finally available and she received 1 unit PRBC 2/4.   -H/H has remained relatively stable.  8.0 today  -Monitor labs q48h.    Thrombocytopenia  -Chronic and at baseline on admit secondary to cirrhosis  -Heme/onc consulted and input appreciated  -Plan is transfuse for platelets <10 or if significant hemorrhage.  -Closely monitor.    MANUEL (generalized anxiety disorder)  -Remains anxious   -On 2/6 gave one time low dose ativan PO - watch respiratory status very closely.    Debility  -PT/OT consulted and recommend inpatient rehab at discharge.    Metabolic acidosis  -Suspect secondary to ANI, management as above    Sleep apnea  -Continue bipap qhs    Type 2 diabetes mellitus without complication, without long-term current use of insulin  -A1c 5.9  -Sugars above goal range  -Increase detemir to 11 units qhs  -Continue SSI ac/hs    Gastroesophageal reflux disease  -Stable, no acute issue    HTN (hypertension)  -BP normal today - was in SPB 90s after dialysis on Wednesday  -Stopped metoprolol 2/16 and noted recurrent tachycardia  -Resume toprol 12.5mg daily with hold parameters    Hyperlipidemia  -History noted  -Not on statin due to cirrhosis      VTE Risk Mitigation (From admission, onward)         Ordered     heparin (porcine) injection 3,200 Units  As needed (PRN)         02/07/22 1834     IP VTE HIGH RISK PATIENT  Once         02/01/22 1557     Place sequential compression device  Until discontinued         02/01/22 1557                Discharge Planning   CLAUDETTE: 2/18/2022     Code Status: Full Code   Is the patient medically ready for discharge?:     Reason for patient still in hospital (select all that apply): Treatment  Discharge Plan A: Rehab   Discharge Delays: (!) Post-Acute Set-up              Chadwick Quan MD  Department of Hospital Medicine   [facility] - [place], [place] - Telemetry

## 2022-02-18 NOTE — ASSESSMENT & PLAN NOTE
-Noted mild transaminitis and thrombocytopenia which are not new.  Noted epistaxis and gingival bleeding which has been ongoing for several months.  These are chronic and note history of cirrhosis and alcohol abuse   -Follows with hepatology.  Unclear if this was autoimmune?  -Abdominal US on day prior to admit showed hepatic cirrhosis/steatosis with sonographic findings of portal hypertension including trace ascites and splenomegaly.  -Noted to be more lethargic with elevation of ammonia level and was lactulose started on 2/9.    -Mental status improved and she had significant cramping and nausea/vomiting so stopped lactulose on 2/17.  KUB 2/17 with non-specific bowel gas pattern  -Monitor mental status closely.

## 2022-02-18 NOTE — ASSESSMENT & PLAN NOTE
-Remains anxious   -On 2/6 gave one time low dose ativan PO - watch respiratory status very closely.

## 2022-02-18 NOTE — NURSING
Report received from JESSIE Ricardo. Patient resting comfortably, no complaints, no acute distress noted. Plan of care reviewed with patient. Instructed patient to call for assistance before ambulating, side rails up x3, bed alarm set, call light in reach, non skid socks in use. Patient verbalized understanding of instructions.

## 2022-02-18 NOTE — PLAN OF CARE
Problem: Adult Inpatient Plan of Care  Goal: Plan of Care Review  Outcome: Ongoing, Progressing  Goal: Patient-Specific Goal (Individualized)  Outcome: Ongoing, Progressing  Goal: Absence of Hospital-Acquired Illness or Injury  Outcome: Ongoing, Progressing  Goal: Optimal Comfort and Wellbeing  Outcome: Ongoing, Progressing  Goal: Readiness for Transition of Care  Outcome: Ongoing, Progressing     Patient on the bed.  Not in any distress. IV site flushing well.  Assessment and vital signs are charted on the flow sheet.  Slept well during night.  Safety maintained in every ways.  Medication given per order and charting done accordingly.  Shift was uneventful.   Changed her position frequently, encouraged her to change position.

## 2022-02-18 NOTE — SUBJECTIVE & OBJECTIVE
Interval History: No acute events overnight.  Seen in HD today.  Looks a bit more comfortable today, but still very ill in appearance.  Nausea is a bit improved.  Primarily taking nutritional shakes as po intake - eating very little.  Breathing more comfortably.  Denies shortness of breath.  All questions answered and patient had no further complaints.    Review of Systems   Constitutional: Positive for activity change, appetite change and fatigue.   HENT: Negative for ear discharge and ear pain.    Eyes: Negative for discharge and itching.   Respiratory: Negative for chest tightness and shortness of breath.    Cardiovascular: Negative for chest pain.   Gastrointestinal: Positive for nausea. Negative for abdominal pain, constipation, diarrhea and vomiting.   Endocrine: Negative for cold intolerance and heat intolerance.   Genitourinary: Negative for difficulty urinating.   Musculoskeletal: Negative for arthralgias and back pain.   Neurological: Positive for weakness. Negative for seizures and syncope.   Psychiatric/Behavioral: Negative for agitation and dysphoric mood.     Objective:     Vital Signs (Most Recent):  Temp: 97 °F (36.1 °C) (02/18/22 1112)  Pulse: 99 (on the telemetry monitor) (02/18/22 1130)  Resp: 19 (02/18/22 1112)  BP: (!) 108/56 (02/18/22 1112)  SpO2: 100 % (02/18/22 1112) Vital Signs (24h Range):  Temp:  [97 °F (36.1 °C)-98.7 °F (37.1 °C)] 97 °F (36.1 °C)  Pulse:  [] 99  Resp:  [16-19] 19  SpO2:  [98 %-100 %] 100 %  BP: (108-143)/(56-61) 108/56     Weight: 70.8 kg (156 lb 1.4 oz)  Body mass index is 33.78 kg/m².    Intake/Output Summary (Last 24 hours) at 2/18/2022 1227  Last data filed at 2/17/2022 1620  Gross per 24 hour   Intake 150 ml   Output --   Net 150 ml      Physical Exam  Vitals and nursing note reviewed.   Constitutional:       General: She is not in acute distress.     Appearance: She is well-developed. She is ill-appearing. She is not toxic-appearing or diaphoretic.       Interventions: Nasal cannula in place.   HENT:      Head: Normocephalic and atraumatic.      Right Ear: External ear normal.      Left Ear: External ear normal.      Nose: Nose normal.      Mouth/Throat:      Mouth: Mucous membranes are moist.   Eyes:      Extraocular Movements: Extraocular movements intact.      Conjunctiva/sclera: Conjunctivae normal.   Cardiovascular:      Rate and Rhythm: Normal rate and regular rhythm.   Pulmonary:      Effort: No tachypnea or respiratory distress.      Breath sounds: No wheezing.   Chest:      Comments: Tunneled HD line in right chest with notable surrounding ecchymosis   Abdominal:      General: Bowel sounds are normal. There is no distension.      Palpations: Abdomen is soft.      Tenderness: There is no abdominal tenderness.      Comments: No palpable hepatomegaly or splenomegaly    Musculoskeletal:         General: No tenderness. Normal range of motion.      Cervical back: Normal range of motion and neck supple.      Right lower leg: Edema present.      Left lower leg: Edema present.   Skin:     General: Skin is warm and dry.   Neurological:      Mental Status: She is alert and oriented to person, place, and time.      Comments: Lethargic but alert and conversant.  Diffuse weakness.   Psychiatric:         Thought Content: Thought content normal.         Significant Labs: All pertinent labs within the past 24 hours have been reviewed.    Significant Imaging: I have reviewed all pertinent imaging results/findings within the past 24 hours.

## 2022-02-18 NOTE — PT/OT/SLP PROGRESS
Occupational Therapy   Treatment    Name: Tammi Farris  MRN: [id number]  Admitting Diagnosis:  Acute renal failure superimposed on stage 2 chronic kidney disease       Recommendations:     Discharge Recommendations: rehabilitation facility  Discharge Equipment Recommendations:  bedside commode,bath bench (jewell RW)  Barriers to discharge:   (not at PLOF; was fully independent PTA)    Assessment:     Tammi Farris is a 71 y.o. female with a medical diagnosis of Acute renal failure superimposed on stage 2 chronic kidney disease. Performance deficits affecting function are weakness,impaired endurance,impaired cognition,decreased ROM,decreased coordination,impaired fine motor,decreased upper extremity function,impaired self care skills,decreased lower extremity function,impaired functional mobilty,gait instability,decreased safety awareness,edema,impaired balance,impaired cardiopulmonary response to activity.     Pt participatory with BUE AROM seated EOB with multiple rest breaks. Lethargic after dialysis, but improved participation compared to 02/17/22 session.    Today, pt struggled with directional cueing with bed mobility requiring short simple verbal with tactile cueing to scoot forward. Pt had difficulty describing how she felt after ambulating and did not make these needs known during ambulation.     Towards reps 7-10 of BUE AROM, pt unable to continue correct movements of exercises requiring continuous demo while pt finished exercise. Pt had 3 errors with counting reps to 10 with minimal distractions in environment.     Rehab Prognosis:  Good; patient would benefit from acute skilled OT services to address these deficits and reach maximum level of function.       Plan:     Patient to be seen  (5-6x/wk) to address the above listed problems via self-care/home management,therapeutic activities,therapeutic exercises  · Plan of Care Expires: 03/03/22  · Plan of Care Reviewed with: patient    Subjective     Chief  complaint: felt weak and off balanced today and sleepy after HD   Patient/family comments/ goals: felt good to get out the room; motivated to keep trying     Pain/Comfort:  · Pain Rating 1: 0/10    Objective:     Communicated with: nursing prior to session.  Patient found HOB elevated with peripheral IV,telemetry,oxygen (HD catheter R chest) upon OT entry to room.    General Precautions: Standard, fall   Orthopedic Precautions:N/A   Braces: N/A  Respiratory Status: Nasal cannula, flow 2 L/min     spO2 95-97% on 2L. HR seated EOB with BUE AROM to 140 bpm and recovered at rest to ~100 bpm. HR to 140 bpm with ambulation with monitoring on telemetry. Nurse notified and pt assisted back to bed after session.      Occupational Performance:     Bed Mobility:    · Patient completed Rolling/Turning to Left with  minimum assistance and with side rail  · Patient completed Scooting anteriorly/posteriorly with minimum assistance  · Patient completed Supine to Sit with minimum assistance, with side rail and HOB elevated with verbal/tactile cueing needed  · Patient completed Sit to Supine with minimum assistance     Functional Mobility/Transfers:  · Patient completed Sit <> Stand Transfer with minimum assistance  with  rolling walker   · Functional Mobility: pt completed ~30 ft x2 using RW on 2L O2 with chair closely following. Prolonged seated rest break in-between trials. Verbal cueing for safe hand placement with each stand.     Activities of Daily Living:  · Grooming: stand by assistance seated EOB to comb her hair with 3 rest breaks needed  · Upper Body Dressing: minimum assistance to don back gown while seated EOB      [facility] 6 Click ADL: 15    Treatment & Education:  · Pt re-educated on OT role/POC.   · Importance of OOB activity with staff assistance.  · Safety during functional t/f and mobility   · Seated unsupported at EOB, pt completed the following BUE AROM:   · x10 elbow flex/ext, shoulder flex/ext, shoulder horizontal  ab/dduction   · Multiple self-care tasks/functional mobility completed- assistance level noted above   · All questions/concerns answered within OT scope of practice       Patient left bed in chair position with all lines intact, call button in reach, bed alarm on, nurse K, notified and all needs met/within reachEducation:      GOALS:   Multidisciplinary Problems     Occupational Therapy Goals        Problem: Occupational Therapy Goal    Goal Priority Disciplines Outcome Interventions   Occupational Therapy Goal     OT, PT/OT Ongoing, Progressing    Description: Goals to be met by: 03/03/22     Patient will increase functional independence with ADLs by performing:    UE Dressing with Stand-by assistance.  LE Dressing with Minimal assistance.  Grooming while seated with Stand-by assistance.  Toileting from bedside commode with minimal assistance for hygiene and clothing management.   Supine to sit with Stand-by assistance.  Step transfer with stand-by assistance  Toilet transfer to bedside commode with stand-by assistance.  Upper extremity exercise program x15 reps per handout, with independence.                     Time Tracking:     OT Date of Treatment: 02/18/22  OT Start Time: 1430  OT Stop Time: 1457  OT Total Time (min): 27 min    Billable Minutes:Therapeutic Exercise 13 min  Total Time 27 min (co-treat with PT)    OT/MYRA: OT          2/18/2022

## 2022-02-18 NOTE — PLAN OF CARE
Recommendations   1) Continue Renal/Diabetic diet - texture modified per ST    2) Continue Novasource Renal BID    3) If nausea continues, consider scheduling zofran vs PRN    Goals: Pt to meet > 50% EEN by RD follow up  Nutrition Goal Status: progressing towards goal  Communication of RD Recs: reviewed with physician

## 2022-02-18 NOTE — PLAN OF CARE
Problem: Occupational Therapy Goal  Goal: Occupational Therapy Goal  Description: Goals to be met by: 03/03/22     Patient will increase functional independence with ADLs by performing:    UE Dressing with Stand-by assistance.  LE Dressing with Minimal assistance.  Grooming while seated with Stand-by assistance.  Toileting from bedside commode with minimal assistance for hygiene and clothing management.   Supine to sit with Stand-by assistance.  Step transfer with stand-by assistance  Toilet transfer to bedside commode with stand-by assistance.  Upper extremity exercise program x15 reps per handout, with independence.    Outcome: Ongoing, Progressing     Pt participatory with BUE AROM seated EOB with multiple rest breaks. Lethargic after dialysis, but improved participation compared to 02/17/22 session.

## 2022-02-18 NOTE — NURSING
Patient transported to dialysis via bed with 2 L N/C in use. No complaints, no acute distress noted. Will monitor upon return to unit.

## 2022-02-18 NOTE — PLAN OF CARE
Problem: Physical Therapy Goal  Goal: Physical Therapy Goal  Description: Goals to be met by: 22     Patient will increase functional independence with mobility by performin. Supine to sit with Modified [place]  2. Rolling to Left and Right with Modified [place]  3. Sit to stand transfer with Modified [place] using RW  4. Bed to chair transfer with Modified [place] using Rolling Walker  5. Gait >50 feet with Modified [place] using Rolling Walker   6. Lower extremity exercise program 2 sets x10 reps per handout, with independence    Outcome: Ongoing, Progressing     Pt ambulated ~30 ft x2 trials with min A using, 2L O2 NC, and chair to follow.

## 2022-02-18 NOTE — PLAN OF CARE
Problem: Adult Inpatient Plan of Care  Goal: Plan of Care Review  Outcome: Ongoing, Progressing  Goal: Patient-Specific Goal (Individualized)  Outcome: Ongoing, Progressing  Goal: Absence of Hospital-Acquired Illness or Injury  Outcome: Ongoing, Progressing     Problem: Infection  Goal: Absence of Infection Signs and Symptoms  Outcome: Ongoing, Progressing     Problem: Fluid and Electrolyte Imbalance (Acute Kidney Injury/Impairment)  Goal: Fluid and Electrolyte Balance  Outcome: Ongoing, Progressing     Problem: Renal Function Impairment (Acute Kidney Injury/Impairment)  Goal: Effective Renal Function  Outcome: Ongoing, Progressing     Problem: Skin Injury Risk Increased  Goal: Skin Health and Integrity  Outcome: Ongoing, Progressing     Problem: Fall Injury Risk  Goal: Absence of Fall and Fall-Related Injury  Outcome: Ongoing, Progressing     Problem: Device-Related Complication Risk (Hemodialysis)  Goal: Safe, Effective Therapy Delivery  Outcome: Ongoing, Progressing     Problem: Hemodynamic Instability (Hemodialysis)  Goal: Effective Tissue Perfusion  Outcome: Ongoing, Progressing     Problem: Infection (Hemodialysis)  Goal: Absence of Infection Signs and Symptoms  Outcome: Ongoing, Progressing     Problem: Coping Ineffective  Goal: Effective Coping  Outcome: Ongoing, Progressing

## 2022-02-18 NOTE — PLAN OF CARE
PRINCE received a message from Shama at [facility] stating that based on info sent for IP Rehab, it lacks support to approve. They are offering the opportunity to set up a P2P before it is denied.     PRINCE notified Dr. Quan via secure chat.     PRINCE spoke with Portia at [facility]. Peer to Peer is scheduled for 1:30 pm. Dr. Quan notified.

## 2022-02-18 NOTE — NURSING
Patient received from the day nurse.  Is on the bed is not in distress and pain.  IV site assessed.  Patient appeared sleeping.safety maintained.  Bed in lowest locked position.personal items and call bell in reach.  Will continue to monitor.

## 2022-02-18 NOTE — NURSING
Report received from JESSIE Dickens. Pt is not in room. Pt is currently in dialysis.     1130: Pt returned from dialysis. Pt medicated per MAR.     1713: Nephrologist paged regarding pt refusing Cellcept. Pt can't swallow pills and medication can not be crushed. Per Evie Smith MD hold Cellcept.

## 2022-02-18 NOTE — ASSESSMENT & PLAN NOTE
-Hb 9.2 on admit - chronic secondary to cirrhosis.  -Dropped to <7 on 2/2  -No evidence of GI bleeding but had noted epistaxis and bleeding gums at times since before hospitalization.  -Folate and B12 replete.  She is iron deficient.  -1 unit PRBC ordered 2/2 but transfusion delayed due to complex antibody matching.  Blood finally available and she received 1 unit PRBC 2/4.   -H/H has remained relatively stable.  8.0 today  -Monitor labs q48h.

## 2022-02-18 NOTE — PT/OT/SLP PROGRESS
Physical Therapy      Patient Name:  Tammi Farris   MRN:  [id number]    Patient not seen at this time for PT tx secondary to Dialysis. Will follow-up.

## 2022-02-18 NOTE — ASSESSMENT & PLAN NOTE
-Admitted to inpatient status  -Baseline Cr 0.9.  On admit Cr 2.7  -Reports being started on lasix 1/8 by her cardiologist and over several days PTA had decreased UOP and diminished PO intake  -On admit BNP elevated at 1160 and with metabolic acidosis and mild hyperkalemia   -Nephrology consulted and input appreciated  -ARMEN positive.  ANCAs negative.  Complement low.  -IR consulted for kidney biopsy, but patient decompensated further and biopsy delayed.  -On 2/7 Cr continued to worsen and was encephalopathic so IR placed THDC and HD initiated.  -Completed 3 days of pulse dose steroids for suspected glomerulonephritis vs pulm/renal syndrome and continues on prednisone.  -Discussed with Dr. Alvarado today.  He suspects lupus nephritis.  On 2/16 decreased prednisone and added cellcept  -Has been on HD MWF (had HD today) and Cr appears to be down trending.  Now 3.5.  May need to hold further HD and monitor.

## 2022-02-18 NOTE — PT/OT/SLP PROGRESS
Speech Language Pathology      Tammi Farris  MRN: [id number]    Patient not seen today secondary to dialysis. Will follow-up today as time allows.

## 2022-02-18 NOTE — PROGRESS NOTES
"[facility] - Telemetry  Adult Nutrition  Progress Note    SUMMARY       Recommendations   1) Continue Renal/Diabetic diet - texture modified per ST    2) Continue Novasource Renal BID    3) If nausea continues, consider scheduling zofran vs PRN    Goals: Pt to meet > 50% EEN by RD follow up  Nutrition Goal Status: progressing towards goal  Communication of RD Recs: reviewed with physician    Assessment and Plan  Nutrition Problem  Inadequate oral intake     Related to (etiology):   Lethargy/decreased appetite      Signs and Symptoms (as evidenced by):   Pt only taking bites of meals with encouragement  N/V noted      Interventions(treatment strategy):  Collaboration with other providers  Carbohydrate modified diet  Renal diet     Nutrition Diagnosis Status:   Continues      Reason for Assessment    Reason For Assessment: RD follow-up  Diagnosis: other (see comments) (Acute renal failure superimposed on stage 2 CKD)  Relevant Medical History: HTN, HLD< GERD< cirrhosis of liver, sleep apnea  Interdisciplinary Rounds: did not attend  General Information Comments: 2/18- Rd f/u. Remote coverage. Pt on 2000kcal DM/renal mech soft diet. Novasource Renal TID. PO intake 50-75% noted. Nausea/ vomiting noted.  Nutrition Discharge Planning: Pt to follow a DM/renal diet with texture/consistency as tolerated.    Nutrition Risk Screen    Nutrition Risk Screen: no indicators present    Nutrition/Diet History    Typical Food/Fluid Intake: LISSETH  Spiritual, Cultural Beliefs, Scientology Practices, Values that Affect Care: no  Factors Affecting Nutritional Intake: nausea/vomiting    Anthropometrics    Temp: 98.7 °F (37.1 °C)  Height Method: Stated  Height: 4' 9" (144.8 cm)  Height (inches): 57 in  Weight Method: Bed Scale  Weight: 70.8 kg (156 lb 1.4 oz)  Weight (lb): 156.09 lb  Ideal Body Weight (IBW), Female: 85 lb  % Ideal Body Weight, Female (lb): 181.65 %  BMI (Calculated): 33.8  BMI Grade: 30 - 34.9- obesity - grade I  Usual Body " Weight (UBW), k.45 kg  % Usual Body Weight: 103.98     Lab/Procedures/Meds    Pertinent Labs Reviewed: reviewed  Pertinent Labs Comments: BUN 71, Cr 3.5, glu 247, WBC 14.89, H/H 8.0/24.0  Pertinent Medications Reviewed: reviewed  Pertinent Medications Comments: Famotidine, insulin detemir, metoprolol succinate, mycophenolate, polyethylene glycol, prednisone     Estimated/Assessed Needs    Weight Used For Calorie Calculations: 73 kg (161 lb)  Energy Calorie Requirements (kcal): 0039-1478 kcal/day based on 25 kcal/kg  Energy Need Method: Kcal/kg  Protein Requirements: 73-88 g/day based on 1-1.2 g/kg for ANI with dialysis, critical care  Weight Used For Protein Calculations: 73 kg (161 lb)  Fluid Requirements (mL): 500 mL + UOP for ANI  Estimated Fluid Requirement Method: other (see comments)  RDA Method (mL): 1460  CHO Requirement: 183 g day    Nutrition Prescription Ordered    Current Diet Order: Diabetic, renal, Level 5 Minced and Moist  Current Nutrition Support Formula Ordered:  (d/c)  Current Nutrition Support Rate Ordered: 0 (ml)  Current Nutrition Support Frequency Ordered: continuous  Oral Nutrition Supplement: Novasource Renal TID    Evaluation of Received Nutrient/Fluid Intake    Energy Calories Required: not meeting needs  Protein Required: not meeting needs  Fluid Required: meeting needs  Comments: LBM   Tolerance: not tolerating  % Intake of Estimated Energy Needs: 25 - 50 %  % Meal Intake: 25 - 50 %    Nutrition Risk    Level of Risk/Frequency of Follow-up: high (2x weekly)     Monitor and Evaluation    Food and Nutrient Intake: energy intake,food and beverage intake  Food and Nutrient Adminstration: diet order  Knowledge/Beliefs/Attitudes: food and nutrition knowledge/skill  Physical Activity and Function: nutrition-related ADLs and IADLs  Anthropometric Measurements: weight,weight change,body mass index  Biochemical Data, Medical Tests and Procedures: electrolyte and renal panel,lipid  profile,gastrointestinal profile,glucose/endocrine profile,inflammatory profile  Nutrition-Focused Physical Findings: overall appearance     Nutrition Follow-Up    RD Follow-up?: Yes

## 2022-02-18 NOTE — PT/OT/SLP PROGRESS
Occupational Therapy      Patient Name:  Tammi Farris   MRN:  [id number]    Patient not seen today secondary to Dialysis. Will follow-up later as able.    2/18/2022

## 2022-02-18 NOTE — PT/OT/SLP PROGRESS
Physical Therapy Treatment    Patient Name:  Tammi Farris   MRN:  [id number]    Recommendations:     Discharge Recommendations:  rehabilitation facility   Discharge Equipment Recommendations: bedside commode,bath bench (pediatric RW)   Barriers to discharge home: Pt with decreased functional mobility and ambulation at this time.  Pt with fall risk.    Assessment:     Tammi Farris is a 71 y.o. female admitted with a medical diagnosis of Acute renal failure superimposed on stage 2 chronic kidney disease.  She presents with the following impairments/functional limitations:  weakness,impaired self care skills,impaired functional mobilty,gait instability,impaired balance,impaired cognition,decreased coordination,decreased upper extremity function,decreased lower extremity function,decreased safety awareness,edema,impaired cardiopulmonary response to activity.    Rehab Prognosis: Good; patient would benefit from acute skilled PT services to address these deficits and reach maximum level of function.    Recent Surgery: * No surgery found *      Plan:     During this hospitalization, patient to be seen 6 x/week to address the identified rehab impairments via gait training,therapeutic activities,therapeutic exercises and progress toward the following goals:    · Plan of Care Expires:  02/23/22    Subjective     Chief Complaint: Pt reported weakness and feeling off balance during ambulation.  Patient/Family Comments/goals: Pt agreeable to therapy.   Pain/Comfort:  · Pain Rating 1: 0/10      Objective:     Patient found HOB elevated with bed alarm,telemetry,oxygen,peripheral IV (R chest HD access) upon PT entry to room.     General Precautions: Standard, fall,respiratory   Orthopedic Precautions:N/A   Braces:N/A  Respiratory Status: Nasal cannula, flow 2 L/min     Functional Mobility: Pt had dialysis this morning, still feeling a little sleepy however doing much better than yesterday.  Pt continued to require extra time to  complete functional tasks, now with increased HR during activities.    · Bed Mobility:     · Rolling Left:  minimum assistance  · Scooting: minimum assistance  · Supine to Sit: minimum assistance with HOB elevated   · Sit to Supine: minimum assistance  · Transfers:     · Sit to Stand:  minimum assistance with rolling walker x2 trials   · Bed to Chair: minimum assistance with  rolling walker  using  Step Transfer  · Chair to bed: minimum assistance with  rolling walker  using  Step Transfer  · Gait: Pt ambulated ~30 ft x2 trials with min A-CGA using RW, 2L O2 NC, and chair to follow.  Pt with narrow BRAD, decreased foot clearance, decreased step length, and decreased renetta.  Pt required min-mod VC's for foot clearance.  Pt required min A for RW management.  Pt with increased HR ~140's during ambulation.    · Balance: Pt with fair static sit/stand balance.     V/S: HR ~100-140's bpm    AM-PAC 6 CLICK MOBILITY  Turning over in bed (including adjusting bedclothes, sheets and blankets)?: 3  Sitting down on and standing up from a chair with arms (e.g., wheelchair, bedside commode, etc.): 3  Moving from lying on back to sitting on the side of the bed?: 3  Moving to and from a bed to a chair (including a wheelchair)?: 3  Need to walk in hospital room?: 3  Climbing 3-5 steps with a railing?: 2  Basic Mobility Total Score: 17       Patient left with bed in chair position with all lines intact, call button in reach, bed alarm on and nurse K notified.  Tray table close by.     GOALS:   Multidisciplinary Problems     Physical Therapy Goals        Problem: Physical Therapy Goal    Goal Priority Disciplines Outcome Goal Variances Interventions   Physical Therapy Goal     PT, PT/OT Ongoing, Progressing     Description: Goals to be met by: 22     Patient will increase functional independence with mobility by performin. Supine to sit with Modified [place]  2. Rolling to Left and Right with Modified [place]  3.  Sit to stand transfer with Modified [place] using RW  4. Bed to chair transfer with Modified [place] using Rolling Walker  5. Gait >50 feet with Modified [place] using Rolling Walker   6. Lower extremity exercise program 2 sets x10 reps per handout, with independence                     Time Tracking:     PT Received On: 02/18/22  PT Start Time: 1429     PT Stop Time: 1457  PT Total Time (min): 28 min     Billable Minutes: Gait Training 14 min co-tx with OT    Treatment Type: Treatment  PT/PTA: PT     PTA Visit Number: 0     02/18/2022

## 2022-02-18 NOTE — NURSING
Went into patient room to offer nausea medication as it is available at this time. Pt was sipping some fluids with family member. Family member stated that she had eaten a few bites of food with vomiting however she did not want anymore. Patient asked to hold Zofran at this time and will call when she needs it. Patient did refuse scheduled cough medication and lanthanum at this time.

## 2022-02-18 NOTE — ASSESSMENT & PLAN NOTE
-BP normal today - was in SPB 90s after dialysis on Wednesday  -Stopped metoprolol 2/16 and noted recurrent tachycardia  -Resume toprol 12.5mg daily with hold parameters

## 2022-02-18 NOTE — PLAN OF CARE
[facility] - Telemetry  Discharge Reassessment    Primary Care Provider: Ann-Marie Hartman MD    Expected Discharge Date: 2/18/2022    SW contacted patient's sister, Tania to update her on discharge plan. SW explained that a peer to peer was needed to determine whether or not patient needed rehab. SW explained that patient's insurance company needed more information to prove that she was medically complexed and needs to see a physician daily. SW also notified Tania that dialysis is setup up with [facility] [place].   Reassessment (most recent)       Discharge Reassessment - 02/18/22 1414          Discharge Reassessment    Assessment Type Discharge Planning Reassessment (P)      Did the patient's condition or plan change since previous assessment? No (P)      Discharge Plan discussed with: Sibling (P)      Name(s) and Number(s) Tania (P)      Communicated CLAUDETTE with patient/caregiver Date not available/Unable to determine (P)      Discharge Plan A Rehab (P)      Discharge Plan B Home Health (P)      DME Needed Upon Discharge  walker, rolling (P)      Discharge Barriers Identified None (P)      Why the patient remains in the hospital Requires continued medical care (P)         Post-Acute Status    Post-Acute Authorization Placement (P)    IPR    Post-Acute Placement Status Pending payor medical review/second level review (P)      Coverage [employer] (P)      Patient choice form signed by patient/caregiver List from System Post-Acute Care (P)      Discharge Delays Post-Acute Set-up (P)

## 2022-02-19 NOTE — PROVIDER TRANSFER
Outside Transfer Acceptance Note / Regional Referral Center    Referring facility: [facility]   Referring provider: ANDREW GONZALEZ  Accepting facility: [facility] [address]  Reason for transfer:  RHEUMATOLOGY  Transfer diagnosis: LUPUS NEPHRITIS  Transfer specialty requested: RHEUMATOLOGY  Transfer specialty notified: yes  Transfer level: NUMBER 1-5: 2  Bed type requested: TELE  Isolation status: No active isolations   Admission class or status: IP- Inpatient      Narrative     71F with CKD2, HTN, HLD, DM2, cirrhosis, sleep apnea admitted on 2/2 for acute renal failure at [facility]. Did not improve with either IVF's or diuresis. Found to have low complement levels, positive anti-Sm/RNP ab's. Unable to get renal biopsy due to thrombocytopenia and how ill she looked. Treated with pulse dose steroids x 3 days; on prednisone. Attempted to start on Cellcept by Nephrologist but has not been taking due to size of pills. She has been started on dialysis, has received several sessions with Cr improving, plan to hold HD this weekend to see if renal recovery. Likely will eventually need to go to inpatient rehab due to debility. VSS, 100% on 2 L NC. WBC 14.89, PLT 51 (baseline 100), Cr 3.5 (improved from 6.4, baseline 0.9), BUN 71, UA with 3+ protein. Abdominal US 1/31 showed normal kidneys. There is concern for lupus nephritis. Case was discussed with Rheumatology who recommends transfer to [facility] for Rheum consult and IR renal biopsy.         Objective     Vitals: Temp: 97.4 °F (36.3 °C) (02/19/22 0844)  Pulse: 79 (02/19/22 0844)  Resp: 17 (02/19/22 0844)  BP: (!) 115/58 (02/19/22 0844)  SpO2: 100 % (02/19/22 0844)  Recent Labs:   All pertinent labs within the past 24 hours have been reviewed.  BMP:   Recent Labs   Lab 02/18/22 0326   *      K 3.8      CO2 20*   BUN 71*   CREATININE 3.5*   CALCIUM 9.0     CBC:   Recent Labs   Lab 02/18/22 0326   WBC 14.89*   HGB 8.0*   HCT 24.0*   PLT  51*       Vent settings: Oxygen Concentration (%):  [30] 30   IV access:        Peripheral IV - Single Lumen 02/19/22 0609 22 G Right Antecubital (Active)     Allergies:   Review of patient's allergies indicates:   Allergen Reactions    Dobutamine Hives    Benadryl [diphenhydramine hcl] Anxiety     Pt states she feels jumpy and anxious when taking.    Darvon [propoxyphene] Nausea Only    Shellfish containing products Hives    Iodinated contrast media Hives    Lisinopril Other (See Comments)     cough    Propoxyphene hcl      Itchy (skin)^    Tizanidine Other (See Comments)     Sweaty, difficulty swallowing    Statins-hmg-coa reductase inhibitors Anxiety and Other (See Comments)     Myalgia, fatigue, palpitations, anxiety      NPO: No      Anticoagulation:   Anticoagulants     Ordered     Route Frequency Start Stop    02/07/22 1834  heparin (porcine)         Cath As needed (PRN) 02/07/22 1933 --           Instructions      Omar Bowen-  Admit to Hospital Medicine  Upon patient arrival to floor, please send SecureChat to [facility] or call extension [phone] (if no answer, this will flip to a beeper, so enter your call back number) for Hospital Medicine admit team assignment and for additional admit orders for the patient.  Do not page the attending physician associated with the patient on arrival (this physician may not be on duty at the time of arrival).  Rather, always call [phone] to reach the triage physician for orders and team assignment.

## 2022-02-19 NOTE — NURSING
Report received from JESSIE Ricardo. Patient resting comfortably, no complaints, no acute distress noted. Pt endorses abdominal pain from constipation to JESSIE Ricardo and this RN. Pt educated on the importance of not refusing Miralax. Pt verbalizes understanding. Plan of care reviewed with patient. Instructed patient to call for assistance before ambulating, side rails up x3, bed alarm set, call light in reach, non skid socks in use. Family at bedside. Patient verbalized understanding of instructions. Will continue to monitor.     0846: Pt medicated per MAR. Pills were crushed and mixed with Miralax and water. Pt tolerated well.  at bedside. WCTM.    0938: Administered Epoetin SQ in abd. Pt attempted swallowing Renal Vitamin and started choking. Pt is not able to swallow pills or capsules at all. Will notify MD and RX again.     1300: Pt had a BM. Pt cleaned and assisted to the chair. Pt is eating meal in chair and has been encouraged to sit up in the chair for a few hours. Pt verbalizes that she will try.

## 2022-02-19 NOTE — PROGRESS NOTES
Date of Admission:2/1/2022    SUBJECTIVE: notes abdominal pain    Current Facility-Administered Medications   Medication    acetaminophen tablet 650 mg    albuterol-ipratropium 2.5 mg-0.5 mg/3 mL nebulizer solution 3 mL    benzonatate capsule 100 mg    bisacodyL suppository 10 mg    dextromethorphan-guaiFENesin  mg/5 ml liquid 5 mL    dextrose 50% injection 12.5 g    dextrose 50% injection 25 g    famotidine tablet 20 mg    glucagon (human recombinant) injection 1 mg    glucose chewable tablet 16 g    glucose chewable tablet 24 g    heparin (porcine) injection 3,200 Units    insulin aspart U-100 pen 0-5 Units    insulin detemir U-100 pen 11 Units    iohexoL (OMNIPAQUE 350) injection 100 mL    lanthanum chewable tablet 500 mg    melatonin tablet 6 mg    metoprolol succinate (TOPROL-XL) 24 hr split tablet 12.5 mg    naloxone 0.4 mg/mL injection 0.02 mg    ondansetron injection 4 mg    polyethylene glycol packet 17 g    predniSONE tablet 40 mg    simethicone chewable tablet 80 mg    sodium chloride 0.9% flush 10 mL       Wt Readings from Last 3 Encounters:   02/16/22 70.8 kg (156 lb 1.4 oz)   01/19/22 68 kg (149 lb 14.6 oz)   01/12/22 66.8 kg (147 lb 4.3 oz)     Temp Readings from Last 3 Encounters:   02/19/22 97.4 °F (36.3 °C) (Oral)   01/19/22 97 °F (36.1 °C) (Oral)   12/15/21 98.1 °F (36.7 °C) (Oral)     BP Readings from Last 3 Encounters:   02/19/22 (!) 115/58   02/07/22 (!) 105/51   01/19/22 (!) 122/58     Pulse Readings from Last 3 Encounters:   02/19/22 79   02/07/22 (!) 52   01/19/22 78       Intake/Output Summary (Last 24 hours) at 2/19/2022 0853  Last data filed at 2/18/2022 1458  Gross per 24 hour   Intake 150 ml   Output --   Net 150 ml       PE:  GEN:wd female in nad  HEENT:ncat,eomi,mm  CVS:s1s2 regular  PULM: no rales  ABD:+bs,soft  EXT:mild edema  NEURO:awake    No results for input(s): GLU, NA, K, CL, CO2, BUN, CREATININE, CALCIUM, MG in the last 24 hours.    Lab Results    Component Value Date    CALCIUM 9.0 02/18/2022    PHOS 5.1 (H) 02/14/2022       No results for input(s): WBC, RBC, HGB, HCT, PLT, MCV, MCH, MCHC in the last 24 hours.        A/P:  1.navneet. creatinine  Ok with hd. Hd MWF. Pt needs a renal biopsy and rheum workup.  2. Proteinuria. Nephrotic. Needs a biopsy. Ck pla2r and cryo. sm ,molly +. C3/c4down. ? Tx. Sle? No rash/jt swelling. Hold cellcept-cannot swallow.  3.hyperkalemia. better.  4.metabolic acidosis bicarb with hd.  5.anemia. epo today. Add vitamins.  6.htn. no arb.  7.dm3. Following sugars.  8.cirrhosis.following.

## 2022-02-19 NOTE — ASSESSMENT & PLAN NOTE
-Admitted to inpatient status  -Baseline Cr 0.9.  On admit Cr 2.7  -Reports being started on lasix 1/8 by her cardiologist and over several days PTA had decreased UOP and diminished PO intake  -On admit BNP elevated at 1160 and with metabolic acidosis and mild hyperkalemia   -Nephrology consulted and input appreciated  -ARMEN positive.  Anti Posey/RNP positive.  Complement low.  ANCA negative  -IR consulted for kidney biopsy, but patient decompensated further and had significant thrombocytopenia so biopsy delayed.  Continued discussion but has not been felt stable for biopsy on any weekday's so far.  -On 2/7 Cr continued to worsen and was encephalopathic so IR placed THDC and HD initiated.  -Completed 3 days of pulse dose steroids for suspected glomerulonephritis vs pulm/renal syndrome and continues on prednisone.  -Nephrology had ordered cellcept, but unable to swallow those pills so discontinued.  We do not have liquid cellcept here.  -Has been on HD MWF (had HD last on 2/18) and Cr appears to be down trending.  Now 3.5.  If further improvement may be able to hold off on HD.  -Discussed with Dr. Case today.  While this could be lupus nephritis, she doesn't have other hallmarks of lupus.  Needs renal biopsy and evaluation by Rheumatology.  -Discussed with Rheumatology at [facility] who were in agreement and when bed available will transfer to [facility].

## 2022-02-19 NOTE — PT/OT/SLP PROGRESS
Physical Therapy Treatment    Patient Name:  Tammi Farris   MRN:  [id number]    Recommendations:     Discharge Recommendations:  rehabilitation facility   Discharge Equipment Recommendations: bedside commode, bath bench (pediatric RW)   Barriers to discharge: None    Assessment:     Tammi Farris is a 71 y.o. female admitted with a medical diagnosis of Acute renal failure superimposed on stage 2 chronic kidney disease.  She presents with the following impairments/functional limitations:  weakness, impaired endurance, decreased coordination, gait instability, decreased upper extremity function, impaired balance, decreased lower extremity function, decreased ROM, decreased safety awareness, impaired functional mobilty, impaired cardiopulmonary response to activity, impaired self care skills, edema .    Rehab Prognosis: Good; patient would benefit from acute skilled PT services to address these deficits and reach maximum level of function.    Recent Surgery: * No surgery found *      Plan:     During this hospitalization, patient to be seen 6 x/week to address the identified rehab impairments via gait training, therapeutic activities, therapeutic exercises and progress toward the following goals:    · Plan of Care Expires:  02/23/22    Subjective     Chief Complaint: weakness   Patient/Family Comments/goals: pt is very pleasant, agreeable and motivated to participate in therapy treatment.   Pain/Comfort:  · Pain Rating 1: 0/10  · Pain Rating Post-Intervention 1: 0/10      Objective:     Communicated with nurse prior to session.  Patient found up in chair with telemetry, peripheral IV, oxygen upon PT entry to room.     General Precautions: Standard, fall, respiratory   Orthopedic Precautions:N/A   Braces: N/A  Respiratory Status: Nasal cannula, flow 2 L/min   SpO2 : 96%-99% on 2L on exertions, HR : 89-96 bpm.   Functional Mobility:  · Bed Mobility:     · Rolling Left:  stand by assistance  · Scooting: contact guard  assistance  · Sit to Supine: moderate assistance with BLE   · Transfers:     · Sit to Stand: 5 trials from chair and 1 trial from toilet seat contact guard assistance with rolling walker. V/T cues for safety, proper technique and walker management.   · Toilet Transfer: contact guard assistance with  rolling walker  using  Step Transfer  · Gait: trained ~ 60 ft in the santoro  and 20 ft in room on level tile with Rolling Walker with CGA for balance and min A for walker management  (2L O2NC and chair followed) . Pt required 1 seated rest break 2* to fatigue   Pt with decreased renetta, increased time in double stance, decreased velocity of limb motion, decreased step length, decreased swing-to-stance ratio, decreased toe-to-floor clearance and decreased weight-shifting ability.Impairments contributing to gait deviations include impaired balance, decreased flexibility, pain, impaired postural control, decreased ROM and decreased strength. V/T cues for safety technique and walker management.    · Balance: good in sitting, fair in standing.        AM-PAC 6 CLICK MOBILITY  Turning over in bed (including adjusting bedclothes, sheets and blankets)?: 4  Sitting down on and standing up from a chair with arms (e.g., wheelchair, bedside commode, etc.): 3  Moving from lying on back to sitting on the side of the bed?: 3  Moving to and from a bed to a chair (including a wheelchair)?: 3  Need to walk in hospital room?: 3  Climbing 3-5 steps with a railing?: 2  Basic Mobility Total Score: 18       Therapeutic Activities and Exercises:.   Patient educated on the purpose of therapeutic exercise.     Patient performed: 2 sets of 10 reps (each) of B LE There Ex: AP, LAQ, while sitting up on chair .   Pt performed balloon toss with OT and pt required CGA from PTA to maintain dynamic standing balance, no LOB.         Patient left left sidelying foam wedge, HOB elevated  with all lines intact, call button in reach, bed alarm on and sister  present..    GOALS:   Multidisciplinary Problems     Physical Therapy Goals        Problem: Physical Therapy Goal    Goal Priority Disciplines Outcome Goal Variances Interventions   Physical Therapy Goal     PT, PT/OT Ongoing, Progressing     Description: Goals to be met by: 22     Patient will increase functional independence with mobility by performin. Supine to sit with Modified [place]  2. Rolling to Left and Right with Modified [place]  3. Sit to stand transfer with Modified [place] using RW  4. Bed to chair transfer with Modified [place] using Rolling Walker  5. Gait >50 feet with Modified [place] using Rolling Walker   6. Lower extremity exercise program 2 sets x10 reps per handout, with independence                     Time Tracking:     PT Received On: 22  PT Start Time: 1514     PT Stop Time: 1554  PT Total Time (min): 40 min     Billable Minutes: Gait Training 12, Therapeutic Activity 11 and Total Time 40 min co-treat with OT    Treatment Type: Treatment  PT/PTA: PTA     PTA Visit Number: 2022

## 2022-02-19 NOTE — PROGRESS NOTES
[facility] Medicine  Progress Note    Patient Name: Tammi Farris  MRN: [id number]  Patient Class: IP- Inpatient   Admission Date: 2/1/2022  Length of Stay: 18 days  Attending Physician: Chadwick Quan MD  Primary Care Provider: Ann-Marie Hartman MD        Subjective:     Principal Problem:Acute renal failure superimposed on stage 2 chronic kidney disease        HPI:  71 y.o. female with HTN, HLD, GERD, cirrhosis of liver, thrombocytopenia, and sleep apnea presents with a complaint of cough and congestion since October.  She is chronically dyspneic, exacerbated by exertion, has seen Cardiology and was prescribed lasix and metoprolol.  Her sister has now noted that the patient has very low urine output and is constipated.  Also follows with Hepatology for chronic liver disease.  Patient denies fever, chills, chest pain, palpitations, orthopnea, PND, dizziness, syncope, n/v/d, abdominal pain, or dysuria.  In the ED, labs reveal ANI, hyperkalemia, metabolic acidosis with elevated lactic acid, elevated liver enzymes, elevated BNP, elevated d-dimer, thrombocytopenia, and markedly concentrated urine.  Echo December 2021 showed preserved EF, no evidence of heart failure.  Chest xray without acute abnormality. No convincing evidence of infectious process.    Interval History: No acute events overnight.  Breathing comfortably but still looks very sick.  Complains of abdominal distention that comes and goes (presently not distended).  Not eating much.  Discussed that we need to get her out of the bed and upright for more of the day.  Denies shortness of breath.  All questions answered and patient had no further complaints.    Review of Systems   Constitutional:  Positive for activity change, appetite change and fatigue.   HENT:  Negative for ear discharge and ear pain.    Eyes:  Negative for discharge and itching.   Respiratory:  Negative for chest tightness and shortness of breath.    Cardiovascular:   Negative for chest pain.   Gastrointestinal:  Negative for abdominal pain, constipation, diarrhea, nausea and vomiting.   Endocrine: Negative for cold intolerance and heat intolerance.   Genitourinary:  Negative for difficulty urinating.   Musculoskeletal:  Negative for arthralgias and back pain.   Neurological:  Positive for weakness. Negative for seizures and syncope.   Psychiatric/Behavioral:  Negative for agitation and dysphoric mood.    Objective:     Vital Signs (Most Recent):  Temp: 97.4 °F (36.3 °C) (02/19/22 1325)  Pulse: 86 (02/19/22 1325)  Resp: 18 (02/19/22 1325)  BP: (!) 114/56 (02/19/22 1325)  SpO2: 100 % (02/19/22 1325) Vital Signs (24h Range):  Temp:  [97 °F (36.1 °C)-98.7 °F (37.1 °C)] 97.4 °F (36.3 °C)  Pulse:  [79-98] 86  Resp:  [12-20] 18  SpO2:  [98 %-100 %] 100 %  BP: (114-127)/(55-61) 114/56     Weight: 70.8 kg (156 lb 1.4 oz)  Body mass index is 33.78 kg/m².    Intake/Output Summary (Last 24 hours) at 2/19/2022 1459  Last data filed at 2/19/2022 1000  Gross per 24 hour   Intake 60 ml   Output --   Net 60 ml        Physical Exam  Vitals and nursing note reviewed.   Constitutional:       General: She is not in acute distress.     Appearance: She is well-developed. She is ill-appearing. She is not toxic-appearing or diaphoretic.      Interventions: Nasal cannula in place.   HENT:      Head: Normocephalic and atraumatic.      Right Ear: External ear normal.      Left Ear: External ear normal.      Nose: Nose normal.      Mouth/Throat:      Mouth: Mucous membranes are moist.   Eyes:      Extraocular Movements: Extraocular movements intact.      Conjunctiva/sclera: Conjunctivae normal.   Cardiovascular:      Rate and Rhythm: Normal rate and regular rhythm.   Pulmonary:      Effort: No tachypnea or respiratory distress.      Breath sounds: No wheezing.   Chest:      Comments: Tunneled HD line in right chest with notable surrounding ecchymosis   Abdominal:      General: Bowel sounds are normal. There  is no distension.      Palpations: Abdomen is soft.      Tenderness: There is no abdominal tenderness.      Comments: No palpable hepatomegaly or splenomegaly    Musculoskeletal:         General: No tenderness. Normal range of motion.      Cervical back: Normal range of motion and neck supple.      Right lower leg: Edema present.      Left lower leg: Edema present.   Skin:     General: Skin is warm and dry.   Neurological:      Mental Status: She is alert and oriented to person, place, and time.      Comments: Lethargic but alert and conversant.  Diffuse weakness.   Psychiatric:         Thought Content: Thought content normal.       Significant Labs: All pertinent labs within the past 24 hours have been reviewed.    Significant Imaging: I have reviewed all pertinent imaging results/findings within the past 24 hours.      Assessment/Plan:      * Acute renal failure superimposed on stage 2 chronic kidney disease  -Admitted to inpatient status  -Baseline Cr 0.9.  On admit Cr 2.7  -Reports being started on lasix 1/8 by her cardiologist and over several days PTA had decreased UOP and diminished PO intake  -On admit BNP elevated at 1160 and with metabolic acidosis and mild hyperkalemia   -Nephrology consulted and input appreciated  -ARMEN positive.  Anti Posey/RNP positive.  Complement low.  ANCA negative  -IR consulted for kidney biopsy, but patient decompensated further and had significant thrombocytopenia so biopsy delayed.  Continued discussion but has not been felt stable for biopsy on any weekday's so far.  -On 2/7 Cr continued to worsen and was encephalopathic so IR placed THDC and HD initiated.  -Completed 3 days of pulse dose steroids for suspected glomerulonephritis vs pulm/renal syndrome and continues on prednisone.  -Nephrology had ordered cellcept, but unable to swallow those pills so discontinued.  We do not have liquid cellcept here.  -Has been on HD MWF (had HD last on 2/18) and Cr appears to be down  trending.  Now 3.5.  If further improvement may be able to hold off on HD.  -Discussed with Dr. Case today.  While this could be lupus nephritis, she doesn't have other hallmarks of lupus.  Needs renal biopsy and evaluation by Rheumatology.  -Discussed with Rheumatology at [facility] who were in agreement and when bed available will transfer to [facility] [place] [facility].      Cirrhosis of liver without ascites  -Noted mild transaminitis and thrombocytopenia which are not new.  Noted epistaxis and gingival bleeding which has been ongoing for several months.  These are chronic and note history of cirrhosis and alcohol abuse   -Follows with hepatology.  Unclear if this was autoimmune?  -Abdominal US on day prior to admit showed hepatic cirrhosis/steatosis with sonographic findings of portal hypertension including trace ascites and splenomegaly.  -On 2/9 noted to be more lethargic with elevation of ammonia level and lactulose was started   -Mental status improved and she had significant cramping and nausea/vomiting so stopped lactulose on 2/17.  KUB 2/17 with non-specific bowel gas pattern  -Mental status continues to be baseline.    -Monitor closely.    Macrocytic anemia  -Hb 9.2 on admit - chronic secondary to cirrhosis.  -Dropped to <7 on 2/2  -No evidence of GI bleeding but had noted epistaxis and bleeding gums at times since before hospitalization.  -Folate and B12 replete.  She is iron deficient.  -1 unit PRBC ordered 2/2 but transfusion delayed due to complex antibody matching.  Blood finally available and she received 1 unit PRBC 2/4.   -H/H has remained relatively stable.  8.0 on 2/18  -Monitor labs q48h.    Thrombocytopenia  -Chronic and at baseline on admit secondary to cirrhosis  -Heme/onc consulted and input appreciated.  Did not recommend bone marrow biopsy  -Plan is transfuse for platelets <10 or if significant hemorrhage.  -Platelets 51K on 2/18.  -[place] cbc q48h.    MANUEL (generalized anxiety disorder)  -Remains anxious    -On 2/6 gave one time low dose ativan PO - watch respiratory status very closely.    Debility  -PT/OT consulted and recommend inpatient rehab at discharge at discharge.  -Inpatient rehab is necessary because of patient's medical complexity and need to be seen by physician daily.  -Not yet medically ready for discharge.    Metabolic acidosis  -Suspect secondary to ANI, management as above    Sleep apnea  -Continue bipap qhs    Type 2 diabetes mellitus without complication, without long-term current use of insulin  -A1c 5.9  -Sugars remain above goal range  -Increase detemir to 14 units qhs  -Continue SSI ac/hs    Gastroesophageal reflux disease  -Stable, no acute issue    HTN (hypertension)  -BP normal today - was in SPB 90s after dialysis on Wednesday  -Continue toprol 12.5mg daily with hold parameters    Hyperlipidemia  -History noted  -Not on statin due to cirrhosis      VTE Risk Mitigation (From admission, onward)         Ordered     heparin (porcine) injection 3,200 Units  As needed (PRN)         02/07/22 1834     IP VTE HIGH RISK PATIENT  Once         02/01/22 1557     Place sequential compression device  Until discontinued         02/01/22 1557                Discharge Planning   CLAUDETTE: 2/18/2022     Code Status: Full Code   Is the patient medically ready for discharge?:     Reason for patient still in hospital (select all that apply): Treatment  Discharge Plan A: Rehab   Discharge Delays: (!) Post-Acute Set-up              Chadwick Quan MD  Department of Hospital Medicine   [facility] [place] - Telemetry

## 2022-02-19 NOTE — PT/OT/SLP PROGRESS
Occupational Therapy   Treatment    Name: Tammi Farris  MRN: [id number]  Admitting Diagnosis:  Acute renal failure superimposed on stage 2 chronic kidney disease       Recommendations:     Discharge Recommendations: rehabilitation facility  Discharge Equipment Recommendations:  bedside commode, bath bench (jewell RW)  Barriers to discharge:   (not at OF; was fully independent PTA)    Assessment:     Tammi Farris is a 71 y.o. female with a medical diagnosis of Acute renal failure superimposed on stage 2 chronic kidney disease.  Performance deficits affecting function are weakness, impaired endurance, decreased upper extremity function, decreased lower extremity function, impaired self care skills, impaired functional mobilty, gait instability, impaired cardiopulmonary response to activity, decreased ROM, edema, impaired skin, decreased coordination, impaired balance, decreased safety awareness.     Pt very pleasant and motivated to participate in tx session this date; pt was able to ambulate household distances, perform functional transfers and tolerate standing therapeutic activities w/ min A -CGA and use of RW. Pt still w/ onset of fatigue due to decreased endurance and significant debility but tolerated activities well w/ rest breaks as needed. Pt w/ SPO2 >95% and HR in mid 90s on 2L O2 NC.  Pt is making progress towards goals and will continue to benefit from skilled acute OT services for maximizing functional capacity.      PTA, pt was fully (I) w/ all aspects of care; pt will benefit from multidisciplinary approach on a daily basis in an IPR setting for returning to OF.     Rehab Prognosis:  Good; patient would benefit from acute skilled OT services to address these deficits and reach maximum level of function.       Plan:     Patient to be seen  (5-6x/wk) to address the above listed problems via self-care/home management, therapeutic activities, therapeutic exercises  · Plan of Care Expires:  03/03/22  · Plan of Care Reviewed with: patient    Subjective     Pain/Comfort:  · Pain Rating 1: 0/10  · Pain Rating Post-Intervention 1: 0/10    Objective:     Communicated with: Nurse (Meri) prior to session.  Patient found up in chair with telemetry, peripheral IV, oxygen (HD catheter R chest) upon OT entry to room.    General Precautions: Standard, fall, respiratory   Orthopedic Precautions:N/A   Braces: N/A  Respiratory Status: Nasal cannula, flow 2 L/min     Occupational Performance:     Bed Mobility:    · Patient completed Rolling/Turning to Left with  stand by assistance for placing wedge   · Patient completed Sit to Supine with moderate assistance and HOB elevated for managing BLES      Functional Mobility/Transfers:  · Patient completed Sit <> Stand Transfer x 5 trials from chair with contact guard assistance and minimum assistance  with  rolling walker; pt required CGA and use of grab bar and RW for standing from toilet   · Patient completed Toilet Transfer Step Transfer technique with contact guard assistance and minimum assistance with  rolling walker  · Functional Mobility: Pt ambulated household/communtiy distances in the hallway w/ min A-CGA and use of RW; pt required x 1 standing rest break due to onset of fatigue. OT followed closely w/ chair. Pt was assisted back to room for ambulating from door>toilet>bed w/ same level of assist.      Activities of Daily Living:  · Upper Body Dressing: minimum assistance for donning gown over back while sitting up in chair  · Lower Body Dressing: total assistance to remove brief in standing and don new brief (in standing) post toielting activity   · Toileting: pt stood w/ RW w/ PT providing CGA while OT provided total A for rear arcelia-care       [facility] 6 Click ADL: 15    Treatment & Education:  -Pt re-educated on role of OT and POC.   -Pt educated on importance of OOB activity w/ assist from staff.   -Pt educated on safe functional mobility and transfers.    -Pt participated in standing TA/TE for increasing UB strength, cardiovascular endurance and dynamic balance for safe performance w/ ADLs and functional mobility. With PT at side providing CGA, pt was able to stand w/ RW in front for volleying balloon w/ OT; pt was instructed to keep BUEs up and supported at chest level and alternate between each UE. Pt was able to tolerate x 3 trials at ~1 min each. Pt requried rest breaks after each trial to recover w/ SPO2 >90% and HR in upper 90s.   -Questions and concerns addressed within OT scope.     Patient left HOB elevated with all lines intact, call button in reach, bed alarm on and family  presentEducation:      GOALS:   Multidisciplinary Problems     Occupational Therapy Goals        Problem: Occupational Therapy Goal    Goal Priority Disciplines Outcome Interventions   Occupational Therapy Goal     OT, PT/OT Ongoing, Progressing    Description: Goals to be met by: 03/03/22     Patient will increase functional independence with ADLs by performing:    UE Dressing with Stand-by assistance.  LE Dressing with Minimal assistance.  Grooming while seated with Stand-by assistance.  Toileting from bedside commode with minimal assistance for hygiene and clothing management.   Supine to sit with Stand-by assistance.  Step transfer with stand-by assistance  Toilet transfer to bedside commode with stand-by assistance.  Upper extremity exercise program x15 reps per handout, with independence.                     Time Tracking:     OT Date of Treatment: 02/19/22  OT Start Time: 1514  OT Stop Time: 1554  OT Total Time (min): 40 min    Billable Minutes:Self Care/Home Management 15  Therapeutic Exercise 8  Total Time 40 (co-tx w/ PT)     OT/MYRA: OT          2/19/2022

## 2022-02-19 NOTE — SUBJECTIVE & OBJECTIVE
Interval History: No acute events overnight.  Breathing comfortably but still looks very sick.  Complains of abdominal distention that comes and goes (presently not distended).  Not eating much.  Discussed that we need to get her out of the bed and upright for more of the day.  Denies shortness of breath.  All questions answered and patient had no further complaints.    Review of Systems   Constitutional:  Positive for activity change, appetite change and fatigue.   HENT:  Negative for ear discharge and ear pain.    Eyes:  Negative for discharge and itching.   Respiratory:  Negative for chest tightness and shortness of breath.    Cardiovascular:  Negative for chest pain.   Gastrointestinal:  Negative for abdominal pain, constipation, diarrhea, nausea and vomiting.   Endocrine: Negative for cold intolerance and heat intolerance.   Genitourinary:  Negative for difficulty urinating.   Musculoskeletal:  Negative for arthralgias and back pain.   Neurological:  Positive for weakness. Negative for seizures and syncope.   Psychiatric/Behavioral:  Negative for agitation and dysphoric mood.    Objective:     Vital Signs (Most Recent):  Temp: 97.4 °F (36.3 °C) (02/19/22 1325)  Pulse: 86 (02/19/22 1325)  Resp: 18 (02/19/22 1325)  BP: (!) 114/56 (02/19/22 1325)  SpO2: 100 % (02/19/22 1325) Vital Signs (24h Range):  Temp:  [97 °F (36.1 °C)-98.7 °F (37.1 °C)] 97.4 °F (36.3 °C)  Pulse:  [79-98] 86  Resp:  [12-20] 18  SpO2:  [98 %-100 %] 100 %  BP: (114-127)/(55-61) 114/56     Weight: 70.8 kg (156 lb 1.4 oz)  Body mass index is 33.78 kg/m².    Intake/Output Summary (Last 24 hours) at 2/19/2022 8715  Last data filed at 2/19/2022 1000  Gross per 24 hour   Intake 60 ml   Output --   Net 60 ml        Physical Exam  Vitals and nursing note reviewed.   Constitutional:       General: She is not in acute distress.     Appearance: She is well-developed. She is ill-appearing. She is not toxic-appearing or diaphoretic.      Interventions:  Nasal cannula in place.   HENT:      Head: Normocephalic and atraumatic.      Right Ear: External ear normal.      Left Ear: External ear normal.      Nose: Nose normal.      Mouth/Throat:      Mouth: Mucous membranes are moist.   Eyes:      Extraocular Movements: Extraocular movements intact.      Conjunctiva/sclera: Conjunctivae normal.   Cardiovascular:      Rate and Rhythm: Normal rate and regular rhythm.   Pulmonary:      Effort: No tachypnea or respiratory distress.      Breath sounds: No wheezing.   Chest:      Comments: Tunneled HD line in right chest with notable surrounding ecchymosis   Abdominal:      General: Bowel sounds are normal. There is no distension.      Palpations: Abdomen is soft.      Tenderness: There is no abdominal tenderness.      Comments: No palpable hepatomegaly or splenomegaly    Musculoskeletal:         General: No tenderness. Normal range of motion.      Cervical back: Normal range of motion and neck supple.      Right lower leg: Edema present.      Left lower leg: Edema present.   Skin:     General: Skin is warm and dry.   Neurological:      Mental Status: She is alert and oriented to person, place, and time.      Comments: Lethargic but alert and conversant.  Diffuse weakness.   Psychiatric:         Thought Content: Thought content normal.       Significant Labs: All pertinent labs within the past 24 hours have been reviewed.    Significant Imaging: I have reviewed all pertinent imaging results/findings within the past 24 hours.

## 2022-02-19 NOTE — ASSESSMENT & PLAN NOTE
-BP normal today - was in SPB 90s after dialysis on Wednesday  -Continue toprol 12.5mg daily with hold parameters

## 2022-02-19 NOTE — PLAN OF CARE
Problem: Physical Therapy Goal  Goal: Physical Therapy Goal  Description: Goals to be met by: 22     Patient will increase functional independence with mobility by performin. Supine to sit with Modified [place]  2. Rolling to Left and Right with Modified [place]  3. Sit to stand transfer with Modified [place] using RW  4. Bed to chair transfer with Modified [place] using Rolling Walker  5. Gait >50 feet with Modified [place] using Rolling Walker   6. Lower extremity exercise program 2 sets x10 reps per handout, with independence    Outcome: Ongoing, Progressing   Pt is progressing well toward treatment goals, pt will benefit from IP in order to return to PLOF.

## 2022-02-19 NOTE — PLAN OF CARE
Problem: Occupational Therapy Goal  Goal: Occupational Therapy Goal  Description: Goals to be met by: 03/03/22     Patient will increase functional independence with ADLs by performing:    UE Dressing with Stand-by assistance.  LE Dressing with Minimal assistance.  Grooming while seated with Stand-by assistance.  Toileting from bedside commode with minimal assistance for hygiene and clothing management.   Supine to sit with Stand-by assistance.  Step transfer with stand-by assistance  Toilet transfer to bedside commode with stand-by assistance.  Upper extremity exercise program x15 reps per handout, with independence.    Outcome: Ongoing, Progressing    Pt very pleasant and motivated to participate in tx session this date; pt was able to ambulate household distances, perform functional transfers and tolerate standing therapeutic activities w/ min A -CGA and use of RW. Pt still w/ onset of fatigue due to decreased endurance and significant debility but tolerated activities well w/ rest breaks as needed. Pt w/ SPO2 >95% and HR in mid 90s on 2L O2 NC.  Pt is making progress towards goals and will continue to benefit from skilled acute OT services for maximizing functional capacity.      PTA, pt was fully (I) w/ all aspects of care; pt will benefit from multidisciplinary approach on a daily basis in an [facility] setting for returning to PLOF.

## 2022-02-19 NOTE — ASSESSMENT & PLAN NOTE
-Noted mild transaminitis and thrombocytopenia which are not new.  Noted epistaxis and gingival bleeding which has been ongoing for several months.  These are chronic and note history of cirrhosis and alcohol abuse   -Follows with hepatology.  Unclear if this was autoimmune?  -Abdominal US on day prior to admit showed hepatic cirrhosis/steatosis with sonographic findings of portal hypertension including trace ascites and splenomegaly.  -On 2/9 noted to be more lethargic with elevation of ammonia level and lactulose was started   -Mental status improved and she had significant cramping and nausea/vomiting so stopped lactulose on 2/17.  KUB 2/17 with non-specific bowel gas pattern  -Mental status continues to be baseline.    -Monitor closely.

## 2022-02-19 NOTE — ASSESSMENT & PLAN NOTE
-PT/OT consulted and recommend inpatient rehab at discharge at discharge.  -Inpatient rehab is necessary because of patient's medical complexity and need to be seen by physician daily.  -Not yet medically ready for discharge.

## 2022-02-19 NOTE — PLAN OF CARE
Problem: Adult Inpatient Plan of Care  Goal: Plan of Care Review  Outcome: Ongoing, Progressing  Goal: Patient-Specific Goal (Individualized)  Outcome: Ongoing, Progressing  Goal: Absence of Hospital-Acquired Illness or Injury  Outcome: Ongoing, Progressing  Goal: Optimal Comfort and Wellbeing  Outcome: Ongoing, Progressing  Goal: Readiness for Transition of Care  Outcome: Ongoing, Progressing     Problem: Infection  Goal: Absence of Infection Signs and Symptoms  Outcome: Ongoing, Progressing     Problem: Diabetes Comorbidity  Goal: Blood Glucose Level Within Targeted Range  Outcome: Ongoing, Progressing     Problem: Fluid and Electrolyte Imbalance (Acute Kidney Injury/Impairment)  Goal: Fluid and Electrolyte Balance  Outcome: Ongoing, Progressing     Problem: Oral Intake Inadequate (Acute Kidney Injury/Impairment)  Goal: Optimal Nutrition Intake  Outcome: Ongoing, Progressing

## 2022-02-19 NOTE — ASSESSMENT & PLAN NOTE
-Chronic and at baseline on admit secondary to cirrhosis  -Heme/onc consulted and input appreciated.  Did not recommend bone marrow biopsy  -Plan is transfuse for platelets <10 or if significant hemorrhage.  -Platelets 51K on 2/18.  -Montiore cbc q48h.

## 2022-02-19 NOTE — NURSING
Patient received from the day nurse.  Is on the bed is not in distress and pain.  IV site assessed.  Introduced myself and safety maintained.  Bed in lowest locked position.personal items and call bell in reach.  Will continue to monitor.

## 2022-02-19 NOTE — ASSESSMENT & PLAN NOTE
-Hb 9.2 on admit - chronic secondary to cirrhosis.  -Dropped to <7 on 2/2  -No evidence of GI bleeding but had noted epistaxis and bleeding gums at times since before hospitalization.  -Folate and B12 replete.  She is iron deficient.  -1 unit PRBC ordered 2/2 but transfusion delayed due to complex antibody matching.  Blood finally available and she received 1 unit PRBC 2/4.   -H/H has remained relatively stable.  8.0 on 2/18  -Monitor labs q48h.

## 2022-02-19 NOTE — PLAN OF CARE
Problem: Diabetes Comorbidity  Goal: Blood Glucose Level Within Targeted Range  Outcome: Ongoing, Not Progressing     Problem: Adult Inpatient Plan of Care  Goal: Plan of Care Review  Outcome: Ongoing, Progressing     Problem: Adult Inpatient Plan of Care  Goal: Patient-Specific Goal (Individualized)  Outcome: Ongoing, Progressing     Problem: Adult Inpatient Plan of Care  Goal: Absence of Hospital-Acquired Illness or Injury  Outcome: Ongoing, Progressing     Problem: Adult Inpatient Plan of Care  Goal: Optimal Comfort and Wellbeing  Outcome: Ongoing, Progressing     Problem: Adult Inpatient Plan of Care  Goal: Readiness for Transition of Care  Outcome: Ongoing, Progressing     Problem: Infection  Goal: Absence of Infection Signs and Symptoms  Outcome: Ongoing, Progressing

## 2022-02-20 NOTE — NURSING
Attempted to call report to AJ at ext. [phone]. RN is in a pt's room. Will attempt again later. Attempted to update pt's sister, Tania. No answer. Message left on .

## 2022-02-20 NOTE — NURSING
Report received from JESSIE Praod. Patient resting comfortably, no complaints, no acute distress noted. Pt endorses 2 BMs last night. Pt verbalizes understanding. Plan of care reviewed with patient. Instructed patient to call for assistance before ambulating, side rails up x3, bed alarm set, call light in reach, non skid socks in use. Family at bedside. Patient verbalized understanding of instructions. Will continue to monitor.     0917: Pt is attempting to eat breakfast. After a few bites, pt starts heaving and endorses feeling nauseated. Tania, family member at bedside, request for pt to get Zofran before meals. MD Kadeem notified and orders received. Will medicate with AM meds after Zofran is administered.

## 2022-02-20 NOTE — NURSING
Call received from Thelma at Transfer center-- Pt has available bed --assigned room 655 at [facility]. repor

## 2022-02-20 NOTE — PROGRESS NOTES
[facility] Medicine  Progress Note    Patient Name: Tammi Farris  MRN: [id number]  Patient Class: IP- Inpatient   Admission Date: 2/1/2022  Length of Stay: 19 days  Attending Physician: Chadwick Quan MD  Primary Care Provider: Ann-Marie Hartman MD        Subjective:     Principal Problem:Acute renal failure superimposed on stage 2 chronic kidney disease        HPI:  71 y.o. female with HTN, HLD, GERD, cirrhosis of liver, thrombocytopenia, and sleep apnea presents with a complaint of cough and congestion since October.  She is chronically dyspneic, exacerbated by exertion, has seen Cardiology and was prescribed lasix and metoprolol.  Her sister has now noted that the patient has very low urine output and is constipated.  Also follows with Hepatology for chronic liver disease.  Patient denies fever, chills, chest pain, palpitations, orthopnea, PND, dizziness, syncope, n/v/d, abdominal pain, or dysuria.  In the ED, labs reveal ANI, hyperkalemia, metabolic acidosis with elevated lactic acid, elevated liver enzymes, elevated BNP, elevated d-dimer, thrombocytopenia, and markedly concentrated urine.  Echo December 2021 showed preserved EF, no evidence of heart failure.  Chest xray without acute abnormality. No convincing evidence of infectious process.    Interval History: No acute events overnight.  Breathing comfortably and sitting in bedside chair.  Develops gagging anytime food is put in her mouth so not eating much.  Denies difficulty swallowing and denies nausea before putting food in her mouth.  Denies shortness of breath.  Family at bedside.  All questions answered and patient had no further complaints.    Review of Systems   Constitutional:  Positive for activity change, appetite change and fatigue.   HENT:  Negative for ear discharge and ear pain.    Eyes:  Negative for discharge and itching.   Respiratory:  Negative for chest tightness and shortness of breath.    Cardiovascular:  Positive  for leg swelling. Negative for chest pain.   Gastrointestinal:  Positive for nausea. Negative for abdominal pain, constipation, diarrhea and vomiting.   Endocrine: Negative for cold intolerance and heat intolerance.   Genitourinary:  Negative for difficulty urinating.   Musculoskeletal:  Negative for arthralgias and back pain.   Neurological:  Positive for weakness. Negative for seizures and syncope.   Psychiatric/Behavioral:  Negative for agitation and dysphoric mood.    Objective:     Vital Signs (Most Recent):  Temp: 97.5 °F (36.4 °C) (02/20/22 1133)  Pulse: 95 (02/20/22 1133)  Resp: 16 (02/20/22 1133)  BP: (!) 96/42 (02/20/22 1133)  SpO2: 100 % (02/20/22 1133) Vital Signs (24h Range):  Temp:  [97.1 °F (36.2 °C)-98.5 °F (36.9 °C)] 97.5 °F (36.4 °C)  Pulse:  [] 95  Resp:  [14-18] 16  SpO2:  [96 %-100 %] 100 %  BP: ()/(42-58) 96/42     Weight: 70.8 kg (156 lb 1.4 oz)  Body mass index is 33.78 kg/m².  No intake or output data in the 24 hours ending 02/20/22 1629     Physical Exam  Vitals and nursing note reviewed.   Constitutional:       General: She is not in acute distress.     Appearance: She is well-developed. She is ill-appearing. She is not toxic-appearing or diaphoretic.      Interventions: Nasal cannula in place.   HENT:      Head: Normocephalic and atraumatic.      Right Ear: External ear normal.      Left Ear: External ear normal.      Nose: Nose normal.      Mouth/Throat:      Mouth: Mucous membranes are moist.   Eyes:      Extraocular Movements: Extraocular movements intact.      Conjunctiva/sclera: Conjunctivae normal.   Cardiovascular:      Rate and Rhythm: Normal rate and regular rhythm.   Pulmonary:      Effort: No tachypnea or respiratory distress.      Breath sounds: No wheezing.   Chest:      Comments: Tunneled HD line in right chest with notable surrounding ecchymosis   Abdominal:      General: Bowel sounds are normal. There is no distension.      Palpations: Abdomen is soft.       Tenderness: There is no abdominal tenderness.      Comments: No palpable hepatomegaly or splenomegaly    Musculoskeletal:         General: No tenderness. Normal range of motion.      Cervical back: Normal range of motion and neck supple.      Right lower leg: Edema present.      Left lower leg: Edema present.   Skin:     General: Skin is warm and dry.   Neurological:      Mental Status: She is alert and oriented to person, place, and time.      Comments: Lethargic but alert and conversant.  Diffuse weakness.   Psychiatric:         Thought Content: Thought content normal.       Significant Labs: All pertinent labs within the past 24 hours have been reviewed.    Significant Imaging: I have reviewed all pertinent imaging results/findings within the past 24 hours.      Assessment/Plan:      * Acute renal failure superimposed on stage 2 chronic kidney disease  -Admitted to inpatient status  -Baseline Cr 0.9.  On admit Cr 2.7  -Reports being started on lasix 1/8 by her cardiologist and over several days PTA had decreased UOP and diminished PO intake  -On admit BNP elevated at 1160 and with metabolic acidosis and mild hyperkalemia   -Nephrology consulted and input appreciated  -ARMEN positive.  Anti Posey/RNP positive.  Complement low.  ANCA negative  -IR consulted for kidney biopsy, but patient decompensated further and had significant thrombocytopenia so biopsy delayed.  Continued discussion but has not been felt stable for biopsy on any weekday's so far.  -On 2/7 Cr continued to worsen and was encephalopathic so IR placed THDC and HD initiated.  -Completed 3 days of pulse dose steroids for suspected glomerulonephritis vs pulm/renal syndrome and continues on prednisone.  -Nephrology had ordered cellcept, but unable to swallow those pills so discontinued.  We do not have liquid cellcept here.  -Has been on HD MWF (had HD last on 2/18) and Cr appears to be down trending.  Now 3.3.  If further improvement may be able to hold  off on HD tomorrow.  -Discussed with Dr. Case today.  While this could be lupus nephritis, she doesn't have other hallmarks of lupus.  Needs renal biopsy and evaluation by Rheumatology prior to cellcept or other immunologic.  -On 2/19 I discussed with Rheumatology at [facility] who were in agreement and when bed available will transfer to [facility].      Cirrhosis of liver without ascites  -Noted mild transaminitis and thrombocytopenia which are not new.  Noted epistaxis and gingival bleeding which has been ongoing for several months.  These are chronic and note history of cirrhosis and alcohol abuse   -Follows with hepatology.  Unclear if this was autoimmune?  -Abdominal US on day prior to admit showed hepatic cirrhosis/steatosis with sonographic findings of portal hypertension including trace ascites and splenomegaly.  -On 2/9 noted to be more lethargic with elevation of ammonia level and lactulose was started   -Mental status improved and she had significant cramping and nausea/vomiting so stopped lactulose on 2/17.  KUB 2/17 with non-specific bowel gas pattern  -Mental status continues to be baseline.    -Monitor closely.    Macrocytic anemia  -Hb 9.2 on admit - chronic secondary to cirrhosis.  -Dropped to <7 on 2/2  -No evidence of GI bleeding but had noted epistaxis and bleeding gums at times since before hospitalization.  -Folate and B12 replete.  She is iron deficient.  -1 unit PRBC ordered 2/2 but transfusion delayed due to complex antibody matching.  Blood finally available and she received 1 unit PRBC 2/4.   -H/H slightly trending down.  Today Hb 7.7  -Monitor labs q48h.    Thrombocytopenia  -Chronic and at baseline on admit secondary to cirrhosis  -Heme/onc consulted and input appreciated.  Did not recommend bone marrow biopsy  -Plan is transfuse for platelets <10 or if significant hemorrhage.  -Platelets 43K today  -Montiore cbc q48h.    MANUEL (generalized anxiety disorder)  -Remains anxious   -On 2/6 gave one  time low dose ativan PO - watch respiratory status very closely.    Debility  -PT/OT consulted and recommend inpatient rehab at discharge at discharge.  -Inpatient rehab is necessary because of patient's medical complexity and need to be seen by physician daily.  -Not yet medically ready for discharge.    Metabolic acidosis  -Suspect secondary to ANI, management as above    Sleep apnea  -Continue bipap qhs    Type 2 diabetes mellitus without complication, without long-term current use of insulin  -A1c 5.9  -Sugars remain above goal range  -Increase detemir to 16 units qhs  -Continue SSI ac/hs    Gastroesophageal reflux disease  -Complains of gagging anytime food is put in her mouth  -Will schedule zofran prior to each meal.  -Continue pepcid.    HTN (hypertension)  -BP borderline hypotensive today - which is has been at times throughout her stay.  -Continue toprol 12.5mg daily with hold parameters. (was held today)    Hyperlipidemia  -History noted  -Not on statin due to cirrhosis      VTE Risk Mitigation (From admission, onward)         Ordered     heparin (porcine) injection 3,200 Units  As needed (PRN)         02/07/22 1834     IP VTE HIGH RISK PATIENT  Once         02/01/22 1557     Place sequential compression device  Until discontinued         02/01/22 1557                Discharge Planning   CLAUDETTE: 2/18/2022     Code Status: Full Code   Is the patient medically ready for discharge?:     Reason for patient still in hospital (select all that apply): Treatment  Discharge Plan A: Rehab   Discharge Delays: (!) Post-Acute Set-up              Chadwick Quan MD  Department of Hospital Medicine   [facility] - Telemetry

## 2022-02-20 NOTE — ASSESSMENT & PLAN NOTE
-Admitted to inpatient status  -Baseline Cr 0.9.  On admit Cr 2.7  -Reports being started on lasix 1/8 by her cardiologist and over several days PTA had decreased UOP and diminished PO intake  -On admit BNP elevated at 1160 and with metabolic acidosis and mild hyperkalemia   -Nephrology consulted and input appreciated  -ARMEN positive.  Anti Posey/RNP positive.  Complement low.  ANCA negative  -IR consulted for kidney biopsy, but patient decompensated further and had significant thrombocytopenia so biopsy delayed.  Continued discussion but has not been felt stable for biopsy on any weekday's so far.  -On 2/7 Cr continued to worsen and was encephalopathic so IR placed THDC and HD initiated.  -Completed 3 days of pulse dose steroids for suspected glomerulonephritis vs pulm/renal syndrome and continues on prednisone.  -Nephrology had ordered cellcept, but unable to swallow those pills so discontinued.  We do not have liquid cellcept here.  -Has been on HD MWF (had HD last on 2/18) and Cr appears to be down trending.  Now 3.3.  If further improvement may be able to hold off on HD tomorrow.  -Discussed with Dr. Case today.  While this could be lupus nephritis, she doesn't have other hallmarks of lupus.  Needs renal biopsy and evaluation by Rheumatology prior to cellcept or other immunologic.  -On 2/19 I discussed with Rheumatology at [facility] who were in agreement and when bed available will transfer to [facility].

## 2022-02-20 NOTE — ASSESSMENT & PLAN NOTE
-Complains of gagging anytime food is put in her mouth  -Will schedule zofran prior to each meal.  -Continue pepcid.

## 2022-02-20 NOTE — PLAN OF CARE
Problem: Adult Inpatient Plan of Care  Goal: Plan of Care Review  Outcome: Ongoing, Progressing  Flowsheets (Taken 2/20/2022 8138)  Plan of Care Reviewed With:   patient   family/ sister at bedside      Problem: Adult Inpatient Plan of Care  Goal: Patient-Specific Goal (Individualized)  Outcome: Ongoing, Progressing     Problem: Adult Inpatient Plan of Care  Goal: Optimal Comfort and Wellbeing  Outcome: Ongoing, Progressing   Remain  Afib on  telemetry monitor. PRN  tylenol  and tums effective. Refused guaifenesin Explained plan of care, verbalized understanding. E  No injury during shift, Side rails up x 2, call light by bedside.

## 2022-02-20 NOTE — ASSESSMENT & PLAN NOTE
-Chronic and at baseline on admit secondary to cirrhosis  -Heme/onc consulted and input appreciated.  Did not recommend bone marrow biopsy  -Plan is transfuse for platelets <10 or if significant hemorrhage.  -Platelets 43K today  -Montiore cbc q48h.

## 2022-02-20 NOTE — SUBJECTIVE & OBJECTIVE
Interval History: No acute events overnight.  Breathing comfortably and sitting in bedside chair.  Develops gagging anytime food is put in her mouth so not eating much.  Denies difficulty swallowing and denies nausea before putting food in her mouth.  Denies shortness of breath.  Family at bedside.  All questions answered and patient had no further complaints.    Review of Systems   Constitutional:  Positive for activity change, appetite change and fatigue.   HENT:  Negative for ear discharge and ear pain.    Eyes:  Negative for discharge and itching.   Respiratory:  Negative for chest tightness and shortness of breath.    Cardiovascular:  Positive for leg swelling. Negative for chest pain.   Gastrointestinal:  Positive for nausea. Negative for abdominal pain, constipation, diarrhea and vomiting.   Endocrine: Negative for cold intolerance and heat intolerance.   Genitourinary:  Negative for difficulty urinating.   Musculoskeletal:  Negative for arthralgias and back pain.   Neurological:  Positive for weakness. Negative for seizures and syncope.   Psychiatric/Behavioral:  Negative for agitation and dysphoric mood.    Objective:     Vital Signs (Most Recent):  Temp: 97.5 °F (36.4 °C) (02/20/22 1133)  Pulse: 95 (02/20/22 1133)  Resp: 16 (02/20/22 1133)  BP: (!) 96/42 (02/20/22 1133)  SpO2: 100 % (02/20/22 1133) Vital Signs (24h Range):  Temp:  [97.1 °F (36.2 °C)-98.5 °F (36.9 °C)] 97.5 °F (36.4 °C)  Pulse:  [] 95  Resp:  [14-18] 16  SpO2:  [96 %-100 %] 100 %  BP: ()/(42-58) 96/42     Weight: 70.8 kg (156 lb 1.4 oz)  Body mass index is 33.78 kg/m².  No intake or output data in the 24 hours ending 02/20/22 1629     Physical Exam  Vitals and nursing note reviewed.   Constitutional:       General: She is not in acute distress.     Appearance: She is well-developed. She is ill-appearing. She is not toxic-appearing or diaphoretic.      Interventions: Nasal cannula in place.   HENT:      Head: Normocephalic and  atraumatic.      Right Ear: External ear normal.      Left Ear: External ear normal.      Nose: Nose normal.      Mouth/Throat:      Mouth: Mucous membranes are moist.   Eyes:      Extraocular Movements: Extraocular movements intact.      Conjunctiva/sclera: Conjunctivae normal.   Cardiovascular:      Rate and Rhythm: Normal rate and regular rhythm.   Pulmonary:      Effort: No tachypnea or respiratory distress.      Breath sounds: No wheezing.   Chest:      Comments: Tunneled HD line in right chest with notable surrounding ecchymosis   Abdominal:      General: Bowel sounds are normal. There is no distension.      Palpations: Abdomen is soft.      Tenderness: There is no abdominal tenderness.      Comments: No palpable hepatomegaly or splenomegaly    Musculoskeletal:         General: No tenderness. Normal range of motion.      Cervical back: Normal range of motion and neck supple.      Right lower leg: Edema present.      Left lower leg: Edema present.   Skin:     General: Skin is warm and dry.   Neurological:      Mental Status: She is alert and oriented to person, place, and time.      Comments: Lethargic but alert and conversant.  Diffuse weakness.   Psychiatric:         Thought Content: Thought content normal.       Significant Labs: All pertinent labs within the past 24 hours have been reviewed.    Significant Imaging: I have reviewed all pertinent imaging results/findings within the past 24 hours.

## 2022-02-20 NOTE — ASSESSMENT & PLAN NOTE
-Hb 9.2 on admit - chronic secondary to cirrhosis.  -Dropped to <7 on 2/2  -No evidence of GI bleeding but had noted epistaxis and bleeding gums at times since before hospitalization.  -Folate and B12 replete.  She is iron deficient.  -1 unit PRBC ordered 2/2 but transfusion delayed due to complex antibody matching.  Blood finally available and she received 1 unit PRBC 2/4.   -H/H slightly trending down.  Today Hb 7.7  -Monitor labs q48h.

## 2022-02-20 NOTE — ASSESSMENT & PLAN NOTE
-BP borderline hypotensive today - which is has been at times throughout her stay.  -Continue toprol 12.5mg daily with hold parameters. (was held today)

## 2022-02-21 PROBLEM — I48.92 ATRIAL FLUTTER: Status: ACTIVE | Noted: 2022-01-01

## 2022-02-21 PROBLEM — M32.14 SYSTEMIC LUPUS ERYTHEMATOSUS WITH GLOMERULAR DISEASE: Status: ACTIVE | Noted: 2022-01-01

## 2022-02-21 NOTE — NURSING
[place] [facility] 655.

## 2022-02-21 NOTE — CONSULTS
[facility] - [facility] Surg  Nephrology  Consult Note    Patient Name: Tammi Farris  MRN: [id number]  Admission Date: 2/1/2022  Hospital Length of Stay: 20 days  Attending Provider: Isidoro Majano MD   Primary Care Physician: Ann-Marie Hartman MD  Principal Problem:Acute renal failure superimposed on stage 2 chronic kidney disease    Inpatient consult to Nephrology  Consult performed by: Claire Sharma DO  Consult ordered by: Isidoro Majano MD        Assessment/Plan:     Acute renal failure superimposed on stage 2 chronic kidney disease  -- New ANI on CKD2  -- Concern for possible lupus nephritis /autoimmune etiology  -- Low complements. Positive anti-Sm /RNP antibodies. ESR > 140. CRP 21.  -- 24 hr urine protein 560 (usually >5270-9448 in lupus nephritis)  -- Negative dsDNA, ANCA, glomerular basement membrane proteins, free light chains, quantitative immunoglobulins, spep, upep.   -- Unable to renal biopsy at  due to thrombocytopenia (~ 50)  -- S/p pulse dose steroids for 3 days and now on prednisone.   -- Attempted to initiate Cellcept but has not taken due to difficulty with pill size.  -- S/p initiation several sessions HD, last 2/16.   -- Cr improving 3.4 from 6.4. K 5.2. CO2 18.  -- Overloaded on exam. Tachypnea /increased WOB    Recommendations:  -- Yao (ordered) for accurate I/O, sample to spin urine, and 24 hr urine protein and CrCl  -- If good UOP in next 6-8 hrs, would initiate Lasix gtt 100 mg/24 hr (~ 4 mg/hr)  -- If poor UOP, dialyze tonight  -- CXR  -- Would recommend against renal biopsy at this time, as risks outweigh benefits considering underlying CKD /scar, age, thrombocytopenia, uremia, bleeding complication may necessitate embolization and lead to chronic HD  -- Treat empirically and defer renal biopsy to future. Initiate Imuran instead of Cellcept. Continue prednisone  -- Strict I/O  -- Daily BMP, mag, phos  -- Avoid nephrotoxins  -- Appreciate rheum. F/u recs.    Thank you for your consult.  I will follow-up with patient. Please contact us if you have any additional questions.    Claire Sharma, DO  Nephrology  [facility] Surg    Subjective:     HPI: Tammi Farris is a 71 y.o. F with history of CKD2, HTN, DM, cirrhosis, and PAULINA, who was admitted to [facility] with acute renal failure. Found to have low complements and positive anti-Sm /RNP antibodies. Unable to renal biopsy due to thrombocytopenia (~ 50). She was treated with pulse dose steroids for 3 days and now on prednisone. Attempted to initiate Cellcept but has not taken due to difficulty with pill size. Initiated on HD, received several sessions, last 2/16. Held over weekend as her Cr improving to 3.5 from 6.4. She reports making good urine. Denies confusion. Concern for possible lupus nephritis and transferred [place] [facility] per rheumatology /nephrology recommendations for IR renal biopsy.      Past Medical History:   Diagnosis Date    Allergy     Anxiety     Basal cell carcinoma     Cirrhosis of liver without ascites 12/27/2017    Depression     Diabetes mellitus, type 2     Disorder of kidney and ureter     Endometriosis     GERD (gastroesophageal reflux disease)     Hyperlipidemia     Hypertension     Joint pain     Psoriasis        Past Surgical History:   Procedure Laterality Date    abdominal laproscopic surgery      APPENDECTOMY      CARPAL TUNNEL RELEASE      right    CHOLECYSTECTOMY  04/2015    COLONOSCOPY N/A 2/8/2019    Procedure: COLONOSCOPY;  Surgeon: Celso Salas MD;  Location: [address]);  Service: Endoscopy;  Laterality: N/A;    ESOPHAGOGASTRODUODENOSCOPY N/A 2/8/2019    Procedure: EGD (ESOPHAGOGASTRODUODENOSCOPY);  Surgeon: Celso Salas MD;  Location: [address]);  Service: Endoscopy;  Laterality: N/A;  varices screening, labs ordered prior    HYSTERECTOMY  age 25    JOSEFA/BSO (endometriosis)    OOPHORECTOMY      SKIN CANCER DESTRUCTION      UPPER GASTROINTESTINAL ENDOSCOPY          Review of patient's allergies indicates:   Allergen Reactions    Dobutamine Hives    Benadryl [diphenhydramine hcl] Anxiety     Pt states she feels jumpy and anxious when taking.    Darvon [propoxyphene] Nausea Only    Shellfish containing products Hives    Iodinated contrast media Hives    Lisinopril Other (See Comments)     cough    Propoxyphene hcl      Itchy (skin)^    Tizanidine Other (See Comments)     Sweaty, difficulty swallowing    Statins-hmg-coa reductase inhibitors Anxiety and Other (See Comments)     Myalgia, fatigue, palpitations, anxiety     Current Facility-Administered Medications   Medication Frequency    acetaminophen tablet 650 mg Q6H PRN    albuterol-ipratropium 2.5 mg-0.5 mg/3 mL nebulizer solution 3 mL Q4H PRN    benzonatate capsule 100 mg TID PRN    bisacodyL suppository 10 mg Daily PRN    dextromethorphan-guaiFENesin  mg/5 ml liquid 5 mL Q6H    dextrose 10% bolus 125 mL PRN    dextrose 10% bolus 250 mL PRN    famotidine tablet 20 mg Daily    glucagon (human recombinant) injection 1 mg PRN    glucose chewable tablet 16 g PRN    glucose chewable tablet 24 g PRN    heparin (porcine) injection 3,200 Units PRN    insulin aspart U-100 pen 0-5 Units QID (AC + HS) PRN    insulin aspart U-100 pen 4 Units TIDWM    insulin detemir U-100 pen 16 Units QHS    iohexoL (OMNIPAQUE 350) injection 100 mL ONCE PRN    lanthanum chewable tablet 500 mg QID    magnesium hydroxide 400 mg/5 ml suspension 2,400 mg Once    melatonin tablet 6 mg Nightly PRN    metoprolol succinate (TOPROL-XL) 24 hr split tablet 12.5 mg Daily    naloxone 0.4 mg/mL injection 0.02 mg PRN    ondansetron injection 4 mg TID AC    polyethylene glycol packet 17 g Daily PRN    predniSONE tablet 40 mg Daily    simethicone chewable tablet 80 mg TID PRN    sodium chloride 0.9% flush 10 mL PRN    vitamin renal formula (B-complex-vitamin c-folic acid) 1 mg per capsule 1 capsule Daily     Family History        Problem Relation (Age of Onset)    Arthritis Mother    Asthma Brother    Hyperlipidemia Brother    Hypertension Mother, Sister, Brother    No Known Problems Father    Raynaud syndrome Sister          Tobacco Use    Smoking status: Never Smoker    Smokeless tobacco: Never Used   Substance and Sexual Activity    Alcohol use: Not Currently    Drug use: No    Sexual activity: Yes     Partners: Male     Birth control/protection: Surgical     Review of Systems   Constitutional:  Positive for activity change and fatigue. Negative for chills and fever.   HENT:  Negative for sore throat.    Respiratory:  Negative for cough and shortness of breath.    Cardiovascular:  Negative for chest pain and leg swelling.   Gastrointestinal:  Negative for abdominal pain, constipation, diarrhea, nausea and vomiting.   Genitourinary:  Negative for decreased urine volume and dysuria.   Musculoskeletal:  Negative for myalgias.   Skin:  Negative for rash.   Neurological:  Negative for dizziness and headaches.   Psychiatric/Behavioral:  Negative for confusion.    Objective:     Vital Signs (Most Recent):  Temp: 97.6 °F (36.4 °C) (02/21/22 0744)  Pulse: (!) 115 (02/21/22 0744)  Resp: 18 (02/21/22 0744)  BP: 130/60 (02/21/22 0744)  SpO2: 98 % (02/21/22 0744)  O2 Device (Oxygen Therapy): nasal cannula (02/20/22 0842)   Vital Signs (24h Range):  Temp:  [97.5 °F (36.4 °C)-98.4 °F (36.9 °C)] 97.6 °F (36.4 °C)  Pulse:  [] 115  Resp:  [16-18] 18  SpO2:  [98 %-100 %] 98 %  BP: ()/(42-60) 130/60     Weight: 72.8 kg (160 lb 7.9 oz) (02/20/22 2100)  Body mass index is 34.73 kg/m².  Body surface area is 1.71 meters squared.    No intake/output data recorded.    Physical Exam  Constitutional:       General: She is not in acute distress.     Appearance: She is ill-appearing.   HENT:      Head: Normocephalic and atraumatic.      Mouth/Throat:      Mouth: Mucous membranes are moist.      Pharynx: Oropharynx is clear.   Eyes:       Extraocular Movements: Extraocular movements intact.      Pupils: Pupils are equal, round, and reactive to light.   Cardiovascular:      Rate and Rhythm: Normal rate and regular rhythm.      Pulses: Normal pulses.      Heart sounds: Normal heart sounds.   Pulmonary:      Effort: Pulmonary effort is normal. No respiratory distress.      Breath sounds: Normal breath sounds.   Abdominal:      General: Abdomen is flat. Bowel sounds are normal.      Palpations: Abdomen is soft.   Musculoskeletal:         General: No swelling.      Cervical back: Neck supple.   Skin:     General: Skin is warm and dry.      Capillary Refill: Capillary refill takes less than 2 seconds.      Comments: Ecchymosis surrounding right chest HD cath   Neurological:      General: No focal deficit present.      Mental Status: She is alert and oriented to person, place, and time. Mental status is at baseline.   Psychiatric:         Mood and Affect: Mood normal.         Behavior: Behavior normal.         Thought Content: Thought content normal.         Judgment: Judgment normal.       Significant Labs:  BMP:   Recent Labs   Lab 02/21/22  0818   *   *   K 5.2*      CO2 18*   BUN 95*   CREATININE 3.4*   CALCIUM 9.1   MG 2.2     CBC:   Recent Labs   Lab 02/20/22  0411   WBC 13.51*   RBC 2.19*   HGB 7.7*   HCT 23.4*   PLT 43*   *   MCH 35.2*   MCHC 32.9     CMP:   Recent Labs   Lab 02/21/22  0818   *   CALCIUM 9.1   ALBUMIN 2.5*   PROT 6.6   *   K 5.2*   CO2 18*      BUN 95*   CREATININE 3.4*   ALKPHOS 181*   ALT 48*   AST 61*   BILITOT 2.5*     Coagulation:   Recent Labs   Lab 02/21/22  0828   INR 1.4*     No results for input(s): COLORU, CLARITYU, SPECGRAV, PHUR, PROTEINUA, GLUCOSEU, BILIRUBINCON, BLOODU, WBCU, RBCU, BACTERIA, MUCUS, NITRITE, LEUKOCYTESUR, UROBILINOGEN, HYALINECASTS in the last 168 hours.  All labs within the past 24 hours have been reviewed.    Significant Imaging:  Labs:  Reviewed    EXAMINATION:  US ABDOMEN COMP WITH DOPPLER (XPD)     CLINICAL HISTORY:  Fatty (change of) liver, not elsewhere classified     TECHNIQUE:  Complete abdominal ultrasound (including pancreas, liver, gallbladder, common bile duct, spleen, aorta, IVC, and kidneys) was performed.     Complete abdominal doppler exam was also performed.     COMPARISON:  Abdominal ultrasound 09/24/2021, 03/22/2021, 09/23/2020.     FINDINGS:  Liver: Normal in size, measuring 14.5 cm. Nodular contour suggesting cirrhosis. No solid mass.     Gallbladder: The gallbladder is surgically absent.     Biliary system: The common duct is not dilated, measuring 5 mm.  No intrahepatic ductal dilatation.     Spleen: Slightly enlarged in size with a homogeneous echotexture, measuring 12.6 x 4.3 cm.     Pancreas: The visualized portions of pancreas appear normal.     Right kidney: Normal in size with no hydronephrosis, measuring 10.5 cm.     Left kidney: Normal in size with no hydronephrosis, measuring 9.3 cm.     Aorta: Obscured by overlying bowel gas.     Inferior vena cava: Normal in appearance.     Miscellaneous: Trace ascites.     Main hepatic artery: Patent.     Main portal vein: Patent with proper directional flow.     Left portal vein:  Patent with proper directional flow.     Right portal vein:  Patent with proper directional flow.     SMV:  Patent with proper directional flow.     IVC: Patent     Left, middle, and right hepatic veins: Patent.     Umbilical vein: Not patent.     Celiac artery: Not visualized.     Splenic Vein: Patent with proper directional flow.     Impression:     Hepatic cirrhosis/steatosis with sonographic findings of portal hypertension including trace ascites and splenomegaly.     Electronically signed by resident: Rashid Parish  Date:                                            01/31/2022  Time:                                           10:56     Electronically signed by: Rustam Dmaon MD  Date:                                             01/31/2022  Time:                                           11:08

## 2022-02-21 NOTE — PT/OT/SLP PROGRESS
Speech Language Pathology      Tammi Farris  MRN: [id number]     eval not completed today 2' to patient sleeping on attempt and not appropriate for PO trials 2' to reduced LAVON. SLP discussed patient's diet with her niece and she described oral dysphagia with solids (consistent with ST reports from [facility]). Full Liquid Diet most appropriate for patient at this time. Recs communicated to MD. CARDENAS to f/u for eval on 2/22/22.    Olivier Correia CCC-SLP  Speech-Language Pathology  Pager: [phone]

## 2022-02-21 NOTE — PROGRESS NOTES
Date of Admission:2/1/2022    SUBJECTIVE:notes not feeling well    Current Facility-Administered Medications   Medication    acetaminophen tablet 650 mg    albuterol-ipratropium 2.5 mg-0.5 mg/3 mL nebulizer solution 3 mL    benzonatate capsule 100 mg    bisacodyL suppository 10 mg    dextromethorphan-guaiFENesin  mg/5 ml liquid 5 mL    dextrose 50% injection 12.5 g    dextrose 50% injection 25 g    famotidine tablet 20 mg    glucagon (human recombinant) injection 1 mg    glucose chewable tablet 16 g    glucose chewable tablet 24 g    heparin (porcine) injection 3,200 Units    insulin aspart U-100 pen 0-5 Units    insulin aspart U-100 pen 4 Units    insulin detemir U-100 pen 16 Units    iohexoL (OMNIPAQUE 350) injection 100 mL    lanthanum chewable tablet 500 mg    melatonin tablet 6 mg    metoprolol succinate (TOPROL-XL) 24 hr split tablet 12.5 mg    naloxone 0.4 mg/mL injection 0.02 mg    ondansetron injection 4 mg    polyethylene glycol packet 17 g    predniSONE tablet 40 mg    simethicone chewable tablet 80 mg    sodium chloride 0.9% flush 10 mL    vitamin renal formula (B-complex-vitamin c-folic acid) 1 mg per capsule 1 capsule       Wt Readings from Last 3 Encounters:   02/16/22 70.8 kg (156 lb 1.4 oz)   02/20/22 70.8 kg (156 lb 1.4 oz)   01/19/22 68 kg (149 lb 14.6 oz)     Temp Readings from Last 3 Encounters:   02/20/22 98.4 °F (36.9 °C) (Axillary)   01/19/22 97 °F (36.1 °C) (Oral)   12/15/21 98.1 °F (36.7 °C) (Oral)     BP Readings from Last 3 Encounters:   02/20/22 (!) 120/55   02/07/22 (!) 105/51   01/19/22 (!) 122/58     Pulse Readings from Last 3 Encounters:   02/20/22 101   02/07/22 (!) 52   01/19/22 78     No intake or output data in the 24 hours ending 02/20/22 1804    PE:  GEN:wd female in nad  HEENT:ncat,eomi,mm  CVS:s1s2 regular  PULM: no rales  ABD:+bs,soft  EXT:mild edema  NEURO:awake    Recent Labs   Lab 02/20/22  0411   *      K 4.5      CO2  20*   BUN 74*   CREATININE 3.3*   CALCIUM 8.9       Lab Results   Component Value Date    CALCIUM 8.9 02/20/2022    PHOS 5.1 (H) 02/14/2022       Recent Labs   Lab 02/20/22  0411   WBC 13.51*   RBC 2.19*   HGB 7.7*   HCT 23.4*   PLT 43*   *   MCH 35.2*   MCHC 32.9           A/P:  1.navneet. creatinine  Ok with hd. Hd MWF. Pt needs a renal biopsy and rheum workup.  2. Proteinuria. Nephrotic. Needs a biopsy. Ck pla2r and cryo. sm ,molly +. C3/c4down. ? Tx. Sle? No rash/jt swelling. Hold cellcept-cannot swallow.  3.hyperkalemia. resolved.  4.metabolic acidosis bicarb with hd cont.  5.anemia. epo  And vit cont.  6.htn. no arb.  7.dm3. Following sugars.  8.cirrhosis.following.

## 2022-02-21 NOTE — ASSESSMENT & PLAN NOTE
-- New ANI on CKD2  -- Concern for possible lupus nephritis /autoimmune etiology  -- Low complements. Positive anti-Sm /RNP antibodies. ESR > 140. CRP 21.  -- 24 hr urine protein 373, urine protein timed 560  -- Negative dsDNA, ANCA, glomerular basement membrane proteins, free light chains, quantitative immunoglobulins, spep, upep.   -- Unable to renal biopsy at  due to thrombocytopenia (~ 50)  -- S/p pulse dose steroids for 3 days and now on prednisone.   -- Attempted to initiate Cellcept but has not taken due to difficulty with pill size.  -- S/p initiation several sessions HD, last 2/16.   -- Cr improving 3.4 from 6.4. Making urine. Not overloaded. Not uremic. K 5.2. CO2 18.    Recommendations:  -- No indication for HD today. Will monitor.  -- Spin urine today  -- F/u phospholipase A2 antibodies and quantiferon pending  -- Strict I/O  -- Daily BMP, mag, phos  -- Avoid nephrotoxins  -- Appreciate IR for renal biopsy. F/u results  -- Continue prednisone  -- Appreciate rheum. F/u recs.

## 2022-02-21 NOTE — CONSULTS
Radiology Consult    Tammi Farris is a 71 y.o. female with cirrhosis, admitted with ANI, met acidosis, hyperkalemia, thrombocytopenia. Low complement levels and positive TAMMI noted. Patient improving with HD and pulse dose steroids. IR consulted for renal biopsy to tule out lupus nephritis.  Past Medical History:   Diagnosis Date    Allergy     Anxiety     Basal cell carcinoma     Cirrhosis of liver without ascites 12/27/2017    Depression     Diabetes mellitus, type 2     Disorder of kidney and ureter     Endometriosis     GERD (gastroesophageal reflux disease)     Hyperlipidemia     Hypertension     Joint pain     Psoriasis      Past Surgical History:   Procedure Laterality Date    abdominal laproscopic surgery      APPENDECTOMY      CARPAL TUNNEL RELEASE      right    CHOLECYSTECTOMY  04/2015    COLONOSCOPY N/A 2/8/2019    Procedure: COLONOSCOPY;  Surgeon: Celso Salas MD;  [facility]  Service: Endoscopy;  Laterality: N/A;    ESOPHAGOGASTRODUODENOSCOPY N/A 2/8/2019    Procedure: EGD (ESOPHAGOGASTRODUODENOSCOPY);  Surgeon: Celso Salas MD;  [facility]  Service: Endoscopy;  Laterality: N/A;  varices screening, labs ordered prior    HYSTERECTOMY  age 25    JOSEFA/BSO (endometriosis)    OOPHORECTOMY      SKIN CANCER DESTRUCTION      UPPER GASTROINTESTINAL ENDOSCOPY         Discussed with primary team, Dr. Majano.    Imaging reviewed with Radiology staff, Dr. Woody.      Scheduled Meds:    dextromethorphan-guaiFENesin  mg/5 ml  5 mL Oral Q6H    famotidine  20 mg Oral Daily    insulin aspart U-100  4 Units Subcutaneous TIDWM    insulin detemir U-100  16 Units Subcutaneous QHS    lanthanum  500 mg Oral QID    magnesium hydroxide 400 mg/5 ml  30 mL Oral Once    metoprolol succinate  12.5 mg Oral Daily    ondansetron  4 mg Intravenous TID AC    predniSONE  40 mg Oral Daily    vitamin renal formula (B-complex-vitamin c-folic acid)   1 capsule Oral Daily     Continuous Infusions:   PRN Meds:acetaminophen, albuterol-ipratropium, benzonatate, bisacodyL, dextrose 10%, dextrose 10%, glucagon (human recombinant), glucose, glucose, heparin (porcine), insulin aspart U-100, iohexoL, melatonin, naloxone, polyethylene glycol, simethicone, sodium chloride 0.9%    Allergies:   Review of patient's allergies indicates:   Allergen Reactions    Dobutamine Hives    Benadryl [diphenhydramine hcl] Anxiety     Pt states she feels jumpy and anxious when taking.    Darvon [propoxyphene] Nausea Only    Shellfish containing products Hives    Iodinated contrast media Hives    Lisinopril Other (See Comments)     cough    Propoxyphene hcl      Itchy (skin)^    Tizanidine Other (See Comments)     Sweaty, difficulty swallowing    Statins-hmg-coa reductase inhibitors Anxiety and Other (See Comments)     Myalgia, fatigue, palpitations, anxiety       Labs:  Recent Labs   Lab 02/21/22  0828   INR 1.4*       Recent Labs   Lab 02/20/22  0411   WBC 13.51*   HGB 7.7*   HCT 23.4*   *   PLT 43*      Recent Labs   Lab 02/21/22  0818   *   *   K 5.2*      CO2 18*   BUN 95*   CREATININE 3.4*   CALCIUM 9.1   MG 2.2   ALT 48*   AST 61*   ALBUMIN 2.5*   BILITOT 2.5*         Vitals (Most Recent):  Temp: 97.6 °F (36.4 °C) (02/21/22 0744)  Pulse: (!) 115 (02/21/22 0744)  Resp: 18 (02/21/22 0744)  BP: 130/60 (02/21/22 0744)  SpO2: 98 % (02/21/22 0744)    Plan:     Unable to schedule native kidney biopsy today.    If IR schedule permits, will plan for inpatient native renal biopsy (have patient NPO, hold nonvital anticoagulation). However, more urgent procedures will be prioritized and may result in procedure delay.    Please reach out to IR in AM to discuss timing/possibility of procedure and possible transfusion needs prior to procedure.     If patient can be discharged prior to biopsy, recommend referral to interventional nephrology for biopsy.    Chadwick  Pako,   PGY-III  Diagnostic and Interventional Radiology  [facility]

## 2022-02-21 NOTE — CONSULTS
I have seen the patient, reviewed the blood work, imaging and other studies.I have personally interviewed and examined the patient at bedside.    71 year old F with PMH of cirrhosis secondary to GARCIA, diverticular disease, psoriasis, HTN that was transferred from [facility] due to acute renal failure and concern for lupus nephritis.  Patient with +ARMEN, +rnp, hypocomplementemia, proteinuria, pericardial/pleural effusion, raynauds, thrombocytopenia, +nicanor, and elevated inflammatory markers consistent with SLE.  She has cirrhosis presumed to be from GARCIA but she has never had biopsy. Suspect that the cirrhosis is also likely contributing to her thrombocytopenia.  Would recommend kidney biopsy for diagnostic purposes.  Send for APS labs, ANCA, MPO, PR3.  Start plaquenil 200mg po qday.  Will defer to renal for weaning of steroids since her main issue is nephritis

## 2022-02-21 NOTE — SUBJECTIVE & OBJECTIVE
Past Medical History:   Diagnosis Date    Allergy     Anxiety     Basal cell carcinoma     Cirrhosis of liver without ascites 12/27/2017    Depression     Diabetes mellitus, type 2     Disorder of kidney and ureter     Endometriosis     GERD (gastroesophageal reflux disease)     Hyperlipidemia     Hypertension     Joint pain     Psoriasis        Past Surgical History:   Procedure Laterality Date    abdominal laproscopic surgery      APPENDECTOMY      CARPAL TUNNEL RELEASE      right    CHOLECYSTECTOMY  04/2015    COLONOSCOPY N/A 2/8/2019    Procedure: COLONOSCOPY;  Surgeon: Celso Salas MD;  Location: [facility] ([address] [place] [address]);  Service: Endoscopy;  Laterality: N/A;    ESOPHAGOGASTRODUODENOSCOPY N/A 2/8/2019    Procedure: EGD (ESOPHAGOGASTRODUODENOSCOPY);  Surgeon: Celso Salas MD;  Location: [facility] ([address] [place] [address]);  Service: Endoscopy;  Laterality: N/A;  varices screening, labs ordered prior    HYSTERECTOMY  age 25    JOSEFA/BSO (endometriosis)    OOPHORECTOMY      SKIN CANCER DESTRUCTION      UPPER GASTROINTESTINAL ENDOSCOPY         Immunization History   Administered Date(s) Administered    COVID-19, MRNA, LN-S, PF (Pfizer) (Purple Cap) 03/10/2021, 03/31/2021, 09/28/2021    Influenza 10/17/2013    Influenza (FLUAD) - Quadrivalent - Adjuvanted - PF *Preferred* (65+) 09/16/2020    Influenza - High Dose - PF (65 years and older) 11/06/2015, 11/29/2016, 11/08/2017, 09/21/2018, 09/25/2019    Influenza - Quadrivalent 10/20/2014    Influenza - Quadrivalent - High Dose - PF (65 years and older) 10/10/2021, 10/10/2021    Influenza - Quadrivalent - PF *Preferred* (6 months and older) 10/17/2013    Influenza - Trivalent - PF (ADULT) 10/17/2013    Pneumococcal Conjugate - 13 Valent 11/06/2015    Pneumococcal Polysaccharide - 23 Valent 10/20/2014, 09/25/2019    Tdap 01/08/2013    Zoster 01/17/2013       Review of patient's allergies indicates:   Allergen Reactions    Dobutamine Hives    Benadryl [diphenhydramine hcl] Anxiety      Pt states she feels jumpy and anxious when taking.    Darvon [propoxyphene] Nausea Only    Shellfish containing products Hives    Iodinated contrast media Hives    Lisinopril Other (See Comments)     cough    Propoxyphene hcl      Itchy (skin)^    Tizanidine Other (See Comments)     Sweaty, difficulty swallowing    Statins-hmg-coa reductase inhibitors Anxiety and Other (See Comments)     Myalgia, fatigue, palpitations, anxiety     Current Facility-Administered Medications   Medication Frequency    0.9%  NaCl infusion PRN    0.9%  NaCl infusion Once    acetaminophen tablet 650 mg Q6H PRN    albuterol-ipratropium 2.5 mg-0.5 mg/3 mL nebulizer solution 3 mL Q4H PRN    benzonatate capsule 100 mg TID PRN    bisacodyL suppository 10 mg Daily PRN    dextromethorphan-guaiFENesin  mg/5 ml liquid 5 mL Q6H    dextrose 10% bolus 125 mL PRN    dextrose 10% bolus 250 mL PRN    famotidine tablet 20 mg Daily    glucagon (human recombinant) injection 1 mg PRN    glucose chewable tablet 16 g PRN    glucose chewable tablet 24 g PRN    heparin (porcine) injection 3,200 Units PRN    hydrOXYchloroQUINE tablet 200 mg Daily    insulin aspart U-100 pen 0-5 Units QID (AC + HS) PRN    insulin aspart U-100 pen 4 Units TIDWM    insulin detemir U-100 pen 16 Units QHS    iohexoL (OMNIPAQUE 350) injection 100 mL ONCE PRN    lanthanum chewable tablet 500 mg QID    melatonin tablet 6 mg Nightly PRN    metoprolol succinate (TOPROL-XL) 24 hr split tablet 12.5 mg Daily    naloxone 0.4 mg/mL injection 0.02 mg PRN    ondansetron injection 4 mg TID AC    polyethylene glycol packet 17 g Daily PRN    predniSONE tablet 40 mg Daily    simethicone chewable tablet 80 mg TID PRN    sodium chloride 0.9% bolus 250 mL PRN    sodium chloride 0.9% flush 10 mL PRN    vitamin renal formula (B-complex-vitamin c-folic acid) 1 mg per capsule 1 capsule Daily     Family History       Problem Relation (Age of Onset)    Arthritis Mother    Asthma Brother     Hyperlipidemia Brother    Hypertension Mother, Sister, Brother    No Known Problems Father    Raynaud syndrome Sister          Tobacco Use    Smoking status: Never Smoker    Smokeless tobacco: Never Used   Substance and Sexual Activity    Alcohol use: Not Currently    Drug use: No    Sexual activity: Yes     Partners: Male     Birth control/protection: Surgical     Review of Systems   Constitutional:  Positive for fatigue. Negative for chills and fever.   HENT:  Negative for hearing loss, sore throat and trouble swallowing.    Eyes:  Negative for visual disturbance.   Respiratory:  Positive for cough and shortness of breath.    Cardiovascular:  Negative for chest pain.   Gastrointestinal:  Positive for nausea. Negative for abdominal pain, constipation, diarrhea and vomiting.   Genitourinary:  Negative for dysuria and frequency.   Musculoskeletal:  Negative for arthralgias and myalgias.   Skin:  Negative for rash.   Neurological:  Positive for weakness. Negative for dizziness and headaches.   Objective:     Vital Signs (Most Recent):  Temp: 96 °F (35.6 °C) (02/21/22 1155)  Pulse: 105 (02/21/22 1155)  Resp: 16 (02/21/22 1155)  BP: (!) 132/58 (02/21/22 1155)  SpO2: 99 % (02/21/22 1155)  O2 Device (Oxygen Therapy): nasal cannula (02/20/22 0842)   Vital Signs (24h Range):  Temp:  [96 °F (35.6 °C)-98.4 °F (36.9 °C)] 96 °F (35.6 °C)  Pulse:  [] 105  Resp:  [16-18] 16  SpO2:  [98 %-100 %] 99 %  BP: (120-135)/(55-60) 132/58     Weight: 72.8 kg (160 lb 7.9 oz) (02/20/22 2100)  Body mass index is 34.73 kg/m².  Body surface area is 1.71 meters squared.      Intake/Output Summary (Last 24 hours) at 2/21/2022 1603  Last data filed at 2/21/2022 1235  Gross per 24 hour   Intake 150 ml   Output 155 ml   Net -5 ml       Physical Exam   Constitutional: She appears ill. No distress.   HENT:   Head: Normocephalic and atraumatic.   Eyes: Conjunctivae are normal.   Cardiovascular: Normal rate, regular rhythm and normal heart sounds.  Exam reveals no friction rub.   No murmur heard.  Pulmonary/Chest: Effort normal. She has no wheezes. She has rales.   Breathing appears labored with some use of accessory muscles   Abdominal: Soft. Bowel sounds are normal. There is no abdominal tenderness. There is no rebound.   Musculoskeletal:      Comments: No synovitis in upper or lower extremities  Mild pitting edema in lower extremities   Neurological: She is alert.   Skin: Skin is warm and dry. She is not diaphoretic.     Significant Labs:  CBC: No results for input(s): WBC, HGB, HCT, PLT in the last 24 hours.  CMP:   Recent Labs   Lab 02/21/22  0818   *   CALCIUM 9.1   ALBUMIN 2.5*   PROT 6.6   *   K 5.2*   CO2 18*      BUN 95*   CREATININE 3.4*   ALKPHOS 181*   ALT 48*   AST 61*   BILITOT 2.5*

## 2022-02-21 NOTE — PROGRESS NOTES
1610 Arrived to pt bedside for 1:1 HD as ordered    1623 HD started via R PC without issue, pt arouses to voice

## 2022-02-21 NOTE — PLAN OF CARE
Problem: Adult Inpatient Plan of Care  Goal: Plan of Care Review  Outcome: Ongoing, Progressing  Goal: Patient-Specific Goal (Individualized)  Outcome: Ongoing, Progressing  Goal: Absence of Hospital-Acquired Illness or Injury  Outcome: Ongoing, Progressing  Goal: Optimal Comfort and Wellbeing  Outcome: Ongoing, Progressing  Goal: Readiness for Transition of Care  Outcome: Ongoing, Progressing     Problem: Infection  Goal: Absence of Infection Signs and Symptoms  Outcome: Ongoing, Progressing     Problem: Diabetes Comorbidity  Goal: Blood Glucose Level Within Targeted Range  Outcome: Ongoing, Progressing     Problem: Fluid and Electrolyte Imbalance (Acute Kidney Injury/Impairment)  Goal: Fluid and Electrolyte Balance  Outcome: Ongoing, Progressing     Problem: Oral Intake Inadequate (Acute Kidney Injury/Impairment)  Goal: Optimal Nutrition Intake  Outcome: Ongoing, Progressing     Problem: Renal Function Impairment (Acute Kidney Injury/Impairment)  Goal: Effective Renal Function  Outcome: Ongoing, Progressing     Problem: Skin Injury Risk Increased  Goal: Skin Health and Integrity  Outcome: Ongoing, Progressing     Problem: Fall Injury Risk  Goal: Absence of Fall and Fall-Related Injury  Outcome: Ongoing, Progressing     Problem: Device-Related Complication Risk (Hemodialysis)  Goal: Safe, Effective Therapy Delivery  Outcome: Ongoing, Progressing     Problem: Hemodynamic Instability (Hemodialysis)  Goal: Effective Tissue Perfusion  Outcome: Ongoing, Progressing     Problem: Infection (Hemodialysis)  Goal: Absence of Infection Signs and Symptoms  Outcome: Ongoing, Progressing     Problem: Coping Ineffective  Goal: Effective Coping  Outcome: Ongoing, Progressing       Plan of care reviewed with pt and family. VSS Pt titrated to 2L of O2. Pt c/o of gas pains intermittently throughout night. Pt  given prn simethicone with no relief. Pt abd nondistended , bowel sounds hypoactive.   IMN notified for prn maalox which pt  refused because she explained the pain had resolved.  Pt has been npo since midnight for renal bioposy this morning. Pt is free of falls and injuries. Bed in lowest position and call light within reach. I will continue to monitor.

## 2022-02-21 NOTE — PT/OT/SLP DISCHARGE
Occupational Therapy Discharge Summary    Tammi Farris  MRN: [id number]   Principal Problem: Acute renal failure superimposed on stage 2 chronic kidney disease      Patient Discharged from acute Occupational Therapy on 2/21/2022.  Please refer to prior OT note dated 2/19/2022  for functional status.    Assessment:      Pt was transferred from [facility].  Due to t/f to a different hospital, new OT orders are needed before pt can be seen for further therapy.    Objective:     GOALS:   Multidisciplinary Problems     Occupational Therapy Goals        Problem: Occupational Therapy Goal    Goal Priority Disciplines Outcome Interventions   Occupational Therapy Goal     OT, PT/OT Ongoing, Progressing    Description: Goals to be met by: 03/03/22     Patient will increase functional independence with ADLs by performing:    UE Dressing with Stand-by assistance.  LE Dressing with Minimal assistance.  Grooming while seated with Stand-by assistance.  Toileting from bedside commode with minimal assistance for hygiene and clothing management.   Supine to sit with Stand-by assistance.  Step transfer with stand-by assistance  Toilet transfer to bedside commode with stand-by assistance.  Upper extremity exercise program x15 reps per handout, with independence.                     Reasons for Discontinuation of Therapy Services  Transfer to alternate level of care.      Plan:     Patient Discharged to: Patient had previous recommendations for inpatient rehab.    2/21/2022

## 2022-02-21 NOTE — HPI
Tammi Farris is a 71 y.o. with HTN, diabetes type 2 (hemoglobin A1c 5.9 on 2/1), CKD2, cirrhosis, HFpEF, macrocytic anemia, psorasis, sleep apnea and GERD who was admitted to [facility] on 2/1/22 for acute renal failure.    She initially presented for cough and dyspnea for the past few months. She was seen by cardiology in 1/2022 and started on Lasix 20mg without improvement.  Additionally, her sister reported that she had decrease appetite, fatigue, and weight loss. She had also experienced decreased urine output for the past few weeks. She notes some gingival bleeding and has been having this when brushing teeth for about 3 weeks. She endorses ongoing alopecia for the past 3 years and skin changes in her fingers in the cold. She denies joint swelling, joint pain, oral ulcers, skin rash, history of miscarriages or chest pain. She does not have history of autoimmune disease in her family. She is a never smoker, denies alcohol or illicit drug use.    She follows with hepatology for cirrhosis due to GARCIA. She also follows with hematology for chronic macrocytic anemia with elevated free light chains and has declined bone marrow biopsy in past.     She arrived for evaluation to the ED on 2/1 were labs revealed ANI with creatinine 3.0 (baseline around 1.3), hyperkalemia, metabolic acidosis with elevated lactic acid, elevated liver enzymes, elevated BNP (1812), elevated d-dimer, thrombocytopenia (plts 88), and markedly concentrated urine. Initially, her ANI was attributed to dehydration in the setting of diuretic use so diuretic was held and she was given gentle IV hydration. IV fluids were later discontinued following day due to development of crackles in lung fields on exam but later restarted. She was placed on bicarb gtt for metabolic acidosis per nephrology. Nephrology initially concerned for Vasculitis vs gbm vs MM, noted to have decreased complement and microscopic hematuria concerning for GN. She was  later transferred to the ICU on 2/4 for episode of desaturation while on bipap. Hematology was consulted and stated that respiratory failure may be due to viral illness given worsening cytopenias. Pulmonology was consulted on 2/4 and stated that there was concern for combination of underlying ILD vs pulmonary edema vs pulmonary-renal syndrome. On 2/5, nephrology recommended pulse dose steroids for concern for GN with plan for kidney biopsy by IR. She completed 3 days of pulse dose steroids for suspected glomerulonephritis vs pulm/renal syndrome (2/5-2/7), then transitioned to pred 60mg on 2/8. However, on 2/7, she was found to require HD which was performed and kidney biopsy was postponed. However, her renal failure and thrombocytopenia continued to worsen, with creatinine peaking at 6.4 and platelets as low as 26,000. On 2/16, Nephrology recommended adding Cellcept and reducing the Prednisone dose. However, the patient was unable to swallow the MMF pills.     She was transferred to [facility] for rheumatology and nephrology consult.      Rheumatological ROS:  No skin rashes,malar rash,photosensitivity   No telangiectasias   No calcinosis   No psoriasis   No patchy alopecia   No oral and nasal ulcers   No dry eyes and dry mouth   No pleurisy or any cardiopulmonary complaints   No dysphagia,diplopia and dysphonia and muscle weakness   No n/v/d/c   No acid reflux+   + raynaud's+   No digital ulcers   No cytopenias   No renal issues   No blood clots   No fever,chills,night sweats,weight loss and loss of appetite   No pregnancy losses/pre term deliveries /pregnancy complications   No new onset headaches   No recurrent conjunctivitis or uveitis or scleritis or episcleritis   No chronic or bloody diarrhea with no u colitis or crohn's /inflammatory bowel disease   No vaginal or urethral  d/c/STDs/no ulcers   No joint pains

## 2022-02-21 NOTE — ASSESSMENT & PLAN NOTE
71 y.o. with HTN, diabetes type 2 (hemoglobin A1c 5.9 on 2/1), CKD2, cirrhosis, HFpEF, macrocytic anemia, psorasis, sleep apnea and GERD who was admitted to [facility] on 2/1/22 for acute renal failure. She endorses fatigue, alopecia (for past 3 years), and +raynauds. Denies oral ulcers, , rash, joint swelling/joint pain, chest pain. Stress echo from 12/2021 showed trivial pericardial effusion.    Labs:  ARMEN 1:1280 on 2/3  +Sm/RNP: 4.06 on 2/3  UPE: no paraprotein bands  ESR: > 140  CRP: 20.8  C3: 26 (L)  C4: < 3 (L)  VALENTINO: pos (VALENTINO pos 2/2  cold autob, haptoglobin and LDH wnl)  UA: Protein 3+, occult blood 3+, WBC 3+  Microscopic UA: RBC >100, WBC 40  Timed urine protein: 560  CRP 20.9  Haptoglobin: norm  CCP: norm  RF: norm  GBM ab: negative  ANCA: negative  Hep B surface antigen: neg  Hep C ab: neg    Treatment history while inpatient:  - S/p Methylpred 1g x 3 days (2/5-2/7), then transitioned to pred 60mg on 2/8, decreased to prednisone 50mg on 2/15, prednisone on 40mg on 2/17- present    According to the EULAR/ACR 2019 criteria for SLE, she meets criteria for lupus (17pts)  due to: +ARMEN, +thrombocytopenia, + low C3, + low C4, + pericardial effusion (on stress echo from 12/2021) + alopecia.     Plan/Recommendations:  - will obtain beta2-glycoproteins, DRVVT, cardiolipin, RNP  - repeat complements and hemolysis labs  - can consider hematology consult if thrombocytopenia does not improvement though likely related to SLE and possible component from underlying cirrhosis  - start plaquenil 200mg daily  - nephrology consulted, plan change to AZA though can consider liquid suspension as alternative as well though will defer treatment and steroid regimen to nephrology given concern for nephritis      Patient seen and evaluated with Dr. Miller. Please see staff attestation for final recommendations.

## 2022-02-21 NOTE — PT/OT/SLP PROGRESS
Speech Language Pathology  Discharge    Tammi Farris  MRN: [id number]    Patient not seen today secondary to pt transfer to [facility] for higher level of care. Please re-consult if warranted.       2/21/2022

## 2022-02-21 NOTE — CONSULTS
[facility]  Rheumatology  Consult Note    Patient Name: Tammi Farris  MRN: [id number]  Admission Date: 2/1/2022  Hospital Length of Stay: 20 days  Code Status: Full Code   Attending Provider: Isidoro Majano MD  Primary Care Physician: Ann-Marie Hartman MD  Principal Problem:Acute renal failure superimposed on stage 2 chronic kidney disease    Consults  Subjective:     HPI: Tammi Farris is a 71 y.o. with HTN, diabetes type 2 (hemoglobin A1c 5.9 on 2/1), CKD2, cirrhosis, HFpEF, macrocytic anemia, psorasis, sleep apnea and GERD who was admitted to [facility] on 2/1/22 for acute renal failure.    She initially presented for cough and dyspnea for the past few months. She was seen by cardiology in 1/2022 and started on Lasix 20mg without improvement.  Additionally, her sister reported that she had decrease appetite, fatigue, and weight loss. She had also experienced decreased urine output for the past few weeks. She notes some gingival bleeding and has been having this when brushing teeth for about 3 weeks. She endorses ongoing alopecia for the past 3 years and skin changes in her fingers in the cold. She denies joint swelling, joint pain, oral ulcers, skin rash, history of miscarriages or chest pain. She does not have history of autoimmune disease in her family. She is a never smoker, denies alcohol or illicit drug use.    She follows with hepatology for cirrhosis due to GARCIA. She also follows with hematology for chronic macrocytic anemia with elevated free light chains and has declined bone marrow biopsy in past.     She arrived for evaluation to the ED on 2/1 were labs revealed ANI with creatinine 3.0 (baseline around 1.3), hyperkalemia, metabolic acidosis with elevated lactic acid, elevated liver enzymes, elevated BNP (1812), elevated d-dimer, thrombocytopenia (plts 88), and markedly concentrated urine. Initially, her ANI was attributed to dehydration in the setting of diuretic use so diuretic was  held and she was given gentle IV hydration. IV fluids were later discontinued following day due to development of crackles in lung fields on exam but later restarted. She was placed on bicarb gtt for metabolic acidosis per nephrology. Nephrology initially concerned for Vasculitis vs gbm vs MM, noted to have decreased complement and active sediment concerning for GN. She was later transferred to the ICU on 2/4 for episode of desaturation while on bipap. Hematology was consulted and stated that respiratory failure may be due to viral illness given worsening cytopenias. Pulmonology was consulted on 2/4 and stated that there was concern for combination of underlying ILD vs pulmonary edema vs pulmonary-renal syndrome. On 2/5, nephrology recommended pulse dose steroids for concern for GN with plan for kidney biopsy by IR. She completed 3 days of pulse dose steroids for suspected glomerulonephritis vs pulm/renal syndrome (2/5-2/7), then transitioned to pred 60mg on 2/8. However, on 2/7, she was found to require HD which was performed and kidney biopsy was postponed. However, her renal failure and thrombocytopenia continued to worsen, with creatinine peaking at 6.4 and platelets as low as 26,000. On 2/16, Nephrology recommended adding Cellcept and reducing the Prednisone dose. However, the patient was unable to swallow the MMF pills.     She was transferred to [facility] for rheumatology and nephrology consult.      Rheumatological ROS:  No skin rashes,malar rash,photosensitivity   No telangiectasias   No calcinosis   No psoriasis   No patchy alopecia   No oral and nasal ulcers   No dry eyes and dry mouth   No pleurisy or any cardiopulmonary complaints   No dysphagia,diplopia and dysphonia and muscle weakness   No n/v/d/c   No acid reflux+   + raynaud's+   No digital ulcers   No cytopenias   No renal issues   No blood clots   No fever,chills,night sweats,weight loss and loss of appetite   No pregnancy losses/pre term deliveries  /pregnancy complications   No new onset headaches   No recurrent conjunctivitis or uveitis or scleritis or episcleritis   No chronic or bloody diarrhea with no u colitis or crohn's /inflammatory bowel disease   No vaginal or urethral  d/c/STDs/no ulcers   No joint pains         Past Medical History:   Diagnosis Date    Allergy     Anxiety     Basal cell carcinoma     Cirrhosis of liver without ascites 12/27/2017    Depression     Diabetes mellitus, type 2     Disorder of kidney and ureter     Endometriosis     GERD (gastroesophageal reflux disease)     Hyperlipidemia     Hypertension     Joint pain     Psoriasis        Past Surgical History:   Procedure Laterality Date    abdominal laproscopic surgery      APPENDECTOMY      CARPAL TUNNEL RELEASE      right    CHOLECYSTECTOMY  04/2015    COLONOSCOPY N/A 2/8/2019    Procedure: COLONOSCOPY;  Surgeon: Celso Salas MD;  Location: [facility] ([address] [place] [address]);  Service: Endoscopy;  Laterality: N/A;    ESOPHAGOGASTRODUODENOSCOPY N/A 2/8/2019    Procedure: EGD (ESOPHAGOGASTRODUODENOSCOPY);  Surgeon: Celso Salas MD;  Location: [address]);  Service: Endoscopy;  Laterality: N/A;  varices screening, labs ordered prior    HYSTERECTOMY  age 25    JOSEFA/BSO (endometriosis)    OOPHORECTOMY      SKIN CANCER DESTRUCTION      UPPER GASTROINTESTINAL ENDOSCOPY         Immunization History   Administered Date(s) Administered    COVID-19, MRNA, LN-S, PF (Pfizer) (Purple Cap) 03/10/2021, 03/31/2021, 09/28/2021    Influenza 10/17/2013    Influenza (FLUAD) - Quadrivalent - Adjuvanted - PF *Preferred* (65+) 09/16/2020    Influenza - High Dose - PF (65 years and older) 11/06/2015, 11/29/2016, 11/08/2017, 09/21/2018, 09/25/2019    Influenza - Quadrivalent 10/20/2014    Influenza - Quadrivalent - High Dose - PF (65 years and older) 10/10/2021, 10/10/2021    Influenza - Quadrivalent - PF *Preferred* (6 months and older) 10/17/2013    Influenza - Trivalent -  PF (ADULT) 10/17/2013    Pneumococcal Conjugate - 13 Valent 11/06/2015    Pneumococcal Polysaccharide - 23 Valent 10/20/2014, 09/25/2019    Tdap 01/08/2013    Zoster 01/17/2013       Review of patient's allergies indicates:   Allergen Reactions    Dobutamine Hives    Benadryl [diphenhydramine hcl] Anxiety     Pt states she feels jumpy and anxious when taking.    Darvon [propoxyphene] Nausea Only    Shellfish containing products Hives    Iodinated contrast media Hives    Lisinopril Other (See Comments)     cough    Propoxyphene hcl      Itchy (skin)^    Tizanidine Other (See Comments)     Sweaty, difficulty swallowing    Statins-hmg-coa reductase inhibitors Anxiety and Other (See Comments)     Myalgia, fatigue, palpitations, anxiety     Current Facility-Administered Medications   Medication Frequency    0.9%  NaCl infusion PRN    0.9%  NaCl infusion Once    acetaminophen tablet 650 mg Q6H PRN    albuterol-ipratropium 2.5 mg-0.5 mg/3 mL nebulizer solution 3 mL Q4H PRN    benzonatate capsule 100 mg TID PRN    bisacodyL suppository 10 mg Daily PRN    dextromethorphan-guaiFENesin  mg/5 ml liquid 5 mL Q6H    dextrose 10% bolus 125 mL PRN    dextrose 10% bolus 250 mL PRN    famotidine tablet 20 mg Daily    glucagon (human recombinant) injection 1 mg PRN    glucose chewable tablet 16 g PRN    glucose chewable tablet 24 g PRN    heparin (porcine) injection 3,200 Units PRN    hydrOXYchloroQUINE tablet 200 mg Daily    insulin aspart U-100 pen 0-5 Units QID (AC + HS) PRN    insulin aspart U-100 pen 4 Units TIDWM    insulin detemir U-100 pen 16 Units QHS    iohexoL (OMNIPAQUE 350) injection 100 mL ONCE PRN    lanthanum chewable tablet 500 mg QID    melatonin tablet 6 mg Nightly PRN    metoprolol succinate (TOPROL-XL) 24 hr split tablet 12.5 mg Daily    naloxone 0.4 mg/mL injection 0.02 mg PRN    ondansetron injection 4 mg TID AC    polyethylene glycol packet 17 g Daily PRN     predniSONE tablet 40 mg Daily    simethicone chewable tablet 80 mg TID PRN    sodium chloride 0.9% bolus 250 mL PRN    sodium chloride 0.9% flush 10 mL PRN    vitamin renal formula (B-complex-vitamin c-folic acid) 1 mg per capsule 1 capsule Daily     Family History       Problem Relation (Age of Onset)    Arthritis Mother    Asthma Brother    Hyperlipidemia Brother    Hypertension Mother, Sister, Brother    No Known Problems Father    Raynaud syndrome Sister          Tobacco Use    Smoking status: Never Smoker    Smokeless tobacco: Never Used   Substance and Sexual Activity    Alcohol use: Not Currently    Drug use: No    Sexual activity: Yes     Partners: Male     Birth control/protection: Surgical     Review of Systems   Constitutional:  Positive for fatigue. Negative for chills and fever.   HENT:  Negative for hearing loss, sore throat and trouble swallowing.    Eyes:  Negative for visual disturbance.   Respiratory:  Positive for cough and shortness of breath.    Cardiovascular:  Negative for chest pain.   Gastrointestinal:  Positive for nausea. Negative for abdominal pain, constipation, diarrhea and vomiting.   Genitourinary:  Negative for dysuria and frequency.   Musculoskeletal:  Negative for arthralgias and myalgias.   Skin:  Negative for rash.   Neurological:  Positive for weakness. Negative for dizziness and headaches.   Objective:     Vital Signs (Most Recent):  Temp: 96 °F (35.6 °C) (02/21/22 1155)  Pulse: 105 (02/21/22 1155)  Resp: 16 (02/21/22 1155)  BP: (!) 132/58 (02/21/22 1155)  SpO2: 99 % (02/21/22 1155)  O2 Device (Oxygen Therapy): nasal cannula (02/20/22 0842)   Vital Signs (24h Range):  Temp:  [96 °F (35.6 °C)-98.4 °F (36.9 °C)] 96 °F (35.6 °C)  Pulse:  [] 105  Resp:  [16-18] 16  SpO2:  [98 %-100 %] 99 %  BP: (120-135)/(55-60) 132/58     Weight: 72.8 kg (160 lb 7.9 oz) (02/20/22 2100)  Body mass index is 34.73 kg/m².  Body surface area is 1.71 meters squared.      Intake/Output  Summary (Last 24 hours) at 2/21/2022 1603  Last data filed at 2/21/2022 1235  Gross per 24 hour   Intake 150 ml   Output 155 ml   Net -5 ml       Physical Exam   Constitutional: She appears ill. No distress.   HENT:   Head: Normocephalic and atraumatic.   Eyes: Conjunctivae are normal.   Cardiovascular: Normal rate, regular rhythm and normal heart sounds. Exam reveals no friction rub.   No murmur heard.  Pulmonary/Chest: Effort normal. She has no wheezes. She has rales.   Breathing appears labored with some use of accessory muscles   Abdominal: Soft. Bowel sounds are normal. There is no abdominal tenderness. There is no rebound.   Musculoskeletal:      Comments: No synovitis in upper or lower extremities  Mild pitting edema in lower extremities   Neurological: She is alert.   Skin: Skin is warm and dry. She is not diaphoretic.     Significant Labs:  CBC: No results for input(s): WBC, HGB, HCT, PLT in the last 24 hours.  CMP:   Recent Labs   Lab 02/21/22  0818   *   CALCIUM 9.1   ALBUMIN 2.5*   PROT 6.6   *   K 5.2*   CO2 18*      BUN 95*   CREATININE 3.4*   ALKPHOS 181*   ALT 48*   AST 61*   BILITOT 2.5*     Assessment/Plan:     * Acute renal failure superimposed on stage 2 chronic kidney disease  71 y.o. with HTN, diabetes type 2 (hemoglobin A1c 5.9 on 2/1), CKD2, cirrhosis, HFpEF, macrocytic anemia, psorasis, sleep apnea and GERD who was admitted to [facility] on 2/1/22 for acute renal failure. She endorses fatigue, alopecia (for past 3 years), and +raynauds. Denies oral ulcers, , rash, joint swelling/joint pain, chest pain. Stress echo from 12/2021 showed trivial pericardial effusion.    Labs:  ARMEN 1:1280 on 2/3  +Sm/RNP: 4.06 on 2/3  UPE: no paraprotein bands  ESR: > 140  CRP: 20.8  C3: 26 (L)  C4: < 3 (L)  VALENTINO: pos (VALENTINO pos 2/2  cold autob, haptoglobin and LDH wnl)  UA: Protein 3+, occult blood 3+, WBC 3+  Microscopic UA: RBC >100, WBC 40  Timed urine protein: 560  CRP  20.9  Haptoglobin: norm  CCP: norm  RF: norm  GBM ab: negative  ANCA: negative  Hep B surface antigen: neg  Hep C ab: neg    Treatment history while inpatient:  - S/p Methylpred 1g x 3 days (2/5-2/7), then transitioned to pred 60mg on 2/8, decreased to prednisone 50mg on 2/15, prednisone on 40mg on 2/17- present    According to the EULAR/ACR 2019 criteria for SLE, she meets criteria for lupus (17pts)  due to: +ARMEN, +thrombocytopenia,+ proteinuria, + low C3, + low C4, + pericardial effusion (on stress echo from 12/2021) + alopecia.     Plan/Recommendations:  - will obtain beta2-glycoproteins, DRVVT, cardiolipin, RNP  - repeat complements and hemolysis labs  - can consider hematology consult if thrombocytopenia does not improvement though likely related to SLE and possible component from underlying cirrhosis  - start plaquenil 200mg daily  - nephrology consulted, plan change to AZA though can consider MMF liquid suspension as alternative as well though will defer treatment and steroid regimen to nephrology given concern for nephritis      Patient seen and evaluated with Dr. Miller. Please see staff attestation for final recommendations.          Thank you for your consult. I will follow-up with patient. Please contact us if you have any additional questions.    Giovanna Galeana MD  Rheumatology  [facility] - Med Surg

## 2022-02-21 NOTE — SUBJECTIVE & OBJECTIVE
Past Medical History:   Diagnosis Date    Allergy     Anxiety     Basal cell carcinoma     Cirrhosis of liver without ascites 12/27/2017    Depression     Diabetes mellitus, type 2     Disorder of kidney and ureter     Endometriosis     GERD (gastroesophageal reflux disease)     Hyperlipidemia     Hypertension     Joint pain     Psoriasis        Past Surgical History:   Procedure Laterality Date    abdominal laproscopic surgery      APPENDECTOMY      CARPAL TUNNEL RELEASE      right    CHOLECYSTECTOMY  04/2015    COLONOSCOPY N/A 2/8/2019    Procedure: COLONOSCOPY;  Surgeon: Celso Salas MD;  Location: [facility] ([address] [place] [address]);  Service: Endoscopy;  Laterality: N/A;    ESOPHAGOGASTRODUODENOSCOPY N/A 2/8/2019    Procedure: EGD (ESOPHAGOGASTRODUODENOSCOPY);  Surgeon: Celso Salas MD;  Location: [facility] ([address] [place] [address]);  Service: Endoscopy;  Laterality: N/A;  varices screening, labs ordered prior    HYSTERECTOMY  age 25    JOSEFA/BSO (endometriosis)    OOPHORECTOMY      SKIN CANCER DESTRUCTION      UPPER GASTROINTESTINAL ENDOSCOPY         Review of patient's allergies indicates:   Allergen Reactions    Dobutamine Hives    Benadryl [diphenhydramine hcl] Anxiety     Pt states she feels jumpy and anxious when taking.    Darvon [propoxyphene] Nausea Only    Shellfish containing products Hives    Iodinated contrast media Hives    Lisinopril Other (See Comments)     cough    Propoxyphene hcl      Itchy (skin)^    Tizanidine Other (See Comments)     Sweaty, difficulty swallowing    Statins-hmg-coa reductase inhibitors Anxiety and Other (See Comments)     Myalgia, fatigue, palpitations, anxiety     Current Facility-Administered Medications   Medication Frequency    acetaminophen tablet 650 mg Q6H PRN    albuterol-ipratropium 2.5 mg-0.5 mg/3 mL nebulizer solution 3 mL Q4H PRN    benzonatate capsule 100 mg TID PRN    bisacodyL suppository 10 mg Daily PRN    dextromethorphan-guaiFENesin  mg/5 ml liquid 5 mL Q6H    dextrose 10%  bolus 125 mL PRN    dextrose 10% bolus 250 mL PRN    famotidine tablet 20 mg Daily    glucagon (human recombinant) injection 1 mg PRN    glucose chewable tablet 16 g PRN    glucose chewable tablet 24 g PRN    heparin (porcine) injection 3,200 Units PRN    insulin aspart U-100 pen 0-5 Units QID (AC + HS) PRN    insulin aspart U-100 pen 4 Units TIDWM    insulin detemir U-100 pen 16 Units QHS    iohexoL (OMNIPAQUE 350) injection 100 mL ONCE PRN    lanthanum chewable tablet 500 mg QID    magnesium hydroxide 400 mg/5 ml suspension 2,400 mg Once    melatonin tablet 6 mg Nightly PRN    metoprolol succinate (TOPROL-XL) 24 hr split tablet 12.5 mg Daily    naloxone 0.4 mg/mL injection 0.02 mg PRN    ondansetron injection 4 mg TID AC    polyethylene glycol packet 17 g Daily PRN    predniSONE tablet 40 mg Daily    simethicone chewable tablet 80 mg TID PRN    sodium chloride 0.9% flush 10 mL PRN    vitamin renal formula (B-complex-vitamin c-folic acid) 1 mg per capsule 1 capsule Daily     Family History       Problem Relation (Age of Onset)    Arthritis Mother    Asthma Brother    Hyperlipidemia Brother    Hypertension Mother, Sister, Brother    No Known Problems Father    Raynaud syndrome Sister          Tobacco Use    Smoking status: Never Smoker    Smokeless tobacco: Never Used   Substance and Sexual Activity    Alcohol use: Not Currently    Drug use: No    Sexual activity: Yes     Partners: Male     Birth control/protection: Surgical     Review of Systems   Constitutional:  Positive for activity change and fatigue. Negative for chills and fever.   HENT:  Negative for sore throat.    Respiratory:  Negative for cough and shortness of breath.    Cardiovascular:  Negative for chest pain and leg swelling.   Gastrointestinal:  Negative for abdominal pain, constipation, diarrhea, nausea and vomiting.   Genitourinary:  Negative for decreased urine volume and dysuria.   Musculoskeletal:  Negative for myalgias.   Skin:  Negative for  rash.   Neurological:  Negative for dizziness and headaches.   Psychiatric/Behavioral:  Negative for confusion.    Objective:     Vital Signs (Most Recent):  Temp: 97.6 °F (36.4 °C) (02/21/22 0744)  Pulse: (!) 115 (02/21/22 0744)  Resp: 18 (02/21/22 0744)  BP: 130/60 (02/21/22 0744)  SpO2: 98 % (02/21/22 0744)  O2 Device (Oxygen Therapy): nasal cannula (02/20/22 0842)   Vital Signs (24h Range):  Temp:  [97.5 °F (36.4 °C)-98.4 °F (36.9 °C)] 97.6 °F (36.4 °C)  Pulse:  [] 115  Resp:  [16-18] 18  SpO2:  [98 %-100 %] 98 %  BP: ()/(42-60) 130/60     Weight: 72.8 kg (160 lb 7.9 oz) (02/20/22 2100)  Body mass index is 34.73 kg/m².  Body surface area is 1.71 meters squared.    No intake/output data recorded.    Physical Exam  Constitutional:       General: She is not in acute distress.     Appearance: She is ill-appearing.   HENT:      Head: Normocephalic and atraumatic.      Mouth/Throat:      Mouth: Mucous membranes are moist.      Pharynx: Oropharynx is clear.   Eyes:      Extraocular Movements: Extraocular movements intact.      Pupils: Pupils are equal, round, and reactive to light.   Cardiovascular:      Rate and Rhythm: Normal rate and regular rhythm.      Pulses: Normal pulses.      Heart sounds: Normal heart sounds.   Pulmonary:      Effort: Pulmonary effort is normal. No respiratory distress.      Breath sounds: Normal breath sounds.   Abdominal:      General: Abdomen is flat. Bowel sounds are normal.      Palpations: Abdomen is soft.   Musculoskeletal:         General: No swelling.      Cervical back: Neck supple.   Skin:     General: Skin is warm and dry.      Capillary Refill: Capillary refill takes less than 2 seconds.      Comments: Ecchymosis surrounding right chest HD cath   Neurological:      General: No focal deficit present.      Mental Status: She is alert and oriented to person, place, and time. Mental status is at baseline.   Psychiatric:         Mood and Affect: Mood normal.          Behavior: Behavior normal.         Thought Content: Thought content normal.         Judgment: Judgment normal.       Significant Labs:  BMP:   Recent Labs   Lab 02/21/22  0818   *   *   K 5.2*      CO2 18*   BUN 95*   CREATININE 3.4*   CALCIUM 9.1   MG 2.2     CBC:   Recent Labs   Lab 02/20/22  0411   WBC 13.51*   RBC 2.19*   HGB 7.7*   HCT 23.4*   PLT 43*   *   MCH 35.2*   MCHC 32.9     CMP:   Recent Labs   Lab 02/21/22  0818   *   CALCIUM 9.1   ALBUMIN 2.5*   PROT 6.6   *   K 5.2*   CO2 18*      BUN 95*   CREATININE 3.4*   ALKPHOS 181*   ALT 48*   AST 61*   BILITOT 2.5*     Coagulation:   Recent Labs   Lab 02/21/22  0828   INR 1.4*     No results for input(s): COLORU, CLARITYU, SPECGRAV, PHUR, PROTEINUA, GLUCOSEU, BILIRUBINCON, BLOODU, WBCU, RBCU, BACTERIA, MUCUS, NITRITE, LEUKOCYTESUR, UROBILINOGEN, HYALINECASTS in the last 168 hours.  All labs within the past 24 hours have been reviewed.    Significant Imaging:  Labs: Reviewed    EXAMINATION:  US ABDOMEN COMP WITH DOPPLER (XPD)     CLINICAL HISTORY:  Fatty (change of) liver, not elsewhere classified     TECHNIQUE:  Complete abdominal ultrasound (including pancreas, liver, gallbladder, common bile duct, spleen, aorta, IVC, and kidneys) was performed.     Complete abdominal doppler exam was also performed.     COMPARISON:  Abdominal ultrasound 09/24/2021, 03/22/2021, 09/23/2020.     FINDINGS:  Liver: Normal in size, measuring 14.5 cm. Nodular contour suggesting cirrhosis. No solid mass.     Gallbladder: The gallbladder is surgically absent.     Biliary system: The common duct is not dilated, measuring 5 mm.  No intrahepatic ductal dilatation.     Spleen: Slightly enlarged in size with a homogeneous echotexture, measuring 12.6 x 4.3 cm.     Pancreas: The visualized portions of pancreas appear normal.     Right kidney: Normal in size with no hydronephrosis, measuring 10.5 cm.     Left kidney: Normal in size with no  hydronephrosis, measuring 9.3 cm.     Aorta: Obscured by overlying bowel gas.     Inferior vena cava: Normal in appearance.     Miscellaneous: Trace ascites.     Main hepatic artery: Patent.     Main portal vein: Patent with proper directional flow.     Left portal vein:  Patent with proper directional flow.     Right portal vein:  Patent with proper directional flow.     SMV:  Patent with proper directional flow.     IVC: Patent     Left, middle, and right hepatic veins: Patent.     Umbilical vein: Not patent.     Celiac artery: Not visualized.     Splenic Vein: Patent with proper directional flow.     Impression:     Hepatic cirrhosis/steatosis with sonographic findings of portal hypertension including trace ascites and splenomegaly.     Electronically signed by resident: Rashid Parish  Date:                                            01/31/2022  Time:                                           10:56     Electronically signed by: Rustam Damon MD  Date:                                            01/31/2022  Time:                                           11:08

## 2022-02-21 NOTE — PROGRESS NOTES
02/21/22 1708   Post-Hemodialysis Assessment   Blood Volume Processed (Liters) 12 L   Dialyzer Clearance Clear   Duration of Treatment (minutes) 60 minutes   Hemodialysis Intake (mL) 500 mL   Total UF (mL) 388 mL   Net Fluid Removal -112     HD tx ended after one hour d/t hypotension per Dr Wilkes. Blood returned and BP improved from 81/41 to 98/47. Primary RN notified. Pt left in NAD, resting; arouses to voice.

## 2022-02-21 NOTE — PT/OT/SLP DISCHARGE
Physical Therapy Discharge Summary    Name: Tammi Farris  MRN: [id number]   Principal Problem: Acute renal failure superimposed on stage 2 chronic kidney disease     Patient Discharged from acute Physical Therapy on 22.       Assessment:     Patient was transferred from [facility] and secondary to transfer to different hospital will need new PT orders before patient can be seen for further therapy.     Objective:     GOALS:   Multidisciplinary Problems     Physical Therapy Goals        Problem: Physical Therapy Goal    Goal Priority Disciplines Outcome Goal Variances Interventions   Physical Therapy Goal     PT, PT/OT Ongoing, Progressing     Description: Goals to be met by: 22     Patient will increase functional independence with mobility by performin. Supine to sit with Modified [place]  2. Rolling to Left and Right with Modified [place]  3. Sit to stand transfer with Modified [place] using RW  4. Bed to chair transfer with Modified [place] using Rolling Walker  5. Gait >50 feet with Modified [place] using Rolling Walker   6. Lower extremity exercise program 2 sets x10 reps per handout, with independence                     Reasons for Discontinuation of Therapy Services  Transfer to alternate level of care.      Plan:     Patient Discharged to: Patient was with previous recommendations for inpatient rehab.  .      2022

## 2022-02-21 NOTE — PLAN OF CARE
Hospital Medicine Acceptance Note    Date of Admit: 2/1/2022  Date of Transfer: 2/21/2022  Transfer Facility: [facility]  Accepting  team: [facility]    Brief History of Present Illness:        71F with CKD2, HTN, HLD, DM2, cirrhosis, sleep apnea admitted on 2/2 for acute renal failure at [facility]. Did not improve with either IVF's or diuresis. Found to have low complement levels, positive anti-Sm/RNP ab's. Unable to get renal biopsy due to thrombocytopenia and how ill she looked. Treated with pulse dose steroids x 3 days; on prednisone. Attempted to start on Cellcept by Nephrologist but has not been taking due to size of pills. She has been started on dialysis, has received several sessions with Cr improving, plan to hold HD this weekend to see if renal recovery. Likely will eventually need to go to inpatient rehab due to debility. VSS, 100% on 2 L NC. WBC 14.89, PLT 51 (baseline 100), Cr 3.5 (improved from 6.4, baseline 0.9), BUN 71, UA with 3+ protein. Abdominal US 1/31 showed normal kidneys. There is concern for lupus nephritis. Case was discussed with Rheumatology who recommends transfer to [facility] for Rheum consult and IR renal biopsy.         Transfer Information:   At time of my assessment pt. Alert but appears drowsy/sluggis. Progress notes from today report pt. At baseline, continue to monitor closely and consider restarting lactulose. EKG also obtained on arrival reveals atrial flutter, no previous documentaion of A flutter noted. Pt. Rate controlled, will hold off on further B-blockers due to issues with hypotension. IR and nephrology consulted, pt. Will be made NPO in case renal biopsy able to be performed in am. No other acute issues, further planned outlined in today's progress note from [facility] physician.    Deejay Posey MD  Hospital Medicine Staff  [phone] pager

## 2022-02-21 NOTE — NURSING
Pt has telemetry orders . Telemetry doesn't have any boxes right now and is currently looking for boxes. I will place when available. MD Deejay james was notified.

## 2022-02-22 PROBLEM — R76.8 POSITIVE DIRECT COOMBS TEST: Status: ACTIVE | Noted: 2022-01-01

## 2022-02-22 NOTE — NURSING
24 hour urine completed. Specimen placed in proper container, prepped and picked up by lab at this time.

## 2022-02-22 NOTE — ASSESSMENT & PLAN NOTE
-plaquenil 200  -on prednisone 40  -nephrology starting imuran  -Renal biopsy on hold for now     no

## 2022-02-22 NOTE — ASSESSMENT & PLAN NOTE
-Dialysis depented ANI thought to be secondary to an auto-immune etiology, based on serologic pattern of possitive ARMEN, Anti-Posey RNP and low complement levels, negative ANCA.  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     -On 2/7 IR placed THDC and HD initiated for encephalopathy  -s/p 3 days of pulse dose steroids for suspected glomerulonephritis vs pulm/renal syndrome and continues on prednisone.  -Nephrology has restarted CellCept in Liquid formulation as pt cleareed for Full Liquids by SLP   -Dialysis is being performed at discretion of Nephrology team.   -Rheumatology consulted. -  -HD today   -Yao in place and lasix infusion @ 5mg/hr running

## 2022-02-22 NOTE — SUBJECTIVE & OBJECTIVE
Interval History: Underwent HD yesterday but discontinued after 1 hour due to hypotension.     Current Facility-Administered Medications   Medication Frequency    0.9%  NaCl infusion PRN    0.9%  NaCl infusion Once    acetaminophen tablet 650 mg Q6H PRN    albuterol-ipratropium 2.5 mg-0.5 mg/3 mL nebulizer solution 3 mL Q4H PRN    benzonatate capsule 100 mg TID PRN    bisacodyL suppository 10 mg Daily PRN    dextromethorphan-guaiFENesin  mg/5 ml liquid 5 mL Q6H    dextrose 10% bolus 125 mL PRN    dextrose 10% bolus 250 mL PRN    famotidine tablet 20 mg Daily    furosemide (LASIX) 500 mg infusion (conc: 10 mg/mL) Continuous    glucagon (human recombinant) injection 1 mg PRN    glucose chewable tablet 16 g PRN    glucose chewable tablet 24 g PRN    heparin (porcine) injection 3,200 Units PRN    hydrOXYchloroQUINE tablet 200 mg Daily    insulin aspart U-100 pen 0-5 Units QID (AC + HS) PRN    insulin aspart U-100 pen 4 Units TIDWM    insulin detemir U-100 pen 10 Units Daily    insulin detemir U-100 pen 16 Units QHS    iohexoL (OMNIPAQUE 350) injection 100 mL ONCE PRN    lanthanum chewable tablet 500 mg QID    melatonin tablet 6 mg Nightly PRN    metoprolol 12.5mg/1.25mL oral solution 6.25 mg BID    naloxone 0.4 mg/mL injection 0.02 mg PRN    ondansetron injection 4 mg TID AC    polyethylene glycol packet 17 g Daily PRN    predniSONE tablet 40 mg Daily    simethicone chewable tablet 80 mg TID PRN    sodium chloride 0.9% bolus 250 mL PRN    sodium chloride 0.9% flush 10 mL PRN    vitamin renal formula (B-complex-vitamin c-folic acid) 1 mg per capsule 1 capsule Daily     Objective:     Vital Signs (Most Recent):  Temp: 96.1 °F (35.6 °C) (02/22/22 0745)  Pulse: 92 (02/22/22 0801)  Resp: 14 (02/22/22 0745)  BP: (!) 100/50 (02/22/22 0745)  SpO2: 99 % (02/22/22 0745)  O2 Device (Oxygen Therapy): nasal cannula (02/21/22 2300)   Vital Signs (24h Range):  Temp:  [96 °F (35.6 °C)-98 °F (36.7 °C)] 96.1 °F (35.6 °C)  Pulse:   [] 92  Resp:  [14-20] 14  SpO2:  [91 %-100 %] 99 %  BP: ()/(41-64) 100/50     Weight: 72.8 kg (160 lb 7.9 oz) (02/20/22 2100)  Body mass index is 34.73 kg/m².  Body surface area is 1.71 meters squared.      Intake/Output Summary (Last 24 hours) at 2/22/2022 1033  Last data filed at 2/21/2022 1800  Gross per 24 hour   Intake 700 ml   Output 543 ml   Net 157 ml       Physical Exam   Constitutional: She appears ill. No distress.   HENT:   Head: Normocephalic and atraumatic.   Eyes: Conjunctivae are normal.   Cardiovascular: Normal rate, regular rhythm and normal heart sounds. Exam reveals no friction rub.   No murmur heard.  Pulmonary/Chest: Effort normal. She has no wheezes. She has rales.   Breathing appears labored with some use of accessory muscles   Abdominal: Soft. Bowel sounds are normal. There is no abdominal tenderness. There is no rebound.   Musculoskeletal:      Comments: No synovitis in upper or lower extremities  Mild pitting edema in lower extremities   Neurological: She is alert.   Skin: Skin is warm and dry. She is not diaphoretic.     Significant Labs:  CBC:   Recent Labs   Lab 02/22/22  0536   WBC 11.25   HGB 7.6*   HCT 22.9*   PLT 50*     CMP:   Recent Labs   Lab 02/22/22  0536   *   CALCIUM 9.0   ALBUMIN 2.4*   PROT 6.3      K 4.9   CO2 22*      BUN 92*   CREATININE 3.5*   ALKPHOS 170*   ALT 49*   AST 48*   BILITOT 2.5*

## 2022-02-22 NOTE — ASSESSMENT & PLAN NOTE
-Chronic and at baseline on admit secondary to cirrhosis  -Heme/onc consulted and input appreciated.  Did not recommend bone marrow biopsy  -Plan is transfuse for platelets <10 or if significant hemorrhage.  -Platelets 43K today  -[place] cbc q48h.  -consented and type & screen sent

## 2022-02-22 NOTE — PLAN OF CARE
Vitals stable. Pt with some labored breathing, continued O2 at 2L nasal cannula; pt denies shortness of breath or chest pain. Appetite minimal but improved with switch to full liquid diet. Pt tolerated crushed meds. Blood sugars elevated but stable. Decreased urine output, MD aware, lasix gtt started. No BM so far this shift. Turned q2h, provided skin care, mepilex placed to sacrum for skin protection. 24 hour urine completed    Problem: Adult Inpatient Plan of Care  Goal: Plan of Care Review  Outcome: Ongoing, Progressing  Goal: Patient-Specific Goal (Individualized)  Outcome: Ongoing, Progressing  Goal: Absence of Hospital-Acquired Illness or Injury  Outcome: Ongoing, Progressing  Goal: Optimal Comfort and Wellbeing  Outcome: Ongoing, Progressing  Goal: Readiness for Transition of Care  Outcome: Ongoing, Progressing     Problem: Infection  Goal: Absence of Infection Signs and Symptoms  Outcome: Ongoing, Progressing     Problem: Diabetes Comorbidity  Goal: Blood Glucose Level Within Targeted Range  Outcome: Ongoing, Progressing     Problem: Fluid and Electrolyte Imbalance (Acute Kidney Injury/Impairment)  Goal: Fluid and Electrolyte Balance  Outcome: Ongoing, Progressing     Problem: Oral Intake Inadequate (Acute Kidney Injury/Impairment)  Goal: Optimal Nutrition Intake  Outcome: Ongoing, Progressing     Problem: Renal Function Impairment (Acute Kidney Injury/Impairment)  Goal: Effective Renal Function  Outcome: Ongoing, Progressing     Problem: Skin Injury Risk Increased  Goal: Skin Health and Integrity  Outcome: Ongoing, Progressing     Problem: Fall Injury Risk  Goal: Absence of Fall and Fall-Related Injury  Outcome: Ongoing, Progressing     Problem: Device-Related Complication Risk (Hemodialysis)  Goal: Safe, Effective Therapy Delivery  Outcome: Ongoing, Progressing     Problem: Hemodynamic Instability (Hemodialysis)  Goal: Effective Tissue Perfusion  Outcome: Ongoing, Progressing     Problem: Infection  (Hemodialysis)  Goal: Absence of Infection Signs and Symptoms  Outcome: Ongoing, Progressing     Problem: Coping Ineffective  Goal: Effective Coping  Outcome: Ongoing, Progressing

## 2022-02-22 NOTE — PT/OT/SLP EVAL
Occupational Therapy   Evaluation    Name: Tammi Farris  MRN: [id number]  Admitting Diagnosis:  Acute renal failure superimposed on stage 2 chronic kidney disease  Recent Surgery: * No surgery found *      Recommendations:     Discharge Recommendations: nursing facility, skilled  Discharge Equipment Recommendations:  bedside commode, bath bench, walker, rolling  Barriers to discharge:  Decreased caregiver support    Assessment:     Tammi Farris is a 71 y.o. female with a medical diagnosis of Acute renal failure superimposed on stage 2 chronic kidney disease.  She presents with c/o of abdominal pain and fatigue. Performance deficits affecting function: weakness, impaired self care skills, impaired balance, impaired endurance, impaired functional mobilty, pain, gait instability, impaired cardiopulmonary response to activity.  Patient has transferred from [facility] facility. She present with pain and poor activity tolerance. She can communicate in English although [place] is her native language. She provided PLOF and home set up information. She lives with a friends and rents a room with no AE in place. She states she has had decline over last three months and required assist with bathing, dressing and toileting. Prior to this she was independent in home and community. She has weakness in BUEs and LEs. She completed roll to right with mod (A), Supine to sit with mod (A), EOB balance dynamic fair. She completed grooming in sitting at EOB with set up. Sit to supine mod (A) and scooting up in bed with min (A) and prolonged time. Patient in agreement with POC.     Rehab Prognosis: Fair; patient would benefit from acute skilled OT services to address these deficits and reach maximum level of function.       Plan:     Patient to be seen 4 x/week to address the above listed problems via self-care/home management, therapeutic activities, therapeutic exercises  · Plan of Care Expires: 03/22/22  · Plan of Care Reviewed with:  "patient, spouse    Subjective     Chief Complaint: "I am very tired."   Patient/Family Comments/goals: discharge to SNF    Occupational Profile:  Living Environment: Lived in rented room with a friend, she required assist with bathing, toileting, and dressing over last three months. She has no AE in place. Prior to illness she was independent in home and community. House has not steps at entrance.     Equipment Used at Home:  none  Assistance upon Discharge: occasional from friends and family    Pain/Comfort:  · Pain Rating 1: 0/10  · Pain Rating Post-Intervention 1: 0/10    Patients cultural, spiritual, Yazdanism conflicts given the current situation: no    Objective:     Communicated with: RN prior to session.  Patient found HOB elevated with hall catheter, telemetry, oxygen upon OT entry to room.    General Precautions: Standard, fall, aspiration   Orthopedic Precautions:N/A   Braces: N/A  Respiratory Status: Nasal cannula, flow 2 L/min    Occupational Performance:    Bed Mobility:    · Patient completed Rolling/Turning to Right with moderate assistance  · Patient completed Supine to Sit with moderate assistance  · Patient completed Sit to Supine with moderate assistance    Functional Mobility/Transfers:  · Patient completed Sit <> Stand Transfer with minimum assistance  with  hand-held assist   ·     Activities of Daily Living:  · Feeding:  supervision set up with head of bed elevated  · Grooming: supervision and total assistance set up at EOB for teeth and face care    Cognitive/Visual Perceptual:  Cognitive/Psychosocial Skills:     -       Oriented to: Person, Place, Time and Situation   -       Follows Commands/attention:Follows multistep  commands  -       Memory: No Deficits noted  -       Safety awareness/insight to disability: intact     Physical Exam:  Upper Extremity Range of Motion:     -       Right Upper Extremity: WFL  -       Left Upper Extremity: WFL  Upper Extremity Strength:    -       Right " Upper Extremity: Deficits: grossly 3/5 all joints  -       Left Upper Extremity: Deficits: grossly 3/5 all joints    AMPAC 6 Click ADL:  AMPAC Total Score: 13    Treatment & Education:  Education on benefits of therapy and time out of bed  Education:    Patient left HOB elevated with all lines intact, call button in reach and RN notified    GOALS:   Multidisciplinary Problems     Occupational Therapy Goals        Problem: Occupational Therapy Goal    Goal Priority Disciplines Outcome Interventions   Occupational Therapy Goal     OT, PT/OT Ongoing, Progressing    Description: Goals to be met by: 03/22/22    Patient will increase functional independence with ADLs by performing:    UE Dressing with Stand-by assistance.  LE Dressing with Minimal assistance.  Grooming while seated with Stand-by assistance.  Toileting from bedside commode with minimal assistance for hygiene and clothing management.   Supine to sit with Stand-by assistance.  Step transfer with stand-by assistance  Toilet transfer to bedside commode with stand-by assistance.  Upper extremity exercise program x15 reps per handout, with independence.  Goals reviewed and updated.                    History:     Past Medical History:   Diagnosis Date    Allergy     Anxiety     Basal cell carcinoma     Cirrhosis of liver without ascites 12/27/2017    Depression     Diabetes mellitus, type 2     Disorder of kidney and ureter     Endometriosis     GERD (gastroesophageal reflux disease)     Hyperlipidemia     Hypertension     Joint pain     Psoriasis        Past Surgical History:   Procedure Laterality Date    abdominal laproscopic surgery      APPENDECTOMY      CARPAL TUNNEL RELEASE      right    CHOLECYSTECTOMY  04/2015    COLONOSCOPY N/A 2/8/2019    Procedure: COLONOSCOPY;  Surgeon: Celso Salas MD;  Location: [address];  Service: Endoscopy;  Laterality: N/A;    ESOPHAGOGASTRODUODENOSCOPY N/A 2/8/2019    Procedure: EGD  (ESOPHAGOGASTRODUODENOSCOPY);  Surgeon: Celso Salas MD;  Location: [facility] ([address] [place] [address]);  Service: Endoscopy;  Laterality: N/A;  varices screening, labs ordered prior    HYSTERECTOMY  age 25    JOSEFA/BSO (endometriosis)    OOPHORECTOMY      SKIN CANCER DESTRUCTION      UPPER GASTROINTESTINAL ENDOSCOPY         Time Tracking:     OT Date of Treatment: 02/22/22  OT Start Time: 1330  OT Stop Time: 1354  OT Total Time (min): 24 min    Billable Minutes:Evaluation 3  Self Care/Home Management 11  Therapeutic Activity 10    2/22/2022

## 2022-02-22 NOTE — ASSESSMENT & PLAN NOTE
-On glucocorticoids  -add basal insulin 10units during day - 16 units overnight  -Aspart 4units TID with meals.   -Sugars remain above goal range  -Continue SSI ac/hs

## 2022-02-22 NOTE — PROGRESS NOTES
[facility]  Nephrology  Progress Note    Patient Name: Tammi Farris  MRN: [id number]  Admission Date: 2/1/2022  Hospital Length of Stay: 21 days  Attending Provider: Isidoro Majano MD   Primary Care Physician: Ann-Marie Hartman MD  Principal Problem:Acute renal failure superimposed on stage 2 chronic kidney disease    Assessment/Plan:     * Acute renal failure superimposed on stage 2 chronic kidney disease  -- New ANI on CKD2  -- Concern for possible lupus nephritis /autoimmune etiology  -- Low complements. Positive anti-Sm /RNP antibodies. ESR > 140. CRP 21.  -- 24 hr urine protein 373, urine protein timed 560  -- Negative dsDNA, ANCA, glomerular basement membrane proteins, free light chains, quantitative immunoglobulins, spep, upep.   -- Unable to renal biopsy at  due to thrombocytopenia (~ 50)  -- S/p pulse dose steroids for 3 days and now on prednisone.   -- Attempted to initiate Cellcept but has not taken due to difficulty with pill size.  -- S/p initiation several sessions HD, last 2/16.   -- Did not tolerate HD yesterday, hypotensive likely 2/2 attempted volume removal.   -- Interstitial edema on CXR. Supplemental O2.   -- SOB somewhat improved today. Minimal UOP.   -- SLE criteria per rheum  -- Lots of granular casts on spinning urine. Questionable muddy brown casts. No RBC or WBC casts seen.  -- Presumed lupus nephritis    Recommendations:  -- Initiate Lasix gtt today 100 mg /24 hr (~ 4 mg/hr)  -- Repeat CXR (ordered)  -- Monitor UOP and BP. Will determine +/- HD tomorrow.  -- Initiate Cellcept (ordered) liquid suspension today, 500 mg Q6h with food to reduce GI side effects. If well tolerated, can transition to 1000 mg Q12h  -- Continue prednisone 40 mg daily, likely at least 1 month  -- PPI while on steroids. Monitor for possibility of HTN, hyperglycemia, infections. Ensure vaccines up to date  -- Appreciate rheum. Plaquenil initiated  -- F/u 24 hour urine protein and CrCl  -- Defer renal bx  to future, as risks outweigh benefits considering underlying CKD /scar, age, thrombocytopenia, uremia, bleeding complication may necessitate embolization and lead to chronic HD  -- Strict I/O  -- Daily BMP, mag, phos  -- Avoid nephrotoxins      Thank you for your consult. I will follow-up with patient. Please contact us if you have any additional questions.    Claire Sharma, DO  Nephrology  [facility]    Subjective:     HPI: Tammi Farris is a 71 y.o. F with history of CKD2, HTN, DM, cirrhosis, and PAULINA, who was admitted to [facility] with acute renal failure. Found to have low complements and positive anti-Sm /RNP antibodies. Unable to renal biopsy due to thrombocytopenia (~ 50). She was treated with pulse dose steroids for 3 days and now on prednisone. Attempted to initiate Cellcept but has not taken due to difficulty with pill size. Initiated on HD, received several sessions, last 2/16. Held over weekend as her Cr improving to 3.5 from 6.4. She reports making good urine. Denies confusion. Concern for possible lupus nephritis and transferred to [facility] per rheumatology /nephrology recommendations for IR renal biopsy.      Interval History: Did not tolerate HD yesterday, hypotensive. +/- Lasix today. SOB somewhat improved. Minimal UOP. Plaquenil initiated per rheum, diagnosed with SLE. Imuran vs mmf today + prednisone for presumed lupus nephritis. Defer renal bx to future. 24 hr urine underway for Pr and CrCl. Lots of granular casts on spinning urine. Questionable muddy brown casts. No RBC or WBC casts seen.    Objective:     Vital Signs (Most Recent):  Temp: 96.1 °F (35.6 °C) (02/22/22 0745)  Pulse: 92 (02/22/22 0801)  Resp: 14 (02/22/22 0745)  BP: (!) 100/50 (02/22/22 0745)  SpO2: 99 % (02/22/22 0745)  O2 Device (Oxygen Therapy): nasal cannula (02/21/22 2300)   Vital Signs (24h Range):  Temp:  [96 °F (35.6 °C)-98 °F (36.7 °C)] 96.1 °F (35.6 °C)  Pulse:  [] 92  Resp:  [14-20] 14  SpO2:  [91 %-100 %] 99  %  BP: ()/(41-64) 100/50     Weight: 72.8 kg (160 lb 7.9 oz) (02/20/22 2100)  Body mass index is 34.73 kg/m².  Body surface area is 1.71 meters squared.    I/O last 3 completed shifts:  In: 700 [P.O.:200; Other:500]  Out: 543 [Urine:155; Other:388]    Physical Exam  Constitutional:       General: She is not in acute distress.     Appearance: She is ill-appearing.   HENT:      Head: Normocephalic and atraumatic.      Mouth/Throat:      Mouth: Mucous membranes are moist.      Pharynx: Oropharynx is clear.   Eyes:      Extraocular Movements: Extraocular movements intact.      Pupils: Pupils are equal, round, and reactive to light.   Cardiovascular:      Rate and Rhythm: Normal rate and regular rhythm.      Pulses: Normal pulses.      Heart sounds: Normal heart sounds.   Pulmonary:      Effort: Pulmonary effort is normal. No respiratory distress.      Breath sounds: Normal breath sounds.   Abdominal:      General: Abdomen is flat. Bowel sounds are normal.      Palpations: Abdomen is soft.   Musculoskeletal:         General: No swelling.      Cervical back: Neck supple.   Skin:     General: Skin is warm and dry.      Capillary Refill: Capillary refill takes less than 2 seconds.      Comments: Ecchymosis surrounding right chest HD cath   Neurological:      General: No focal deficit present.      Mental Status: She is alert and oriented to person, place, and time. Mental status is at baseline.   Psychiatric:         Mood and Affect: Mood normal.         Behavior: Behavior normal.         Thought Content: Thought content normal.         Judgment: Judgment normal.       Significant Labs:  BMP:   Recent Labs   Lab 02/21/22  0818 02/22/22  0536   * 212*   * 137   K 5.2* 4.9    102   CO2 18* 22*   BUN 95* 92*   CREATININE 3.4* 3.5*   CALCIUM 9.1 9.0   MG 2.2  --        CBC:   Recent Labs   Lab 02/22/22  0536   WBC 11.25   RBC 2.12*   HGB 7.6*   HCT 22.9*   PLT 50*   *   MCH 35.8*   MCHC 33.2        CMP:   Recent Labs   Lab 02/22/22  0536   *   CALCIUM 9.0   ALBUMIN 2.4*   PROT 6.3      K 4.9   CO2 22*      BUN 92*   CREATININE 3.5*   ALKPHOS 170*   ALT 49*   AST 48*   BILITOT 2.5*       Coagulation:   Recent Labs   Lab 02/21/22  0828   INR 1.4*       No results for input(s): COLORU, CLARITYU, SPECGRAV, PHUR, PROTEINUA, GLUCOSEU, BILIRUBINCON, BLOODU, WBCU, RBCU, BACTERIA, MUCUS, NITRITE, LEUKOCYTESUR, UROBILINOGEN, HYALINECASTS in the last 168 hours.  All labs within the past 24 hours have been reviewed.    Significant Imaging:  Labs: Reviewed

## 2022-02-22 NOTE — ASSESSMENT & PLAN NOTE
-BP borderline hypotensive today - which is has been at times throughout her stay.  -Continue toprol 12.5mg daily with hold parameters.

## 2022-02-22 NOTE — SUBJECTIVE & OBJECTIVE
Interval History: patient seen today   Nephrology/rheumatology/ir consulted.     Discussed plan of care with patient and her sister via speakerphone.     Nephrology planning for HD today    Rheum recommends starting plaquenil, pts findings c/w SLE.     Due to coagulopathy - renal biopsy deferred for now per nephrology recs.     Pt consented for blood products and type & screen sent.     Review of Systems   Constitutional:  Positive for activity change, appetite change and fatigue.   HENT:  Negative for ear discharge and ear pain.    Eyes:  Negative for discharge and itching.   Respiratory:  Negative for chest tightness and shortness of breath.    Cardiovascular:  Positive for leg swelling. Negative for chest pain.   Gastrointestinal:  Positive for nausea. Negative for abdominal pain, constipation, diarrhea and vomiting.   Endocrine: Negative for cold intolerance and heat intolerance.   Genitourinary:  Negative for difficulty urinating.   Musculoskeletal:  Negative for arthralgias and back pain.   Neurological:  Positive for weakness. Negative for seizures and syncope.   Psychiatric/Behavioral:  Negative for agitation and dysphoric mood.    Objective:     Vital Signs (Most Recent):  Temp: 97.6 °F (36.4 °C) (02/21/22 1929)  Pulse: 85 (02/21/22 1946)  Resp: 20 (02/21/22 1929)  BP: (!) 102/48 (02/21/22 1929)  SpO2: 97 % (02/21/22 1929)   Vital Signs (24h Range):  Temp:  [96 °F (35.6 °C)-98.1 °F (36.7 °C)] 97.6 °F (36.4 °C)  Pulse:  [] 85  Resp:  [16-20] 20  SpO2:  [97 %-100 %] 97 %  BP: ()/(41-60) 102/48     Weight: 72.8 kg (160 lb 7.9 oz)  Body mass index is 34.73 kg/m².    Intake/Output Summary (Last 24 hours) at 2/21/2022 1254  Last data filed at 2/21/2022 1800  Gross per 24 hour   Intake 700 ml   Output 543 ml   Net 157 ml      Physical Exam  Vitals and nursing note reviewed.   Constitutional:       General: She is not in acute distress.     Appearance: She is well-developed. She is ill-appearing. She is  not toxic-appearing or diaphoretic.      Interventions: Nasal cannula in place.   HENT:      Head: Normocephalic and atraumatic.      Right Ear: External ear normal.      Left Ear: External ear normal.      Nose: Nose normal.      Mouth/Throat:      Mouth: Mucous membranes are moist.   Eyes:      Extraocular Movements: Extraocular movements intact.      Conjunctiva/sclera: Conjunctivae normal.   Cardiovascular:      Rate and Rhythm: Normal rate and regular rhythm.   Pulmonary:      Effort: No tachypnea or respiratory distress.      Breath sounds: No wheezing.      Comments: Nasasl cannula o2, increased work of breathing  Chest:      Comments: Tunneled HD line in right chest with notable surrounding ecchymosis   Abdominal:      General: Bowel sounds are normal. There is no distension.      Palpations: Abdomen is soft.      Tenderness: There is no abdominal tenderness.      Comments: No palpable hepatomegaly or splenomegaly    Musculoskeletal:         General: No tenderness. Normal range of motion.      Cervical back: Normal range of motion and neck supple.      Right lower leg: Edema present.      Left lower leg: Edema present.   Skin:     General: Skin is warm and dry.   Neurological:      Mental Status: She is alert and oriented to person, place, and time.      Comments: Lethargic but alert and conversant.  Diffuse weakness.   Psychiatric:         Thought Content: Thought content normal.       Significant Labs: All pertinent labs within the past 24 hours have been reviewed.  CBC:   Recent Labs   Lab 02/20/22  0411   WBC 13.51*   HGB 7.7*   HCT 23.4*   PLT 43*     CMP:   Recent Labs   Lab 02/20/22  0411 02/21/22  0818    135*   K 4.5 5.2*    103   CO2 20* 18*   * 214*   BUN 74* 95*   CREATININE 3.3* 3.4*   CALCIUM 8.9 9.1   PROT 6.2 6.6   ALBUMIN 2.5* 2.5*   BILITOT 1.9* 2.5*   ALKPHOS 139* 181*   AST 40 61*   ALT 38 48*   ANIONGAP 11 14   EGFRNONAA 13* 12.9*       Significant Imaging: I have  reviewed all pertinent imaging results/findings within the past 24 hours.

## 2022-02-22 NOTE — PT/OT/SLP EVAL
Speech Language Pathology Evaluation  Bedside Swallow    Patient Name:  Tammi Farris   MRN:  [id number]  Admitting Diagnosis: Acute renal failure superimposed on stage 2 chronic kidney disease    Recommendations:                 General Recommendations:  Dysphagia therapy  Diet recommendations: Full liquids   Aspiration Precautions: 1 bite/sip at a time, Alternating bites/sips, Assistance with meals, Feed only when awake/alert, HOB to 90 degrees, Small bites/sips and Standard aspiration precautions   General Precautions: Standard, aspiration, fall  Communication strategies:  provide increased time to answer and go to room if call light pushed    History:     Past Medical History:   Diagnosis Date    Allergy     Anxiety     Basal cell carcinoma     Cirrhosis of liver without ascites 12/27/2017    Depression     Diabetes mellitus, type 2     Disorder of kidney and ureter     Endometriosis     GERD (gastroesophageal reflux disease)     Hyperlipidemia     Hypertension     Joint pain     Psoriasis        Past Surgical History:   Procedure Laterality Date    abdominal laproscopic surgery      APPENDECTOMY      CARPAL TUNNEL RELEASE      right    CHOLECYSTECTOMY  04/2015    COLONOSCOPY N/A 2/8/2019    Procedure: COLONOSCOPY;  Surgeon: Celso Salas MD;  Location: [address]);  Service: Endoscopy;  Laterality: N/A;    ESOPHAGOGASTRODUODENOSCOPY N/A 2/8/2019    Procedure: EGD (ESOPHAGOGASTRODUODENOSCOPY);  Surgeon: Celso Salas MD;  Location: [address]);  Service: Endoscopy;  Laterality: N/A;  varices screening, labs ordered prior    HYSTERECTOMY  age 25    JOSEFA/BSO (endometriosis)    OOPHORECTOMY      SKIN CANCER DESTRUCTION      UPPER GASTROINTESTINAL ENDOSCOPY         Social History: Patient lives with a friend and 2 renters.    Prior Intubation HX:  None    Modified Barium Swallow: None this admission    Chest X-Rays (2/22/22): Central line lower SVC.  Heart size is upper  normal.  There is mild-moderate edema.  There is no change.    Prior diet: Reg/thin    Occupation/hobbies/homemaking: Pt was fully IND prior to admission.    Subjective     Pt awake, laying in bed  Communicated to nurse prior to session    Pain/Comfort:  · Pain Rating 1: 0/10  · Pain Rating Post-Intervention 1: 0/10    Respiratory Status: Nasal cannula    Objective:     Oral Musculature Evaluation  · Oral Musculature: WFL  · Dentition: present and adequate  · Secretion Management: adequate  · Mucosal Quality: adequate  · Mandibular Strength and Mobility: WFL  · Oral Labial Strength and Mobility: WFL  · Lingual Strength and Mobility: WFL  · Velar Elevation: WFL  · Buccal Strength and Mobility: WFL  · Volitional Cough: slightly weak likely due to lethargy  · Volitional Swallow: timely and efficient  · Voice Prior to PO Intake: WFL  · Oral Musculature Comments: grossly symmetrical    Bedside Swallow Eval:   Consistencies Assessed:  · Thin liquids cup sip x4  · Puree tsp x2     Oral Phase:   · WFL, however some oral holding observed and cues needed to initiate swallow    Pharyngeal Phase:   · no overt clinical signs/symptoms of aspiration  · no overt clinical signs/symptoms of pharyngeal dysphagia    Compensatory Strategies  · None    Treatment: Pt seen for BSE. Pt's family present in room. Pt and pt's family report minimal PO intake of mechanical soft diet. Pt required assist during PO intake. SLP recommending a Full Liquid Diet. Pt educated on current recommendations, swallow precautions, and SLP POC. Pt left with call light in reach. Recs communicated to team.    Assessment:     Tammi Farris is a 71 y.o. female with an SLP diagnosis of Dysphagia. SLP continue to follow.     Goals:   Multidisciplinary Problems     SLP Goals        Problem: SLP Goal    Goal Priority Disciplines Outcome   SLP Goal    Low SLP Ongoing, Progressing   Description: Speech-Language Pathology Goals  Goals to be Met by 3/3  1. Pt will  participate in ongoing swallow assessment to determine safest and least restrictive diet.                    Plan:     · Patient to be seen:  3 x/week   · Plan of Care expires:  03/21/22  · Plan of Care reviewed with:  patient, family  · SLP Follow-Up:  Yes       Discharge recommendations:   (Pending)   Barriers to Discharge:  None    Time Tracking:     SLP Treatment Date:   02/22/22  Speech Start Time:  0918  Speech Stop Time:  0928     Speech Total Time (min):  10 min    Billable Minutes: Eval Swallow and Oral Function 10     ADELINA Vieira-SLP  Speech-Language Pathology    02/22/2022

## 2022-02-22 NOTE — PT/OT/SLP PROGRESS
Physical Therapy      Patient Name:  Tammi Farris   MRN:  [id number]    Attempted to see patient for PT; however patient requesting to hold secondary to drowsiness. Physical therapy will follow-up with patient on 2/23/22.    Rustam Wesley, PT

## 2022-02-22 NOTE — PLAN OF CARE
Problem: Occupational Therapy Goal  Goal: Occupational Therapy Goal  Description: Goals to be met by: 03/22/22    Patient will increase functional independence with ADLs by performing:    UE Dressing with Stand-by assistance.  LE Dressing with Minimal assistance.  Grooming while seated with Stand-by assistance.  Toileting from bedside commode with minimal assistance for hygiene and clothing management.   Supine to sit with Stand-by assistance.  Step transfer with stand-by assistance  Toilet transfer to bedside commode with stand-by assistance.  Upper extremity exercise program x15 reps per handout, with independence.  Goals reviewed and updated.   Outcome: Ongoing, Progressing

## 2022-02-22 NOTE — PROGRESS NOTES
[facility] Medicine  Progress Note    Patient Name: Tammi Farris  MRN: [id number]  Patient Class: IP- Inpatient   Admission Date: 2/1/2022  Length of Stay: 21 days  Attending Physician: Isidoro Majano MD  Primary Care Provider: Ann-Marie Hartman MD        Subjective:     Principal Problem:Acute renal failure superimposed on stage 2 chronic kidney disease        HPI:  71 y.o. female with HTN, HLD, GERD, cirrhosis of liver, thrombocytopenia, and sleep apnea presents with a complaint of cough and congestion since October.  She is chronically dyspneic, exacerbated by exertion, has seen Cardiology and was prescribed lasix and metoprolol.  Her sister has now noted that the patient has very low urine output and is constipated.  Also follows with Hepatology for chronic liver disease.  Patient denies fever, chills, chest pain, palpitations, orthopnea, PND, dizziness, syncope, n/v/d, abdominal pain, or dysuria.  In the ED, labs reveal ANI, hyperkalemia, metabolic acidosis with elevated lactic acid, elevated liver enzymes, elevated BNP, elevated d-dimer, thrombocytopenia, and markedly concentrated urine.  Echo December 2021 showed preserved EF, no evidence of heart failure.  Chest xray without acute abnormality. No convincing evidence of infectious process.      Overview/Hospital Course:  71 year old woman with HTN, HLD, GERD, CKDII, cirrhosis and PAULINA presented for sob and has been diagnosed with ani/CKDII and acute hypoxic respiratory failure.  Complex patient and presentation with very difficult to ascertain volume status.  Was given lasix and then fluids and then attempt at lasix again.  Suspect a pulmonary/renal vasculitis but not clearly diagnosed (Prior TAMMI positive 2019, low C3 and C4 and abnormal SPEP with elevated kappa/lambda ratio).  Had planned renal biopsy, but encephalopathic, anuric and worsening acidosis so THDC placed and initiated HD on 2/7.  Has completed 3 days pulse dose steroids (1g  daily) and will be on prednisone 60 mg.  Patient more lethargic on 2/8 and noted to have episode of rapid AFib.  Moved to ICU.  Noted to have increased ammonia levels and started on lactulose.  Mentation slowly improving with no further arrhythmias.  Poor oral intake.  NGT placed and started on tube feedings.  NGT removed and ST consulted.  Severely debilitated and PT/OT consulted.    Patient transferred to [facility] for rheumatology evaluation and possible renal biopsy.     [facility] Course  After transfer, Nephrology and Rheumatology consulted, IR consulted to evaluate for renal biopsy.   Renal biopsy felt to be too high risk in the setting of patient's coagulopathic risk factors.  Rheumatology started hydroxychloroquine 200mg daily, nephrology starting CellCept Liquid as part of DMARD therapy for SLE with nephritis.   Patient in guarded condition with hypoxemia, atrial fib/flutter on IR metoprolol, lethargy.          No new subjective & objective note has been filed under this hospital service since the last note was generated.      Assessment/Plan:      * Acute renal failure superimposed on stage 2 chronic kidney disease  -Dialysis depented ANI thought to be secondary to an auto-immune etiology, based on serologic pattern of possitive ARMEN, Anti-Posey RNP and low complement levels, negative ANCA.  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     -On 2/7 IR placed THDC and HD initiated for encephalopathy  -s/p 3 days of pulse dose steroids for suspected glomerulonephritis vs pulm/renal syndrome and continues on prednisone.  -Nephrology has restarted CellCept in Liquid formulation as pt cleareed for Full Liquids by SLP   -Dialysis is being performed at discretion of Nephrology team.   -Rheumatology consulted. -  -HD today   -Yao in place and lasix infusion @ 5mg/hr running     Systemic lupus erythematosus with glomerular disease  -plaquenil 200  -on prednisone 40  -nephrology starting imuran  -Renal biopsy on  hold for now      Thrombocytopenia  -Chronic and at baseline on admit secondary to cirrhosis  -Heme/onc consulted and input appreciated.  Did not recommend bone marrow biopsy  -Plan is transfuse for platelets <10 or if significant hemorrhage.  -Platelets 43K today  -Montiore cbc q48h.  -consented and type & screen sent  -Hematology consult requested, pt was seen earlier in hospital stay, Rheumatology with concern about thrombocytopenia despite pulse dose glucocorticoid therapy     Cirrhosis of liver without ascites  -Noted mild transaminitis and thrombocytopenia which are not new.  Noted epistaxis and gingival bleeding which has been ongoing for several months.  These are chronic and note history of cirrhosis and alcohol abuse   -Follows with hepatology.  Unclear if this was autoimmune?  -Abdominal US on day prior to admit showed hepatic cirrhosis/steatosis with sonographic findings of portal hypertension including trace ascites and splenomegaly.  -On 2/9 noted to be more lethargic with elevation of ammonia level and lactulose was started   -Mental status improved and she had significant cramping and nausea/vomiting so stopped lactulose on 2/17.  KUB 2/17 with non-specific bowel gas pattern  -Mental status continues to be baseline.    -Monitor closely.    Atrial flutter  Initial EKG with concern for atrial flutter.   -2/21 - EKG with Atrial fibrillation - pts sister says she has been diagnosed with this before  -Started on Liquid Metoprolol as could not crush Toprol XL, so will continue as HR in 80-90 range  -not on anticoagulation - coagulopathy and potential procedure   -continue Tele monitoring  -consider cardiology consult if unstable tachycardia      Metabolic acidosis  -Suspect secondary to ANI, management as above    Oropharyngeal dysphagia  SLP recs Full liquid diet at this time       Type 2 diabetes mellitus without complication, without long-term current use of insulin  -On glucocorticoids  -add basal  insulin 10units during day - 16 units overnight  -Aspart 4units TID with meals.   -Sugars remain above goal range  -Continue SSI ac/hs    Primary hypertension  -switched to IR metoprolol liquid 6.25mg BID    Gastroesophageal reflux disease  -Complains of gagging anytime food is put in her mouth  -Will schedule zofran prior to each meal.  -Continue pepcid.    Macrocytic anemia  -Hb 9.2 on admit - chronic secondary to cirrhosis.  -Dropped to <7 on 2/2  -No evidence of GI bleeding but had noted epistaxis and bleeding gums at times since before hospitalization.  -Folate and B12 replete.  She is iron deficient.  -1 unit PRBC ordered 2/2 but transfusion delayed due to complex antibody matching.  Blood finally available and she received 1 unit PRBC 2/4.   -H/H slightly trending down.  Today Hb 7.7  -Monitor labs q48h.    Sleep apnea  -Continue bipap qhs    Debility  -PT/OT consulted and recommend inpatient rehab at discharge at discharge.  -Inpatient rehab is necessary because of patient's medical complexity and need to be seen by physician daily.  -Not yet medically ready for discharge.    MANUEL (generalized anxiety disorder)  -Remains anxious   -On 2/6 gave one time low dose ativan PO - watch respiratory status very closely.    Hyperlipidemia  -History noted  -Not on statin due to cirrhosis    Positive direct Jacob test  Noted on prior anemia workup - positive for Cold agglutinin         VTE Risk Mitigation (From admission, onward)         Ordered     heparin (porcine) injection 3,200 Units  As needed (PRN)         02/07/22 1834     IP VTE HIGH RISK PATIENT  Once         02/01/22 1557     Place sequential compression device  Until discontinued         02/01/22 1557                Discharge Planning   CLAUDETTE: 2/28/2022     Code Status: Full Code   Is the patient medically ready for discharge?: No    Reason for patient still in hospital (select all that apply): Patient unstable, Patient trending condition, Laboratory test,  Treatment, Consult recommendations, PT / OT recommendations and Pending disposition  Discharge Plan A: Rehab   Discharge Delays: (!) Post-Acute Set-up              Isidoro Majano MD  Department of Hospital Medicine   [facility]

## 2022-02-22 NOTE — ASSESSMENT & PLAN NOTE
-Chronic and at baseline on admit secondary to cirrhosis  -Heme/onc consulted and input appreciated.  Did not recommend bone marrow biopsy  -Plan is transfuse for platelets <10 or if significant hemorrhage.  -Platelets 43K today  -Montiore cbc q48h.  -consented and type & screen sent  -Hematology consult requested, pt was seen earlier in hospital stay, Rheumatology with concern about thrombocytopenia despite pulse dose glucocorticoid therapy

## 2022-02-22 NOTE — SUBJECTIVE & OBJECTIVE
Interval History: Did not tolerate HD yesterday, hypotensive. +/- Lasix today. SOB somewhat improved. Minimal UOP. Plaquenil initiated per rheum, diagnosed with SLE. Imuran vs mmf today + prednisone for presumed lupus nephritis. Defer renal bx to future. 24 hr urine underway for Pr and CrCl. Lots of granular casts on spinning urine. Questionable muddy brown casts. No RBC or WBC casts seen.    Objective:     Vital Signs (Most Recent):  Temp: 96.1 °F (35.6 °C) (02/22/22 0745)  Pulse: 92 (02/22/22 0801)  Resp: 14 (02/22/22 0745)  BP: (!) 100/50 (02/22/22 0745)  SpO2: 99 % (02/22/22 0745)  O2 Device (Oxygen Therapy): nasal cannula (02/21/22 2300)   Vital Signs (24h Range):  Temp:  [96 °F (35.6 °C)-98 °F (36.7 °C)] 96.1 °F (35.6 °C)  Pulse:  [] 92  Resp:  [14-20] 14  SpO2:  [91 %-100 %] 99 %  BP: ()/(41-64) 100/50     Weight: 72.8 kg (160 lb 7.9 oz) (02/20/22 2100)  Body mass index is 34.73 kg/m².  Body surface area is 1.71 meters squared.    I/O last 3 completed shifts:  In: 700 [P.O.:200; Other:500]  Out: 543 [Urine:155; Other:388]    Physical Exam  Constitutional:       General: She is not in acute distress.     Appearance: She is ill-appearing.   HENT:      Head: Normocephalic and atraumatic.      Mouth/Throat:      Mouth: Mucous membranes are moist.      Pharynx: Oropharynx is clear.   Eyes:      Extraocular Movements: Extraocular movements intact.      Pupils: Pupils are equal, round, and reactive to light.   Cardiovascular:      Rate and Rhythm: Normal rate and regular rhythm.      Pulses: Normal pulses.      Heart sounds: Normal heart sounds.   Pulmonary:      Effort: Pulmonary effort is normal. No respiratory distress.      Breath sounds: Normal breath sounds.   Abdominal:      General: Abdomen is flat. Bowel sounds are normal.      Palpations: Abdomen is soft.   Musculoskeletal:         General: No swelling.      Cervical back: Neck supple.   Skin:     General: Skin is warm and dry.      Capillary  Refill: Capillary refill takes less than 2 seconds.      Comments: Ecchymosis surrounding right chest HD cath   Neurological:      General: No focal deficit present.      Mental Status: She is alert and oriented to person, place, and time. Mental status is at baseline.   Psychiatric:         Mood and Affect: Mood normal.         Behavior: Behavior normal.         Thought Content: Thought content normal.         Judgment: Judgment normal.       Significant Labs:  BMP:   Recent Labs   Lab 02/21/22  0818 02/22/22  0536   * 212*   * 137   K 5.2* 4.9    102   CO2 18* 22*   BUN 95* 92*   CREATININE 3.4* 3.5*   CALCIUM 9.1 9.0   MG 2.2  --        CBC:   Recent Labs   Lab 02/22/22  0536   WBC 11.25   RBC 2.12*   HGB 7.6*   HCT 22.9*   PLT 50*   *   MCH 35.8*   MCHC 33.2       CMP:   Recent Labs   Lab 02/22/22  0536   *   CALCIUM 9.0   ALBUMIN 2.4*   PROT 6.3      K 4.9   CO2 22*      BUN 92*   CREATININE 3.5*   ALKPHOS 170*   ALT 49*   AST 48*   BILITOT 2.5*       Coagulation:   Recent Labs   Lab 02/21/22  0828   INR 1.4*       No results for input(s): COLORU, CLARITYU, SPECGRAV, PHUR, PROTEINUA, GLUCOSEU, BILIRUBINCON, BLOODU, WBCU, RBCU, BACTERIA, MUCUS, NITRITE, LEUKOCYTESUR, UROBILINOGEN, HYALINECASTS in the last 168 hours.  All labs within the past 24 hours have been reviewed.    Significant Imaging:  Labs: Reviewed

## 2022-02-22 NOTE — PLAN OF CARE
Problem: Adult Inpatient Plan of Care  Goal: Plan of Care Review  Outcome: Ongoing, Progressing  Goal: Patient-Specific Goal (Individualized)  Outcome: Ongoing, Progressing  Goal: Absence of Hospital-Acquired Illness or Injury  Outcome: Ongoing, Progressing  Goal: Optimal Comfort and Wellbeing  Outcome: Ongoing, Progressing  Goal: Readiness for Transition of Care  Outcome: Ongoing, Progressing   Call light and personal items within reach. Family at bedside. Hourly visits to ensure safety and comfort.

## 2022-02-22 NOTE — ASSESSMENT & PLAN NOTE
-- New ANI on CKD2  -- Concern for possible lupus nephritis /autoimmune etiology  -- Low complements. Positive anti-Sm /RNP antibodies. ESR > 140. CRP 21.  -- 24 hr urine protein 373, urine protein timed 560  -- Negative dsDNA, ANCA, glomerular basement membrane proteins, free light chains, quantitative immunoglobulins, spep, upep.   -- Unable to renal biopsy at  due to thrombocytopenia (~ 50)  -- S/p pulse dose steroids for 3 days and now on prednisone.   -- Attempted to initiate Cellcept but has not taken due to difficulty with pill size.  -- S/p initiation several sessions HD, last 2/16.   -- Did not tolerate HD yesterday, hypotensive.   -- Interstitial edema on CXR. Supplemental O2.   -- SOB somewhat improved today. Minimal UOP.   -- SLE criteria per rheum  -- Lots of granular casts on spinning urine. Questionable muddy brown casts. No RBC or WBC casts seen.    Recommendations:  -- +/- Lasix gtt  -- Imuran vs mmf (liquid suspension) today + prednisone for presumed lupus nephritis.   -- Appreciate rheum. Plaquenil initiated  -- F/u 24 hour urine protein and CrCl  -- Defer renal bx to future, as risks outweigh benefits considering underlying CKD /scar, age, thrombocytopenia, uremia, bleeding complication may necessitate embolization and lead to chronic HD  -- Strict I/O  -- Daily BMP, mag, phos  -- Avoid nephrotoxins

## 2022-02-22 NOTE — PLAN OF CARE
Vitals stable, pt still tachycardic 105-115. Sats stable on 2L nasal cannula, however pt occasionally appears to be mildly distressed with activity. Tolerating diet and crushed pills; appetite decreased, seen by SLP today. Yao inserted, pt noted to be oliguric, 24 hour urine collection started today at 1500. No BM this shift. Denies pain. Family updated at bedside. Skin care provided, mepilex border in place, turn q2h. Vit K infusion given.     Problem: Adult Inpatient Plan of Care  Goal: Plan of Care Review  Outcome: Ongoing, Progressing  Goal: Patient-Specific Goal (Individualized)  Outcome: Ongoing, Progressing  Goal: Absence of Hospital-Acquired Illness or Injury  Outcome: Ongoing, Progressing  Goal: Optimal Comfort and Wellbeing  Outcome: Ongoing, Progressing  Goal: Readiness for Transition of Care  Outcome: Ongoing, Progressing     Problem: Infection  Goal: Absence of Infection Signs and Symptoms  Outcome: Ongoing, Progressing     Problem: Diabetes Comorbidity  Goal: Blood Glucose Level Within Targeted Range  Outcome: Ongoing, Progressing     Problem: Fluid and Electrolyte Imbalance (Acute Kidney Injury/Impairment)  Goal: Fluid and Electrolyte Balance  Outcome: Ongoing, Progressing     Problem: Oral Intake Inadequate (Acute Kidney Injury/Impairment)  Goal: Optimal Nutrition Intake  Outcome: Ongoing, Progressing     Problem: Renal Function Impairment (Acute Kidney Injury/Impairment)  Goal: Effective Renal Function  Outcome: Ongoing, Progressing     Problem: Skin Injury Risk Increased  Goal: Skin Health and Integrity  Outcome: Ongoing, Progressing     Problem: Fall Injury Risk  Goal: Absence of Fall and Fall-Related Injury  Outcome: Ongoing, Progressing     Problem: Device-Related Complication Risk (Hemodialysis)  Goal: Safe, Effective Therapy Delivery  Outcome: Ongoing, Progressing     Problem: Hemodynamic Instability (Hemodialysis)  Goal: Effective Tissue Perfusion  Outcome: Ongoing, Progressing     Problem:  Infection (Hemodialysis)  Goal: Absence of Infection Signs and Symptoms  Outcome: Ongoing, Progressing     Problem: Coping Ineffective  Goal: Effective Coping  Outcome: Ongoing, Progressing

## 2022-02-22 NOTE — PLAN OF CARE
Problem: SLP Goal  Goal: SLP Goal  Description: Speech-Language Pathology Goals  Goals to be Met by 3/3  1. Pt will participate in ongoing swallow assessment to determine safest and least restrictive diet.   Outcome: Ongoing, Progressing     Pt seen for BSE. SLP recommending a Full Liquid Diet. Continue SLP POC.    Rita Brunson, S-SLP  Speech-Language Pathology

## 2022-02-22 NOTE — PROGRESS NOTES
[facility] Medicine  Progress Note    Patient Name: Tammi Farris  MRN: [id number]  Patient Class: IP- Inpatient   Admission Date: 2/1/2022  Length of Stay: 21 days  Attending Physician: Isidoro Majano MD  Primary Care Provider: Ann-Marie Hartman MD        Subjective:     Principal Problem:Acute renal failure superimposed on stage 2 chronic kidney disease        HPI:  71 y.o. female with HTN, HLD, GERD, cirrhosis of liver, thrombocytopenia, and sleep apnea presents with a complaint of cough and congestion since October.  She is chronically dyspneic, exacerbated by exertion, has seen Cardiology and was prescribed lasix and metoprolol.  Her sister has now noted that the patient has very low urine output and is constipated.  Also follows with Hepatology for chronic liver disease.  Patient denies fever, chills, chest pain, palpitations, orthopnea, PND, dizziness, syncope, n/v/d, abdominal pain, or dysuria.  In the ED, labs reveal ANI, hyperkalemia, metabolic acidosis with elevated lactic acid, elevated liver enzymes, elevated BNP, elevated d-dimer, thrombocytopenia, and markedly concentrated urine.  Echo December 2021 showed preserved EF, no evidence of heart failure.  Chest xray without acute abnormality. No convincing evidence of infectious process.      Overview/Hospital Course:  71 year old woman with HTN, HLD, GERD, CKDII, cirrhosis and PAULINA presented for sob and has been diagnosed with ani/CKDII and acute hypoxic respiratory failure.  Complex patient and presentation with very difficult to ascertain volume status.  Was given lasix and then fluids and then attempt at lasix again.  Suspect a pulmonary/renal vasculitis but not clearly diagnosed (Prior TAMMI positive 2019, low C3 and C4 and abnormal SPEP with elevated kappa/lambda ratio).  Had planned renal biopsy, but encephalopathic, anuric and worsening acidosis so THDC placed and initiated HD on 2/7.  Has completed 3 days pulse dose steroids (1g  daily) and will be on prednisone 60 mg.  Patient more lethargic on 2/8 and noted to have episode of rapid AFib.  Moved to ICU.  Noted to have increased ammonia levels and started on lactulose.  Mentation slowly improving with no further arrhythmias.  Poor oral intake.  NGT placed and started on tube feedings.  NGT removed and ST consulted.  Severely debilitated and PT/OT consulted.    Patient transferred to [facility] for rheumatology evaluation and possible renal biopsy.     [facility] Course  After transfer, Nephrology and Rheumatology consulted, IR consulted to evaluate for renal biopsy.   Renal biopsy felt to be too high risk in the setting of patient's coagulopathic risk factors.  Rheumatology started hydroxychloroquine 200mg daily, nephrology starting CellCept Liquid as part of DMARD therapy for SLE with nephritis.   Patient in guarded condition with hypoxemia, atrial fib/flutter on IR metoprolol, lethargy.          Interval History: On 2L oxygen,   SLP recs Liquid Diet    UF -358  after HD yesterday   Starting Lasix drip to promote uop - 5mg/hr without a bolus    Nephrology starting cell cept in liquid form today.     For glycemic control - increasing her basal by adding a day time dose    Seen with video , 2 additional family members at bedside, pt still looks very weak and fatigued but oriented and participates in interview, has c/o of intermittent abdominal discomfort and some generalized tenderness on exam     Review of Systems   Constitutional:  Positive for activity change and appetite change. Negative for unexpected weight change.   HENT:  Negative for sore throat.    Cardiovascular:  Positive for leg swelling. Negative for chest pain.   Gastrointestinal:  Positive for abdominal pain.   Genitourinary:  Negative for dysuria.   Objective:     Vital Signs (Most Recent):  Temp: 96 °F (35.6 °C) (02/22/22 1208)  Pulse: 81 (02/22/22 1630)  Resp: 18 (02/22/22 1208)  BP: (!) 121/54 (02/22/22  1630)  SpO2: 98 % (02/22/22 1630)   Vital Signs (24h Range):  Temp:  [96 °F (35.6 °C)-98 °F (36.7 °C)] 96 °F (35.6 °C)  Pulse:  [] 81  Resp:  [14-20] 18  SpO2:  [91 %-99 %] 98 %  BP: ()/(41-64) 121/54     Weight: 72.8 kg (160 lb 7.9 oz)  Body mass index is 34.73 kg/m².    Intake/Output Summary (Last 24 hours) at 2/22/2022 1644  Last data filed at 2/22/2022 1500  Gross per 24 hour   Intake 868 ml   Output 688 ml   Net 180 ml      Physical Exam  Vitals and nursing note reviewed.   Constitutional:       General: She is not in acute distress.     Appearance: She is well-developed. She is ill-appearing. She is not toxic-appearing or diaphoretic.      Interventions: Nasal cannula in place.   HENT:      Nose: Nose normal.   Eyes:      General: No scleral icterus.  Cardiovascular:      Rate and Rhythm: Normal rate and regular rhythm.   Pulmonary:      Effort: No tachypnea or respiratory distress.      Breath sounds: No wheezing.      Comments: Nasasl cannula o2, increased work of breathing  Chest:      Comments: Tunneled HD line in right chest with notable surrounding ecchymosis   Abdominal:      General: Bowel sounds are normal. There is no distension.      Palpations: Abdomen is soft.      Tenderness: There is abdominal tenderness.      Comments: generalized tenderness on exam    Musculoskeletal:         General: No tenderness. Normal range of motion.      Cervical back: Normal range of motion and neck supple.      Right lower leg: Edema present.      Left lower leg: Edema present.   Skin:     General: Skin is warm and dry.      Comments: Malar rash   Neurological:      Mental Status: She is alert and oriented to person, place, and time.      Comments: Lethargic but alert and conversant.  Diffuse weakness.   Psychiatric:         Thought Content: Thought content normal.       Significant Labs: All pertinent labs within the past 24 hours have been reviewed.  CMP:   Recent Labs   Lab 02/21/22  0818 02/22/22  0536    * 137   K 5.2* 4.9    102   CO2 18* 22*   * 212*   BUN 95* 92*   CREATININE 3.4* 3.5*   CALCIUM 9.1 9.0   PROT 6.6 6.3   ALBUMIN 2.5* 2.4*   BILITOT 2.5* 2.5*   ALKPHOS 181* 170*   AST 61* 48*   ALT 48* 49*   ANIONGAP 14 13   EGFRNONAA 12.9* 12.5*       Significant Imaging: I have reviewed all pertinent imaging results/findings within the past 24 hours.      Assessment/Plan:      * Acute renal failure superimposed on stage 2 chronic kidney disease  -Dialysis depented ANI thought to be secondary to an auto-immune etiology, based on serologic pattern of possitive ARMEN, Anti-Posey RNP and low complement levels, negative ANCA.  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     -On 2/7 IR placed THDC and HD initiated for encephalopathy  -s/p 3 days of pulse dose steroids for suspected glomerulonephritis vs pulm/renal syndrome and continues on prednisone.  -Nephrology has restarted CellCept in Liquid formulation as pt cleareed for Full Liquids by SLP   -Dialysis is being performed at discretion of Nephrology team.   -Rheumatology consulted. -  -HD today   -Yao in place and lasix infusion @ 5mg/hr running     Systemic lupus erythematosus with glomerular disease  -plaquenil 200  -on prednisone 40  -nephrology starting imuran  -Renal biopsy on hold for now      Thrombocytopenia  -Chronic and at baseline on admit secondary to cirrhosis  -Heme/onc consulted and input appreciated.  Did not recommend bone marrow biopsy  -Plan is transfuse for platelets <10 or if significant hemorrhage.  -Platelets 43K today  -[place] cbc q48h.  -consented and type & screen sent  -Hematology consult requested, pt was seen earlier in hospital stay, Rheumatology with concern about thrombocytopenia despite pulse dose glucocorticoid therapy     Cirrhosis of liver without ascites  -Noted mild transaminitis and thrombocytopenia which are not new.  Noted epistaxis and gingival bleeding which has been ongoing for several months.  These  are chronic and note history of cirrhosis and alcohol abuse   -Follows with hepatology.  Unclear if this was autoimmune?  -Abdominal US on day prior to admit showed hepatic cirrhosis/steatosis with sonographic findings of portal hypertension including trace ascites and splenomegaly.  -On 2/9 noted to be more lethargic with elevation of ammonia level and lactulose was started   -Mental status improved and she had significant cramping and nausea/vomiting so stopped lactulose on 2/17.  KUB 2/17 with non-specific bowel gas pattern  -Mental status continues to be baseline.    -Monitor closely.    Atrial flutter  Initial EKG with concern for atrial flutter.   -2/21 - EKG with Atrial fibrillation - pts sister says she has been diagnosed with this before  -Started on Liquid Metoprolol as could not crush Toprol XL, so will continue as HR in 80-90 range  -not on anticoagulation - coagulopathy and potential procedure   -continue Tele monitoring  -consider cardiology consult if unstable tachycardia      Metabolic acidosis  -Suspect secondary to ANI, management as above    Oropharyngeal dysphagia  SLP recs Full liquid diet at this time       Type 2 diabetes mellitus without complication, without long-term current use of insulin  -On glucocorticoids  -add basal insulin 10units during day - 16 units overnight  -Aspart 4units TID with meals.   -Sugars remain above goal range  -Continue SSI ac/hs    Primary hypertension  -switched to IR metoprolol liquid 6.25mg BID    Gastroesophageal reflux disease  -Complains of gagging anytime food is put in her mouth  -Will schedule zofran prior to each meal.  -Continue pepcid.    Macrocytic anemia  -Hb 9.2 on admit - chronic secondary to cirrhosis.  -Dropped to <7 on 2/2  -No evidence of GI bleeding but had noted epistaxis and bleeding gums at times since before hospitalization.  -Folate and B12 replete.  She is iron deficient.  -1 unit PRBC ordered 2/2 but transfusion delayed due to complex  antibody matching.  Blood finally available and she received 1 unit PRBC 2/4.   -H/H slightly trending down.  Today Hb 7.7  -Monitor labs q48h.    Sleep apnea  -Continue bipap qhs    Debility  -PT/OT consulted and recommend inpatient rehab at discharge at discharge.  -Inpatient rehab is necessary because of patient's medical complexity and need to be seen by physician daily.  -Not yet medically ready for discharge.    MANUEL (generalized anxiety disorder)  -Remains anxious   -On 2/6 gave one time low dose ativan PO - watch respiratory status very closely.    Hyperlipidemia  -History noted  -Not on statin due to cirrhosis    Positive direct Jacob test  Noted on prior anemia workup - positive for Cold agglutinin         VTE Risk Mitigation (From admission, onward)         Ordered     heparin (porcine) injection 3,200 Units  As needed (PRN)         02/07/22 1834     IP VTE HIGH RISK PATIENT  Once         02/01/22 1557     Place sequential compression device  Until discontinued         02/01/22 1557                Discharge Planning   CLAUDETTE: 2/28/2022     Code Status: Full Code   Is the patient medically ready for discharge?: No    Reason for patient still in hospital (select all that apply): Patient trending condition  Discharge Plan A: Rehab   Discharge Delays: (!) Post-Acute Set-up              Isidoro Majano MD  Department of Hospital Medicine   [facility]

## 2022-02-22 NOTE — SUBJECTIVE & OBJECTIVE
Interval History: On 2L oxygen,   SLP recs Liquid Diet    UF -358  after HD yesterday   Starting Lasix drip to promote uop - 5mg/hr without a bolus    Nephrology starting cell cept in liquid form today.     For glycemic control - increasing her basal by adding a day time dose    Seen with video , 2 additional family members at bedside, pt still looks very weak and fatigued but oriented and participates in interview, has c/o of intermittent abdominal discomfort and some generalized tenderness on exam     Review of Systems   Constitutional:  Positive for activity change and appetite change. Negative for unexpected weight change.   HENT:  Negative for sore throat.    Cardiovascular:  Positive for leg swelling. Negative for chest pain.   Gastrointestinal:  Positive for abdominal pain.   Genitourinary:  Negative for dysuria.   Objective:     Vital Signs (Most Recent):  Temp: 96 °F (35.6 °C) (02/22/22 1208)  Pulse: 81 (02/22/22 1630)  Resp: 18 (02/22/22 1208)  BP: (!) 121/54 (02/22/22 1630)  SpO2: 98 % (02/22/22 1630)   Vital Signs (24h Range):  Temp:  [96 °F (35.6 °C)-98 °F (36.7 °C)] 96 °F (35.6 °C)  Pulse:  [] 81  Resp:  [14-20] 18  SpO2:  [91 %-99 %] 98 %  BP: ()/(41-64) 121/54     Weight: 72.8 kg (160 lb 7.9 oz)  Body mass index is 34.73 kg/m².    Intake/Output Summary (Last 24 hours) at 2/22/2022 1644  Last data filed at 2/22/2022 1500  Gross per 24 hour   Intake 868 ml   Output 688 ml   Net 180 ml      Physical Exam  Vitals and nursing note reviewed.   Constitutional:       General: She is not in acute distress.     Appearance: She is well-developed. She is ill-appearing. She is not toxic-appearing or diaphoretic.      Interventions: Nasal cannula in place.   HENT:      Nose: Nose normal.   Eyes:      General: No scleral icterus.  Cardiovascular:      Rate and Rhythm: Normal rate and regular rhythm.   Pulmonary:      Effort: No tachypnea or respiratory distress.      Breath sounds: No  wheezing.      Comments: Nasasl cannula o2, increased work of breathing  Chest:      Comments: Tunneled HD line in right chest with notable surrounding ecchymosis   Abdominal:      General: Bowel sounds are normal. There is no distension.      Palpations: Abdomen is soft.      Tenderness: There is abdominal tenderness.      Comments: generalized tenderness on exam    Musculoskeletal:         General: No tenderness. Normal range of motion.      Cervical back: Normal range of motion and neck supple.      Right lower leg: Edema present.      Left lower leg: Edema present.   Skin:     General: Skin is warm and dry.      Comments: Malar rash   Neurological:      Mental Status: She is alert and oriented to person, place, and time.      Comments: Lethargic but alert and conversant.  Diffuse weakness.   Psychiatric:         Thought Content: Thought content normal.       Significant Labs: All pertinent labs within the past 24 hours have been reviewed.  CMP:   Recent Labs   Lab 02/21/22  0818 02/22/22  0536   * 137   K 5.2* 4.9    102   CO2 18* 22*   * 212*   BUN 95* 92*   CREATININE 3.4* 3.5*   CALCIUM 9.1 9.0   PROT 6.6 6.3   ALBUMIN 2.5* 2.4*   BILITOT 2.5* 2.5*   ALKPHOS 181* 170*   AST 61* 48*   ALT 48* 49*   ANIONGAP 14 13   EGFRNONAA 12.9* 12.5*       Significant Imaging: I have reviewed all pertinent imaging results/findings within the past 24 hours.

## 2022-02-22 NOTE — PROGRESS NOTES
[facility] - Med Surg  Rheumatology  Progress Note    Patient Name: Tammi Farris  MRN: [id number]  Admission Date: 2/1/2022  Hospital Length of Stay: 21 days  Code Status: Full Code   Attending Provider: Isidoro Majano MD  Primary Care Physician: Ann-Marie Hartman MD  Principal Problem: Acute renal failure superimposed on stage 2 chronic kidney disease    Subjective:     HPI: Tammi Farris is a 71 y.o. with HTN, diabetes type 2 (hemoglobin A1c 5.9 on 2/1), CKD2, cirrhosis, HFpEF, macrocytic anemia, psorasis, sleep apnea and GERD who was admitted to [facility] on 2/1/22 for acute renal failure.    She initially presented for cough and dyspnea for the past few months. She was seen by cardiology in 1/2022 and started on Lasix 20mg without improvement.  Additionally, her sister reported that she had decrease appetite, fatigue, and weight loss. She had also experienced decreased urine output for the past few weeks. She notes some gingival bleeding and has been having this when brushing teeth for about 3 weeks. She endorses ongoing alopecia for the past 3 years and skin changes in her fingers in the cold. She denies joint swelling, joint pain, oral ulcers, skin rash, history of miscarriages or chest pain. She does not have history of autoimmune disease in her family. She is a never smoker, denies alcohol or illicit drug use.    She follows with hepatology for cirrhosis due to GARCIA. She also follows with hematology for chronic macrocytic anemia with elevated free light chains and has declined bone marrow biopsy in past.     She arrived for evaluation to the ED on 2/1 were labs revealed ANI with creatinine 3.0 (baseline around 1.3), hyperkalemia, metabolic acidosis with elevated lactic acid, elevated liver enzymes, elevated BNP (1812), elevated d-dimer, thrombocytopenia (plts 88), and markedly concentrated urine. Initially, her ANI was attributed to dehydration in the setting of diuretic use so diuretic was held and  she was given gentle IV hydration. IV fluids were later discontinued following day due to development of crackles in lung fields on exam but later restarted. She was placed on bicarb gtt for metabolic acidosis per nephrology. Nephrology initially concerned for Vasculitis vs gbm vs MM, noted to have decreased complement and active sediment concerning for GN. She was later transferred to the ICU on 2/4 for episode of desaturation while on bipap. Hematology was consulted and stated that respiratory failure may be due to viral illness given worsening cytopenias. Pulmonology was consulted on 2/4 and stated that there was concern for combination of underlying ILD vs pulmonary edema vs pulmonary-renal syndrome. On 2/5, nephrology recommended pulse dose steroids for concern for GN with plan for kidney biopsy by IR. She completed 3 days of pulse dose steroids for suspected glomerulonephritis vs pulm/renal syndrome (2/5-2/7), then transitioned to pred 60mg on 2/8. However, on 2/7, she was found to require HD which was performed and kidney biopsy was postponed. However, her renal failure and thrombocytopenia continued to worsen, with creatinine peaking at 6.4 and platelets as low as 26,000. On 2/16, Nephrology recommended adding Cellcept and reducing the Prednisone dose. However, the patient was unable to swallow the MMF pills.     [place] [facility] [place].      Rheumatological ROS:  No skin rashes,malar rash,photosensitivity   No telangiectasias   No calcinosis   No psoriasis   No patchy alopecia   No oral and nasal ulcers   No dry eyes and dry mouth   No pleurisy or any cardiopulmonary complaints   No dysphagia,diplopia and dysphonia and muscle weakness   No n/v/d/c   No acid reflux+   + raynaud's+   No digital ulcers   No cytopenias   No renal issues   No blood clots   No fever,chills,night sweats,weight loss and loss of appetite   No pregnancy losses/pre term deliveries /pregnancy  complications   No new onset headaches   No recurrent conjunctivitis or uveitis or scleritis or episcleritis   No chronic or bloody diarrhea with no u colitis or crohn's /inflammatory bowel disease   No vaginal or urethral  d/c/STDs/no ulcers   No joint pains         Interval History: Underwent HD yesterday but discontinued after 1 hour due to hypotension.     Current Facility-Administered Medications   Medication Frequency    0.9%  NaCl infusion PRN    0.9%  NaCl infusion Once    acetaminophen tablet 650 mg Q6H PRN    albuterol-ipratropium 2.5 mg-0.5 mg/3 mL nebulizer solution 3 mL Q4H PRN    benzonatate capsule 100 mg TID PRN    bisacodyL suppository 10 mg Daily PRN    dextromethorphan-guaiFENesin  mg/5 ml liquid 5 mL Q6H    dextrose 10% bolus 125 mL PRN    dextrose 10% bolus 250 mL PRN    famotidine tablet 20 mg Daily    furosemide (LASIX) 500 mg infusion (conc: 10 mg/mL) Continuous    glucagon (human recombinant) injection 1 mg PRN    glucose chewable tablet 16 g PRN    glucose chewable tablet 24 g PRN    heparin (porcine) injection 3,200 Units PRN    hydrOXYchloroQUINE tablet 200 mg Daily    insulin aspart U-100 pen 0-5 Units QID (AC + HS) PRN    insulin aspart U-100 pen 4 Units TIDWM    insulin detemir U-100 pen 10 Units Daily    insulin detemir U-100 pen 16 Units QHS    iohexoL (OMNIPAQUE 350) injection 100 mL ONCE PRN    lanthanum chewable tablet 500 mg QID    melatonin tablet 6 mg Nightly PRN    metoprolol 12.5mg/1.25mL oral solution 6.25 mg BID    naloxone 0.4 mg/mL injection 0.02 mg PRN    ondansetron injection 4 mg TID AC    polyethylene glycol packet 17 g Daily PRN    predniSONE tablet 40 mg Daily    simethicone chewable tablet 80 mg TID PRN    sodium chloride 0.9% bolus 250 mL PRN    sodium chloride 0.9% flush 10 mL PRN    vitamin renal formula (B-complex-vitamin c-folic acid) 1 mg per capsule 1 capsule Daily     Objective:     Vital Signs (Most Recent):  Temp:  96.1 °F (35.6 °C) (02/22/22 0745)  Pulse: 92 (02/22/22 0801)  Resp: 14 (02/22/22 0745)  BP: (!) 100/50 (02/22/22 0745)  SpO2: 99 % (02/22/22 0745)  O2 Device (Oxygen Therapy): nasal cannula (02/21/22 2300)   Vital Signs (24h Range):  Temp:  [96 °F (35.6 °C)-98 °F (36.7 °C)] 96.1 °F (35.6 °C)  Pulse:  [] 92  Resp:  [14-20] 14  SpO2:  [91 %-100 %] 99 %  BP: ()/(41-64) 100/50     Weight: 72.8 kg (160 lb 7.9 oz) (02/20/22 2100)  Body mass index is 34.73 kg/m².  Body surface area is 1.71 meters squared.      Intake/Output Summary (Last 24 hours) at 2/22/2022 1033  Last data filed at 2/21/2022 1800  Gross per 24 hour   Intake 700 ml   Output 543 ml   Net 157 ml       Physical Exam   Constitutional: She appears ill. No distress.   HENT:   Head: Normocephalic and atraumatic.   Eyes: Conjunctivae are normal.   Cardiovascular: Normal rate, regular rhythm and normal heart sounds. Exam reveals no friction rub.   No murmur heard.  Pulmonary/Chest: Effort normal. She has no wheezes. She has rales.   Breathing appears labored with some use of accessory muscles   Abdominal: Soft. Bowel sounds are normal. There is no abdominal tenderness. There is no rebound.   Musculoskeletal:      Comments: No synovitis in upper or lower extremities  Mild pitting edema in lower extremities   Neurological: She is alert.   Skin: Skin is warm and dry. She is not diaphoretic.     Significant Labs:  CBC:   Recent Labs   Lab 02/22/22  0536   WBC 11.25   HGB 7.6*   HCT 22.9*   PLT 50*     CMP:   Recent Labs   Lab 02/22/22  0536   *   CALCIUM 9.0   ALBUMIN 2.4*   PROT 6.3      K 4.9   CO2 22*      BUN 92*   CREATININE 3.5*   ALKPHOS 170*   ALT 49*   AST 48*   BILITOT 2.5*     Assessment/Plan:     * Acute renal failure superimposed on stage 2 chronic kidney disease  71 y.o. with HTN, diabetes type 2 (hemoglobin A1c 5.9 on 2/1), CKD2, cirrhosis, HFpEF, macrocytic anemia, psorasis, sleep apnea and GERD who was admitted to  [facility] on 2/1/22 for acute renal failure. She endorses fatigue, alopecia (for past 3 years), and +raynauds. Denies oral ulcers, , rash, joint swelling/joint pain, chest pain. Stress echo from 12/2021 showed trivial pericardial effusion.    Labs:  ARMEN 1:1280 on 2/3  +Sm/RNP: 4.06 on 2/3  UPE: no paraprotein bands  ESR: > 140  CRP: 20.8  C3: 26 (L)--> 40  C4: < 3 (L)--> 5  VALENTINO: pos (VALENTINO pos 2/2  cold autob, haptoglobin and LDH wnl)  UA: Protein 3+, occult blood 3+, WBC 3+  Microscopic UA: RBC >100, WBC 40  Timed urine protein: 560  CRP 20.9  Haptoglobin: norm--> repeat normal on 2/22 normal  LDH: 203 --> repeat 271 on 2/22   CCP: norm  RF: norm  GBM ab: negative  ANCA: negative  Hep B surface antigen: neg  Hep C ab: neg  DRVVT: pending  Beta2-gp: pending  Cardiolipin: pending    Treatment history while inpatient:  - S/p Methylpred 1g x 3 days (2/5-2/7), then transitioned to pred 60mg on 2/8, decreased to prednisone 50mg on 2/15, prednisone on 40mg on 2/17- present    According to the EULAR/ACR 2019 criteria for SLE, she meets criteria for lupus (17pts)  due to: +ARMEN, +thrombocytopenia,+ proteinuria, + low C3, + low C4, + pericardial effusion (on stress echo from 12/2021) + alopecia.     Plan/Recommendations:  - follow up beta2-glycoproteins, DRVVT, cardiolipin  - obtain ferritin  - peripheral smear ordered for persistent thrombocytopenia   - recommend hematology consult   - continue plaquenil 200mg daily  - nephrology consulted, advising against renal biopsy due to concern for complications, plan for MMF liquid suspension; defer treatment and steroid regimen to nephrology given concern for nephritis      Patient seen and evaluated with Dr. Miller. Please see staff attestation for final recommendations.              Giovanna Galeana MD  Rheumatology  [facility]      I have seen the patient, reviewed the blood work, imaging and other studies.I have personally interviewed and examined the patient at  bedside.  71 year old F with PMH of cirrhosis secondary to GARCIA, diverticular disease, psoriasis, HTN that was transferred from [facility] due to acute renal failure and concern for lupus nephritis.  Patient with +ARMEN, +rnp, hypocomplementemia, proteinuria, pericardial/pleural effusion, raynauds, thrombocytopenia, +nicanor, and elevated inflammatory markers consistent with SLE.  She has cirrhosis presumed to be from GARCIA but she has never had biopsy. Suspect that the cirrhosis is also likely contributing to her thrombocytopenia.  Recommend kidney biopsy but renal she is at high risk for biopsy so she was started on cellcept solution.  Follow up APS labs, ANCA, MPO, PR3.  Continue plaquenil 200mg po qday.  Continue prednisone 40mg a day  Follow up heme recommendations

## 2022-02-22 NOTE — PROGRESS NOTES
[facility]  Progress Note    Patient Name: Tammi Farris  MRN: [id number]  Patient Class: IP- Inpatient   Admission Date: 2/1/2022  Length of Stay: 20 days  Attending Physician: Isidoro Majano MD  Primary Care Provider: Ann-Marie Hartman MD        Subjective:     Principal Problem:Acute renal failure superimposed on stage 2 chronic kidney disease        HPI:  71 y.o. female with HTN, HLD, GERD, cirrhosis of liver, thrombocytopenia, and sleep apnea presents with a complaint of cough and congestion since October.  She is chronically dyspneic, exacerbated by exertion, has seen Cardiology and was prescribed lasix and metoprolol.  Her sister has now noted that the patient has very low urine output and is constipated.  Also follows with Hepatology for chronic liver disease.  Patient denies fever, chills, chest pain, palpitations, orthopnea, PND, dizziness, syncope, n/v/d, abdominal pain, or dysuria.  In the ED, labs reveal ANI, hyperkalemia, metabolic acidosis with elevated lactic acid, elevated liver enzymes, elevated BNP, elevated d-dimer, thrombocytopenia, and markedly concentrated urine.  Echo December 2021 showed preserved EF, no evidence of heart failure.  Chest xray without acute abnormality. No convincing evidence of infectious process.      Overview/Hospital Course:  71 year old woman with HTN, HLD, GERD, CKDII, cirrhosis and PAULINA presented for sob and has been diagnosed with ani/CKDII and acute hypoxic respiratory failure.  Complex patient and presentation with very difficult to ascertain volume status.  Was given lasix and then fluids and then attempt at lasix again.  Suspect a pulmonary/renal vasculitis but not clearly diagnosed (Prior TAMMI positive 2019, low C3 and C4 and abnormal SPEP with elevated kappa/lambda ratio).  Had planned renal biopsy, but encephalopathic, anuric and worsening acidosis so THDC placed and initiated HD on 2/7.  Has completed 3 days pulse dose steroids (1g  daily) and will be on prednisone 60 mg.  Patient more lethargic on 2/8 and noted to have episode of rapid AFib.  Moved to ICU.  Noted to have increased ammonia levels and started on lactulose.  Mentation slowly improving with no further arrhythmias.  Poor oral intake.  NGT placed and started on tube feedings.  NGT removed and ST consulted.  Severely debilitated and PT/OT consulted.    Patient transferred to [facility] for rheumatology evaluation and possible renal biopsy.     [facility] Course  After transfer, Nephrology and Rheumatology consulted, IR consulted to evaluate for renal biopsy.        Interval History: patient seen today   Nephrology/rheumatology/ir consulted.     Discussed plan of care with patient and her sister via speakerphone.     Nephrology planning for HD today    Rheum recommends starting plaquenil, pts findings c/w SLE.     Due to coagulopathy - renal biopsy deferred for now per nephrology recs.     Pt consented for blood products and type & screen sent.     Review of Systems   Constitutional:  Positive for activity change, appetite change and fatigue.   HENT:  Negative for ear discharge and ear pain.    Eyes:  Negative for discharge and itching.   Respiratory:  Negative for chest tightness and shortness of breath.    Cardiovascular:  Positive for leg swelling. Negative for chest pain.   Gastrointestinal:  Positive for nausea. Negative for abdominal pain, constipation, diarrhea and vomiting.   Endocrine: Negative for cold intolerance and heat intolerance.   Genitourinary:  Negative for difficulty urinating.   Musculoskeletal:  Negative for arthralgias and back pain.   Neurological:  Positive for weakness. Negative for seizures and syncope.   Psychiatric/Behavioral:  Negative for agitation and dysphoric mood.    Objective:     Vital Signs (Most Recent):  Temp: 97.6 °F (36.4 °C) (02/21/22 1929)  Pulse: 85 (02/21/22 1946)  Resp: 20 (02/21/22 1929)  BP: (!) 102/48 (02/21/22 1929)  SpO2: 97 %  (02/21/22 1929)   Vital Signs (24h Range):  Temp:  [96 °F (35.6 °C)-98.1 °F (36.7 °C)] 97.6 °F (36.4 °C)  Pulse:  [] 85  Resp:  [16-20] 20  SpO2:  [97 %-100 %] 97 %  BP: ()/(41-60) 102/48     Weight: 72.8 kg (160 lb 7.9 oz)  Body mass index is 34.73 kg/m².    Intake/Output Summary (Last 24 hours) at 2/21/2022 1143  Last data filed at 2/21/2022 1800  Gross per 24 hour   Intake 700 ml   Output 543 ml   Net 157 ml      Physical Exam  Vitals and nursing note reviewed.   Constitutional:       General: She is not in acute distress.     Appearance: She is well-developed. She is ill-appearing. She is not toxic-appearing or diaphoretic.      Interventions: Nasal cannula in place.   HENT:      Head: Normocephalic and atraumatic.      Right Ear: External ear normal.      Left Ear: External ear normal.      Nose: Nose normal.      Mouth/Throat:      Mouth: Mucous membranes are moist.   Eyes:      Extraocular Movements: Extraocular movements intact.      Conjunctiva/sclera: Conjunctivae normal.   Cardiovascular:      Rate and Rhythm: Normal rate and regular rhythm.   Pulmonary:      Effort: No tachypnea or respiratory distress.      Breath sounds: No wheezing.      Comments: Nasasl cannula o2, increased work of breathing  Chest:      Comments: Tunneled HD line in right chest with notable surrounding ecchymosis   Abdominal:      General: Bowel sounds are normal. There is no distension.      Palpations: Abdomen is soft.      Tenderness: There is no abdominal tenderness.      Comments: No palpable hepatomegaly or splenomegaly    Musculoskeletal:         General: No tenderness. Normal range of motion.      Cervical back: Normal range of motion and neck supple.      Right lower leg: Edema present.      Left lower leg: Edema present.   Skin:     General: Skin is warm and dry.   Neurological:      Mental Status: She is alert and oriented to person, place, and time.      Comments: Lethargic but alert and conversant.  Diffuse  weakness.   Psychiatric:         Thought Content: Thought content normal.       Significant Labs: All pertinent labs within the past 24 hours have been reviewed.  CBC:   Recent Labs   Lab 02/20/22  0411   WBC 13.51*   HGB 7.7*   HCT 23.4*   PLT 43*     CMP:   Recent Labs   Lab 02/20/22  0411 02/21/22  0818    135*   K 4.5 5.2*    103   CO2 20* 18*   * 214*   BUN 74* 95*   CREATININE 3.3* 3.4*   CALCIUM 8.9 9.1   PROT 6.2 6.6   ALBUMIN 2.5* 2.5*   BILITOT 1.9* 2.5*   ALKPHOS 139* 181*   AST 40 61*   ALT 38 48*   ANIONGAP 11 14   EGFRNONAA 13* 12.9*       Significant Imaging: I have reviewed all pertinent imaging results/findings within the past 24 hours.      Assessment/Plan:      * Acute renal failure superimposed on stage 2 chronic kidney disease  -Baseline Cr 0.9.  On admit Cr 2.7, BNP elevated at 1160 and with metabolic acidosis and mild hyperkalemia   -Nephrology consulted and input appreciated  -ARMEN positive.  Anti Posey/RNP positive.  Complement low.  ANCA negative  -On 2/7 Cr continued to worsen and was encephalopathic so IR placed THDC and HD initiated.  -Completed 3 days of pulse dose steroids for suspected glomerulonephritis vs pulm/renal syndrome and continues on prednisone.  -Nephrology had ordered cellcept, but unable to swallow those pills so discontinued.  We do not have liquid cellcept here.  -Has been on HD MWF (had HD last on 2/18) and Cr appears to be down trending.    -transferred to [facility] [place] for rheumatology evaluation, en bed available will transfer to [facility] [place].    -HD today   -may start lasix drip pending UOP, hall ordered today     Systemic lupus erythematosus with glomerular disease  Start plaquenil  -on prednisone  -nephrology considering imuran  -Renal biopsy on hold for now      Thrombocytopenia  -Chronic and at baseline on admit secondary to cirrhosis  -Heme/onc consulted and input appreciated.  Did not recommend bone marrow biopsy  -Plan is transfuse for platelets <10 or if  significant hemorrhage.  -Platelets 43K today  -[place] cbc q48h.  -consented and type & screen sent    Cirrhosis of liver without ascites  -Noted mild transaminitis and thrombocytopenia which are not new.  Noted epistaxis and gingival bleeding which has been ongoing for several months.  These are chronic and note history of cirrhosis and alcohol abuse   -Follows with hepatology.  Unclear if this was autoimmune?  -Abdominal US on day prior to admit showed hepatic cirrhosis/steatosis with sonographic findings of portal hypertension including trace ascites and splenomegaly.  -On 2/9 noted to be more lethargic with elevation of ammonia level and lactulose was started   -Mental status improved and she had significant cramping and nausea/vomiting so stopped lactulose on 2/17.  KUB 2/17 with non-specific bowel gas pattern  -Mental status continues to be baseline.    -Monitor closely.    Atrial flutter  Initial EKG with concern for atrial flutter.   -todays EKG with Atrial fibrillation - pts sister says she has been diagnosed with this before  -not on anticoagulation - coagulopathy and potential procedure   -on Toprol.   -continue Tele monitoring  -consider cardiology consult if unstable tachycardia      Metabolic acidosis  -Suspect secondary to ANI, management as above    Oropharyngeal dysphagia  SLP consulted  WB - SLP recs mechanical soft       Type 2 diabetes mellitus without complication, without long-term current use of insulin  -A1c 5.9  -Sugars remain above goal range  -detemir to 16 units qhs  -Continue SSI ac/hs    Primary hypertension  -BP borderline hypotensive today - which is has been at times throughout her stay.  -Continue toprol 12.5mg daily with hold parameters.     Gastroesophageal reflux disease  -Complains of gagging anytime food is put in her mouth  -Will schedule zofran prior to each meal.  -Continue pepcid.    Macrocytic anemia  -Hb 9.2 on admit - chronic secondary to cirrhosis.  -Dropped to <7 on  2/2  -No evidence of GI bleeding but had noted epistaxis and bleeding gums at times since before hospitalization.  -Folate and B12 replete.  She is iron deficient.  -1 unit PRBC ordered 2/2 but transfusion delayed due to complex antibody matching.  Blood finally available and she received 1 unit PRBC 2/4.   -H/H slightly trending down.  Today Hb 7.7  -Monitor labs q48h.    Sleep apnea  -Continue bipap qhs    Debility  -PT/OT consulted and recommend inpatient rehab at discharge at discharge.  -Inpatient rehab is necessary because of patient's medical complexity and need to be seen by physician daily.  -Not yet medically ready for discharge.    MANUEL (generalized anxiety disorder)  -Remains anxious   -On 2/6 gave one time low dose ativan PO - watch respiratory status very closely.    Hyperlipidemia  -History noted  -Not on statin due to cirrhosis    VTE Risk Mitigation (From admission, onward)         Ordered     heparin (porcine) injection 3,200 Units  As needed (PRN)         02/07/22 1834     IP VTE HIGH RISK PATIENT  Once         02/01/22 1557     Place sequential compression device  Until discontinued         02/01/22 1557                Discharge Planning   CLAUDETTE:      Code Status: Full Code   Is the patient medically ready for discharge?: No    Reason for patient still in hospital (select all that apply): Patient trending condition  Discharge Plan A: Rehab   Discharge Delays: (!) Post-Acute Set-up              Isidoro Majano MD  Department of Hospital Medicine   [facility]

## 2022-02-22 NOTE — ASSESSMENT & PLAN NOTE
-Baseline Cr 0.9.  On admit Cr 2.7, BNP elevated at 1160 and with metabolic acidosis and mild hyperkalemia   -Nephrology consulted and input appreciated  -ARMEN positive.  Anti Posey/RNP positive.  Complement low.  ANCA negative  -On 2/7 Cr continued to worsen and was encephalopathic so IR placed THDC and HD initiated.  -Completed 3 days of pulse dose steroids for suspected glomerulonephritis vs pulm/renal syndrome and continues on prednisone.  -Nephrology had ordered cellcept, but unable to swallow those pills so discontinued.  We do not have liquid cellcept here.  -Has been on HD MWF (had HD last on 2/18) and Cr appears to be down trending.    -transferred to [facility] for rheumatology evaluation, en bed available will transfer to [facility].    -HD today   -may start lasix drip pending rafael SAMUEL ordered today

## 2022-02-22 NOTE — ASSESSMENT & PLAN NOTE
Initial EKG with concern for atrial flutter.   -2/21 - EKG with Atrial fibrillation - pts sister says she has been diagnosed with this before  -Started on Liquid Metoprolol as could not crush Toprol XL, so will continue as HR in 80-90 range  -not on anticoagulation - coagulopathy and potential procedure   -continue Tele monitoring  -consider cardiology consult if unstable tachycardia

## 2022-02-23 NOTE — ASSESSMENT & PLAN NOTE
71 y.o. with HTN, diabetes type 2 (hemoglobin A1c 5.9 on 2/1), CKD2, cirrhosis, HFpEF, macrocytic anemia, psorasis, sleep apnea and GERD who was admitted to [facility] on 2/1/22 for acute renal failure. She endorses fatigue, alopecia (for past 3 years), and +raynauds. Denies oral ulcers, , rash, joint swelling/joint pain, chest pain. Stress echo from 12/2021 showed trivial pericardial effusion.    Labs:  ARMEN 1:1280 on 2/3  +Sm/RNP: 4.06 on 2/3  UPE: no paraprotein bands  ESR: > 140  CRP: 20.8  C3: 26 (L)--> 40  C4: < 3 (L)--> 5  VALENTINO: pos (VALENTINO pos 2/2  cold autob, haptoglobin and LDH wnl)  UA: Protein 3+, occult blood 3+, WBC 3+  Microscopic UA: RBC >100, WBC 40  Timed urine protein: 560  CRP 20.9  Haptoglobin: norm  CCP: norm  RF: norm  GBM ab: negative  ANCA: negative  Hep B surface antigen: neg  Hep C ab: neg    Treatment history while inpatient:  - S/p Methylpred 1g x 3 days (2/5-2/7), then transitioned to pred 60mg on 2/8, decreased to prednisone 50mg on 2/15, prednisone on 40mg on 2/17- present    According to the EULAR/ACR 2019 criteria for SLE, she meets criteria for lupus (17pts)  due to: +ARMEN, +thrombocytopenia,+ proteinuria, + low C3, + low C4, + pericardial effusion (on stress echo from 12/2021) + alopecia.     Plan/Recommendations:  - Continue to follow up APS labs  - hematology consulted, appreciate recs  - continue plaquenil 200mg daily  - nephrology consulted, recommended cellcept PO and renal biopsy to be done at later date given concern for complications  - prednisone per nephrology, would recommend PJP ppx and GI ppx while on high dose steroids      Patient seen and evaluated with Dr. Miller. Please see staff attestation for final recommendations.

## 2022-02-23 NOTE — PROGRESS NOTES
[facility] - Med Surg  Rheumatology  Progress Note    Patient Name: Tammi Farris  MRN: [id number]  Admission Date: 2/1/2022  Hospital Length of Stay: 22 days  Code Status: Full Code   Attending Provider: Isidoro Majano MD  Primary Care Physician: Ann-Marie Hartman MD  Principal Problem: Acute renal failure superimposed on stage 2 chronic kidney disease    Subjective:     HPI: Tammi Farris is a 71 y.o. with HTN, diabetes type 2 (hemoglobin A1c 5.9 on 2/1), CKD2, cirrhosis, HFpEF, macrocytic anemia, psorasis, sleep apnea and GERD who was admitted to [facility] on 2/1/22 for acute renal failure.    She initially presented for cough and dyspnea for the past few months. She was seen by cardiology in 1/2022 and started on Lasix 20mg without improvement.  Additionally, her sister reported that she had decrease appetite, fatigue, and weight loss. She had also experienced decreased urine output for the past few weeks. She notes some gingival bleeding and has been having this when brushing teeth for about 3 weeks. She endorses ongoing alopecia for the past 3 years and skin changes in her fingers in the cold. She denies joint swelling, joint pain, oral ulcers, skin rash, history of miscarriages or chest pain. She does not have history of autoimmune disease in her family. She is a never smoker, denies alcohol or illicit drug use.    She follows with hepatology for cirrhosis due to GARCIA. She also follows with hematology for chronic macrocytic anemia with elevated free light chains and has declined bone marrow biopsy in past.     She arrived for evaluation to the ED on 2/1 were labs revealed ANI with creatinine 3.0 (baseline around 1.3), hyperkalemia, metabolic acidosis with elevated lactic acid, elevated liver enzymes, elevated BNP (1812), elevated d-dimer, thrombocytopenia (plts 88), and markedly concentrated urine. Initially, her ANI was attributed to dehydration in the setting of diuretic use so diuretic was held and  she was given gentle IV hydration. IV fluids were later discontinued following day due to development of crackles in lung fields on exam but later restarted. She was placed on bicarb gtt for metabolic acidosis per nephrology. Nephrology initially concerned for Vasculitis vs gbm vs MM, noted to have decreased complement and active sediment concerning for GN. She was later transferred to the ICU on 2/4 for episode of desaturation while on bipap. Hematology was consulted and stated that respiratory failure may be due to viral illness given worsening cytopenias. Pulmonology was consulted on 2/4 and stated that there was concern for combination of underlying ILD vs pulmonary edema vs pulmonary-renal syndrome. On 2/5, nephrology recommended pulse dose steroids for concern for GN with plan for kidney biopsy by IR. She completed 3 days of pulse dose steroids for suspected glomerulonephritis vs pulm/renal syndrome (2/5-2/7), then transitioned to pred 60mg on 2/8. However, on 2/7, she was found to require HD which was performed and kidney biopsy was postponed. However, her renal failure and thrombocytopenia continued to worsen, with creatinine peaking at 6.4 and platelets as low as 26,000. On 2/16, Nephrology recommended adding Cellcept and reducing the Prednisone dose. However, the patient was unable to swallow the MMF pills.     She was transferred to [facility] [place] [facility] for rheumatology and nephrology consult.      Rheumatological ROS:  No skin rashes,malar rash,photosensitivity   No telangiectasias   No calcinosis   No psoriasis   No patchy alopecia   No oral and nasal ulcers   No dry eyes and dry mouth   No pleurisy or any cardiopulmonary complaints   No dysphagia,diplopia and dysphonia and muscle weakness   No n/v/d/c   No acid reflux+   + raynaud's+   No digital ulcers   No cytopenias   No renal issues   No blood clots   No fever,chills,night sweats,weight loss and loss of appetite   No pregnancy losses/pre term deliveries /pregnancy  complications   No new onset headaches   No recurrent conjunctivitis or uveitis or scleritis or episcleritis   No chronic or bloody diarrhea with no u colitis or crohn's /inflammatory bowel disease   No vaginal or urethral  d/c/STDs/no ulcers   No joint pains         Interval History: No acute events overnight. Continues on lasix gtt.    Current Facility-Administered Medications   Medication Frequency    0.9%  NaCl infusion PRN    0.9%  NaCl infusion Once    acetaminophen tablet 650 mg Q6H PRN    albuterol-ipratropium 2.5 mg-0.5 mg/3 mL nebulizer solution 3 mL Q4H PRN    benzonatate capsule 100 mg TID PRN    bisacodyL suppository 10 mg Daily PRN    dextromethorphan-guaiFENesin  mg/5 ml liquid 5 mL Q6H    dextrose 10% bolus 125 mL PRN    dextrose 10% bolus 250 mL PRN    famotidine tablet 20 mg Daily    furosemide (LASIX) 500 mg infusion (conc: 10 mg/mL) Continuous    glucagon (human recombinant) injection 1 mg PRN    glucose chewable tablet 16 g PRN    glucose chewable tablet 24 g PRN    heparin (porcine) injection 3,200 Units PRN    hydrOXYchloroQUINE tablet 200 mg Daily    insulin aspart U-100 pen 0-5 Units QID (AC + HS) PRN    insulin aspart U-100 pen 4 Units TIDWM    insulin detemir U-100 pen 10 Units Daily    insulin detemir U-100 pen 16 Units QHS    iohexoL (OMNIPAQUE 350) injection 100 mL ONCE PRN    lanthanum chewable tablet 500 mg QID    melatonin tablet 6 mg Nightly PRN    metoprolol 12.5mg/1.25mL oral solution 6.25 mg BID    mycophenolate mofetil 200 mg/mL suspension 500 mg Q6H    naloxone 0.4 mg/mL injection 0.02 mg PRN    ondansetron injection 4 mg TID AC    polyethylene glycol packet 17 g Daily PRN    predniSONE tablet 40 mg Daily    simethicone chewable tablet 80 mg TID PRN    sodium chloride 0.9% bolus 250 mL PRN    sodium chloride 0.9% flush 10 mL PRN    vitamin renal formula (B-complex-vitamin c-folic acid) 1 mg per capsule 1 capsule Daily     Objective:      Vital Signs (Most Recent):  Temp: 96.7 °F (35.9 °C) (02/23/22 1533)  Pulse: (!) 121 (02/23/22 1533)  Resp: 18 (02/23/22 1533)  BP: 130/60 (02/23/22 1533)  SpO2: 99 % (02/23/22 1533)  O2 Device (Oxygen Therapy): nasal cannula (02/23/22 1240)   Vital Signs (24h Range):  Temp:  [96.6 °F (35.9 °C)-97.9 °F (36.6 °C)] 96.7 °F (35.9 °C)  Pulse:  [] 121  Resp:  [16-20] 18  SpO2:  [97 %-100 %] 99 %  BP: ()/(48-60) 130/60     Weight: 72.8 kg (160 lb 7.9 oz) (02/20/22 2100)  Body mass index is 34.73 kg/m².  Body surface area is 1.71 meters squared.      Intake/Output Summary (Last 24 hours) at 2/23/2022 1655  Last data filed at 2/23/2022 0552  Gross per 24 hour   Intake 150 ml   Output 200 ml   Net -50 ml       Physical Exam   Constitutional: She appears ill. No distress.   HENT:   Head: Normocephalic and atraumatic.   Eyes: Conjunctivae are normal.   Cardiovascular: Normal rate, regular rhythm and normal heart sounds. Exam reveals no friction rub.   No murmur heard.  Pulmonary/Chest: Effort normal. She has no wheezes. She has rales.   Abdominal: Soft. Bowel sounds are normal. There is no abdominal tenderness. There is no rebound.   Musculoskeletal:      Comments: No synovitis in upper or lower extremities   Neurological: She is alert.   Skin: Skin is warm and dry. She is not diaphoretic.     Significant Labs:  CBC:   Recent Labs   Lab 02/23/22  1158   WBC 10.43   HGB 7.8*   HCT 23.9*   PLT 52*     CMP:   Recent Labs   Lab 02/23/22  1158   *   CALCIUM 9.0   ALBUMIN 2.3*   PROT 6.6      K 5.6*   CO2 22*      *   CREATININE 3.8*   ALKPHOS 153*   ALT 49*   AST 44*   BILITOT 2.5*     Assessment/Plan:     * Acute renal failure superimposed on stage 2 chronic kidney disease  71 y.o. with HTN, diabetes type 2 (hemoglobin A1c 5.9 on 2/1), CKD2, cirrhosis, HFpEF, macrocytic anemia, psorasis, sleep apnea and GERD who was admitted to [facility] on 2/1/22 for acute renal failure. She  endorses fatigue, alopecia (for past 3 years), and +raynauds. Denies oral ulcers, , rash, joint swelling/joint pain, chest pain. Stress echo from 12/2021 showed trivial pericardial effusion.    Labs:  ARMEN 1:1280 on 2/3  +Sm/RNP: 4.06 on 2/3  UPE: no paraprotein bands  ESR: > 140  CRP: 20.8  C3: 26 (L)--> 40  C4: < 3 (L)--> 5  VALENTINO: pos (VALENTINO pos 2/2  cold autob, haptoglobin and LDH wnl)  UA: Protein 3+, occult blood 3+, WBC 3+  Microscopic UA: RBC >100, WBC 40  Timed urine protein: 560  CRP 20.9  Haptoglobin: norm  CCP: norm  RF: norm  GBM ab: negative  ANCA: negative  Hep B surface antigen: neg  Hep C ab: neg    Treatment history while inpatient:  - S/p Methylpred 1g x 3 days (2/5-2/7), then transitioned to pred 60mg on 2/8, decreased to prednisone 50mg on 2/15, prednisone on 40mg on 2/17- present    According to the EULAR/ACR 2019 criteria for SLE, she meets criteria for lupus (17pts)  due to: +ARMEN, +thrombocytopenia,+ proteinuria, + low C3, + low C4, + pericardial effusion (on stress echo from 12/2021) + alopecia.     Plan/Recommendations:  - Continue to follow up APS labs  - hematology consulted, appreciate recs  - continue plaquenil 200mg daily  - nephrology consulted, recommended cellcept PO and renal biopsy to be done at later date given concern for complications  - prednisone per nephrology, would recommend PJP ppx and GI ppx while on high dose steroids      Patient seen and evaluated with Dr. Miller. Please see staff attestation for final recommendations.              Giovanna Galeana MD  Rheumatology  [facility]

## 2022-02-23 NOTE — ASSESSMENT & PLAN NOTE
71 years old female with multiple co morbidities, including liver cirrhosis 2/2 GARCIA, chronic thrombocytopenia, CKD, DM, and new diagnosis of SLE with suspected lupus nephritis. She has baseline thrombocytopenia due to cirrhosis which has worsened likely in the setting of lupus flare. She underwent pulse dose steroids between 02/05 and 02/07. Her complements levels improved a little, but still low. Her PLT didn't improve with steroids. She started cellcept recently and is on plaquenil.   Work-up that was done so far:   - Nutritional studies: B12, folate, iron panel are within normal limits.   - SPEP: no monoclonal proteins, FLC, increased both Kappa and Lambda chain with a ratio of 2.10 (can be seen in kidney disease), UPEP with no monoclonal protein.   - HIT antibody 0.35, making HIT unlikely.   - , normal haptoglobin, excluding hemolytic processes (TMA, aHUS).   - Peripheral smear reviewed by myself,(RBC: anisopoikilocytosis, very few red cell fragments  (1/hpf) with WBC: neutrophilia PLT: thrombocytopenia, no clumping or large PLT) Pending pathology review, MAHA or TMA is unlikely in that case.   - DRVVT, Anti-GP2, anticardiolipin are pending     Based on the above, This is most likely a chronic thrombocytopenia that is worsening in the setting of active lupus and acute illness.   Will follow-up on the APS labs to exclude CAPS.  Transfuse PLT if <10,000, or <50,000 with active bleeding or before invasive procedures.

## 2022-02-23 NOTE — SUBJECTIVE & OBJECTIVE
Interval History: No acute events overnight. Continues on lasix gtt.    Current Facility-Administered Medications   Medication Frequency    0.9%  NaCl infusion PRN    0.9%  NaCl infusion Once    acetaminophen tablet 650 mg Q6H PRN    albuterol-ipratropium 2.5 mg-0.5 mg/3 mL nebulizer solution 3 mL Q4H PRN    benzonatate capsule 100 mg TID PRN    bisacodyL suppository 10 mg Daily PRN    dextromethorphan-guaiFENesin  mg/5 ml liquid 5 mL Q6H    dextrose 10% bolus 125 mL PRN    dextrose 10% bolus 250 mL PRN    famotidine tablet 20 mg Daily    furosemide (LASIX) 500 mg infusion (conc: 10 mg/mL) Continuous    glucagon (human recombinant) injection 1 mg PRN    glucose chewable tablet 16 g PRN    glucose chewable tablet 24 g PRN    heparin (porcine) injection 3,200 Units PRN    hydrOXYchloroQUINE tablet 200 mg Daily    insulin aspart U-100 pen 0-5 Units QID (AC + HS) PRN    insulin aspart U-100 pen 4 Units TIDWM    insulin detemir U-100 pen 10 Units Daily    insulin detemir U-100 pen 16 Units QHS    iohexoL (OMNIPAQUE 350) injection 100 mL ONCE PRN    lanthanum chewable tablet 500 mg QID    melatonin tablet 6 mg Nightly PRN    metoprolol 12.5mg/1.25mL oral solution 6.25 mg BID    mycophenolate mofetil 200 mg/mL suspension 500 mg Q6H    naloxone 0.4 mg/mL injection 0.02 mg PRN    ondansetron injection 4 mg TID AC    polyethylene glycol packet 17 g Daily PRN    predniSONE tablet 40 mg Daily    simethicone chewable tablet 80 mg TID PRN    sodium chloride 0.9% bolus 250 mL PRN    sodium chloride 0.9% flush 10 mL PRN    vitamin renal formula (B-complex-vitamin c-folic acid) 1 mg per capsule 1 capsule Daily     Objective:     Vital Signs (Most Recent):  Temp: 96.7 °F (35.9 °C) (02/23/22 1533)  Pulse: (!) 121 (02/23/22 1533)  Resp: 18 (02/23/22 1533)  BP: 130/60 (02/23/22 1533)  SpO2: 99 % (02/23/22 1533)  O2 Device (Oxygen Therapy): nasal cannula (02/23/22 1240)   Vital Signs (24h Range):  Temp:  [96.6 °F (35.9 °C)-97.9 °F  (36.6 °C)] 96.7 °F (35.9 °C)  Pulse:  [] 121  Resp:  [16-20] 18  SpO2:  [97 %-100 %] 99 %  BP: ()/(48-60) 130/60     Weight: 72.8 kg (160 lb 7.9 oz) (02/20/22 2100)  Body mass index is 34.73 kg/m².  Body surface area is 1.71 meters squared.      Intake/Output Summary (Last 24 hours) at 2/23/2022 1655  Last data filed at 2/23/2022 0552  Gross per 24 hour   Intake 150 ml   Output 200 ml   Net -50 ml       Physical Exam   Constitutional: She appears ill. No distress.   HENT:   Head: Normocephalic and atraumatic.   Eyes: Conjunctivae are normal.   Cardiovascular: Normal rate, regular rhythm and normal heart sounds. Exam reveals no friction rub.   No murmur heard.  Pulmonary/Chest: Effort normal. She has no wheezes. She has rales.   Abdominal: Soft. Bowel sounds are normal. There is no abdominal tenderness. There is no rebound.   Musculoskeletal:      Comments: No synovitis in upper or lower extremities   Neurological: She is alert.   Skin: Skin is warm and dry. She is not diaphoretic.     Significant Labs:  CBC:   Recent Labs   Lab 02/23/22  1158   WBC 10.43   HGB 7.8*   HCT 23.9*   PLT 52*     CMP:   Recent Labs   Lab 02/23/22  1158   *   CALCIUM 9.0   ALBUMIN 2.3*   PROT 6.6      K 5.6*   CO2 22*      *   CREATININE 3.8*   ALKPHOS 153*   ALT 49*   AST 44*   BILITOT 2.5*

## 2022-02-23 NOTE — ASSESSMENT & PLAN NOTE
-- New ANI on CKD2  -- Concern for possible lupus nephritis /autoimmune etiology  -- Low complements. Positive anti-Sm /RNP antibodies. ESR > 140. CRP 21.  -- 24 hr urine protein 373, urine protein timed 560  -- Negative dsDNA, ANCA, glomerular basement membrane proteins, free light chains, quantitative immunoglobulins, spep, upep.   -- Unable to renal biopsy at  due to thrombocytopenia (~ 50)  -- S/p pulse dose steroids for 3 days and now on prednisone.   -- Attempted to initiate Cellcept but has not taken due to difficulty with pill size.  -- S/p initiation several sessions HD, last 2/16.   -- SLE criteria per rheum  -- Lots of granular casts on spinning urine. Questionable muddy brown casts. No RBC or WBC casts seen.  -- Minimal UOP. Dry mucous membranes. Denies SOB.   -- Labs pending.  -- 24 hr urine protein improved to 163. Likely r/t steroids    Recommendations:  -- Continue Lasix gtt  -- +/- HD today.   -- Continue Cellcept liquid suspension, 500 mg Q6h with food to reduce GI side effects. If well tolerated, can transition to 1000 mg Q12h  -- Continue prednisone 40 mg daily, likely at least 1 month  -- PPI while on steroids. Monitor for possibility of HTN, hyperglycemia, infections. Ensure vaccines up to date  -- Appreciate rheum. Plaquenil initiated  -- Defer renal bx to future, as risks outweigh benefits considering underlying CKD /scar, age, thrombocytopenia, uremia, bleeding complication may necessitate embolization and lead to chronic HD  -- Strict I/O  -- Daily BMP, mag, phos  -- Avoid nephrotoxins

## 2022-02-23 NOTE — PT/OT/SLP PROGRESS
Speech Language Pathology Treatment    Patient Name:  Tammi Farris   MRN:  [id number]  Admitting Diagnosis: Acute renal failure superimposed on stage 2 chronic kidney disease    Recommendations:                 General Recommendations:  Dysphagia therapy  Diet recommendations: Liquid Diet Level: Full liquids   Aspiration Precautions: 1 bite/sip at a time, Alternating bites/sips, Assistance with meals, Feed only when awake/alert, HOB to 90 degrees, Small bites/sips and Standard aspiration precautions   General Precautions: Standard, fall, aspiration  Communication strategies:  provide increased time to answer and go to room if call light pushed    Subjective     Pt resting in chair at bedside, easily woke  Communicated with nurse prior to session    Pain/Comfort:  · Pain Rating 1: 0/10  · Pain Rating 2: 0/10    Respiratory Status: Room air    Objective:     Has the patient been evaluated by SLP for swallowing?   Yes  Keep patient NPO? No      Pt seen for ongoing tx. Pt reported that her appetite has increased since being on FLD, however still with reduced PO intake. Pt prefers continuation of FLD, limiting trials of solids. Pt observed consuming straw sips of thin liquid x7 and tsp puree x2 with no overt signs of aspiration. Pt was asking for more of the thin liquid throughout session (Boost drink). Pt educated on current recommendations, swallow precautions, and SLP POC. Pt left with call light in reach.     Assessment:     Tammi Farris is a 71 y.o. female with an SLP diagnosis of Dysphagia. SLP continue to follow.     Goals:   Multidisciplinary Problems     SLP Goals        Problem: SLP Goal    Goal Priority Disciplines Outcome   SLP Goal    Low SLP Ongoing, Progressing   Description: Speech-Language Pathology Goals  Goals to be Met by 3/3  1. Pt will participate in ongoing swallow assessment to determine safest and least restrictive diet.                    Plan:     · Patient to be seen:  3 x/week   · Plan of  Care expires:  03/21/22  · Plan of Care reviewed with:  patient   · SLP Follow-Up:  Yes       Discharge recommendations:  nursing facility, skilled   Barriers to Discharge:  None    Time Tracking:     SLP Treatment Date:   02/23/22  Speech Start Time:  1117  Speech Stop Time:  1127     Speech Total Time (min):  10 min    Billable Minutes: Treatment Swallowing Dysfunction 10     ADELINA Vieira-SLP  Speech-Language Pathology    02/23/2022

## 2022-02-23 NOTE — PROGRESS NOTES
[facility] - [facility] Surg  Nephrology  Progress Note    Patient Name: Tammi Farris  MRN: [id number]  Admission Date: 2/1/2022  Hospital Length of Stay: 22 days  Attending Provider: Isidoro Majano MD   Primary Care Physician: Ann-Marie Hartman MD  Principal Problem:Acute renal failure superimposed on stage 2 chronic kidney disease    Assessment/Plan:     * Acute renal failure superimposed on stage 2 chronic kidney disease  -- New ANI on CKD2  -- Concern for possible lupus nephritis /autoimmune etiology  -- Low complements. Positive anti-Sm /RNP antibodies. ESR > 140. CRP 21.  -- 24 hr urine protein 373, urine protein timed 560  -- Negative dsDNA, ANCA, glomerular basement membrane proteins, free light chains, quantitative immunoglobulins, spep, upep.   -- Unable to renal biopsy at  due to thrombocytopenia (~ 50)  -- S/p pulse dose steroids for 3 days and now on prednisone.   -- Attempted to initiate Cellcept but has not taken due to difficulty with pill size.  -- S/p initiation several sessions HD, last 2/16.   -- SLE criteria per rheum  -- Lots of granular casts on spinning urine. Questionable muddy brown casts. No RBC or WBC casts seen.  -- Still some dependent edema. UOP improved from yesterday.  -- Labs pending.  -- 24 hr urine protein improved to 163. Likely from steroids    Recommendations:  -- Continue Lasix gtt at 5 mg/hr  -- No HD today  -- D/c hall  -- Continue Cellcept liquid suspension, 500 mg Q6h with food to reduce GI side effects. If well tolerated, can transition to 1000 mg Q12h  -- Continue prednisone 40 mg daily, likely at least 1 month  -- PPI while on steroids. Monitor for possibility of HTN, hyperglycemia, infections. Ensure vaccines up to date  -- Appreciate rheum. Plaquenil initiated  -- Defer renal bx to future, as risks outweigh benefits considering underlying CKD /scar, age, thrombocytopenia, uremia, bleeding complication may necessitate embolization and lead to chronic HD  -- Strict I/O  --  Daily BMP, mag, phos  -- Avoid nephrotoxins      Thank you for your consult. I will follow-up with patient. Please contact us if you have any additional questions.    Claire Sharma, DO  Nephrology  [facility] Surg    Subjective:     HPI: Tammi Farris is a 71 y.o. F with history of CKD2, HTN, DM, cirrhosis, and PAULINA, who was admitted to [facility] with acute renal failure. Found to have low complements and positive anti-Sm /RNP antibodies. Unable to renal biopsy due to thrombocytopenia (~ 50). She was treated with pulse dose steroids for 3 days and now on prednisone. Attempted to initiate Cellcept but has not taken due to difficulty with pill size. Initiated on HD, received several sessions, last 2/16. Held over weekend as her Cr improving to 3.5 from 6.4. She reports making good urine. Denies confusion. Concern for possible lupus nephritis and transferred [place] [facility] [place]ology /nephrology recommendations for IR renal biopsy.      Interval History: Lasix gtt initiated. Minimal UOP. Dry mucous membranes. Denies SOB. Labs pending. +/- HD today. Tolerating cellcept.    Objective:     Vital Signs (Most Recent):  Temp: 96.8 °F (36 °C) (02/23/22 0831)  Pulse: (!) 125 (02/23/22 0831)  Resp: 16 (02/23/22 0831)  BP: (!) 117/55 (02/23/22 0831)  SpO2: 99 % (02/23/22 0831)  O2 Device (Oxygen Therapy): nasal cannula (02/23/22 0435)   Vital Signs (24h Range):  Temp:  [96 °F (35.6 °C)-97.9 °F (36.6 °C)] 96.8 °F (36 °C)  Pulse:  [] 125  Resp:  [16-20] 16  SpO2:  [97 %-100 %] 99 %  BP: ()/(48-57) 117/55     Weight: 72.8 kg (160 lb 7.9 oz) (02/20/22 2100)  Body mass index is 34.73 kg/m².  Body surface area is 1.71 meters squared.    I/O last 3 completed shifts:  In: 468 [P.O.:468]  Out: 500 [Urine:500]    Physical Exam  Constitutional:       General: She is not in acute distress.     Appearance: She is ill-appearing.   HENT:      Head: Normocephalic and atraumatic.      Mouth/Throat:      Mouth: Mucous membranes are  moist.      Pharynx: Oropharynx is clear.   Eyes:      Extraocular Movements: Extraocular movements intact.      Pupils: Pupils are equal, round, and reactive to light.   Cardiovascular:      Rate and Rhythm: Normal rate and regular rhythm.      Pulses: Normal pulses.      Heart sounds: Normal heart sounds.   Pulmonary:      Effort: Pulmonary effort is normal. No respiratory distress.      Breath sounds: Normal breath sounds.   Abdominal:      General: Abdomen is flat. Bowel sounds are normal.      Palpations: Abdomen is soft.   Musculoskeletal:         General: No swelling.      Cervical back: Neck supple.   Skin:     General: Skin is warm and dry.      Capillary Refill: Capillary refill takes less than 2 seconds.      Comments: Ecchymosis surrounding right chest HD cath   Neurological:      General: No focal deficit present.      Mental Status: She is alert and oriented to person, place, and time. Mental status is at baseline.   Psychiatric:         Mood and Affect: Mood normal.         Behavior: Behavior normal.         Thought Content: Thought content normal.         Judgment: Judgment normal.       Significant Labs:  BMP:   Recent Labs   Lab 02/21/22  0818 02/22/22  0536   * 212*   * 137   K 5.2* 4.9    102   CO2 18* 22*   BUN 95* 92*   CREATININE 3.4* 3.5*   CALCIUM 9.1 9.0   MG 2.2  --        CBC:   Recent Labs   Lab 02/22/22  1055   WBC 12.00   RBC 2.33*   HGB 8.2*   HCT 24.6*   PLT 46*   *   MCH 35.2*   MCHC 33.3       CMP:   Recent Labs   Lab 02/22/22  0536   *   CALCIUM 9.0   ALBUMIN 2.4*   PROT 6.3      K 4.9   CO2 22*      BUN 92*   CREATININE 3.5*   ALKPHOS 170*   ALT 49*   AST 48*   BILITOT 2.5*       Coagulation:   Recent Labs   Lab 02/21/22  0828   INR 1.4*       No results for input(s): COLORU, CLARITYU, SPECGRAV, PHUR, PROTEINUA, GLUCOSEU, BILIRUBINCON, BLOODU, WBCU, RBCU, BACTERIA, MUCUS, NITRITE, LEUKOCYTESUR, UROBILINOGEN, HYALINECASTS in the last  168 hours.  All labs within the past 24 hours have been reviewed.    Significant Imaging:  Labs: Reviewed

## 2022-02-23 NOTE — CONSULTS
[facility] S[facility]  Hematology/Oncology  Consult Note    Patient Name: Tammi Farris  MRN: [id number]  Admission Date: 2/1/2022  Hospital Length of Stay: 22 days  Code Status: Full Code   Attending Provider: Isidoro Majano MD  Consulting Provider: Connor Chen MD  Primary Care Physician: Ann-Marie Hartman MD  Principal Problem:Acute renal failure superimposed on stage 2 chronic kidney disease    Inpatient consult to Hematology  Consult performed by: Connor Chen MD  Consult ordered by: Isidoro Majano MD        Subjective:     HPI:  71 years old female patient with HTN, DM2. CKD2, Liver cirrhosis ?2/2 GARCIA, chronic thrombocytopenia, who was admitted to [facility] on 02/1/22 on the account of acute hypoxic resp failure and ANI. Nephrology was consulted, and there was a concern of GN as she had low complements, and active sediments in urine. She was started on on pulse dose steroids 02/05 - 02/07, then transitioned to PO prednisone. However, she required HD 02/07 due to volume overload. She was transferred to [facility] for higher level of care. She met diagnostic criteria for SLE, as +TAMMI, +thrombocytopenia,+ proteinuria, + low C3, + low C4, + pericardial effusion. She is started on Cellcept by nephrology.   Hematology was consulted for worsening thrombocytopenia. Reviewing her charts, she has chronic thrombocytopenia ranging in the low 100s since 2020. This was attribued to her liver cirrhosis. On the current presentation, her PLT counts were 88,000/cmm. It progressively got worse to low of 26,000, before stabilizing at low 40s.                    Oncology Treatment Plan:   [Could not find a treatment plan. This SmartLink may be configured incorrectly. Contact a  for help.]    Medications:  Continuous Infusions:   furosemide (LASIX) 10 mg/mL infusion (non-titrating) 5 mg/hr (02/22/22 1105)     Scheduled Meds:   sodium chloride 0.9%   Intravenous Once    dextromethorphan-guaiFENesin  mg/5  ml  5 mL Oral Q6H    famotidine  20 mg Oral Daily    hydrOXYchloroQUINE  200 mg Oral Daily    insulin aspart U-100  4 Units Subcutaneous TIDWM    insulin detemir U-100  10 Units Subcutaneous Daily    insulin detemir U-100  16 Units Subcutaneous QHS    lanthanum  500 mg Oral QID    metoprolol  6.25 mg Oral BID    mycophenolate mofetil  500 mg Oral Q6H    ondansetron  4 mg Intravenous TID AC    predniSONE  40 mg Oral Daily    vitamin renal formula (B-complex-vitamin c-folic acid)  1 capsule Oral Daily     PRN Meds:sodium chloride 0.9%, acetaminophen, albuterol-ipratropium, benzonatate, bisacodyL, dextrose 10%, dextrose 10%, glucagon (human recombinant), glucose, glucose, heparin (porcine), insulin aspart U-100, iohexoL, melatonin, naloxone, polyethylene glycol, simethicone, sodium chloride 0.9%, sodium chloride 0.9%     Review of patient's allergies indicates:   Allergen Reactions    Dobutamine Hives    Benadryl [diphenhydramine hcl] Anxiety     Pt states she feels jumpy and anxious when taking.    Darvon [propoxyphene] Nausea Only    Shellfish containing products Hives    Iodinated contrast media Hives    Lisinopril Other (See Comments)     cough    Propoxyphene hcl      Itchy (skin)^    Tizanidine Other (See Comments)     Sweaty, difficulty swallowing    Statins-hmg-coa reductase inhibitors Anxiety and Other (See Comments)     Myalgia, fatigue, palpitations, anxiety        Past Medical History:   Diagnosis Date    Allergy     Anxiety     Basal cell carcinoma     Cirrhosis of liver without ascites 12/27/2017    Depression     Diabetes mellitus, type 2     Disorder of kidney and ureter     Endometriosis     GERD (gastroesophageal reflux disease)     Hyperlipidemia     Hypertension     Joint pain     Psoriasis      Past Surgical History:   Procedure Laterality Date    abdominal laproscopic surgery      APPENDECTOMY      CARPAL TUNNEL RELEASE      right    CHOLECYSTECTOMY  04/2015     COLONOSCOPY N/A 2/8/2019    Procedure: COLONOSCOPY;  Surgeon: Celso Salas MD;  [facility]  Service: Endoscopy;  Laterality: N/A;    ESOPHAGOGASTRODUODENOSCOPY N/A 2/8/2019    Procedure: EGD (ESOPHAGOGASTRODUODENOSCOPY);  Surgeon: Celso Salas MD;  [facility]  Service: Endoscopy;  Laterality: N/A;  varices screening, labs ordered prior    HYSTERECTOMY  age 25    JOSEFA/BSO (endometriosis)    OOPHORECTOMY      SKIN CANCER DESTRUCTION      UPPER GASTROINTESTINAL ENDOSCOPY       Family History       Problem Relation (Age of Onset)    Arthritis Mother    Asthma Brother    Hyperlipidemia Brother    Hypertension Mother, Sister, Brother    No Known Problems Father    Raynaud syndrome Sister          Tobacco Use    Smoking status: Never Smoker    Smokeless tobacco: Never Used   Substance and Sexual Activity    Alcohol use: Not Currently    Drug use: No    Sexual activity: Yes     Partners: Male     Birth control/protection: Surgical       Review of Systems   Constitutional:  Positive for fatigue. Negative for unexpected weight change.   HENT:  Negative for congestion.    Respiratory:  Negative for cough and shortness of breath.    Cardiovascular:  Negative for chest pain.   Gastrointestinal:  Negative for abdominal distention.   Objective:     Vital Signs (Most Recent):  Temp: 96.8 °F (36 °C) (02/23/22 0831)  Pulse: 100 (02/23/22 0852)  Resp: 16 (02/23/22 0831)  BP: (!) 117/55 (02/23/22 0831)  SpO2: 99 % (02/23/22 0831)   Vital Signs (24h Range):  Temp:  [96 °F (35.6 °C)-97.9 °F (36.6 °C)] 96.8 °F (36 °C)  Pulse:  [] 100  Resp:  [16-20] 16  SpO2:  [97 %-100 %] 99 %  BP: ()/(48-57) 117/55     Weight: 72.8 kg (160 lb 7.9 oz)  Body mass index is 34.73 kg/m².  Body surface area is 1.71 meters squared.      Intake/Output Summary (Last 24 hours) at 2/23/2022 1034  Last data filed at 2/23/2022 0552  Gross per 24 hour   Intake 350 ml   Output 500 ml   Net -150  ml       Physical Exam  Constitutional:       Appearance: She is ill-appearing.   HENT:      Head: Normocephalic and atraumatic.   Cardiovascular:      Rate and Rhythm: Tachycardia present.      Heart sounds: No murmur heard.  Pulmonary:      Effort: No respiratory distress.      Breath sounds: No wheezing.   Abdominal:      General: There is no distension.      Tenderness: There is no abdominal tenderness.   Skin:     Findings: Bruising present.   Neurological:      General: No focal deficit present.       Significant Labs:   CBC:   Recent Labs   Lab 02/22/22  0536 02/22/22  1055   WBC 11.25 12.00   HGB 7.6* 8.2*   HCT 22.9* 24.6*   PLT 50* 46*    and CMP:   Recent Labs   Lab 02/22/22  0536      K 4.9      CO2 22*   *   BUN 92*   CREATININE 3.5*   CALCIUM 9.0   PROT 6.3   ALBUMIN 2.4*   BILITOT 2.5*   ALKPHOS 170*   AST 48*   ALT 49*   ANIONGAP 13   EGFRNONAA 12.5*       Diagnostic Results:  None    Assessment/Plan:     Thrombocytopenia  71 years old female with multiple co morbidities, including liver cirrhosis 2/2 GARCIA, chronic thrombocytopenia, CKD, DM, and new diagnosis of SLE with suspected lupus nephritis. She has baseline thrombocytopenia due to cirrhosis which has worsened likely in the setting of lupus flare. She underwent pulse dose steroids between 02/05 and 02/07. Her complements levels improved a little, but still low. Her PLT didn't improve with steroids. She started cellcept recently and is on plaquenil.   Work-up that was done so far:   - Nutritional studies: B12, folate, iron panel are within normal limits.   - SPEP: no monoclonal proteins, FLC, increased both Kappa and Lambda chain with a ratio of 2.10 (can be seen in kidney disease), UPEP with no monoclonal protein.   - HIT antibody 0.35, making HIT unlikely.   - , normal haptoglobin, excluding hemolytic processes (TMA, aHUS).   - Peripheral smear reviewed by myself,(RBC: anisopoikilocytosis, very few red cell fragments   (1/hpf) with WBC: neutrophilia PLT: thrombocytopenia, no clumping or large PLT) Pending pathology review, MAHA or TMA is unlikely in that case.   - DRVVT, Anti-GP2, anticardiolipin are pending     Based on the above, This is most likely a chronic thrombocytopenia that is worsening in the setting of active lupus and acute illness.   Will follow-up on the APS labs to exclude CAPS.  Transfuse PLT if <10,000, or <50,000 with active bleeding or before invasive procedures.       The patient will be discussed with the attending physician .  Recommendations outlined in this note are not final until attending attestation.      Thank you for your consult. I will follow-up with patient. Please contact us if you have any additional questions.    Connor Chen MD  Hematology/Oncology  [facility] Surg

## 2022-02-23 NOTE — SUBJECTIVE & OBJECTIVE
Interval History: Lasix gtt initiated. Minimal UOP. Dry mucous membranes. Denies SOB. Labs pending. +/- HD today. Tolerating cellcept.    Objective:     Vital Signs (Most Recent):  Temp: 96.8 °F (36 °C) (02/23/22 0831)  Pulse: (!) 125 (02/23/22 0831)  Resp: 16 (02/23/22 0831)  BP: (!) 117/55 (02/23/22 0831)  SpO2: 99 % (02/23/22 0831)  O2 Device (Oxygen Therapy): nasal cannula (02/23/22 0435)   Vital Signs (24h Range):  Temp:  [96 °F (35.6 °C)-97.9 °F (36.6 °C)] 96.8 °F (36 °C)  Pulse:  [] 125  Resp:  [16-20] 16  SpO2:  [97 %-100 %] 99 %  BP: ()/(48-57) 117/55     Weight: 72.8 kg (160 lb 7.9 oz) (02/20/22 2100)  Body mass index is 34.73 kg/m².  Body surface area is 1.71 meters squared.    I/O last 3 completed shifts:  In: 468 [P.O.:468]  Out: 500 [Urine:500]    Physical Exam  Constitutional:       General: She is not in acute distress.     Appearance: She is ill-appearing.   HENT:      Head: Normocephalic and atraumatic.      Mouth/Throat:      Mouth: Mucous membranes are moist.      Pharynx: Oropharynx is clear.   Eyes:      Extraocular Movements: Extraocular movements intact.      Pupils: Pupils are equal, round, and reactive to light.   Cardiovascular:      Rate and Rhythm: Normal rate and regular rhythm.      Pulses: Normal pulses.      Heart sounds: Normal heart sounds.   Pulmonary:      Effort: Pulmonary effort is normal. No respiratory distress.      Breath sounds: Normal breath sounds.   Abdominal:      General: Abdomen is flat. Bowel sounds are normal.      Palpations: Abdomen is soft.   Musculoskeletal:         General: No swelling.      Cervical back: Neck supple.   Skin:     General: Skin is warm and dry.      Capillary Refill: Capillary refill takes less than 2 seconds.      Comments: Ecchymosis surrounding right chest HD cath   Neurological:      General: No focal deficit present.      Mental Status: She is alert and oriented to person, place, and time. Mental status is at baseline.    Psychiatric:         Mood and Affect: Mood normal.         Behavior: Behavior normal.         Thought Content: Thought content normal.         Judgment: Judgment normal.       Significant Labs:  BMP:   Recent Labs   Lab 02/21/22  0818 02/22/22  0536   * 212*   * 137   K 5.2* 4.9    102   CO2 18* 22*   BUN 95* 92*   CREATININE 3.4* 3.5*   CALCIUM 9.1 9.0   MG 2.2  --        CBC:   Recent Labs   Lab 02/22/22  1055   WBC 12.00   RBC 2.33*   HGB 8.2*   HCT 24.6*   PLT 46*   *   MCH 35.2*   MCHC 33.3       CMP:   Recent Labs   Lab 02/22/22  0536   *   CALCIUM 9.0   ALBUMIN 2.4*   PROT 6.3      K 4.9   CO2 22*      BUN 92*   CREATININE 3.5*   ALKPHOS 170*   ALT 49*   AST 48*   BILITOT 2.5*       Coagulation:   Recent Labs   Lab 02/21/22  0828   INR 1.4*       No results for input(s): COLORU, CLARITYU, SPECGRAV, PHUR, PROTEINUA, GLUCOSEU, BILIRUBINCON, BLOODU, WBCU, RBCU, BACTERIA, MUCUS, NITRITE, LEUKOCYTESUR, UROBILINOGEN, HYALINECASTS in the last 168 hours.  All labs within the past 24 hours have been reviewed.    Significant Imaging:  Labs: Reviewed

## 2022-02-23 NOTE — PLAN OF CARE
[facility] has no beds at this time, [facility] is not able to accept any HD patients at this time. [facility] will follow.

## 2022-02-23 NOTE — PLAN OF CARE
Problem: Adult Inpatient Plan of Care  Goal: Plan of Care Review  Outcome: Ongoing, Progressing  Goal: Patient-Specific Goal (Individualized)  Outcome: Ongoing, Progressing  Goal: Absence of Hospital-Acquired Illness or Injury  Outcome: Ongoing, Progressing  Goal: Optimal Comfort and Wellbeing  Outcome: Ongoing, Progressing  Goal: Readiness for Transition of Care  Outcome: Ongoing, Progressing   VSS. No complaints of pain. Cluster care to promote rest. Call light and personal items within reach. Family at bedside.

## 2022-02-23 NOTE — PLAN OF CARE
Pt's therapy recs are now SNF, Sw will reach out to family and Pt and ask for SNF preferences. Sw spoke with sister Tania by phone, asked to send referrals in the [place] area, Sw sent 7 referrals out, will follow.

## 2022-02-23 NOTE — CARE UPDATE
RAPID RESPONSE NURSE ROUND       Rounding completed with charge RNJerrod. No concerns verbalized at this time. Instructed to call [phone] for further concerns or assistance.

## 2022-02-23 NOTE — SUBJECTIVE & OBJECTIVE
Oncology Treatment Plan:   [Could not find a treatment plan. This SmartLink may be configured incorrectly. Contact a  for help.]    Medications:  Continuous Infusions:   furosemide (LASIX) 10 mg/mL infusion (non-titrating) 5 mg/hr (02/22/22 1105)     Scheduled Meds:   sodium chloride 0.9%   Intravenous Once    dextromethorphan-guaiFENesin  mg/5 ml  5 mL Oral Q6H    famotidine  20 mg Oral Daily    hydrOXYchloroQUINE  200 mg Oral Daily    insulin aspart U-100  4 Units Subcutaneous TIDWM    insulin detemir U-100  10 Units Subcutaneous Daily    insulin detemir U-100  16 Units Subcutaneous QHS    lanthanum  500 mg Oral QID    metoprolol  6.25 mg Oral BID    mycophenolate mofetil  500 mg Oral Q6H    ondansetron  4 mg Intravenous TID AC    predniSONE  40 mg Oral Daily    vitamin renal formula (B-complex-vitamin c-folic acid)  1 capsule Oral Daily     PRN Meds:sodium chloride 0.9%, acetaminophen, albuterol-ipratropium, benzonatate, bisacodyL, dextrose 10%, dextrose 10%, glucagon (human recombinant), glucose, glucose, heparin (porcine), insulin aspart U-100, iohexoL, melatonin, naloxone, polyethylene glycol, simethicone, sodium chloride 0.9%, sodium chloride 0.9%     Review of patient's allergies indicates:   Allergen Reactions    Dobutamine Hives    Benadryl [diphenhydramine hcl] Anxiety     Pt states she feels jumpy and anxious when taking.    Darvon [propoxyphene] Nausea Only    Shellfish containing products Hives    Iodinated contrast media Hives    Lisinopril Other (See Comments)     cough    Propoxyphene hcl      Itchy (skin)^    Tizanidine Other (See Comments)     Sweaty, difficulty swallowing    Statins-hmg-coa reductase inhibitors Anxiety and Other (See Comments)     Myalgia, fatigue, palpitations, anxiety        Past Medical History:   Diagnosis Date    Allergy     Anxiety     Basal cell carcinoma     Cirrhosis of liver without ascites 12/27/2017    Depression     Diabetes mellitus, type 2      Disorder of kidney and ureter     Endometriosis     GERD (gastroesophageal reflux disease)     Hyperlipidemia     Hypertension     Joint pain     Psoriasis      Past Surgical History:   Procedure Laterality Date    abdominal laproscopic surgery      APPENDECTOMY      CARPAL TUNNEL RELEASE      right    CHOLECYSTECTOMY  04/2015    COLONOSCOPY N/A 2/8/2019    Procedure: COLONOSCOPY;  Surgeon: Celso Salas MD;  Location: [facility] ([address] [place] [address]);  Service: Endoscopy;  Laterality: N/A;    ESOPHAGOGASTRODUODENOSCOPY N/A 2/8/2019    Procedure: EGD (ESOPHAGOGASTRODUODENOSCOPY);  Surgeon: Celso Salas MD;  Location: [facility] ([address] [place] [address]);  Service: Endoscopy;  Laterality: N/A;  varices screening, labs ordered prior    HYSTERECTOMY  age 25    JOSEFA/BSO (endometriosis)    OOPHORECTOMY      SKIN CANCER DESTRUCTION      UPPER GASTROINTESTINAL ENDOSCOPY       Family History       Problem Relation (Age of Onset)    Arthritis Mother    Asthma Brother    Hyperlipidemia Brother    Hypertension Mother, Sister, Brother    No Known Problems Father    Raynaud syndrome Sister          Tobacco Use    Smoking status: Never Smoker    Smokeless tobacco: Never Used   Substance and Sexual Activity    Alcohol use: Not Currently    Drug use: No    Sexual activity: Yes     Partners: Male     Birth control/protection: Surgical       Review of Systems   Constitutional:  Positive for fatigue. Negative for unexpected weight change.   HENT:  Negative for congestion.    Respiratory:  Negative for cough and shortness of breath.    Cardiovascular:  Negative for chest pain.   Gastrointestinal:  Negative for abdominal distention.   Objective:     Vital Signs (Most Recent):  Temp: 96.8 °F (36 °C) (02/23/22 0831)  Pulse: 100 (02/23/22 0852)  Resp: 16 (02/23/22 0831)  BP: (!) 117/55 (02/23/22 0831)  SpO2: 99 % (02/23/22 0831)   Vital Signs (24h Range):  Temp:  [96 °F (35.6 °C)-97.9 °F (36.6 °C)] 96.8 °F (36 °C)  Pulse:  [] 100  Resp:  [16-20]  16  SpO2:  [97 %-100 %] 99 %  BP: ()/(48-57) 117/55     Weight: 72.8 kg (160 lb 7.9 oz)  Body mass index is 34.73 kg/m².  Body surface area is 1.71 meters squared.      Intake/Output Summary (Last 24 hours) at 2/23/2022 1034  Last data filed at 2/23/2022 0552  Gross per 24 hour   Intake 350 ml   Output 500 ml   Net -150 ml       Physical Exam  Constitutional:       Appearance: She is ill-appearing.   HENT:      Head: Normocephalic and atraumatic.   Cardiovascular:      Rate and Rhythm: Tachycardia present.      Heart sounds: No murmur heard.  Pulmonary:      Effort: No respiratory distress.      Breath sounds: No wheezing.   Abdominal:      General: There is no distension.      Tenderness: There is no abdominal tenderness.   Skin:     Findings: Bruising present.   Neurological:      General: No focal deficit present.       Significant Labs:   CBC:   Recent Labs   Lab 02/22/22  0536 02/22/22  1055   WBC 11.25 12.00   HGB 7.6* 8.2*   HCT 22.9* 24.6*   PLT 50* 46*    and CMP:   Recent Labs   Lab 02/22/22  0536      K 4.9      CO2 22*   *   BUN 92*   CREATININE 3.5*   CALCIUM 9.0   PROT 6.3   ALBUMIN 2.4*   BILITOT 2.5*   ALKPHOS 170*   AST 48*   ALT 49*   ANIONGAP 13   EGFRNONAA 12.5*       Diagnostic Results:  None

## 2022-02-23 NOTE — PT/OT/SLP PROGRESS
Occupational Therapy   Treatment    Name: Tammi Farris  MRN: [id number]  Admitting Diagnosis:  Acute renal failure superimposed on stage 2 chronic kidney disease      Pre-op Diagnosis: * No surgery found *    Recommendations:     Discharge Recommendations: nursing facility, skilled  Discharge Equipment Recommendations:  bedside commode, bath bench, walker, rolling  Barriers to discharge:  Decreased caregiver support    Assessment:     Tammi Farris is a 71 y.o. female with a medical diagnosis of Acute renal failure superimposed on stage 2 chronic kidney disease.  She presents with the following performance deficits affecting function are weakness, impaired endurance, impaired self care skills, impaired functional mobilty, gait instability, impaired balance, impaired cardiopulmonary response to activity.     Patient agreeable to OT session with improved tolerance and participative throughout. Patient completed bed mobility supine>EOB with Min(A), demonstrating SBA for static sitting balance. Patient provided with bedside chair in room to encourage OOB activity/tolerance; RN notified. Patient completed functional t/f EOB>bedside chair using RW with Mod(A) using step transfer. Patient would benefit from continued OT services to address deficits and progress towards goals. Continue OT POC.    Rehab Prognosis:  Good; patient would benefit from acute skilled OT services to address these deficits and reach maximum level of function.       Plan:     Patient to be seen 4 x/week to address the above listed problems via self-care/home management, therapeutic activities, therapeutic exercises  · Plan of Care Expires: 03/22/22  · Plan of Care Reviewed with: patient, sibling    Subjective   Patient agreeable to OT session.     Pain/Comfort:  · Pain Rating 1: 0/10  · Pain Rating Post-Intervention 1: 0/10    Objective:     Communicated with: RN and OTR prior to session.  Patient found HOB elevated with telemetry, oxygen (urethral  catheter; HD catheter) upon OT entry to room.  A client care conference was completed by the OTR and the STYLES prior to treatment by the STYLES to discuss the patient's POC and current status.    General Precautions: Standard, aspiration, fall   Orthopedic Precautions:N/A   Braces: N/A  Respiratory Status: Nasal cannula, flow 2 L/min     Occupational Performance:     Bed Mobility:    · Patient completed Rolling/Turning to Right with minimum assistance, with side rail and HOB slightly elevated  · Rolled (L) with Min(A) to don brief  · Patient completed Scooting anteriorly to plant B feet on floor with contact guard assistance  · Patient completed Supine to Sit with moderate assistance, with side rail and HOB elevated with trunk support to facilitate midline     Functional Mobility/Transfers:  · Patient completed Sit <> Stand Transfer with contact guard assistance  with  rolling walker and min cues for proper body mechanics and hand placement   · x2 trials from EOB  · Patient completed Bed > Chair Transfer using Step Transfer technique with moderate assistance with rolling walker  · Functional Mobility: ~3 lateral steps to (R) using RW with Mod(A); increased time for mobility    Activities of Daily Living:  · Grooming: stand by assistance for facial hygiene seated in bedside chair  · Lower Body Dressing: total assistance for donning brief      [facility] 6 Click ADL: 14    Treatment & Education:  Instruction and facilitation in bed mobility techniques, safe transfer techniques and safe functional mobility  Addressed all patient questions/concerns within MY[facility] scope of practice.     Patient left up in chair with all lines intact, call button in reach, RN notified and sister and MD presentEducation:      GOALS:   Multidisciplinary Problems     Occupational Therapy Goals        Problem: Occupational Therapy Goal    Goal Priority Disciplines Outcome Interventions   Occupational Therapy Goal     OT, PT/OT Ongoing, Progressing     Description: Goals to be met by: 03/22/22    Patient will increase functional independence with ADLs by performing:    UE Dressing with Stand-by assistance.  LE Dressing with Minimal assistance.  Grooming while seated with Stand-by assistance.  Toileting from bedside commode with minimal assistance for hygiene and clothing management.   Supine to sit with Stand-by assistance.  Step transfer with stand-by assistance  Toilet transfer to bedside commode with stand-by assistance.  Upper extremity exercise program x15 reps per handout, with independence.  Goals reviewed and updated.                    Time Tracking:     OT Date of Treatment: 02/23/22  OT Start Time: 0927  OT Stop Time: 1000  OT Total Time (min): 33 min    Billable Minutes:Therapeutic Activity 33    OT/MYRA: MYRA     MYRA Visit Number: 1 2/23/2022

## 2022-02-24 PROBLEM — E83.39 HYPERPHOSPHATEMIA: Status: ACTIVE | Noted: 2022-01-01

## 2022-02-24 NOTE — ASSESSMENT & PLAN NOTE
-On glucocorticoids  -add basal insulin 10units during day - 16 units overnight  -Aspart 4units TID with meals.   -Sugars remain above goal range  -Continue SSI ac/hs  2/24   Patient's FSGs are controlled on current hypoglycemics.   Last A1c reviewed-   Lab Results   Component Value Date    HGBA1C 5.9 (H) 02/01/2022    HGBA1C 6.3 (H) 03/22/2021    HGBA1C 6.6 (H) 09/08/2020     Will hold PO hypoglycemics and will start correctional scale insulin  Most recent fingerstick glucose reviewed-   Recent Labs   Lab 02/23/22 1953 02/23/22  2116 02/23/22  2200 02/24/22  0712   POCTGLUCOSE 272* 241* 358* 210*     currently on levemir and novolog

## 2022-02-24 NOTE — CARE UPDATE
RAPID RESPONSE NURSE ROUND       Rounding completed with [facility] Charge RN, Jonathan. No concerns verbalized at this time. Instructed to call [phone] for further concerns or assistance.

## 2022-02-24 NOTE — ASSESSMENT & PLAN NOTE
Likely 2/2 renal failure. Phos 5.8 this AM.    - Monitor with daily phos  - Can start Sevelamer 800 mg TID

## 2022-02-24 NOTE — ASSESSMENT & PLAN NOTE
Hyperkalemia  K  at Recent Labs   Lab 02/23/22  2121 02/23/22  2357 02/24/22  0344   K 5.7*  5.7* 5.3*  5.3* 5.2*  5.2*  5.2*   .   2/24 Lasix infusion was increased from 5 mg an hour to 10 mg/h overnight.  Repeat potassium around midnight decreased to 5.3.  Will continue current Lasix infusion and monitor serum potassium levels.

## 2022-02-24 NOTE — ASSESSMENT & PLAN NOTE
Up to 5.7 overnight, improved on lasix gtt. 5.2 in AM. Likely 2/2 renal failure    - Recommend discontinuing lasix as patient starting to look volume down  - Continue to monitor  - Shift PRN

## 2022-02-24 NOTE — PROGRESS NOTES
[facility]  Progress Note    Patient Name: Tammi Farris  MRN: [id number]  Patient Class: IP- Inpatient   Admission Date: 2/1/2022  Length of Stay: 23 days  Attending Physician: Jovanny Mendez MD  Primary Care Provider: Ann-Marie Hartman MD        Subjective:     Principal Problem:Acute renal failure superimposed on stage 2 chronic kidney disease        HPI:  71 y.o. female with HTN, HLD, GERD, cirrhosis of liver, thrombocytopenia, and sleep apnea presents with a complaint of cough and congestion since October.  She is chronically dyspneic, exacerbated by exertion, has seen Cardiology and was prescribed lasix and metoprolol.  Her sister has now noted that the patient has very low urine output and is constipated.  Also follows with Hepatology for chronic liver disease.  Patient denies fever, chills, chest pain, palpitations, orthopnea, PND, dizziness, syncope, n/v/d, abdominal pain, or dysuria.  In the ED, labs reveal ANI, hyperkalemia, metabolic acidosis with elevated lactic acid, elevated liver enzymes, elevated BNP, elevated d-dimer, thrombocytopenia, and markedly concentrated urine.  Echo December 2021 showed preserved EF, no evidence of heart failure.  Chest xray without acute abnormality. No convincing evidence of infectious process.      Overview/Hospital Course:  71 year old woman with HTN, HLD, GERD, CKDII, cirrhosis and PAULINA presented for sob and has been diagnosed with ani/CKDII and acute hypoxic respiratory failure.  Complex patient and presentation with very difficult to ascertain volume status.  Was given lasix and then fluids and then attempt at lasix again.  Suspect a pulmonary/renal vasculitis but not clearly diagnosed (Prior TAMMI positive 2019, low C3 and C4 and abnormal SPEP with elevated kappa/lambda ratio).  Had planned renal biopsy, but encephalopathic, anuric and worsening acidosis so THDC placed and initiated HD on 2/7.  Has completed 3 days pulse dose  steroids (1g daily) and will be on prednisone 60 mg.  Patient more lethargic on 2/8 and noted to have episode of rapid AFib.  Moved to ICU.  Noted to have increased ammonia levels and started on lactulose.  Mentation slowly improving with no further arrhythmias.  Poor oral intake.  NGT placed and started on tube feedings.  NGT removed and ST consulted.  Severely debilitated and PT/OT consulted.    Patient transferred to [facility] for rheumatology evaluation and possible renal biopsy.     [facility] Course  After transfer, Nephrology and Rheumatology consulted, IR consulted to evaluate for renal biopsy.   Renal biopsy felt to be too high risk in the setting of patient's coagulopathic risk factors.  Rheumatology started hydroxychloroquine 200mg daily, nephrology starting CellCept Liquid as part of DMARD therapy for SLE with nephritis.   Patient in guarded condition with hypoxemia, atrial fib/flutter on IR metoprolol, lethargy.    2/23 On 2L oxygen, Patient w/o acute complaints. Will d/c hall - 24hr urine finished collection per nursing.    Labs show hyperkalemia, Nephrology recommends shifting with insulin dextrose and will give potassium binders.  Surveillance lab studies ordered   BUN is 119 she is awake and alert but does appear fatigued and somewhat lethargic but is interactive on interview  CRP is rising but has not been checked in almost 3 weeks. s/p hematology f/u  chronic thrombocytopenia that is worsening in the setting of active lupus and acute illness.     2/24 Lasix infusion was increased from 5 mg an hour to 10 mg/h overnight.  Repeat potassium around midnight decreased to 5.3.  Will continue current Lasix infusion and monitor serum potassium levels.Has been typed and screened and consented would transfuse for hemoglobin greater than 7 and absolute PLT greater than 10, if any bleeding keep platelets above 50. Presented in AFib versus flutter, not discussed in [place] notes, anticoagulation likely  contraindicated given coagulopathy titrate beta-blocker as tolerated. renal function not improving she is having GI symptoms. increase Urea and Creatinine, possible HD session by tomorrow                Review of Systems  Constitutional:  Positive for activity change and appetite change. Negative for unexpected weight change.   HENT:  Negative for sore throat.    Cardiovascular:  Positive for leg swelling. Negative for chest pain.   Gastrointestinal:  Positive for abdominal pain.   Genitourinary:  Negative for dysuria.   Objective:     Vital Signs (Most Recent):  Temp: 97.9 °F (36.6 °C) (02/24/22 0710)  Pulse: 98 (02/24/22 0912)  Resp: 20 (02/24/22 0710)  BP: (Abnormal) 109/55 (02/24/22 0710)  SpO2: 98 % (02/24/22 1014)   Vital Signs (24h Range):  Temp:  [96.6 °F (35.9 °C)-97.9 °F (36.6 °C)] 97.9 °F (36.6 °C)  Pulse:  [] 98  Resp:  [16-42] 20  SpO2:  [94 %-100 %] 98 %  BP: (102-130)/(51-60) 109/55     Weight: 72.8 kg (160 lb 7.9 oz)  Body mass index is 34.73 kg/m².    Intake/Output Summary (Last 24 hours) at 2/24/2022 1055  Last data filed at 2/24/2022 0400  Gross per 24 hour   Intake no documentation   Output 401 ml   Net -401 ml      Physical Exam    Appearance: She is well-developed. She is ill-appearing. She is not toxic-appearing or diaphoretic.      Interventions: Nasal cannula in place.   HENT:      Nose: Nose normal.   Eyes:      General: No scleral icterus.  Cardiovascular:      Rate and Rhythm: Normal rate and regular rhythm.   Pulmonary:      Effort: No tachypnea or respiratory distress.     Chest:      Comments: Tunneled  HD line in right chest with notable surrounding ecchymosis   Abdominal:      General: Bowel sounds are normal. There is no distension.      Palpations: Abdomen is soft.   generalized tenderness on exam    Musculoskeletal:         General: No tenderness. Normal range of motion.      Cervical back: Normal range of motion and neck supple.      Right lower leg: Edema present.       Left lower leg: Edema present.   Skin:     General: Skin is warm and dry.      Comments: Malar rash   Neurological:      Mental Status: She is alert and oriented to person, place, and time.      Comments: Lethargic but alert and conversant.  Diffuse weakness.   Psychiatric:         Thought Content: Thought content normal.       Significant Labs: All pertinent labs within the past 24 hours have been reviewed.  Recent Lab Results         02/24/22  0712   02/24/22  0344   02/23/22  2357   02/23/22  2200   02/23/22  2121        Albumin   2.5             Alkaline Phosphatase   166             ALT   46             Anion Gap   14   16     16          14             Aniso   Slight             AST   40             Baso #   0.01             Basophil %   0.1             BILIRUBIN TOTAL   2.1  Comment: For infants and newborns, interpretation of results should be based  on gestational age, weight and in agreement with clinical  observations.    Premature Infant recommended reference ranges:  Up to 24 hours.............<8.0 mg/dL  Up to 48 hours............<12.0 mg/dL  3-5 days..................<15.0 mg/dL  6-29 days.................<15.0 mg/dL               BUN   113   107     110          113             Calcium   9.1   9.1     9.1          9.1             Chloride   102   103     104          102             CO2   22   20     17          22             Creatinine   3.9   4.0     3.7          3.9             Differential Method   Automated             eGFR if    12.6   12.3     13.5          12.6             eGFR if non    11.0  Comment: Calculation used to obtain the estimated glomerular filtration  rate (eGFR) is the CKD-EPI equation.      10.6  Comment: Calculation used to obtain the estimated glomerular filtration  rate (eGFR) is the CKD-EPI equation.        11.7  Comment: Calculation used to obtain the estimated glomerular filtration  rate (eGFR) is the CKD-EPI equation.              11.0  Comment: Calculation used to obtain the estimated glomerular filtration  rate (eGFR) is the CKD-EPI equation.                Eos #   0.0             Eosinophil %   0.0             Glucose   203   246     232          203             Gran # (ANC)   8.5             Gran %   80.8             Hematocrit   24.4             Hemoglobin   7.7             Hypo   Occasional             Immature Grans (Abs)   0.06  Comment: Mild elevation in immature granulocytes is non specific and   can be seen in a variety of conditions including stress response,   acute inflammation, trauma and pregnancy. Correlation with other   laboratory and clinical findings is essential.               Immature Granulocytes   0.6             Lymph #   1.3             Lymph %   12.7             Magnesium   2.3             MCH   34.4             MCHC   31.6             MCV   109             Mono #   0.6             Mono %   5.8             MPV   14.2             nRBC   0             Ovalocytes   Occasional             Phosphorus   5.8             Platelet Estimate               Platelets   51             POCT Glucose 210       358         Poik   Slight             Poly   Occasional             Potassium   5.2   5.3  Comment: *No Visible Hemolysis     5.7  Comment: *No Visible Hemolysis          5.2   5.3  Comment: *No Visible Hemolysis     5.7  Comment: *No Visible Hemolysis          5.2             PROTEIN TOTAL   6.5             RBC   2.24             RDW   19.2             Sodium   138   139     137          138             Target Cells               Toxic Granulation               WBC   10.56                                02/23/22  2116   02/23/22  1953   02/23/22  1832   02/23/22  1756   02/23/22  1618        Albumin               Alkaline Phosphatase               ALT               Anion Gap               Aniso               AST               Baso #               Basophil %               BILIRUBIN TOTAL               BUN                Calcium               Chloride               CO2               Creatinine               Differential Method               eGFR if                eGFR if non                Eos #               Eosinophil %               Glucose               Gran # (ANC)               Gran %               Hematocrit               Hemoglobin               Hypo               Immature Grans (Abs)               Immature Granulocytes               Lymph #               Lymph %               Magnesium               MCH               MCHC               MCV               Mono #               Mono %               MPV               nRBC               Ovalocytes               Phosphorus               Platelet Estimate               Platelets               POCT Glucose 241   272   259   282   290       Poik               Poly               Potassium               PROTEIN TOTAL               RBC               RDW               Sodium               Target Cells               Toxic Granulation               WBC                                  02/23/22  1158   02/23/22  1107        Albumin 2.3         Alkaline Phosphatase 153         ALT 49         Anion Gap 13         Aniso Slight         AST 44         Baso # 0.00         Basophil % 0.0         BILIRUBIN TOTAL 2.5  Comment: For infants and newborns, interpretation of results should be based  on gestational age, weight and in agreement with clinical  observations.    Premature Infant recommended reference ranges:  Up to 24 hours.............<8.0 mg/dL  Up to 48 hours............<12.0 mg/dL  3-5 days..................<15.0 mg/dL  6-29 days.................<15.0 mg/dL                    Calcium 9.0         Chloride 102         CO2 22         Creatinine 3.8         Differential Method Automated         eGFR if  13.0         eGFR if non  11.3  Comment: Calculation used to obtain the estimated glomerular filtration  rate (eGFR) is  the CKD-EPI equation.            Eos # 0.0         Eosinophil % 0.0         Glucose 238         Gran # (ANC) 8.5         Gran % 81.1         Hematocrit 23.9         Hemoglobin 7.8         Hypo Occasional         Immature Grans (Abs) 0.05  Comment: Mild elevation in immature granulocytes is non specific and   can be seen in a variety of conditions including stress response,   acute inflammation, trauma and pregnancy. Correlation with other   laboratory and clinical findings is essential.           Immature Granulocytes 0.5         Lymph # 1.3         Lymph % 12.6         Magnesium 2.2         MCH 34.7         MCHC 32.6                  Mono # 0.6         Mono % 5.8         MPV 13.0         nRBC 0         Ovalocytes Occasional         Phosphorus 6.0         Platelet Estimate Decreased         Platelets 52         POCT Glucose   235       Poik Slight         Poly         Potassium 5.6  Comment: *No Visible Hemolysis         PROTEIN TOTAL 6.6         RBC 2.25         RDW 19.0         Sodium 137         Target Cells Occasional         Toxic Granulation Present         WBC 10.43                 Significant Imaging: I have reviewed all pertinent imaging results/findings within the past 24 hours.      Assessment/Plan:      * Acute renal failure superimposed on stage 2 chronic kidney disease  -Dialysis depented ANI thought to be secondary to an auto-immune etiology, based on serologic pattern of possitive ARMEN, Anti-Posey RNP and low complement levels, negative ANCA.  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     -On 2/7 IR placed THDC and HD initiated for encephalopathy  -s/p 3 days of pulse dose steroids for suspected glomerulonephritis vs pulm/renal syndrome and continues on prednisone.  -Nephrology has restarted CellCept in Liquid formulation as pt cleareed for Full Liquids by SLP   -Dialysis is being performed at discretion of Nephrology team.   -Rheumatology consulted. -  --continue Lasix, now HD today, treating  hyperkalemia medically, BUN rising.   -Placed transfer order to Stepdown level of care - believe pt needs to be on continuous monitoring give multiorgan dysfunction - high acuity of her disease  2/24 renal function not improving she is having GI symptoms. increase Urea and Creatinine, possible HD session by tomorrow  . UOP 400ml/24h     Positive direct Jacob test  Noted on prior anemia workup - positive for Cold agglutinin       Systemic lupus erythematosus with glomerular disease  -plaquenil 200  -on prednisone 40  -nephrology starting imuran  -Renal biopsy on hold for now      Atrial flutter  Initial EKG with concern for atrial flutter.   -2/21 - EKG with Atrial fibrillation - pts sister says she has been diagnosed with this before  -Started on Liquid Metoprolol as could not crush Toprol XL, so will continue as HR in 80-90 range  -not on anticoagulation - coagulopathy and potential procedure   -continue Tele monitoring  -consider cardiology consult if unstable tachycardia  2/24 Presented in AFib versus flutter, not discussed in [facility] [place] [place] notes, anticoagulation likely contraindicated given coagulopathy titrate beta-blocker as tolerated          MANUEL (generalized anxiety disorder)  -Remains anxious   -On 2/6 gave one time low dose ativan PO - watch respiratory status very closely.    Debility  -PT/OT consulted and recommend inpatient rehab at discharge at discharge.  -Inpatient rehab is necessary because of patient's medical complexity and need to be seen by physician daily.  -Not yet medically ready for discharge.    Hyperkalemia  Hyperkalemia  K  at Recent Labs   Lab 02/23/22  2121 02/23/22  2357 02/24/22  0344   K 5.7*  5.7* 5.3*  5.3* 5.2*  5.2*  5.2*   .   2/24 Lasix infusion was increased from 5 mg an hour to 10 mg/h overnight.  Repeat potassium around midnight decreased to 5.3.  Will continue current Lasix infusion and monitor serum potassium levels.      Metabolic acidosis  -Suspect secondary to ANI,  management as above    2/24  likely from CKD/ANI . patient with bicarbonate Recent Labs   Lab 02/23/22  2121 02/23/22  2357 02/24/22  0344   CO2 17* 20* 22*  22*    . monitor     Sleep apnea  -Continue bipap qhs    Thrombocytopenia  -Chronic and at baseline on admit secondary to cirrhosis  -Heme/onc consulted and input appreciated.  Did not recommend bone marrow biopsy  -[place] cbc q48h.  -consented and type & screen sent  -Hematology consult requested, hematology recs that ongoing thrombocytopenia is sequelae of acute illness,  To transfuse if PLT <10  And if any bleeding transfuse PLT <50.   2/24  Thrombocytopenia  Patient with thrombocytopenia Recent Labs   Lab 02/22/22  1055 02/23/22  1158 02/24/22  0344   PLT 46* 52* 51*   . Platelet counts stable.monitor    Macrocytic anemia  -Hb 9.2 on admit - chronic secondary to cirrhosis.  -Dropped to <7 on 2/2  -No evidence of GI bleeding but had noted epistaxis and bleeding gums at times since before hospitalization.  -Folate and B12 replete.  She is iron deficient.  -1 unit PRBC ordered 2/2 but transfusion delayed due to complex antibody matching.  Blood finally available and she received 1 unit PRBC 2/4.   -H/H slightly trending down.  Today Hb 7.7  -Monitor labs q48h.    Cirrhosis of liver without ascites  -Noted mild transaminitis and thrombocytopenia which are not new.  Noted epistaxis and gingival bleeding which has been ongoing for several months.  These are chronic and note history of cirrhosis and alcohol abuse   -Follows with hepatology.  Unclear if this was autoimmune?  -Abdominal US on day prior to admit showed hepatic cirrhosis/steatosis with sonographic findings of portal hypertension including trace ascites and splenomegaly.  -On 2/9 noted to be more lethargic with elevation of ammonia level and lactulose was started   -Mental status improved and she had significant cramping and nausea/vomiting so stopped lactulose on 2/17.  KUB 2/17 with non-specific  bowel gas pattern  -Mental status continues to be baseline.    -Monitor closely.    Type 2 diabetes mellitus without complication, without long-term current use of insulin  -On glucocorticoids  -add basal insulin 10units during day - 16 units overnight  -Aspart 4units TID with meals.   -Sugars remain above goal range  -Continue SSI ac/hs  2/24   Patient's FSGs are controlled on current hypoglycemics.   Last A1c reviewed-   Lab Results   Component Value Date    HGBA1C 5.9 (H) 02/01/2022    HGBA1C 6.3 (H) 03/22/2021    HGBA1C 6.6 (H) 09/08/2020     Will hold PO hypoglycemics and will start correctional scale insulin  Most recent fingerstick glucose reviewed-   Recent Labs   Lab 02/23/22 1953 02/23/22 2116 02/23/22 2200 02/24/22  0712   POCTGLUCOSE 272* 241* 358* 210*     currently on levemir and novolog      Gastroesophageal reflux disease  -Complains of gagging anytime food is put in her mouth  -Will schedule zofran prior to each meal.  -Continue pepcid.    Oropharyngeal dysphagia  SLP recs Full liquid diet at this time       Primary hypertension  -switched to IR metoprolol liquid 6.25mg BID    Hyperlipidemia  -History noted  -Not on statin due to cirrhosis    VTE Risk Mitigation (From admission, onward)         Ordered     heparin (porcine) injection 3,200 Units  As needed (PRN)         02/07/22 1834     IP VTE HIGH RISK PATIENT  Once         02/01/22 1557     Place sequential compression device  Until discontinued         02/01/22 1557                Discharge Planning   CLAUDETTE: 2/28/2022     Code Status: Full Code   Is the patient medically ready for discharge?: No    Reason for patient still in hospital (select all that apply): Treatment  Discharge Plan A: Rehab   Discharge Delays: (Abnormal) Post-Acute Set-up              Jovanny Mendez MD  Department of Hospital Medicine   [facility] - [facility]

## 2022-02-24 NOTE — SUBJECTIVE & OBJECTIVE
Review of Systems  Constitutional:  Positive for activity change and appetite change. Negative for unexpected weight change.   HENT:  Negative for sore throat.    Cardiovascular:  Positive for leg swelling. Negative for chest pain.   Gastrointestinal:  Positive for abdominal pain.   Genitourinary:  Negative for dysuria.   Objective:     Vital Signs (Most Recent):  Temp: 97.9 °F (36.6 °C) (02/24/22 0710)  Pulse: 98 (02/24/22 0912)  Resp: 20 (02/24/22 0710)  BP: (Abnormal) 109/55 (02/24/22 0710)  SpO2: 98 % (02/24/22 1014)   Vital Signs (24h Range):  Temp:  [96.6 °F (35.9 °C)-97.9 °F (36.6 °C)] 97.9 °F (36.6 °C)  Pulse:  [] 98  Resp:  [16-42] 20  SpO2:  [94 %-100 %] 98 %  BP: (102-130)/(51-60) 109/55     Weight: 72.8 kg (160 lb 7.9 oz)  Body mass index is 34.73 kg/m².    Intake/Output Summary (Last 24 hours) at 2/24/2022 1055  Last data filed at 2/24/2022 0400  Gross per 24 hour   Intake no documentation   Output 401 ml   Net -401 ml      Physical Exam    Appearance: She is well-developed. She is ill-appearing. She is not toxic-appearing or diaphoretic.      Interventions: Nasal cannula in place.   HENT:      Nose: Nose normal.   Eyes:      General: No scleral icterus.  Cardiovascular:      Rate and Rhythm: Normal rate and regular rhythm.   Pulmonary:      Effort: No tachypnea or respiratory distress.     Chest:      Comments: Tunneled  HD line in right chest with notable surrounding ecchymosis   Abdominal:      General: Bowel sounds are normal. There is no distension.      Palpations: Abdomen is soft.   generalized tenderness on exam    Musculoskeletal:         General: No tenderness. Normal range of motion.      Cervical back: Normal range of motion and neck supple.      Right lower leg: Edema present.      Left lower leg: Edema present.   Skin:     General: Skin is warm and dry.      Comments: Malar rash   Neurological:      Mental Status: She is alert and oriented to person, place, and time.       Comments: Lethargic but alert and conversant.  Diffuse weakness.   Psychiatric:         Thought Content: Thought content normal.       Significant Labs: All pertinent labs within the past 24 hours have been reviewed.  Recent Lab Results         02/24/22  0712   02/24/22  0344   02/23/22  2357   02/23/22  2200   02/23/22  2121        Albumin   2.5             Alkaline Phosphatase   166             ALT   46             Anion Gap   14   16     16          14             Aniso   Slight             AST   40             Baso #   0.01             Basophil %   0.1             BILIRUBIN TOTAL   2.1  Comment: For infants and newborns, interpretation of results should be based  on gestational age, weight and in agreement with clinical  observations.    Premature Infant recommended reference ranges:  Up to 24 hours.............<8.0 mg/dL  Up to 48 hours............<12.0 mg/dL  3-5 days..................<15.0 mg/dL  6-29 days.................<15.0 mg/dL               BUN   113   107     110          113             Calcium   9.1   9.1     9.1          9.1             Chloride   102   103     104          102             CO2   22   20     17          22             Creatinine   3.9   4.0     3.7          3.9             Differential Method   Automated             eGFR if    12.6   12.3     13.5          12.6             eGFR if non    11.0  Comment: Calculation used to obtain the estimated glomerular filtration  rate (eGFR) is the CKD-EPI equation.      10.6  Comment: Calculation used to obtain the estimated glomerular filtration  rate (eGFR) is the CKD-EPI equation.        11.7  Comment: Calculation used to obtain the estimated glomerular filtration  rate (eGFR) is the CKD-EPI equation.             11.0  Comment: Calculation used to obtain the estimated glomerular filtration  rate (eGFR) is the CKD-EPI equation.                Eos #   0.0             Eosinophil %   0.0             Glucose   203    246     232          203             Gran # (ANC)   8.5             Gran %   80.8             Hematocrit   24.4             Hemoglobin   7.7             Hypo   Occasional             Immature Grans (Abs)   0.06  Comment: Mild elevation in immature granulocytes is non specific and   can be seen in a variety of conditions including stress response,   acute inflammation, trauma and pregnancy. Correlation with other   laboratory and clinical findings is essential.               Immature Granulocytes   0.6             Lymph #   1.3             Lymph %   12.7             Magnesium   2.3             MCH   34.4             MCHC   31.6             MCV   109             Mono #   0.6             Mono %   5.8             MPV   14.2             nRBC   0             Ovalocytes   Occasional             Phosphorus   5.8             Platelet Estimate               Platelets   51             POCT Glucose 210       358         Poik   Slight             Poly   Occasional             Potassium   5.2   5.3  Comment: *No Visible Hemolysis     5.7  Comment: *No Visible Hemolysis          5.2   5.3  Comment: *No Visible Hemolysis     5.7  Comment: *No Visible Hemolysis          5.2             PROTEIN TOTAL   6.5             RBC   2.24             RDW   19.2             Sodium   138   139     137          138             Target Cells               Toxic Granulation               WBC   10.56                                02/23/22  2116   02/23/22  1953   02/23/22  1832   02/23/22  1756   02/23/22  1618        Albumin               Alkaline Phosphatase               ALT               Anion Gap               Aniso               AST               Baso #               Basophil %               BILIRUBIN TOTAL               BUN               Calcium               Chloride               CO2               Creatinine               Differential Method               eGFR if                eGFR if non                Eos  #               Eosinophil %               Glucose               Gran # (ANC)               Gran %               Hematocrit               Hemoglobin               Hypo               Immature Grans (Abs)               Immature Granulocytes               Lymph #               Lymph %               Magnesium               MCH               MCHC               MCV               Mono #               Mono %               MPV               nRBC               Ovalocytes               Phosphorus               Platelet Estimate               Platelets               POCT Glucose 241   272   259   282   290       Poik               Poly               Potassium               PROTEIN TOTAL               RBC               RDW               Sodium               Target Cells               Toxic Granulation               WBC                                  02/23/22  1158   02/23/22  1107        Albumin 2.3         Alkaline Phosphatase 153         ALT 49         Anion Gap 13         Aniso Slight         AST 44         Baso # 0.00         Basophil % 0.0         BILIRUBIN TOTAL 2.5  Comment: For infants and newborns, interpretation of results should be based  on gestational age, weight and in agreement with clinical  observations.    Premature Infant recommended reference ranges:  Up to 24 hours.............<8.0 mg/dL  Up to 48 hours............<12.0 mg/dL  3-5 days..................<15.0 mg/dL  6-29 days.................<15.0 mg/dL                    Calcium 9.0         Chloride 102         CO2 22         Creatinine 3.8         Differential Method Automated         eGFR if  13.0         eGFR if non  11.3  Comment: Calculation used to obtain the estimated glomerular filtration  rate (eGFR) is the CKD-EPI equation.            Eos # 0.0         Eosinophil % 0.0         Glucose 238         Gran # (ANC) 8.5         Gran % 81.1         Hematocrit 23.9         Hemoglobin 7.8         Hypo Occasional          Immature Grans (Abs) 0.05  Comment: Mild elevation in immature granulocytes is non specific and   can be seen in a variety of conditions including stress response,   acute inflammation, trauma and pregnancy. Correlation with other   laboratory and clinical findings is essential.           Immature Granulocytes 0.5         Lymph # 1.3         Lymph % 12.6         Magnesium 2.2         MCH 34.7         MCHC 32.6                  Mono # 0.6         Mono % 5.8         MPV 13.0         nRBC 0         Ovalocytes Occasional         Phosphorus 6.0         Platelet Estimate Decreased         Platelets 52         POCT Glucose   235       Poik Slight         Poly         Potassium 5.6  Comment: *No Visible Hemolysis         PROTEIN TOTAL 6.6         RBC 2.25         RDW 19.0         Sodium 137         Target Cells Occasional         Toxic Granulation Present         WBC 10.43                 Significant Imaging: I have reviewed all pertinent imaging results/findings within the past 24 hours.

## 2022-02-24 NOTE — SUBJECTIVE & OBJECTIVE
Interval History: AF VSS. Hyperkalemic to 5.7 overnight. Lasix gtt increased to 10/hr. K 5.2 this AM. Cr down to 3.9, 400 cc UOP with 1x unmeasured. Complaining of abdominal pain and constipation this AM. Otherwise doing well.    Review of patient's allergies indicates:   Allergen Reactions    Dobutamine Hives    Benadryl [diphenhydramine hcl] Anxiety     Pt states she feels jumpy and anxious when taking.    Darvon [propoxyphene] Nausea Only    Shellfish containing products Hives    Iodinated contrast media Hives    Lisinopril Other (See Comments)     cough    Propoxyphene hcl      Itchy (skin)^    Tizanidine Other (See Comments)     Sweaty, difficulty swallowing    Statins-hmg-coa reductase inhibitors Anxiety and Other (See Comments)     Myalgia, fatigue, palpitations, anxiety     Current Facility-Administered Medications   Medication Frequency    0.9%  NaCl infusion PRN    0.9%  NaCl infusion Once    acetaminophen tablet 650 mg Q6H PRN    albuterol-ipratropium 2.5 mg-0.5 mg/3 mL nebulizer solution 3 mL Q4H PRN    benzonatate capsule 100 mg TID PRN    bisacodyL suppository 10 mg Daily PRN    dextromethorphan-guaiFENesin  mg/5 ml liquid 5 mL Q6H    dextrose 10% bolus 125 mL PRN    dextrose 10% bolus 250 mL PRN    dextrose 10% bolus 250 mL PRN    famotidine tablet 20 mg Daily    furosemide (LASIX) 500 mg infusion (conc: 10 mg/mL) Continuous    glucagon (human recombinant) injection 1 mg PRN    glucose chewable tablet 16 g PRN    glucose chewable tablet 24 g PRN    heparin (porcine) injection 3,200 Units PRN    hydrOXYchloroQUINE tablet 200 mg Daily    insulin aspart U-100 pen 0-5 Units QID (AC + HS) PRN    insulin aspart U-100 pen 4 Units TIDWM    insulin detemir U-100 pen 10 Units Daily    insulin detemir U-100 pen 16 Units QHS    iohexoL (OMNIPAQUE 350) injection 100 mL ONCE PRN    lanthanum chewable tablet 500 mg QID    melatonin tablet 6 mg Nightly PRN    metoprolol 12.5mg/1.25mL oral solution 6.25 mg BID     mycophenolate mofetil 200 mg/mL suspension 500 mg Q6H    naloxone 0.4 mg/mL injection 0.02 mg PRN    ondansetron injection 4 mg TID AC    polyethylene glycol packet 17 g Daily PRN    predniSONE tablet 40 mg Daily    simethicone chewable tablet 80 mg TID PRN    sodium chloride 0.9% bolus 250 mL PRN    sodium chloride 0.9% flush 10 mL PRN    vitamin renal formula (B-complex-vitamin c-folic acid) 1 mg per capsule 1 capsule Daily       Objective:     Vital Signs (Most Recent):  Temp: 97.9 °F (36.6 °C) (02/24/22 0710)  Pulse: 98 (02/24/22 0912)  Resp: 20 (02/24/22 0710)  BP: (!) 109/55 (02/24/22 0710)  SpO2: 98 % (02/24/22 1014)  O2 Device (Oxygen Therapy): room air (02/24/22 1014)   Vital Signs (24h Range):  Temp:  [96.6 °F (35.9 °C)-97.9 °F (36.6 °C)] 97.9 °F (36.6 °C)  Pulse:  [] 98  Resp:  [16-42] 20  SpO2:  [94 %-100 %] 98 %  BP: (102-130)/(51-60) 109/55     Weight: 72.8 kg (160 lb 7.9 oz) (02/20/22 2100)  Body mass index is 34.73 kg/m².  Body surface area is 1.71 meters squared.    I/O last 3 completed shifts:  In: -   Out: 601 [Urine:600; Stool:1]    Physical Exam  Constitutional:       General: She is not in acute distress.     Appearance: She is ill-appearing.   HENT:      Head: Normocephalic and atraumatic.      Mouth/Throat:      Mouth: Mucous membranes are moist.      Pharynx: Oropharynx is clear.   Eyes:      Extraocular Movements: Extraocular movements intact.      Pupils: Pupils are equal, round, and reactive to light.   Cardiovascular:      Rate and Rhythm: Normal rate and regular rhythm.      Pulses: Normal pulses.      Heart sounds: Normal heart sounds.   Pulmonary:      Effort: Pulmonary effort is normal. No respiratory distress.      Breath sounds: Normal breath sounds.   Abdominal:      General: Abdomen is flat. Bowel sounds are normal.      Palpations: Abdomen is soft.   Musculoskeletal:         General: No swelling.      Cervical back: Neck supple.   Skin:     General: Skin is warm and  dry.      Capillary Refill: Capillary refill takes less than 2 seconds.      Comments: Ecchymosis surrounding right chest HD cath   Neurological:      General: No focal deficit present.      Mental Status: She is alert and oriented to person, place, and time. Mental status is at baseline.   Psychiatric:         Mood and Affect: Mood normal.         Behavior: Behavior normal.         Thought Content: Thought content normal.         Judgment: Judgment normal.       Significant Labs:  CBC:   Recent Labs   Lab 02/24/22  0344   WBC 10.56   RBC 2.24*   HGB 7.7*   HCT 24.4*   PLT 51*   *   MCH 34.4*   MCHC 31.6*     CMP:   Recent Labs   Lab 02/24/22  0344   *  203*   CALCIUM 9.1  9.1   ALBUMIN 2.5*   PROT 6.5     138   K 5.2*  5.2*  5.2*   CO2 22*  22*     102   *  113*   CREATININE 3.9*  3.9*   ALKPHOS 166*   ALT 46*   AST 40   BILITOT 2.1*     All labs within the past 24 hours have been reviewed.     Significant Imaging:  Imaging Results              US Lower Extremity Veins Bilateral (Final result)  Result time 02/01/22 10:32:52   Procedure changed from US Lower Extremity Veins Bilateral     Final result by Polo Carr MD (02/01/22 10:32:52)                   Impression:      1. No sonographic evidence of DVT in the bilateral lower extremities.      Electronically signed by: Polo Carr  Date:    02/01/2022  Time:    10:32               Narrative:    EXAMINATION:  US LOWER EXTREMITY VEINS BILATERAL    CLINICAL HISTORY:  cough and congestion since October; Cough, unspecified    TECHNIQUE:  Duplex and color flow Doppler and dynamic compression was performed of the bilateral lower extremity veins was performed.    COMPARISON:  None    FINDINGS:  Right lower extremity    Common femoral, superficial femoral, popliteal, upper greater saphenous and deep femoral veins demonstrate normal compressibility, phasic flow and augmentation response without evidence of filling defect.  Visualized calf veins are patent.    Left lower extremity    Common femoral, superficial femoral, popliteal, upper greater saphenous and deep femoral veins demonstrate normal compressibility, phasic flow and augmentation response without evidence of filling defect. Visualized calf veins are patent.                                       X-Ray Chest PA And Lateral (Final result)  Result time 02/01/22 09:39:05      Final result by Brandon Fry MD (02/01/22 09:39:05)                   Impression:      No significant changes.      Electronically signed by: Brandon Fry MD  Date:    02/01/2022  Time:    09:39               Narrative:    EXAMINATION:  XR CHEST PA AND LATERAL    CLINICAL HISTORY:  Chest Pain;    TECHNIQUE:  PA and lateral views of the chest were performed.    COMPARISON:  02/01/2022 at 08:08 hours, 12/13/2021    FINDINGS:  Enlarged cardiac silhouette, similar to the prior exam.  There are mild perihilar and right basilar lung opacities, not significantly changed from the earlier exam, possible cardiogenic/noncardiogenic pulmonary edema, pneumonia, or aspiration.  Superimposed chronic interstitial changes may be present, as well.  The opacities have not significantly changed from the earlier exam.  Mildly elevated right hemidiaphragm, stable.  No pneumothorax.  No pleural effusions.                                       X-Ray Chest AP Portable (Final result)  Result time 02/01/22 08:28:48      Final result by Kemal Latham MD (02/01/22 08:28:48)                   Impression:      Nonspecific interstitial fibrotic changes stable and unchanged allowing for differences in inspiratory effort and technique.  Clinical correlation requested.    Epic abnormal flag      Electronically signed by: Kemal Latham MD  Date:    02/01/2022  Time:    08:28               Narrative:    EXAMINATION:  XR CHEST AP PORTABLE    CLINICAL HISTORY:  Cough, unspecified    TECHNIQUE:  Single frontal view of the chest  was performed.    COMPARISON:  12/13/2021    FINDINGS:  Increasing perihilar interstitial infiltrates, some volume loss of anterior segment of elevation right minor fissure, elevation right hemidiaphragm with compression atelectasis of medial basilar segment right lower lobe.  Top-normal size heart.  Large body size degrades exam.  Pulmonary vascular tree borderline prominent.                                    All significant imaging within the past 24 hours has been reviewed.

## 2022-02-24 NOTE — PT/OT/SLP PROGRESS
Physical Therapy      Patient Name:  Tammi Farris   MRN:  [id number]    Attempted to see patient for PT; however patient deferred, citing significant abdominal pain at this time. Physical therapy will follow-up as able.    Christina Jo, PT, DPT, GCS  2/24/2022

## 2022-02-24 NOTE — PROGRESS NOTES
[facility] - Telemetry Stepdown  Nephrology  Progress Note    Patient Name: Tammi Farris  MRN: [id number]  Admission Date: 2/1/2022  Hospital Length of Stay: 23 days  Attending Provider: Jovanny Mendez MD   Primary Care Physician: Ann-Marie Hartman MD  Principal Problem:Acute renal failure superimposed on stage 2 chronic kidney disease    Assessment/Plan:     * Acute renal failure superimposed on stage 2 chronic kidney disease  -- New ANI on CKD2  -- Concern for possible lupus nephritis /autoimmune etiology  -- Low complements. Positive anti-Sm /RNP antibodies. ESR > 140. CRP 21.  -- 24 hr urine protein 373, urine protein timed 560  -- Negative dsDNA, ANCA, glomerular basement membrane proteins, free light chains, quantitative immunoglobulins, spep, upep.   -- Unable to renal biopsy at  due to thrombocytopenia (~ 50)  -- S/p pulse dose steroids for 3 days and now on prednisone.   -- Attempted to initiate Cellcept but has not taken due to difficulty with pill size.  -- S/p initiation several sessions HD, last 2/16.   -- SLE criteria per rheum  -- Lots of granular casts on spinning urine. Questionable muddy brown casts. No RBC or WBC casts seen.  -- Minimal UOP. Dry mucous membranes. Denies SOB.   -- Repeat ESR/CRP and C3/C4 pending  -- 24 hr urine protein improved to 163. Likely r/t steroids    Recommendations:  -- Discontinue lasix gtt and monitor fluid status  -- Likely no further need for HD during this hospital stay  -- Continue Cellcept liquid suspension, 500 mg Q6h with food to reduce GI side effects. If well tolerated, can transition to 1000 mg Q12h  -- Continue prednisone 40 mg daily, likely at least 1 month  -- Pepcid while on steroids. Monitor for possibility of HTN, hyperglycemia, infections. Ensure vaccines up to date  -- Appreciate rheum. Plaquenil initiated  -- Defer renal bx to future, as risks outweigh benefits considering underlying CKD /scar, age, thrombocytopenia, uremia, bleeding complication  may necessitate embolization and lead to chronic HD  -- Strict I/O  -- Daily BMP, mag, phos  -- Avoid nephrotoxins    Hyperphosphatemia  Likely 2/2 renal failure. Phos 5.8 this AM.    - Monitor with daily phos  - Can start Sevelamer 800 mg TID    Hyperkalemia  Up to 5.7 overnight, improved on lasix gtt. 5.2 in AM. Likely 2/2 renal failure    - Recommend discontinuing lasix as patient starting to look volume down  - Continue to monitor  - Shift PRN          Subjective:     HPI: Tammi Farris is a 71 y.o. F with history of CKD2, HTN, DM, cirrhosis, and PAULINA, who was admitted to [facility] with acute renal failure. Found to have low complements and positive anti-Sm /RNP antibodies. Unable to renal biopsy due to thrombocytopenia (~ 50). She was treated with pulse dose steroids for 3 days and now on prednisone. Attempted to initiate Cellcept but has not taken due to difficulty with pill size. Initiated on HD, received several sessions, last 2/16. Held over weekend as her Cr improving to 3.5 from 6.4. She reports making good urine. Denies confusion. Concern for possible lupus nephritis and transferred to [facility] per rheumatology /nephrology recommendations for IR renal biopsy.      Interval History: AF VSS. Hyperkalemic to 5.7 overnight. Lasix gtt increased to 10/hr. K 5.2 this AM. Cr down to 3.9, 400 cc UOP with 1x unmeasured. Complaining of abdominal pain and constipation this AM. Otherwise doing well.    Review of patient's allergies indicates:   Allergen Reactions    Dobutamine Hives    Benadryl [diphenhydramine hcl] Anxiety     Pt states she feels jumpy and anxious when taking.    Darvon [propoxyphene] Nausea Only    Shellfish containing products Hives    Iodinated contrast media Hives    Lisinopril Other (See Comments)     cough    Propoxyphene hcl      Itchy (skin)^    Tizanidine Other (See Comments)     Sweaty, difficulty swallowing    Statins-hmg-coa reductase inhibitors Anxiety and Other (See Comments)      Myalgia, fatigue, palpitations, anxiety     Current Facility-Administered Medications   Medication Frequency    0.9%  NaCl infusion PRN    0.9%  NaCl infusion Once    acetaminophen tablet 650 mg Q6H PRN    albuterol-ipratropium 2.5 mg-0.5 mg/3 mL nebulizer solution 3 mL Q4H PRN    benzonatate capsule 100 mg TID PRN    bisacodyL suppository 10 mg Daily PRN    dextromethorphan-guaiFENesin  mg/5 ml liquid 5 mL Q6H    dextrose 10% bolus 125 mL PRN    dextrose 10% bolus 250 mL PRN    dextrose 10% bolus 250 mL PRN    famotidine tablet 20 mg Daily    furosemide (LASIX) 500 mg infusion (conc: 10 mg/mL) Continuous    glucagon (human recombinant) injection 1 mg PRN    glucose chewable tablet 16 g PRN    glucose chewable tablet 24 g PRN    heparin (porcine) injection 3,200 Units PRN    hydrOXYchloroQUINE tablet 200 mg Daily    insulin aspart U-100 pen 0-5 Units QID (AC + HS) PRN    insulin aspart U-100 pen 4 Units TIDWM    insulin detemir U-100 pen 10 Units Daily    insulin detemir U-100 pen 16 Units QHS    iohexoL (OMNIPAQUE 350) injection 100 mL ONCE PRN    lanthanum chewable tablet 500 mg QID    melatonin tablet 6 mg Nightly PRN    metoprolol 12.5mg/1.25mL oral solution 6.25 mg BID    mycophenolate mofetil 200 mg/mL suspension 500 mg Q6H    naloxone 0.4 mg/mL injection 0.02 mg PRN    ondansetron injection 4 mg TID AC    polyethylene glycol packet 17 g Daily PRN    predniSONE tablet 40 mg Daily    simethicone chewable tablet 80 mg TID PRN    sodium chloride 0.9% bolus 250 mL PRN    sodium chloride 0.9% flush 10 mL PRN    vitamin renal formula (B-complex-vitamin c-folic acid) 1 mg per capsule 1 capsule Daily       Objective:     Vital Signs (Most Recent):  Temp: 97.9 °F (36.6 °C) (02/24/22 0710)  Pulse: 98 (02/24/22 0912)  Resp: 20 (02/24/22 0710)  BP: (!) 109/55 (02/24/22 0710)  SpO2: 98 % (02/24/22 1014)  O2 Device (Oxygen Therapy): room air (02/24/22 1014)   Vital Signs (24h  Range):  Temp:  [96.6 °F (35.9 °C)-97.9 °F (36.6 °C)] 97.9 °F (36.6 °C)  Pulse:  [] 98  Resp:  [16-42] 20  SpO2:  [94 %-100 %] 98 %  BP: (102-130)/(51-60) 109/55     Weight: 72.8 kg (160 lb 7.9 oz) (02/20/22 2100)  Body mass index is 34.73 kg/m².  Body surface area is 1.71 meters squared.    I/O last 3 completed shifts:  In: -   Out: 601 [Urine:600; Stool:1]    Physical Exam  Constitutional:       General: She is not in acute distress.     Appearance: She is ill-appearing.   HENT:      Head: Normocephalic and atraumatic.      Mouth/Throat:      Mouth: Mucous membranes are moist.      Pharynx: Oropharynx is clear.   Eyes:      Extraocular Movements: Extraocular movements intact.      Pupils: Pupils are equal, round, and reactive to light.   Cardiovascular:      Rate and Rhythm: Normal rate and regular rhythm.      Pulses: Normal pulses.      Heart sounds: Normal heart sounds.   Pulmonary:      Effort: Pulmonary effort is normal. No respiratory distress.      Breath sounds: Normal breath sounds.   Abdominal:      General: Abdomen is flat. Bowel sounds are normal.      Palpations: Abdomen is soft.   Musculoskeletal:         General: No swelling.      Cervical back: Neck supple.   Skin:     General: Skin is warm and dry.      Capillary Refill: Capillary refill takes less than 2 seconds.      Comments: Ecchymosis surrounding right chest HD cath   Neurological:      General: No focal deficit present.      Mental Status: She is alert and oriented to person, place, and time. Mental status is at baseline.   Psychiatric:         Mood and Affect: Mood normal.         Behavior: Behavior normal.         Thought Content: Thought content normal.         Judgment: Judgment normal.       Significant Labs:  CBC:   Recent Labs   Lab 02/24/22  0344   WBC 10.56   RBC 2.24*   HGB 7.7*   HCT 24.4*   PLT 51*   *   MCH 34.4*   MCHC 31.6*     CMP:   Recent Labs   Lab 02/24/22  0344   *  203*   CALCIUM 9.1  9.1   ALBUMIN  2.5*   PROT 6.5     138   K 5.2*  5.2*  5.2*   CO2 22*  22*     102   *  113*   CREATININE 3.9*  3.9*   ALKPHOS 166*   ALT 46*   AST 40   BILITOT 2.1*     All labs within the past 24 hours have been reviewed.     Significant Imaging:  Imaging Results              US Lower Extremity Veins Bilateral (Final result)  Result time 02/01/22 10:32:52   Procedure changed from US Lower Extremity Veins Bilateral     Final result by Polo Carr MD (02/01/22 10:32:52)                   Impression:      1. No sonographic evidence of DVT in the bilateral lower extremities.      Electronically signed by: Polo Carr  Date:    02/01/2022  Time:    10:32               Narrative:    EXAMINATION:  US LOWER EXTREMITY VEINS BILATERAL    CLINICAL HISTORY:  cough and congestion since October; Cough, unspecified    TECHNIQUE:  Duplex and color flow Doppler and dynamic compression was performed of the bilateral lower extremity veins was performed.    COMPARISON:  None    FINDINGS:  Right lower extremity    Common femoral, superficial femoral, popliteal, upper greater saphenous and deep femoral veins demonstrate normal compressibility, phasic flow and augmentation response without evidence of filling defect. Visualized calf veins are patent.    Left lower extremity    Common femoral, superficial femoral, popliteal, upper greater saphenous and deep femoral veins demonstrate normal compressibility, phasic flow and augmentation response without evidence of filling defect. Visualized calf veins are patent.                                       X-Ray Chest PA And Lateral (Final result)  Result time 02/01/22 09:39:05      Final result by Brandon Fry MD (02/01/22 09:39:05)                   Impression:      No significant changes.      Electronically signed by: Brandon Fry MD  Date:    02/01/2022  Time:    09:39               Narrative:    EXAMINATION:  XR CHEST PA AND LATERAL    CLINICAL HISTORY:  Chest  Pain;    TECHNIQUE:  PA and lateral views of the chest were performed.    COMPARISON:  02/01/2022 at 08:08 hours, 12/13/2021    FINDINGS:  Enlarged cardiac silhouette, similar to the prior exam.  There are mild perihilar and right basilar lung opacities, not significantly changed from the earlier exam, possible cardiogenic/noncardiogenic pulmonary edema, pneumonia, or aspiration.  Superimposed chronic interstitial changes may be present, as well.  The opacities have not significantly changed from the earlier exam.  Mildly elevated right hemidiaphragm, stable.  No pneumothorax.  No pleural effusions.                                       X-Ray Chest AP Portable (Final result)  Result time 02/01/22 08:28:48      Final result by Kemal Latham MD (02/01/22 08:28:48)                   Impression:      Nonspecific interstitial fibrotic changes stable and unchanged allowing for differences in inspiratory effort and technique.  Clinical correlation requested.    Epic abnormal flag      Electronically signed by: Kemal Latham MD  Date:    02/01/2022  Time:    08:28               Narrative:    EXAMINATION:  XR CHEST AP PORTABLE    CLINICAL HISTORY:  Cough, unspecified    TECHNIQUE:  Single frontal view of the chest was performed.    COMPARISON:  12/13/2021    FINDINGS:  Increasing perihilar interstitial infiltrates, some volume loss of anterior segment of elevation right minor fissure, elevation right hemidiaphragm with compression atelectasis of medial basilar segment right lower lobe.  Top-normal size heart.  Large body size degrades exam.  Pulmonary vascular tree borderline prominent.                                    All significant imaging within the past 24 hours has been reviewed.        Thank you for your consult. I will follow-up with patient. Please contact us if you have any additional questions.    Anthony Cantor MD  Nephrology  [facility] - Tele[facility] Stepdown

## 2022-02-24 NOTE — ASSESSMENT & PLAN NOTE
-Dialysis depented ANI thought to be secondary to an auto-immune etiology, based on serologic pattern of possitive ARMEN, Anti-Posey RNP and low complement levels, negative ANCA.  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     -On 2/7 IR placed THDC and HD initiated for encephalopathy  -s/p 3 days of pulse dose steroids for suspected glomerulonephritis vs pulm/renal syndrome and continues on prednisone.  -Nephrology has restarted CellCept in Liquid formulation as pt cleareed for Full Liquids by SLP   -Dialysis is being performed at discretion of Nephrology team.   -Rheumatology consulted. -  --continue Lasix, now HD today, treating hyperkalemia medically, BUN rising.   -Placed transfer order to Stepdown level of care - believe pt needs to be on continuous monitoring give multiorgan dysfunction - high acuity of her disease  2/24 renal function not improving she is having GI symptoms. increase Urea and Creatinine, possible HD session by tomorrow  . UOP 400ml/24h

## 2022-02-24 NOTE — PLAN OF CARE
Problem: Adult Inpatient Plan of Care  Goal: Plan of Care Review  Outcome: Ongoing, Progressing  Flowsheets (Taken 2/24/2022 0608)  Plan of Care Reviewed With:   patient   family     Problem: Diabetes Comorbidity  Goal: Blood Glucose Level Within Targeted Range  Outcome: Ongoing, Progressing  Intervention: Monitor and Manage Glycemia  Flowsheets (Taken 2/24/2022 0608)  Glycemic Management:   blood glucose monitored   supplemental insulin given     Problem: Fluid and Electrolyte Imbalance (Acute Kidney Injury/Impairment)  Goal: Fluid and Electrolyte Balance  Outcome: Ongoing, Progressing  Intervention: Monitor and Manage Fluid and Electrolyte Balance  Flowsheets (Taken 2/24/2022 0608)  Fluid/Electrolyte Management:   electrolyte-binding therapy initiated   electrolyte supplement adjusted   fluids adjusted     Problem: Fall Injury Risk  Goal: Absence of Fall and Fall-Related Injury  Outcome: Ongoing, Progressing  Intervention: Identify and Manage Contributors  Flowsheets (Taken 2/24/2022 0608)  Self-Care Promotion:   independence encouraged   BADL personal objects within reach   BADL personal routines maintained  Medication Review/Management: medications reviewed  Intervention: Promote Injury-Free Environment  Flowsheets (Taken 2/24/2022 0608)  Safety Promotion/Fall Prevention:   assistive device/personal item within reach   bed alarm set   side rails raised x 2   nonskid shoes/socks when out of bed   instructed to call staff for mobility   Pt AAO*3, calm ,cooperative. Appeared Lethargic but able to answer orientation questions. Potassium shift done overnight. Pt on continuous Lasix drip infusing @1ml/hr. Pt wore bipap for couple hours overnight. BG monitored per order, adjusted as per orders. No falls or injuries reported. WCTM.

## 2022-02-24 NOTE — SIGNIFICANT EVENT
I was asked to follow-up Mrs. Farris's potassium levels.  Potassium level around 10:00 p.m. resulted at 5.7.  Case discussed with on-call for Nephrology.  Lasix infusion was increased from 5 mg an hour to 10 mg an hour.  Repeat potassium around midnight decreased to 5.3.  Will continue current Lasix infusion and monitor serum potassium levels.

## 2022-02-24 NOTE — PROGRESS NOTES
"[facility] - Telemetry Stepdown  Adult Nutrition  Consult Note    SUMMARY     Recommendations    1. Continue current Full liquid, Diabetic/Renal diet (texture per SLP) + ONS.   2. RD to monitor & follow-up.    Goals: Pt to meet > 50% EEN by RD follow up  Nutrition Goal Status: progressing towards goal  Communication of RD Recs: reviewed with RN    Assessment and Plan    Nutrition Problem:  Inadequate oral intake     Related to (etiology):   Lethargy/decreased appetite      Signs and Symptoms (as evidenced by):   Pt only taking bites of meals with encouragement     Interventions(treatment strategy):  Collaboration with other providers  Carbohydrate modified diet  Renal diet     Nutrition Diagnosis Status:   Improving     Reason for Assessment    Reason For Assessment: RD follow-up  Diagnosis: other (see comments) (Acute renal failure superimposed on stage 2 CKD)  Relevant Medical History: HTN, HLD< GERD< cirrhosis of liver, sleep apnea  Interdisciplinary Rounds: did not attend    General Information Comments: Full liquid diet per SLP recommendations. Unable to assess pt this AM 2/2 team at bedside. Per RN documentation, pt tolerating diet w/ 50% PO intake. Pt receiving HD. UBW: 70kg, per chart review. RD unable to assess for malnutrition; will monitor energy intake & weight changes.  Nutrition Discharge Planning: Adequate PO intake    Nutrition/Diet History    Typical Food/Fluid Intake: LISSETH  Spiritual, Cultural Beliefs, Church Practices, Values that Affect Care: no  Factors Affecting Nutritional Intake: difficulty/impaired swallowing    Anthropometrics    Temp: 97.9 °F (36.6 °C)  Height Method: Stated  Height: 4' 9" (144.8 cm)  Height (inches): 57 in  Weight Method: Bed Scale  Weight: 72.8 kg (160 lb 7.9 oz)  Weight (lb): 160.5 lb  Ideal Body Weight (IBW), Female: 85 lb  % Ideal Body Weight, Female (lb): 188.82 %  BMI (Calculated): 34.7  BMI Grade: 30 - 34.9- obesity - grade I  Usual Body Weight (UBW), k " kg  % Usual Body Weight: 104.22  % Weight Change From Usual Weight: 4 %    Lab/Procedures/Meds    Pertinent Labs Reviewed: reviewed  Pertinent Labs Comments: K 5.2, , Creat 3.9, GFR 11, A1C 5.9  Pertinent Medications Reviewed: reviewed  Pertinent Medications Comments: Lasix    Estimated/Assessed Needs    Weight Used For Calorie Calculations: 72.8 kg (160 lb 7.9 oz)     Energy Calorie Requirements (kcal): 1396 kcal/d  Energy Need Method: [place]-St Jeor (1.25 PAL)     Protein Requirements: 73-88 g/d (1-1.2 g/kg)  Weight Used For Protein Calculations: 72.8 kg (160 lb 7.9 oz)     Estimated Fluid Requirement Method: other (see comments) (Per MD)  RDA Method (mL): 1396    Nutrition Prescription Ordered    Current Diet Order: Full liquid, Renal/Diabetic  Nutrition Order Comments: Novasource ONS    Evaluation of Received Nutrient/Fluid Intake    I/O: +1.9L since 2/10    Comments: LBM: 2/24    Tolerance: tolerating    Nutrition Risk    Level of Risk/Frequency of Follow-up: (1x/week)     Monitor and Evaluation    Food and Nutrient Intake: energy intake, food and beverage intake  Food and Nutrient Adminstration: diet order  Knowledge/Beliefs/Attitudes: food and nutrition knowledge/skill  Physical Activity and Function: nutrition-related ADLs and IADLs  Anthropometric Measurements: weight, weight change  Biochemical Data, Medical Tests and Procedures: inflammatory profile, lipid profile, glucose/endocrine profile, gastrointestinal profile, electrolyte and renal panel  Nutrition-Focused Physical Findings: overall appearance     Nutrition Follow-Up    RD Follow-up?: Yes

## 2022-02-24 NOTE — ASSESSMENT & PLAN NOTE
-Suspect secondary to ANI, management as above    2/24  likely from CKD/NAI . patient with bicarbonate Recent Labs   Lab 02/23/22  2121 02/23/22  2357 02/24/22  0344   CO2 17* 20* 22*  22*    . monitor

## 2022-02-24 NOTE — PLAN OF CARE
Problem: Adult Inpatient Plan of Care  Goal: Plan of Care Review  Outcome: Ongoing, Progressing   VSS.  AAOX4.  Family at bedside.  Plan of care discussed and reviewed.  Assisted patient to bedside commode.  Doesn't want to use the purick system to urinate.  Patient had small bowel movement after suppository given early as prn ordered.   Patient resting in bed.  AUBREY.  NAM.

## 2022-02-24 NOTE — ASSESSMENT & PLAN NOTE
-Chronic and at baseline on admit secondary to cirrhosis  -Heme/onc consulted and input appreciated.  Did not recommend bone marrow biopsy  -Montiore cbc q48h.  -consented and type & screen sent  -Hematology consult requested, hematology recs that ongoing thrombocytopenia is sequelae of acute illness,  To transfuse if PLT <10  And if any bleeding transfuse PLT <50.   2/24  Thrombocytopenia  Patient with thrombocytopenia Recent Labs   Lab 02/22/22  1055 02/23/22  1158 02/24/22  0344   PLT 46* 52* 51*   . Platelet counts stable.monitor

## 2022-02-24 NOTE — ASSESSMENT & PLAN NOTE
-Chronic and at baseline on admit secondary to cirrhosis  -Heme/onc consulted and input appreciated.  Did not recommend bone marrow biopsy  -Montiore cbc q48h.  -consented and type & screen sent  -Hematology consult requested, hematology recs that ongoing thrombocytopenia is sequelae of acute illness,  To transfuse if PLT <10  And if any bleeding transfuse PLT <50.

## 2022-02-24 NOTE — ASSESSMENT & PLAN NOTE
Initial EKG with concern for atrial flutter.   -2/21 - EKG with Atrial fibrillation - pts sister says she has been diagnosed with this before  -Started on Liquid Metoprolol as could not crush Toprol XL, so will continue as HR in 80-90 range  -not on anticoagulation - coagulopathy and potential procedure   -continue Tele monitoring  -consider cardiology consult if unstable tachycardia  2/24 Presented in AFib versus flutter, not discussed in [facility] notes, anticoagulation likely contraindicated given coagulopathy titrate beta-blocker as tolerated

## 2022-02-24 NOTE — SUBJECTIVE & OBJECTIVE
Interval History: On 2L oxygen,   Sister Marga is at bedside.   Patient w/o acute complaints    Will d/c hall - 24hr urine finished collection per nursing.       Labs show hyperkalemia, Nephrology recommends shifting with insulin dextrose and will give potassium binders.  Surveillance lab studies ordered    Patient is BUN is 119 she is awake and alert but does appear fatigued and somewhat lethargic but is interactive on interview    CRP is rising but has not been checked in almost 3 weeks    Review of Systems   Constitutional:  Positive for activity change and appetite change. Negative for unexpected weight change.   HENT:  Negative for sore throat.    Cardiovascular:  Positive for leg swelling. Negative for chest pain.   Gastrointestinal:  Positive for abdominal pain.   Genitourinary:  Negative for dysuria.   Objective:     Vital Signs (Most Recent):  Temp: 96.7 °F (35.9 °C) (02/23/22 1533)  Pulse: (!) 121 (02/23/22 1533)  Resp: 18 (02/23/22 1533)  BP: 130/60 (02/23/22 1533)  SpO2: 99 % (02/23/22 1533)   Vital Signs (24h Range):  Temp:  [96.6 °F (35.9 °C)-97.9 °F (36.6 °C)] 96.7 °F (35.9 °C)  Pulse:  [] 121  Resp:  [16-20] 18  SpO2:  [97 %-100 %] 99 %  BP: ()/(48-60) 130/60     Weight: 72.8 kg (160 lb 7.9 oz)  Body mass index is 34.73 kg/m².    Intake/Output Summary (Last 24 hours) at 2/23/2022 1907  Last data filed at 2/23/2022 0552  Gross per 24 hour   Intake --   Output 200 ml   Net -200 ml        Physical Exam  Vitals and nursing note reviewed.   Constitutional:       General: She is not in acute distress.     Appearance: She is well-developed. She is ill-appearing. She is not toxic-appearing or diaphoretic.      Interventions: Nasal cannula in place.   HENT:      Nose: Nose normal.   Eyes:      General: No scleral icterus.  Cardiovascular:      Rate and Rhythm: Normal rate and regular rhythm.   Pulmonary:      Effort: No tachypnea or respiratory distress.      Breath sounds: No wheezing.       Comments: Nasasl cannula o2, increased work of breathing  Chest:      Comments: Tunneled HD line in right chest with notable surrounding ecchymosis   Abdominal:      General: Bowel sounds are normal. There is no distension.      Palpations: Abdomen is soft.      Tenderness: There is abdominal tenderness.      Comments: generalized tenderness on exam    Musculoskeletal:         General: No tenderness. Normal range of motion.      Cervical back: Normal range of motion and neck supple.      Right lower leg: Edema present.      Left lower leg: Edema present.   Skin:     General: Skin is warm and dry.      Comments: Malar rash   Neurological:      Mental Status: She is alert and oriented to person, place, and time.      Comments: Lethargic but alert and conversant.  Diffuse weakness.   Psychiatric:         Thought Content: Thought content normal.       Significant Labs: All pertinent labs within the past 24 hours have been reviewed.  CMP:   Recent Labs   Lab 02/22/22  0536 02/23/22  1158    137   K 4.9 5.6*    102   CO2 22* 22*   * 238*   BUN 92* 119*   CREATININE 3.5* 3.8*   CALCIUM 9.0 9.0   PROT 6.3 6.6   ALBUMIN 2.4* 2.3*   BILITOT 2.5* 2.5*   ALKPHOS 170* 153*   AST 48* 44*   ALT 49* 49*   ANIONGAP 13 13   EGFRNONAA 12.5* 11.3*         Significant Imaging: I have reviewed all pertinent imaging results/findings within the past 24 hours.

## 2022-02-24 NOTE — PROGRESS NOTES
[facility]  Progress Note    Patient Name: Tammi Farris  MRN: [id number]  Patient Class: IP- Inpatient   Admission Date: 2/1/2022  Length of Stay: 22 days  Attending Physician: Isidoro Majano MD  Primary Care Provider: Ann-Marie Hartman MD        Subjective:     Principal Problem:Acute renal failure superimposed on stage 2 chronic kidney disease        HPI:  71 y.o. female with HTN, HLD, GERD, cirrhosis of liver, thrombocytopenia, and sleep apnea presents with a complaint of cough and congestion since October.  She is chronically dyspneic, exacerbated by exertion, has seen Cardiology and was prescribed lasix and metoprolol.  Her sister has now noted that the patient has very low urine output and is constipated.  Also follows with Hepatology for chronic liver disease.  Patient denies fever, chills, chest pain, palpitations, orthopnea, PND, dizziness, syncope, n/v/d, abdominal pain, or dysuria.  In the ED, labs reveal ANI, hyperkalemia, metabolic acidosis with elevated lactic acid, elevated liver enzymes, elevated BNP, elevated d-dimer, thrombocytopenia, and markedly concentrated urine.  Echo December 2021 showed preserved EF, no evidence of heart failure.  Chest xray without acute abnormality. No convincing evidence of infectious process.      Overview/Hospital Course:  71 year old woman with HTN, HLD, GERD, CKDII, cirrhosis and PAULINA presented for sob and has been diagnosed with ani/CKDII and acute hypoxic respiratory failure.  Complex patient and presentation with very difficult to ascertain volume status.  Was given lasix and then fluids and then attempt at lasix again.  Suspect a pulmonary/renal vasculitis but not clearly diagnosed (Prior TAMMI positive 2019, low C3 and C4 and abnormal SPEP with elevated kappa/lambda ratio).  Had planned renal biopsy, but encephalopathic, anuric and worsening acidosis so THDC placed and initiated HD on 2/7.  Has completed 3 days pulse dose steroids (1g  daily) and will be on prednisone 60 mg.  Patient more lethargic on 2/8 and noted to have episode of rapid AFib.  Moved to ICU.  Noted to have increased ammonia levels and started on lactulose.  Mentation slowly improving with no further arrhythmias.  Poor oral intake.  NGT placed and started on tube feedings.  NGT removed and ST consulted.  Severely debilitated and PT/OT consulted.    Patient transferred to [facility] for rheumatology evaluation and possible renal biopsy.     [facility] Course  After transfer, Nephrology and Rheumatology consulted, IR consulted to evaluate for renal biopsy.   Renal biopsy felt to be too high risk in the setting of patient's coagulopathic risk factors.  Rheumatology started hydroxychloroquine 200mg daily, nephrology starting CellCept Liquid as part of DMARD therapy for SLE with nephritis.   Patient in guarded condition with hypoxemia, atrial fib/flutter on IR metoprolol, lethargy.          Interval History: On 2L oxygen,   Sister Marga is at bedside.   Patient w/o acute complaints    Will d/c hall - 24hr urine finished collection per nursing.       Labs show hyperkalemia, Nephrology recommends shifting with insulin dextrose and will give potassium binders.  Surveillance lab studies ordered    Patient is BUN is 119 she is awake and alert but does appear fatigued and somewhat lethargic but is interactive on interview    CRP is rising but has not been checked in almost 3 weeks    Review of Systems   Constitutional:  Positive for activity change and appetite change. Negative for unexpected weight change.   HENT:  Negative for sore throat.    Cardiovascular:  Positive for leg swelling. Negative for chest pain.   Gastrointestinal:  Positive for abdominal pain.   Genitourinary:  Negative for dysuria.   Objective:     Vital Signs (Most Recent):  Temp: 96.7 °F (35.9 °C) (02/23/22 1533)  Pulse: (!) 121 (02/23/22 1533)  Resp: 18 (02/23/22 1533)  BP: 130/60 (02/23/22 1533)  SpO2: 99 %  (02/23/22 1533)   Vital Signs (24h Range):  Temp:  [96.6 °F (35.9 °C)-97.9 °F (36.6 °C)] 96.7 °F (35.9 °C)  Pulse:  [] 121  Resp:  [16-20] 18  SpO2:  [97 %-100 %] 99 %  BP: ()/(48-60) 130/60     Weight: 72.8 kg (160 lb 7.9 oz)  Body mass index is 34.73 kg/m².    Intake/Output Summary (Last 24 hours) at 2/23/2022 1908  Last data filed at 2/23/2022 0552  Gross per 24 hour   Intake --   Output 200 ml   Net -200 ml        Physical Exam  Vitals and nursing note reviewed.   Constitutional:       General: She is not in acute distress.     Appearance: She is well-developed. She is ill-appearing. She is not toxic-appearing or diaphoretic.      Interventions: Nasal cannula in place.   HENT:      Nose: Nose normal.   Eyes:      General: No scleral icterus.  Cardiovascular:      Rate and Rhythm: Normal rate and regular rhythm.   Pulmonary:      Effort: No tachypnea or respiratory distress.      Breath sounds: No wheezing.      Comments: Nasasl cannula o2, increased work of breathing  Chest:      Comments: Tunneled HD line in right chest with notable surrounding ecchymosis   Abdominal:      General: Bowel sounds are normal. There is no distension.      Palpations: Abdomen is soft.      Tenderness: There is abdominal tenderness.      Comments: generalized tenderness on exam    Musculoskeletal:         General: No tenderness. Normal range of motion.      Cervical back: Normal range of motion and neck supple.      Right lower leg: Edema present.      Left lower leg: Edema present.   Skin:     General: Skin is warm and dry.      Comments: Malar rash   Neurological:      Mental Status: She is alert and oriented to person, place, and time.      Comments: Lethargic but alert and conversant.  Diffuse weakness.   Psychiatric:         Thought Content: Thought content normal.       Significant Labs: All pertinent labs within the past 24 hours have been reviewed.  CMP:   Recent Labs   Lab 02/22/22  0536 02/23/22  1158     137   K 4.9 5.6*    102   CO2 22* 22*   * 238*   BUN 92* 119*   CREATININE 3.5* 3.8*   CALCIUM 9.0 9.0   PROT 6.3 6.6   ALBUMIN 2.4* 2.3*   BILITOT 2.5* 2.5*   ALKPHOS 170* 153*   AST 48* 44*   ALT 49* 49*   ANIONGAP 13 13   EGFRNONAA 12.5* 11.3*         Significant Imaging: I have reviewed all pertinent imaging results/findings within the past 24 hours.      Assessment/Plan:      * Acute renal failure superimposed on stage 2 chronic kidney disease  -Dialysis depented ANI thought to be secondary to an auto-immune etiology, based on serologic pattern of possitive ARMEN, Anti-Posey RNP and low complement levels, negative ANCA.  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     -On 2/7 IR placed THDC and HD initiated for encephalopathy  -s/p 3 days of pulse dose steroids for suspected glomerulonephritis vs pulm/renal syndrome and continues on prednisone.  -Nephrology has restarted CellCept in Liquid formulation as pt cleareed for Full Liquids by SLP   -Dialysis is being performed at discretion of Nephrology team.   -Rheumatology consulted. -  --continue Lasix, now HD today, treating hyperkalemia medically, BUN rising.   -Placed transfer order to Stepdown level of care - believe pt needs to be on continuous monitoring give multiorgan dysfunction - high acuity of her disease    Systemic lupus erythematosus with glomerular disease  -plaquenil 200  -on prednisone 40  -nephrology starting imuran  -Renal biopsy on hold for now      Thrombocytopenia  -Chronic and at baseline on admit secondary to cirrhosis  -Heme/onc consulted and input appreciated.  Did not recommend bone marrow biopsy  -[place] cbc q48h.  -consented and type & screen sent  -Hematology consult requested, hematology recs that ongoing thrombocytopenia is sequelae of acute illness,  To transfuse if PLT <10  And if any bleeding transfuse PLT <50.     Cirrhosis of liver without ascites  -Noted mild transaminitis and thrombocytopenia which are not new.   Noted epistaxis and gingival bleeding which has been ongoing for several months.  These are chronic and note history of cirrhosis and alcohol abuse   -Follows with hepatology.  Unclear if this was autoimmune?  -Abdominal US on day prior to admit showed hepatic cirrhosis/steatosis with sonographic findings of portal hypertension including trace ascites and splenomegaly.  -On 2/9 noted to be more lethargic with elevation of ammonia level and lactulose was started   -Mental status improved and she had significant cramping and nausea/vomiting so stopped lactulose on 2/17.  KUB 2/17 with non-specific bowel gas pattern  -Mental status continues to be baseline.    -Monitor closely.    Atrial flutter  Initial EKG with concern for atrial flutter.   -2/21 - EKG with Atrial fibrillation - pts sister says she has been diagnosed with this before  -Started on Liquid Metoprolol as could not crush Toprol XL, so will continue as HR in 80-90 range  -not on anticoagulation - coagulopathy and potential procedure   -continue Tele monitoring  -consider cardiology consult if unstable tachycardia      Metabolic acidosis  -Suspect secondary to ANI, management as above    Oropharyngeal dysphagia  SLP recs Full liquid diet at this time       Type 2 diabetes mellitus without complication, without long-term current use of insulin  -On glucocorticoids  -add basal insulin 10units during day - 16 units overnight  -Aspart 4units TID with meals.   -Sugars remain above goal range  -Continue SSI ac/hs    Primary hypertension  -switched to IR metoprolol liquid 6.25mg BID    Gastroesophageal reflux disease  -Complains of gagging anytime food is put in her mouth  -Will schedule zofran prior to each meal.  -Continue pepcid.    Macrocytic anemia  -Hb 9.2 on admit - chronic secondary to cirrhosis.  -Dropped to <7 on 2/2  -No evidence of GI bleeding but had noted epistaxis and bleeding gums at times since before hospitalization.  -Folate and B12 replete.   She is iron deficient.  -1 unit PRBC ordered 2/2 but transfusion delayed due to complex antibody matching.  Blood finally available and she received 1 unit PRBC 2/4.   -H/H slightly trending down.  Today Hb 7.7  -Monitor labs q48h.    Sleep apnea  -Continue bipap qhs    Debility  -PT/OT consulted and recommend inpatient rehab at discharge at discharge.  -Inpatient rehab is necessary because of patient's medical complexity and need to be seen by physician daily.  -Not yet medically ready for discharge.    MANUEL (generalized anxiety disorder)  -Remains anxious   -On 2/6 gave one time low dose ativan PO - watch respiratory status very closely.    Hyperlipidemia  -History noted  -Not on statin due to cirrhosis    Positive direct Jacob test  Noted on prior anemia workup - positive for Cold agglutinin         VTE Risk Mitigation (From admission, onward)         Ordered     heparin (porcine) injection 3,200 Units  As needed (PRN)         02/07/22 1834     IP VTE HIGH RISK PATIENT  Once         02/01/22 1557     Place sequential compression device  Until discontinued         02/01/22 1557                Discharge Planning   CLAUDETTE: 2/28/2022     Code Status: Full Code   Is the patient medically ready for discharge?: No    Reason for patient still in hospital (select all that apply): Patient trending condition, Laboratory test and Pending disposition  Discharge Plan A: Rehab   Discharge Delays: (!) Post-Acute Set-up              Isidoro Majano MD  Department of Hospital Medicine   [facility]

## 2022-02-24 NOTE — ASSESSMENT & PLAN NOTE
-- New ANI on CKD2  -- Concern for possible lupus nephritis /autoimmune etiology  -- Low complements. Positive anti-Sm /RNP antibodies. ESR > 140. CRP 21.  -- 24 hr urine protein 373, urine protein timed 560  -- Negative dsDNA, ANCA, glomerular basement membrane proteins, free light chains, quantitative immunoglobulins, spep, upep.   -- Unable to renal biopsy at  due to thrombocytopenia (~ 50)  -- S/p pulse dose steroids for 3 days and now on prednisone.   -- Attempted to initiate Cellcept but has not taken due to difficulty with pill size.  -- S/p initiation several sessions HD, last 2/16.   -- SLE criteria per rheum  -- Lots of granular casts on spinning urine. Questionable muddy brown casts. No RBC or WBC casts seen.  -- Minimal UOP. Dry mucous membranes. Denies SOB.   -- Repeat ESR/CRP and C3/C4 pending  -- 24 hr urine protein improved to 163. Likely r/t steroids    Recommendations:  -- Discontinue lasix gtt and monitor fluid status  -- Likely no further need for HD during this hospital stay  -- Continue Cellcept liquid suspension, 500 mg Q6h with food to reduce GI side effects. If well tolerated, can transition to 1000 mg Q12h  -- Continue prednisone 40 mg daily, likely at least 1 month  -- Pepcid while on steroids. Monitor for possibility of HTN, hyperglycemia, infections. Ensure vaccines up to date  -- Appreciate rheum. Plaquenil initiated  -- Defer renal bx to future, as risks outweigh benefits considering underlying CKD /scar, age, thrombocytopenia, uremia, bleeding complication may necessitate embolization and lead to chronic HD  -- Strict I/O  -- Daily BMP, mag, phos  -- Avoid nephrotoxins

## 2022-02-24 NOTE — CARE UPDATE
RAPID RESPONSE NURSE AI ALERT       AI alert received.    Chart Reviewed: 02/23/2022, 9:28 PM    MRN: [id number]  Bed: 8098/8098 A    Dx: Acute renal failure superimposed on stage 2 chronic kidney disease    Tammi Farris has a past medical history of Allergy, Anxiety, Basal cell carcinoma, Cirrhosis of liver without ascites, Depression, Diabetes mellitus, type 2, Disorder of kidney and ureter, Endometriosis, GERD (gastroesophageal reflux disease), Hyperlipidemia, Hypertension, Joint pain, and Psoriasis.      Charge RNRosaura contacted. No concerns verbalized at this time. Patient potassium being shifted per RN however last potassium level was drawn @ approximately 12 pm. Charge to assess urine output and response to lasix infusion. Otherwise no concerns at this time. Nephrology following. Instructed to call [phone] for further concerns or assistance.    Lucie Reyna RN

## 2022-02-24 NOTE — PROGRESS NOTES
Hematology note:   71 years old female patient with HTN, DM2. CKD2, Liver cirrhosis ?2/2 GARCIA, chronic thrombocytopenia, who was admitted to [facility] on 02/1/22 on the account of acute hypoxic resp failure and ANI. Nephrology was consulted, and there was a concern of GN as she had low complements, and active sediments in urine. She was started on on pulse dose steroids 02/05 - 02/07, then transitioned to PO prednisone. However, she required HD 02/07 due to volume overload. She was transferred to [facility] for higher level of care. She met diagnostic criteria for SLE, as +ARMEN, +thrombocytopenia,+ proteinuria, + low C3, + low C4, + pericardial effusion. She is started on Cellcept by nephrology.   Hematology was consulted for worsening thrombocytopenia. Reviewing her charts, she has chronic thrombocytopenia ranging in the low 100s since 2020. This was attribued to her liver cirrhosis. On the current presentation, her PLT counts were 88,000/cmm. It progressively got worse to low of 26,000, before stabilizing at low 40s.        02/22/22 05:36 02/22/22 10:55 02/23/22 11:58 02/24/22 03:44   Platelets 50 (L) 46 (L) 52 (L) 51 (L)          Assessment and plan:   #Thrombocytopenia  71 years old female with multiple co morbidities, including liver cirrhosis 2/2 GARCIA, chronic thrombocytopenia, CKD, DM, and new diagnosis of SLE with suspected lupus nephritis. She has baseline thrombocytopenia due to cirrhosis which has worsened likely in the setting of lupus flare. She underwent pulse dose steroids between 02/05 and 02/07. Her complements levels improved a little, but still low. Her PLT didn't improve with steroids. She started cellcept recently and is on plaquenil.   Work-up that was done so far:   - Nutritional studies: B12, folate, iron panel are within normal limits.   - SPEP: no monoclonal proteins, FLC, increased both Kappa and Lambda chain with a ratio of 2.10 (can be seen in kidney disease), UPEP with no monoclonal protein.    - HIT antibody 0.35, making HIT unlikely.   - , normal haptoglobin, excluding hemolytic processes (TMA, aHUS).   - Peripheral smear reviewed by myself,(RBC: anisopoikilocytosis, very few red cell fragments  (1/hpf) with WBC: neutrophilia PLT: thrombocytopenia, no clumping or large PLT) Pending pathology review, MAHA or TMA is unlikely in that case.   - DRVVT, Anti-B2GP, are pending   - Anti-cardiolipin IgM weak positive        Based on the above, This is most likely a chronic thrombocytopenia that is worsening in the setting of active lupus and acute illness.   Transfuse PLT if <10,000, or <50,000 with active bleeding or before invasive procedures.

## 2022-02-24 NOTE — ASSESSMENT & PLAN NOTE
-Dialysis depented ANI thought to be secondary to an auto-immune etiology, based on serologic pattern of possitive ARMEN, Anti-Posey RNP and low complement levels, negative ANCA.  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     -On 2/7 IR placed THDC and HD initiated for encephalopathy  -s/p 3 days of pulse dose steroids for suspected glomerulonephritis vs pulm/renal syndrome and continues on prednisone.  -Nephrology has restarted CellCept in Liquid formulation as pt cleareed for Full Liquids by SLP   -Dialysis is being performed at discretion of Nephrology team.   -Rheumatology consulted. -  --continue Lasix, now HD today, treating hyperkalemia medically, BUN rising.   -Placed transfer order to Stepdown level of care - believe pt needs to be on continuous monitoring give multiorgan dysfunction - high acuity of her disease

## 2022-02-25 NOTE — ASSESSMENT & PLAN NOTE
Lab Results   Component Value Date    CREATININE 4.6 (H) 02/25/2022     Avoid insulin stacking  Titrate insulin slowly

## 2022-02-25 NOTE — PLAN OF CARE
Alert and oriented x4. Vitals stable. Potassium shifted as ordered and BG monitored and adjusted as ordered.  Free from falls and injuries during shift. No concerns or requests voiced. Bed low with side rails up x2. Call bell within reach. Will continue plan of care.

## 2022-02-25 NOTE — PROGRESS NOTES
Omar Bowen - Telemetry [facility] Medicine  Progress Note    Patient Name: Tammi Farris  MRN: [id number]  Patient Class: IP- Inpatient   Admission Date: 2/1/2022  Length of Stay: 24 days  Attending Physician: Jovanny Mendez MD  Primary Care Provider: Ann-Marie Hartman MD        Subjective:     Principal Problem:Acute renal failure superimposed on stage 2 chronic kidney disease        HPI:  71 y.o. female with HTN, HLD, GERD, cirrhosis of liver, thrombocytopenia, and sleep apnea presents with a complaint of cough and congestion since October.  She is chronically dyspneic, exacerbated by exertion, has seen Cardiology and was prescribed lasix and metoprolol.  Her sister has now noted that the patient has very low urine output and is constipated.  Also follows with Hepatology for chronic liver disease.  Patient denies fever, chills, chest pain, palpitations, orthopnea, PND, dizziness, syncope, n/v/d, abdominal pain, or dysuria.  In the ED, labs reveal ANI, hyperkalemia, metabolic acidosis with elevated lactic acid, elevated liver enzymes, elevated BNP, elevated d-dimer, thrombocytopenia, and markedly concentrated urine.  Echo December 2021 showed preserved EF, no evidence of heart failure.  Chest xray without acute abnormality. No convincing evidence of infectious process.      Overview/Hospital Course:  71 year old woman with HTN, HLD, GERD, CKDII, cirrhosis and PAULINA presented for sob and has been diagnosed with ani/CKDII and acute hypoxic respiratory failure.  Complex patient and presentation with very difficult to ascertain volume status.  Was given lasix and then fluids and then attempt at lasix again.  Suspect a pulmonary/renal vasculitis but not clearly diagnosed (Prior TAMMI positive 2019, low C3 and C4 and abnormal SPEP with elevated kappa/lambda ratio).  Had planned renal biopsy, but encephalopathic, anuric and worsening acidosis so THDC placed and initiated HD on 2/7.  Has completed 3 days pulse dose  steroids (1g daily) and will be on prednisone 60 mg.  Patient more lethargic on 2/8 and noted to have episode of rapid AFib.  Moved to ICU.  Noted to have increased ammonia levels and started on lactulose.  Mentation slowly improving with no further arrhythmias.  Poor oral intake.  NGT placed and started on tube feedings.  NGT removed and ST consulted.  Severely debilitated and PT/OT consulted.    Patient transferred to [facility] for rheumatology evaluation and possible renal biopsy.     [facility] Course  After transfer, Nephrology and Rheumatology consulted, IR consulted to evaluate for renal biopsy.   Renal biopsy felt to be too high risk in the setting of patient's coagulopathic risk factors.  Rheumatology started hydroxychloroquine 200mg daily, nephrology starting CellCept Liquid as part of DMARD therapy for SLE with nephritis.   Patient in guarded condition with hypoxemia, atrial fib/flutter on IR metoprolol, lethargy.    2/23 On 2L oxygen, Patient w/o acute complaints. Will d/c hall - 24hr urine finished collection per nursing.    Labs show hyperkalemia, Nephrology recommends shifting with insulin dextrose and will give potassium binders.  Surveillance lab studies ordered   BUN is 119 she is awake and alert but does appear fatigued and somewhat lethargic but is interactive on interview  CRP is rising but has not been checked in almost 3 weeks. s/p hematology f/u  chronic thrombocytopenia that is worsening in the setting of active lupus and acute illness.     2/24 Lasix infusion was increased from 5 mg an hour to 10 mg/h overnight.  Repeat potassium around midnight decreased to 5.3.  Will continue current Lasix infusion and monitor serum potassium levels.Has been typed and screened and consented would transfuse for hemoglobin greater than 7 and absolute PLT greater than 10, if any bleeding keep platelets above 50. Presented in AFib versus flutter, not discussed in [place] notes, anticoagulation likely  contraindicated given coagulopathy titrate beta-blocker as tolerated. renal function not improving she is having GI symptoms. increase Urea and Creatinine, possible HD session by tomorrow      2/25 K shifted overnight        Interval history  2/25 K 6.1 -->: 5.9 discussed with nephrology for HD this AM. SBP  . plan for SLED with borderline BP. critical care consulted. Endocrine consulted for uncontrolled blood sugars .Discontinued SQ insulin. Started Transition IV insulin infusion at 1 u/hr       Review of Systems:   Pain scale:   Constitutional:  Positive for activity change and appetite change. Negative for unexpected weight change.   HENT:  Negative for sore throat.    Cardiovascular:  Positive for leg swelling. Negative for chest pain.   Gastrointestinal:  Positive for abdominal pain.   Genitourinary:  Negative for dysuria.     OBJECTIVE:     Physical Exam:  Body mass index is 34.73 kg/m².    Appearance: She is well-developed. She is ill-appearing. She is not toxic-appearing or diaphoretic.      Interventions: Nasal cannula in place.   HENT:      Nose: Nose normal.   Eyes:      General: No scleral icterus.  Cardiovascular:      Rate and Rhythm: Normal rate and regular rhythm.   Pulmonary:      Effort: No tachypnea or respiratory distress.      Chest:      Comments: Tunneled  HD line in right chest with notable surrounding ecchymosis   Abdominal:      General: Bowel sounds are normal. There is no distension.      Palpations: Abdomen is soft.   generalized tenderness on exam    Musculoskeletal:         General: No tenderness. Normal range of motion.      Cervical back: Normal range of motion and neck supple.      Right lower leg: Edema present.      Left lower leg: Edema present.   Skin:     General: Skin is warm and dry.      Comments:    Neurological:      Mental Status: She is alert and oriented to person, place, and time.      Comments: AAOX2  Diffuse weakness.   Psychiatric:         Thought Content:  Thought content normal.                  Vital Signs  Temp: 96.8 °F (36 °C) (02/25/22 0407)  Pulse: 83 (02/25/22 0410)  Resp: 19 (02/25/22 0410)  BP: (Abnormal) 100/47 (02/25/22 0410)  SpO2: 95 % (02/25/22 0410)     24 Hour VS Range    Temp:  [96.8 °F (36 °C)-98.5 °F (36.9 °C)]   Pulse:  [65-98]   Resp:  [18-62]   BP: ()/(44-55)   SpO2:  [91 %-100 %]     Intake/Output Summary (Last 24 hours) at 2/25/2022 0646  Last data filed at 2/24/2022 1430  Gross per 24 hour   Intake 240 ml   Output no documentation   Net 240 ml         I/O This Shift:  No intake/output data recorded.    Wt Readings from Last 3 Encounters:   02/24/22 72.8 kg (160 lb 7.9 oz)   02/20/22 70.8 kg (156 lb 1.4 oz)   01/19/22 68 kg (149 lb 14.6 oz)       I have personally reviewed the vitals and recorded Intake/Output     Laboratory/Diagnostic Data:    CBC/Anemia Labs: Coags:    Recent Labs   Lab 02/22/22  1055 02/23/22  0243 02/23/22  1158 02/24/22  0344   WBC 12.00  --  10.43 10.56   HGB 8.2*  --  7.8* 7.7*   HCT 24.6*  --  23.9* 24.4*   PLT 46*  --  52* 51*   *  --  106* 109*   RDW 19.0*  --  19.0* 19.2*   FERRITIN  --  191  --   --     Recent Labs   Lab 02/21/22  0828   INR 1.4*        Chemistries: ABG:   Recent Labs   Lab 02/21/22  0818 02/22/22  0536 02/23/22  1158 02/23/22  2121 02/24/22  0344 02/24/22  1506 02/24/22  2137 02/24/22  2346   * 137 137   < > 138  138 139 136 135*   K 5.2* 4.9 5.6*   < > 5.2*  5.2*  5.2* 5.4* 5.5*  5.5* 6.1*  6.1*    102 102   < > 102  102 103 102 103   CO2 18* 22* 22*   < > 22*  22* 20* 19* 14*   BUN 95* 92* 119*   < > 113*  113* 124* 120* 129*   CREATININE 3.4* 3.5* 3.8*   < > 3.9*  3.9* 4.0* 4.1* 4.2*   CALCIUM 9.1 9.0 9.0   < > 9.1  9.1 9.1 8.6* 8.5*   PROT 6.6 6.3 6.6  --  6.5  --   --   --    BILITOT 2.5* 2.5* 2.5*  --  2.1*  --   --   --    ALKPHOS 181* 170* 153*  --  166*  --   --   --    ALT 48* 49* 49*  --  46*  --   --   --    AST 61* 48* 44*  --  40  --   --   --    MG  2.2  --  2.2  --  2.3  --   --   --    PHOS 5.0*  --  6.0*  --  5.8* 5.7*  --   --     < > = values in this interval not displayed.    No results for input(s): PH, PCO2, PO2, HCO3, POCSATURATED, BE in the last 168 hours.     POCT Glucose: HbA1c:    Recent Labs   Lab 02/23/22  2200 02/24/22  0712 02/24/22  1146 02/24/22  1614 02/24/22  2045 02/24/22 2112   POCTGLUCOSE 358* 210* 173* 204* 384* 329*    Hemoglobin A1C   Date Value Ref Range Status   02/01/2022 5.9 (H) 4.0 - 5.6 % Final     Comment:     ADA Screening Guidelines:  5.7-6.4%  Consistent with prediabetes  >or=6.5%  Consistent with diabetes    High levels of fetal hemoglobin interfere with the HbA1C  assay. Heterozygous hemoglobin variants (HbS, HgC, etc)do  not significantly interfere with this assay.   However, presence of multiple variants may affect accuracy.     03/22/2021 6.3 (H) 4.0 - 5.6 % Final     Comment:     ADA Screening Guidelines:  5.7-6.4%  Consistent with prediabetes  >or=6.5%  Consistent with diabetes    High levels of fetal hemoglobin interfere with the HbA1C  assay. Heterozygous hemoglobin variants (HbS, HgC, etc)do  not significantly interfere with this assay.   However, presence of multiple variants may affect accuracy.     09/08/2020 6.6 (H) 4.0 - 5.6 % Final     Comment:     ADA Screening Guidelines:  5.7-6.4%  Consistent with prediabetes  >or=6.5%  Consistent with diabetes  High levels of fetal hemoglobin interfere with the HbA1C  assay. Heterozygous hemoglobin variants (HbS, HgC, etc)do  not significantly interfere with this assay.   However, presence of multiple variants may affect accuracy.          Cardiac Enzymes: Ejection Fractions:    No results for input(s): CPK, CPKMB, MB, TROPONINI in the last 72 hours. EF   Date Value Ref Range Status   12/07/2021 68 % Final          No results for input(s): COLORU, APPEARANCEUA, PHUR, SPECGRAV, PROTEINUA, GLUCUA, KETONESU, BILIRUBINUA, OCCULTUA, NITRITE, UROBILINOGEN, LEUKOCYTESUR, RBCUA,  WBCUA, BACTERIA, SQUAMEPITHEL, HYALINECASTS in the last 48 hours.    Invalid input(s): WRIGHTSUR    Procalcitonin (ng/mL)   Date Value   02/01/2022 0.23     Lactate (Lactic Acid) (mmol/L)   Date Value   02/03/2022 1.7   02/02/2022 1.6   02/01/2022 2.3 (H)   12/09/2021 1.1   12/08/2021 2.8 (H)     BNP (pg/mL)   Date Value   02/03/2022 1,812 (H)   01/03/2022 150 (H)   12/22/2021 107 (H)   12/07/2021 297 (H)   11/30/2021 309 (H)     CRP (mg/L)   Date Value   02/24/2022 58.7 (H)   02/23/2022 79.2 (H)   02/03/2022 20.9 (H)     Sed Rate   Date Value   02/24/2022 47 mm/Hr (H)   02/23/2022 50 mm/Hr (H)   02/03/2022 >140 mm/Hr (H)   06/02/2007 4 mm/hr     D-Dimer (mg/L FEU)   Date Value   11/30/2021 1.66 (H)     Ferritin (ng/mL)   Date Value   02/23/2022 191   02/02/2022 43   12/20/2021 40   10/13/2020 87   12/23/2019 101   12/27/2017 173     LD (U/L)   Date Value   02/22/2022 271 (H)   02/04/2022 203   12/11/2020 198     Troponin I   Date Value   02/03/2022 0.085 ng/mL (H)   02/01/2022 0.034 ng/mL (H)   12/08/2021 0.020 ng/mL   12/07/2021 0.018 ng/mL   12/07/2021 0.016 ng/mL   11/30/2021 0.008 ng/mL   06/02/2007 0.008 ng/ml     CPK (U/L)   Date Value   06/02/2007 95     Results for orders placed or performed in visit on 12/20/21   VITAMIN D   Result Value Ref Range    Vit D, 25-Hydroxy 33 30 - 96 ng/mL   Results for orders placed or performed in visit on 03/22/21   Vitamin D   Result Value Ref Range    Vit D, 25-Hydroxy 23 (L) 30 - 96 ng/mL   Results for orders placed or performed in visit on 08/28/19   Vitamin D   Result Value Ref Range    Vit D, 25-Hydroxy 27 (L) 30 - 96 ng/mL     SARS-CoV2 (COVID-19) Qualitative PCR (no units)   Date Value   02/04/2022 Not Detected     SARS-CoV-2 RNA, Amplification, Qual (no units)   Date Value   08/27/2021 Negative     POC Rapid COVID (no units)   Date Value   02/01/2022 Negative   11/08/2021 Negative   10/25/2021 Negative       Microbiology labs for the last week  Microbiology Results  (last 7 days)     ** No results found for the last 168 hours. **          Reviewed and noted in plan where applicable- Please see chart for full lab data.    Lines/Drains:       Hemodialysis Catheter 02/07/22 1436 right internal jugular (Active)   $ Dialysis Supplies Short Term Dialysis Catheter 02/18/22 1130   Verification by X-ray Other (Comment) 02/16/22 1236   Site Assessment No drainage;No redness;No swelling;No warmth 02/24/22 0800   Line Securement Device Secured with sutures 02/23/22 1800   Dressing Type Biopatch in place;Central line dressing 02/23/22 1800   Dressing Status Clean;Dry;Intact 02/24/22 0800   Dressing Intervention First dressing 02/23/22 1800   Date on Dressing 02/23/22 02/23/22 1800   Dressing Due to be Changed 03/02/22 02/23/22 1800   Venous Patency/Care flushed w/o difficulty;blood return present;infusing 02/21/22 1623   Arterial Patency/Care flushed w/o difficulty;blood return present;infusing 02/21/22 1623   Line Necessity Review CRRT/HD 02/24/22 0800   Number of days: 17            Peripheral IV - Single Lumen 02/19/22 0609 22 G Right Antecubital (Active)   Site Assessment Clean;Dry;Intact 02/24/22 2100   Extremity Assessment Distal to IV No abnormal discoloration;No redness;No swelling;No warmth 02/22/22 1918   Line Status Saline locked 02/22/22 1918   Dressing Status Clean;Dry;Intact 02/22/22 1918   Dressing Intervention Integrity maintained 02/22/22 1918   Dressing Change Due 02/23/22 02/20/22 2000   Site Change Due 02/23/22 02/20/22 2000   Reason Not Rotated Not due 02/20/22 2000   Number of days: 6            Peripheral IV - Single Lumen 02/24/22 0855 20 G;1 3/4 in Left;Anterior Forearm (Active)   Site Assessment Clean;Dry;Intact 02/24/22 2100   Extremity Assessment Distal to IV No abnormal discoloration 02/24/22 0912   Line Status Blood return noted;Saline locked 02/24/22 0912   Dressing Status Clean;Dry;Intact 02/24/22 0912   Dressing Intervention First dressing 02/24/22 0912    Site Change Due 02/28/22 02/24/22 0912   Number of days: 0       Imaging  ECG Results          EKG 12-lead (Final result)  Result time 02/01/22 12:35:46    Final result by Interface, Lab In [facility] (02/01/22 12:35:46)             Narrative:    Test Reason : sob    Vent. Rate : 065 BPM     Atrial Rate : 065 BPM     P-R Int : 110 ms          QRS Dur : 070 ms      QT Int : 422 ms       P-R-T Axes : 040 044 058 degrees     QTc Int : 438 ms    Sinus rhythm with short DE  Low voltage QRS  Nonspecific ST and T wave abnormality  Abnormal ECG  When compared with ECG of 12-JAN-2022 07:38,  Vent. rate has decreased BY  32 BPM  Nonspecific T wave abnormality now evident in Inferior leads  Nonspecific T wave abnormality, worse in Lateral leads  Confirmed by Rashid Ramirez MD ([id number]) on 2/1/2022 12:35:36 PM    Referred By: [email or web address]   SELF           Confirmed By:Rashid Ramirez MD                           EKG 12-LEAD (Final result)  Result time 02/11/22 08:59:09    Final result by Unknown User (02/11/22 08:59:09)                              No results found for this or any previous visit.      X-Ray Abdomen Portable  Narrative: EXAMINATION:  XR ABDOMEN PORTABLE    CLINICAL HISTORY:  distension;    TECHNIQUE:  AP View(s) of the abdomen was performed.    COMPARISON:  02/17/2022    FINDINGS:  No radiographic mass, organomegaly or pathologic calcification.    Status post cholecystectomy.    Mild retained bowel contrast.    No evidence of obstruction.  No acute osseous abnormality.  Impression: No acute radiographic abnormality.    Electronically signed by: Serge Hackett  Date:    02/24/2022  Time:    18:34      Labs, Imaging, EKG and Diagnostic results from 2/25/2022 were reviewed.    Medications:  Medication list was reviewed and changes noted under Assessment/Plan.  No current facility-administered medications on file prior to encounter.     Current Outpatient Medications on File Prior to Encounter   Medication Sig Dispense  Refill    allopurinoL (ZYLOPRIM) 100 MG tablet Take 1 tablet (100 mg total) by mouth once daily. 30 tablet 2    betamethasone dipropionate (DIPROLENE) 0.05 % cream Apply topically 2 (two) times daily. To affected areas on lower back (Patient not taking: Reported on 1/19/2022) 45 g 1    blood sugar diagnostic Strp Use 1-2 times daily to check blood sugar as directed. 100 strip 11    blood-glucose meter kit Use as instructed to check blood sugar 1-2 times a day 1 each 0    cholecalciferol, vitamin D3, (VITAMIN D3) 25 mcg (1,000 unit) capsule Take 1 capsule (1,000 Units total) by mouth once daily.  0    clotrimazole (LOTRIMIN) 1 % cream APPLY TO THE AFFECTED AREA OF RASH ON BREASTS AND IN FOLDS OF SKIN AS DIRECTED TWICE DAILY (Patient not taking: Reported on 1/19/2022) 60 g 1    fluocinonide (LIDEX) 0.05 % external solution AAA scalp qday - bid prn pruritus (Patient not taking: Reported on 1/19/2022) 60 mL 3    furosemide (LASIX) 20 MG tablet Take 1 tablet (20 mg total) by mouth once daily. 30 tablet 11    lancets Misc 1 lancet by Misc.(Non-Drug; Combo Route) route 2 (two) times daily with meals. 100 each 11    lancing device Misc 1 Device by Misc.(Non-Drug; Combo Route) route 2 (two) times daily with meals. 1 each 0    losartan (COZAAR) 50 MG tablet TAKE 1 TABLET(50 MG) BY MOUTH EVERY DAY 90 tablet 1    metFORMIN (GLUCOPHAGE-XR) 500 MG ER 24hr tablet Take 1 tablet after breakfast and 2 tablets after dinner every day 270 tablet 3    metoprolol tartrate (LOPRESSOR) 25 MG tablet Take 1 tablet (25 mg total) by mouth 2 (two) times daily. 60 tablet 11    omeprazole (PRILOSEC) 40 MG capsule Take 1 capsule (40 mg total) by mouth 2 (two) times daily. Take first thing in the morning and then again 30 minutes before dinner 60 capsule 3    triamcinolone acetonide 0.025% (KENALOG) 0.025 % cream AAA under breasts and groin bid after cool blow dry prn (Patient not taking: Reported on 1/19/2022) 30 g 3     Scheduled  Medications:  sodium chloride 0.9%, , Intravenous, Once  dextromethorphan-guaiFENesin  mg/5 ml, 5 mL, Oral, Q6H  famotidine, 20 mg, Oral, Daily  hydrOXYchloroQUINE, 200 mg, Oral, Daily  insulin aspart U-100, 4 Units, Subcutaneous, TIDWM  insulin detemir U-100, 10 Units, Subcutaneous, Daily  insulin detemir U-100, 16 Units, Subcutaneous, QHS  lanthanum, 500 mg, Oral, QID  metoprolol, 6.25 mg, Oral, BID  mycophenolate mofetil, 500 mg, Oral, Q6H  ondansetron, 4 mg, Intravenous, TID AC  predniSONE, 40 mg, Oral, Daily  vitamin renal formula (B-complex-vitamin c-folic acid), 1 capsule, Oral, Daily      PRN: sodium chloride 0.9%, acetaminophen, albuterol-ipratropium, benzonatate, bisacodyL, dextrose 10%, dextrose 10%, glucagon (human recombinant), glucose, glucose, heparin (porcine), insulin aspart U-100, iohexoL, melatonin, naloxone, polyethylene glycol, simethicone, sodium chloride 0.9%, sodium chloride 0.9%  Infusions:   Estimated Creatinine Clearance: 10.9 mL/min (A) (based on SCr of 4.2 mg/dL (H)).    No new subjective & objective note has been filed under this hospital service since the last note was generated.      Assessment/Plan:      * Acute renal failure superimposed on stage 2 chronic kidney disease  -Dialysis depented ANI thought to be secondary to an auto-immune etiology, based on serologic pattern of possitive ARMEN, Anti-Posey RNP and low complement levels, negative ANCA.  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     -On 2/7 IR placed THDC and HD initiated for encephalopathy  -s/p 3 days of pulse dose steroids for suspected glomerulonephritis vs pulm/renal syndrome and continues on prednisone.  -Nephrology has restarted CellCept in Liquid formulation as pt cleareed for Full Liquids by SLP   -Dialysis is being performed at discretion of Nephrology team.   -Rheumatology consulted. -  --continue Lasix, now HD today, treating hyperkalemia medically, BUN rising.   -Placed transfer order to Stepdown level of  care - believe pt needs to be on continuous monitoring give multiorgan dysfunction - high acuity of her disease  2/24 renal function not improving she is having GI symptoms. increase Urea and Creatinine, possible HD session by tomorrow  . UOP 400ml/24h     Positive direct Jacob test  Noted on prior anemia workup - positive for Cold agglutinin       Systemic lupus erythematosus with glomerular disease  -plaquenil 200  -on prednisone 40  -nephrology starting imuran  -Renal biopsy on hold for now      Atrial flutter  Initial EKG with concern for atrial flutter.   -2/21 - EKG with Atrial fibrillation - pts sister says she has been diagnosed with this before  -Started on Liquid Metoprolol as could not crush Toprol XL, so will continue as HR in 80-90 range  -not on anticoagulation - coagulopathy and potential procedure   -continue Tele monitoring  -consider cardiology consult if unstable tachycardia  2/24 Presented in AFib versus flutter, not discussed in [facility] [place], anticoagulation likely contraindicated given coagulopathy titrate beta-blocker as tolerated          MANUEL (generalized anxiety disorder)  -Remains anxious   -On 2/6 gave one time low dose ativan PO - watch respiratory status very closely.    Debility  -PT/OT consulted and recommend inpatient rehab at discharge at discharge.  -Inpatient rehab is necessary because of patient's medical complexity and need to be seen by physician daily.  -Not yet medically ready for discharge.    Hyperkalemia    Hyperkalemia  K  at Recent Labs   Lab 02/24/22  2137 02/24/22  2346 02/25/22  0325   K 5.5*  5.5* 6.1*  6.1* 5.9*  5.9*  5.9*   . received D 50,  insulin, albulterol concentrate, Lokelma   and calcium gluconate . BMP q 4h     2/24 Lasix infusion was increased from 5 mg an hour to 10 mg/h overnight.  Repeat potassium around midnight decreased to 5.3.  Will continue current Lasix infusion and monitor serum potassium levels.    2/25 K 6.1 -->: 5.9 discussed with  nephrology for HD this AM. SBP  . plan for SLED with borderline BP. critical care consulted.         Metabolic acidosis  -Suspect secondary to ANI, management as above      Sleep apnea  -Continue bipap qhs    Thrombocytopenia  -Chronic and at baseline on admit secondary to cirrhosis  -Heme/onc consulted and input appreciated.  Did not recommend bone marrow biopsy  -[place] cbc q48h.  -consented and type & screen sent  -Hematology consult requested, hematology recs that ongoing thrombocytopenia is sequelae of acute illness,  To transfuse if PLT <10  And if any bleeding transfuse PLT <50.   2/25  Thrombocytopenia  Patient with thrombocytopenia Recent Labs   Lab 02/22/22  1055 02/23/22  1158 02/24/22  0344   PLT 46* 52* 51*   . Platelet counts stable.monitor      Macrocytic anemia  -Hb 9.2 on admit - chronic secondary to cirrhosis.  -Dropped to <7 on 2/2  -No evidence of GI bleeding but had noted epistaxis and bleeding gums at times since before hospitalization.  -Folate and B12 replete.  She is iron deficient.  -1 unit PRBC ordered 2/2 but transfusion delayed due to complex antibody matching.  Blood finally available and she received 1 unit PRBC 2/4.   -H/H slightly trending down.  Today Hb 7.7  -Monitor labs q48h.    Cirrhosis of liver without ascites  -Noted mild transaminitis and thrombocytopenia which are not new.  Noted epistaxis and gingival bleeding which has been ongoing for several months.  These are chronic and note history of cirrhosis and alcohol abuse   -Follows with hepatology.  Unclear if this was autoimmune?  -Abdominal US on day prior to admit showed hepatic cirrhosis/steatosis with sonographic findings of portal hypertension including trace ascites and splenomegaly.  -On 2/9 noted to be more lethargic with elevation of ammonia level and lactulose was started   -Mental status improved and she had significant cramping and nausea/vomiting so stopped lactulose on 2/17.  KUB 2/17 with non-specific  bowel gas pattern  -Mental status continues to be baseline.    -Monitor closely.    Type 2 diabetes mellitus without complication, without long-term current use of insulin  -On glucocorticoids  -add basal insulin 10units during day - 16 units overnight  -Aspart 4units TID with meals.   -Sugars remain above goal range  -Continue SSI ac/hs  2/24   Patient's FSGs are not controlled on current hypoglycemics.   Last A1c reviewed-   Lab Results   Component Value Date    HGBA1C 5.9 (H) 02/01/2022    HGBA1C 6.3 (H) 03/22/2021    HGBA1C 6.6 (H) 09/08/2020   orrectional scale insulin  Most recent fingerstick glucose reviewed-   Recent Labs   Lab 02/24/22  1146 02/24/22  1614 02/24/22 2045 02/24/22 2112   POCTGLUCOSE 173* 204* 384* 329*   2/25   Endocrine consulted for uncontrolled blood sugars .Discontinued SQ insulin. Started Transition IV insulin infusion at 1 u/hr         Gastroesophageal reflux disease  -Complains of gagging anytime food is put in her mouth  -Will schedule zofran prior to each meal.  -Continue pepcid.    Oropharyngeal dysphagia  SLP recs Full liquid diet at this time       Primary hypertension  -switched to IR metoprolol liquid 6.25mg BID    Hyperlipidemia  -History noted  -Not on statin due to cirrhosis    VTE Risk Mitigation (From admission, onward)         Ordered     heparin (porcine) injection 3,200 Units  As needed (PRN)         02/07/22 1834     IP VTE HIGH RISK PATIENT  Once         02/01/22 1557     Place sequential compression device  Until discontinued         02/01/22 1557              Dispostion - Transfer to ICU     Discharge Planning   CLAUDETTE: 2/28/2022     Code Status: Full Code   Is the patient medically ready for discharge?: No    Reason for patient still in hospital (select all that apply): Treatment  Discharge Plan A: Rehab   Discharge Delays: (Abnormal) Post-Acute Set-up              Jovanny Mendez MD  Department of Hospital Medicine   [facility] - Telemetry Stepdown

## 2022-02-25 NOTE — ASSESSMENT & PLAN NOTE
Platelets of 46, hematology consulted for thrombocytopenia     Plan:  Transfuse PLT if <10,000, or <50,000 with active bleeding or before invasive procedures.   Most likely a chronic thrombocytopenia that is worsening in the setting of active lupus and acute illness.   Follow-up Heme recs

## 2022-02-25 NOTE — SUBJECTIVE & OBJECTIVE
Past Medical History:   Diagnosis Date    Allergy     Anxiety     Basal cell carcinoma     Cirrhosis of liver without ascites 12/27/2017    Depression     Diabetes mellitus, type 2     Disorder of kidney and ureter     Endometriosis     GERD (gastroesophageal reflux disease)     Hyperlipidemia     Hypertension     Joint pain     Psoriasis        Past Surgical History:   Procedure Laterality Date    abdominal laproscopic surgery      APPENDECTOMY      CARPAL TUNNEL RELEASE      right    CHOLECYSTECTOMY  04/2015    COLONOSCOPY N/A 2/8/2019    Procedure: COLONOSCOPY;  Surgeon: Celso Salas MD;  Location: [facility] ([address] [place] [address]);  Service: Endoscopy;  Laterality: N/A;    ESOPHAGOGASTRODUODENOSCOPY N/A 2/8/2019    Procedure: EGD (ESOPHAGOGASTRODUODENOSCOPY);  Surgeon: Celso Salas MD;  Location: [facility] ([address] [place] [address]);  Service: Endoscopy;  Laterality: N/A;  varices screening, labs ordered prior    HYSTERECTOMY  age 25    JOSEFA/BSO (endometriosis)    OOPHORECTOMY      SKIN CANCER DESTRUCTION      UPPER GASTROINTESTINAL ENDOSCOPY         Review of patient's allergies indicates:   Allergen Reactions    Dobutamine Hives    Benadryl [diphenhydramine hcl] Anxiety     Pt states she feels jumpy and anxious when taking.    Darvon [propoxyphene] Nausea Only    Shellfish containing products Hives    Iodinated contrast media Hives    Lisinopril Other (See Comments)     cough    Propoxyphene hcl      Itchy (skin)^    Tizanidine Other (See Comments)     Sweaty, difficulty swallowing    Statins-hmg-coa reductase inhibitors Anxiety and Other (See Comments)     Myalgia, fatigue, palpitations, anxiety       Family History       Problem Relation (Age of Onset)    Arthritis Mother    Asthma Brother    Hyperlipidemia Brother    Hypertension Mother, Sister, Brother    No Known Problems Father    Raynaud syndrome Sister          Tobacco Use    Smoking status: Never Smoker    Smokeless tobacco: Never Used   Substance and Sexual Activity     Alcohol use: Not Currently    Drug use: No    Sexual activity: Yes     Partners: Male     Birth control/protection: Surgical      Review of Systems   Constitutional:  Negative for fever.   Respiratory:  Negative for chest tightness and shortness of breath.    Cardiovascular:  Positive for leg swelling. Negative for chest pain.   Gastrointestinal:  Positive for abdominal pain. Negative for abdominal distention.   Genitourinary:         Patient gets diaylsis   Skin:  Negative for color change and rash.   Neurological:  Negative for facial asymmetry.   Psychiatric/Behavioral:  Negative for agitation and behavioral problems.    Objective:     Vital Signs (Most Recent):  Temp: 98.6 °F (37 °C) (02/25/22 1600)  Pulse: 87 (02/25/22 1700)  Resp: (!) 27 (02/25/22 1700)  BP: (!) 120/52 (02/25/22 1700)  SpO2: (!) 94 % (02/25/22 1700)   Vital Signs (24h Range):  Temp:  [96.8 °F (36 °C)-98.6 °F (37 °C)] 98.6 °F (37 °C)  Pulse:  [70-89] 87  Resp:  [18-62] 27  SpO2:  [90 %-100 %] 94 %  BP: ()/(44-57) 120/52   Weight: 72.8 kg (160 lb 7.9 oz)  Body mass index is 34.73 kg/m².      Intake/Output Summary (Last 24 hours) at 2/25/2022 1725  Last data filed at 2/25/2022 1500  Gross per 24 hour   Intake 250 ml   Output --   Net 250 ml       Physical Exam  Constitutional:       Appearance: She is ill-appearing.   HENT:      Head: Normocephalic.   Eyes:      General:         Right eye: No discharge.         Left eye: No discharge.      Extraocular Movements: Extraocular movements intact.      Conjunctiva/sclera: Conjunctivae normal.   Neck:      Comments: Right sided tunnel cath  Cardiovascular:      Rate and Rhythm: Normal rate.      Pulses: Normal pulses.      Heart sounds: No murmur heard.  Pulmonary:      Breath sounds: Normal breath sounds.   Abdominal:      Tenderness: There is abdominal tenderness (RUQ).   Musculoskeletal:      Right lower leg: Edema present.      Left lower leg: Edema present.   Skin:     General: Skin is warm  and dry.   Neurological:      Mental Status: She is alert.   Psychiatric:         Mood and Affect: Mood normal.         Behavior: Behavior normal.       Vents:  Oxygen Concentration (%): 30 (02/18/22 2111)  Lines/Drains/Airways       Central Venous Catheter Line  Duration                  Hemodialysis Catheter 02/07/22 1436 right internal jugular 18 days              Peripheral Intravenous Line  Duration                  Peripheral IV - Single Lumen 02/24/22 0855 20 G;1 3/4 in Left;Anterior Forearm 1 day                  Significant Labs:    CBC/Anemia Profile:  Recent Labs   Lab 02/24/22  0344 02/25/22  0325   WBC 10.56 8.59   HGB 7.7* 7.8*   HCT 24.4* 23.7*   PLT 51* 46*   * 112*   RDW 19.2* 20.0*        Chemistries:  Recent Labs   Lab 02/24/22  0344 02/24/22  1506 02/24/22  2137 02/25/22  0325 02/25/22  0813 02/25/22  1241     138 139   < > 133*  133* 134* 134*   K 5.2*  5.2*  5.2* 5.4*   < > 5.9*  5.9*  5.9* 5.8*  5.8* 5.8*  5.8*     102 103   < > 98  98 99 99   CO2 22*  22* 20*   < > 16*  16* 15* 17*   *  113* 124*   < > 130*  130* 132* 137*   CREATININE 3.9*  3.9* 4.0*   < > 4.5*  4.5* 4.6* 4.4*   CALCIUM 9.1  9.1 9.1   < > 9.3  9.3 9.3 9.0   ALBUMIN 2.5* 2.4*  --  2.4*  --   --    PROT 6.5  --   --  6.3  --   --    BILITOT 2.1*  --   --  2.1*  --   --    ALKPHOS 166*  --   --  158*  --   --    ALT 46*  --   --  51*  --   --    AST 40  --   --  44*  --   --    MG 2.3  --   --  2.3  --   --    PHOS 5.8* 5.7*  --  6.6*  --   --     < > = values in this interval not displayed.     Bilirubin:   Recent Labs   Lab 02/21/22  0818 02/22/22  0536 02/23/22  1158 02/24/22  0344 02/25/22  0325   BILITOT 2.5* 2.5* 2.5* 2.1* 2.1*     POCT Glucose:   Recent Labs   Lab 02/25/22  0802 02/25/22  1132 02/25/22  1545   POCTGLUCOSE 293* 269* 273*       All pertinent labs within the past 24 hours have been reviewed.    Significant Imaging: I have reviewed all pertinent imaging  results/findings within the past 24 hours.

## 2022-02-25 NOTE — NURSING
Transferred patient to 3076 ICU via bed and telemetry in progress.  NADN.  Tolerated well.  Pt AAOx3. .  Sister Tania notified.  Friend at bedside. Personal belongings packed by male friend and brought to ICU with patient.  Report given to Sofia prior to transfer.

## 2022-02-25 NOTE — PT/OT/SLP PROGRESS
Occupational Therapy      Patient Name:  Tammi Farris   MRN:  [id number]    Patient not seen today secondary to Other (Comment) (tx to CICU for HD and for decline in function (specifically lethargy)). Will follow-up Monday.  Will hold off on discharging as it is not yet determined if pt will stay on CICU or return to [place] [id number]. Notified PT to hold off on eval due to this information provided by the RN.    2/25/2022

## 2022-02-25 NOTE — ASSESSMENT & PLAN NOTE
Up to 6.1 overnight, improved on lasix gtt. 5.9 in AM. Likely 2/2 renal failure    - SLED  - Continue to monitor  - Shift PRN

## 2022-02-25 NOTE — AI DETERIORATION ALERT
"RAPID RESPONSE NURSE AI ALERT       AI alert received.    Chart Reviewed: 02/25/2022, 2:16 AM    MRN: [id number]  Bed: 8098/8098 A    Dx: Acute renal failure superimposed on stage 2 chronic kidney disease    Tammi Farris has a past medical history of Allergy, Anxiety, Basal cell carcinoma, Cirrhosis of liver without ascites, Depression, Diabetes mellitus, type 2, Disorder of kidney and ureter, Endometriosis, GERD (gastroesophageal reflux disease), Hyperlipidemia, Hypertension, Joint pain, and Psoriasis.    Last VS: BP (!) 115/51   Pulse 78   Temp 97.8 °F (36.6 °C)   Resp (!) 62   Ht 4' 9" (1.448 m)   Wt 72.8 kg (160 lb 7.9 oz)   SpO2 99%   Breastfeeding No   BMI 34.73 kg/m²     24H Vital Sign Range:  Temp:  [97.4 °F (36.3 °C)-97.9 °F (36.6 °C)]   Pulse:  []   Resp:  [20-62]   BP: ()/(51-55)   SpO2:  [91 %-100 %]     Level of Consciousness (AVPU): alert    Recent Labs     02/22/22  1055 02/23/22  1158 02/24/22  0344   WBC 12.00 10.43 10.56   HGB 8.2* 7.8* 7.7*   HCT 24.6* 23.9* 24.4*   PLT 46* 52* 51*       Recent Labs     02/23/22  1158 02/23/22  2121 02/24/22  0344 02/24/22  1506 02/24/22  2137 02/24/22  2346      < > 138  138 139 136 135*   K 5.6*   < > 5.2*  5.2*  5.2* 5.4* 5.5*  5.5* 6.1*  6.1*      < > 102  102 103 102 103   CO2 22*   < > 22*  22* 20* 19* 14*   CREATININE 3.8*   < > 3.9*  3.9* 4.0* 4.1* 4.2*   *   < > 203*  203* 153* 296* 278*   PHOS 6.0*  --  5.8* 5.7*  --   --    MG 2.2  --  2.3  --   --   --     < > = values in this interval not displayed.        No results for input(s): PH, PCO2, PO2, HCO3, POCSATURATED, BE in the last 72 hours.     OXYGEN:  Flow (L/min): 2  Oxygen Concentration (%): 30  O2 Device (Oxygen Therapy): room air    MEWS score: 3    bedside RN, Che contacted. No concerns verbalized at this time. Instructed to call [phone] for further concerns or assistance.    Susan Ledesma RN        "

## 2022-02-25 NOTE — ASSESSMENT & PLAN NOTE
Likely 2/2 renal failure. Phos 6.6 this AM.    - Monitor with daily phos  - Continue Lanthanum, should improve with dialysis

## 2022-02-25 NOTE — PT/OT/SLP PROGRESS
"Speech Language Pathology Treatment    Patient Name:  Tammi Farris   MRN:  [id number]  Admitting Diagnosis: Acute renal failure superimposed on stage 2 chronic kidney disease    Recommendations:                 General Recommendations:  Dysphagia therapy  Diet recommendations:  NPO, Liquid Diet Level: NPO   Aspiration Precautions: Feed only when awake/alert, HOB to 90 degrees, Meds crushed in puree, Small bites/sips and Standard aspiration precautions   General Precautions: Standard, fall, aspiration  Communication strategies:  none    Subjective     "She only takes a few sips" per pt    Patient goals:did not state     Pain/Comfort:  · Pain Rating 1: 0/10  · Pain Rating Post-Intervention 1: 0/10    Respiratory Status: Room air    Objective:     Has the patient been evaluated by SLP for swallowing?   Yes  Keep patient NPO? No   Current Respiratory Status:        Pt. Seen at bedside with family present, refusing solid trials. Vocal intensity was reduced with tense, breathy quality.  Pt. Agreed to take 4 sips of water via cup with no s/s of aspiration.   Pt. Reported very poor appetite, taking only 2-3 sips at a time.  Education provided to pt and family re importance of improving caloric intake.  Pt. Requested to stay on full liquid diet and encouraged pt. To drink nutritional shakes throughout the day.     Assessment:     Tammi Farris is a 71 y.o. female with an SLP diagnosis of Dysphagia.    Goals:   Multidisciplinary Problems     SLP Goals        Problem: SLP Goal    Goal Priority Disciplines Outcome   SLP Goal    Low SLP Ongoing, Not Progressing   Description: Speech-Language Pathology Goals  Goals to be Met by 3/3  1. Pt will participate in ongoing swallow assessment to determine safest and least restrictive diet.                    Plan:     · Patient to be seen:  3 x/week   · Plan of Care expires:  03/21/22  · Plan of Care reviewed with:  patient, family   · SLP Follow-Up:  Yes       Discharge recommendations:  " nursing facility, skilled   Barriers to Discharge:  None    Time Tracking:     SLP Treatment Date:   02/25/22  Speech Start Time:  1145  Speech Stop Time:  1158     Speech Total Time (min):  13 min    Billable Minutes: Treatment Swallowing Dysfunction 5 and Self Care/Home Management Training 8    02/25/2022

## 2022-02-25 NOTE — ASSESSMENT & PLAN NOTE
BG goal 140-180    Steroids contributing to significant prandial BG excursions.  Diet full liquid [facility]; Renal, Consistent Carbohydrate; Thin - < 25% per patient    Plan:   -Discontinue SQ insulin  -Start Transition IV insulin infusion at 1 u/hr with stepdown parameters  -Start Moderate Dose Correction Scale (given steroid therapy)  -BG monitoring ac/hs  -Will consider scheduled Novolog once appetite improves    ** Please call Endocrine for any BG related issues **

## 2022-02-25 NOTE — PLAN OF CARE
Problem: SLP Goal  Goal: SLP Goal  Description: Speech-Language Pathology Goals  Goals to be Met by 3/3  1. Pt will participate in ongoing swallow assessment to determine safest and least restrictive diet.   2/25/2022 1203 by Latia Negrete MA, CCC-SLP  Outcome: Ongoing, Not Progressing  2/25/2022 1203 by Latia Negrete MA, CCC-SLP  Outcome: Ongoing, Not Progressing

## 2022-02-25 NOTE — ASSESSMENT & PLAN NOTE
Rheumatology following patient.   Patient S/p Methylpred 1g x 3 days (2/5-2/7), then transitioned to pred 60mg on 2/8, decreased to prednisone 50mg on 2/15, prednisone on 40mg on 2/17- present     Patient meets criteria for SLE due to: +ARMEN, +thrombocytopenia,+ proteinuria, + low C3, + low C4, + pericardial effusion (on stress echo from 12/2021) + alopecia.      Plan  - APS labs ordered  - continue plaquenil 200mg daily  - nephrology consulted, recommended cellcept PO and renal biopsy to be done at later date given concern for complications  - prednisone started by nephrology, will consider atovaquone for PJP ppx

## 2022-02-25 NOTE — HPI
Reason for Consult: Management of type 2 DM, Hyperglycemia     Surgical Procedure and Date:none     Diabetes diagnosis year: about 10 years ago    Home Diabetes Medications:  metformin 1000 mg BID  Lab Results   Component Value Date    HGBA1C 5.9 (H) 02/01/2022         How often checking glucose at home?  Once daily    BG readings on regimen: 170-180s  Hypoglycemia on the regimen?  seldomly   Missed doses on regimen?  No    Diabetes Complications include:     Hyperglycemia, CKD     Complicating diabetes co morbidities:   HTN, HLD, Obesity, Cirrhosis, CKD, Glucocorticoid Use      HPI:   Patient is a 71 y.o. female with a diagnosis of type 2 DM, HTN, HLD, GERD, cirrhosis of liver, thrombocytopenia, and sleep apnea presents with a complaint of cough and congestion since October.  She is chronically dyspneic, exacerbated by exertion, has seen Cardiology and was prescribed lasix and metoprolol.  Her sister has now noted that the patient has very low urine output and is constipated.  Endocrinology consulted for BG/ DM management.

## 2022-02-25 NOTE — PROGRESS NOTES
[facility] - Telemetry Stepdown  Nephrology  Progress Note    Patient Name: Tammi Farris  MRN: [id number]  Admission Date: 2/1/2022  Hospital Length of Stay: 24 days  Attending Provider: Jovanny Mendez MD   Primary Care Physician: Ann-Marie Hartman MD  Principal Problem:Acute renal failure superimposed on stage 2 chronic kidney disease    Assessment/Plan:     * Acute renal failure superimposed on stage 2 chronic kidney disease  -- New ANI on CKD2  -- Concern for possible lupus nephritis /autoimmune etiology  -- Low complements. Positive anti-Sm /RNP antibodies. ESR > 140. CRP 21.  -- 24 hr urine protein 373, urine protein timed 560  -- Negative dsDNA, ANCA, glomerular basement membrane proteins, free light chains, quantitative immunoglobulins, spep, upep.   -- Unable to renal biopsy at  due to thrombocytopenia (~ 50)  -- S/p pulse dose steroids for 3 days and now on prednisone.   -- Attempted to initiate Cellcept but has not taken due to difficulty with pill size.  -- S/p initiation several sessions HD, last 2/16.   -- SLE criteria per rheum  -- Lots of granular casts on spinning urine. Questionable muddy brown casts. No RBC or WBC casts seen.  -- Minimal UOP. Dry mucous membranes. Denies SOB.   -- Repeat ESR/CRP and C3/C4 show improvement  -- 24 hr urine protein improved to 163. Likely r/t steroids    Recommendations:  -- SLED for 6 hours in ICU  -- Continue Cellcept liquid suspension, 500 mg Q6h with food to reduce GI side effects. If well tolerated, can transition to 1000 mg Q12h  -- Continue prednisone 40 mg daily, likely at least 1 month  -- Pepcid while on steroids. Monitor for possibility of HTN, hyperglycemia, infections. Ensure vaccines up to date  -- Appreciate rheum. Plaquenil initiated  -- Defer renal bx to future, as risks outweigh benefits considering underlying CKD /scar, age, thrombocytopenia, uremia, bleeding complication may necessitate embolization and lead to chronic HD  -- Strict I/O  -- Daily  BMP, mag, phos  -- Avoid nephrotoxins    Hyperphosphatemia  Likely 2/2 renal failure. Phos 6.6 this AM.    - Monitor with daily phos  - Continue Lanthanum, should improve with dialysis    Hyperkalemia  Up to 6.1 overnight, improved on lasix gtt. 5.9 in AM. Likely 2/2 renal failure    - SLED  - Continue to monitor  - Shift PRN            Subjective:     HPI: Tammi Farris is a 71 y.o. F with history of CKD2, HTN, DM, cirrhosis, and PAULINA, who was admitted to [facility] with acute renal failure. Found to have low complements and positive anti-Sm /RNP antibodies. Unable to renal biopsy due to thrombocytopenia (~ 50). She was treated with pulse dose steroids for 3 days and now on prednisone. Attempted to initiate Cellcept but has not taken due to difficulty with pill size. Initiated on HD, received several sessions, last 2/16. Held over weekend as her Cr improving to 3.5 from 6.4. She reports making good urine. Denies confusion. Concern for possible lupus nephritis and transferred to [facility] per rheumatology /nephrology recommendations for IR renal biopsy.      Interval History: NAEO. AF VSS. Hyperkalemic to 6.1 overnight, shifted, Cr worsening to 4.5. Hypophosphatemic 6.6. UOP not charted. Patient will need SLED for ~6 hrs in ICU.    Review of patient's allergies indicates:   Allergen Reactions    Dobutamine Hives    Benadryl [diphenhydramine hcl] Anxiety     Pt states she feels jumpy and anxious when taking.    Darvon [propoxyphene] Nausea Only    Shellfish containing products Hives    Iodinated contrast media Hives    Lisinopril Other (See Comments)     cough    Propoxyphene hcl      Itchy (skin)^    Tizanidine Other (See Comments)     Sweaty, difficulty swallowing    Statins-hmg-coa reductase inhibitors Anxiety and Other (See Comments)     Myalgia, fatigue, palpitations, anxiety     Current Facility-Administered Medications   Medication Frequency    0.9%  NaCl infusion (CRRT USE ONLY) Continuous    0.9%  NaCl  infusion PRN    0.9%  NaCl infusion Once    0.9%  NaCl infusion Once    acetaminophen tablet 650 mg Q6H PRN    albuterol-ipratropium 2.5 mg-0.5 mg/3 mL nebulizer solution 3 mL Q4H PRN    benzonatate capsule 100 mg TID PRN    bisacodyL suppository 10 mg Daily PRN    dextromethorphan-guaiFENesin  mg/5 ml liquid 5 mL Q6H    dextrose 10% bolus 125 mL PRN    dextrose 10% bolus 250 mL PRN    famotidine tablet 20 mg Daily    glucagon (human recombinant) injection 1 mg PRN    glucose chewable tablet 16 g PRN    glucose chewable tablet 24 g PRN    heparin (porcine) injection 3,200 Units PRN    hydrOXYchloroQUINE tablet 200 mg Daily    insulin aspart U-100 pen 0-5 Units QID (AC + HS) PRN    insulin aspart U-100 pen 6 Units TIDWM    insulin detemir U-100 pen 10 Units Daily    insulin detemir U-100 pen 16 Units QHS    iohexoL (OMNIPAQUE 350) injection 100 mL ONCE PRN    lanthanum chewable tablet 500 mg QID    magnesium sulfate 2g in water 50mL IVPB (premix) PRN    melatonin tablet 6 mg Nightly PRN    metoprolol 12.5mg/1.25mL oral solution 6.25 mg BID    mycophenolate mofetil 200 mg/mL suspension 500 mg Q6H    naloxone 0.4 mg/mL injection 0.02 mg PRN    ondansetron injection 4 mg TID AC    polyethylene glycol packet 17 g Daily PRN    predniSONE tablet 40 mg Daily    simethicone chewable tablet 80 mg TID PRN    sodium chloride 0.9% bolus 250 mL PRN    sodium chloride 0.9% bolus 250 mL PRN    sodium chloride 0.9% flush 10 mL PRN    vitamin renal formula (B-complex-vitamin c-folic acid) 1 mg per capsule 1 capsule Daily       Objective:     Vital Signs (Most Recent):  Temp: 97.7 °F (36.5 °C) (02/25/22 1132)  Pulse: 85 (02/25/22 1137)  Resp: (!) 26 (02/25/22 1132)  BP: (!) 119/57 (02/25/22 1132)  SpO2: (!) 93 % (02/25/22 1132)  O2 Device (Oxygen Therapy): room air (02/25/22 0732)   Vital Signs (24h Range):  Temp:  [96.8 °F (36 °C)-98.5 °F (36.9 °C)] 97.7 °F (36.5 °C)  Pulse:  [65-89] 85  Resp:   [18-62] 26  SpO2:  [91 %-100 %] 93 %  BP: ()/(44-57) 119/57     Weight: 72.8 kg (160 lb 7.9 oz) (02/24/22 1100)  Body mass index is 34.73 kg/m².  Body surface area is 1.71 meters squared.    I/O last 3 completed shifts:  In: 240 [P.O.:240]  Out: 401 [Urine:400; Stool:1]    Physical Exam  Constitutional:       General: She is not in acute distress.     Appearance: She is ill-appearing.   HENT:      Head: Normocephalic and atraumatic.      Mouth/Throat:      Mouth: Mucous membranes are moist.      Pharynx: Oropharynx is clear.   Eyes:      Extraocular Movements: Extraocular movements intact.      Pupils: Pupils are equal, round, and reactive to light.   Cardiovascular:      Rate and Rhythm: Normal rate and regular rhythm.      Pulses: Normal pulses.      Heart sounds: Normal heart sounds.   Pulmonary:      Effort: Pulmonary effort is normal. No respiratory distress.      Breath sounds: Normal breath sounds.   Abdominal:      General: Abdomen is flat. Bowel sounds are normal.      Palpations: Abdomen is soft.   Musculoskeletal:         General: No swelling.      Cervical back: Neck supple.   Skin:     General: Skin is warm and dry.      Capillary Refill: Capillary refill takes less than 2 seconds.      Comments: Ecchymosis surrounding right chest HD cath   Neurological:      General: No focal deficit present.      Mental Status: She is alert and oriented to person, place, and time. Mental status is at baseline.   Psychiatric:         Mood and Affect: Mood normal.         Behavior: Behavior normal.         Thought Content: Thought content normal.         Judgment: Judgment normal.       Significant Labs:  CBC:   Recent Labs   Lab 02/25/22 0325   WBC 8.59   RBC 2.12*   HGB 7.8*   HCT 23.7*   PLT 46*   *   MCH 36.8*   MCHC 32.9     CMP:   Recent Labs   Lab 02/25/22 0325 02/25/22  0813   *  258* 254*   CALCIUM 9.3  9.3 9.3   ALBUMIN 2.4*  --    PROT 6.3  --    *  133* 134*   K 5.9*  5.9*   5.9* 5.8*  5.8*   CO2 16*  16* 15*   CL 98  98 99   *  130* 132*   CREATININE 4.5*  4.5* 4.6*   ALKPHOS 158*  --    ALT 51*  --    AST 44*  --    BILITOT 2.1*  --      All labs within the past 24 hours have been reviewed.     Significant Imaging:  Imaging Results              US Lower Extremity Veins Bilateral (Final result)  Result time 02/01/22 10:32:52   Procedure changed from US Lower Extremity Veins Bilateral     Final result by Polo Carr MD (02/01/22 10:32:52)                   Impression:      1. No sonographic evidence of DVT in the bilateral lower extremities.      Electronically signed by: Polo Carr  Date:    02/01/2022  Time:    10:32               Narrative:    EXAMINATION:  US LOWER EXTREMITY VEINS BILATERAL    CLINICAL HISTORY:  cough and congestion since October; Cough, unspecified    TECHNIQUE:  Duplex and color flow Doppler and dynamic compression was performed of the bilateral lower extremity veins was performed.    COMPARISON:  None    FINDINGS:  Right lower extremity    Common femoral, superficial femoral, popliteal, upper greater saphenous and deep femoral veins demonstrate normal compressibility, phasic flow and augmentation response without evidence of filling defect. Visualized calf veins are patent.    Left lower extremity    Common femoral, superficial femoral, popliteal, upper greater saphenous and deep femoral veins demonstrate normal compressibility, phasic flow and augmentation response without evidence of filling defect. Visualized calf veins are patent.                                       X-Ray Chest PA And Lateral (Final result)  Result time 02/01/22 09:39:05      Final result by Brandon Fry MD (02/01/22 09:39:05)                   Impression:      No significant changes.      Electronically signed by: Brandon Fry MD  Date:    02/01/2022  Time:    09:39               Narrative:    EXAMINATION:  XR CHEST PA AND LATERAL    CLINICAL HISTORY:  Chest  Pain;    TECHNIQUE:  PA and lateral views of the chest were performed.    COMPARISON:  02/01/2022 at 08:08 hours, 12/13/2021    FINDINGS:  Enlarged cardiac silhouette, similar to the prior exam.  There are mild perihilar and right basilar lung opacities, not significantly changed from the earlier exam, possible cardiogenic/noncardiogenic pulmonary edema, pneumonia, or aspiration.  Superimposed chronic interstitial changes may be present, as well.  The opacities have not significantly changed from the earlier exam.  Mildly elevated right hemidiaphragm, stable.  No pneumothorax.  No pleural effusions.                                       X-Ray Chest AP Portable (Final result)  Result time 02/01/22 08:28:48      Final result by Kemal Latham MD (02/01/22 08:28:48)                   Impression:      Nonspecific interstitial fibrotic changes stable and unchanged allowing for differences in inspiratory effort and technique.  Clinical correlation requested.    Epic abnormal flag      Electronically signed by: Kemal Latham MD  Date:    02/01/2022  Time:    08:28               Narrative:    EXAMINATION:  XR CHEST AP PORTABLE    CLINICAL HISTORY:  Cough, unspecified    TECHNIQUE:  Single frontal view of the chest was performed.    COMPARISON:  12/13/2021    FINDINGS:  Increasing perihilar interstitial infiltrates, some volume loss of anterior segment of elevation right minor fissure, elevation right hemidiaphragm with compression atelectasis of medial basilar segment right lower lobe.  Top-normal size heart.  Large body size degrades exam.  Pulmonary vascular tree borderline prominent.                                    All significant imaging within the past 24 hours has been reviewed.        Thank you for your consult. I will follow-up with patient. Please contact us if you have any additional questions.    Anthony Cantor MD  Nephrology  [facility] - Telemetry Stepdown

## 2022-02-25 NOTE — ASSESSMENT & PLAN NOTE
Hyperkalemia  Up to 6.1 overnight, improved on lasix gtt. 5.9 in AM. Likely 2/2 renal failure    Plan  - SLED  - Continue to monitor  - Shift PRN

## 2022-02-25 NOTE — CLINICAL REVIEW
Hematology note:   71 years old female patient with HTN, DM2. CKD2, Liver cirrhosis ?2/2 GARCIA, chronic thrombocytopenia, who was admitted to [facility] on 02/1/22 on the account of acute hypoxic resp failure and ANI. Nephrology was consulted, and there was a concern of GN as she had low complements, and active sediments in urine. She was started on on pulse dose steroids 02/05 - 02/07, then transitioned to PO prednisone. However, she required HD 02/07 due to volume overload. She was transferred to [facility] for higher level of care. She met diagnostic criteria for SLE, as +ARMEN, +thrombocytopenia,+ proteinuria, + low C3, + low C4, + pericardial effusion. She is started on Cellcept by nephrology.   Hematology was consulted for worsening thrombocytopenia. Reviewing her charts, she has chronic thrombocytopenia ranging in the low 100s since 2020. This was attribued to her liver cirrhosis. On the current presentation, her PLT counts were 88,000/cmm. It progressively got worse to low of 26,000, before stabilizing at low 40s.        02/22/22 05:36 02/22/22 10:55 02/23/22 11:58 02/24/22 03:44 02/25/22 03:25   Platelets 50 (L) 46 (L) 52 (L) 51 (L) 46 (L)       #Thrombocytopenia  71 years old female with multiple co morbidities, including liver cirrhosis 2/2 GARCIA, chronic thrombocytopenia, CKD, DM, and new diagnosis of SLE with suspected lupus nephritis. She has baseline thrombocytopenia due to cirrhosis which has worsened likely in the setting of lupus flare. She underwent pulse dose steroids between 02/05 and 02/07. Her complements levels improved a little, but still low. Her PLT didn't improve with steroids. She started cellcept recently and is on plaquenil.   Work-up that was done so far:   - Nutritional studies: B12, folate, iron panel are within normal limits.   - SPEP: no monoclonal proteins, FLC, increased both Kappa and Lambda chain with a ratio of 2.10 (can be seen in kidney disease), UPEP with no monoclonal protein.   -  HIT antibody 0.35, making HIT unlikely.   - , normal haptoglobin, excluding hemolytic processes (TMA, aHUS).   - Peripheral smear reviewed by myself,(RBC: anisopoikilocytosis, very few red cell fragments  (1/hpf) with WBC: neutrophilia PLT: thrombocytopenia, no clumping or large PLT) Pending pathology review, MAHA or TMA is unlikely in that case.   - APLS labs negative       Based on the above, This is most likely a chronic thrombocytopenia that is worsening in the setting of active lupus and acute illness.   Transfuse PLT if <10,000, or <50,000 with active bleeding or before invasive procedures.     Hematology will sign off. Please call with any questions.       Connor Chen MD  Hematology/Oncology fellow. PGY V

## 2022-02-25 NOTE — PROGRESS NOTES
02/25/22 1547   Treatment   Treatment Type SLED   Treatment Status New start   Dialysis Machine Number [id number]   Solutions Labeled and Current  Yes   Access Tunneled Cath;Right   Catheter Dressing Intact  Yes   Alarms Engaged Yes     CRRT started as ordered without issue, pt left in NAD/VSS. Report given to primary RN, will increase UF to goal of 300ml/hr as tolerated.

## 2022-02-25 NOTE — SUBJECTIVE & OBJECTIVE
Interval HPI:   Overnight events: Remains in [facility]. BG above goal with scheduled and prn correction scale. K shifted overnight. Prednisone 40 mg daily. Appetite poor.   Eating:  Diet full liquid [facility]; Renal, Consistent Carbohydrate; Thin  Nausea: No  Hypoglycemia and intervention: No  Fever: No  TPN and/or TF: No      PMH, PSH, FH, SH  reviewed     ROS:    Review of Systems  Constitutional: Negative for weight changes.  Eyes: Negative for visual disturbance.  Respiratory: Negative for cough.   Cardiovascular: Negative for chest pain.  Gastrointestinal: Negative for nausea.  Endocrine: Negative for polyuria, polydipsia.  Musculoskeletal: Negative for back pain.  Skin: Negative for rash.  Neurological: Negative for syncope.  Psychiatric/Behavioral: Negative for depression.      Current Medications and/or Treatments Impacting Glycemic Control  Immunotherapy:    Immunosuppressants           Stop Route Frequency     mycophenolate mofetil 200 mg/mL suspension 500 mg         -- Oral Every 6 hours          Steroids:   Hormones (From admission, onward)                Start     Stop Route Frequency Ordered    02/17/22 0900  predniSONE tablet 40 mg         -- Oral Daily 02/16/22 1116    02/01/22 1557  melatonin tablet 6 mg  (Medication Panel)         -- Oral Nightly PRN 02/01/22 1557          Pressors:    Autonomic Drugs (From admission, onward)                None          Hyperglycemia/Diabetes Medications:   Antihyperglycemics (From admission, onward)                Start     Stop Route Frequency Ordered    02/22/22 1030  insulin detemir U-100 pen 10 Units         -- SubQ Daily 02/22/22 1017    02/20/22 2100  insulin detemir U-100 pen 16 Units         -- SubQ Nightly 02/20/22 1638    02/20/22 1130  insulin aspart U-100 pen 4 Units         -- SubQ 3 times daily with meals 02/20/22 0828    02/06/22 0050  insulin aspart U-100 pen 0-5 Units         -- SubQ Before meals & nightly PRN 02/05/22 2350             PHYSICAL  EXAMINATION:  Vitals:    02/25/22 0410   BP: (!) 100/47   Pulse: 83   Resp: 19   Temp:      Body mass index is 34.73 kg/m².    Physical Exam  Constitutional: Well developed, well nourished, NAD.  ENT: External ears no masses with nose patent; normal hearing.  Neck: Supple; trachea midline.  Cardiovascular: Normal heart sounds, BLE edema present.   Lungs: Normal effort; lungs anterior bilaterally clear to auscultation.  Abdomen: Soft, no masses, no hernias.  MS: No clubbing or cyanosis of nails noted; unable to assess gait.  Skin: No rashes, lesions, or ulcers; no nodules.   Psychiatric: Good judgement and insight; normal mood and affect.  Neurological: Cranial nerves are grossly intact.   Foot: nails in good condition, no amputations noted.

## 2022-02-25 NOTE — HPI
Tammi Farris is a 71 y.o. F with history of CKD2, HTN, DM, cirrhosis, and PAULINA, who was admitted to [facility] with acute renal failure. Found to have low complements and positive anti-Sm /RNP antibodies. Unable to renal biopsy due to thrombocytopenia (~ 50). She was treated with pulse dose steroids for 3 days and now on prednisone. Attempted to initiate Cellcept but has not taken due to difficulty with pill size. Initiated on HD, received several sessions, last 2/16. Held over weekend as her Cr improving to 3.5 from 6.4. She reports making good urine. Denies confusion. Concern for possible lupus nephritis and transferred to [facility] per rheumatology /nephrology recommendations for IR renal biopsy. Patient admitted to ICU for CRRT needs. Patient notes she has not been feeling well for a couple of months. She says she gets dialysis because she has liver disease.

## 2022-02-25 NOTE — SUBJECTIVE & OBJECTIVE
Interval History: NAEO. AF VSS. Hyperkalemic to 6.1 overnight, shifted, Cr worsening to 4.5. Hypophosphatemic 6.6. UOP not charted. Patient will need SLED for ~6 hrs in ICU.    Review of patient's allergies indicates:   Allergen Reactions    Dobutamine Hives    Benadryl [diphenhydramine hcl] Anxiety     Pt states she feels jumpy and anxious when taking.    Darvon [propoxyphene] Nausea Only    Shellfish containing products Hives    Iodinated contrast media Hives    Lisinopril Other (See Comments)     cough    Propoxyphene hcl      Itchy (skin)^    Tizanidine Other (See Comments)     Sweaty, difficulty swallowing    Statins-hmg-coa reductase inhibitors Anxiety and Other (See Comments)     Myalgia, fatigue, palpitations, anxiety     Current Facility-Administered Medications   Medication Frequency    0.9%  NaCl infusion (CRRT USE ONLY) Continuous    0.9%  NaCl infusion PRN    0.9%  NaCl infusion Once    0.9%  NaCl infusion Once    acetaminophen tablet 650 mg Q6H PRN    albuterol-ipratropium 2.5 mg-0.5 mg/3 mL nebulizer solution 3 mL Q4H PRN    benzonatate capsule 100 mg TID PRN    bisacodyL suppository 10 mg Daily PRN    dextromethorphan-guaiFENesin  mg/5 ml liquid 5 mL Q6H    dextrose 10% bolus 125 mL PRN    dextrose 10% bolus 250 mL PRN    famotidine tablet 20 mg Daily    glucagon (human recombinant) injection 1 mg PRN    glucose chewable tablet 16 g PRN    glucose chewable tablet 24 g PRN    heparin (porcine) injection 3,200 Units PRN    hydrOXYchloroQUINE tablet 200 mg Daily    insulin aspart U-100 pen 0-5 Units QID (AC + HS) PRN    insulin aspart U-100 pen 6 Units TIDWM    insulin detemir U-100 pen 10 Units Daily    insulin detemir U-100 pen 16 Units QHS    iohexoL (OMNIPAQUE 350) injection 100 mL ONCE PRN    lanthanum chewable tablet 500 mg QID    magnesium sulfate 2g in water 50mL IVPB (premix) PRN    melatonin tablet 6 mg Nightly PRN    metoprolol 12.5mg/1.25mL oral solution 6.25 mg BID    mycophenolate  mofetil 200 mg/mL suspension 500 mg Q6H    naloxone 0.4 mg/mL injection 0.02 mg PRN    ondansetron injection 4 mg TID AC    polyethylene glycol packet 17 g Daily PRN    predniSONE tablet 40 mg Daily    simethicone chewable tablet 80 mg TID PRN    sodium chloride 0.9% bolus 250 mL PRN    sodium chloride 0.9% bolus 250 mL PRN    sodium chloride 0.9% flush 10 mL PRN    vitamin renal formula (B-complex-vitamin c-folic acid) 1 mg per capsule 1 capsule Daily       Objective:     Vital Signs (Most Recent):  Temp: 97.7 °F (36.5 °C) (02/25/22 1132)  Pulse: 85 (02/25/22 1137)  Resp: (!) 26 (02/25/22 1132)  BP: (!) 119/57 (02/25/22 1132)  SpO2: (!) 93 % (02/25/22 1132)  O2 Device (Oxygen Therapy): room air (02/25/22 0732)   Vital Signs (24h Range):  Temp:  [96.8 °F (36 °C)-98.5 °F (36.9 °C)] 97.7 °F (36.5 °C)  Pulse:  [65-89] 85  Resp:  [18-62] 26  SpO2:  [91 %-100 %] 93 %  BP: ()/(44-57) 119/57     Weight: 72.8 kg (160 lb 7.9 oz) (02/24/22 1100)  Body mass index is 34.73 kg/m².  Body surface area is 1.71 meters squared.    I/O last 3 completed shifts:  In: 240 [P.O.:240]  Out: 401 [Urine:400; Stool:1]    Physical Exam  Constitutional:       General: She is not in acute distress.     Appearance: She is ill-appearing.   HENT:      Head: Normocephalic and atraumatic.      Mouth/Throat:      Mouth: Mucous membranes are moist.      Pharynx: Oropharynx is clear.   Eyes:      Extraocular Movements: Extraocular movements intact.      Pupils: Pupils are equal, round, and reactive to light.   Cardiovascular:      Rate and Rhythm: Normal rate and regular rhythm.      Pulses: Normal pulses.      Heart sounds: Normal heart sounds.   Pulmonary:      Effort: Pulmonary effort is normal. No respiratory distress.      Breath sounds: Normal breath sounds.   Abdominal:      General: Abdomen is flat. Bowel sounds are normal.      Palpations: Abdomen is soft.   Musculoskeletal:         General: No swelling.      Cervical back: Neck supple.    Skin:     General: Skin is warm and dry.      Capillary Refill: Capillary refill takes less than 2 seconds.      Comments: Ecchymosis surrounding right chest HD cath   Neurological:      General: No focal deficit present.      Mental Status: She is alert and oriented to person, place, and time. Mental status is at baseline.   Psychiatric:         Mood and Affect: Mood normal.         Behavior: Behavior normal.         Thought Content: Thought content normal.         Judgment: Judgment normal.       Significant Labs:  CBC:   Recent Labs   Lab 02/25/22 0325   WBC 8.59   RBC 2.12*   HGB 7.8*   HCT 23.7*   PLT 46*   *   MCH 36.8*   MCHC 32.9     CMP:   Recent Labs   Lab 02/25/22 0325 02/25/22  0813   *  258* 254*   CALCIUM 9.3  9.3 9.3   ALBUMIN 2.4*  --    PROT 6.3  --    *  133* 134*   K 5.9*  5.9*  5.9* 5.8*  5.8*   CO2 16*  16* 15*   CL 98  98 99   *  130* 132*   CREATININE 4.5*  4.5* 4.6*   ALKPHOS 158*  --    ALT 51*  --    AST 44*  --    BILITOT 2.1*  --      All labs within the past 24 hours have been reviewed.     Significant Imaging:  Imaging Results              US Lower Extremity Veins Bilateral (Final result)  Result time 02/01/22 10:32:52   Procedure changed from US Lower Extremity Veins Bilateral     Final result by Polo Carr MD (02/01/22 10:32:52)                   Impression:      1. No sonographic evidence of DVT in the bilateral lower extremities.      Electronically signed by: Polo Carr  Date:    02/01/2022  Time:    10:32               Narrative:    EXAMINATION:  US LOWER EXTREMITY VEINS BILATERAL    CLINICAL HISTORY:  cough and congestion since October; Cough, unspecified    TECHNIQUE:  Duplex and color flow Doppler and dynamic compression was performed of the bilateral lower extremity veins was performed.    COMPARISON:  None    FINDINGS:  Right lower extremity    Common femoral, superficial femoral, popliteal, upper greater saphenous and deep  femoral veins demonstrate normal compressibility, phasic flow and augmentation response without evidence of filling defect. Visualized calf veins are patent.    Left lower extremity    Common femoral, superficial femoral, popliteal, upper greater saphenous and deep femoral veins demonstrate normal compressibility, phasic flow and augmentation response without evidence of filling defect. Visualized calf veins are patent.                                       X-Ray Chest PA And Lateral (Final result)  Result time 02/01/22 09:39:05      Final result by Brandon Fry MD (02/01/22 09:39:05)                   Impression:      No significant changes.      Electronically signed by: Brandon Fry MD  Date:    02/01/2022  Time:    09:39               Narrative:    EXAMINATION:  XR CHEST PA AND LATERAL    CLINICAL HISTORY:  Chest Pain;    TECHNIQUE:  PA and lateral views of the chest were performed.    COMPARISON:  02/01/2022 at 08:08 hours, 12/13/2021    FINDINGS:  Enlarged cardiac silhouette, similar to the prior exam.  There are mild perihilar and right basilar lung opacities, not significantly changed from the earlier exam, possible cardiogenic/noncardiogenic pulmonary edema, pneumonia, or aspiration.  Superimposed chronic interstitial changes may be present, as well.  The opacities have not significantly changed from the earlier exam.  Mildly elevated right hemidiaphragm, stable.  No pneumothorax.  No pleural effusions.                                       X-Ray Chest AP Portable (Final result)  Result time 02/01/22 08:28:48      Final result by Kemal Latham MD (02/01/22 08:28:48)                   Impression:      Nonspecific interstitial fibrotic changes stable and unchanged allowing for differences in inspiratory effort and technique.  Clinical correlation requested.    Epic abnormal flag      Electronically signed by: Kemal Latham MD  Date:    02/01/2022  Time:    08:28               Narrative:     EXAMINATION:  XR CHEST AP PORTABLE    CLINICAL HISTORY:  Cough, unspecified    TECHNIQUE:  Single frontal view of the chest was performed.    COMPARISON:  12/13/2021    FINDINGS:  Increasing perihilar interstitial infiltrates, some volume loss of anterior segment of elevation right minor fissure, elevation right hemidiaphragm with compression atelectasis of medial basilar segment right lower lobe.  Top-normal size heart.  Large body size degrades exam.  Pulmonary vascular tree borderline prominent.                                    All significant imaging within the past 24 hours has been reviewed.

## 2022-02-25 NOTE — ASSESSMENT & PLAN NOTE
-- New ANI on CKD2  -- Concern for possible lupus nephritis /autoimmune etiology  -- Low complements. Positive anti-Sm /RNP antibodies. ESR > 140. CRP 21.  -- 24 hr urine protein 373, urine protein timed 560  -- Negative dsDNA, ANCA, glomerular basement membrane proteins, free light chains, quantitative immunoglobulins, spep, upep.   -- Unable to renal biopsy at  due to thrombocytopenia (~ 50)  -- S/p pulse dose steroids for 3 days and now on prednisone.   -- Attempted to initiate Cellcept but has not taken due to difficulty with pill size.  -- S/p initiation several sessions HD, last 2/16.   -- SLE criteria per rheum  -- Lots of granular casts on spinning urine. Questionable muddy brown casts. No RBC or WBC casts seen.  -- Minimal UOP. Dry mucous membranes. Denies SOB.   -- Repeat ESR/CRP and C3/C4 show improvement  -- 24 hr urine protein improved to 163. Likely r/t steroids    Recommendations:  -- SLED for 6 hours in ICU  -- Continue Cellcept liquid suspension, 500 mg Q6h with food to reduce GI side effects. If well tolerated, can transition to 1000 mg Q12h  -- Continue prednisone 40 mg daily, likely at least 1 month  -- Pepcid while on steroids. Monitor for possibility of HTN, hyperglycemia, infections. Ensure vaccines up to date  -- Appreciate rheum. Plaquenil initiated  -- Defer renal bx to future, as risks outweigh benefits considering underlying CKD /scar, age, thrombocytopenia, uremia, bleeding complication may necessitate embolization and lead to chronic HD  -- Strict I/O  -- Daily BMP, mag, phos  -- Avoid nephrotoxins

## 2022-02-25 NOTE — CONSULTS
[facility] - Telemetry Stepdown  Endocrinology  Diabetes Consult Note    Consult Requested by: Jovanny Mendez MD   Reason for admit: Acute renal failure superimposed on stage 2 chronic kidney disease    HISTORY OF PRESENT ILLNESS:  Reason for Consult: Management of type 2 DM, Hyperglycemia     Surgical Procedure and Date:none     Diabetes diagnosis year: about 10 years ago    Home Diabetes Medications:  metformin 1000 mg BID  Lab Results   Component Value Date    HGBA1C 5.9 (H) 02/01/2022         How often checking glucose at home?  Once daily    BG readings on regimen: 170-180s  Hypoglycemia on the regimen?  seldomly   Missed doses on regimen?  No    Diabetes Complications include:     Hyperglycemia, CKD     Complicating diabetes co morbidities:   HTN, HLD, Obesity, Cirrhosis, CKD, Glucocorticoid Use      HPI:   Patient is a 71 y.o. female with a diagnosis of type 2 DM, HTN, HLD, GERD, cirrhosis of liver, thrombocytopenia, and sleep apnea presents with a complaint of cough and congestion since October.  She is chronically dyspneic, exacerbated by exertion, has seen Cardiology and was prescribed lasix and metoprolol.  Her sister has now noted that the patient has very low urine output and is constipated.  Endocrinology consulted for BG/ DM management.                Interval HPI:   Overnight events: Remains in [facility]. BG above goal with scheduled and prn correction scale. K shifted overnight. Prednisone 40 mg daily. Appetite poor.   Eating:  Diet full liquid [facility]; Renal, Consistent Carbohydrate; Thin  Nausea: No  Hypoglycemia and intervention: No  Fever: No  TPN and/or TF: No      PMH, PSH, FH, SH  reviewed     ROS:    Review of Systems  Constitutional: Negative for weight changes.  Eyes: Negative for visual disturbance.  Respiratory: Negative for cough.   Cardiovascular: Negative for chest pain.  Gastrointestinal: Negative for nausea.  Endocrine: Negative for polyuria, polydipsia.  Musculoskeletal:  Negative for back pain.  Skin: Negative for rash.  Neurological: Negative for syncope.  Psychiatric/Behavioral: Negative for depression.      Current Medications and/or Treatments Impacting Glycemic Control  Immunotherapy:    Immunosuppressants           Stop Route Frequency     mycophenolate mofetil 200 mg/mL suspension 500 mg         -- Oral Every 6 hours          Steroids:   Hormones (From admission, onward)                Start     Stop Route Frequency Ordered    02/17/22 0900  predniSONE tablet 40 mg         -- Oral Daily 02/16/22 1116    02/01/22 1557  melatonin tablet 6 mg  (Medication Panel)         -- Oral Nightly PRN 02/01/22 1557          Pressors:    Autonomic Drugs (From admission, onward)                None          Hyperglycemia/Diabetes Medications:   Antihyperglycemics (From admission, onward)                Start     Stop Route Frequency Ordered    02/22/22 1030  insulin detemir U-100 pen 10 Units         -- SubQ Daily 02/22/22 1017    02/20/22 2100  insulin detemir U-100 pen 16 Units         -- SubQ Nightly 02/20/22 1638    02/20/22 1130  insulin aspart U-100 pen 4 Units         -- SubQ 3 times daily with meals 02/20/22 0828    02/06/22 0050  insulin aspart U-100 pen 0-5 Units         -- SubQ Before meals & nightly PRN 02/05/22 2350             PHYSICAL EXAMINATION:  Vitals:    02/25/22 0410   BP: (!) 100/47   Pulse: 83   Resp: 19   Temp:      Body mass index is 34.73 kg/m².    Physical Exam  Constitutional: Well developed, well nourished, NAD.  ENT: External ears no masses with nose patent; normal hearing.  Neck: Supple; trachea midline.  Cardiovascular: Normal heart sounds, BLE edema present.   Lungs: Normal effort; lungs anterior bilaterally clear to auscultation.  Abdomen: Soft, no masses, no hernias.  MS: No clubbing or cyanosis of nails noted; unable to assess gait.  Skin: No rashes, lesions, or ulcers; no nodules.   Psychiatric: Good judgement and insight; normal mood and  affect.  Neurological: Cranial nerves are grossly intact.   Foot: nails in good condition, no amputations noted.        Labs Reviewed and Include   Recent Labs   Lab 02/25/22  0325 02/25/22  0813   *  258* 254*   CALCIUM 9.3  9.3 9.3   ALBUMIN 2.4*  --    PROT 6.3  --    *  133* 134*   K 5.9*  5.9*  5.9* 5.8*  5.8*   CO2 16*  16* 15*   CL 98  98 99   *  130* 132*   CREATININE 4.5*  4.5* 4.6*   ALKPHOS 158*  --    ALT 51*  --    AST 44*  --    BILITOT 2.1*  --      Lab Results   Component Value Date    WBC 8.59 02/25/2022    HGB 7.8 (L) 02/25/2022    HCT 23.7 (L) 02/25/2022     (H) 02/25/2022    PLT 46 (L) 02/25/2022     No results for input(s): TSH, FREET4 in the last 168 hours.  Lab Results   Component Value Date    HGBA1C 5.9 (H) 02/01/2022       Nutritional status:   Body mass index is 34.73 kg/m².  Lab Results   Component Value Date    ALBUMIN 2.4 (L) 02/25/2022    ALBUMIN 2.4 (L) 02/24/2022    ALBUMIN 2.5 (L) 02/24/2022     No results found for: PREALBUMIN    Estimated Creatinine Clearance: 10 mL/min (A) (based on SCr of 4.6 mg/dL (H)).    Accu-Checks  Recent Labs     02/23/22  1953 02/23/22  2116 02/23/22  2200 02/24/22  0712 02/24/22  1146 02/24/22  1614 02/24/22  2045 02/24/22  2112 02/25/22  0802 02/25/22  1132   POCTGLUCOSE 272* 241* 358* 210* 173* 204* 384* 329* 293* 269*        ASSESSMENT and PLAN    * Acute renal failure superimposed on stage 2 chronic kidney disease  Lab Results   Component Value Date    CREATININE 4.6 (H) 02/25/2022     Avoid insulin stacking  Titrate insulin slowly      Type 2 diabetes mellitus without complication, without long-term current use of insulin  BG goal 140-180    Steroids contributing to significant prandial BG excursions.  Diet full liquid [facility]; Renal, Consistent Carbohydrate; Thin - < 25% per patient    Plan:   -Discontinue SQ insulin  -Start Transition IV insulin infusion at 1 u/hr with stepdown parameters  -Start  Moderate Dose Correction Scale (given steroid therapy)  -BG monitoring ac/hs  -Will consider scheduled Novolog once appetite improves    ** Please call Endocrine for any BG related issues **            Hyperlipidemia  May increase insulin resistance.             Plan discussed with patient at bedside.     Lynnette Krause NP  Endocrinology  [facility] - Telemetry Stepdown

## 2022-02-25 NOTE — CONSULTS
Consult received. Patient to be admitted to the MICU. Full H&P to follow.    Jaja Bills PA-C  Critical Care Medicine  2/25/2022   5:30 PM

## 2022-02-25 NOTE — ASSESSMENT & PLAN NOTE
71 y.o F w/ dialysis dependent ANI thought to be secondary to an auto-immune etiology, based on serologic pattern of possitive ARMEN, Anti-Posey RNP and low complement levels, negative ANCA.  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      Plan:  --patient admitted to ICU for SLED (6 hours)  --Continue Cellcept liquid suspension, 500 mg Q6h with food to reduce GI side effects. If well tolerated, can transition to 1000 mg Q12h  --continue Hydroxychloroquine  --continue steroids 40 mg daily  --renal bx per nephrology after clinically stable  -- Strict I/O  -- Daily BMP, mag, phos  -- Avoid nephrotoxins

## 2022-02-26 PROBLEM — A41.9 SEVERE SEPSIS: Status: ACTIVE | Noted: 2022-01-01

## 2022-02-26 PROBLEM — R65.20 SEVERE SEPSIS: Status: ACTIVE | Noted: 2022-01-01

## 2022-02-26 NOTE — SUBJECTIVE & OBJECTIVE
"Interval HPI:   Overnight events: Remains in CICU. BG trending down on current IV insulin infusion at 1 u/hr. Insulin infusion stopped this morning per protocol. Creatinine 4.4.; CRRT. Appetite poor. Diet full liquid [facility]; Renal, Consistent Carbohydrate; Thin  Eating:   <25%  Nausea: No  Hypoglycemia and intervention: No  Fever: No  TPN and/or TF: No  If yes, type of TF/TPN and rate: n/a    BP (!) 93/54   Pulse 83   Temp 97.4 °F (36.3 °C)   Resp (!) 27   Ht 4' 9" (1.448 m)   Wt 72.9 kg (160 lb 11.5 oz)   SpO2 100%   Breastfeeding No   BMI 34.78 kg/m²     Labs Reviewed and Include    Recent Labs   Lab 02/25/22  1241   *   CALCIUM 9.0   *   K 5.8*  5.8*   CO2 17*   CL 99   *   CREATININE 4.4*     Lab Results   Component Value Date    WBC 8.24 02/26/2022    HGB 6.9 (L) 02/26/2022    HCT 20.7 (L) 02/26/2022     (H) 02/26/2022    PLT 46 (L) 02/25/2022     No results for input(s): TSH, FREET4 in the last 168 hours.  Lab Results   Component Value Date    HGBA1C 5.9 (H) 02/01/2022       Nutritional status:   Body mass index is 34.78 kg/m².  Lab Results   Component Value Date    ALBUMIN 2.4 (L) 02/25/2022    ALBUMIN 2.4 (L) 02/24/2022    ALBUMIN 2.5 (L) 02/24/2022     No results found for: PREALBUMIN    Estimated Creatinine Clearance: 10.5 mL/min (A) (based on SCr of 4.4 mg/dL (H)).    Accu-Checks  Recent Labs     02/24/22  2045 02/24/22  2112 02/25/22  0802 02/25/22  1132 02/25/22  1545 02/25/22  2240 02/26/22  0258 02/26/22  0330 02/26/22  0819 02/26/22  0943   POCTGLUCOSE 384* 329* 293* 269* 273* 157* 89 84 73 98       Current Medications and/or Treatments Impacting Glycemic Control  Immunotherapy:    Immunosuppressants           Stop Route Frequency     mycophenolate mofetil 200 mg/mL suspension 500 mg         -- Oral Every 6 hours          Steroids:   Hormones (From admission, onward)                Start     Stop Route Frequency Ordered    02/17/22 0900  predniSONE tablet " 40 mg         -- Oral Daily 02/16/22 1116    02/01/22 1557  melatonin tablet 6 mg  (Medication Panel)         -- Oral Nightly PRN 02/01/22 1557          Pressors:    Autonomic Drugs (From admission, onward)                Start     Stop Route Frequency Ordered    02/26/22 0815  NORepinephrine 4 mg in dextrose 5% 250 mL infusion (premix) (titrating)        Question Answer Comment   Begin at (in mcg/kg/min): 0.02    Titrate by: (in mcg/kg/min) 0.02    Titrate interval: (in minutes) 5    Titrate to maintain: (MAP or SBP) MAP    Greater than: (in mmHg) 65    Maximum dose: (in mcg/kg/min) 3        -- IV Continuous 02/26/22 0802          Hyperglycemia/Diabetes Medications:   Antihyperglycemics (From admission, onward)                Start     Stop Route Frequency Ordered    02/26/22 1103  insulin aspart U-100 pen 0-4 Units         -- SubQ As needed (PRN) 02/26/22 1003

## 2022-02-26 NOTE — PROGRESS NOTES
Pharmacokinetic Initial Assessment: IV Vancomycin    Assessment/Plan:    Initiate intravenous vancomycin with loading dose of 1500 mg once with subsequent doses when random concentrations are less than 20 mcg/mL  Desired empiric serum trough concentration is 10 to 20 mcg/mL  Draw vancomycin random level on 2/27 with AM labs.  Pharmacy will continue to follow and monitor vancomycin.      Please contact pharmacy at extension [phone] with any questions regarding this assessment.     Thank you for the consult,   Reji Guzman       Patient brief summary:  Tammi Farris is a 71 y.o. female initiated on antimicrobial therapy with IV Vancomycin for treatment of suspected intra-abdominal infection    Drug Allergies:   Review of patient's allergies indicates:   Allergen Reactions    Dobutamine Hives    Benadryl [diphenhydramine hcl] Anxiety     Pt states she feels jumpy and anxious when taking.    Darvon [propoxyphene] Nausea Only    Shellfish containing products Hives    Iodinated contrast media Hives    Lisinopril Other (See Comments)     cough    Propoxyphene hcl      Itchy (skin)^    Tizanidine Other (See Comments)     Sweaty, difficulty swallowing    Statins-hmg-coa reductase inhibitors Anxiety and Other (See Comments)     Myalgia, fatigue, palpitations, anxiety       Actual Body Weight:   72.9 kg    Renal Function:   Estimated Creatinine Clearance: 28.8 mL/min (A) (based on SCr of 1.6 mg/dL (H)).,     Dialysis Method (if applicable):  SLED/CRRT    CBC (last 72 hours):  Recent Labs   Lab Result Units 02/24/22  0344 02/25/22  0325 02/26/22  0938   WBC K/uL 10.56 8.59 8.24   Hemoglobin g/dL 7.7* 7.8* 6.9*   Hematocrit % 24.4* 23.7* 20.7*   Platelets K/uL 51* 46* 31*   Gran % % 80.8* 83.0* 70.4   Lymph % % 12.7* 11.1* 24.8   Mono % % 5.8 5.5 4.0   Eosinophil % % 0.0 0.0 0.2   Basophil % % 0.1 0.1 0.1   Differential Method  Automated Automated Automated       Metabolic Panel (last 72 hours):  Recent Labs   Lab  Result Units 02/23/22 2121 02/23/22 2357 02/24/22  0344 02/24/22  1506 02/24/22 2137 02/24/22  2346 02/25/22  0325 02/25/22  0813 02/25/22  1241 02/26/22  0938 02/26/22  1402   Sodium mmol/L 137 139 138  138 139 136 135* 133*  133* 134* 134* 138 137  136   Potassium mmol/L 5.7*  5.7* 5.3*  5.3* 5.2*  5.2*  5.2* 5.4* 5.5*  5.5* 6.1*  6.1* 5.9*  5.9*  5.9* 5.8*  5.8* 5.8*  5.8* 4.9 4.3  4.3   Chloride mmol/L 104 103 102  102 103 102 103 98  98 99 99 102 99  97   CO2 mmol/L 17* 20* 22*  22* 20* 19* 14* 16*  16* 15* 17* 21* 22*  22*   Glucose mg/dL 232* 246* 203*  203* 153* 296* 278* 258*  258* 254* 232* 94 101  100   BUN mg/dL 110* 107* 113*  113* 124* 120* 129* 130*  130* 132* 137* 74* 37*  37*   Creatinine mg/dL 3.7* 4.0* 3.9*  3.9* 4.0* 4.1* 4.2* 4.5*  4.5* 4.6* 4.4* 2.7* 1.6*  1.6*   Albumin g/dL  --   --  2.5* 2.4*  --   --  2.4*  --   --  2.2* 2.4*   Total Bilirubin mg/dL  --   --  2.1*  --   --   --  2.1*  --   --  2.6*  --    Alkaline Phosphatase U/L  --   --  166*  --   --   --  158*  --   --  130  --    AST U/L  --   --  40  --   --   --  44*  --   --  65*  --    ALT U/L  --   --  46*  --   --   --  51*  --   --  55*  --    Magnesium mg/dL  --   --  2.3  --   --   --  2.3  --   --   --   --    Phosphorus mg/dL  --   --  5.8* 5.7*  --   --  6.6*  --   --   --  2.6*       Drug levels (last 3 results):  No results for input(s): VANCOMYCINRA, VANCOMYCINPE, VANCOMYCINTR in the last 72 hours.    Microbiologic Results:  Microbiology Results (last 7 days)     Procedure Component Value Units Date/Time    Blood culture [[id number]]     Order Status: No result Specimen: Blood     Blood culture [[id number]]     Order Status: No result Specimen: Blood     Culture, Respiratory with Gram Stain [[id number]]     Order Status: No result Specimen: Respiratory from Sputum, Expectorated

## 2022-02-26 NOTE — ASSESSMENT & PLAN NOTE
Hyperkalemia  Likely 2/2 renal failure. Improved with dialysis.     Plan  - SLED  - Continue to monitor  - Shift PRN   details… detailed exam

## 2022-02-26 NOTE — ASSESSMENT & PLAN NOTE
Plan:  Goal blood glucose 140-180  Follow up endocrine recs/notify them of any diet changes  Last A1c 5.9

## 2022-02-26 NOTE — PROGRESS NOTES
[facility] - Cardiac Intensive Care  Rheumatology  Progress Note    Patient Name: Tammi Farris  MRN: [id number]  Admission Date: 2/1/2022  Hospital Length of Stay: 24 days  Code Status: Full Code   Attending Provider: Chalino Ingram*  Primary Care Physician: Ann-Marie Hartman MD  Principal Problem: Acute renal failure superimposed on stage 2 chronic kidney disease    Subjective:     HPI: Tammi Farris is a 71 y.o. with HTN, diabetes type 2 (hemoglobin A1c 5.9 on 2/1), CKD2, cirrhosis, HFpEF, macrocytic anemia, psorasis, sleep apnea and GERD who was admitted to [facility] on 2/1/22 for acute renal failure.    She initially presented for cough and dyspnea for the past few months. She was seen by cardiology in 1/2022 and started on Lasix 20mg without improvement.  Additionally, her sister reported that she had decrease appetite, fatigue, and weight loss. She had also experienced decreased urine output for the past few weeks. She notes some gingival bleeding and has been having this when brushing teeth for about 3 weeks. She endorses ongoing alopecia for the past 3 years and skin changes in her fingers in the cold. She denies joint swelling, joint pain, oral ulcers, skin rash, history of miscarriages or chest pain. She does not have history of autoimmune disease in her family. She is a never smoker, denies alcohol or illicit drug use.    She follows with hepatology for cirrhosis due to GARCIA. She also follows with hematology for chronic macrocytic anemia with elevated free light chains and has declined bone marrow biopsy in past.     She arrived for evaluation to the ED on 2/1 were labs revealed ANI with creatinine 3.0 (baseline around 1.3), hyperkalemia, metabolic acidosis with elevated lactic acid, elevated liver enzymes, elevated BNP (1812), elevated d-dimer, thrombocytopenia (plts 88), and markedly concentrated urine. Initially, her ANI was attributed to dehydration in the setting of diuretic use so  diuretic was held and she was given gentle IV hydration. IV fluids were later discontinued following day due to development of crackles in lung fields on exam but later restarted. She was placed on bicarb gtt for metabolic acidosis per nephrology. Nephrology initially concerned for Vasculitis vs gbm vs MM, noted to have decreased complement and active sediment concerning for GN. She was later transferred to the ICU on 2/4 for episode of desaturation while on bipap. Hematology was consulted and stated that respiratory failure may be due to viral illness given worsening cytopenias. Pulmonology was consulted on 2/4 and stated that there was concern for combination of underlying ILD vs pulmonary edema vs pulmonary-renal syndrome. On 2/5, nephrology recommended pulse dose steroids for concern for GN with plan for kidney biopsy by IR. She completed 3 days of pulse dose steroids for suspected glomerulonephritis vs pulm/renal syndrome (2/5-2/7), then transitioned to pred 60mg on 2/8. However, on 2/7, she was found to require HD which was performed and kidney biopsy was postponed. However, her renal failure and thrombocytopenia continued to worsen, with creatinine peaking at 6.4 and platelets as low as 26,000. On 2/16, Nephrology recommended adding Cellcept and reducing the Prednisone dose. However, the patient was unable to swallow the MMF pills.     [place] [facility] [place] consult.      Rheumatological ROS:  No skin rashes,malar rash,photosensitivity   No telangiectasias   No calcinosis   No psoriasis   No patchy alopecia   No oral and nasal ulcers   No dry eyes and dry mouth   No pleurisy or any cardiopulmonary complaints   No dysphagia,diplopia and dysphonia and muscle weakness   No n/v/d/c   No acid reflux+   + raynaud's+   No digital ulcers   No cytopenias   No renal issues   No blood clots   No fever,chills,night sweats,weight loss and loss of appetite   No pregnancy losses/pre term  deliveries /pregnancy complications   No new onset headaches   No recurrent conjunctivitis or uveitis or scleritis or episcleritis   No chronic or bloody diarrhea with no u colitis or crohn's /inflammatory bowel disease   No vaginal or urethral  d/c/STDs/no ulcers   No joint pains         Interval History: Plans to move to ICU today for CRRT.    Current Facility-Administered Medications   Medication Frequency    0.9%  NaCl infusion (CRRT USE ONLY) Continuous    0.9%  NaCl infusion PRN    0.9%  NaCl infusion Once    0.9%  NaCl infusion Once    acetaminophen tablet 650 mg Q6H PRN    albuterol-ipratropium 2.5 mg-0.5 mg/3 mL nebulizer solution 3 mL Q4H PRN    benzonatate capsule 100 mg TID PRN    bisacodyL suppository 10 mg Daily PRN    dextromethorphan-guaiFENesin  mg/5 ml liquid 5 mL Q6H    dextrose 10% bolus 125 mL PRN    dextrose 10% bolus 250 mL PRN    famotidine tablet 20 mg Daily    glucagon (human recombinant) injection 1 mg PRN    glucose chewable tablet 16 g PRN    glucose chewable tablet 24 g PRN    heparin (porcine) injection 3,200 Units PRN    hydrOXYchloroQUINE tablet 200 mg Daily    insulin aspart U-100 pen 0-10 Units PRN    insulin regular in 0.9 % NaCl 100 unit/100 mL (1 unit/mL) infusion Continuous    iohexoL (OMNIPAQUE 350) injection 100 mL ONCE PRN    lanthanum chewable tablet 500 mg QID    magnesium sulfate 2g in water 50mL IVPB (premix) PRN    melatonin tablet 6 mg Nightly PRN    metoprolol 12.5mg/1.25mL oral solution 6.25 mg BID    mycophenolate mofetil 200 mg/mL suspension 500 mg Q6H    naloxone 0.4 mg/mL injection 0.02 mg PRN    ondansetron injection 4 mg TID AC    polyethylene glycol packet 17 g Daily PRN    predniSONE tablet 40 mg Daily    simethicone chewable tablet 80 mg TID PRN    sodium chloride 0.9% bolus 250 mL PRN    sodium chloride 0.9% bolus 250 mL PRN    sodium chloride 0.9% flush 10 mL PRN    vitamin renal formula (B-complex-vitamin c-folic  acid) 1 mg per capsule 1 capsule Daily     Objective:     Vital Signs (Most Recent):  Temp: 98.6 °F (37 °C) (02/25/22 1600)  Pulse: 87 (02/25/22 1700)  Resp: (!) 27 (02/25/22 1700)  BP: (!) 120/52 (02/25/22 1700)  SpO2: (!) 94 % (02/25/22 1700)  O2 Device (Oxygen Therapy): room air (02/25/22 1700)   Vital Signs (24h Range):  Temp:  [96.8 °F (36 °C)-98.6 °F (37 °C)] 98.6 °F (37 °C)  Pulse:  [70-89] 87  Resp:  [18-62] 27  SpO2:  [90 %-100 %] 94 %  BP: ()/(44-57) 120/52     Weight: 72.8 kg (160 lb 7.9 oz) (02/24/22 1100)  Body mass index is 34.73 kg/m².  Body surface area is 1.71 meters squared.      Intake/Output Summary (Last 24 hours) at 2/25/2022 1811  Last data filed at 2/25/2022 1500  Gross per 24 hour   Intake 250 ml   Output --   Net 250 ml       Physical Exam   Constitutional: She appears ill. No distress.   HENT:   Head: Normocephalic and atraumatic.   Eyes: Conjunctivae are normal.   Cardiovascular: Normal rate, regular rhythm and normal heart sounds. Exam reveals no friction rub.   No murmur heard.  Pulmonary/Chest: Effort normal. She has no wheezes. She has rales.   Abdominal: Soft. Bowel sounds are normal. There is no abdominal tenderness. There is no rebound.   Musculoskeletal:      Comments: No synovitis in upper or lower extremities   Neurological: She is alert.   Skin: Skin is warm and dry. She is not diaphoretic.     Significant Labs:  CBC:   Recent Labs   Lab 02/25/22  0325   WBC 8.59   HGB 7.8*   HCT 23.7*   PLT 46*     CMP:   Recent Labs   Lab 02/25/22  0325 02/25/22  0813 02/25/22  1241   *  258*   < > 232*   CALCIUM 9.3  9.3   < > 9.0   ALBUMIN 2.4*  --   --    PROT 6.3  --   --    *  133*   < > 134*   K 5.9*  5.9*  5.9*   < > 5.8*  5.8*   CO2 16*  16*   < > 17*   CL 98  98   < > 99   *  130*   < > 137*   CREATININE 4.5*  4.5*   < > 4.4*   ALKPHOS 158*  --   --    ALT 51*  --   --    AST 44*  --   --    BILITOT 2.1*  --   --     < > = values in this interval  not displayed.         Assessment/Plan:     * Acute renal failure superimposed on stage 2 chronic kidney disease  71 y.o. with HTN, diabetes type 2 (hemoglobin A1c 5.9 on 2/1), CKD2, cirrhosis, HFpEF, macrocytic anemia, psorasis, sleep apnea and GERD who was admitted to [facility] on 2/1/22 for acute renal failure. She endorses fatigue, alopecia (for past 3 years), and +raynauds. Denies oral ulcers, , rash, joint swelling/joint pain, chest pain. Stress echo from 12/2021 showed trivial pericardial effusion.    Labs:  ARMEN 1:1280 on 2/3  +Sm/RNP: 4.06 on 2/3  UPE: no paraprotein bands  ESR: > 140  CRP: 20.8  C3: 26 (L)--> 40  C4: < 3 (L)--> 5  VALENTINO: pos (VALENTINO pos 2/2  cold autob, haptoglobin and LDH wnl)  UA: Protein 3+, occult blood 3+, WBC 3+  Microscopic UA: RBC >100, WBC 40  Timed urine protein: 560  CRP 20.9  Haptoglobin: norm  CCP: norm  RF: norm  GBM ab: negative  ANCA: negative  Hep B surface antigen: neg  Hep C ab: neg    Treatment history while inpatient:  - S/p Methylpred 1g x 3 days (2/5-2/7), then transitioned to pred 60mg on 2/8, decreased to prednisone 50mg on 2/15, prednisone on 40mg on 2/17- present    According to the EULAR/ACR 2019 criteria for SLE, she meets criteria for lupus (17pts)  due to: +ARMEN, +thrombocytopenia,+ proteinuria, + low C3, + low C4, + pericardial effusion (on stress echo from 12/2021) + alopecia.     Plan/Recommendations:  - Continue to follow up APS labs  - continue plaquenil 200mg daily  - nephrology consulted, recommended cellcept PO and renal biopsy to be done at later date given concern for complications  - prednisone per nephrology, would recommend PJP ppx and GI ppx while on high dose steroids      Patient seen and evaluated with Dr. Miller. Please see staff attestation for final recommendations.              Giovanna Galeana MD  Rheumatology  [facility] - Cardiac Intensive Care

## 2022-02-26 NOTE — SUBJECTIVE & OBJECTIVE
Interval History/Significant Events: Patient dialysis clotted off overnight, HgB dropped to 6.9. Complaining of nausea and worsening abdominal pain. Now requiring a touch of pressors.     Review of Systems   Constitutional:  Positive for fatigue. Negative for fever.   Respiratory:  Negative for chest tightness and shortness of breath.    Cardiovascular:  Positive for leg swelling. Negative for chest pain.   Gastrointestinal:  Positive for abdominal pain, nausea and vomiting. Negative for abdominal distention and constipation.   Genitourinary:  Negative for dysuria.        Patient gets diaylsis   Skin:  Negative for color change and rash.   Neurological:  Negative for facial asymmetry.   Psychiatric/Behavioral:  Negative for agitation and behavioral problems.    Objective:     Vital Signs (Most Recent):  Temp: 97.4 °F (36.3 °C) (02/26/22 0301)  Pulse: 83 (02/26/22 0630)  Resp: (!) 21 (02/26/22 1509)  BP: (!) 98/42 (02/26/22 0900)  SpO2: 100 % (02/26/22 0630)   Vital Signs (24h Range):  Temp:  [96.8 °F (36 °C)-98.6 °F (37 °C)] 97.4 °F (36.3 °C)  Pulse:  [81-90] 83  Resp:  [15-53] 21  SpO2:  [90 %-100 %] 100 %  BP: ()/(42-54) 98/42   Weight: 72.9 kg (160 lb 11.5 oz)  Body mass index is 34.78 kg/m².      Intake/Output Summary (Last 24 hours) at 2/26/2022 1525  Last data filed at 2/26/2022 1200  Gross per 24 hour   Intake 310 ml   Output 290 ml   Net 20 ml       Physical Exam  Constitutional:       Appearance: She is obese. She is ill-appearing.   HENT:      Head: Normocephalic.   Eyes:      General: No scleral icterus.        Right eye: No discharge.         Left eye: No discharge.      Conjunctiva/sclera: Conjunctivae normal.   Neck:      Comments: Right sided tunnel cath  Cardiovascular:      Rate and Rhythm: Normal rate.      Pulses: Normal pulses.      Heart sounds: No murmur heard.  Pulmonary:      Breath sounds: Normal breath sounds.   Abdominal:      General: There is distension.      Tenderness: There is  abdominal tenderness (RUQ). There is no guarding.   Musculoskeletal:      Right lower leg: Edema present.      Left lower leg: Edema present.   Skin:     General: Skin is warm and dry.      Coloration: Skin is not jaundiced.      Findings: Bruising present.   Neurological:      General: No focal deficit present.      Mental Status: She is alert.   Psychiatric:         Mood and Affect: Mood normal.         Behavior: Behavior normal.       Vents:  Oxygen Concentration (%): 332 (02/25/22 1957)  Lines/Drains/Airways       Central Venous Catheter Line  Duration                  Hemodialysis Catheter 02/07/22 1436 right internal jugular 19 days              Peripheral Intravenous Line  Duration                  Peripheral IV - Single Lumen 02/24/22 0855 20 G;1 3/4 in Left;Anterior Forearm 2 days                  Significant Labs:    CBC/Anemia Profile:  Recent Labs   Lab 02/25/22  0325 02/26/22  0938   WBC 8.59 8.24   HGB 7.8* 6.9*   HCT 23.7* 20.7*   PLT 46* 31*   * 106*   RDW 20.0* 19.3*   FERRITIN  --  170        Chemistries:  Recent Labs   Lab 02/25/22  0325 02/25/22  0813 02/25/22  1241 02/26/22  0938 02/26/22  1402   *  133*   < > 134* 138 137  136   K 5.9*  5.9*  5.9*   < > 5.8*  5.8* 4.9 4.3  4.3   CL 98  98   < > 99 102 99  97   CO2 16*  16*   < > 17* 21* 22*  22*   *  130*   < > 137* 74* 37*  37*   CREATININE 4.5*  4.5*   < > 4.4* 2.7* 1.6*  1.6*   CALCIUM 9.3  9.3   < > 9.0 8.3* 8.2*  8.1*   ALBUMIN 2.4*  --   --  2.2* 2.4*   PROT 6.3  --   --  5.6*  --    BILITOT 2.1*  --   --  2.6*  --    ALKPHOS 158*  --   --  130  --    ALT 51*  --   --  55*  --    AST 44*  --   --  65*  --    MG 2.3  --   --   --   --    PHOS 6.6*  --   --   --  2.6*    < > = values in this interval not displayed.       Lactic Acid:   Recent Labs   Lab 02/26/22  1415   LACTATE 6.2*     All pertinent labs within the past 24 hours have been reviewed.    Significant Imaging:  I have reviewed all pertinent  imaging results/findings within the past 24 hours.

## 2022-02-26 NOTE — PROGRESS NOTES
[facility]  Critical Care Medicine  Progress Note    Patient Name: Tammi Farris  MRN: [id number]  Admission Date: 2/1/2022  Hospital Length of Stay: 25 days  Code Status: Full Code  Attending Provider: Chalino Ingram*  Primary Care Provider: Ann-Marie Hartman MD   Principal Problem: Acute renal failure superimposed on stage 2 chronic kidney disease    Subjective:     HPI:  Tammi Farris is a 71 y.o. F with history of CKD2, HTN, DM, cirrhosis, and PAULINA, who was admitted to [facility] with acute renal failure. Found to have low complements and positive anti-Sm /RNP antibodies. Unable to renal biopsy due to thrombocytopenia (~ 50). She was treated with pulse dose steroids for 3 days and now on prednisone. Attempted to initiate Cellcept but has not taken due to difficulty with pill size. Initiated on HD, received several sessions, last 2/16. Held over weekend as her Cr improving to 3.5 from 6.4. She reports making good urine. Denies confusion. Concern for possible lupus nephritis and transferred to [facility] per rheumatology /nephrology recommendations for IR renal biopsy. Patient admitted to ICU for CRRT needs. Patient notes she has not been feeling well for a couple of months. She says she gets dialysis because she has liver disease.         Hospital/ICU Course:  Stepped up to MICU 2/25 for CRRT. Patient now requiring pressor support (low levels of levo), and found to have HgB of 6.9, unit of PRBC transfused. Has had struggled with dialysis clotting off. Additionally, has been experiencing abdominal pain for some time. Bedside ultrasound revealing ascites, lactate 6.2. CT abd/pelvis pending. Dx para pending. Echo pending.       Interval History/Significant Events: Patient dialysis clotted off overnight, HgB dropped to 6.9. Complaining of nausea and worsening abdominal pain. Now requiring a touch of pressors.     Review of Systems   Constitutional:  Positive for fatigue. Negative for fever.    Respiratory:  Negative for chest tightness and shortness of breath.    Cardiovascular:  Positive for leg swelling. Negative for chest pain.   Gastrointestinal:  Positive for abdominal pain, nausea and vomiting. Negative for abdominal distention and constipation.   Genitourinary:  Negative for dysuria.        Patient gets diaylsis   Skin:  Negative for color change and rash.   Neurological:  Negative for facial asymmetry.   Psychiatric/Behavioral:  Negative for agitation and behavioral problems.    Objective:     Vital Signs (Most Recent):  Temp: 97.4 °F (36.3 °C) (02/26/22 0301)  Pulse: 83 (02/26/22 0630)  Resp: (!) 21 (02/26/22 1509)  BP: (!) 98/42 (02/26/22 0900)  SpO2: 100 % (02/26/22 0630)   Vital Signs (24h Range):  Temp:  [96.8 °F (36 °C)-98.6 °F (37 °C)] 97.4 °F (36.3 °C)  Pulse:  [81-90] 83  Resp:  [15-53] 21  SpO2:  [90 %-100 %] 100 %  BP: ()/(42-54) 98/42   Weight: 72.9 kg (160 lb 11.5 oz)  Body mass index is 34.78 kg/m².      Intake/Output Summary (Last 24 hours) at 2/26/2022 1525  Last data filed at 2/26/2022 1200  Gross per 24 hour   Intake 310 ml   Output 290 ml   Net 20 ml       Physical Exam  Constitutional:       Appearance: She is obese. She is ill-appearing.   HENT:      Head: Normocephalic.   Eyes:      General: No scleral icterus.        Right eye: No discharge.         Left eye: No discharge.      Conjunctiva/sclera: Conjunctivae normal.   Neck:      Comments: Right sided tunnel cath  Cardiovascular:      Rate and Rhythm: Normal rate.      Pulses: Normal pulses.      Heart sounds: No murmur heard.  Pulmonary:      Breath sounds: Normal breath sounds.   Abdominal:      General: There is distension.      Tenderness: There is abdominal tenderness (RUQ). There is no guarding.   Musculoskeletal:      Right lower leg: Edema present.      Left lower leg: Edema present.   Skin:     General: Skin is warm and dry.      Coloration: Skin is not jaundiced.      Findings: Bruising present.    Neurological:      General: No focal deficit present.      Mental Status: She is alert.   Psychiatric:         Mood and Affect: Mood normal.         Behavior: Behavior normal.       Vents:  Oxygen Concentration (%): 332 (02/25/22 1957)  Lines/Drains/Airways       Central Venous Catheter Line  Duration                  Hemodialysis Catheter 02/07/22 1436 right internal jugular 19 days              Peripheral Intravenous Line  Duration                  Peripheral IV - Single Lumen 02/24/22 0855 20 G;1 3/4 in Left;Anterior Forearm 2 days                  Significant Labs:    CBC/Anemia Profile:  Recent Labs   Lab 02/25/22  0325 02/26/22  0938   WBC 8.59 8.24   HGB 7.8* 6.9*   HCT 23.7* 20.7*   PLT 46* 31*   * 106*   RDW 20.0* 19.3*   FERRITIN  --  170        Chemistries:  Recent Labs   Lab 02/25/22  0325 02/25/22  0813 02/25/22  1241 02/26/22  0938 02/26/22  1402   *  133*   < > 134* 138 137  136   K 5.9*  5.9*  5.9*   < > 5.8*  5.8* 4.9 4.3  4.3   CL 98  98   < > 99 102 99  97   CO2 16*  16*   < > 17* 21* 22*  22*   *  130*   < > 137* 74* 37*  37*   CREATININE 4.5*  4.5*   < > 4.4* 2.7* 1.6*  1.6*   CALCIUM 9.3  9.3   < > 9.0 8.3* 8.2*  8.1*   ALBUMIN 2.4*  --   --  2.2* 2.4*   PROT 6.3  --   --  5.6*  --    BILITOT 2.1*  --   --  2.6*  --    ALKPHOS 158*  --   --  130  --    ALT 51*  --   --  55*  --    AST 44*  --   --  65*  --    MG 2.3  --   --   --   --    PHOS 6.6*  --   --   --  2.6*    < > = values in this interval not displayed.       Lactic Acid:   Recent Labs   Lab 02/26/22  1415   LACTATE 6.2*     All pertinent labs within the past 24 hours have been reviewed.    Significant Imaging:  I have reviewed all pertinent imaging results/findings within the past 24 hours.      ABG  No results for input(s): PH, PO2, PCO2, HCO3, BE in the last 168 hours.  Assessment/Plan:     Cardiac/Vascular  Primary hypertension  On metoprolol 6.25mg BID    Hyperlipidemia  Not on statin due to  history of cirrhosis    Renal/  * Acute renal failure superimposed on stage 2 chronic kidney disease  71 y.o F w/ dialysis dependent ANI thought to be secondary to an auto-immune etiology, based on serologic pattern of possitive ARMEN, Anti-Posey RNP and low complement levels, negative ANCA.  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      Plan:  --patient admitted to ICU for SLED (6 hours)  --Continue Cellcept liquid suspension, 500 mg Q6h with food to reduce GI side effects. If well tolerated, can transition to 1000 mg Q12h  --continue Hydroxychloroquine  --continue steroids 40 mg daily  --renal bx per nephrology after clinically stable  -- Strict I/O  -- Daily BMP, mag, phos  -- Avoid nephrotoxins    Hyperphosphatemia  Phosphate improved with dialysis  See acute renal failure    Systemic lupus erythematosus with glomerular disease  Rheumatology following patient.   Patient S/p Methylpred 1g x 3 days (2/5-2/7), then transitioned to pred 60mg on 2/8, decreased to prednisone 50mg on 2/15, prednisone on 40mg on 2/17- present     Patient meets criteria for SLE due to: +ARMEN, +thrombocytopenia,+ proteinuria, + low C3, + low C4, + pericardial effusion (on stress echo from 12/2021) + alopecia.      Plan  - APS labs ordered  - continue plaquenil 200mg daily  - nephrology consulted, recommended cellcept PO and renal biopsy to be done at later date given concern for complications  - prednisone started by nephrology, will consider atovaquone for PJP ppx    Hyperkalemia  Hyperkalemia  Likely 2/2 renal failure. Improved with dialysis.     Plan  - SLED  - Continue to monitor  - Shift PRN    ID  Severe sepsis  This patient does have evidence of infective focus  My overall impression is septic shock. Vital signs were reviewed and noted in progress note.  Antibiotics given-   Antibiotics (From admission, onward)            Start     Stop Route Frequency Ordered    02/26/22 5955  vancomycin 1.5 g in dextrose 5 % 250 mL IVPB (ready to  "mix)         -- IV Once 02/26/22 1536    02/26/22 1630  piperacillin-tazobactam 4.5 g in sodium chloride 0.9% 100 mL IVPB (ready to mix system)         -- IV Every 8 hours (non-standard times) 02/26/22 1538    02/26/22 1623  vancomycin - pharmacy to dose  (vancomycin IVPB)        "And" Linked Group Details    -- IV pharmacy to manage frequency 02/26/22 1524        Cultures were taken-   Microbiology Results (last 7 days)     Procedure Component Value Units Date/Time    Blood culture [[id number]]     Order Status: No result Specimen: Blood     Blood culture [[id number]]     Order Status: No result Specimen: Blood     Culture, Respiratory with Gram Stain [[id number]]     Order Status: No result Specimen: Respiratory from Sputum, Expectorated         Latest lactate reviewed, they are-  Recent Labs   Lab 02/26/22  1415   LACTATE 6.2*       Organ dysfunction indicated by Acute kidney injury  Source- TBD      Plan:  - vanc and zosyn  - blood cultures  - diagnostic para  - fluids with prbc and platelets      Hematology  Thrombocytopenia   Platelets chronically low. Hematology following     Plan:  Transfuse PLT if <10,000, or <50,000 with active bleeding or before invasive procedures.   Most likely a chronic thrombocytopenia that is worsening in the setting of active lupus and acute illness.   Follow-up Heme recs  Transfusing unit prior to diagnostic para            Oncology  Macrocytic anemia  Patient with history of Cirrhosis. HgB dropped to 6.9. Receiving unit of PRBC    Plan:  -Follow up daily CBC  -Transfuse for Hgb less than 7    Endocrine  Type 2 diabetes mellitus without complication, without long-term current use of insulin  Last A1c 5.9    Plan:  Goal blood glucose 140-180  Follow up endocrine recs/notify them of any diet changes          GI  Cirrhosis of liver without ascites  Patient complaining of abdominal pain. Bedside ultrasound revealing ascites. Lactate elevated at 6.2. Ct from 2/1 revealing small amount of " ascites.     - Ct abd/pelvis  - diagnostic para      Gastroesophageal reflux disease  Continue Famotidine while on steroids    Other  Debility  PT/OT consulted    Sleep apnea  BiPAP [facility] [place]     Critical Care Daily Checklist:    A: Awake: RASS Goal/Actual Goal:    Actual: [place] Agitation Sedation Scale (RASS): Alert and calm   B: Spontaneous Breathing Trial Performed?     C: SAT & SBT Coordinated?                        D: Delirium: CAM-ICU Overall CAM-ICU: Negative   E: Early Mobility Performed? No   F: Feeding Goal: Goals: Pt to meet > 50% EEN by RD follow up  Status: Nutrition Goal Status: progressing towards goal   Current Diet Order   Procedures    Diet full liquid [facility]; Renal, Consistent Carbohydrate; Thin     Order Specific Question:   Indicate patient location for additional diet options:     Answer:   [facility]     Order Specific Question:   Additional Diet Options:     Answer:   Renal     Order Specific Question:   Additional Diet Options:     Answer:   Consistent Carbohydrate     Order Specific Question:   Fluid consistency:     Answer:   Thin      AS: Analgesia/Sedation none   T: Thromboembolic Prophylaxis Low platelets, no   H: HOB > 300 Yes   U: Stress Ulcer Prophylaxis (if needed) famotidine   G: Glucose Control Endo managing   B: Bowel Function Stool Occurrence: 1   I: Indwelling Catheter (Lines & Yao) Necessity R tunnel cath, PIV   D: De-escalation of Antimicrobials/Pharmacotherapies Broad spectrum abx vanc and zosyn    Plan for the day/ETD CT abd/pelvis, diagnostic para, abx    Code Status:  Family/Goals of Care: Full Code         Critical secondary to Patient has a condition that poses threat to life and bodily function: Acute Renal Failure and sepsis      Critical care was time spent personally by me on the following activities: development of treatment plan with patient or surrogate and bedside caregivers, discussions with consultants, evaluation of patient's response to  treatment, examination of patient, ordering and performing treatments and interventions, ordering and review of laboratory studies, ordering and review of radiographic studies, pulse oximetry, re-evaluation of patient's condition. This critical care time did not overlap with that of any other provider or involve time for any procedures.     Juan Hanley,   Critical Care Medicine  [facility]

## 2022-02-26 NOTE — ASSESSMENT & PLAN NOTE
Patient with history of Cirrhosis. Hgb of 7.8.    Plan:  -Follow up daily CBC  -Transfuse for Hgb less than 7

## 2022-02-26 NOTE — ASSESSMENT & PLAN NOTE
Last A1c 5.9    Plan:  Goal blood glucose 140-180  Follow up endocrine recs/notify them of any diet changes

## 2022-02-26 NOTE — ASSESSMENT & PLAN NOTE
Platelets chronically low. Hematology following     Plan:  Transfuse PLT if <10,000, or <50,000 with active bleeding or before invasive procedures.   Most likely a chronic thrombocytopenia that is worsening in the setting of active lupus and acute illness.   Follow-up Heme recs  Transfusing unit prior to diagnostic para

## 2022-02-26 NOTE — SUBJECTIVE & OBJECTIVE
Interval History:   Pt' in declining overall condition. States feeling short of breath, very tired, and fatigued. Significant other at the bedside.   Pt unable to be maintain on CRRT-SLED d/t clotting of filter, pt's platelets have dropped to 31 and hgb is 6.9 from 7.8 yesterday.     Review of patient's allergies indicates:   Allergen Reactions    Dobutamine Hives    Benadryl [diphenhydramine hcl] Anxiety     Pt states she feels jumpy and anxious when taking.    Darvon [propoxyphene] Nausea Only    Shellfish containing products Hives    Iodinated contrast media Hives    Lisinopril Other (See Comments)     cough    Propoxyphene hcl      Itchy (skin)^    Tizanidine Other (See Comments)     Sweaty, difficulty swallowing    Statins-hmg-coa reductase inhibitors Anxiety and Other (See Comments)     Myalgia, fatigue, palpitations, anxiety     Current Facility-Administered Medications   Medication Frequency    0.9%  NaCl infusion (CRRT USE ONLY) Continuous    0.9%  NaCl infusion (CRRT USE ONLY) Continuous    0.9%  NaCl infusion (for blood administration) Q24H PRN    0.9%  NaCl infusion (for blood administration) Q24H PRN    0.9%  NaCl infusion PRN    acetaminophen tablet 650 mg Q6H PRN    albuterol-ipratropium 2.5 mg-0.5 mg/3 mL nebulizer solution 3 mL Q4H PRN    alteplase injection 2 mg Once    benzonatate capsule 100 mg TID PRN    bisacodyL suppository 10 mg Daily PRN    calcium gluconate 3 g in dextrose 5 % 100 mL infusion Continuous    dextromethorphan-guaiFENesin  mg/5 ml liquid 5 mL Q6H    dextrose 10% bolus 125 mL PRN    dextrose 10% bolus 250 mL PRN    dextrose-sod citrate-citric ac 2.45-2.2 gram- 800 mg/100 mL Soln Continuous    famotidine tablet 20 mg Daily    glucagon (human recombinant) injection 1 mg PRN    glucose chewable tablet 16 g PRN    glucose chewable tablet 24 g PRN    heparin (porcine) injection 3,200 Units PRN    hydrOXYchloroQUINE tablet 200 mg Daily    insulin aspart U-100 pen 0-4 Units PRN     iohexoL (OMNIPAQUE 350) injection 100 mL ONCE PRN    lanthanum chewable tablet 500 mg QID    magnesium sulfate 2g in water 50mL IVPB (premix) PRN    melatonin tablet 6 mg Nightly PRN    metoprolol 12.5mg/1.25mL oral solution 6.25 mg BID    mycophenolate mofetil 200 mg/mL suspension 500 mg Q6H    naloxone 0.4 mg/mL injection 0.02 mg PRN    NORepinephrine 4 mg in dextrose 5% 250 mL infusion (premix) (titrating) Continuous    ondansetron injection 4 mg TID AC    piperacillin-tazobactam 4.5 g in sodium chloride 0.9% 100 mL IVPB (ready to mix system) Q8H    polyethylene glycol packet 17 g Daily PRN    predniSONE tablet 40 mg Daily    simethicone chewable tablet 80 mg TID PRN    sodium chloride 0.9% bolus 250 mL PRN    sodium chloride 0.9% bolus 250 mL PRN    sodium chloride 0.9% flush 10 mL PRN    vancomycin - pharmacy to dose pharmacy to manage frequency    vancomycin 1.5 g in dextrose 5 % 250 mL IVPB (ready to mix) Once    vitamin renal formula (B-complex-vitamin c-folic acid) 1 mg per capsule 1 capsule Daily       Objective:     Vital Signs (Most Recent):  Temp: 97.4 °F (36.3 °C) (02/26/22 0301)  Pulse: 83 (02/26/22 0630)  Resp: (!) 21 (02/26/22 1509)  BP: (!) 98/42 (02/26/22 0900)  SpO2: 100 % (02/26/22 0630)  O2 Device (Oxygen Therapy): nasal cannula (02/26/22 0814)   Vital Signs (24h Range):  Temp:  [96.8 °F (36 °C)-97.8 °F (36.6 °C)] 97.4 °F (36.3 °C)  Pulse:  [81-90] 83  Resp:  [15-53] 21  SpO2:  [90 %-100 %] 100 %  BP: ()/(42-54) 98/42     Weight: 72.9 kg (160 lb 11.5 oz) (02/26/22 0400)  Body mass index is 34.78 kg/m².  Body surface area is 1.71 meters squared.    I/O last 3 completed shifts:  In: 250 [I.V.:250]  Out: 290 [Other:290]    Physical Exam  Vitals and nursing note reviewed.   Constitutional:       General: She is in acute distress.      Appearance: She is ill-appearing. She is not toxic-appearing or diaphoretic.   Cardiovascular:      Rate and Rhythm: Normal rate and regular rhythm.       Pulses: Normal pulses.      Heart sounds: Murmur heard.      Comments: RIJ HD catheter  Pulmonary:      Effort: Respiratory distress present.      Breath sounds: Rales present. No wheezing or rhonchi.      Comments: Crackles throughout all lung fields   Abdominal:      General: Abdomen is flat. Bowel sounds are normal. There is no distension.      Palpations: Abdomen is soft. There is no mass.      Tenderness: There is no abdominal tenderness. There is no guarding or rebound.   Musculoskeletal:         General: No swelling, tenderness or deformity.      Right lower leg: No edema.      Left lower leg: No edema.   Skin:     General: Skin is warm.      Capillary Refill: Capillary refill takes 2 to 3 seconds.      Coloration: Skin is pale. Skin is not jaundiced.      Findings: Bruising and erythema present. No lesion or rash.      Comments: Large ecchymotic area at HD catheter site    Neurological:      Mental Status: She is alert and oriented to person, place, and time.       Significant Labs:  Cardiac Markers: No results for input(s): CKMB, TROPONINT, MYOGLOBIN in the last 168 hours.  CBC:   Recent Labs   Lab 02/26/22  0938   WBC 8.24   RBC 1.95*   HGB 6.9*   HCT 20.7*   PLT 31*   *   MCH 35.4*   MCHC 33.3     Coagulation:   Recent Labs   Lab 02/21/22  0828   INR 1.4*     LFTs:   Recent Labs   Lab 02/26/22  0938 02/26/22  1402   ALT 55*  --    AST 65*  --    ALKPHOS 130  --    BILITOT 2.6*  --    PROT 5.6*  --    ALBUMIN 2.2* 2.4*     PTH: No results for input(s): PTH in the last 168 hours.  All labs within the past 24 hours have been reviewed.     Significant Imaging:  Labs: Reviewed  X-Ray: Reviewed

## 2022-02-26 NOTE — PROGRESS NOTES
"[facility]  Endocrinology  Progress Note    Admit Date: 2/1/2022     Reason for Consult: Management of type 2 DM, Hyperglycemia     Surgical Procedure and Date:none     Diabetes diagnosis year: about 10 years ago    Home Diabetes Medications:  metformin 1000 mg BID  Lab Results   Component Value Date    HGBA1C 5.9 (H) 02/01/2022         How often checking glucose at home?  Once daily    BG readings on regimen: 170-180s  Hypoglycemia on the regimen?  seldomly   Missed doses on regimen?  No    Diabetes Complications include:     Hyperglycemia, CKD     Complicating diabetes co morbidities:   HTN, HLD, Obesity, Cirrhosis, CKD, Glucocorticoid Use      HPI:   Patient is a 71 y.o. female with a diagnosis of type 2 DM, HTN, HLD, GERD, cirrhosis of liver, thrombocytopenia, and sleep apnea presents with a complaint of cough and congestion since October.  She is chronically dyspneic, exacerbated by exertion, has seen Cardiology and was prescribed lasix and metoprolol.  Her sister has now noted that the patient has very low urine output and is constipated.  Endocrinology consulted for BG/ DM management.                Interval HPI:   Overnight events: Remains in CICU. BG trending down on current IV insulin infusion at 1 u/hr. Insulin infusion stopped this morning per protocol. Creatinine 4.4.; CRRT. Appetite poor. Diet full liquid [facility]; Renal, Consistent Carbohydrate; Thin  Eating:   <25%  Nausea: No  Hypoglycemia and intervention: No  Fever: No  TPN and/or TF: No  If yes, type of TF/TPN and rate: n/a    BP (!) 93/54   Pulse 83   Temp 97.4 °F (36.3 °C)   Resp (!) 27   Ht 4' 9" (1.448 m)   Wt 72.9 kg (160 lb 11.5 oz)   SpO2 100%   Breastfeeding No   BMI 34.78 kg/m²     Labs Reviewed and Include    Recent Labs   Lab 02/25/22  1241   *   CALCIUM 9.0   *   K 5.8*  5.8*   CO2 17*   CL 99   *   CREATININE 4.4*     Lab Results   Component Value Date    WBC 8.24 02/26/2022 "    HGB 6.9 (L) 02/26/2022    HCT 20.7 (L) 02/26/2022     (H) 02/26/2022    PLT 46 (L) 02/25/2022     No results for input(s): TSH, FREET4 in the last 168 hours.  Lab Results   Component Value Date    HGBA1C 5.9 (H) 02/01/2022       Nutritional status:   Body mass index is 34.78 kg/m².  Lab Results   Component Value Date    ALBUMIN 2.4 (L) 02/25/2022    ALBUMIN 2.4 (L) 02/24/2022    ALBUMIN 2.5 (L) 02/24/2022     No results found for: PREALBUMIN    Estimated Creatinine Clearance: 10.5 mL/min (A) (based on SCr of 4.4 mg/dL (H)).    Accu-Checks  Recent Labs     02/24/22  2045 02/24/22  2112 02/25/22  0802 02/25/22  1132 02/25/22  1545 02/25/22  2240 02/26/22  0258 02/26/22  0330 02/26/22  0819 02/26/22  0943   POCTGLUCOSE 384* 329* 293* 269* 273* 157* 89 84 73 98       Current Medications and/or Treatments Impacting Glycemic Control  Immunotherapy:    Immunosuppressants           Stop Route Frequency     mycophenolate mofetil 200 mg/mL suspension 500 mg         -- Oral Every 6 hours          Steroids:   Hormones (From admission, onward)                Start     Stop Route Frequency Ordered    02/17/22 0900  predniSONE tablet 40 mg         -- Oral Daily 02/16/22 1116    02/01/22 1557  melatonin tablet 6 mg  (Medication Panel)         -- Oral Nightly PRN 02/01/22 1557          Pressors:    Autonomic Drugs (From admission, onward)                Start     Stop Route Frequency Ordered    02/26/22 0815  NORepinephrine 4 mg in dextrose 5% 250 mL infusion (premix) (titrating)        Question Answer Comment   Begin at (in mcg/kg/min): 0.02    Titrate by: (in mcg/kg/min) 0.02    Titrate interval: (in minutes) 5    Titrate to maintain: (MAP or SBP) MAP    Greater than: (in mmHg) 65    Maximum dose: (in mcg/kg/min) 3        -- IV Continuous 02/26/22 0802          Hyperglycemia/Diabetes Medications:   Antihyperglycemics (From admission, onward)                Start     Stop Route Frequency Ordered    02/26/22 1103  insulin  aspart U-100 pen 0-4 Units         -- SubQ As needed (PRN) 02/26/22 1003            ASSESSMENT and PLAN    * Acute renal failure superimposed on stage 2 chronic kidney disease  Lab Results   Component Value Date    CREATININE 4.4 (H) 02/25/2022     Avoid insulin stacking  Titrate insulin slowly      Type 2 diabetes mellitus without complication, without long-term current use of insulin  BG goal 140-180  Steroids may contribute to prandial BG excursions  Diet [facility]; Renal, Consistent Carbohydrate; Thin - < 25% per patient    Plan:   -Discontinue Transition IV insulin infusion at 0.8 u/hr with stepdown parameters (BG trending down below goal ranges)   -Change to Low Dose Correction Scale   -BG monitoring ac/hs/0200    ** Please call Endocrine for any BG related issues **            Hyperlipidemia  May increase insulin resistance.             Lynnette Krause NP  Endocrinology  [facility]

## 2022-02-26 NOTE — PROGRESS NOTES
CRRT  restarted  Citrate and calcium gluconate added to orders ,B / uf  Goal initially set at 200. B /P 96/42, pulse  80. o2 sat 100% on nasal cannula 3 liters/  .

## 2022-02-26 NOTE — ASSESSMENT & PLAN NOTE
Patient complaining of abdominal pain. Bedside ultrasound revealing ascites. Lactate elevated at 6.2. Ct from 2/1 revealing small amount of ascites.     - Ct abd/pelvis  - diagnostic para

## 2022-02-26 NOTE — SUBJECTIVE & OBJECTIVE
Interval History: Plans to move to ICU today for CRRT.    Current Facility-Administered Medications   Medication Frequency    0.9%  NaCl infusion (CRRT USE ONLY) Continuous    0.9%  NaCl infusion PRN    0.9%  NaCl infusion Once    0.9%  NaCl infusion Once    acetaminophen tablet 650 mg Q6H PRN    albuterol-ipratropium 2.5 mg-0.5 mg/3 mL nebulizer solution 3 mL Q4H PRN    benzonatate capsule 100 mg TID PRN    bisacodyL suppository 10 mg Daily PRN    dextromethorphan-guaiFENesin  mg/5 ml liquid 5 mL Q6H    dextrose 10% bolus 125 mL PRN    dextrose 10% bolus 250 mL PRN    famotidine tablet 20 mg Daily    glucagon (human recombinant) injection 1 mg PRN    glucose chewable tablet 16 g PRN    glucose chewable tablet 24 g PRN    heparin (porcine) injection 3,200 Units PRN    hydrOXYchloroQUINE tablet 200 mg Daily    insulin aspart U-100 pen 0-10 Units PRN    insulin regular in 0.9 % NaCl 100 unit/100 mL (1 unit/mL) infusion Continuous    iohexoL (OMNIPAQUE 350) injection 100 mL ONCE PRN    lanthanum chewable tablet 500 mg QID    magnesium sulfate 2g in water 50mL IVPB (premix) PRN    melatonin tablet 6 mg Nightly PRN    metoprolol 12.5mg/1.25mL oral solution 6.25 mg BID    mycophenolate mofetil 200 mg/mL suspension 500 mg Q6H    naloxone 0.4 mg/mL injection 0.02 mg PRN    ondansetron injection 4 mg TID AC    polyethylene glycol packet 17 g Daily PRN    predniSONE tablet 40 mg Daily    simethicone chewable tablet 80 mg TID PRN    sodium chloride 0.9% bolus 250 mL PRN    sodium chloride 0.9% bolus 250 mL PRN    sodium chloride 0.9% flush 10 mL PRN    vitamin renal formula (B-complex-vitamin c-folic acid) 1 mg per capsule 1 capsule Daily     Objective:     Vital Signs (Most Recent):  Temp: 98.6 °F (37 °C) (02/25/22 1600)  Pulse: 87 (02/25/22 1700)  Resp: (!) 27 (02/25/22 1700)  BP: (!) 120/52 (02/25/22 1700)  SpO2: (!) 94 % (02/25/22 1700)  O2 Device (Oxygen Therapy): room air (02/25/22 1700)   Vital Signs (24h  Range):  Temp:  [96.8 °F (36 °C)-98.6 °F (37 °C)] 98.6 °F (37 °C)  Pulse:  [70-89] 87  Resp:  [18-62] 27  SpO2:  [90 %-100 %] 94 %  BP: ()/(44-57) 120/52     Weight: 72.8 kg (160 lb 7.9 oz) (02/24/22 1100)  Body mass index is 34.73 kg/m².  Body surface area is 1.71 meters squared.      Intake/Output Summary (Last 24 hours) at 2/25/2022 1811  Last data filed at 2/25/2022 1500  Gross per 24 hour   Intake 250 ml   Output --   Net 250 ml       Physical Exam   Constitutional: She appears ill. No distress.   HENT:   Head: Normocephalic and atraumatic.   Eyes: Conjunctivae are normal.   Cardiovascular: Normal rate, regular rhythm and normal heart sounds. Exam reveals no friction rub.   No murmur heard.  Pulmonary/Chest: Effort normal. She has no wheezes. She has rales.   Abdominal: Soft. Bowel sounds are normal. There is no abdominal tenderness. There is no rebound.   Musculoskeletal:      Comments: No synovitis in upper or lower extremities   Neurological: She is alert.   Skin: Skin is warm and dry. She is not diaphoretic.     Significant Labs:  CBC:   Recent Labs   Lab 02/25/22  0325   WBC 8.59   HGB 7.8*   HCT 23.7*   PLT 46*     CMP:   Recent Labs   Lab 02/25/22  0325 02/25/22  0813 02/25/22  1241   *  258*   < > 232*   CALCIUM 9.3  9.3   < > 9.0   ALBUMIN 2.4*  --   --    PROT 6.3  --   --    *  133*   < > 134*   K 5.9*  5.9*  5.9*   < > 5.8*  5.8*   CO2 16*  16*   < > 17*   CL 98  98   < > 99   *  130*   < > 137*   CREATININE 4.5*  4.5*   < > 4.4*   ALKPHOS 158*  --   --    ALT 51*  --   --    AST 44*  --   --    BILITOT 2.1*  --   --     < > = values in this interval not displayed.

## 2022-02-26 NOTE — ASSESSMENT & PLAN NOTE
71 y.o. with HTN, diabetes type 2 (hemoglobin A1c 5.9 on 2/1), CKD2, cirrhosis, HFpEF, macrocytic anemia, psorasis, sleep apnea and GERD who was admitted to [facility] on 2/1/22 for acute renal failure. She endorses fatigue, alopecia (for past 3 years), and +raynauds. Denies oral ulcers, , rash, joint swelling/joint pain, chest pain. Stress echo from 12/2021 showed trivial pericardial effusion.    Labs:  ARMEN 1:1280 on 2/3  +Sm/RNP: 4.06 on 2/3  UPE: no paraprotein bands  ESR: > 140  CRP: 20.8  C3: 26 (L)--> 40  C4: < 3 (L)--> 5  VALENTINO: pos (VALENTINO pos 2/2  cold autob, haptoglobin and LDH wnl)  UA: Protein 3+, occult blood 3+, WBC 3+  Microscopic UA: RBC >100, WBC 40  Timed urine protein: 560  CRP 20.9  Haptoglobin: norm  CCP: norm  RF: norm  GBM ab: negative  ANCA: negative  Hep B surface antigen: neg  Hep C ab: neg    Treatment history while inpatient:  - S/p Methylpred 1g x 3 days (2/5-2/7), then transitioned to pred 60mg on 2/8, decreased to prednisone 50mg on 2/15, prednisone on 40mg on 2/17- present    According to the EULAR/ACR 2019 criteria for SLE, she meets criteria for lupus (17pts)  due to: +ARMEN, +thrombocytopenia,+ proteinuria, + low C3, + low C4, + pericardial effusion (on stress echo from 12/2021) + alopecia.     Plan/Recommendations:  - Continue to follow up APS labs  - continue plaquenil 200mg daily  - nephrology consulted, recommended cellcept PO and renal biopsy to be done at later date given concern for complications  - prednisone per nephrology, would recommend PJP ppx and GI ppx while on high dose steroids      Patient seen and evaluated with Dr. Miller. Please see staff attestation for final recommendations.

## 2022-02-26 NOTE — PROGRESS NOTES
[facility] - Cardiac Intensive Care  Nephrology  Progress Note    Patient Name: Tammi Farris  MRN: [id number]  Admission Date: 2/1/2022  Hospital Length of Stay: 25 days  Attending Provider: Chalino Ingram*   Primary Care Physician: Ann-Marie Hartman MD  Principal Problem:Acute renal failure superimposed on stage 2 chronic kidney disease    Subjective:     HPI: Tammi Farris is a 71 y.o. F with history of CKD2, HTN, DM, cirrhosis, and PAULINA, who was admitted to [facility] with acute renal failure. Found to have low complements and positive anti-Sm /RNP antibodies. Unable to renal biopsy due to thrombocytopenia (~ 50). She was treated with pulse dose steroids for 3 days and now on prednisone. Attempted to initiate Cellcept but has not taken due to difficulty with pill size. Initiated on HD, received several sessions, last 2/16. Held over weekend as her Cr improving to 3.5 from 6.4. She reports making good urine. Denies confusion. Concern for possible lupus nephritis and transferred to [facility] per rheumatology /nephrology recommendations for IR renal biopsy.      Interval History:   Pt' in declining overall condition. States feeling short of breath, very tired, and fatigued. Significant other at the bedside.   Pt unable to be maintain on CRRT-SLED d/t clotting of filter, pt's platelets have dropped to 31 and hgb is 6.9 from 7.8 yesterday.     Review of patient's allergies indicates:   Allergen Reactions    Dobutamine Hives    Benadryl [diphenhydramine hcl] Anxiety     Pt states she feels jumpy and anxious when taking.    Darvon [propoxyphene] Nausea Only    Shellfish containing products Hives    Iodinated contrast media Hives    Lisinopril Other (See Comments)     cough    Propoxyphene hcl      Itchy (skin)^    Tizanidine Other (See Comments)     Sweaty, difficulty swallowing    Statins-hmg-coa reductase inhibitors Anxiety and Other (See Comments)     Myalgia, fatigue, palpitations, anxiety     Current  Facility-Administered Medications   Medication Frequency    0.9%  NaCl infusion (CRRT USE ONLY) Continuous    0.9%  NaCl infusion (CRRT USE ONLY) Continuous    0.9%  NaCl infusion (for blood administration) Q24H PRN    0.9%  NaCl infusion (for blood administration) Q24H PRN    0.9%  NaCl infusion PRN    acetaminophen tablet 650 mg Q6H PRN    albuterol-ipratropium 2.5 mg-0.5 mg/3 mL nebulizer solution 3 mL Q4H PRN    alteplase injection 2 mg Once    benzonatate capsule 100 mg TID PRN    bisacodyL suppository 10 mg Daily PRN    calcium gluconate 3 g in dextrose 5 % 100 mL infusion Continuous    dextromethorphan-guaiFENesin  mg/5 ml liquid 5 mL Q6H    dextrose 10% bolus 125 mL PRN    dextrose 10% bolus 250 mL PRN    dextrose-sod citrate-citric ac 2.45-2.2 gram- 800 mg/100 mL Soln Continuous    famotidine tablet 20 mg Daily    glucagon (human recombinant) injection 1 mg PRN    glucose chewable tablet 16 g PRN    glucose chewable tablet 24 g PRN    heparin (porcine) injection 3,200 Units PRN    hydrOXYchloroQUINE tablet 200 mg Daily    insulin aspart U-100 pen 0-4 Units PRN    iohexoL (OMNIPAQUE 350) injection 100 mL ONCE PRN    lanthanum chewable tablet 500 mg QID    magnesium sulfate 2g in water 50mL IVPB (premix) PRN    melatonin tablet 6 mg Nightly PRN    metoprolol 12.5mg/1.25mL oral solution 6.25 mg BID    mycophenolate mofetil 200 mg/mL suspension 500 mg Q6H    naloxone 0.4 mg/mL injection 0.02 mg PRN    NORepinephrine 4 mg in dextrose 5% 250 mL infusion (premix) (titrating) Continuous    ondansetron injection 4 mg TID AC    piperacillin-tazobactam 4.5 g in sodium chloride 0.9% 100 mL IVPB (ready to mix system) Q8H    polyethylene glycol packet 17 g Daily PRN    predniSONE tablet 40 mg Daily    simethicone chewable tablet 80 mg TID PRN    sodium chloride 0.9% bolus 250 mL PRN    sodium chloride 0.9% bolus 250 mL PRN    sodium chloride 0.9% flush 10 mL PRN    vancomycin  - pharmacy to dose pharmacy to manage frequency    vancomycin 1.5 g in dextrose 5 % 250 mL IVPB (ready to mix) Once    vitamin renal formula (B-complex-vitamin c-folic acid) 1 mg per capsule 1 capsule Daily       Objective:     Vital Signs (Most Recent):  Temp: 97.4 °F (36.3 °C) (02/26/22 0301)  Pulse: 83 (02/26/22 0630)  Resp: (!) 21 (02/26/22 1509)  BP: (!) 98/42 (02/26/22 0900)  SpO2: 100 % (02/26/22 0630)  O2 Device (Oxygen Therapy): nasal cannula (02/26/22 0814)   Vital Signs (24h Range):  Temp:  [96.8 °F (36 °C)-97.8 °F (36.6 °C)] 97.4 °F (36.3 °C)  Pulse:  [81-90] 83  Resp:  [15-53] 21  SpO2:  [90 %-100 %] 100 %  BP: ()/(42-54) 98/42     Weight: 72.9 kg (160 lb 11.5 oz) (02/26/22 0400)  Body mass index is 34.78 kg/m².  Body surface area is 1.71 meters squared.    I/O last 3 completed shifts:  In: 250 [I.V.:250]  Out: 290 [Other:290]    Physical Exam  Vitals and nursing note reviewed.   Constitutional:       General: She is in acute distress.      Appearance: She is ill-appearing. She is not toxic-appearing or diaphoretic.   Cardiovascular:      Rate and Rhythm: Normal rate and regular rhythm.      Pulses: Normal pulses.      Heart sounds: Murmur heard.      Comments: RIJ HD catheter  Pulmonary:      Effort: Respiratory distress present.      Breath sounds: Rales present. No wheezing or rhonchi.      Comments: Crackles throughout all lung fields   Abdominal:      General: Abdomen is flat. Bowel sounds are normal. There is no distension.      Palpations: Abdomen is soft. There is no mass.      Tenderness: There is no abdominal tenderness. There is no guarding or rebound.   Musculoskeletal:         General: No swelling, tenderness or deformity.      Right lower leg: No edema.      Left lower leg: No edema.   Skin:     General: Skin is warm.      Capillary Refill: Capillary refill takes 2 to 3 seconds.      Coloration: Skin is pale. Skin is not jaundiced.      Findings: Bruising and erythema present. No  lesion or rash.      Comments: Large ecchymotic area at HD catheter site    Neurological:      Mental Status: She is alert and oriented to person, place, and time.       Significant Labs:  Cardiac Markers: No results for input(s): CKMB, TROPONINT, MYOGLOBIN in the last 168 hours.  CBC:   Recent Labs   Lab 02/26/22  0938   WBC 8.24   RBC 1.95*   HGB 6.9*   HCT 20.7*   PLT 31*   *   MCH 35.4*   MCHC 33.3     Coagulation:   Recent Labs   Lab 02/21/22  0828   INR 1.4*     LFTs:   Recent Labs   Lab 02/26/22  0938 02/26/22  1402   ALT 55*  --    AST 65*  --    ALKPHOS 130  --    BILITOT 2.6*  --    PROT 5.6*  --    ALBUMIN 2.2* 2.4*     PTH: No results for input(s): PTH in the last 168 hours.  All labs within the past 24 hours have been reviewed.     Significant Imaging:  Labs: Reviewed  X-Ray: Reviewed    Assessment/Plan:     * Acute renal failure superimposed on stage 2 chronic kidney disease  Superimposed ANI on CKD II, unable to get renal bx high risk for bleeding as she has severe thrombocytopenia, workup ongoing for LN    Worsening clinical condition, specifically her hematological labs have significantly dropped all cell lines.   Recommendations:   -please discuss case again with hematology and rheum given acute deterioration of hgb and platelet levels  -SLED x6 hours today with citrate & calcium gluconate, pt will re-evaluate post-treatment, she may need systemic anticoagulation to optimize dialysis for vol removal (a concern given her low plt count)  -repeat labs Q6H and page nephrology if her respiratory status worsen   -stop phos binders  -strict I&Os   -consider goals of care discussion     Thrombocytopenia  Lab Results   Component Value Date    PLT 31 (LL) 02/26/2022     -Plan per primary team           Thank you for your consult. I will follow-up with patient. Please contact us if you have any additional questions.    Tammi Luciano MD  Nephrology  [facility] - Cardiac Intensive Care

## 2022-02-26 NOTE — PROGRESS NOTES
CRRT restart and daily checks complete.    02/26/22 0432   Treatment   Treatment Type SLED   Treatment Status Restart;Daily equipment check   Dialysis Machine Number [id number]   Solutions Labeled and Current  Yes   Access Tunneled Cath;Right   Catheter Dressing Intact  Yes   Alarms Engaged Yes   CRRT Comments CRRT restart and daily check   Prescription   Time (Hours) 6   Dialysate K + (mEq/L) 4   Dialysate CA + (mEq/L) 2.25   Dialysate HCO3 - (Bicarb) (mEq/L) 35   Dialysate Na + (mEq/L) 140   Cartridge Type   (Rev 300)   Dialysate Flow Rate (mL/min) 200   UF Goal Rate 300 mL/hr   CRRT Hourly Documentation   Blood Flow (mL/min) 150   UF Rate 200 cc/hr   Arterial Pressure (mmHg) -40 mmHg   Venous Pressure (mmHg) 50 mmHg   Effluent Pressure (EP) (mmHg) 20 mmHg   Total UF (Hourly Cleared) (mL) 0

## 2022-02-26 NOTE — PROGRESS NOTES
CRRT SLED RST per MD order.    02/26/22 1200   Treatment   Treatment Type SLED   Treatment Status Restart   Dialysis Machine Number [id number]   Dialyzer Time (hours) 0   BVP (Liters) 0 L   Solutions Labeled and Current  Yes   Access Tunneled Cath;Right   Catheter Dressing Intact  Yes   Alarms Engaged Yes   CRRT Comments CRRT RST   Prescription   Time (Hours) Continuous   Dialysate K + (mEq/L) 3   Dialysate CA + (mEq/L) 2.5   Dialysate HCO3 - (Bicarb) (mEq/L) 35   Dialysate Na + (mEq/L) 135   Cartridge Type Other  (300)   Dialysate Flow Rate (mL/min) 200   UF Goal Rate 300 mL/hr   CRRT Hourly Documentation   Blood Flow (mL/min) 150   UF Rate 200 cc/hr   Arterial Pressure (mmHg) -40 mmHg   Venous Pressure (mmHg) 50 mmHg   Effluent Pressure (EP) (mmHg) 50 mmHg   Total UF (Hourly Cleared) (mL) 0   CRRT Sodium Chloride   CRRT Sodium Chloride Volume 250 mL

## 2022-02-26 NOTE — ASSESSMENT & PLAN NOTE
BG goal 140-180  Steroids may contribute to prandial BG excursions  Diet full liquid [facility]; Renal, Consistent Carbohydrate; Thin - < 25% per patient    Plan:   -Discontinue Transition IV insulin infusion at 0.8 u/hr with stepdown parameters (BG trending down below goal ranges)   -Change to Low Dose Correction Scale   -BG monitoring ac/hs/0200    ** Please call Endocrine for any BG related issues **

## 2022-02-26 NOTE — ASSESSMENT & PLAN NOTE
"This patient does have evidence of infective focus  My overall impression is septic shock. Vital signs were reviewed and noted in progress note.  Antibiotics given-   Antibiotics (From admission, onward)            Start     Stop Route Frequency Ordered    02/26/22 1645  vancomycin 1.5 g in dextrose 5 % 250 mL IVPB (ready to mix)         -- IV Once 02/26/22 1536    02/26/22 1630  piperacillin-tazobactam 4.5 g in sodium chloride 0.9% 100 mL IVPB (ready to mix system)         -- IV Every 8 hours (non-standard times) 02/26/22 1538    02/26/22 1623  vancomycin - pharmacy to dose  (vancomycin IVPB)        "And" Linked Group Details    -- IV pharmacy to manage frequency 02/26/22 1524        Cultures were taken-   Microbiology Results (last 7 days)     Procedure Component Value Units Date/Time    Blood culture [[id number]]     Order Status: No result Specimen: Blood     Blood culture [[id number]]     Order Status: No result Specimen: Blood     Culture, Respiratory with Gram Stain [[id number]]     Order Status: No result Specimen: Respiratory from Sputum, Expectorated         Latest lactate reviewed, they are-  Recent Labs   Lab 02/26/22  1415   LACTATE 6.2*       Organ dysfunction indicated by Acute kidney injury  Source- TBD      Plan:  - vanc and zosyn  - blood cultures  - diagnostic para  - fluids with prbc and platelets    "

## 2022-02-26 NOTE — ASSESSMENT & PLAN NOTE
Superimposed ANI on CKD II, unable to get renal bx high risk for bleeding as she has severe thrombocytopenia, workup ongoing for LN    Worsening clinical condition, specifically her hematological labs have significantly dropped all cell lines.   Recommendations:   -please discuss case again with hematology and rheum given acute deterioration of hgb and platelet levels  -SLED x6 hours today with citrate & calcium gluconate, pt will re-evaluate post-treatment, she may need systemic anticoagulation to optimize dialysis for vol removal (a concern given her low plt count)  -repeat labs Q6H and page nephrology if her respiratory status worsen   -stop phos binders  -strict I&Os   -consider goals of care discussion

## 2022-02-26 NOTE — HOSPITAL COURSE
Stepped up to MICU  for CRRT. Patient now requiring pressor support (low levels of levo), and found to have HgB of 6.9, unit of PRBC transfused. Has had struggled with dialysis clotting off. Additionally, has been experiencing abdominal pain for some time. Bedside ultrasound revealing ascites, lactate 6.2. CT abd/pelvis showing diffusely edematous pancrease suggesting acute pancreatitis, ascites as well as a small amount of layering hyperdensity that could represent component of hemorrhage or protenacious material, as well as diverticulosis with wall thickening, and liver cirrhosis. Lipase wnl. LFTs increasing, MELD 27, hepatology consulted. Dx para positive for SBP, PMN >2000. Repeat echo with EF 65%, elevated PAP, and grade 3 LV diastolic dysfunction. Palliative consulted.   On the evening of , goals of care discussion held between family and palliative medicine/MICU team regarding prognosis. Patient was transitioned to comfort care and  later in the night.

## 2022-02-26 NOTE — PT/OT/SLP PROGRESS
Physical Therapy      Patient Name:  Tammi Farris   MRN:  [id number]    Patient not seen today secondary to t/f to ICU. PT orders were placed 2/21/22. Pt requires new orders for therapy evaluation due to change in status and t/f to higher level of care.     2/26/2022

## 2022-02-26 NOTE — ASSESSMENT & PLAN NOTE
Patient with history of Cirrhosis. HgB dropped to 6.9. Receiving unit of PRBC    Plan:  -Follow up daily CBC  -Transfuse for Hgb less than 7

## 2022-02-26 NOTE — ASSESSMENT & PLAN NOTE
Lab Results   Component Value Date    CREATININE 4.4 (H) 02/25/2022     Avoid insulin stacking  Titrate insulin slowly

## 2022-02-26 NOTE — H&P
[address] - Cardiac Intensive Care  Critical Care Medicine  History & Physical    Patient Name: Tammi Farris  MRN: [id number]  Admission Date: 2/1/2022  Hospital Length of Stay: 24 days  Code Status: Full Code  Attending Physician: Chalino Ingram*   Primary Care Provider: Ann-Marie Hartman MD   Principal Problem: Acute renal failure superimposed on stage 2 chronic kidney disease    Subjective:     HPI:  Tammi Farris is a 71 y.o. F with history of CKD2, HTN, DM, cirrhosis, and PAULINA, who was admitted to [facility] with acute renal failure. Found to have low complements and positive anti-Sm /RNP antibodies. Unable to renal biopsy due to thrombocytopenia (~ 50). She was treated with pulse dose steroids for 3 days and now on prednisone. Attempted to initiate Cellcept but has not taken due to difficulty with pill size. Initiated on HD, received several sessions, last 2/16. Held over weekend as her Cr improving to 3.5 from 6.4. She reports making good urine. Denies confusion. Concern for possible lupus nephritis and transferred to [facility] per rheumatology /nephrology recommendations for IR renal biopsy. Patient admitted to ICU for CRRT needs. Patient notes she has not been feeling well for a couple of months. She says she gets dialysis because she has liver disease.         Hospital/ICU Course:  No notes on file     Past Medical History:   Diagnosis Date    Allergy     Anxiety     Basal cell carcinoma     Cirrhosis of liver without ascites 12/27/2017    Depression     Diabetes mellitus, type 2     Disorder of kidney and ureter     Endometriosis     GERD (gastroesophageal reflux disease)     Hyperlipidemia     Hypertension     Joint pain     Psoriasis        Past Surgical History:   Procedure Laterality Date    abdominal laproscopic surgery      APPENDECTOMY      CARPAL TUNNEL RELEASE      right    CHOLECYSTECTOMY  04/2015    COLONOSCOPY N/A 2/8/2019    Procedure: COLONOSCOPY;  Surgeon: Celso Salas,  MD;  [facility]  Service: Endoscopy;  Laterality: N/A;    ESOPHAGOGASTRODUODENOSCOPY N/A 2/8/2019    Procedure: EGD (ESOPHAGOGASTRODUODENOSCOPY);  Surgeon: Celso Salas MD;  [facility]  Service: Endoscopy;  Laterality: N/A;  varices screening, labs ordered prior    HYSTERECTOMY  age 25    JOSEFA/BSO (endometriosis)    OOPHORECTOMY      SKIN CANCER DESTRUCTION      UPPER GASTROINTESTINAL ENDOSCOPY         Review of patient's allergies indicates:   Allergen Reactions    Dobutamine Hives    Benadryl [diphenhydramine hcl] Anxiety     Pt states she feels jumpy and anxious when taking.    Darvon [propoxyphene] Nausea Only    Shellfish containing products Hives    Iodinated contrast media Hives    Lisinopril Other (See Comments)     cough    Propoxyphene hcl      Itchy (skin)^    Tizanidine Other (See Comments)     Sweaty, difficulty swallowing    Statins-hmg-coa reductase inhibitors Anxiety and Other (See Comments)     Myalgia, fatigue, palpitations, anxiety       Family History       Problem Relation (Age of Onset)    Arthritis Mother    Asthma Brother    Hyperlipidemia Brother    Hypertension Mother, Sister, Brother    No Known Problems Father    Raynaud syndrome Sister          Tobacco Use    Smoking status: Never Smoker    Smokeless tobacco: Never Used   Substance and Sexual Activity    Alcohol use: Not Currently    Drug use: No    Sexual activity: Yes     Partners: Male     Birth control/protection: Surgical      Review of Systems   Constitutional:  Negative for fever.   Respiratory:  Negative for chest tightness and shortness of breath.    Cardiovascular:  Positive for leg swelling. Negative for chest pain.   Gastrointestinal:  Positive for abdominal pain. Negative for abdominal distention.   Genitourinary:         Patient gets diaylsis   Skin:  Negative for color change and rash.   Neurological:  Negative for facial asymmetry.   Psychiatric/Behavioral:   Negative for agitation and behavioral problems.    Objective:     Vital Signs (Most Recent):  Temp: 98.6 °F (37 °C) (02/25/22 1600)  Pulse: 87 (02/25/22 1700)  Resp: (!) 27 (02/25/22 1700)  BP: (!) 120/52 (02/25/22 1700)  SpO2: (!) 94 % (02/25/22 1700)   Vital Signs (24h Range):  Temp:  [96.8 °F (36 °C)-98.6 °F (37 °C)] 98.6 °F (37 °C)  Pulse:  [70-89] 87  Resp:  [18-62] 27  SpO2:  [90 %-100 %] 94 %  BP: ()/(44-57) 120/52   Weight: 72.8 kg (160 lb 7.9 oz)  Body mass index is 34.73 kg/m².      Intake/Output Summary (Last 24 hours) at 2/25/2022 1725  Last data filed at 2/25/2022 1500  Gross per 24 hour   Intake 250 ml   Output --   Net 250 ml       Physical Exam  Constitutional:       Appearance: She is ill-appearing.   HENT:      Head: Normocephalic.   Eyes:      General:         Right eye: No discharge.         Left eye: No discharge.      Extraocular Movements: Extraocular movements intact.      Conjunctiva/sclera: Conjunctivae normal.   Neck:      Comments: Right sided tunnel cath  Cardiovascular:      Rate and Rhythm: Normal rate.      Pulses: Normal pulses.      Heart sounds: No murmur heard.  Pulmonary:      Breath sounds: Normal breath sounds.   Abdominal:      Tenderness: There is abdominal tenderness (RUQ).   Musculoskeletal:      Right lower leg: Edema present.      Left lower leg: Edema present.   Skin:     General: Skin is warm and dry.   Neurological:      Mental Status: She is alert.   Psychiatric:         Mood and Affect: Mood normal.         Behavior: Behavior normal.       Vents:  Oxygen Concentration (%): 30 (02/18/22 2111)  Lines/Drains/Airways       Central Venous Catheter Line  Duration                  Hemodialysis Catheter 02/07/22 1436 right internal jugular 18 days              Peripheral Intravenous Line  Duration                  Peripheral IV - Single Lumen 02/24/22 0855 20 G;1 3/4 in Left;Anterior Forearm 1 day                  Significant Labs:    CBC/Anemia Profile:  Recent Labs    Lab 02/24/22  0344 02/25/22  0325   WBC 10.56 8.59   HGB 7.7* 7.8*   HCT 24.4* 23.7*   PLT 51* 46*   * 112*   RDW 19.2* 20.0*        Chemistries:  Recent Labs   Lab 02/24/22  0344 02/24/22  1506 02/24/22  2137 02/25/22  0325 02/25/22  0813 02/25/22  1241     138 139   < > 133*  133* 134* 134*   K 5.2*  5.2*  5.2* 5.4*   < > 5.9*  5.9*  5.9* 5.8*  5.8* 5.8*  5.8*     102 103   < > 98  98 99 99   CO2 22*  22* 20*   < > 16*  16* 15* 17*   *  113* 124*   < > 130*  130* 132* 137*   CREATININE 3.9*  3.9* 4.0*   < > 4.5*  4.5* 4.6* 4.4*   CALCIUM 9.1  9.1 9.1   < > 9.3  9.3 9.3 9.0   ALBUMIN 2.5* 2.4*  --  2.4*  --   --    PROT 6.5  --   --  6.3  --   --    BILITOT 2.1*  --   --  2.1*  --   --    ALKPHOS 166*  --   --  158*  --   --    ALT 46*  --   --  51*  --   --    AST 40  --   --  44*  --   --    MG 2.3  --   --  2.3  --   --    PHOS 5.8* 5.7*  --  6.6*  --   --     < > = values in this interval not displayed.     Bilirubin:   Recent Labs   Lab 02/21/22  0818 02/22/22  0536 02/23/22  1158 02/24/22 0344 02/25/22  0325   BILITOT 2.5* 2.5* 2.5* 2.1* 2.1*     POCT Glucose:   Recent Labs   Lab 02/25/22  0802 02/25/22  1132 02/25/22  1545   POCTGLUCOSE 293* 269* 273*       All pertinent labs within the past 24 hours have been reviewed.    Significant Imaging: I have reviewed all pertinent imaging results/findings within the past 24 hours.    Assessment/Plan:     Cardiac/Vascular  Primary hypertension  On metoprolol 6.25mg BID    Hyperlipidemia  Not on statin due to history of cirrhosis    Renal/  * Acute renal failure superimposed on stage 2 chronic kidney disease  71 y.o F w/ dialysis dependent ANI thought to be secondary to an auto-immune etiology, based on serologic pattern of possitive ARMEN, Anti-Posey RNP and low complement levels, negative ANCA.  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      Plan:  --patient admitted to ICU for SLED (6 hours)  --Continue Cellcept liquid  suspension, 500 mg Q6h with food to reduce GI side effects. If well tolerated, can transition to 1000 mg Q12h  --continue Hydroxychloroquine  --continue steroids 40 mg daily  --renal bx per nephrology after clinically stable  -- Strict I/O  -- Daily BMP, mag, phos  -- Avoid nephrotoxins    Hyperphosphatemia  Phosphate of 6.6 today  See acute renal failure    Systemic lupus erythematosus with glomerular disease  Rheumatology following patient.   Patient S/p Methylpred 1g x 3 days (2/5-2/7), then transitioned to pred 60mg on 2/8, decreased to prednisone 50mg on 2/15, prednisone on 40mg on 2/17- present     Patient meets criteria for SLE due to: +ARMEN, +thrombocytopenia,+ proteinuria, + low C3, + low C4, + pericardial effusion (on stress echo from 12/2021) + alopecia.      Plan  - APS labs ordered  - continue plaquenil 200mg daily  - nephrology consulted, recommended cellcept PO and renal biopsy to be done at later date given concern for complications  - prednisone started by nephrology, will consider atovaquone for PJP ppx    Hyperkalemia  Hyperkalemia  Up to 6.1 overnight, improved on lasix gtt. 5.9 in AM. Likely 2/2 renal failure    Plan  - SLED  - Continue to monitor  - Shift PRN    Hematology  Thrombocytopenia  Platelets of 46, hematology consulted for thrombocytopenia     Plan:  Transfuse PLT if <10,000, or <50,000 with active bleeding or before invasive procedures.   Most likely a chronic thrombocytopenia that is worsening in the setting of active lupus and acute illness.   Follow-up Heme recs          Oncology  Macrocytic anemia  Patient with history of Cirrhosis. Hgb of 7.8.    Plan:  -Follow up daily CBC  -Transfuse for Hgb less than 7    Endocrine  Type 2 diabetes mellitus without complication, without long-term current use of insulin  Plan:  Goal blood glucose 140-180  Follow up endocrine recs/notify them of any diet changes  Last A1c 5.9        GI  Gastroesophageal reflux disease  Continue Famotidine while  on steroids    Other  Debility  PT/OT consulted    Sleep apnea  BiPAP [facility]        Critical Care Daily Checklist:    A: Awake: RASS Goal/Actual Goal:    Actual: [place] Agitation Sedation Scale (RASS): Alert and calm   B: Spontaneous Breathing Trial Performed?  no   C: SAT & SBT Coordinated?  no                      D: Delirium: CAM-ICU Overall CAM-ICU: Negative   E: Early Mobility Performed? Yes   F: Feeding Goal: Goals: Pt to meet > 50% EEN by RD follow up  Status: Nutrition Goal Status: progressing towards goal   Current Diet Order   Procedures    Diet full liquid [facility]; Renal, Consistent Carbohydrate; Thin     Order Specific Question:   Indicate patient location for additional diet options:     Answer:   [facility]     Order Specific Question:   Additional Diet Options:     Answer:   Renal     Order Specific Question:   Additional Diet Options:     Answer:   Consistent Carbohydrate     Order Specific Question:   Fluid consistency:     Answer:   Thin      AS: Analgesia/Sedation Not sedated   T: Thromboembolic Prophylaxis thrombocytopenia   H: HOB > 300 Yes   U: Stress Ulcer Prophylaxis (if needed) Famotidine while on steroids   G: Glucose Control Endocrine following   B: Bowel Function Stool Occurrence: 1   I: Indwelling Catheter (Lines & Yao) Necessity    D: De-escalation of Antimicrobials/Pharmacotherapies     Plan for the day/ETD diaylsis per neprho    Code Status:  Family/Goals of Care: Full code       Critical care was time spent personally by me on the following activities: development of treatment plan with patient or surrogate and bedside caregivers, discussions with consultants, evaluation of patient's response to treatment, examination of patient, ordering and performing treatments and interventions, ordering and review of laboratory studies, ordering and review of radiographic studies, pulse oximetry, re-evaluation of patient's condition. This critical care time did not overlap with  that of any other provider or involve time for any procedures.     Olivier Azul MD  Critical Care Medicine  [facility] - Cardiac Intensive Care

## 2022-02-27 NOTE — ASSESSMENT & PLAN NOTE
Hyperkalemia  Likely 2/2 renal failure. Improved with dialysis.     Plan  - SLED  - Continue to monitor  - Shift PRN

## 2022-02-27 NOTE — SUBJECTIVE & OBJECTIVE
Interval History: Clinical condition continues to worsen. Patient tachypneic with increased work of breathing this morning. Also hypotensive with MAPs in low 60s. Appears volume overloaded. Metabolically stable for now but may need further CRRT for volume removal. 600 cc UOP..    Review of patient's allergies indicates:   Allergen Reactions    Dobutamine Hives    Benadryl [diphenhydramine hcl] Anxiety     Pt states she feels jumpy and anxious when taking.    Darvon [propoxyphene] Nausea Only    Shellfish containing products Hives    Iodinated contrast media Hives    Lisinopril Other (See Comments)     cough    Propoxyphene hcl      Itchy (skin)^    Tizanidine Other (See Comments)     Sweaty, difficulty swallowing    Statins-hmg-coa reductase inhibitors Anxiety and Other (See Comments)     Myalgia, fatigue, palpitations, anxiety     Current Facility-Administered Medications   Medication Frequency    0.9%  NaCl infusion (CRRT USE ONLY) Continuous    0.9%  NaCl infusion (for blood administration) Q24H PRN    0.9%  NaCl infusion (for blood administration) Q24H PRN    0.9%  NaCl infusion PRN    acetaminophen tablet 650 mg Q6H PRN    albuterol-ipratropium 2.5 mg-0.5 mg/3 mL nebulizer solution 3 mL Q4H PRN    alteplase injection 2 mg Once    amiodarone 360 mg/200 mL (1.8 mg/mL) infusion Continuous    benzonatate capsule 100 mg TID PRN    bisacodyL suppository 10 mg Daily PRN    calcium gluconate 3 g in dextrose 5 % 100 mL infusion Continuous    dextromethorphan-guaiFENesin  mg/5 ml liquid 5 mL Q6H    dextrose 10% bolus 125 mL PRN    dextrose 10% bolus 250 mL PRN    dextrose-sod citrate-citric ac 2.45-2.2 gram- 800 mg/100 mL Soln Continuous    famotidine tablet 20 mg Daily    glucagon (human recombinant) injection 1 mg PRN    glucose chewable tablet 16 g PRN    glucose chewable tablet 24 g PRN    heparin (porcine) injection 3,200 Units PRN    HYDROmorphone injection 0.2 mg Q6H PRN    hydrOXYchloroQUINE tablet 200 mg  Daily    insulin aspart U-100 pen 0-4 Units PRN    iohexoL (OMNIPAQUE 350) injection 100 mL ONCE PRN    melatonin tablet 6 mg Nightly PRN    metoprolol 12.5mg/1.25mL oral solution 6.25 mg BID    naloxone 0.4 mg/mL injection 0.02 mg PRN    NORepinephrine 4 mg in dextrose 5% 250 mL infusion (premix) (titrating) Continuous    ondansetron injection 4 mg TID AC    piperacillin-tazobactam 4.5 g in sodium chloride 0.9% 100 mL IVPB (ready to mix system) Q8H    polyethylene glycol packet 17 g Daily PRN    predniSONE tablet 40 mg Daily    simethicone chewable tablet 80 mg TID PRN    sodium chloride 0.9% bolus 250 mL PRN    sodium chloride 0.9% bolus 250 mL PRN    sodium chloride 0.9% flush 10 mL PRN    vancomycin - pharmacy to dose pharmacy to manage frequency    vancomycin 500 mg in dextrose 5 % 100 mL IVPB (ready to mix system) Once    vitamin renal formula (B-complex-vitamin c-folic acid) 1 mg per capsule 1 capsule Daily       Objective:     Vital Signs (Most Recent):  Temp: 98.1 °F (36.7 °C) (02/27/22 0700)  Pulse: 73 (02/27/22 1102)  Resp: (!) 30 (02/27/22 1102)  BP: (!) 88/52 (02/27/22 1102)  SpO2: 97 % (02/27/22 1102)  O2 Device (Oxygen Therapy): nasal cannula (02/27/22 1102)   Vital Signs (24h Range):  Temp:  [96.8 °F (36 °C)-98.7 °F (37.1 °C)] 98.1 °F (36.7 °C)  Pulse:  [] 73  Resp:  [12-65] 30  SpO2:  [97 %-100 %] 97 %  BP: ()/(39-55) 88/52     Weight: 72.9 kg (160 lb 11.5 oz) (02/27/22 0400)  Body mass index is 34.78 kg/m².  Body surface area is 1.71 meters squared.    I/O last 3 completed shifts:  In: 2950.8 [P.O.:60; I.V.:1032.9; Blood:849.5; IV Piggyback:1008.3]  Out: 1805 [Other:1805]    Physical Exam  Vitals and nursing note reviewed.   Constitutional:       General: She is in acute distress.      Appearance: She is ill-appearing. She is not toxic-appearing or diaphoretic.   Cardiovascular:      Rate and Rhythm: Normal rate and regular rhythm.      Pulses: Normal pulses.      Heart sounds: Murmur  heard.      Comments: RIJ HD catheter  Pulmonary:      Effort: Respiratory distress present.      Breath sounds: Rales present. No wheezing or rhonchi.      Comments: Crackles throughout all lung fields   Abdominal:      General: Abdomen is flat. Bowel sounds are normal. There is no distension.      Palpations: Abdomen is soft. There is no mass.      Tenderness: There is no abdominal tenderness. There is no guarding or rebound.   Musculoskeletal:         General: No swelling, tenderness or deformity.      Right lower leg: No edema.      Left lower leg: No edema.   Skin:     General: Skin is warm.      Capillary Refill: Capillary refill takes 2 to 3 seconds.      Coloration: Skin is pale. Skin is not jaundiced.      Findings: Bruising and erythema present. No lesion or rash.      Comments: Large ecchymotic area at HD catheter site    Neurological:      Mental Status: She is alert and oriented to person, place, and time.       Significant Labs:  CBC:   Recent Labs   Lab 02/26/22  2223   WBC 9.11   RBC 2.46*   HGB 8.2*   HCT 24.6*   PLT 68*   *   MCH 33.3*   MCHC 33.3     CMP:   Recent Labs   Lab 02/27/22  0844   *   CALCIUM 8.1*   ALBUMIN 2.0*   PROT 5.0*   *   K 4.1   CO2 23   CL 94*   BUN 23   CREATININE 1.3   ALKPHOS 145*   *   *   BILITOT 4.8*     No results for input(s): COLORU, CLARITYU, SPECGRAV, PHUR, PROTEINUA, GLUCOSEU, BILIRUBINCON, BLOODU, WBCU, RBCU, BACTERIA, MUCUS, NITRITE, LEUKOCYTESUR, UROBILINOGEN, HYALINECASTS in the last 168 hours.  All labs within the past 24 hours have been reviewed.     Significant Imaging:  Imaging Results              US Lower Extremity Veins Bilateral (Final result)  Result time 02/01/22 10:32:52   Procedure changed from US Lower Extremity Veins Bilateral     Final result by Polo Carr MD (02/01/22 10:32:52)                   Impression:      1. No sonographic evidence of DVT in the bilateral lower extremities.      Electronically signed  by: Polo Carr  Date:    02/01/2022  Time:    10:32               Narrative:    EXAMINATION:  US LOWER EXTREMITY VEINS BILATERAL    CLINICAL HISTORY:  cough and congestion since October; Cough, unspecified    TECHNIQUE:  Duplex and color flow Doppler and dynamic compression was performed of the bilateral lower extremity veins was performed.    COMPARISON:  None    FINDINGS:  Right lower extremity    Common femoral, superficial femoral, popliteal, upper greater saphenous and deep femoral veins demonstrate normal compressibility, phasic flow and augmentation response without evidence of filling defect. Visualized calf veins are patent.    Left lower extremity    Common femoral, superficial femoral, popliteal, upper greater saphenous and deep femoral veins demonstrate normal compressibility, phasic flow and augmentation response without evidence of filling defect. Visualized calf veins are patent.                                       X-Ray Chest PA And Lateral (Final result)  Result time 02/01/22 09:39:05      Final result by Brandon Fry MD (02/01/22 09:39:05)                   Impression:      No significant changes.      Electronically signed by: Brandon Fry MD  Date:    02/01/2022  Time:    09:39               Narrative:    EXAMINATION:  XR CHEST PA AND LATERAL    CLINICAL HISTORY:  Chest Pain;    TECHNIQUE:  PA and lateral views of the chest were performed.    COMPARISON:  02/01/2022 at 08:08 hours, 12/13/2021    FINDINGS:  Enlarged cardiac silhouette, similar to the prior exam.  There are mild perihilar and right basilar lung opacities, not significantly changed from the earlier exam, possible cardiogenic/noncardiogenic pulmonary edema, pneumonia, or aspiration.  Superimposed chronic interstitial changes may be present, as well.  The opacities have not significantly changed from the earlier exam.  Mildly elevated right hemidiaphragm, stable.  No pneumothorax.  No pleural effusions.                                        X-Ray Chest AP Portable (Final result)  Result time 02/01/22 08:28:48      Final result by Kemal Latham MD (02/01/22 08:28:48)                   Impression:      Nonspecific interstitial fibrotic changes stable and unchanged allowing for differences in inspiratory effort and technique.  Clinical correlation requested.    Epic abnormal flag      Electronically signed by: Kemal Latham MD  Date:    02/01/2022  Time:    08:28               Narrative:    EXAMINATION:  XR CHEST AP PORTABLE    CLINICAL HISTORY:  Cough, unspecified    TECHNIQUE:  Single frontal view of the chest was performed.    COMPARISON:  12/13/2021    FINDINGS:  Increasing perihilar interstitial infiltrates, some volume loss of anterior segment of elevation right minor fissure, elevation right hemidiaphragm with compression atelectasis of medial basilar segment right lower lobe.  Top-normal size heart.  Large body size degrades exam.  Pulmonary vascular tree borderline prominent.                                    All significant imaging within the past 24 hours has been reviewed.

## 2022-02-27 NOTE — ASSESSMENT & PLAN NOTE
Likely 2/2 renal failure. Phos 2.4 this AM.    - Replete PRN goal >3  - Monitor with daily phos  - Hold phos binders

## 2022-02-27 NOTE — ASSESSMENT & PLAN NOTE
Patient complaining of abdominal pain. Bedside ultrasound revealing ascites. Lactate elevated at 6.2. Ct from 2/1 revealing small amount of ascites.     MELD-Na score: 36 at 2/27/2022 12:59 PM  MELD score: 35 at 2/27/2022 12:59 PM  Calculated from:  Serum Creatinine: On dialysis. Using 4 mg/dL.  Serum Sodium: 131 mmol/L at 2/27/2022 12:59 PM  Total Bilirubin: 4.8 mg/dL at 2/27/2022  8:44 AM  INR(ratio): 2.4 at 2/27/2022 12:59 PM  Age: 71 years      - Ct abd/pelvis showing diffusely edematous pancreas suggesting pancreatitis, ascites, supravesical space  small amount of layering hyperdensity, possibly representing small component of hemorrhage or debris/proteinaceous fluid, colonic diverticulosis.  Diffuse mild bowel wall thickening and surrounding inflammatory change.  Findings are nonspecific could represent infectious or ischemic etiologies. Cirrhotic liver morphology with sequela of portal hypertension including ascites, varices, and anasarca.    - diagnostic para, pending  - f/u para labs  - lipase wnl  - Hepatology consult, appreciate recs

## 2022-02-27 NOTE — ASSESSMENT & PLAN NOTE
" Platelets chronically low. Hematology following and feel that her thrombocytopenia is "likely a chronic thrombocytopenia that is worsening in the setting of active lupus and acute illness".      Plan:   Transfuse PLT if <10,000, or <50,000 with active bleeding or before invasive procedures.   Most likely a chronic thrombocytopenia that is worsening in the setting of active lupus and acute illness.   Follow-up Heme recs   Transfused unit prior to diagnostic para          "

## 2022-02-27 NOTE — PROGRESS NOTES
Pharmacokinetic Assessment Follow Up: IV Vancomycin    Vancomycin Regimen Assessment & Plan:  - Vancomycin level drawn with morning labs today resulted as 19.7 mcg/mL, goal trough 10-20 mcg/mL.  - Nephrology following for ANI/RRT, did SLED x6h yesterday. Will continue pulse dosing.  - Administer vancomycin 500 mg IV x1 dose today since level is <20. Draw vancomycin level with morning labs tomorrow. Will re-dose as needed depending on level, renal function, and RRT plans.    Drug levels (last 3 results):  Recent Labs   Lab Result Units 02/27/22  0302   Vancomycin, Random ug/mL 19.7     Pharmacy will continue to follow and monitor vancomycin.    Please contact pharmacy at extension [phone] for questions regarding this assessment.    Thank you for the consult,   Gato Angel     Patient brief summary:  Tammi Farris is a 71 y.o. female initiated on antimicrobial therapy with IV Vancomycin for treatment of intra-abdominal infection    The patient's current regimen is pulse dosing.    Drug Allergies:   Review of patient's allergies indicates:   Allergen Reactions    Dobutamine Hives    Benadryl [diphenhydramine hcl] Anxiety     Pt states she feels jumpy and anxious when taking.    Darvon [propoxyphene] Nausea Only    Shellfish containing products Hives    Iodinated contrast media Hives    Lisinopril Other (See Comments)     cough    Propoxyphene hcl      Itchy (skin)^    Tizanidine Other (See Comments)     Sweaty, difficulty swallowing    Statins-hmg-coa reductase inhibitors Anxiety and Other (See Comments)     Myalgia, fatigue, palpitations, anxiety     Actual Body Weight:   72.9 kg    Renal Function:   Estimated Creatinine Clearance: 35.4 mL/min (based on SCr of 1.3 mg/dL).,     Dialysis Method (if applicable):  SLED

## 2022-02-27 NOTE — ASSESSMENT & PLAN NOTE
Superimposed ANI on CKD II, unable to get renal bx high risk for bleeding as she has severe thrombocytopenia, workup ongoing for LN    Worsening clinical condition, specifically her hematological labs have significantly dropped all cell lines.   Recommendations:   -Please discuss case again with hematology and rheum given acute deterioration of hgb and platelet levels  -SLED not tolerated due to clotting off filter, she may need systemic anticoagulation to optimize dialysis for vol removal (a concern given her low plt count)  -LFTs worsening  -repeat labs Q6H and page nephrology if her respiratory status worsen   -hold phos binders  -strict I&Os   -Consider goals of care discussion

## 2022-02-27 NOTE — PROGRESS NOTES
[facility] - Cardiac Intensive Care  Rheumatology  Progress Note    Patient Name: Tammi Farris  MRN: [id number]  Admission Date: 2/1/2022  Hospital Length of Stay: 26 days  Code Status: Full Code   Attending Provider: Chalino Ingram*  Primary Care Physician: Ann-Marie Hartman MD  Principal Problem: Acute renal failure superimposed on stage 2 chronic kidney disease    Subjective:     HPI: Tammi Farris is a 71 y.o. with HTN, diabetes type 2 (hemoglobin A1c 5.9 on 2/1), CKD2, cirrhosis, HFpEF, macrocytic anemia, psorasis, sleep apnea and GERD who was admitted to [facility] on 2/1/22 for acute renal failure.    She initially presented for cough and dyspnea for the past few months. She was seen by cardiology in 1/2022 and started on Lasix 20mg without improvement.  Additionally, her sister reported that she had decrease appetite, fatigue, and weight loss. She had also experienced decreased urine output for the past few weeks. She notes some gingival bleeding and has been having this when brushing teeth for about 3 weeks. She endorses ongoing alopecia for the past 3 years and skin changes in her fingers in the cold. She denies joint swelling, joint pain, oral ulcers, skin rash, history of miscarriages or chest pain. She does not have history of autoimmune disease in her family. She is a never smoker, denies alcohol or illicit drug use.    She follows with hepatology for cirrhosis due to GARCIA. She also follows with hematology for chronic macrocytic anemia with elevated free light chains and has declined bone marrow biopsy in past.     She arrived for evaluation to the ED on 2/1 were labs revealed ANI with creatinine 3.0 (baseline around 1.3), hyperkalemia, metabolic acidosis with elevated lactic acid, elevated liver enzymes, elevated BNP (1812), elevated d-dimer, thrombocytopenia (plts 88), and markedly concentrated urine. Initially, her ANI was attributed to dehydration in the setting of diuretic use so  diuretic was held and she was given gentle IV hydration. IV fluids were later discontinued following day due to development of crackles in lung fields on exam but later restarted. She was placed on bicarb gtt for metabolic acidosis per nephrology. Nephrology initially concerned for Vasculitis vs gbm vs MM, noted to have decreased complement and microscopic hematuria concerning for GN. She was later transferred to the ICU on 2/4 for episode of desaturation while on bipap. Hematology was consulted and stated that respiratory failure may be due to viral illness given worsening cytopenias. Pulmonology was consulted on 2/4 and stated that there was concern for combination of underlying ILD vs pulmonary edema vs pulmonary-renal syndrome. On 2/5, nephrology recommended pulse dose steroids for concern for GN with plan for kidney biopsy by IR. She completed 3 days of pulse dose steroids for suspected glomerulonephritis vs pulm/renal syndrome (2/5-2/7), then transitioned to pred 60mg on 2/8. However, on 2/7, she was found to require HD which was performed and kidney biopsy was postponed. However, her renal failure and thrombocytopenia continued to worsen, with creatinine peaking at 6.4 and platelets as low as 26,000. On 2/16, Nephrology recommended adding Cellcept and reducing the Prednisone dose. However, the patient was unable to swallow the MMF pills.     She was transferred to [facility] for rheumatology and nephrology consult.      Rheumatological ROS:  No skin rashes,malar rash,photosensitivity   No telangiectasias   No calcinosis   No psoriasis   No patchy alopecia   No oral and nasal ulcers   No dry eyes and dry mouth   No pleurisy or any cardiopulmonary complaints   No dysphagia,diplopia and dysphonia and muscle weakness   No n/v/d/c   No acid reflux+   + raynaud's+   No digital ulcers   No cytopenias   No renal issues   No blood clots   No fever,chills,night sweats,weight loss and loss of appetite   No pregnancy losses/pre  term deliveries /pregnancy complications   No new onset headaches   No recurrent conjunctivitis or uveitis or scleritis or episcleritis   No chronic or bloody diarrhea with no u colitis or crohn's /inflammatory bowel disease   No vaginal or urethral  d/c/STDs/no ulcers   No joint pains         Interval History: Requiring low dose levophed since yesterday afternoon that has increased from 0.04 to 0.2. Additionally, ICU performed bedside US which showed ascites, concern for septic shock from SBP. Liver enzymes increased today, AST 65-->470, ALT 55-->223. She underwent SLED for 6 hours yesterday. Currently on broad spectrum antibiotics.     Current Facility-Administered Medications   Medication Frequency    0.9%  NaCl infusion (CRRT USE ONLY) Continuous    0.9%  NaCl infusion (for blood administration) Q24H PRN    0.9%  NaCl infusion (for blood administration) Q24H PRN    0.9%  NaCl infusion PRN    acetaminophen tablet 650 mg Q6H PRN    albuterol-ipratropium 2.5 mg-0.5 mg/3 mL nebulizer solution 3 mL Q4H PRN    alteplase injection 2 mg Once    amiodarone 360 mg/200 mL (1.8 mg/mL) infusion Continuous    benzonatate capsule 100 mg TID PRN    bisacodyL suppository 10 mg Daily PRN    calcium gluconate 3 g in dextrose 5 % 100 mL infusion Continuous    dextromethorphan-guaiFENesin  mg/5 ml liquid 5 mL Q6H    dextrose 10% bolus 125 mL PRN    dextrose 10% bolus 250 mL PRN    dextrose-sod citrate-citric ac 2.45-2.2 gram- 800 mg/100 mL Soln Continuous    famotidine tablet 20 mg Daily    glucagon (human recombinant) injection 1 mg PRN    glucose chewable tablet 16 g PRN    glucose chewable tablet 24 g PRN    heparin (porcine) injection 3,200 Units PRN    HYDROmorphone injection 0.2 mg Q6H PRN    hydrOXYchloroQUINE tablet 200 mg Daily    insulin aspart U-100 pen 0-4 Units PRN    iohexoL (OMNIPAQUE 350) injection 100 mL ONCE PRN    melatonin tablet 6 mg Nightly PRN    metoprolol 12.5mg/1.25mL oral  solution 6.25 mg BID    naloxone 0.4 mg/mL injection 0.02 mg PRN    NORepinephrine 32 mg in dextrose 5 % 250 mL infusion Continuous    ondansetron injection 4 mg TID AC    piperacillin-tazobactam 4.5 g in sodium chloride 0.9% 100 mL IVPB (ready to mix system) Q12H    polyethylene glycol packet 17 g Daily PRN    predniSONE tablet 40 mg Daily    simethicone chewable tablet 80 mg TID PRN    sodium chloride 0.9% bolus 250 mL PRN    sodium chloride 0.9% bolus 250 mL PRN    sodium chloride 0.9% flush 10 mL PRN    vancomycin - pharmacy to dose pharmacy to manage frequency    vancomycin 500 mg in dextrose 5 % 100 mL IVPB (ready to mix system) Once    vasopressin (PITRESSIN) 0.2 Units/mL in dextrose 5 % 100 mL infusion Continuous    vitamin renal formula (B-complex-vitamin c-folic acid) 1 mg per capsule 1 capsule Daily     Objective:     Vital Signs (Most Recent):  Temp: 98.1 °F (36.7 °C) (02/27/22 0700)  Pulse: 73 (02/27/22 1102)  Resp: (!) 30 (02/27/22 1102)  BP: (!) 88/52 (02/27/22 1102)  SpO2: 97 % (02/27/22 1102)  O2 Device (Oxygen Therapy): nasal cannula (02/27/22 1102)   Vital Signs (24h Range):  Temp:  [96.8 °F (36 °C)-98.7 °F (37.1 °C)] 98.1 °F (36.7 °C)  Pulse:  [] 73  Resp:  [12-65] 30  SpO2:  [97 %-100 %] 97 %  BP: ()/(39-55) 88/52     Weight: 72.9 kg (160 lb 11.5 oz) (02/27/22 0400)  Body mass index is 34.78 kg/m².  Body surface area is 1.71 meters squared.      Intake/Output Summary (Last 24 hours) at 2/27/2022 1246  Last data filed at 2/27/2022 0800  Gross per 24 hour   Intake 2712.32 ml   Output 1515 ml   Net 1197.32 ml       Physical Exam   Constitutional: She appears ill. No distress.   HENT:   Head: Normocephalic and atraumatic.   Eyes: Conjunctivae are normal.   Cardiovascular: Normal rate, regular rhythm and normal heart sounds. Exam reveals no friction rub.   No murmur heard.  Pulmonary/Chest: Effort normal. She has no wheezes.   Abdominal: Soft. Bowel sounds are normal. There  is abdominal tenderness. There is no rebound.   Musculoskeletal:      Comments: No synovitis in upper or lower extremities. Mild pitting of lower extremities.   Neurological: She is alert.   Skin: Skin is warm and dry. She is not diaphoretic.     Significant Labs:  CBC:   Recent Labs   Lab 02/26/22  2223   WBC 9.11   HGB 8.2*   HCT 24.6*   PLT 68*     CMP:   Recent Labs   Lab 02/27/22  0844   *   CALCIUM 8.1*   ALBUMIN 2.0*   PROT 5.0*   *   K 4.1   CO2 23   CL 94*   BUN 23   CREATININE 1.3   ALKPHOS 145*   *   *   BILITOT 4.8*     Assessment/Plan:     * Acute renal failure superimposed on stage 2 chronic kidney disease  71 y.o. with HTN, diabetes type 2 (hemoglobin A1c 5.9 on 2/1), CKD2, cirrhosis, HFpEF, macrocytic anemia, psorasis, sleep apnea and GERD who was admitted to [facility] on 2/1/22 for acute renal failure. She endorses fatigue, alopecia (for past 3 years), and +raynauds. Denies oral ulcers, , rash, joint swelling/joint pain, chest pain. Stress echo from 12/2021 showed trivial pericardial effusion.    Labs:  ARMEN 1:1280 on 2/3  +Sm/RNP: 4.06 on 2/3  UPE: no paraprotein bands  ESR: > 140  CRP: 20.8  C3: 26 (L)--> 40--> 31  C4: < 3 (L)--> 5 -->4  VALENTINO: pos (VALENTINO pos 2/2  cold autob, haptoglobin and LDH wnl)  UA: Protein 3+, occult blood 3+, WBC 3+  Microscopic UA: RBC >100, WBC 40  Timed urine protein: 560  CRP 20.9  Haptoglobin: norm  CCP: norm  RF: norm  GBM ab: negative  ANCA: negative  Hep B surface antigen: neg  Hep C ab: neg    Treatment history while inpatient:  - S/p Methylpred 1g x 3 days (2/5-2/7), then transitioned to pred 60mg on 2/8, decreased to prednisone 50mg on 2/15, prednisone on 40mg on 2/17- present    Assessment:  She has labs that would, according to the EULAR/ACR 2019 criteria for SLE, meet this for SLE (17pts)  due to: +ARMEN, +thrombocytopenia,+ proteinuria, + low C3, + low C4, + pericardial effusion (on stress echo from 12/2021) + alopecia.  However,  this still remains uncertainty with this diagnosis as her complements and cytopenias have not improved despite pulse of steroids and prednisone since 2/5.  Given her known underlying cirrhosis, this may explain her hypocomplementemia (decrease synthetic function), thrombocytopenia and renal failure.  Likely contributing factor. Ascites on bedside US by ICU team. Unable to get renal biopsy due to thrombocytopenia, which help to definitively rule out SLE.    Plan/Recommendations:  - agree with holding cellcept in the setting to suspected septic shock, antibiotics per primary  - Agree with hepatology consult  - nephrology following, renal biopsy would help with definitive diagnosis but currently deferred given thrombocytopenia and HDS instability, to be done at later date given concern for complications  - Follow up APS labs  - continue plaquenil 200mg daily  - will follow up CT chest abdomen pelvis  - prednisone per nephrology, can consider stress dose steroids if needed  - consider PJP ppx and GI ppx while on high dose steroids      Patient seen and evaluated with Dr. Miller. Please see staff attestation for final recommendations.            Giovanna Galeana MD  Rheumatology  [facility] - Cardiac Intensive Care

## 2022-02-27 NOTE — NURSING
Pt taken down to CT this morning, and requiring vaso shortly after arrival on unit. Pt currently on 4mg levo, vaso, and kvo. Team has been notified of pt's status. Pt awaiting CRRT when BP more stabilized. Max concentrated 32mg ordered in the morning, but still unable to start as pt waiting for arterial line placement. Pt unable to tolerate nasal cannula and placed on bipap. Team has been notified.

## 2022-02-27 NOTE — ASSESSMENT & PLAN NOTE
Patient with history of Cirrhosis. HgB dropped to 6.9. Received unit of PRBC and HgB increased appropriately    Plan:  -Follow up daily CBC  -Transfuse for Hgb less than 7

## 2022-02-27 NOTE — SUBJECTIVE & OBJECTIVE
Interval History: Requiring low dose levophed since yesterday afternoon that has increased from 0.04 to 0.2. Additionally, ICU performed bedside US which showed ascites, concern for septic shock from SBP. Liver enzymes increased today, AST 65-->470, ALT 55-->223. She underwent SLED for 6 hours yesterday. Currently on broad spectrum antibiotics.     Current Facility-Administered Medications   Medication Frequency    0.9%  NaCl infusion (CRRT USE ONLY) Continuous    0.9%  NaCl infusion (for blood administration) Q24H PRN    0.9%  NaCl infusion (for blood administration) Q24H PRN    0.9%  NaCl infusion PRN    acetaminophen tablet 650 mg Q6H PRN    albuterol-ipratropium 2.5 mg-0.5 mg/3 mL nebulizer solution 3 mL Q4H PRN    alteplase injection 2 mg Once    amiodarone 360 mg/200 mL (1.8 mg/mL) infusion Continuous    benzonatate capsule 100 mg TID PRN    bisacodyL suppository 10 mg Daily PRN    calcium gluconate 3 g in dextrose 5 % 100 mL infusion Continuous    dextromethorphan-guaiFENesin  mg/5 ml liquid 5 mL Q6H    dextrose 10% bolus 125 mL PRN    dextrose 10% bolus 250 mL PRN    dextrose-sod citrate-citric ac 2.45-2.2 gram- 800 mg/100 mL Soln Continuous    famotidine tablet 20 mg Daily    glucagon (human recombinant) injection 1 mg PRN    glucose chewable tablet 16 g PRN    glucose chewable tablet 24 g PRN    heparin (porcine) injection 3,200 Units PRN    HYDROmorphone injection 0.2 mg Q6H PRN    hydrOXYchloroQUINE tablet 200 mg Daily    insulin aspart U-100 pen 0-4 Units PRN    iohexoL (OMNIPAQUE 350) injection 100 mL ONCE PRN    melatonin tablet 6 mg Nightly PRN    metoprolol 12.5mg/1.25mL oral solution 6.25 mg BID    naloxone 0.4 mg/mL injection 0.02 mg PRN    NORepinephrine 32 mg in dextrose 5 % 250 mL infusion Continuous    ondansetron injection 4 mg TID AC    piperacillin-tazobactam 4.5 g in sodium chloride 0.9% 100 mL IVPB (ready to mix system) Q12H    polyethylene glycol packet 17 g Daily PRN    predniSONE  tablet 40 mg Daily    simethicone chewable tablet 80 mg TID PRN    sodium chloride 0.9% bolus 250 mL PRN    sodium chloride 0.9% bolus 250 mL PRN    sodium chloride 0.9% flush 10 mL PRN    vancomycin - pharmacy to dose pharmacy to manage frequency    vancomycin 500 mg in dextrose 5 % 100 mL IVPB (ready to mix system) Once    vasopressin (PITRESSIN) 0.2 Units/mL in dextrose 5 % 100 mL infusion Continuous    vitamin renal formula (B-complex-vitamin c-folic acid) 1 mg per capsule 1 capsule Daily     Objective:     Vital Signs (Most Recent):  Temp: 98.1 °F (36.7 °C) (02/27/22 0700)  Pulse: 73 (02/27/22 1102)  Resp: (!) 30 (02/27/22 1102)  BP: (!) 88/52 (02/27/22 1102)  SpO2: 97 % (02/27/22 1102)  O2 Device (Oxygen Therapy): nasal cannula (02/27/22 1102)   Vital Signs (24h Range):  Temp:  [96.8 °F (36 °C)-98.7 °F (37.1 °C)] 98.1 °F (36.7 °C)  Pulse:  [] 73  Resp:  [12-65] 30  SpO2:  [97 %-100 %] 97 %  BP: ()/(39-55) 88/52     Weight: 72.9 kg (160 lb 11.5 oz) (02/27/22 0400)  Body mass index is 34.78 kg/m².  Body surface area is 1.71 meters squared.      Intake/Output Summary (Last 24 hours) at 2/27/2022 1246  Last data filed at 2/27/2022 0800  Gross per 24 hour   Intake 2712.32 ml   Output 1515 ml   Net 1197.32 ml       Physical Exam   Constitutional: She appears ill. No distress.   HENT:   Head: Normocephalic and atraumatic.   Eyes: Conjunctivae are normal.   Cardiovascular: Normal rate, regular rhythm and normal heart sounds. Exam reveals no friction rub.   No murmur heard.  Pulmonary/Chest: Effort normal. She has no wheezes.   Abdominal: Soft. Bowel sounds are normal. There is abdominal tenderness. There is no rebound.   Musculoskeletal:      Comments: No synovitis in upper or lower extremities. Mild pitting of lower extremities.   Neurological: She is alert.   Skin: Skin is warm and dry. She is not diaphoretic.     Significant Labs:  CBC:   Recent Labs   Lab 02/26/22  2223   WBC 9.11   HGB 8.2*   HCT  24.6*   PLT 68*     CMP:   Recent Labs   Lab 02/27/22  0844   *   CALCIUM 8.1*   ALBUMIN 2.0*   PROT 5.0*   *   K 4.1   CO2 23   CL 94*   BUN 23   CREATININE 1.3   ALKPHOS 145*   *   *   BILITOT 4.8*

## 2022-02-27 NOTE — CONSULTS
I have seen the patient, reviewed the blood work, imaging and other studies.I have personally interviewed and examined the patient at bedside.  71 year old F with PMH of cirrhosis secondary to GARCIA, diverticular disease, psoriasis, HTN that was transferred from [facility] due to acute renal failure and concern for lupus nephritis.  Patient with +ARMEN, +rnp, hypocomplementemia, proteinuria, pericardial/pleural effusion, raynauds, thrombocytopenia, +ncianor, and elevated inflammatory markers consistent with SLE.     Patient was initially admitted to [facility] on 2/1 with acute renal failure.  She had worsening renal failure and active sediment at OSH.  On 2/5, nephrology recommended pulse dose steroids for concern for GN with plan for kidney biopsy by She completed 3 days of pulse dose steroids for suspected glomerulonephritis vs pulm/renal syndrome (2/5-2/7). She was then transitioned to pred 60mg on 2/8. She was transferred to [facility] on 2/21 due to worsening renal failure and worsening thrombocytopenia.      She has cirrhosis presumed to be from GARCIA but she has never had biopsy. Suspect that the cirrhosis is also likely contributing to her thrombocytopenia.  Recommend kidney biopsy but renal she is at high risk for biopsy so she was started on cellcept solution.   Pain now in ICU due to hypotension requiring pressor, worsening LFTS, and worsening ascites concerning SBP. She is also requiring CRRT but limited due to blood pressure.      -hold cellcept due to concern for SBP  - hepatology consult   -follow up CT abdomen/pelvis  -check cpk, aldolase, ferritin, coagulation studies, fibrinogen  -consider increasing from 40mg a day to stress dose steroids  -Continue plaquenil 200mg po qday

## 2022-02-27 NOTE — SUBJECTIVE & OBJECTIVE
Interval History/Significant Events: Patient with increasing pressor support overnight. Mentation with minimal improvement. Afebrile, but increasing oxygen and pressor requirements. Hepatology and palliative consulted.       Review of Systems   Constitutional:  Positive for fatigue. Negative for fever.   Respiratory:  Negative for chest tightness and shortness of breath.    Cardiovascular:  Positive for leg swelling. Negative for chest pain.   Gastrointestinal:  Positive for abdominal pain. Negative for abdominal distention, constipation, diarrhea, nausea and vomiting.   Genitourinary:  Negative for dysuria.        Patient gets diaylsis   Musculoskeletal:  Positive for back pain.   Skin:  Negative for color change and rash.   Neurological:  Negative for facial asymmetry.   Psychiatric/Behavioral:  Negative for agitation and behavioral problems.    Objective:     Vital Signs (Most Recent):  Temp: 98.1 °F (36.7 °C) (02/27/22 0700)  Pulse: 73 (02/27/22 0900)  Resp: (!) 22 (02/27/22 0900)  BP: (!) 95/43 (02/27/22 0900)  SpO2: 100 % (02/27/22 0900)   Vital Signs (24h Range):  Temp:  [96.8 °F (36 °C)-98.7 °F (37.1 °C)] 98.1 °F (36.7 °C)  Pulse:  [] 73  Resp:  [12-65] 22  SpO2:  [98 %-100 %] 100 %  BP: ()/(39-55) 95/43   Weight: 72.9 kg (160 lb 11.5 oz)  Body mass index is 34.78 kg/m².      Intake/Output Summary (Last 24 hours) at 2/27/2022 1020  Last data filed at 2/27/2022 0800  Gross per 24 hour   Intake 2962.32 ml   Output 1515 ml   Net 1447.32 ml       Physical Exam  Constitutional:       Appearance: She is obese. She is ill-appearing.   HENT:      Head: Normocephalic.   Eyes:      General: No scleral icterus.        Right eye: No discharge.         Left eye: No discharge.      Conjunctiva/sclera: Conjunctivae normal.   Neck:      Comments: Right sided tunnel cath  Cardiovascular:      Rate and Rhythm: Normal rate.      Pulses: Normal pulses.      Heart sounds: No murmur heard.  Pulmonary:      Breath  sounds: Normal breath sounds. No wheezing.      Comments: On BiPAP  Abdominal:      General: There is distension.      Tenderness: There is abdominal tenderness (RUQ). There is no guarding.   Musculoskeletal:      Right lower leg: Edema present.      Left lower leg: Edema present.   Skin:     General: Skin is warm and dry.      Coloration: Skin is not jaundiced.      Findings: Bruising present.   Neurological:      General: No focal deficit present.      Mental Status: She is alert.   Psychiatric:         Mood and Affect: Mood normal.       Vents:  Oxygen Concentration (%): 35 (02/27/22 0900)  Lines/Drains/Airways       Central Venous Catheter Line  Duration                  Hemodialysis Catheter 02/07/22 1436 right internal jugular 19 days              Peripheral Intravenous Line  Duration                  Peripheral IV - Single Lumen 02/24/22 0855 20 G;1 3/4 in Left;Anterior Forearm 3 days                  Significant Labs:    CBC/Anemia Profile:  Recent Labs   Lab 02/26/22  0938 02/26/22  2223   WBC 8.24 9.11   HGB 6.9* 8.2*   HCT 20.7* 24.6*   PLT 31* 68*   * 100*   RDW 19.3* 19.9*   FERRITIN 170  --         Chemistries:  Recent Labs   Lab 02/26/22  0938 02/26/22  1402 02/26/22  1836 02/26/22  2223 02/27/22  0302 02/27/22  0844    137  136   < > 135*  134* 132*  132* 131*   K 4.9 4.3  4.3   < > 3.9  3.9 3.9  3.9 4.1    99  97   < > 95  95 94*  94* 94*   CO2 21* 22*  22*   < > 23  24 25  25 23   BUN 74* 37*  37*   < > 12  12 18  18 23   CREATININE 2.7* 1.6*  1.6*   < > 0.7  0.7 1.0  1.0 1.3   CALCIUM 8.3* 8.2*  8.1*   < > 7.9*  7.9* 7.9*  7.9* 8.1*   ALBUMIN 2.2* 2.4*  --  2.2* 2.1* 2.0*   PROT 5.6*  --   --   --   --  5.0*   BILITOT 2.6*  --   --   --   --  4.8*   ALKPHOS 130  --   --   --   --  145*   ALT 55*  --   --   --   --  223*   AST 65*  --   --   --   --  470*   PHOS  --  2.6*  --  1.7* 2.4*  --     < > = values in this interval not displayed.       All pertinent  labs within the past 24 hours have been reviewed.    Significant Imaging:  I have reviewed all pertinent imaging results/findings within the past 24 hours.

## 2022-02-27 NOTE — PROCEDURES
"Tammi Farris is a 71 y.o. female patient.    Temp: 98.2 °F (36.8 °C) (02/27/22 1100)  Pulse: 73 (02/27/22 1413)  Resp: (!) 36 (02/27/22 1400)  BP: (!) 88/52 (02/27/22 1413)  SpO2: 96 % (02/27/22 1400)  Weight: 72.6 kg (160 lb) (02/27/22 1413)  Height: 4' 9" (144.8 cm) (02/27/22 1413)       Paracentesis    Date/Time: 2/27/2022 3:42 PM  Location procedure was performed: [facility]  Performed by: Kaitlynn Pratt MD  Authorized by: Kaitlynn Pratt MD   Assisting provider: Kaitlynn Pratt MD  Consent Done: Yes  Consent: Verbal consent not obtained.  Consent given by: patient  Patient understanding: patient states understanding of the procedure being performed  Patient consent: the patient's understanding of the procedure matches consent given  Procedure consent: procedure consent matches procedure scheduled  Relevant documents: relevant documents present and verified  Test results: test results available and properly labeled  Site marked: the operative site was marked  Imaging studies: imaging studies available  Required items: required blood products, implants, devices, and special equipment available  Initial or subsequent exam: initial  Procedure purpose: diagnostic  Indications: new onset ascites and secondary bacterial peritonitis    Anesthesia:  Local Anesthetic: lidocaine 1% without epinephrine  Anesthetic total: 3 mL  Description of findings: dark yellow, hazy fluid   Needle gauge: 20  Ultrasound guidance: yes  Puncture site: left lower quadrant  Fluid removed: 30(ml)  Fluid appearance: cloudy  Dressing: 4x4 sterile gauze  Complications: No  Specimens: Yes  Implants: No  Patient tolerance: Patient tolerated the procedure well with no immediate complications          2/27/2022  "

## 2022-02-27 NOTE — ASSESSMENT & PLAN NOTE
Rheumatology following patient.   Patient S/p Methylpred 1g x 3 days (2/5-2/7), then transitioned to pred 60mg on 2/8, decreased to prednisone 50mg on 2/15, prednisone on 40mg on 2/17- present     Patient meets criteria for SLE due to: +ARMEN, +thrombocytopenia,+ proteinuria, + low C3, + low C4, + pericardial effusion (on stress echo from 12/2021) + alopecia.      Plan  - APS labs ordered per rheum  - continue plaquenil 200mg daily  - nephrology consulted, recommended cellcept PO and renal biopsy to be done at later date given concern for complications  - Stress dose steroids, atovaquone for PJP ppx

## 2022-02-27 NOTE — ASSESSMENT & PLAN NOTE
71 y.o. with HTN, diabetes type 2 (hemoglobin A1c 5.9 on 2/1), CKD2, cirrhosis, HFpEF, macrocytic anemia, psorasis, sleep apnea and GERD who was admitted to [facility] on 2/1/22 for acute renal failure. She endorses fatigue, alopecia (for past 3 years), and +raynauds. Denies oral ulcers, , rash, joint swelling/joint pain, chest pain. Stress echo from 12/2021 showed trivial pericardial effusion.    Labs:  ARMEN 1:1280 on 2/3  +Sm/RNP: 4.06 on 2/3  UPE: no paraprotein bands  ESR: > 140  CRP: 20.8  C3: 26 (L)--> 40--> 31  C4: < 3 (L)--> 5 -->4  VALENTINO: pos (VALENTINO pos 2/2  cold autob, haptoglobin and LDH wnl)  UA: Protein 3+, occult blood 3+, WBC 3+  Microscopic UA: RBC >100, WBC 40  Timed urine protein: 560  CRP 20.9  Haptoglobin: norm  CCP: norm  RF: norm  GBM ab: negative  ANCA: negative  Hep B surface antigen: neg  Hep C ab: neg    Treatment history while inpatient:  - S/p Methylpred 1g x 3 days (2/5-2/7), then transitioned to pred 60mg on 2/8, decreased to prednisone 50mg on 2/15, prednisone on 40mg on 2/17- present    Assessment:  She has labs that would, according to the EULAR/ACR 2019 criteria for SLE, meet this for SLE (17pts)  due to: +ARMEN, +thrombocytopenia,+ proteinuria, + low C3, + low C4, + pericardial effusion (on stress echo from 12/2021) + alopecia.  However, this still remains uncertainty with this diagnosis as her complements and cytopenias have not improved despite pulse of steroids and prednisone since 2/5.  Given her known underlying cirrhosis, this may explain her hypocomplementemia (decrease synthetic function), thrombocytopenia and renal failure (ANI with muddy brown casts, no active sediment noted by nephrology here).  Now found to have ascites exam bedside ultrasound by ICU, suggestive of decompensated cirrhosis.  Unable to get renal biopsy due to thrombocytopenia, which help to definitively rule out SLE.    Plan/Recommendations:  - agree with holding cellcept in the setting to suspected  septic shock, antibiotics per primary  - Agree with hepatology consult  - nephrology following, renal biopsy would help with definitive diagnosis but currently deferred given thrombocytopenia and HDS instability, to be done at later date given concern for complications  - Follow up APS labs  - continue plaquenil 200mg daily  - will follow up CT chest abdomen pelvis  - prednisone per nephrology, can consider stress dose steroids if needed  - consider PJP ppx and GI ppx while on high dose steroids      Patient seen and evaluated with Dr. Miller. Please see staff attestation for final recommendations.

## 2022-02-27 NOTE — ASSESSMENT & PLAN NOTE
71 y.o F w/ dialysis dependent ANI thought to be secondary to an auto-immune etiology, based on serologic pattern of possitive ARMEN, Anti-Posey RNP and low complement levels, negative ANCA.  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      Plan:  --patient admitted to ICU for SLED (6 hours)  --HOLDING Cellcept   --continue Hydroxychloroquine  -- change steroids 40 mg daily, switch to stress dose steroids  --renal bx per nephrology after clinically stable  -- Strict I/O  -- Daily BMP, mag, phos  -- Avoid nephrotoxins

## 2022-02-27 NOTE — CONSULTS
Consult received, chart reviewed, 71 y.o. F with history of CKD2, HTN, DM, cirrhosis, and PAULINA, who was admitted to [facility] with acute renal failure. Concern for possible lupus nephritis and transferred to [facility] per rheumatology /nephrology recommendations for IR renal biopsy. Patient admitted to ICU for CRRT needs. Unable to renal biopsy due to thrombocytopenia.    Discussed with team, patient can be seen tomorrow, non-urgent ACP/GOC discussion tomorrow.     Full consult to follow next available date.       Thank you for the opportunity to care for this patient and family.     Please call with questions.     Bee Mcclain MD  Palliative Care Department  [facility]

## 2022-02-27 NOTE — ASSESSMENT & PLAN NOTE
"This patient does have evidence of infective focus  My overall impression is septic shock. Vital signs were reviewed and noted in progress note.  Antibiotics given-   Antibiotics (From admission, onward)            Start     Stop Route Frequency Ordered    02/27/22 2100  piperacillin-tazobactam 4.5 g in sodium chloride 0.9% 100 mL IVPB (ready to mix system)         -- IV Every 12 hours (non-standard times) 02/27/22 1147    02/26/22 1623  vancomycin - pharmacy to dose  (vancomycin IVPB)        "And" Linked Group Details    -- IV pharmacy to manage frequency 02/26/22 1524        Cultures were taken-   Microbiology Results (last 7 days)     Procedure Component Value Units Date/Time    Gram stain [[id number]] Collected: 02/27/22 1515    Order Status: Sent Specimen: Ascites Updated: 02/27/22 1540    Aerobic culture [[id number]] Collected: 02/27/22 1515    Order Status: Sent Specimen: Ascites Updated: 02/27/22 1540    Culture, Anaerobic [[id number]] Collected: 02/27/22 1515    Order Status: Sent Specimen: Ascites Updated: 02/27/22 1540    Blood culture [[id number]] Collected: 02/26/22 1836    Order Status: Completed Specimen: Blood from Peripheral, Forearm, Right Updated: 02/27/22 0315     Blood Culture, Routine No Growth to date    Blood culture [[id number]] Collected: 02/26/22 1835    Order Status: Completed Specimen: Blood from Peripheral, Forearm, Left Updated: 02/27/22 0315     Blood Culture, Routine No Growth to date    Culture, Respiratory with Gram Stain [[id number]]     Order Status: No result Specimen: Respiratory from Sputum, Expectorated         Latest lactate reviewed, they are-  Recent Labs   Lab 02/27/22  0302 02/27/22  0844 02/27/22  1259   LACTATE 5.0* 3.6* 5.1*       Organ dysfunction indicated by Acute kidney injury  Source- TBD      Plan:  - vanc and zosyn  - blood cultures  - diagnostic para  -  Ct abd/pelvis showing diffusely edematous pancreas suggesting pancreatitis, ascites, supravesical space  small " amount of layering hyperdensity, possibly representing small component of hemorrhage or debris/proteinaceous fluid, colonic diverticulosis.  Diffuse mild bowel wall thickening and surrounding inflammatory change.  Findings are nonspecific could represent infectious or ischemic etiologies. Cirrhotic liver morphology with sequela of portal hypertension including ascites, varices, and anasarca.

## 2022-02-27 NOTE — PROGRESS NOTES
[facility]  Nephrology  Progress Note    Patient Name: Tammi Farris  MRN: [id number]  Admission Date: 2/1/2022  Hospital Length of Stay: 26 days  Attending Provider: Chalino Ingram*   Primary Care Physician: Ann-Marie Hartman MD  Principal Problem:Acute renal failure superimposed on stage 2 chronic kidney disease    Subjective:     HPI: Tammi Farris is a 71 y.o. F with history of CKD2, HTN, DM, cirrhosis, and PAULINA, who was admitted to [facility] with acute renal failure. Found to have low complements and positive anti-Sm /RNP antibodies. Unable to renal biopsy due to thrombocytopenia (~ 50). She was treated with pulse dose steroids for 3 days and now on prednisone. Attempted to initiate Cellcept but has not taken due to difficulty with pill size. Initiated on HD, received several sessions, last 2/16. Held over weekend as her Cr improving to 3.5 from 6.4. She reports making good urine. Denies confusion. Concern for possible lupus nephritis and transferred to [facility] per rheumatology /nephrology recommendations for IR renal biopsy.      Interval History: Clinical condition continues to worsen. Patient tachypneic with increased work of breathing this morning. Also hypotensive with MAPs in low 60s. Appears volume overloaded. Metabolically stable for now but may need further CRRT for volume removal. 600 cc UOP..    Review of patient's allergies indicates:   Allergen Reactions    Dobutamine Hives    Benadryl [diphenhydramine hcl] Anxiety     Pt states she feels jumpy and anxious when taking.    Darvon [propoxyphene] Nausea Only    Shellfish containing products Hives    Iodinated contrast media Hives    Lisinopril Other (See Comments)     cough    Propoxyphene hcl      Itchy (skin)^    Tizanidine Other (See Comments)     Sweaty, difficulty swallowing    Statins-hmg-coa reductase inhibitors Anxiety and Other (See Comments)     Myalgia, fatigue, palpitations, anxiety     Current  Facility-Administered Medications   Medication Frequency    0.9%  NaCl infusion (CRRT USE ONLY) Continuous    0.9%  NaCl infusion (for blood administration) Q24H PRN    0.9%  NaCl infusion (for blood administration) Q24H PRN    0.9%  NaCl infusion PRN    acetaminophen tablet 650 mg Q6H PRN    albuterol-ipratropium 2.5 mg-0.5 mg/3 mL nebulizer solution 3 mL Q4H PRN    alteplase injection 2 mg Once    amiodarone 360 mg/200 mL (1.8 mg/mL) infusion Continuous    benzonatate capsule 100 mg TID PRN    bisacodyL suppository 10 mg Daily PRN    calcium gluconate 3 g in dextrose 5 % 100 mL infusion Continuous    dextromethorphan-guaiFENesin  mg/5 ml liquid 5 mL Q6H    dextrose 10% bolus 125 mL PRN    dextrose 10% bolus 250 mL PRN    dextrose-sod citrate-citric ac 2.45-2.2 gram- 800 mg/100 mL Soln Continuous    famotidine tablet 20 mg Daily    glucagon (human recombinant) injection 1 mg PRN    glucose chewable tablet 16 g PRN    glucose chewable tablet 24 g PRN    heparin (porcine) injection 3,200 Units PRN    HYDROmorphone injection 0.2 mg Q6H PRN    hydrOXYchloroQUINE tablet 200 mg Daily    insulin aspart U-100 pen 0-4 Units PRN    iohexoL (OMNIPAQUE 350) injection 100 mL ONCE PRN    melatonin tablet 6 mg Nightly PRN    metoprolol 12.5mg/1.25mL oral solution 6.25 mg BID    naloxone 0.4 mg/mL injection 0.02 mg PRN    NORepinephrine 4 mg in dextrose 5% 250 mL infusion (premix) (titrating) Continuous    ondansetron injection 4 mg TID AC    piperacillin-tazobactam 4.5 g in sodium chloride 0.9% 100 mL IVPB (ready to mix system) Q8H    polyethylene glycol packet 17 g Daily PRN    predniSONE tablet 40 mg Daily    simethicone chewable tablet 80 mg TID PRN    sodium chloride 0.9% bolus 250 mL PRN    sodium chloride 0.9% bolus 250 mL PRN    sodium chloride 0.9% flush 10 mL PRN    vancomycin - pharmacy to dose pharmacy to manage frequency    vancomycin 500 mg in dextrose 5 % 100 mL IVPB  (ready to mix system) Once    vitamin renal formula (B-complex-vitamin c-folic acid) 1 mg per capsule 1 capsule Daily       Objective:     Vital Signs (Most Recent):  Temp: 98.1 °F (36.7 °C) (02/27/22 0700)  Pulse: 73 (02/27/22 1102)  Resp: (!) 30 (02/27/22 1102)  BP: (!) 88/52 (02/27/22 1102)  SpO2: 97 % (02/27/22 1102)  O2 Device (Oxygen Therapy): nasal cannula (02/27/22 1102)   Vital Signs (24h Range):  Temp:  [96.8 °F (36 °C)-98.7 °F (37.1 °C)] 98.1 °F (36.7 °C)  Pulse:  [] 73  Resp:  [12-65] 30  SpO2:  [97 %-100 %] 97 %  BP: ()/(39-55) 88/52     Weight: 72.9 kg (160 lb 11.5 oz) (02/27/22 0400)  Body mass index is 34.78 kg/m².  Body surface area is 1.71 meters squared.    I/O last 3 completed shifts:  In: 2950.8 [P.O.:60; I.V.:1032.9; Blood:849.5; IV Piggyback:1008.3]  Out: 1805 [Other:1805]    Physical Exam  Vitals and nursing note reviewed.   Constitutional:       General: She is in acute distress.      Appearance: She is ill-appearing. She is not toxic-appearing or diaphoretic.   Cardiovascular:      Rate and Rhythm: Normal rate and regular rhythm.      Pulses: Normal pulses.      Heart sounds: Murmur heard.      Comments: RIJ HD catheter  Pulmonary:      Effort: Respiratory distress present.      Breath sounds: Rales present. No wheezing or rhonchi.      Comments: Crackles throughout all lung fields   Abdominal:      General: Abdomen is flat. Bowel sounds are normal. There is no distension.      Palpations: Abdomen is soft. There is no mass.      Tenderness: There is no abdominal tenderness. There is no guarding or rebound.   Musculoskeletal:         General: No swelling, tenderness or deformity.      Right lower leg: No edema.      Left lower leg: No edema.   Skin:     General: Skin is warm.      Capillary Refill: Capillary refill takes 2 to 3 seconds.      Coloration: Skin is pale. Skin is not jaundiced.      Findings: Bruising and erythema present. No lesion or rash.      Comments: Large  ecchymotic area at HD catheter site    Neurological:      Mental Status: She is alert and oriented to person, place, and time.       Significant Labs:  CBC:   Recent Labs   Lab 02/26/22  2223   WBC 9.11   RBC 2.46*   HGB 8.2*   HCT 24.6*   PLT 68*   *   MCH 33.3*   MCHC 33.3     CMP:   Recent Labs   Lab 02/27/22  0844   *   CALCIUM 8.1*   ALBUMIN 2.0*   PROT 5.0*   *   K 4.1   CO2 23   CL 94*   BUN 23   CREATININE 1.3   ALKPHOS 145*   *   *   BILITOT 4.8*     No results for input(s): COLORU, CLARITYU, SPECGRAV, PHUR, PROTEINUA, GLUCOSEU, BILIRUBINCON, BLOODU, WBCU, RBCU, BACTERIA, MUCUS, NITRITE, LEUKOCYTESUR, UROBILINOGEN, HYALINECASTS in the last 168 hours.  All labs within the past 24 hours have been reviewed.     Significant Imaging:  Imaging Results              US Lower Extremity Veins Bilateral (Final result)  Result time 02/01/22 10:32:52   Procedure changed from US Lower Extremity Veins Bilateral     Final result by Polo Carr MD (02/01/22 10:32:52)                   Impression:      1. No sonographic evidence of DVT in the bilateral lower extremities.      Electronically signed by: Polo Carr  Date:    02/01/2022  Time:    10:32               Narrative:    EXAMINATION:  US LOWER EXTREMITY VEINS BILATERAL    CLINICAL HISTORY:  cough and congestion since October; Cough, unspecified    TECHNIQUE:  Duplex and color flow Doppler and dynamic compression was performed of the bilateral lower extremity veins was performed.    COMPARISON:  None    FINDINGS:  Right lower extremity    Common femoral, superficial femoral, popliteal, upper greater saphenous and deep femoral veins demonstrate normal compressibility, phasic flow and augmentation response without evidence of filling defect. Visualized calf veins are patent.    Left lower extremity    Common femoral, superficial femoral, popliteal, upper greater saphenous and deep femoral veins demonstrate normal compressibility,  phasic flow and augmentation response without evidence of filling defect. Visualized calf veins are patent.                                       X-Ray Chest PA And Lateral (Final result)  Result time 02/01/22 09:39:05      Final result by Brandon Fry MD (02/01/22 09:39:05)                   Impression:      No significant changes.      Electronically signed by: Brandon Fry MD  Date:    02/01/2022  Time:    09:39               Narrative:    EXAMINATION:  XR CHEST PA AND LATERAL    CLINICAL HISTORY:  Chest Pain;    TECHNIQUE:  PA and lateral views of the chest were performed.    COMPARISON:  02/01/2022 at 08:08 hours, 12/13/2021    FINDINGS:  Enlarged cardiac silhouette, similar to the prior exam.  There are mild perihilar and right basilar lung opacities, not significantly changed from the earlier exam, possible cardiogenic/noncardiogenic pulmonary edema, pneumonia, or aspiration.  Superimposed chronic interstitial changes may be present, as well.  The opacities have not significantly changed from the earlier exam.  Mildly elevated right hemidiaphragm, stable.  No pneumothorax.  No pleural effusions.                                       X-Ray Chest AP Portable (Final result)  Result time 02/01/22 08:28:48      Final result by Kemal Latham MD (02/01/22 08:28:48)                   Impression:      Nonspecific interstitial fibrotic changes stable and unchanged allowing for differences in inspiratory effort and technique.  Clinical correlation requested.    Epic abnormal flag      Electronically signed by: Kemal Latham MD  Date:    02/01/2022  Time:    08:28               Narrative:    EXAMINATION:  XR CHEST AP PORTABLE    CLINICAL HISTORY:  Cough, unspecified    TECHNIQUE:  Single frontal view of the chest was performed.    COMPARISON:  12/13/2021    FINDINGS:  Increasing perihilar interstitial infiltrates, some volume loss of anterior segment of elevation right minor fissure, elevation right  hemidiaphragm with compression atelectasis of medial basilar segment right lower lobe.  Top-normal size heart.  Large body size degrades exam.  Pulmonary vascular tree borderline prominent.                                    All significant imaging within the past 24 hours has been reviewed.      Assessment/Plan:     * Acute renal failure superimposed on stage 2 chronic kidney disease  Superimposed ANI on CKD II, unable to get renal bx high risk for bleeding as she has severe thrombocytopenia, workup ongoing for LN    Worsening clinical condition, specifically her hematological labs have significantly dropped all cell lines.   Recommendations:   -Please discuss case again with hematology and rheum given acute deterioration of hgb and platelet levels  -SLED not tolerated due to clotting off filter, she may need systemic anticoagulation to optimize dialysis for vol removal (a concern given her low plt count)  -LFTs worsening  -repeat labs Q6H and page nephrology if her respiratory status worsen   -hold phos binders  -strict I&Os   -Consider goals of care discussion     Hyperphosphatemia  Likely 2/2 renal failure. Phos 2.4 this AM.    - Replete PRN goal >3  - Monitor with daily phos  - Hold phos binders    Hyperkalemia  Improved to 3.9. Likely 2/2 renal failure    - Continue to monitor  - Shift PRN      Thrombocytopenia  Lab Results   Component Value Date    PLT 68 (L) 02/26/2022     -Plan per primary team           Thank you for your consult. I will follow-up with patient. Please contact us if you have any additional questions.    Anthony Cantor MD  Nephrology  [facility]

## 2022-02-27 NOTE — CARE UPDATE
BG goal 140-180    -A1C   Lab Results   Component Value Date    HGBA1C 5.9 (H) 02/01/2022       -HOME REGIMEN: Metformin 1g BID     -INPATIENT REGIMEN: correction scale     -GLUCOSE TREND FOR THE PAST 24HRS:     -NO HYPOGYCEMIAS NOTED     -TOLERATING 25% OF PO DIET     -Diet: Diet full liquid [facility]; Renal, Consistent Carbohydrate; Thin    -Steroids - Prednisone 40 mg daily    -Tube Feeds - none      Remains in CICU. NAEON. BG within goal ranges with no prn SQ insulin requirements overnight. Appetite remains poor.     Plan:  -Continue Low Dose Correction Scale  -BG monitoring ac/hs    Discharge planning: tbd    Endocrine to continue to follow    ** Please call Endocrine for any BG related issues **

## 2022-02-27 NOTE — PROGRESS NOTES
[facility] - Cardiac Intensive [facility]  Critical Care Medicine  Progress Note    Patient Name: Tammi Farris  MRN: [id number]  Admission Date: 2/1/2022  Hospital Length of Stay: 26 days  Code Status: Full Code  Attending Provider: Chalino Ingram*  Primary Care Provider: Ann-Marie Hartman MD   Principal Problem: Acute renal failure superimposed on stage 2 chronic kidney disease    Subjective:     HPI:  Tammi Farris is a 71 y.o. F with history of CKD2, HTN, DM, cirrhosis, and PAULINA, who was admitted to [facility] with acute renal failure. Found to have low complements and positive anti-Sm /RNP antibodies. Unable to renal biopsy due to thrombocytopenia (~ 50). She was treated with pulse dose steroids for 3 days and now on prednisone. Attempted to initiate Cellcept but has not taken due to difficulty with pill size. Initiated on HD, received several sessions, last 2/16. Held over weekend as her Cr improving to 3.5 from 6.4. She reports making good urine. Denies confusion. Concern for possible lupus nephritis and transferred to [facility] per rheumatology /nephrology recommendations for IR renal biopsy. Patient admitted to ICU for CRRT needs. Patient notes she has not been feeling well for a couple of months. She says she gets dialysis because she has liver disease.         Hospital/ICU Course:  Stepped up to MICU 2/25 for CRRT. Patient now requiring pressor support (low levels of levo), and found to have HgB of 6.9, unit of PRBC transfused. Has had struggled with dialysis clotting off. Additionally, has been experiencing abdominal pain for some time. Bedside ultrasound revealing ascites, lactate 6.2. CT abd/pelvis showing diffusely edematous pancrease suggesting acute pancreatitis, ascites as well as a small amount of layering hyperdensity that could represent component of hemorrhage or protenacious material, as well as diverticulosis with wall thickening, and liver cirrhosis. Lipase wnl. LFTs increasing, MELD 27, hepatology  consulted. Dx para pending. Echo pending. Palliative consulted.           Interval History/Significant Events: Patient with increasing pressor support overnight. Mentation with minimal improvement. Afebrile, but increasing oxygen and pressor requirements. Hepatology and palliative consulted.       Review of Systems   Constitutional:  Positive for fatigue. Negative for fever.   Respiratory:  Negative for chest tightness and shortness of breath.    Cardiovascular:  Positive for leg swelling. Negative for chest pain.   Gastrointestinal:  Positive for abdominal pain. Negative for abdominal distention, constipation, diarrhea, nausea and vomiting.   Genitourinary:  Negative for dysuria.        Patient gets diaylsis   Musculoskeletal:  Positive for back pain.   Skin:  Negative for color change and rash.   Neurological:  Negative for facial asymmetry.   Psychiatric/Behavioral:  Negative for agitation and behavioral problems.    Objective:     Vital Signs (Most Recent):  Temp: 98.1 °F (36.7 °C) (02/27/22 0700)  Pulse: 73 (02/27/22 0900)  Resp: (!) 22 (02/27/22 0900)  BP: (!) 95/43 (02/27/22 0900)  SpO2: 100 % (02/27/22 0900)   Vital Signs (24h Range):  Temp:  [96.8 °F (36 °C)-98.7 °F (37.1 °C)] 98.1 °F (36.7 °C)  Pulse:  [] 73  Resp:  [12-65] 22  SpO2:  [98 %-100 %] 100 %  BP: ()/(39-55) 95/43   Weight: 72.9 kg (160 lb 11.5 oz)  Body mass index is 34.78 kg/m².      Intake/Output Summary (Last 24 hours) at 2/27/2022 1020  Last data filed at 2/27/2022 0800  Gross per 24 hour   Intake 2962.32 ml   Output 1515 ml   Net 1447.32 ml       Physical Exam  Constitutional:       Appearance: She is obese. She is ill-appearing.   HENT:      Head: Normocephalic.   Eyes:      General: No scleral icterus.        Right eye: No discharge.         Left eye: No discharge.      Conjunctiva/sclera: Conjunctivae normal.   Neck:      Comments: Right sided tunnel cath  Cardiovascular:      Rate and Rhythm: Normal rate.      Pulses:  Normal pulses.      Heart sounds: No murmur heard.  Pulmonary:      Breath sounds: Normal breath sounds. No wheezing.      Comments: On BiPAP  Abdominal:      General: There is distension.      Tenderness: There is abdominal tenderness (RUQ). There is no guarding.   Musculoskeletal:      Right lower leg: Edema present.      Left lower leg: Edema present.   Skin:     General: Skin is warm and dry.      Coloration: Skin is not jaundiced.      Findings: Bruising present.   Neurological:      General: No focal deficit present.      Mental Status: She is alert.   Psychiatric:         Mood and Affect: Mood normal.       Vents:  Oxygen Concentration (%): 35 (02/27/22 0900)  Lines/Drains/Airways       Central Venous Catheter Line  Duration                  Hemodialysis Catheter 02/07/22 1436 right internal jugular 19 days              Peripheral Intravenous Line  Duration                  Peripheral IV - Single Lumen 02/24/22 0855 20 G;1 3/4 in Left;Anterior Forearm 3 days                  Significant Labs:    CBC/Anemia Profile:  Recent Labs   Lab 02/26/22  0938 02/26/22  2223   WBC 8.24 9.11   HGB 6.9* 8.2*   HCT 20.7* 24.6*   PLT 31* 68*   * 100*   RDW 19.3* 19.9*   FERRITIN 170  --         Chemistries:  Recent Labs   Lab 02/26/22  0938 02/26/22  1402 02/26/22  1836 02/26/22  2223 02/27/22  0302 02/27/22  0844    137  136   < > 135*  134* 132*  132* 131*   K 4.9 4.3  4.3   < > 3.9  3.9 3.9  3.9 4.1    99  97   < > 95  95 94*  94* 94*   CO2 21* 22*  22*   < > 23  24 25  25 23   BUN 74* 37*  37*   < > 12  12 18  18 23   CREATININE 2.7* 1.6*  1.6*   < > 0.7  0.7 1.0  1.0 1.3   CALCIUM 8.3* 8.2*  8.1*   < > 7.9*  7.9* 7.9*  7.9* 8.1*   ALBUMIN 2.2* 2.4*  --  2.2* 2.1* 2.0*   PROT 5.6*  --   --   --   --  5.0*   BILITOT 2.6*  --   --   --   --  4.8*   ALKPHOS 130  --   --   --   --  145*   ALT 55*  --   --   --   --  223*   AST 65*  --   --   --   --  470*   PHOS  --  2.6*  --  1.7*  2.4*  --     < > = values in this interval not displayed.       All pertinent labs within the past 24 hours have been reviewed.    Significant Imaging:  I have reviewed all pertinent imaging results/findings within the past 24 hours.      ABG  Recent Labs   Lab 02/27/22  1102   PH 7.443   PO2 36*   PCO2 36.9   HCO3 25.2   BE 1     Assessment/Plan:     Cardiac/Vascular  Primary hypertension  On metoprolol 6.25mg BID, was held with hypotension overnight 2/26, went into a-fib. Amio started    Hyperlipidemia  Not on statin due to history of cirrhosis    Renal/  * Acute renal failure superimposed on stage 2 chronic kidney disease  71 y.o F w/ dialysis dependent ANI thought to be secondary to an auto-immune etiology, based on serologic pattern of possitive ARMEN, Anti-Posey RNP and low complement levels, negative ANCA.  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      Plan:  --patient admitted to ICU for SLED (6 hours)  --HOLDING Cellcept   --continue Hydroxychloroquine  -- change steroids 40 mg daily, switch to stress dose steroids  --renal bx per nephrology after clinically stable  -- Strict I/O  -- Daily BMP, mag, phos  -- Avoid nephrotoxins    Hyperphosphatemia  Phosphate improved with dialysis  See acute renal failure    Systemic lupus erythematosus with glomerular disease  Rheumatology following patient.   Patient S/p Methylpred 1g x 3 days (2/5-2/7), then transitioned to pred 60mg on 2/8, decreased to prednisone 50mg on 2/15, prednisone on 40mg on 2/17- present     Patient meets criteria for SLE due to: +ARMEN, +thrombocytopenia,+ proteinuria, + low C3, + low C4, + pericardial effusion (on stress echo from 12/2021) + alopecia.      Plan  - APS labs ordered per rheum  - continue plaquenil 200mg daily  - nephrology consulted, recommended cellcept PO and renal biopsy to be done at later date given concern for complications  - Stress dose steroids, atovaquone for PJP ppx    Hyperkalemia  Hyperkalemia  Likely 2/2 renal  "failure. Improved with dialysis.     Plan  - SLED  - Continue to monitor  - Shift PRN    ID  Severe sepsis  This patient does have evidence of infective focus  My overall impression is septic shock. Vital signs were reviewed and noted in progress note.  Antibiotics given-   Antibiotics (From admission, onward)            Start     Stop Route Frequency Ordered    02/27/22 2100  piperacillin-tazobactam 4.5 g in sodium chloride 0.9% 100 mL IVPB (ready to mix system)         -- IV Every 12 hours (non-standard times) 02/27/22 1147    02/26/22 1623  vancomycin - pharmacy to dose  (vancomycin IVPB)        "And" Linked Group Details    -- IV pharmacy to manage frequency 02/26/22 1524        Cultures were taken-   Microbiology Results (last 7 days)     Procedure Component Value Units Date/Time    Gram stain [[id number]] Collected: 02/27/22 1515    Order Status: Sent Specimen: Ascites Updated: 02/27/22 1540    Aerobic culture [[id number]] Collected: 02/27/22 1515    Order Status: Sent Specimen: Ascites Updated: 02/27/22 1540    Culture, Anaerobic [[id number]] Collected: 02/27/22 1515    Order Status: Sent Specimen: Ascites Updated: 02/27/22 1540    Blood culture [[id number]] Collected: 02/26/22 1836    Order Status: Completed Specimen: Blood from Peripheral, Forearm, Right Updated: 02/27/22 0315     Blood Culture, Routine No Growth to date    Blood culture [[id number]] Collected: 02/26/22 1835    Order Status: Completed Specimen: Blood from Peripheral, Forearm, Left Updated: 02/27/22 0315     Blood Culture, Routine No Growth to date    Culture, Respiratory with Gram Stain [[id number]]     Order Status: No result Specimen: Respiratory from Sputum, Expectorated         Latest lactate reviewed, they are-  Recent Labs   Lab 02/27/22  0302 02/27/22  0844 02/27/22  1259   LACTATE 5.0* 3.6* 5.1*       Organ dysfunction indicated by Acute kidney injury  Source- TBD      Plan:  - vanc and zosyn  - blood cultures  - diagnostic para  -  Ct " "abd/pelvis showing diffusely edematous pancreas suggesting pancreatitis, ascites, supravesical space  small amount of layering hyperdensity, possibly representing small component of hemorrhage or debris/proteinaceous fluid, colonic diverticulosis.  Diffuse mild bowel wall thickening and surrounding inflammatory change.  Findings are nonspecific could represent infectious or ischemic etiologies. Cirrhotic liver morphology with sequela of portal hypertension including ascites, varices, and anasarca.      Hematology  Thrombocytopenia   Platelets chronically low. Hematology following and feel that her thrombocytopenia is "likely a chronic thrombocytopenia that is worsening in the setting of active lupus and acute illness".      Plan:   Transfuse PLT if <10,000, or <50,000 with active bleeding or before invasive procedures.   Most likely a chronic thrombocytopenia that is worsening in the setting of active lupus and acute illness.   Follow-up Heme recs   Transfused unit prior to diagnostic para            Oncology  Macrocytic anemia  Patient with history of Cirrhosis. HgB dropped to 6.9. Received unit of PRBC and HgB increased appropriately    Plan:  -Follow up daily CBC  -Transfuse for Hgb less than 7    Endocrine  Type 2 diabetes mellitus without complication, without long-term current use of insulin  Last A1c 5.9    Plan:  Goal blood glucose 140-180  Follow up endocrine recs/notify them of any diet changes          GI  Cirrhosis of liver without ascites  Patient complaining of abdominal pain. Bedside ultrasound revealing ascites. Lactate elevated at 6.2. Ct from 2/1 revealing small amount of ascites.     MELD-Na score: 36 at 2/27/2022 12:59 PM  MELD score: 35 at 2/27/2022 12:59 PM  Calculated from:  Serum Creatinine: On dialysis. Using 4 mg/dL.  Serum Sodium: 131 mmol/L at 2/27/2022 12:59 PM  Total Bilirubin: 4.8 mg/dL at 2/27/2022  8:44 AM  INR(ratio): 2.4 at 2/27/2022 12:59 PM  Age: 71 years      - Ct abd/pelvis " showing diffusely edematous pancreas suggesting pancreatitis, ascites, supravesical space  small amount of layering hyperdensity, possibly representing small component of hemorrhage or debris/proteinaceous fluid, colonic diverticulosis.  Diffuse mild bowel wall thickening and surrounding inflammatory change.  Findings are nonspecific could represent infectious or ischemic etiologies. Cirrhotic liver morphology with sequela of portal hypertension including ascites, varices, and anasarca.    - diagnostic para, pending  - f/u para labs  - lipase wnl  - Hepatology consult, appreciate recs      Gastroesophageal reflux disease  Continue Famotidine while on steroids    Other  Debility  PT/OT consulted    Sleep apnea  BiPAP [facility]       Critical Care Daily Checklist:    A: Awake: RASS Goal/Actual Goal:    Actual: Galloway Agitation Sedation Scale (RASS): Drowsy   B: Spontaneous Breathing Trial Performed?     C: SAT & SBT Coordinated?  n/a                      D: Delirium: CAM-ICU Overall CAM-ICU: Positive   E: Early Mobility Performed? No   F: Feeding Goal: Goals: Pt to meet > 50% EEN by RD follow up  Status: Nutrition Goal Status: progressing towards goal   Current Diet Order   Procedures    Diet full liquid [facility]; Renal, Consistent Carbohydrate; Thin     Order Specific Question:   Indicate patient location for additional diet options:     Answer:   [facility]     Order Specific Question:   Additional Diet Options:     Answer:   Renal     Order Specific Question:   Additional Diet Options:     Answer:   Consistent Carbohydrate     Order Specific Question:   Fluid consistency:     Answer:   Thin      AS: Analgesia/Sedation Dilaudid prn   T: Thromboembolic Prophylaxis none   H: HOB > 300 Yes   U: Stress Ulcer Prophylaxis (if needed) famotidine   G: Glucose Control Endo managing   B: Bowel Function Stool Occurrence: 1   I: Indwelling Catheter (Lines & Yao) Necessity r tunnel cath   D: De-escalation of  Antimicrobials/Pharmacotherapies vanc and zosyn    Plan for the day/ETD Diagnostic para, wean pressors as tolerated    Code Status:  Family/Goals of Care: Full Code         Critical secondary to Patient has a condition that poses threat to life and bodily function: Acute Renal Failure and sepsis      Critical care was time spent personally by me on the following activities: development of treatment plan with patient or surrogate and bedside caregivers, discussions with consultants, evaluation of patient's response to treatment, examination of patient, ordering and performing treatments and interventions, ordering and review of laboratory studies, ordering and review of radiographic studies, pulse oximetry, re-evaluation of patient's condition. This critical care time did not overlap with that of any other provider or involve time for any procedures.     Juan Hanley,   Critical Care Medicine  [facility] - Card[facility]

## 2022-02-28 PROBLEM — Z51.5 PALLIATIVE CARE ENCOUNTER: Status: ACTIVE | Noted: 2022-01-01

## 2022-02-28 PROBLEM — K92.2 GI BLEED: Status: ACTIVE | Noted: 2022-01-01

## 2022-02-28 PROBLEM — Z51.5 COMFORT MEASURES ONLY STATUS: Status: ACTIVE | Noted: 2022-01-01

## 2022-02-28 PROBLEM — K65.2 SBP (SPONTANEOUS BACTERIAL PERITONITIS): Status: ACTIVE | Noted: 2022-01-01

## 2022-02-28 NOTE — PLAN OF CARE
CMICU DAILY GOALS       A: Awake    RASS: Goal -    Actual - RASS (Galloway Agitation-Sedation Scale): -1-->drowsy   Restraint necessity: Clinical Justification: Removing medical devices  B: Breathe   SBT: Not intubated   C: Coordinate A & B, analgesics/sedatives   Pain: managed    SAT: Not intubated  D: Delirium   CAM-ICU: Overall CAM-ICU: Positive  E: Early(intubated/ Progressive (non-intubated) Mobility   MOVE Screen: Fail   Activity: Activity Management: Rolling - L1  FAS: Feeding/Nutrition   Diet order: Diet/Nutrition Received: clear liquid, Specialty Diet/Nutrition Received: renal diet  T: Thrombus   DVT prophylaxis: VTE Required Core Measure: Pharmacological prophylaxis initiated/maintained  H: HOB Elevation   Head of Bed (HOB) Positioning: HOB at 30-45 degrees  U: Ulcer Prophylaxis   GI: yes  G: Glucose control   managed Glycemic Management: blood glucose monitored  S: Skin   Bathing/Skin Care: incontinence care  Device Skin Pressure Protection: absorbent pad utilized/changed, adhesive use limited, skin-to-device areas padded, skin-to-skin areas padded  Pressure Reduction Devices: foam padding utilized, positioning supports utilized  Pressure Reduction Techniques: weight shift assistance provided  Skin Protection: adhesive use limited, incontinence pads utilized, skin-to-skin areas padded, skin-to-device areas padded, tubing/devices free from skin contact  B: Bowel Function   diarrhea   I: Indwelling Catheters   Yao necessity: [REMOVED]      Urethral Catheter 02/21/22 1245 16 Fr.-Reason for Continuing Urinary Catheterization: Urinary retention  [REMOVED]      Urethral Catheter 02/01/22 1352 Latex 16 Fr.-Reason for Continuing Urinary Catheterization: Urinary retention   CVC necessity: Yes  D: De-escalation Antibiotics   No    Family/Goals of care/Code Status   Code Status: Full Code    24H Vital Sign Range  Temp:  [97 °F (36.1 °C)-98.4 °F (36.9 °C)]   Pulse:  []   Resp:  [14-65]   BP: ()/(39-55)    SpO2:  [94 %-100 %]      Shift Events   Pt went to CT this morning. Requiring levo 32mg, and vaso on shift. Still receiving amio. Team at bedside this evening to place IJ TLC and arterial line. Plans to restart CRRT overnight.     VS and assessment per flow sheet, patient progressing towards goals as tolerated, plan of care reviewed with family, all concerns addressed, will continue to monitor.    Sara West

## 2022-02-28 NOTE — SUBJECTIVE & OBJECTIVE
Past Medical History:   Diagnosis Date    Allergy     Anxiety     Basal cell carcinoma     Cirrhosis of liver without ascites 12/27/2017    Depression     Diabetes mellitus, type 2     Disorder of kidney and ureter     Endometriosis     GERD (gastroesophageal reflux disease)     Hyperlipidemia     Hypertension     Joint pain     Psoriasis        Past Surgical History:   Procedure Laterality Date    abdominal laproscopic surgery      APPENDECTOMY      CARPAL TUNNEL RELEASE      right    CHOLECYSTECTOMY  04/2015    COLONOSCOPY N/A 2/8/2019    Procedure: COLONOSCOPY;  Surgeon: Celso Salas MD;  Location: [facility] ([address] [place] [address]);  Service: Endoscopy;  Laterality: N/A;    ESOPHAGOGASTRODUODENOSCOPY N/A 2/8/2019    Procedure: EGD (ESOPHAGOGASTRODUODENOSCOPY);  Surgeon: Celso Salas MD;  Location: [facility] ([address] [place] [address]);  Service: Endoscopy;  Laterality: N/A;  varices screening, labs ordered prior    HYSTERECTOMY  age 25    JOSEFA/BSO (endometriosis)    OOPHORECTOMY      SKIN CANCER DESTRUCTION      UPPER GASTROINTESTINAL ENDOSCOPY         Review of patient's allergies indicates:   Allergen Reactions    Dobutamine Hives    Benadryl [diphenhydramine hcl] Anxiety     Pt states she feels jumpy and anxious when taking.    Darvon [propoxyphene] Nausea Only    Shellfish containing products Hives    Iodinated contrast media Hives    Lisinopril Other (See Comments)     cough    Propoxyphene hcl      Itchy (skin)^    Tizanidine Other (See Comments)     Sweaty, difficulty swallowing    Statins-hmg-coa reductase inhibitors Anxiety and Other (See Comments)     Myalgia, fatigue, palpitations, anxiety     Family History       Problem Relation (Age of Onset)    Arthritis Mother    Asthma Brother    Hyperlipidemia Brother    Hypertension Mother, Sister, Brother    No Known Problems Father    Raynaud syndrome Sister          Tobacco Use    Smoking status: Never Smoker    Smokeless tobacco: Never Used   Substance and Sexual Activity     Alcohol use: Not Currently    Drug use: No    Sexual activity: Yes     Partners: Male     Birth control/protection: Surgical     Review of Systems   Constitutional:  Positive for fatigue.   HENT: Negative.     Eyes: Negative.    Respiratory:  Positive for shortness of breath.    Cardiovascular: Negative.    Gastrointestinal:  Positive for abdominal distention.   Endocrine: Negative.    Genitourinary: Negative.    Musculoskeletal: Negative.    Skin: Negative.    Allergic/Immunologic: Negative.    Neurological: Negative.    Hematological: Negative.    Psychiatric/Behavioral: Negative.     Objective:     Vital Signs (Most Recent):  Temp: 97.5 °F (36.4 °C) (02/28/22 0700)  Pulse: 99 (02/28/22 1100)  Resp: (!) 33 (02/28/22 1100)  BP: (!) 95/41 (02/28/22 1100)  SpO2: 98 % (02/28/22 1100)   Vital Signs (24h Range):  Temp:  [97.1 °F (36.2 °C)-98.4 °F (36.9 °C)] 97.5 °F (36.4 °C)  Pulse:  [] 99  Resp:  [12-48] 33  SpO2:  [94 %-100 %] 98 %  BP: ()/(41-57) 95/41  Arterial Line BP: ()/(24-62) 110/36     Weight: 72.6 kg (160 lb) (02/27/22 1413)  Body mass index is 34.62 kg/m².      Intake/Output Summary (Last 24 hours) at 2/28/2022 1208  Last data filed at 2/28/2022 1100  Gross per 24 hour   Intake 3795.69 ml   Output 3041 ml   Net 754.69 ml       Lines/Drains/Airways       Central Venous Catheter Line  Duration                  Hemodialysis Catheter 02/07/22 1436 right internal jugular 20 days              Arterial Line  Duration             Arterial Line 02/27/22 1900 Right Radial <1 day              Peripheral Intravenous Line  Duration                  Peripheral IV - Single Lumen 02/24/22 0855 20 G;1 3/4 in Left;Anterior Forearm 4 days                    Physical Exam  Gen: NAD, lying comfortably  HENT: NCAT, oropharynx clear  Eyes: icteric sclerae, EOMI grossly  Neck: supple, no visible masses/goiter, RIJ TLC  Cardiac: RRR, no M/R/G, S1/S2 present  Lungs: CTAB, no crackles, no wheezes  Abd: soft, NT/ND,  normoactive BS, no rebound  Ext: 2+ LE edema, warm, well perfused  Skin: skin intact on exposed body parts, no visible rashes, lesions  Neuro: A&Ox4, neuro exam grossly intact, moves all extremities  Psych: appropriate mood, affect        Significant Labs:  CBC:   Recent Labs   Lab 02/26/22  2223 02/27/22  1654 02/28/22  0912   WBC 9.11 9.82 7.50   HGB 8.2* 7.8* 7.7*   HCT 24.6* 23.8* 22.5*   PLT 68* 48* 43*     CMP:   Recent Labs   Lab 02/27/22  0844 02/27/22  1259 02/28/22  0420 02/28/22  0749   *   < > 99  99  99 86   CALCIUM 8.1*   < > 7.6*  7.6*  7.6* 7.5*   ALBUMIN 2.0*   < > 2.1*  --    PROT 5.0*  --   --   --    *   < > 133*  133*  133* 130*   K 4.1   < > 4.3  4.3  4.3 4.2   CO2 23   < > 20*  21*  21* 20*   CL 94*   < > 100  99  99 98   BUN 23   < > 8  8  8 5*   CREATININE 1.3   < > 0.6  0.6  0.6 0.5   ALKPHOS 145*  --   --   --    *  --   --   --    *  --   --   --    BILITOT 4.8*  --   --   --     < > = values in this interval not displayed.     Coagulation:   Recent Labs   Lab 02/27/22  1259   INR 2.4*       Significant Imaging:  Imaging results within the past 24 hours have been reviewed.

## 2022-02-28 NOTE — CONSULTS
[facility] - Cardiac Intensive Care  Palliative Medicine  Consult Note    Patient Name: Tammi Farris  MRN: [id number]  Admission Date: 2/1/2022  Hospital Length of Stay: 27 days  Code Status: DNR   Attending Provider: Guy Oliva MD  Consulting Provider: Elena Mckenzie MD  Primary Care Physician: Ann-Marie Hartman MD  Principal Problem:Acute renal failure superimposed on stage 2 chronic kidney disease    Patient information was obtained from patient, relative(s) and primary team.      Consults  Assessment/Plan:     Palliative care encounter  71 year old female with prolonged hospitalization with acute renal failure and sepsis. Family requesting to follow up with palliative. Palliative was consulted at the [facility] [place]     Code status: DNR     Surrogate decision maker: patient's siblings     GOC/ACP  -Met with patient, patient's sister Tania and niece at bedside. Patient confused but able to somewhat participate in conversation. Family aware that patient is very sick and is not currently tolerating CRRT. They understand that patient is not likely to survive this hospitalization. Family in agreement with plan to resume CRRT if possible. If this is not possible they would like to transition to comfort focused care. Visitation liberalized     Will continue to follow         Thank you for your consult. I will follow-up with patient. Please contact us if you have any additional questions.    Subjective:     HPI:   Ms. Farris is a 71 y.o. female with HTN, HLD, GERD, cirrhosis of liver, thrombocytopenia, and sleep apnea who presented to ED with a complaint of cough and congestion since October.  She is chronically dyspneic, exacerbated by exertion, has seen Cardiology and was prescribed lasix and metoprolol.  Her sister has now noted that the patient has very low urine output and is constipated.  Also follows with Hepatology for chronic liver disease.  Patient denies fever, chills, chest pain, palpitations, orthopnea,  PND, dizziness, syncope, n/v/d, abdominal pain, or dysuria.  In the ED, labs reveal ANI, hyperkalemia, metabolic acidosis with elevated lactic acid, elevated liver enzymes, elevated BNP, elevated d-dimer, thrombocytopenia, and markedly concentrated urine.  Echo December 2021 showed preserved EF, no evidence of heart failure.  Chest xray without acute abnormality. No convincing evidence of infectious process.    [place] [facility] with concerns for lupus nephritis. Patient has not been able to get bx due to thrombocytopenia. Now requiring pressors with multisystem organ failure. Currently not tolerating CRRT. Family requesting to follow up with palliative medicine. Met with palliative medicine at the [facility] [place]:  No notes on file        Past Medical History:   Diagnosis Date    Allergy     Anxiety     Basal cell carcinoma     Cirrhosis of liver without ascites 12/27/2017    Depression     Diabetes mellitus, type 2     Disorder of kidney and ureter     Endometriosis     GERD (gastroesophageal reflux disease)     Hyperlipidemia     Hypertension     Joint pain     Psoriasis        Past Surgical History:   Procedure Laterality Date    abdominal laproscopic surgery      APPENDECTOMY      CARPAL TUNNEL RELEASE      right    CHOLECYSTECTOMY  04/2015    COLONOSCOPY N/A 2/8/2019    Procedure: COLONOSCOPY;  Surgeon: Celso Salas MD;  Location: [address]);  Service: Endoscopy;  Laterality: N/A;    ESOPHAGOGASTRODUODENOSCOPY N/A 2/8/2019    Procedure: EGD (ESOPHAGOGASTRODUODENOSCOPY);  Surgeon: Celso Salas MD;  Location: [address]);  Service: Endoscopy;  Laterality: N/A;  varices screening, labs ordered prior    HYSTERECTOMY  age 25    JOSEFA/BSO (endometriosis)    OOPHORECTOMY      SKIN CANCER DESTRUCTION      UPPER GASTROINTESTINAL ENDOSCOPY         Review of patient's allergies indicates:   Allergen Reactions    Dobutamine Hives     Benadryl [diphenhydramine hcl] Anxiety     Pt states she feels jumpy and anxious when taking.    Darvon [propoxyphene] Nausea Only    Shellfish containing products Hives    Iodinated contrast media Hives    Lisinopril Other (See Comments)     cough    Propoxyphene hcl      Itchy (skin)^    Tizanidine Other (See Comments)     Sweaty, difficulty swallowing    Statins-hmg-coa reductase inhibitors Anxiety and Other (See Comments)     Myalgia, fatigue, palpitations, anxiety       Medications:  Continuous Infusions:   sodium chloride 0.9%      amiodarone in dextrose 5% 0.5 mg/min (02/28/22 1100)    calcium gluconate infusion (CRRT) 10 mL/hr at 02/28/22 1100    dextrose-sod citrate-citric ac 150 mL/hr at 02/28/22 1100    NORepinephrine bitartrate-D5W 0.62 mcg/kg/min (02/28/22 1429)    vasopressin 0.04 Units/min (02/28/22 1358)     Scheduled Meds:   alteplase  2 mg Intra-Catheter Once    atovaquone  1,500 mg Oral Daily    dextromethorphan-guaiFENesin  mg/5 ml  5 mL Oral Q6H    famotidine  20 mg Oral Daily    fludrocortisone  100 mcg Oral Daily    hydrocortisone sodium succinate  100 mg Intravenous Q8H    hydrOXYchloroQUINE  200 mg Oral Daily    ondansetron  4 mg Intravenous TID AC    piperacillin-tazobactam (ZOSYN) IVPB  4.5 g Intravenous Q8H    vitamin renal formula (B-complex-vitamin c-folic acid)  1 capsule Oral Daily     PRN Meds:sodium chloride, sodium chloride, sodium chloride 0.9%, acetaminophen, albuterol-ipratropium, benzonatate, bisacodyL, dextrose 10%, dextrose 10%, glucagon (human recombinant), glucose, glucose, heparin (porcine), HYDROmorphone, insulin aspart U-100, iohexoL, magnesium sulfate IVPB, melatonin, naloxone, polyethylene glycol, simethicone, sodium chloride 0.9%, sodium chloride 0.9%, sodium chloride 0.9%, sodium phosphate IVPB, sodium phosphate IVPB, sodium phosphate IVPB, Pharmacy to dose Vancomycin consult **AND** vancomycin - pharmacy to dose    Family History        Problem Relation (Age of Onset)    Arthritis Mother    Asthma Brother    Hyperlipidemia Brother    Hypertension Mother, Sister, Brother    No Known Problems Father    Raynaud syndrome Sister          Tobacco Use    Smoking status: Never Smoker    Smokeless tobacco: Never Used   Substance and Sexual Activity    Alcohol use: Not Currently    Drug use: No    Sexual activity: Yes     Partners: Male     Birth control/protection: Surgical       Review of Systems   Unable to perform ROS: Mental status change   Objective:     Vital Signs (Most Recent):  Temp: 97.3 °F (36.3 °C) (02/28/22 1100)  Pulse: (!) 115 (02/28/22 1400)  Resp: (!) 29 (02/28/22 1400)  BP: (!) 127/50 (02/28/22 1300)  SpO2: 98 % (02/28/22 1400)   Vital Signs (24h Range):  Temp:  [97.1 °F (36.2 °C)-97.5 °F (36.4 °C)] 97.3 °F (36.3 °C)  Pulse:  [] 115  Resp:  [12-48] 29  SpO2:  [95 %-100 %] 98 %  BP: ()/(41-57) 127/50  Arterial Line BP: ()/(24-62) 126/44     Weight: 72.6 kg (160 lb)  Body mass index is 34.62 kg/m².    Physical Exam  Vitals and nursing note reviewed.   Constitutional:       General: She is not in acute distress.     Appearance: She is ill-appearing.   HENT:      Head: Normocephalic.      Right Ear: External ear normal.      Left Ear: External ear normal.      Nose: Nose normal.      Mouth/Throat:      Mouth: Mucous membranes are moist.   Eyes:      Pupils: Pupils are equal, round, and reactive to light.   Cardiovascular:      Rate and Rhythm: Tachycardia present.   Pulmonary:      Effort: Pulmonary effort is normal. No respiratory distress.   Abdominal:      General: There is no distension.   Musculoskeletal:      Cervical back: Normal range of motion.   Skin:     Findings: Bruising present.   Neurological:      Mental Status: She is alert.      Comments: confused   Psychiatric:         Cognition and Memory: Cognition is impaired.       Review of Symptoms      Symptom Assessment (ESAS 0-10 Scale)  Unable to  complete assessment due to Mental status change     CAM / Delirium:  Positive  Constipation:  Negative  Diarrhea:  Negative    Bowel Management Plan (BMP):  Yes      Pain Assessment in Advanced Demential Scale (PAINAD)   Breathing - Independent of vocalization:  0  Negative vocalization:  0  Facial expression:  0  Body language:  0  Consolability:  0  Total:  0    Modified Ernestina Scale:  0    Performance Status:  30    Living Arrangements:  Lives with family    Psychosocial/Cultural: Big family       Advance Care Planning   Advance Directives:   Living Will: No    LaPOST: No    Do Not Resuscitate Status: Yes    Medical Power of : No      Decision Making:  Family answered questions and Patient answered questions       Significant Labs: All pertinent labs within the past 24 hours have been reviewed.  CBC:   Recent Labs   Lab 02/28/22  0912   WBC 7.50   HGB 7.7*   HCT 22.5*   MCV 99*   PLT 43*     BMP:  Recent Labs   Lab 02/28/22  1238   GLU 89   *   K 4.4      CO2 19*   BUN 3*   CREATININE 0.4*   CALCIUM 7.4*     LFT:  Lab Results   Component Value Date    AST 1,603 (H) 02/28/2022    ALKPHOS 185 (H) 02/28/2022    BILITOT 8.7 (H) 02/28/2022     Albumin:   Albumin   Date Value Ref Range Status   02/28/2022 2.0 (L) 3.5 - 5.2 g/dL Final     Protein:   Total Protein   Date Value Ref Range Status   02/28/2022 5.1 (L) 6.0 - 8.4 g/dL Final     Lactic acid:   Lab Results   Component Value Date    LACTATE 5.3 (HH) 02/28/2022    LACTATE 5.1 (HH) 02/27/2022       Significant Imaging: I have reviewed all pertinent imaging results/findings within the past 24 hours.        > 50% of 75 min visit spent in chart review, face to face discussion of goals of care,  symptom assessment, coordination of care and emotional support.    Elena Mckenzie MD  Palliative Medicine  [facility] - Cardiac Intensive Care

## 2022-02-28 NOTE — PROCEDURES
"Tammi Farris is a 71 y.o. female patient.    Temp: 97.5 °F (36.4 °C) (22 0700)  Pulse: (!) 113 (22 1230)  Resp: (!) 30 (22 1230)  BP: (!) 112/52 (22 1230)  SpO2: 100 % (22 1230)  Weight: 72.6 kg (160 lb) (22 1413)  Height: 4' 9" (144.8 cm) (22 1413)       Arterial Line    Date/Time: 2022 1:52 PM  Location procedure was performed: [facility] CRITICAL CARE MEDICINE  Performed by: Lisbeth Rivero MD  Authorized by: Lisbeth Rivero MD   Pre-op Diagnosis: Shock  Post-operative diagnosis: shock   Consent Done: Yes  Consent: Verbal consent not obtained.  Patient identity confirmed: , name and MRN  Preparation: Patient was prepped and draped in the usual sterile fashion.  Indications: hemodynamic monitoring  Location: left radial  Anesthesia: local infiltration    Anesthesia:  Local Anesthetic: lidocaine 1% without epinephrine  Anesthetic total: 3 mL    Patient sedated: no  Nando's test normal: yes  Needle gauge: 20  Seldinger technique: Seldinger technique used  Cutdown: cutdown required  Number of attempts: 1  Complications: No  Specimens: No  Post-procedure: line sutured and dressing applied  Post-procedure CMS: normal  Patient tolerance: Patient tolerated the procedure well with no immediate complications          2022  "

## 2022-02-28 NOTE — ASSESSMENT & PLAN NOTE
Superimposed ANI on CKD II, unable to get renal bx high risk for bleeding as she has anemia + thrombocytopenia. - S/p Methylpred 1g x 3 days (2/5-2/7). Now on stress dose steroids. Also on Plaquenil, holding cellcept.     Worsening clinical conditions with rising lactate and LFTs, also with drop in Hb and Platelets this AM. Rheum, Palliative, Hepatology on board. Too high risk for renal bx.     Recommendations:   -Continue CRRT, will slowly try and increase UF from 200-->250-->300 as tolerated given concern for volume overload  - Monitor calcium while on CRRT, will adjust as she has been hypocalcemic, requiring citrate during CRRT due to clotting of line  -LFTs worsening, appreciate hepatology assitance  -repeat labs Q6H and page nephrology if her respiratory status worsen   -strict I&Os

## 2022-02-28 NOTE — SUBJECTIVE & OBJECTIVE
Interval History: Pt with increasing upper leg edema. Also with abdominal pain. Reports feeling similar to when she initially presented, no large improvement in feeling sine starting HD. Per nursing overnight, had issues with HD clotting multiple times while on SLED. Still on continuous HD tx. Also on stress dose steroids.     Review of patient's allergies indicates:   Allergen Reactions    Dobutamine Hives    Benadryl [diphenhydramine hcl] Anxiety     Pt states she feels jumpy and anxious when taking.    Darvon [propoxyphene] Nausea Only    Shellfish containing products Hives    Iodinated contrast media Hives    Lisinopril Other (See Comments)     cough    Propoxyphene hcl      Itchy (skin)^    Tizanidine Other (See Comments)     Sweaty, difficulty swallowing    Statins-hmg-coa reductase inhibitors Anxiety and Other (See Comments)     Myalgia, fatigue, palpitations, anxiety     Current Facility-Administered Medications   Medication Frequency    0.9%  NaCl infusion (CRRT USE ONLY) Continuous    0.9%  NaCl infusion (for blood administration) Q24H PRN    0.9%  NaCl infusion (for blood administration) Q24H PRN    0.9%  NaCl infusion PRN    acetaminophen tablet 650 mg Q6H PRN    albuterol-ipratropium 2.5 mg-0.5 mg/3 mL nebulizer solution 3 mL Q4H PRN    alteplase injection 2 mg Once    amiodarone 360 mg/200 mL (1.8 mg/mL) infusion Continuous    atovaquone suspension 1,500 mg Daily    benzonatate capsule 100 mg TID PRN    bisacodyL suppository 10 mg Daily PRN    calcium gluconate 3 g in dextrose 5 % 100 mL infusion Continuous    dextromethorphan-guaiFENesin  mg/5 ml liquid 5 mL Q6H    dextrose 10% bolus 125 mL PRN    dextrose 10% bolus 250 mL PRN    dextrose-sod citrate-citric ac 2.45-2.2 gram- 800 mg/100 mL Soln Continuous    famotidine tablet 20 mg Daily    fludrocortisone tablet 100 mcg Daily    glucagon (human recombinant) injection 1 mg PRN    glucose chewable tablet 16 g PRN    glucose chewable tablet 24 g  PRN    heparin (porcine) injection 3,200 Units PRN    hydrocortisone sodium succinate injection 100 mg Q8H    HYDROmorphone injection 0.2 mg Q6H PRN    hydrOXYchloroQUINE tablet 200 mg Daily    insulin aspart U-100 pen 0-5 Units QID (AC + HS) PRN    iohexoL (OMNIPAQUE 350) injection 100 mL ONCE PRN    magnesium sulfate 2g in water 50mL IVPB (premix) PRN    melatonin tablet 6 mg Nightly PRN    naloxone 0.4 mg/mL injection 0.02 mg PRN    NORepinephrine 32 mg in dextrose 5 % 250 mL infusion Continuous    ondansetron injection 4 mg TID AC    piperacillin-tazobactam 4.5 g in sodium chloride 0.9% 100 mL IVPB (ready to mix system) Q8H    polyethylene glycol packet 17 g Daily PRN    simethicone chewable tablet 80 mg TID PRN    sodium chloride 0.9% bolus 250 mL PRN    sodium chloride 0.9% bolus 250 mL PRN    sodium chloride 0.9% flush 10 mL PRN    sodium phosphate 20.01 mmol in dextrose 5 % 250 mL IVPB PRN    sodium phosphate 30 mmol in dextrose 5 % 250 mL IVPB PRN    sodium phosphate 39.99 mmol in dextrose 5 % 250 mL IVPB PRN    vancomycin - pharmacy to dose pharmacy to manage frequency    vasopressin (PITRESSIN) 0.2 Units/mL in dextrose 5 % 100 mL infusion Continuous    vitamin renal formula (B-complex-vitamin c-folic acid) 1 mg per capsule 1 capsule Daily       Objective:     Vital Signs (Most Recent):  Temp: 97.5 °F (36.4 °C) (02/28/22 0700)  Pulse: (!) 113 (02/28/22 1230)  Resp: (!) 30 (02/28/22 1230)  BP: (!) 112/52 (02/28/22 1230)  SpO2: 100 % (02/28/22 1230)  O2 Device (Oxygen Therapy): nasal cannula (02/28/22 1100)   Vital Signs (24h Range):  Temp:  [97.1 °F (36.2 °C)-98.4 °F (36.9 °C)] 97.5 °F (36.4 °C)  Pulse:  [] 113  Resp:  [12-48] 30  SpO2:  [94 %-100 %] 100 %  BP: ()/(41-57) 112/52  Arterial Line BP: ()/(24-62) 110/36     Weight: 72.6 kg (160 lb) (02/27/22 1413)  Body mass index is 34.62 kg/m².  Body surface area is 1.71 meters squared.    I/O last 3 completed shifts:  In: 2311.6  [I.V.:1596.7; Blood:350; IV Piggyback:364.9]  Out: 2505 [Other:2505]    Physical Exam  Vitals and nursing note reviewed. Exam conducted with a chaperone present.   Constitutional:       Appearance: Normal appearance. She is well-developed. She is ill-appearing. She is not toxic-appearing or diaphoretic.   HENT:      Head: Normocephalic and atraumatic.   Eyes:      Conjunctiva/sclera: Conjunctivae normal.   Pulmonary:      Effort: Pulmonary effort is normal.      Comments: On 2L NC  Abdominal:      Tenderness: There is abdominal tenderness.   Musculoskeletal:         General: Swelling (Upper thigh pitting edema) present.   Neurological:      General: No focal deficit present.      Mental Status: She is oriented to person, place, and time.   Psychiatric:         Mood and Affect: Mood normal.         Behavior: Behavior normal.       Significant Labs:  BMP:   Recent Labs   Lab 02/25/22  0325 02/25/22  0813 02/28/22  1238   *  258*   < > 89   *  133*   < > 135*   K 5.9*  5.9*  5.9*   < > 4.4   CL 98  98   < > 102   CO2 16*  16*   < > 19*   *  130*   < > 3*   CREATININE 4.5*  4.5*   < > 0.4*   CALCIUM 9.3  9.3   < > 7.4*   MG 2.3  --   --     < > = values in this interval not displayed.     CBC:   Recent Labs   Lab 02/28/22  0912   WBC 7.50   RBC 2.28*   HGB 7.7*   HCT 22.5*   PLT 43*   MCV 99*   MCH 33.8*   MCHC 34.2     LFTs:   Recent Labs   Lab 02/28/22  1238   *   AST 1,603*   ALKPHOS 185*   BILITOT 8.7*   PROT 5.1*   ALBUMIN 2.0*     No results for input(s): COLORU, CLARITYU, SPECGRAV, PHUR, PROTEINUA, GLUCOSEU, BILIRUBINCON, BLOODU, WBCU, RBCU, BACTERIA, MUCUS, NITRITE, LEUKOCYTESUR, UROBILINOGEN, HYALINECASTS in the last 168 hours.  All labs within the past 24 hours have been reviewed.     Significant Imaging:  Labs: Reviewed

## 2022-02-28 NOTE — EICU
Intervention Initiated From:  Bedside    Constance Communicated with Bedside Nurse regarding:  Documentation    Comments: Called to bedside for art line placement.  Consent confirmed with bedside team.  Physician verification completed.  Time out done using proper pt identifiers.  Labs reviewed with MD.  Art line placed to (L) radial by MD Luther using u/s guidance.  Good waveform noted to transducer. Pt tolerated procedure well.

## 2022-02-28 NOTE — PROCEDURES
"Tammi Farris is a 71 y.o. female patient.    Temp: 98.4 °F (36.9 °C) (02/27/22 1500)  Pulse: 62 (02/27/22 1800)  Resp: (!) 24 (02/27/22 1800)  BP: (!) 121/49 (02/27/22 1800)  SpO2: 100 % (02/27/22 1800)  Weight: 72.6 kg (160 lb) (02/27/22 1413)  Height: 4' 9" (144.8 cm) (02/27/22 1413)       Arterial Line    Date/Time: 2/27/2022 7:32 PM  Location procedure was performed: [facility] CRITICAL CARE MEDICINE  Performed by: Carly Durant MD  Authorized by: Carly Durant MD   Consent Done: Yes  Consent: Verbal consent obtained. Written consent obtained.  Risks and benefits: risks, benefits and alternatives were discussed  Consent given by: patient  Time out: Immediately prior to procedure a "time out" was called to verify the correct patient, procedure, equipment, support staff and site/side marked as required.  Indications: hemodynamic monitoring  Location: right radial  Anesthesia: local infiltration    Anesthesia:  Local Anesthetic: lidocaine 1% without epinephrine  Anesthetic total: 2 mL    Patient sedated: no  Nando's test normal: yes  Needle gauge: 20  Seldinger technique: Seldinger technique used  Number of attempts: 1  Post-procedure: line sutured and dressing applied  Post-procedure CMS: decreased circulation  Patient tolerance: Patient tolerated the procedure well with no immediate complications          2/27/2022  "

## 2022-02-28 NOTE — PROGRESS NOTES
[facility]  Nephrology  Progress Note    Patient Name: Tammi Farris  MRN: [id number]  Admission Date: 2/1/2022  Hospital Length of Stay: 27 days  Attending Provider: Guy Oliva MD   Primary Care Physician: Ann-Marie Hartman MD  Principal Problem:Acute renal failure superimposed on stage 2 chronic kidney disease    Subjective:     HPI: Tammi Farris is a 71 y.o. F with history of CKD2, HTN, DM, cirrhosis, and PAULINA, who was admitted to [facility] with acute renal failure. Found to have low complements and positive anti-Sm /RNP antibodies. Unable to renal biopsy due to thrombocytopenia (~ 50). She was treated with pulse dose steroids for 3 days and now on prednisone. Attempted to initiate Cellcept but has not taken due to difficulty with pill size. Initiated on HD, received several sessions, last 2/16. Held over weekend as her Cr improving to 3.5 from 6.4. She reports making good urine. Denies confusion. Concern for possible lupus nephritis and transferred to [facility] [place] per rheumatology /nephrology recommendations for IR renal biopsy.      Interval History: Pt with increasing upper leg edema. Also with abdominal pain. Reports feeling similar to when she initially presented, no large improvement in feeling sine starting HD. Per nursing overnight, had issues with HD clotting multiple times while on SLED. Still on continuous HD tx. Also on stress dose steroids.     Review of patient's allergies indicates:   Allergen Reactions    Dobutamine Hives    Benadryl [diphenhydramine hcl] Anxiety     Pt states she feels jumpy and anxious when taking.    Darvon [propoxyphene] Nausea Only    Shellfish containing products Hives    Iodinated contrast media Hives    Lisinopril Other (See Comments)     cough    Propoxyphene hcl      Itchy (skin)^    Tizanidine Other (See Comments)     Sweaty, difficulty swallowing    Statins-hmg-coa reductase inhibitors Anxiety and Other (See Comments)     Myalgia, fatigue,  palpitations, anxiety     Current Facility-Administered Medications   Medication Frequency    0.9%  NaCl infusion (CRRT USE ONLY) Continuous    0.9%  NaCl infusion (for blood administration) Q24H PRN    0.9%  NaCl infusion (for blood administration) Q24H PRN    0.9%  NaCl infusion PRN    acetaminophen tablet 650 mg Q6H PRN    albuterol-ipratropium 2.5 mg-0.5 mg/3 mL nebulizer solution 3 mL Q4H PRN    alteplase injection 2 mg Once    amiodarone 360 mg/200 mL (1.8 mg/mL) infusion Continuous    atovaquone suspension 1,500 mg Daily    benzonatate capsule 100 mg TID PRN    bisacodyL suppository 10 mg Daily PRN    calcium gluconate 3 g in dextrose 5 % 100 mL infusion Continuous    dextromethorphan-guaiFENesin  mg/5 ml liquid 5 mL Q6H    dextrose 10% bolus 125 mL PRN    dextrose 10% bolus 250 mL PRN    dextrose-sod citrate-citric ac 2.45-2.2 gram- 800 mg/100 mL Soln Continuous    famotidine tablet 20 mg Daily    fludrocortisone tablet 100 mcg Daily    glucagon (human recombinant) injection 1 mg PRN    glucose chewable tablet 16 g PRN    glucose chewable tablet 24 g PRN    heparin (porcine) injection 3,200 Units PRN    hydrocortisone sodium succinate injection 100 mg Q8H    HYDROmorphone injection 0.2 mg Q6H PRN    hydrOXYchloroQUINE tablet 200 mg Daily    insulin aspart U-100 pen 0-5 Units QID (AC + HS) PRN    iohexoL (OMNIPAQUE 350) injection 100 mL ONCE PRN    magnesium sulfate 2g in water 50mL IVPB (premix) PRN    melatonin tablet 6 mg Nightly PRN    naloxone 0.4 mg/mL injection 0.02 mg PRN    NORepinephrine 32 mg in dextrose 5 % 250 mL infusion Continuous    ondansetron injection 4 mg TID AC    piperacillin-tazobactam 4.5 g in sodium chloride 0.9% 100 mL IVPB (ready to mix system) Q8H    polyethylene glycol packet 17 g Daily PRN    simethicone chewable tablet 80 mg TID PRN    sodium chloride 0.9% bolus 250 mL PRN    sodium chloride 0.9% bolus 250 mL PRN    sodium chloride  0.9% flush 10 mL PRN    sodium phosphate 20.01 mmol in dextrose 5 % 250 mL IVPB PRN    sodium phosphate 30 mmol in dextrose 5 % 250 mL IVPB PRN    sodium phosphate 39.99 mmol in dextrose 5 % 250 mL IVPB PRN    vancomycin - pharmacy to dose pharmacy to manage frequency    vasopressin (PITRESSIN) 0.2 Units/mL in dextrose 5 % 100 mL infusion Continuous    vitamin renal formula (B-complex-vitamin c-folic acid) 1 mg per capsule 1 capsule Daily       Objective:     Vital Signs (Most Recent):  Temp: 97.5 °F (36.4 °C) (02/28/22 0700)  Pulse: (!) 113 (02/28/22 1230)  Resp: (!) 30 (02/28/22 1230)  BP: (!) 112/52 (02/28/22 1230)  SpO2: 100 % (02/28/22 1230)  O2 Device (Oxygen Therapy): nasal cannula (02/28/22 1100)   Vital Signs (24h Range):  Temp:  [97.1 °F (36.2 °C)-98.4 °F (36.9 °C)] 97.5 °F (36.4 °C)  Pulse:  [] 113  Resp:  [12-48] 30  SpO2:  [94 %-100 %] 100 %  BP: ()/(41-57) 112/52  Arterial Line BP: ()/(24-62) 110/36     Weight: 72.6 kg (160 lb) (02/27/22 1413)  Body mass index is 34.62 kg/m².  Body surface area is 1.71 meters squared.    I/O last 3 completed shifts:  In: 2311.6 [I.V.:1596.7; Blood:350; IV Piggyback:364.9]  Out: 2505 [Other:2505]    Physical Exam  Vitals and nursing note reviewed. Exam conducted with a chaperone present.   Constitutional:       Appearance: Normal appearance. She is well-developed. She is ill-appearing. She is not toxic-appearing or diaphoretic.   HENT:      Head: Normocephalic and atraumatic.   Eyes:      Conjunctiva/sclera: Conjunctivae normal.   Pulmonary:      Effort: Pulmonary effort is normal.      Comments: On 2L NC  Abdominal:      Tenderness: There is abdominal tenderness.   Musculoskeletal:         General: Swelling (Upper thigh pitting edema) present.   Neurological:      General: No focal deficit present.      Mental Status: She is oriented to person, place, and time.   Psychiatric:         Mood and Affect: Mood normal.         Behavior: Behavior  normal.       Significant Labs:  BMP:   Recent Labs   Lab 02/25/22  0325 02/25/22  0813 02/28/22  1238   *  258*   < > 89   *  133*   < > 135*   K 5.9*  5.9*  5.9*   < > 4.4   CL 98  98   < > 102   CO2 16*  16*   < > 19*   *  130*   < > 3*   CREATININE 4.5*  4.5*   < > 0.4*   CALCIUM 9.3  9.3   < > 7.4*   MG 2.3  --   --     < > = values in this interval not displayed.     CBC:   Recent Labs   Lab 02/28/22  0912   WBC 7.50   RBC 2.28*   HGB 7.7*   HCT 22.5*   PLT 43*   MCV 99*   MCH 33.8*   MCHC 34.2     LFTs:   Recent Labs   Lab 02/28/22  1238   *   AST 1,603*   ALKPHOS 185*   BILITOT 8.7*   PROT 5.1*   ALBUMIN 2.0*     No results for input(s): COLORU, CLARITYU, SPECGRAV, PHUR, PROTEINUA, GLUCOSEU, BILIRUBINCON, BLOODU, WBCU, RBCU, BACTERIA, MUCUS, NITRITE, LEUKOCYTESUR, UROBILINOGEN, HYALINECASTS in the last 168 hours.  All labs within the past 24 hours have been reviewed.     Significant Imaging:  Labs: Reviewed    Assessment/Plan:     * Acute renal failure superimposed on stage 2 chronic kidney disease  Superimposed ANI on CKD II, unable to get renal bx high risk for bleeding as she has anemia + thrombocytopenia. - S/p Methylpred 1g x 3 days (2/5-2/7). Now on stress dose steroids. Also on Plaquenil, holding cellcept.     Worsening clinical conditions with rising lactate and LFTs, also with drop in Hb and Platelets this AM. Rheum, Palliative, Hepatology on board. Too high risk for renal bx.     Recommendations:   -Continue CRRT, will slowly try and increase UF from 200-->250-->300 as tolerated given concern for volume overload  - Monitor calcium while on CRRT, will adjust as she has been hypocalcemic, requiring citrate during CRRT due to clotting of line  -LFTs worsening, appreciate hepatology assitance  -repeat labs Q6H and page nephrology if her respiratory status worsen   -strict I&Os       SBP (spontaneous bacterial peritonitis)  Pt currently being tx for SBP with zosyn.  Hepatology following, advise repeat para in ~5 days to re-check.     Systemic lupus erythematosus with glomerular disease  See acute renal failure. Appreciate Rheumatology input.     Hyperkalemia  Improved to 4.4 now on CRRT    - Continue to monitor electrolytes while on CRRT      Metabolic acidosis  See acute renal failure    Thrombocytopenia  Lab Results   Component Value Date    PLT 43 (L) 02/28/2022     -Too high risk for renal bx at this point, continued management per Rheum and MICU          Thank you for your consult. I will follow-up with patient. Please contact us if you have any additional questions.      Tatianna Benson MD  Nephrology  [facility]

## 2022-02-28 NOTE — PROGRESS NOTES
02/27/22 2207   Treatment   Treatment Type SLED   Treatment Status Restart   Dialysis Machine Number [id number]   Dialyzer Time (hours) 0   BVP (Liters) 0 L   Solutions Labeled and Current  Yes   Access Right;IJ;Tunneled Cath   Catheter Dressing Intact  Yes   Alarms Engaged Yes   CRRT Comments SLED restart   Prescription   Time (Hours) Continuous   Dialysate K + (mEq/L) 4   Dialysate CA + (mEq/L) 2.25   Dialysate HCO3 - (Bicarb) (mEq/L) 30   Dialysate Na + (mEq/L) 135   Cartridge Type Other  (r300)   Dialysate Flow Rate (mL/min) 200   UF Goal Rate 350 mL/hr   CRRT Hourly Documentation   Blood Flow (mL/min) 200   UF Rate 200 cc/hr   Arterial Pressure (mmHg) -50 mmHg   Venous Pressure (mmHg) 50 mmHg   Effluent Pressure (EP) (mmHg) 30 mmHg     SLED restarted. UF rate set at 200. Report given to primary nurse.

## 2022-02-28 NOTE — ASSESSMENT & PLAN NOTE
Patient complaining of abdominal pain. Bedside ultrasound revealing ascites. Lactate elevated at 6.2. Ct from 2/1 revealing small amount of ascites.     MELD-Na score: 38 at 2/28/2022 12:38 PM  MELD score: 38 at 2/28/2022 12:38 PM  Calculated from:  Serum Creatinine: On dialysis. Using 4 mg/dL.  Serum Sodium: 135 mmol/L at 2/28/2022 12:38 PM  Total Bilirubin: 8.7 mg/dL at 2/28/2022 12:38 PM  INR(ratio): 2.4 at 2/27/2022 12:59 PM  Age: 71 years      - Ct abd/pelvis showing diffusely edematous pancreas suggesting pancreatitis, ascites, supravesical space  small amount of layering hyperdensity, possibly representing small component of hemorrhage or debris/proteinaceous fluid, colonic diverticulosis.  Diffuse mild bowel wall thickening and surrounding inflammatory change.  Findings are nonspecific could represent infectious or ischemic etiologies. Cirrhotic liver morphology with sequela of portal hypertension including ascites, varices, and anasarca.    - diagnostic para 2/27  -- SBP, PMN >2000  - lipase wnl  - Hepatology consult, appreciate recs

## 2022-02-28 NOTE — ASSESSMENT & PLAN NOTE
Pt currently being tx for SBP with zosyn. Hepatology following, advise repeat para in ~5 days to re-check.

## 2022-02-28 NOTE — PROGRESS NOTES
[facility] - Cardiac Intensive Care  Critical Care Medicine  Progress Note    Patient Name: Tammi Farris  MRN: [id number]  Admission Date: 2/1/2022  Hospital Length of Stay: 27 days  Code Status: DNR  Attending Provider: Guy Oliva MD  Primary Care Provider: Ann-Marie Hartman MD   Principal Problem: Acute renal failure superimposed on stage 2 chronic kidney disease    Subjective:     HPI:  Tammi Farris is a 71 y.o. F with history of CKD2, HTN, DM, cirrhosis, and PAULINA, who was admitted to [facility] with acute renal failure. Found to have low complements and positive anti-Sm /RNP antibodies. Unable to renal biopsy due to thrombocytopenia (~ 50). She was treated with pulse dose steroids for 3 days and now on prednisone. Attempted to initiate Cellcept but has not taken due to difficulty with pill size. Initiated on HD, received several sessions, last 2/16. Held over weekend as her Cr improving to 3.5 from 6.4. She reports making good urine. Denies confusion. Concern for possible lupus nephritis and transferred to [facility] [place] [facility] per rheumatology /nephrology recommendations for IR renal biopsy. Patient admitted to ICU for CRRT needs. Patient notes she has not been feeling well for a couple of months. She says she gets dialysis because she has liver disease.         Hospital/ICU Course:  Stepped up to MICU 2/25 for CRRT. Patient now requiring pressor support (low levels of levo), and found to have HgB of 6.9, unit of PRBC transfused. Has had struggled with dialysis clotting off. Additionally, has been experiencing abdominal pain for some time. Bedside ultrasound revealing ascites, lactate 6.2. CT abd/pelvis showing diffusely edematous pancrease suggesting acute pancreatitis, ascites as well as a small amount of layering hyperdensity that could represent component of hemorrhage or protenacious material, as well as diverticulosis with wall thickening, and liver cirrhosis. Lipase wnl. LFTs increasing, MELD 27, hepatology  consulted. Dx para positive for SBP, PMN >2000. Repeat echo with EF 65%, elevated PAP, and grade 3 LV diastolic dysfunction. Palliative consulted.           Interval History/Significant Events: NAEON. Afebrile. Diagnostic para positive for SBP, PMN >2000. Hepatology and palliative consulted, recs pending.    Review of Systems   Constitutional:  Positive for fatigue. Negative for fever.   Respiratory:  Negative for chest tightness and shortness of breath.    Cardiovascular:  Positive for leg swelling. Negative for chest pain.   Gastrointestinal:  Positive for abdominal pain. Negative for abdominal distention, constipation, diarrhea, nausea and vomiting.   Genitourinary:  Negative for dysuria.        Patient gets diaylsis   Musculoskeletal:  Positive for back pain.   Skin:  Negative for color change and rash.   Neurological:  Negative for facial asymmetry.   Psychiatric/Behavioral:  Negative for agitation and behavioral problems.    Objective:     Vital Signs (Most Recent):  Temp: 97.5 °F (36.4 °C) (02/28/22 0700)  Pulse: 69 (02/28/22 0758)  Resp: (!) 48 (02/28/22 0758)  BP: (!) 105/51 (02/28/22 0758)  SpO2: 95 % (02/28/22 0758)   Vital Signs (24h Range):  Temp:  [97.1 °F (36.2 °C)-98.4 °F (36.9 °C)] 97.5 °F (36.4 °C)  Pulse:  [60-75] 69  Resp:  [12-48] 48  SpO2:  [94 %-100 %] 95 %  BP: ()/(41-57) 105/51  Arterial Line BP: ()/(24-62) 110/36   Weight: 72.6 kg (160 lb)  Body mass index is 34.62 kg/m².      Intake/Output Summary (Last 24 hours) at 2/28/2022 0848  Last data filed at 2/28/2022 0800  Gross per 24 hour   Intake 1087.7 ml   Output 2327 ml   Net -1239.3 ml       Physical Exam  Constitutional:       Appearance: She is obese. She is ill-appearing.   HENT:      Head: Normocephalic.   Eyes:      General: No scleral icterus.        Right eye: No discharge.         Left eye: No discharge.      Conjunctiva/sclera: Conjunctivae normal.   Neck:      Comments: Right sided tunnel cath  Cardiovascular:       Rate and Rhythm: Normal rate.      Pulses: Normal pulses.      Heart sounds: No murmur heard.  Pulmonary:      Breath sounds: Normal breath sounds. No wheezing.      Comments: On BiPAP  Abdominal:      General: There is distension. BRB per rectum.     Tenderness: There is abdominal tenderness (RUQ). There is no guarding.   Musculoskeletal:      Right lower leg: Edema present.      Left lower leg: Edema present.   Skin:     General: Skin is warm and dry.      Coloration: Skin is not jaundiced.      Findings: Bruising present.   Neurological:      General: No focal deficit present.      Mental Status: She is alert.   Psychiatric:         Mood and Affect: Mood normal.       Vents:  Oxygen Concentration (%): 35 (02/28/22 0300)  Lines/Drains/Airways       Central Venous Catheter Line  Duration                  Hemodialysis Catheter 02/07/22 1436 right internal jugular 20 days              Arterial Line  Duration             Arterial Line 02/27/22 1900 Right Radial <1 day              Peripheral Intravenous Line  Duration                  Peripheral IV - Single Lumen 02/24/22 0855 20 G;1 3/4 in Left;Anterior Forearm 3 days                  Significant Labs:    CBC/Anemia Profile:  Recent Labs   Lab 02/26/22  0938 02/26/22  2223 02/27/22  1654 02/28/22  0420   WBC 8.24 9.11 9.82  --    HGB 6.9* 8.2* 7.8*  --    HCT 20.7* 24.6* 23.8*  --    PLT 31* 68* 48*  --    * 100* 99*  --    RDW 19.3* 19.9* 20.3*  --    FERRITIN 170  --   --  629*        Chemistries:  Recent Labs   Lab 02/26/22  0938 02/26/22  1402 02/27/22  0844 02/27/22  1259 02/27/22  2136 02/28/22  0420      < > 131* 129*  131*  131* 129*  126* 133*  133*  133*   K 4.9   < > 4.1 4.2  4.2  4.2 4.2  4.1 4.3  4.3  4.3      < > 94* 92*  93*  93* 92*  90* 100  99  99   CO2 21*   < > 23 21*  21*  21* 20*  20* 20*  21*  21*   BUN 74*   < > 23 24*  26*  26* 30*  30* 8  8  8   CREATININE 2.7*   < > 1.3 1.5*  1.5*  1.5* 1.8*   1.9* 0.6  0.6  0.6   CALCIUM 8.3*   < > 8.1* 7.8*  8.1*  8.1* 8.2*  8.1* 7.6*  7.6*  7.6*   ALBUMIN 2.2*   < > 2.0* 2.0* 2.0* 2.1*   PROT 5.6*  --  5.0*  --   --   --    BILITOT 2.6*  --  4.8*  --   --   --    ALKPHOS 130  --  145*  --   --   --    ALT 55*  --  223*  --   --   --    AST 65*  --  470*  --   --   --    PHOS  --    < >  --  3.1 3.9 1.6*    < > = values in this interval not displayed.       All pertinent labs within the past 24 hours have been reviewed.    Significant Imaging:  I have reviewed all pertinent imaging results/findings within the past 24 hours.      ABG  Recent Labs   Lab 02/28/22  1230   PH 7.431   PO2 43   PCO2 30.8*   HCO3 20.5*   BE -4     Assessment/Plan:     Cardiac/Vascular  Primary hypertension  On metoprolol 6.25mg BID, was held with hypotension overnight 2/26, went into a-fib. Amio started    Hyperlipidemia  Not on statin due to history of cirrhosis    Renal/  * Acute renal failure superimposed on stage 2 chronic kidney disease  71 y.o F w/ dialysis dependent ANI thought to be secondary to an auto-immune etiology, based on serologic pattern of possitive ARMEN, Anti-Posey RNP and low complement levels, negative ANCA.  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      Plan:  --patient admitted to ICU for SLED (6 hours)  --HOLDING Cellcept   --continue Hydroxychloroquine  -- change steroids 40 mg daily, switch to stress dose steroids  --renal bx per nephrology after clinically stable  -- Strict I/O  -- Daily BMP, mag, phos  -- Avoid nephrotoxins    Hyperphosphatemia  Phosphate improved with dialysis  See acute renal failure    Systemic lupus erythematosus with glomerular disease  Rheumatology following patient.   Patient S/p Methylpred 1g x 3 days (2/5-2/7), then transitioned to pred 60mg on 2/8, decreased to prednisone 50mg on 2/15, prednisone on 40mg on 2/17- present     Patient meets criteria for SLE due to: +ARMEN, +thrombocytopenia,+ proteinuria, + low C3, + low C4, +  "pericardial effusion (on stress echo from 12/2021) + alopecia.      Plan  - APS labs ordered per rheum  - continue plaquenil 200mg daily  - nephrology consulted, recommended cellcept PO and renal biopsy to be done at later date given concern for complications  - Stress dose steroids, atovaquone for PJP ppx    Hyperkalemia  Hyperkalemia  Likely 2/2 renal failure. Improved with dialysis.     Plan  - SLED  - Continue to monitor  - Shift PRN    ID  Severe sepsis  This patient does have evidence of infective focus  My overall impression is septic shock. Vital signs were reviewed and noted in progress note.  Antibiotics given-   Antibiotics (From admission, onward)            Start     Stop Route Frequency Ordered    02/28/22 0930  vancomycin in dextrose 5 % 1 gram/250 mL IVPB 1,000 mg         -- IV Once 02/28/22 0822 02/28/22 0900  piperacillin-tazobactam 4.5 g in sodium chloride 0.9% 100 mL IVPB (ready to mix system)         -- IV Every 8 hours (non-standard times) 02/28/22 0823 02/26/22 1623  vancomycin - pharmacy to dose  (vancomycin IVPB)        "And" Linked Group Details    -- IV pharmacy to manage frequency 02/26/22 1524        Cultures were taken-   Microbiology Results (last 7 days)     Procedure Component Value Units Date/Time    Blood culture [[id number]] Collected: 02/26/22 1835    Order Status: Completed Specimen: Blood from Peripheral, Forearm, Left Updated: 02/27/22 2212     Blood Culture, Routine No Growth to date      No Growth to date    Blood culture [[id number]] Collected: 02/26/22 1836    Order Status: Completed Specimen: Blood from Peripheral, Forearm, Right Updated: 02/27/22 2212     Blood Culture, Routine No Growth to date      No Growth to date    Gram stain [[id number]] Collected: 02/27/22 1515    Order Status: Completed Specimen: Ascites Updated: 02/27/22 2122     Gram Stain Result Rare WBC's      No organisms seen    Aerobic culture [[id number]] Collected: 02/27/22 1515    Order Status: Sent " "Specimen: Ascites Updated: 02/27/22 1654    Culture, Anaerobic [[id number]] Collected: 02/27/22 1515    Order Status: Sent Specimen: Ascites Updated: 02/27/22 1653    Culture, Respiratory with Gram Stain [[id number]]     Order Status: No result Specimen: Respiratory from Sputum, Expectorated         Latest lactate reviewed, they are-  Recent Labs   Lab 02/27/22  0302 02/27/22  0844 02/27/22  1259   LACTATE 5.0* 3.6* 5.1*       Organ dysfunction indicated by Acute kidney injury  Source- TBD      Plan:  - vanc and zosyn  - blood cultures  - diagnostic para with SBP  -  Ct abd/pelvis showing diffusely edematous pancreas suggesting pancreatitis, ascites, supravesical space  small amount of layering hyperdensity, possibly representing small component of hemorrhage or debris/proteinaceous fluid, colonic diverticulosis.  Diffuse mild bowel wall thickening and surrounding inflammatory change.  Findings are nonspecific could represent infectious or ischemic etiologies. Cirrhotic liver morphology with sequela of portal hypertension including ascites, varices, and anasarca.      Hematology  Thrombocytopenia   Platelets chronically low. Hematology following and feel that her thrombocytopenia is "likely a chronic thrombocytopenia that is worsening in the setting of active lupus and acute illness".      Plan:   Transfuse PLT if <10,000, or <50,000 with active bleeding or before invasive procedures.   Most likely a chronic thrombocytopenia that is worsening in the setting of active lupus and acute illness.   Transfused unit prior to diagnostic para 2/27            Oncology  Macrocytic anemia  Patient with history of Cirrhosis. HgB dropped to 6.9. Received unit of PRBC and HgB increased appropriately    Plan:  -Follow up daily CBC  -Transfuse for Hgb less than 7    Endocrine  Type 2 diabetes mellitus without complication, without long-term current use of insulin  Last A1c 5.9    Plan:  Goal blood glucose 140-180  Follow up endocrine " recs/notify them of any diet changes          GI  GI bleed  BRB per rectum seen 2/28 with increasing pressor reqs  1u pRBC ordered  CBC q12h  Consider GI consult    SBP (spontaneous bacterial peritonitis)  Diagnostic para 2/27  Positive for SBP, covered by zosyn    Cirrhosis of liver without ascites  Patient complaining of abdominal pain. Bedside ultrasound revealing ascites. Lactate elevated at 6.2. Ct from 2/1 revealing small amount of ascites.     MELD-Na score: 38 at 2/28/2022 12:38 PM  MELD score: 38 at 2/28/2022 12:38 PM  Calculated from:  Serum Creatinine: On dialysis. Using 4 mg/dL.  Serum Sodium: 135 mmol/L at 2/28/2022 12:38 PM  Total Bilirubin: 8.7 mg/dL at 2/28/2022 12:38 PM  INR(ratio): 2.4 at 2/27/2022 12:59 PM  Age: 71 years      - Ct abd/pelvis showing diffusely edematous pancreas suggesting pancreatitis, ascites, supravesical space  small amount of layering hyperdensity, possibly representing small component of hemorrhage or debris/proteinaceous fluid, colonic diverticulosis.  Diffuse mild bowel wall thickening and surrounding inflammatory change.  Findings are nonspecific could represent infectious or ischemic etiologies. Cirrhotic liver morphology with sequela of portal hypertension including ascites, varices, and anasarca.    - diagnostic para 2/27  -- SBP, PMN >2000  - lipase wnl  - Hepatology consult, appreciate recs      Gastroesophageal reflux disease  Continue Famotidine while on steroids    Other  Palliative care encounter  Palliative care consulted, appreciate recs    Debility  PT/OT consulted    Sleep apnea  BiPAP QHS     Critical Care Daily Checklist:    A: Awake: RASS Goal/Actual Goal:    Actual: [place] Agitation Sedation Scale (RASS): Drowsy   B: Spontaneous Breathing Trial Performed?     C: SAT & SBT Coordinated?  n/a                      D: Delirium: CAM-ICU Overall CAM-ICU: Positive   E: Early Mobility Performed? Yes   F: Feeding Goal: Goals: Pt to meet > 50% EEN by RD follow  up  Status: Nutrition Goal Status: progressing towards goal   Current Diet Order   Procedures    Diet full liquid [facility]; Renal, Consistent Carbohydrate; Thin     Order Specific Question:   Indicate patient location for additional diet options:     Answer:   [facility]     Order Specific Question:   Additional Diet Options:     Answer:   Renal     Order Specific Question:   Additional Diet Options:     Answer:   Consistent Carbohydrate     Order Specific Question:   Fluid consistency:     Answer:   Thin      AS: Analgesia/Sedation Levo, vaso   T: Thromboembolic Prophylaxis No, thrombocytopenia   H: HOB > 300 Yes   U: Stress Ulcer Prophylaxis (if needed) Famotidine   G: Glucose Control LDSSI   B: Bowel Function Stool Occurrence: 1   I: Indwelling Catheter (Lines & Yao) Necessity L radial A-line, RIJ   D: De-escalation of Antimicrobials/Pharmacotherapies Vanc, zosyn    Plan for the day/ETD GOC discussion with family, Stabilize    Code Status:  Family/Goals of Care: DNR  Updated family at bedside       Critical secondary to Patient has a condition that poses threat to life and bodily function: Severe Respiratory Distress, Acute Renal Failure and Shock      Critical care was time spent personally by me on the following activities: development of treatment plan with patient or surrogate and bedside caregivers, discussions with consultants, evaluation of patient's response to treatment, examination of patient, ordering and performing treatments and interventions, ordering and review of laboratory studies, ordering and review of radiographic studies, pulse oximetry, re-evaluation of patient's condition. This critical care time did not overlap with that of any other provider or involve time for any procedures.     Christina Farley ([place] Name: MD [place]  Critical Care Medicine  [facility] - Cardiac Intensive Care

## 2022-02-28 NOTE — SUBJECTIVE & OBJECTIVE
Interval History/Significant Events: NAEON. Afebrile. Diagnostic para positive for SBP, PMN >2000. Hepatology and palliative consulted, recs pending.    Review of Systems   Constitutional:  Positive for fatigue. Negative for fever.   Respiratory:  Negative for chest tightness and shortness of breath.    Cardiovascular:  Positive for leg swelling. Negative for chest pain.   Gastrointestinal:  Positive for abdominal pain. Negative for abdominal distention, constipation, diarrhea, nausea and vomiting.   Genitourinary:  Negative for dysuria.        Patient gets diaylsis   Musculoskeletal:  Positive for back pain.   Skin:  Negative for color change and rash.   Neurological:  Negative for facial asymmetry.   Psychiatric/Behavioral:  Negative for agitation and behavioral problems.    Objective:     Vital Signs (Most Recent):  Temp: 97.5 °F (36.4 °C) (02/28/22 0700)  Pulse: 69 (02/28/22 0758)  Resp: (!) 48 (02/28/22 0758)  BP: (!) 105/51 (02/28/22 0758)  SpO2: 95 % (02/28/22 0758)   Vital Signs (24h Range):  Temp:  [97.1 °F (36.2 °C)-98.4 °F (36.9 °C)] 97.5 °F (36.4 °C)  Pulse:  [60-75] 69  Resp:  [12-48] 48  SpO2:  [94 %-100 %] 95 %  BP: ()/(41-57) 105/51  Arterial Line BP: ()/(24-62) 110/36   Weight: 72.6 kg (160 lb)  Body mass index is 34.62 kg/m².      Intake/Output Summary (Last 24 hours) at 2/28/2022 0848  Last data filed at 2/28/2022 0800  Gross per 24 hour   Intake 1087.7 ml   Output 2327 ml   Net -1239.3 ml       Physical Exam  Constitutional:       Appearance: She is obese. She is ill-appearing.   HENT:      Head: Normocephalic.   Eyes:      General: No scleral icterus.        Right eye: No discharge.         Left eye: No discharge.      Conjunctiva/sclera: Conjunctivae normal.   Neck:      Comments: Right sided tunnel cath  Cardiovascular:      Rate and Rhythm: Normal rate.      Pulses: Normal pulses.      Heart sounds: No murmur heard.  Pulmonary:      Breath sounds: Normal breath sounds. No  wheezing.      Comments: On BiPAP  Abdominal:      General: There is distension.      Tenderness: There is abdominal tenderness (RUQ). There is no guarding.   Musculoskeletal:      Right lower leg: Edema present.      Left lower leg: Edema present.   Skin:     General: Skin is warm and dry.      Coloration: Skin is not jaundiced.      Findings: Bruising present.   Neurological:      General: No focal deficit present.      Mental Status: She is alert.   Psychiatric:         Mood and Affect: Mood normal.       Vents:  Oxygen Concentration (%): 35 (02/28/22 0300)  Lines/Drains/Airways       Central Venous Catheter Line  Duration                  Hemodialysis Catheter 02/07/22 1436 right internal jugular 20 days              Arterial Line  Duration             Arterial Line 02/27/22 1900 Right Radial <1 day              Peripheral Intravenous Line  Duration                  Peripheral IV - Single Lumen 02/24/22 0855 20 G;1 3/4 in Left;Anterior Forearm 3 days                  Significant Labs:    CBC/Anemia Profile:  Recent Labs   Lab 02/26/22  0938 02/26/22  2223 02/27/22  1654 02/28/22  0420   WBC 8.24 9.11 9.82  --    HGB 6.9* 8.2* 7.8*  --    HCT 20.7* 24.6* 23.8*  --    PLT 31* 68* 48*  --    * 100* 99*  --    RDW 19.3* 19.9* 20.3*  --    FERRITIN 170  --   --  629*        Chemistries:  Recent Labs   Lab 02/26/22  0938 02/26/22  1402 02/27/22  0844 02/27/22  1259 02/27/22  2136 02/28/22  0420      < > 131* 129*  131*  131* 129*  126* 133*  133*  133*   K 4.9   < > 4.1 4.2  4.2  4.2 4.2  4.1 4.3  4.3  4.3      < > 94* 92*  93*  93* 92*  90* 100  99  99   CO2 21*   < > 23 21*  21*  21* 20*  20* 20*  21*  21*   BUN 74*   < > 23 24*  26*  26* 30*  30* 8  8  8   CREATININE 2.7*   < > 1.3 1.5*  1.5*  1.5* 1.8*  1.9* 0.6  0.6  0.6   CALCIUM 8.3*   < > 8.1* 7.8*  8.1*  8.1* 8.2*  8.1* 7.6*  7.6*  7.6*   ALBUMIN 2.2*   < > 2.0* 2.0* 2.0* 2.1*   PROT 5.6*  --  5.0*  --    --   --    BILITOT 2.6*  --  4.8*  --   --   --    ALKPHOS 130  --  145*  --   --   --    ALT 55*  --  223*  --   --   --    AST 65*  --  470*  --   --   --    PHOS  --    < >  --  3.1 3.9 1.6*    < > = values in this interval not displayed.       All pertinent labs within the past 24 hours have been reviewed.    Significant Imaging:  I have reviewed all pertinent imaging results/findings within the past 24 hours.

## 2022-02-28 NOTE — ASSESSMENT & PLAN NOTE
BRB per rectum seen 2/28 with increasing pressor reqs  1u pRBC ordered  CBC q12h  Consider GI consult

## 2022-02-28 NOTE — ASSESSMENT & PLAN NOTE
"This patient does have evidence of infective focus  My overall impression is septic shock. Vital signs were reviewed and noted in progress note.  Antibiotics given-   Antibiotics (From admission, onward)            Start     Stop Route Frequency Ordered    02/28/22 0930  vancomycin in dextrose 5 % 1 gram/250 mL IVPB 1,000 mg         -- IV Once 02/28/22 0822    02/28/22 0900  piperacillin-tazobactam 4.5 g in sodium chloride 0.9% 100 mL IVPB (ready to mix system)         -- IV Every 8 hours (non-standard times) 02/28/22 0823 02/26/22 1623  vancomycin - pharmacy to dose  (vancomycin IVPB)        "And" Linked Group Details    -- IV pharmacy to manage frequency 02/26/22 1524        Cultures were taken-   Microbiology Results (last 7 days)     Procedure Component Value Units Date/Time    Blood culture [[id number]] Collected: 02/26/22 1835    Order Status: Completed Specimen: Blood from Peripheral, Forearm, Left Updated: 02/27/22 2212     Blood Culture, Routine No Growth to date      No Growth to date    Blood culture [[id number]] Collected: 02/26/22 1836    Order Status: Completed Specimen: Blood from Peripheral, Forearm, Right Updated: 02/27/22 2212     Blood Culture, Routine No Growth to date      No Growth to date    Gram stain [[id number]] Collected: 02/27/22 1515    Order Status: Completed Specimen: Ascites Updated: 02/27/22 2122     Gram Stain Result Rare WBC's      No organisms seen    Aerobic culture [[id number]] Collected: 02/27/22 1515    Order Status: Sent Specimen: Ascites Updated: 02/27/22 1654    Culture, Anaerobic [[id number]] Collected: 02/27/22 1515    Order Status: Sent Specimen: Ascites Updated: 02/27/22 1653    Culture, Respiratory with Gram Stain [[id number]]     Order Status: No result Specimen: Respiratory from Sputum, Expectorated         Latest lactate reviewed, they are-  Recent Labs   Lab 02/27/22  0302 02/27/22  0844 02/27/22  1259   LACTATE 5.0* 3.6* 5.1*       Organ dysfunction indicated by " Acute kidney injury  Source- TBD      Plan:  - vanc and zosyn  - blood cultures  - diagnostic para with SBP  -  Ct abd/pelvis showing diffusely edematous pancreas suggesting pancreatitis, ascites, supravesical space  small amount of layering hyperdensity, possibly representing small component of hemorrhage or debris/proteinaceous fluid, colonic diverticulosis.  Diffuse mild bowel wall thickening and surrounding inflammatory change.  Findings are nonspecific could represent infectious or ischemic etiologies. Cirrhotic liver morphology with sequela of portal hypertension including ascites, varices, and anasarca.

## 2022-02-28 NOTE — SUBJECTIVE & OBJECTIVE
Interval History: Patient requiring 2 pressor support in the ICU. She is alert and able to communicate. She has sister and niece at bedside. They confirmed that her older sister had scleroderma and had complications of GAVE(watermelon stomach as told to them by her doctor)    Current Facility-Administered Medications   Medication Frequency    0.9%  NaCl infusion (CRRT USE ONLY) Continuous    0.9%  NaCl infusion (for blood administration) Q24H PRN    0.9%  NaCl infusion (for blood administration) Q24H PRN    0.9%  NaCl infusion (for blood administration) Q24H PRN    0.9%  NaCl infusion PRN    acetaminophen tablet 650 mg Q6H PRN    albuterol-ipratropium 2.5 mg-0.5 mg/3 mL nebulizer solution 3 mL Q4H PRN    alteplase injection 2 mg Once    amiodarone 360 mg/200 mL (1.8 mg/mL) infusion Continuous    atovaquone suspension 1,500 mg Daily    benzonatate capsule 100 mg TID PRN    bisacodyL suppository 10 mg Daily PRN    calcium gluconate 3 g in dextrose 5 % 100 mL infusion Continuous    dextromethorphan-guaiFENesin  mg/5 ml liquid 5 mL Q6H    dextrose 10% bolus 125 mL PRN    dextrose 10% bolus 250 mL PRN    dextrose-sod citrate-citric ac 2.45-2.2 gram- 800 mg/100 mL Soln Continuous    famotidine tablet 20 mg Daily    fludrocortisone tablet 100 mcg Daily    glucagon (human recombinant) injection 1 mg PRN    glucose chewable tablet 16 g PRN    glucose chewable tablet 24 g PRN    heparin (porcine) injection 3,200 Units PRN    hydrocortisone sodium succinate injection 100 mg Q8H    HYDROmorphone injection 0.2 mg Q4H PRN    hydrOXYchloroQUINE tablet 200 mg Daily    insulin aspart U-100 pen 0-5 Units QID (AC + HS) PRN    iohexoL (OMNIPAQUE 350) injection 100 mL ONCE PRN    magnesium sulfate 2g in water 50mL IVPB (premix) PRN    melatonin tablet 6 mg Nightly PRN    naloxone 0.4 mg/mL injection 0.02 mg PRN    NORepinephrine 32 mg in dextrose 5 % 250 mL infusion Continuous    ondansetron injection 4 mg TID AC     piperacillin-tazobactam 4.5 g in sodium chloride 0.9% 100 mL IVPB (ready to mix system) Q8H    polyethylene glycol packet 17 g Daily PRN    simethicone chewable tablet 80 mg TID PRN    sodium chloride 0.9% bolus 250 mL PRN    sodium chloride 0.9% bolus 250 mL PRN    sodium chloride 0.9% flush 10 mL PRN    sodium phosphate 20.01 mmol in dextrose 5 % 250 mL IVPB PRN    sodium phosphate 30 mmol in dextrose 5 % 250 mL IVPB PRN    sodium phosphate 39.99 mmol in dextrose 5 % 250 mL IVPB PRN    vancomycin - pharmacy to dose pharmacy to manage frequency    vasopressin (PITRESSIN) 0.2 Units/mL in dextrose 5 % 100 mL infusion Continuous    vitamin renal formula (B-complex-vitamin c-folic acid) 1 mg per capsule 1 capsule Daily     Objective:     Vital Signs (Most Recent):  Temp: 97.3 °F (36.3 °C) (02/28/22 1100)  Pulse: (!) 115 (02/28/22 1400)  Resp: (!) 29 (02/28/22 1525)  BP: (!) 127/50 (02/28/22 1300)  SpO2: 98 % (02/28/22 1400)  O2 Device (Oxygen Therapy): nasal cannula (02/28/22 1400)   Vital Signs (24h Range):  Temp:  [97.1 °F (36.2 °C)-97.5 °F (36.4 °C)] 97.3 °F (36.3 °C)  Pulse:  [] 115  Resp:  [12-48] 29  SpO2:  [95 %-100 %] 98 %  BP: ()/(41-57) 127/50  Arterial Line BP: ()/(24-62) 126/44     Weight: 72.6 kg (160 lb) (02/27/22 1413)  Body mass index is 34.62 kg/m².  Body surface area is 1.71 meters squared.      Intake/Output Summary (Last 24 hours) at 2/28/2022 1630  Last data filed at 2/28/2022 1100  Gross per 24 hour   Intake 3381.01 ml   Output 3041 ml   Net 340.01 ml       Physical Exam   Constitutional: She is oriented to person, place, and time. She appears toxic. She appears ill.   Eyes: Pupils are equal, round, and reactive to light.   Cardiovascular: Normal pulses. Tachycardia present.   Pulmonary/Chest: Effort normal.   Abdominal: Soft.   Musculoskeletal:         General: No swelling or tenderness.   Neurological: She is alert and oriented to person, place, and time.   Skin: Skin is warm  and dry. Capillary refill takes less than 2 seconds.   Psychiatric: Her behavior is normal. Mood, judgment and thought content normal.     Significant Labs:  CBC:   Recent Labs   Lab 02/27/22  1654 02/28/22  0912 02/28/22  1550   WBC 9.82 7.50 9.89   HGB 7.8* 7.7* 7.2*   HCT 23.8* 22.5* 22.2*   PLT 48* 43* 44*     CMP:   Recent Labs   Lab 02/28/22  1238 02/28/22  1550   GLU 89  --    CALCIUM 7.4* 7.7*   ALBUMIN 2.0*  --    PROT 5.1*  --    * 131*   K 4.4 4.6   CO2 19*  --     99   BUN 3*  --    CREATININE 0.4*  --    ALKPHOS 185*  --    *  --    AST 1,603*  --    BILITOT 8.7*  --        Significant Imaging:  Imaging results within the past 24 hours have been reviewed.

## 2022-02-28 NOTE — PROGRESS NOTES
Pharmacokinetic Assessment Follow Up: IV Vancomycin    Vancomycin Regimen Assessment & Plan:  - Vancomycin level drawn with morning labs today resulted as 8.7 mcg/mL, goal trough 10-20 mcg/mL.  - Nephrology following for ANI/RRT, did continuous SLED yesterday. Will continue pulse dosing.  - Administer vancomycin 1000 mg IV x1 dose today since level is <20. Draw vancomycin level with morning labs tomorrow. Will re-dose as needed depending on level, renal function, and RRT plans.    Drug levels (last 3 results):  Recent Labs   Lab Result Units 02/27/22  0302 02/28/22  0420   Vancomycin, Random ug/mL 19.7 8.7     Pharmacy will continue to follow and monitor vancomycin.    Please contact pharmacy at extension [phone] for questions regarding this assessment.    Thank you for the consult,   Gato Angel     Patient brief summary:  Tammi Farris is a 71 y.o. female initiated on antimicrobial therapy with IV Vancomycin for treatment of intra-abdominal infection    The patient's current regimen is pulse dosing.    Drug Allergies:   Review of patient's allergies indicates:   Allergen Reactions    Dobutamine Hives    Benadryl [diphenhydramine hcl] Anxiety     Pt states she feels jumpy and anxious when taking.    Darvon [propoxyphene] Nausea Only    Shellfish containing products Hives    Iodinated contrast media Hives    Lisinopril Other (See Comments)     cough    Propoxyphene hcl      Itchy (skin)^    Tizanidine Other (See Comments)     Sweaty, difficulty swallowing    Statins-hmg-coa reductase inhibitors Anxiety and Other (See Comments)     Myalgia, fatigue, palpitations, anxiety     Actual Body Weight:   72.9 kg    Renal Function:   Estimated Creatinine Clearance: 76.4 mL/min (based on SCr of 0.6 mg/dL).,     Dialysis Method (if applicable):  SLED

## 2022-02-28 NOTE — SUBJECTIVE & OBJECTIVE
Past Medical History:   Diagnosis Date    Allergy     Anxiety     Basal cell carcinoma     Cirrhosis of liver without ascites 12/27/2017    Depression     Diabetes mellitus, type 2     Disorder of kidney and ureter     Endometriosis     GERD (gastroesophageal reflux disease)     Hyperlipidemia     Hypertension     Joint pain     Psoriasis        Past Surgical History:   Procedure Laterality Date    abdominal laproscopic surgery      APPENDECTOMY      CARPAL TUNNEL RELEASE      right    CHOLECYSTECTOMY  04/2015    COLONOSCOPY N/A 2/8/2019    Procedure: COLONOSCOPY;  Surgeon: Celso Salas MD;  Location: [facility] ([address] [place] [address]);  Service: Endoscopy;  Laterality: N/A;    ESOPHAGOGASTRODUODENOSCOPY N/A 2/8/2019    Procedure: EGD (ESOPHAGOGASTRODUODENOSCOPY);  Surgeon: Celso Salas MD;  Location: [facility] ([address] [place] [address]);  Service: Endoscopy;  Laterality: N/A;  varices screening, labs ordered prior    HYSTERECTOMY  age 25    JOSEFA/BSO (endometriosis)    OOPHORECTOMY      SKIN CANCER DESTRUCTION      UPPER GASTROINTESTINAL ENDOSCOPY         Review of patient's allergies indicates:   Allergen Reactions    Dobutamine Hives    Benadryl [diphenhydramine hcl] Anxiety     Pt states she feels jumpy and anxious when taking.    Darvon [propoxyphene] Nausea Only    Shellfish containing products Hives    Iodinated contrast media Hives    Lisinopril Other (See Comments)     cough    Propoxyphene hcl      Itchy (skin)^    Tizanidine Other (See Comments)     Sweaty, difficulty swallowing    Statins-hmg-coa reductase inhibitors Anxiety and Other (See Comments)     Myalgia, fatigue, palpitations, anxiety       Medications:  Continuous Infusions:   sodium chloride 0.9%      amiodarone in dextrose 5% 0.5 mg/min (02/28/22 1100)    calcium gluconate infusion (CRRT) 10 mL/hr at 02/28/22 1100    dextrose-sod citrate-citric ac 150 mL/hr at 02/28/22 1100    NORepinephrine bitartrate-D5W 0.62 mcg/kg/min (02/28/22 3379)    vasopressin 0.04  Units/min (02/28/22 1358)     Scheduled Meds:   alteplase  2 mg Intra-Catheter Once    atovaquone  1,500 mg Oral Daily    dextromethorphan-guaiFENesin  mg/5 ml  5 mL Oral Q6H    famotidine  20 mg Oral Daily    fludrocortisone  100 mcg Oral Daily    hydrocortisone sodium succinate  100 mg Intravenous Q8H    hydrOXYchloroQUINE  200 mg Oral Daily    ondansetron  4 mg Intravenous TID AC    piperacillin-tazobactam (ZOSYN) IVPB  4.5 g Intravenous Q8H    vitamin renal formula (B-complex-vitamin c-folic acid)  1 capsule Oral Daily     PRN Meds:sodium chloride, sodium chloride, sodium chloride 0.9%, acetaminophen, albuterol-ipratropium, benzonatate, bisacodyL, dextrose 10%, dextrose 10%, glucagon (human recombinant), glucose, glucose, heparin (porcine), HYDROmorphone, insulin aspart U-100, iohexoL, magnesium sulfate IVPB, melatonin, naloxone, polyethylene glycol, simethicone, sodium chloride 0.9%, sodium chloride 0.9%, sodium chloride 0.9%, sodium phosphate IVPB, sodium phosphate IVPB, sodium phosphate IVPB, Pharmacy to dose Vancomycin consult **AND** vancomycin - pharmacy to dose    Family History       Problem Relation (Age of Onset)    Arthritis Mother    Asthma Brother    Hyperlipidemia Brother    Hypertension Mother, Sister, Brother    No Known Problems Father    Raynaud syndrome Sister          Tobacco Use    Smoking status: Never Smoker    Smokeless tobacco: Never Used   Substance and Sexual Activity    Alcohol use: Not Currently    Drug use: No    Sexual activity: Yes     Partners: Male     Birth control/protection: Surgical       Review of Systems   Unable to perform ROS: Mental status change   Objective:     Vital Signs (Most Recent):  Temp: 97.3 °F (36.3 °C) (02/28/22 1100)  Pulse: (!) 115 (02/28/22 1400)  Resp: (!) 29 (02/28/22 1400)  BP: (!) 127/50 (02/28/22 1300)  SpO2: 98 % (02/28/22 1400)   Vital Signs (24h Range):  Temp:  [97.1 °F (36.2 °C)-97.5 °F (36.4 °C)] 97.3 °F (36.3 °C)  Pulse:  []  115  Resp:  [12-48] 29  SpO2:  [95 %-100 %] 98 %  BP: ()/(41-57) 127/50  Arterial Line BP: ()/(24-62) 126/44     Weight: 72.6 kg (160 lb)  Body mass index is 34.62 kg/m².    Physical Exam  Vitals and nursing note reviewed.   Constitutional:       General: She is not in acute distress.     Appearance: She is ill-appearing.   HENT:      Head: Normocephalic.      Right Ear: External ear normal.      Left Ear: External ear normal.      Nose: Nose normal.      Mouth/Throat:      Mouth: Mucous membranes are moist.   Eyes:      Pupils: Pupils are equal, round, and reactive to light.   Cardiovascular:      Rate and Rhythm: Tachycardia present.   Pulmonary:      Effort: Pulmonary effort is normal. No respiratory distress.   Abdominal:      General: There is no distension.   Musculoskeletal:      Cervical back: Normal range of motion.   Skin:     Findings: Bruising present.   Neurological:      Mental Status: She is alert.      Comments: confused   Psychiatric:         Cognition and Memory: Cognition is impaired.       Review of Symptoms      Symptom Assessment (ESAS 0-10 Scale)  Unable to complete assessment due to Mental status change     CAM / Delirium:  Positive  Constipation:  Negative  Diarrhea:  Negative    Bowel Management Plan (BMP):  Yes      Pain Assessment in Advanced Demential Scale (PAINAD)   Breathing - Independent of vocalization:  0  Negative vocalization:  0  Facial expression:  0  Body language:  0  Consolability:  0  Total:  0    Modified Ernestina Scale:  0    Performance Status:  30    Living Arrangements:  Lives with family    Psychosocial/Cultural: Big family       Advance Care Planning   Advance Directives:   Living Will: No    LaPOST: No    Do Not Resuscitate Status: Yes    Medical Power of : No      Decision Making:  Family answered questions and Patient answered questions       Significant Labs: All pertinent labs within the past 24 hours have been reviewed.  CBC:   Recent Labs   Lab  02/28/22  0912   WBC 7.50   HGB 7.7*   HCT 22.5*   MCV 99*   PLT 43*     BMP:  Recent Labs   Lab 02/28/22  1238   GLU 89   *   K 4.4      CO2 19*   BUN 3*   CREATININE 0.4*   CALCIUM 7.4*     LFT:  Lab Results   Component Value Date    AST 1,603 (H) 02/28/2022    ALKPHOS 185 (H) 02/28/2022    BILITOT 8.7 (H) 02/28/2022     Albumin:   Albumin   Date Value Ref Range Status   02/28/2022 2.0 (L) 3.5 - 5.2 g/dL Final     Protein:   Total Protein   Date Value Ref Range Status   02/28/2022 5.1 (L) 6.0 - 8.4 g/dL Final     Lactic acid:   Lab Results   Component Value Date    LACTATE 5.3 (HH) 02/28/2022    LACTATE 5.1 (HH) 02/27/2022       Significant Imaging: I have reviewed all pertinent imaging results/findings within the past 24 hours.

## 2022-02-28 NOTE — PROGRESS NOTES
[facility] - Cardiac Intensive Care  Rheumatology  Progress Note    Patient Name: Tammi Farris  MRN: [id number]  Admission Date: 2/1/2022  Hospital Length of Stay: 27 days  Code Status: DNR   Attending Provider: Guy Oliva MD  Primary Care Physician: Ann-Marie Hartman MD  Principal Problem: Acute renal failure superimposed on stage 2 chronic kidney disease    Subjective:     HPI: Tammi Farris is a 71 y.o. with HTN, diabetes type 2 (hemoglobin A1c 5.9 on 2/1), CKD2, cirrhosis, HFpEF, macrocytic anemia, psorasis, sleep apnea and GERD who was admitted to [facility] on 2/1/22 for acute renal failure.    She initially presented for cough and dyspnea for the past few months. She was seen by cardiology in 1/2022 and started on Lasix 20mg without improvement.  Additionally, her sister reported that she had decrease appetite, fatigue, and weight loss. She had also experienced decreased urine output for the past few weeks. She notes some gingival bleeding and has been having this when brushing teeth for about 3 weeks. She endorses ongoing alopecia for the past 3 years and skin changes in her fingers in the cold. She denies joint swelling, joint pain, oral ulcers, skin rash, history of miscarriages or chest pain. She does not have history of autoimmune disease in her family. She is a never smoker, denies alcohol or illicit drug use.    She follows with hepatology for cirrhosis due to GARCIA. She also follows with hematology for chronic macrocytic anemia with elevated free light chains and has declined bone marrow biopsy in past.     She arrived for evaluation to the ED on 2/1 were labs revealed ANI with creatinine 3.0 (baseline around 1.3), hyperkalemia, metabolic acidosis with elevated lactic acid, elevated liver enzymes, elevated BNP (1812), elevated d-dimer, thrombocytopenia (plts 88), and markedly concentrated urine. Initially, her ANI was attributed to dehydration in the setting of diuretic use so diuretic  was held and she was given gentle IV hydration. IV fluids were later discontinued following day due to development of crackles in lung fields on exam but later restarted. She was placed on bicarb gtt for metabolic acidosis per nephrology. Nephrology initially concerned for Vasculitis vs gbm vs MM, noted to have decreased complement and microscopic hematuria concerning for GN. She was later transferred to the ICU on 2/4 for episode of desaturation while on bipap. Hematology was consulted and stated that respiratory failure may be due to viral illness given worsening cytopenias. Pulmonology was consulted on 2/4 and stated that there was concern for combination of underlying ILD vs pulmonary edema vs pulmonary-renal syndrome. On 2/5, nephrology recommended pulse dose steroids for concern for GN with plan for kidney biopsy by IR. She completed 3 days of pulse dose steroids for suspected glomerulonephritis vs pulm/renal syndrome (2/5-2/7), then transitioned to pred 60mg on 2/8. However, on 2/7, she was found to require HD which was performed and kidney biopsy was postponed. However, her renal failure and thrombocytopenia continued to worsen, with creatinine peaking at 6.4 and platelets as low as 26,000. On 2/16, Nephrology recommended adding Cellcept and reducing the Prednisone dose. However, the patient was unable to swallow the MMF pills.     She was transferred to [facility] for rheumatology and nephrology consult.      Rheumatological ROS:  No skin rashes,malar rash,photosensitivity   No telangiectasias   No calcinosis   No psoriasis   No patchy alopecia   No oral and nasal ulcers   No dry eyes and dry mouth   No pleurisy or any cardiopulmonary complaints   No dysphagia,diplopia and dysphonia and muscle weakness   No n/v/d/c   No acid reflux+   + raynaud's+   No digital ulcers   No cytopenias   No renal issues   No blood clots   No fever,chills,night sweats,weight loss and loss of appetite   No pregnancy losses/pre term  deliveries /pregnancy complications   No new onset headaches   No recurrent conjunctivitis or uveitis or scleritis or episcleritis   No chronic or bloody diarrhea with no u colitis or crohn's /inflammatory bowel disease   No vaginal or urethral  d/c/STDs/no ulcers   No joint pains         Interval History: Patient requiring 2 pressor support in the ICU. She is alert and able to communicate. She has sister and niece at bedside. They confirmed that her older sister had scleroderma and had complications of GAVE(watermelon stomach as told to them by her doctor)    Current Facility-Administered Medications   Medication Frequency    0.9%  NaCl infusion (CRRT USE ONLY) Continuous    0.9%  NaCl infusion (for blood administration) Q24H PRN    0.9%  NaCl infusion (for blood administration) Q24H PRN    0.9%  NaCl infusion (for blood administration) Q24H PRN    0.9%  NaCl infusion PRN    acetaminophen tablet 650 mg Q6H PRN    albuterol-ipratropium 2.5 mg-0.5 mg/3 mL nebulizer solution 3 mL Q4H PRN    alteplase injection 2 mg Once    amiodarone 360 mg/200 mL (1.8 mg/mL) infusion Continuous    atovaquone suspension 1,500 mg Daily    benzonatate capsule 100 mg TID PRN    bisacodyL suppository 10 mg Daily PRN    calcium gluconate 3 g in dextrose 5 % 100 mL infusion Continuous    dextromethorphan-guaiFENesin  mg/5 ml liquid 5 mL Q6H    dextrose 10% bolus 125 mL PRN    dextrose 10% bolus 250 mL PRN    dextrose-sod citrate-citric ac 2.45-2.2 gram- 800 mg/100 mL Soln Continuous    famotidine tablet 20 mg Daily    fludrocortisone tablet 100 mcg Daily    glucagon (human recombinant) injection 1 mg PRN    glucose chewable tablet 16 g PRN    glucose chewable tablet 24 g PRN    heparin (porcine) injection 3,200 Units PRN    hydrocortisone sodium succinate injection 100 mg Q8H    HYDROmorphone injection 0.2 mg Q4H PRN    hydrOXYchloroQUINE tablet 200 mg Daily    insulin aspart U-100 pen 0-5 Units QID (AC +  HS) PRN    iohexoL (OMNIPAQUE 350) injection 100 mL ONCE PRN    magnesium sulfate 2g in water 50mL IVPB (premix) PRN    melatonin tablet 6 mg Nightly PRN    naloxone 0.4 mg/mL injection 0.02 mg PRN    NORepinephrine 32 mg in dextrose 5 % 250 mL infusion Continuous    ondansetron injection 4 mg TID AC    piperacillin-tazobactam 4.5 g in sodium chloride 0.9% 100 mL IVPB (ready to mix system) Q8H    polyethylene glycol packet 17 g Daily PRN    simethicone chewable tablet 80 mg TID PRN    sodium chloride 0.9% bolus 250 mL PRN    sodium chloride 0.9% bolus 250 mL PRN    sodium chloride 0.9% flush 10 mL PRN    sodium phosphate 20.01 mmol in dextrose 5 % 250 mL IVPB PRN    sodium phosphate 30 mmol in dextrose 5 % 250 mL IVPB PRN    sodium phosphate 39.99 mmol in dextrose 5 % 250 mL IVPB PRN    vancomycin - pharmacy to dose pharmacy to manage frequency    vasopressin (PITRESSIN) 0.2 Units/mL in dextrose 5 % 100 mL infusion Continuous    vitamin renal formula (B-complex-vitamin c-folic acid) 1 mg per capsule 1 capsule Daily     Objective:     Vital Signs (Most Recent):  Temp: 97.3 °F (36.3 °C) (02/28/22 1100)  Pulse: (!) 115 (02/28/22 1400)  Resp: (!) 29 (02/28/22 1525)  BP: (!) 127/50 (02/28/22 1300)  SpO2: 98 % (02/28/22 1400)  O2 Device (Oxygen Therapy): nasal cannula (02/28/22 1400)   Vital Signs (24h Range):  Temp:  [97.1 °F (36.2 °C)-97.5 °F (36.4 °C)] 97.3 °F (36.3 °C)  Pulse:  [] 115  Resp:  [12-48] 29  SpO2:  [95 %-100 %] 98 %  BP: ()/(41-57) 127/50  Arterial Line BP: ()/(24-62) 126/44     Weight: 72.6 kg (160 lb) (02/27/22 1413)  Body mass index is 34.62 kg/m².  Body surface area is 1.71 meters squared.      Intake/Output Summary (Last 24 hours) at 2/28/2022 1630  Last data filed at 2/28/2022 1100  Gross per 24 hour   Intake 3381.01 ml   Output 3041 ml   Net 340.01 ml       Physical Exam   Constitutional: She is oriented to person, place, and time. She appears toxic. She appears  ill.   Eyes: Pupils are equal, round, and reactive to light.   Cardiovascular: Normal pulses. Tachycardia present.   Pulmonary/Chest: Effort normal.   Abdominal: Soft.   Musculoskeletal:         General: No swelling or tenderness.   Neurological: She is alert and oriented to person, place, and time.   Skin: Skin is warm and dry. Capillary refill takes less than 2 seconds.   Psychiatric: Her behavior is normal. Mood, judgment and thought content normal.     Significant Labs:  CBC:   Recent Labs   Lab 02/27/22  1654 02/28/22  0912 02/28/22  1550   WBC 9.82 7.50 9.89   HGB 7.8* 7.7* 7.2*   HCT 23.8* 22.5* 22.2*   PLT 48* 43* 44*     CMP:   Recent Labs   Lab 02/28/22  1238 02/28/22  1550   GLU 89  --    CALCIUM 7.4* 7.7*   ALBUMIN 2.0*  --    PROT 5.1*  --    * 131*   K 4.4 4.6   CO2 19*  --     99   BUN 3*  --    CREATININE 0.4*  --    ALKPHOS 185*  --    *  --    AST 1,603*  --    BILITOT 8.7*  --        Significant Imaging:  Imaging results within the past 24 hours have been reviewed.    Assessment/Plan:     * Acute renal failure superimposed on stage 2 chronic kidney disease  71 y.o. with HTN, diabetes type 2 (hemoglobin A1c 5.9 on 2/1), CKD2, cirrhosis, HFpEF, macrocytic anemia, psorasis, sleep apnea and GERD who was admitted to [facility] on 2/1/22 for acute renal failure. She endorses fatigue, alopecia (for past 3 years), and +raynauds. Denies oral ulcers, , rash, joint swelling/joint pain, chest pain. Stress echo from 12/2021 showed trivial pericardial effusion.    Labs:  ARMEN 1:1280 on 2/3  +Sm/RNP: 4.06 on 2/3  UPE: no paraprotein bands  ESR: > 140  CRP: 20.8  C3: 26 (L)--> 40--> 31  C4: < 3 (L)--> 5 -->4  VALENTINO: pos (VALENTINO pos 2/2  cold autob, haptoglobin and LDH wnl)  UA: Protein 3+, occult blood 3+, WBC 3+  Microscopic UA: RBC >100, WBC 40  Timed urine protein: 560  CRP 20.9  Haptoglobin: norm  CCP: norm  RF: norm  GBM ab: negative  ANCA: negative  Hep B surface antigen: neg  Hep C ab:  neg    Treatment history while inpatient:  - S/p Methylpred 1g x 3 days (2/5-2/7), then transitioned to pred 60mg on 2/8, decreased to prednisone 50mg on 2/15, prednisone on 40mg on 2/17- present    Assessment:  She has labs that would, according to the EULAR/ACR 2019 criteria for SLE, meet this for SLE (17pts)  due to: +ARMEN, +thrombocytopenia,+ proteinuria, + low C3, + low C4, + pericardial effusion (on stress echo from 12/2021) + alopecia.  However, this still remains uncertainty with this diagnosis as her complements and cytopenias have not improved despite pulse of steroids and prednisone since 2/5.  Given her known underlying cirrhosis, this may explain her hypocomplementemia (decrease synthetic function), thrombocytopenia and renal failure (ANI with muddy brown casts, no active sediment noted by nephrology here).  Now found to have ascites exam bedside ultrasound by ICU, suggestive of decompensated cirrhosis.  Unable to get renal biopsy due to thrombocytopenia, which help to definitively rule out SLE.    Plan/Recommendations:  - agree with holding cellcept in the setting to suspected septic shock, antibiotics per primary  - Agree with hepatology consult  - nephrology following, renal biopsy would help with definitive diagnosis but currently deferred given thrombocytopenia and HDS instability, to be done at later date given concern for complications  - Follow up APS labs  - continue plaquenil 200mg daily  - prednisone per nephrology/Primary as need for stress dose steroids at this time  - consider PJP ppx and GI ppx while on high dose steroids      Patient seen and evaluated with Dr. Lopez. Please see staff attestation for final recommendations.              Alfredo Coronado MD  Rheumatology  [facility] - Cardiac Intensive Care      Patient seen and examined with fellow.  All elements of history, physical exam and medical decision making independently confirmed by me.  Complex patient with multiple medical issues  including cirrhosis thought to be due to GARCIA, chronic macrocytic anemia with elevated light chains, DM, HTN, CKD2, psoriasis, sleep apnea, HFpEF and a sister who had scleroderma and  from water melon stomach per the family now with renal failure and serologies suggestive of lupus however other conditions could be contributing contributing. Proceed with above plan as listed in fellow note. See note for details.

## 2022-02-28 NOTE — CONSULTS
[facility] - Cardiac Int[facility] Care  Gastroenterology  Consult Note    Patient Name: Tammi Farris  MRN: [id number]  Admission Date: 2/1/2022  Hospital Length of Stay: 27 days  Code Status: Full Code   Attending Provider: Guy Oliva MD   Consulting Provider: Salomon Gerardo MD  Primary Care Physician: Ann-Marie Hartman MD  Principal Problem:Acute renal failure superimposed on stage 2 chronic kidney disease    Inpatient consult to Hepatology  Consult performed by: Salomon Gerardo MD  Consult ordered by: Juan Hanley DO        Subjective:     HPI:  71 F w/ PMH CKD, HTN, DM2, PAULINA, SLE with lupus nephritis, GARCIA cirrhosis (previously followed by Dr. Oh), transferred from [facility] with acute renal failure thought to be combination of SLE nephritis and possible HRS.  Patient was initially admitted to [facility], found to have low compliment levels, unable to obtain renal biopsy 2/2 thrombocytopenia.  Given empiric pulse dose steroids and then continued on steroid taper and cellcept.  Ultimately renal function continued to worsen and she was started on RRT and transferred to [facility] [place] after increasing pressor requirements.  On transfer, patient underwent paracentesis on 2/27 showing findings consistent with SBP.  Started on zosyn.  Hepatology consulted for further evaluation.     Last seen in Hepatology clinic, at that time, transplant workup not initiated 2/2 low MELD (MELD 9).        Past Medical History:   Diagnosis Date    Allergy     Anxiety     Basal cell carcinoma     Cirrhosis of liver without ascites 12/27/2017    Depression     Diabetes mellitus, type 2     Disorder of kidney and ureter     Endometriosis     GERD (gastroesophageal reflux disease)     Hyperlipidemia     Hypertension     Joint pain     Psoriasis        Past Surgical History:   Procedure Laterality Date    abdominal laproscopic surgery      APPENDECTOMY      CARPAL TUNNEL RELEASE      right    CHOLECYSTECTOMY   04/2015    COLONOSCOPY N/A 2/8/2019    Procedure: COLONOSCOPY;  Surgeon: Celso Salas MD;  [facility]  Service: Endoscopy;  Laterality: N/A;    ESOPHAGOGASTRODUODENOSCOPY N/A 2/8/2019    Procedure: EGD (ESOPHAGOGASTRODUODENOSCOPY);  Surgeon: Celso Salas MD;  [facility]  Service: Endoscopy;  Laterality: N/A;  varices screening, labs ordered prior    HYSTERECTOMY  age 25    JOSEFA/BSO (endometriosis)    OOPHORECTOMY      SKIN CANCER DESTRUCTION      UPPER GASTROINTESTINAL ENDOSCOPY         Review of patient's allergies indicates:   Allergen Reactions    Dobutamine Hives    Benadryl [diphenhydramine hcl] Anxiety     Pt states she feels jumpy and anxious when taking.    Darvon [propoxyphene] Nausea Only    Shellfish containing products Hives    Iodinated contrast media Hives    Lisinopril Other (See Comments)     cough    Propoxyphene hcl      Itchy (skin)^    Tizanidine Other (See Comments)     Sweaty, difficulty swallowing    Statins-hmg-coa reductase inhibitors Anxiety and Other (See Comments)     Myalgia, fatigue, palpitations, anxiety     Family History       Problem Relation (Age of Onset)    Arthritis Mother    Asthma Brother    Hyperlipidemia Brother    Hypertension Mother, Sister, Brother    No Known Problems Father    Raynaud syndrome Sister          Tobacco Use    Smoking status: Never Smoker    Smokeless tobacco: Never Used   Substance and Sexual Activity    Alcohol use: Not Currently    Drug use: No    Sexual activity: Yes     Partners: Male     Birth control/protection: Surgical     Review of Systems   Constitutional:  Positive for fatigue.   HENT: Negative.     Eyes: Negative.    Respiratory:  Positive for shortness of breath.    Cardiovascular: Negative.    Gastrointestinal:  Positive for abdominal distention.   Endocrine: Negative.    Genitourinary: Negative.    Musculoskeletal: Negative.    Skin: Negative.    Allergic/Immunologic:  Negative.    Neurological: Negative.    Hematological: Negative.    Psychiatric/Behavioral: Negative.     Objective:     Vital Signs (Most Recent):  Temp: 97.5 °F (36.4 °C) (02/28/22 0700)  Pulse: 99 (02/28/22 1100)  Resp: (!) 33 (02/28/22 1100)  BP: (!) 95/41 (02/28/22 1100)  SpO2: 98 % (02/28/22 1100)   Vital Signs (24h Range):  Temp:  [97.1 °F (36.2 °C)-98.4 °F (36.9 °C)] 97.5 °F (36.4 °C)  Pulse:  [] 99  Resp:  [12-48] 33  SpO2:  [94 %-100 %] 98 %  BP: ()/(41-57) 95/41  Arterial Line BP: ()/(24-62) 110/36     Weight: 72.6 kg (160 lb) (02/27/22 1413)  Body mass index is 34.62 kg/m².      Intake/Output Summary (Last 24 hours) at 2/28/2022 1208  Last data filed at 2/28/2022 1100  Gross per 24 hour   Intake 3795.69 ml   Output 3041 ml   Net 754.69 ml       Lines/Drains/Airways       Central Venous Catheter Line  Duration                  Hemodialysis Catheter 02/07/22 1436 right internal jugular 20 days              Arterial Line  Duration             Arterial Line 02/27/22 1900 Right Radial <1 day              Peripheral Intravenous Line  Duration                  Peripheral IV - Single Lumen 02/24/22 0855 20 G;1 3/4 in Left;Anterior Forearm 4 days                    Physical Exam  Gen: NAD, lying comfortably  HENT: NCAT, oropharynx clear  Eyes: icteric sclerae, EOMI grossly  Neck: supple, no visible masses/goiter, RIJ TLC  Cardiac: RRR, no M/R/G, S1/S2 present  Lungs: CTAB, no crackles, no wheezes  Abd: soft, NT/ND, normoactive BS, no rebound  Ext: 2+ LE edema, warm, well perfused  Skin: skin intact on exposed body parts, no visible rashes, lesions  Neuro: A&Ox4, neuro exam grossly intact, moves all extremities  Psych: appropriate mood, affect        Significant Labs:  CBC:   Recent Labs   Lab 02/26/22  2223 02/27/22  1654 02/28/22  0912   WBC 9.11 9.82 7.50   HGB 8.2* 7.8* 7.7*   HCT 24.6* 23.8* 22.5*   PLT 68* 48* 43*     CMP:   Recent Labs   Lab 02/27/22  0844 02/27/22  1259 02/28/22  0420  02/28/22  0749   *   < > 99  99  99 86   CALCIUM 8.1*   < > 7.6*  7.6*  7.6* 7.5*   ALBUMIN 2.0*   < > 2.1*  --    PROT 5.0*  --   --   --    *   < > 133*  133*  133* 130*   K 4.1   < > 4.3  4.3  4.3 4.2   CO2 23   < > 20*  21*  21* 20*   CL 94*   < > 100  99  99 98   BUN 23   < > 8  8  8 5*   CREATININE 1.3   < > 0.6  0.6  0.6 0.5   ALKPHOS 145*  --   --   --    *  --   --   --    *  --   --   --    BILITOT 4.8*  --   --   --     < > = values in this interval not displayed.     Coagulation:   Recent Labs   Lab 02/27/22  1259   INR 2.4*       Significant Imaging:  Imaging results within the past 24 hours have been reviewed.    Assessment/Plan:     SBP (spontaneous bacterial peritonitis)  Patient with decompensated GARCIA cirrhosis with ascites and volume overload, SLE with presumed lupus nephritis s/p empiric steroids now with worsening renal failure requiring CRRT and vaso pressor requirements.  Ascites fluid consistent with SBP now on ABx.    Patient is currently too critically ill for transplant consideration at this time given critical illness, high vasopressor requirements and active infection (SBP).  We will continue to follow clinical course.    Recommendations:  - continue ABx  - repeat paracentesis in 5 days to document improvement in SBP  - consider repeating CT abd/pelvis given significant lactic acidosis, pressor requirement and SBP (r/o bowel perforation)  - continue management of arrythmia  - continue to monitor mental status, rifaximin        Thank you for your consult. I will follow-up with patient. Please contact us if you have any additional questions.    Salomon Gerardo MD  Gastroenterology  [facility] - Cardiac Intensive Care

## 2022-02-28 NOTE — CARE UPDATE
BG goal 140-180    -A1C   Lab Results   Component Value Date    HGBA1C 5.9 (H) 02/01/2022       -HOME REGIMEN: Metformin 1g BID     -INPATIENT REGIMEN: correction scale     -GLUCOSE TREND FOR THE PAST 24HRS: 102-229    -NO HYPOGYCEMIAS NOTED     -TOLERATING 25% OF PO DIET     -Diet: Diet full liquid [facility]; Renal, Consistent Carbohydrate; Thin    -Steroids - Hydrocortisone 100 mg q 8 hrs     -Tube Feeds - none      Remains in CICU. NAEON. BG within goal ranges with minimal prn SQ insulin requirements. Appetite remains poor. Remains on SLED.     Plan:  -Continue Low Dose Correction Scale  -BG monitoring ac/hs    Discharge planning: tbd    Endocrine to continue to follow    ** Please call Endocrine for any BG related issues **

## 2022-02-28 NOTE — ASSESSMENT & PLAN NOTE
Patient complaining of abdominal pain. Bedside ultrasound revealing ascites. Lactate elevated at 6.2. Ct from 2/1 revealing small amount of ascites.     MELD-Na score: 36 at 2/28/2022  4:20 AM  MELD score: 35 at 2/28/2022  4:20 AM  Calculated from:  Serum Creatinine: On dialysis. Using 4 mg/dL.  Serum Sodium: 133 mmol/L at 2/28/2022  4:20 AM  Total Bilirubin: 4.8 mg/dL at 2/27/2022  8:44 AM  INR(ratio): 2.4 at 2/27/2022 12:59 PM  Age: 71 years      - Ct abd/pelvis showing diffusely edematous pancreas suggesting pancreatitis, ascites, supravesical space  small amount of layering hyperdensity, possibly representing small component of hemorrhage or debris/proteinaceous fluid, colonic diverticulosis.  Diffuse mild bowel wall thickening and surrounding inflammatory change.  Findings are nonspecific could represent infectious or ischemic etiologies. Cirrhotic liver morphology with sequela of portal hypertension including ascites, varices, and anasarca.    - diagnostic para 2/27  -- SBP, PMN >2000  - lipase wnl  - Hepatology consult, appreciate recs

## 2022-02-28 NOTE — ASSESSMENT & PLAN NOTE
Patient with decompensated GARCIA cirrhosis with ascites and volume overload, SLE with presumed lupus nephritis s/p empiric steroids now with worsening renal failure requiring CRRT and vaso pressor requirements.  Ascites fluid consistent with SBP now on ABx.    Patient is currently too critically ill for transplant consideration at this time given critical illness, high vasopressor requirements and active infection (SBP).  We will continue to follow clinical course.    Recommendations:  - continue ABx  - repeat paracentesis in 5 days to document improvement in SBP  - consider repeating CT abd/pelvis given significant lactic acidosis, pressor requirement and SBP (r/o bowel perforation)  - continue management of arrythmia  - continue to monitor mental status, rifaximin

## 2022-02-28 NOTE — ASSESSMENT & PLAN NOTE
Lab Results   Component Value Date    PLT 43 (L) 02/28/2022     -Too high risk for renal bx at this point, continued management per Rheum and MICU

## 2022-02-28 NOTE — ASSESSMENT & PLAN NOTE
71 y.o. with HTN, diabetes type 2 (hemoglobin A1c 5.9 on 2/1), CKD2, cirrhosis, HFpEF, macrocytic anemia, psorasis, sleep apnea and GERD who was admitted to [facility] on 2/1/22 for acute renal failure. She endorses fatigue, alopecia (for past 3 years), and +raynauds. Denies oral ulcers, , rash, joint swelling/joint pain, chest pain. Stress echo from 12/2021 showed trivial pericardial effusion.    Labs:  ARMEN 1:1280 on 2/3  +Sm/RNP: 4.06 on 2/3  UPE: no paraprotein bands  ESR: > 140  CRP: 20.8  C3: 26 (L)--> 40--> 31  C4: < 3 (L)--> 5 -->4  VALENTINO: pos (VALENTINO pos 2/2  cold autob, haptoglobin and LDH wnl)  UA: Protein 3+, occult blood 3+, WBC 3+  Microscopic UA: RBC >100, WBC 40  Timed urine protein: 560  CRP 20.9  Haptoglobin: norm  CCP: norm  RF: norm  GBM ab: negative  ANCA: negative  Hep B surface antigen: neg  Hep C ab: neg    Treatment history while inpatient:  - S/p Methylpred 1g x 3 days (2/5-2/7), then transitioned to pred 60mg on 2/8, decreased to prednisone 50mg on 2/15, prednisone on 40mg on 2/17- present    Assessment:  She has labs that would, according to the EULAR/ACR 2019 criteria for SLE, meet this for SLE (17pts)  due to: +ARMEN, +thrombocytopenia,+ proteinuria, + low C3, + low C4, + pericardial effusion (on stress echo from 12/2021) + alopecia.  However, this still remains uncertainty with this diagnosis as her complements and cytopenias have not improved despite pulse of steroids and prednisone since 2/5.  Given her known underlying cirrhosis, this may explain her hypocomplementemia (decrease synthetic function), thrombocytopenia and renal failure (ANI with muddy brown casts, no active sediment noted by nephrology here).  Now found to have ascites exam bedside ultrasound by ICU, suggestive of decompensated cirrhosis.  Unable to get renal biopsy due to thrombocytopenia, which help to definitively rule out SLE.    Plan/Recommendations:  - agree with holding cellcept in the setting to suspected  septic shock, antibiotics per primary  - Agree with hepatology consult  - nephrology following, renal biopsy would help with definitive diagnosis but currently deferred given thrombocytopenia and HDS instability, to be done at later date given concern for complications  - Follow up APS labs  - continue plaquenil 200mg daily  - prednisone per nephrology/Primary as need for stress dose steroids at this time  - consider PJP ppx and GI ppx while on high dose steroids      Patient seen and evaluated with Dr. Lopez. Please see staff attestation for final recommendations.

## 2022-02-28 NOTE — PROCEDURES
"Tammi Farris is a 71 y.o. female patient.    Temp: 98.4 °F (36.9 °C) (02/27/22 1500)  Pulse: 62 (02/27/22 1800)  Resp: (!) 24 (02/27/22 1800)  BP: (!) 121/49 (02/27/22 1800)  SpO2: 100 % (02/27/22 1800)  Weight: 72.6 kg (160 lb) (02/27/22 1413)  Height: 4' 9" (144.8 cm) (02/27/22 1413)       Central Line    Date/Time: 2/27/2022 8:21 PM  Performed by: Kaitlynn Pratt MD  Authorized by: Kaitlynn Pratt MD     Location procedure was performed:  [facility]  Assisting Provider:  Kaitlynn Pratt MD  Consent Done ?:  Yes  Time out complete?: Verified correct patient, procedure, equipment, staff, and site/side    Indications:  Med administration and vascular access  Anesthesia:  Local infiltration  Local anesthetic:  Lidocaine 1% without epinephrine  Anesthetic total (ml):  3  Preparation:  Skin prepped with ChloraPrep  Skin prep agent dried: Skin prep agent completely dried prior to procedure    Sterile barriers: All five maximal sterile barriers used - gloves, gown, cap, mask and large sterile sheet    Hand hygiene: Hand hygiene performed immediately prior to central venous catheter insertion    Location:  Left internal jugular  Catheter type:  Triple lumen  Catheter size:  7 Fr  Ultrasound guidance: Yes    Vessel Caliber:  Large   patent  Comprressibility:  Normal  Needle advanced into vessel with real time ultrasound guidance.    Guidewire confirmed in vessel.    Image recorded and saved.    Steril sheath on probe.    Sterile gel used.  Manometry: Yes    Number of attempts:  2  Securement:  Line sutured  Complications: No    Specimens: No    Implants: No    XRay:  Placement verified by x-ray, no pneumothorax on x-ray and successful placement  Adverse Events:  NoneTermination Site: inferior vena cava        2/27/2022  "

## 2022-02-28 NOTE — PT/OT/SLP DISCHARGE
Occupational Therapy Discharge Summary    Tammi Farris  MRN: [id number]   Principal Problem: Acute renal failure superimposed on stage 2 chronic kidney disease      Patient Discharged from acute Occupational Therapy on 2/28/2022  .  Please refer to prior OT note dated 2/23/22  for functional status.    Assessment:      Patient appropriate for care in another setting.    Objective:     GOALS:   Multidisciplinary Problems     Occupational Therapy Goals        Problem: Occupational Therapy Goal    Goal Priority Disciplines Outcome Interventions   Occupational Therapy Goal     OT, PT/OT Ongoing, Progressing    Description: Goals to be met by: 03/22/22    Patient will increase functional independence with ADLs by performing:    UE Dressing with Stand-by assistance.  LE Dressing with Minimal assistance.  Grooming while seated with Stand-by assistance.  Toileting from bedside commode with minimal assistance for hygiene and clothing management.   Supine to sit with Stand-by assistance.  Step transfer with stand-by assistance  Toilet transfer to bedside commode with stand-by assistance.  Upper extremity exercise program x15 reps per handout, with independence.  Goals reviewed and updated.                    Reasons for Discontinuation of Therapy Services  Transfer to alternate level of care.      Plan:     Patient Discharged to: Pt was t/f to ICU 2/25/22 with CRRT needs with initial OT orders no longer valid.     2/28/2022

## 2022-02-28 NOTE — HPI
71 F w/ PMH CKD, HTN, DM2, PAULINA, SLE with lupus nephritis, GARCIA cirrhosis (previously followed by Dr. Oh), transferred from [facility] with acute renal failure thought to be combination of SLE nephritis and possible HRS.  Patient was initially admitted to [facility], found to have low compliment levels, unable to obtain renal biopsy 2/2 thrombocytopenia.  Given empiric pulse dose steroids and then continued on steroid taper and cellcept.  Ultimately renal function continued to worsen and she was started on RRT and [place] [facility] [place] after increasing pressor requirements.  On transfer, patient underwent paracentesis on 2/27 showing findings consistent with SBP.  Started on zosyn.  Hepatology consulted for further evaluation.     Last seen in Hepatology clinic, at that time, transplant workup not initiated 2/2 low MELD (MELD 9).

## 2022-02-28 NOTE — ASSESSMENT & PLAN NOTE
" Platelets chronically low. Hematology following and feel that her thrombocytopenia is "likely a chronic thrombocytopenia that is worsening in the setting of active lupus and acute illness".      Plan:   Transfuse PLT if <10,000, or <50,000 with active bleeding or before invasive procedures.   Most likely a chronic thrombocytopenia that is worsening in the setting of active lupus and acute illness.   Transfused unit prior to diagnostic para 2/27          "

## 2022-02-28 NOTE — ASSESSMENT & PLAN NOTE
71 year old female with prolonged hospitalization with acute renal failure and sepsis. Family requesting to follow up with palliative. Palliative was consulted at the [facility] [place], [place]     Code status: DNR     Surrogate decision maker: patient's siblings     GOC/ACP  -Met with patient, patient's sister Tania and niece at bedside. Patient confused but able to somewhat participate in conversation. Family aware that patient is very sick and is not currently tolerating CRRT. They understand that patient is not likely to survive this hospitalization. Family in agreement with plan to resume CRRT if possible. If this is not possible they would like to transition to comfort focused care. Visitation liberalized     Will continue to follow

## 2022-03-01 NOTE — CARE UPDATE
Called to bedside by patient's nurse, Ronit. Nursing supervisor notified.     Patient is not responding to verbal or tactile stimuli in all extremities. No facial movement to noxious stimulus at the supraorbital nerve bilaterally. Patient does not have pupillary, corneal, oculovestibular or oculogyric reflexes. Patient's pupils are fixed and dilated. No heart or breath sounds on auscultation. No respirations. No palpable pulses.     Family was present at bedside and notified of patient's death.  was notified.    Time of death: 2:08 AM    Cause of Death: Acute renal failure/sepsis.       Mary Miller MD  [facility]  Internal Medicine, PGY-2  Email: [email or web address]  Mobile number: [phone]

## 2022-03-01 NOTE — NURSING
Notified CCS that pt asystole on the monitor. No palpable pulse noted. No respirations present. MD to bedside to pronounce pt.  called. Support provided to family at bedside.

## 2022-03-01 NOTE — CARE UPDATE
Called to bedside at the request of patient's niece and sister. They are ready to transition to a comfort based approach. The patient's significant other entered the room as discussions were had. I explained the process of transitioning to comfort care and that we would treat her aggressively with medications to make her comfortable. All questions were answered. Comfort orders placed and all non-comfort focused medications [email or web address].      Nat Brannon DO  Internal Medicine PGY2  MICU

## 2022-03-01 NOTE — PLAN OF CARE
CMICU DAILY GOALS       A: Awake    RASS: Goal -    Actual - RASS (Galloway Agitation-Sedation Scale): -1-->drowsy   Restraint necessity: Clinical Justification: Removing medical devices  B: Breathe   SBT: Not intubated   C: Coordinate A & B, analgesics/sedatives   Pain: managed    SAT: Not intubated  D: Delirium   CAM-ICU: Overall CAM-ICU: Positive  E: Early(intubated/ Progressive (non-intubated) Mobility   MOVE Screen: Fail   Activity: Activity Management: Patient unable to perform activities  FAS: Feeding/Nutrition   Diet order: Diet/Nutrition Received: clear liquid, Specialty Diet/Nutrition Received: renal diet  T: Thrombus   DVT prophylaxis: VTE Required Core Measure: Per order contraindicated for SCDs/Anticoagulants  H: HOB Elevation   Head of Bed (HOB) Positioning: HOB at 30-45 degrees  U: Ulcer Prophylaxis   GI: no  G: Glucose control   managed Glycemic Management: blood glucose monitored  S: Skin   Bathing/Skin Care: bath, partial, incontinence care  Device Skin Pressure Protection: absorbent pad utilized/changed, pressure points protected, skin-to-skin areas padded  Pressure Reduction Devices: foam padding utilized, heel offloading device utilized, specialty bed utilized  Pressure Reduction Techniques: weight shift assistance provided  Skin Protection: adhesive use limited, incontinence pads utilized, skin-to-skin areas padded, transparent dressing maintained  B: Bowel Function   no issues   I: Indwelling Catheters   Yao necessity: [REMOVED]      Urethral Catheter 02/21/22 1245 16 Fr.-Reason for Continuing Urinary Catheterization: Urinary retention  [REMOVED]      Urethral Catheter 02/01/22 1352 Latex 16 Fr.-Reason for Continuing Urinary Catheterization: Urinary retention   CVC necessity: No  D: De-escalation Antibiotics   No    Family/Goals of care/Code Status   Code Status: DNR    24H Vital Sign Range  Temp:  [96.6 °F (35.9 °C)-97.7 °F (36.5 °C)]   Pulse:  []   Resp:  [12-73]   BP:  ()/(41-57)   SpO2:  [92 %-100 %]   Arterial Line BP: ()/(33-62)      Shift Events   Pt remains on Levo, Vaso, and amio. Levo titrated to maintain MAP greater than 65. MD made aware of aggressive levo titration. [place] placed throughout shift. MD order CRRT rinsed back at 1300 due to increasing pressor requirements. 1 unit of RBC given during shift. Electrolytes replaced per PRNs.     VS and assessment per flow sheet, patient progressing towards goals as tolerated, plan of care reviewed with family, all concerns addressed, will continue to monitor.     Selene Beaver RN

## 2022-03-01 NOTE — H&P
[facility] - Cardiac Intensive Care  Critical Care Medicine  History & Physical    Patient Name: Tammi Farris  MRN: [id number]  Admission Date: 2/1/2022  Hospital Length of Stay: 28 days  Code Status: DNR  Attending Physician: Guy Oliva MD   Primary Care Provider: Ann-Marie Hartman MD   Principal Problem: Acute renal failure superimposed on stage 2 chronic kidney disease    Subjective:     HPI:  Tammi Farris is a 71 y.o. F with history of CKD2, HTN, DM, cirrhosis, and PAULINA, who was [facility] with acute renal failure. Found to have low complements and positive anti-Sm /RNP antibodies. Unable to renal biopsy due to thrombocytopenia (~ 50). She was treated with pulse dose steroids for 3 days and now on prednisone. Attempted to initiate Cellcept but has not taken due to difficulty with pill size. Initiated on HD, received several sessions, last 2/16. Held over weekend as her Cr improving to 3.5 from 6.4. She reports making good urine. Denies confusion. Concern for possible lupus nephritis and transferred to [facility] per rheumatology /nephrology recommendations for IR renal biopsy. Patient admitted to ICU for CRRT needs. Patient notes she has not been feeling well for a couple of months. She says she gets dialysis because she has liver disease.         Hospital/ICU Course:  Stepped up to MICU 2/25 for CRRT. Patient now requiring pressor support (low levels of levo), and found to have HgB of 6.9, unit of PRBC transfused. Has had struggled with dialysis clotting off. Additionally, has been experiencing abdominal pain for some time. Bedside ultrasound revealing ascites, lactate 6.2. CT abd/pelvis showing diffusely edematous pancrease suggesting acute pancreatitis, ascites as well as a small amount of layering hyperdensity that could represent component of hemorrhage or protenacious material, as well as diverticulosis with wall thickening, and liver cirrhosis. Lipase wnl. LFTs increasing, MELD 27, hepatology  consulted. Dx para positive for SBP, PMN >2000. Repeat echo with EF 65%, elevated PAP, and grade 3 LV diastolic dysfunction. Palliative consulted.   On the evening of , goals of care discussion held between family and palliative medicine/MICU team regarding prognosis. Patient was transitioned to comfort care and  later in the night.            No new subjective & objective note has been filed under this hospital service since the last note was generated.    Assessment/Plan:     Cardiac/Vascular  Primary hypertension  On metoprolol 6.25mg BID, was held with hypotension overnight , went into a-fib. Amio started    Hyperlipidemia  Not on statin due to history of cirrhosis    Renal/  * Acute renal failure superimposed on stage 2 chronic kidney disease  71 y.o F w/ dialysis dependent ANI thought to be secondary to an auto-immune etiology, based on serologic pattern of possitive ARMEN, Anti-Posey RNP and low complement levels, negative ANCA.  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      Plan:  --patient admitted to ICU for SLED (6 hours)  --HOLDING Cellcept   --continue Hydroxychloroquine  -- change steroids 40 mg daily, switch to stress dose steroids  --renal bx per nephrology after clinically stable  -- Strict I/O  -- Daily BMP, mag, phos  -- Avoid nephrotoxins    Hyperphosphatemia  Phosphate improved with dialysis  See acute renal failure    Systemic lupus erythematosus with glomerular disease  Rheumatology following patient.   Patient S/p Methylpred 1g x 3 days (-), then transitioned to pred 60mg on , decreased to prednisone 50mg on 2/15, prednisone on 40mg on - present     Patient meets criteria for SLE due to: +ARMEN, +thrombocytopenia,+ proteinuria, + low C3, + low C4, + pericardial effusion (on stress echo from 2021) + alopecia.      Plan  - APS labs ordered per rheum  - continue plaquenil 200mg daily  - nephrology consulted, recommended cellcept PO and renal biopsy to be done at  "later date given concern for complications  - Stress dose steroids, atovaquone for PJP ppx    Hyperkalemia  Hyperkalemia  Likely 2/2 renal failure. Improved with dialysis.     Plan  - SLED  - Continue to monitor  - Shift PRN    ID  Severe sepsis  This patient does have evidence of infective focus  My overall impression is septic shock. Vital signs were reviewed and noted in progress note.  Antibiotics given-   Antibiotics (From admission, onward)            Start     Stop Route Frequency Ordered    02/28/22 0930  vancomycin in dextrose 5 % 1 gram/250 mL IVPB 1,000 mg         -- IV Once 02/28/22 0822 02/28/22 0900  piperacillin-tazobactam 4.5 g in sodium chloride 0.9% 100 mL IVPB (ready to mix system)         -- IV Every 8 hours (non-standard times) 02/28/22 0823 02/26/22 1623  vancomycin - pharmacy to dose  (vancomycin IVPB)        "And" Linked Group Details    -- IV pharmacy to manage frequency 02/26/22 1524        Cultures were taken-   Microbiology Results (last 7 days)     Procedure Component Value Units Date/Time    Blood culture [[id number]] Collected: 02/26/22 1835    Order Status: Completed Specimen: Blood from Peripheral, Forearm, Left Updated: 02/27/22 2212     Blood Culture, Routine No Growth to date      No Growth to date    Blood culture [[id number]] Collected: 02/26/22 1836    Order Status: Completed Specimen: Blood from Peripheral, Forearm, Right Updated: 02/27/22 2212     Blood Culture, Routine No Growth to date      No Growth to date    Gram stain [[id number]] Collected: 02/27/22 1515    Order Status: Completed Specimen: Ascites Updated: 02/27/22 2122     Gram Stain Result Rare WBC's      No organisms seen    Aerobic culture [[id number]] Collected: 02/27/22 1515    Order Status: Sent Specimen: Ascites Updated: 02/27/22 1654    Culture, Anaerobic [[id number]] Collected: 02/27/22 1515    Order Status: Sent Specimen: Ascites Updated: 02/27/22 1653    Culture, Respiratory with Gram Stain [[id number]]  " "   Order Status: No result Specimen: Respiratory from Sputum, Expectorated         Latest lactate reviewed, they are-  Recent Labs   Lab 02/27/22  0302 02/27/22  0844 02/27/22  1259   LACTATE 5.0* 3.6* 5.1*       Organ dysfunction indicated by Acute kidney injury  Source- TBD      Plan:  - vanc and zosyn  - blood cultures  - diagnostic para with SBP  -  Ct abd/pelvis showing diffusely edematous pancreas suggesting pancreatitis, ascites, supravesical space  small amount of layering hyperdensity, possibly representing small component of hemorrhage or debris/proteinaceous fluid, colonic diverticulosis.  Diffuse mild bowel wall thickening and surrounding inflammatory change.  Findings are nonspecific could represent infectious or ischemic etiologies. Cirrhotic liver morphology with sequela of portal hypertension including ascites, varices, and anasarca.      Hematology  Thrombocytopenia   Platelets chronically low. Hematology following and feel that her thrombocytopenia is "likely a chronic thrombocytopenia that is worsening in the setting of active lupus and acute illness".      Plan:   Transfuse PLT if <10,000, or <50,000 with active bleeding or before invasive procedures.   Most likely a chronic thrombocytopenia that is worsening in the setting of active lupus and acute illness.   Transfused unit prior to diagnostic para 2/27            Oncology  Macrocytic anemia  Patient with history of Cirrhosis. HgB dropped to 6.9. Received unit of PRBC and HgB increased appropriately    Plan:  -Follow up daily CBC  -Transfuse for Hgb less than 7    Endocrine  Type 2 diabetes mellitus without complication, without long-term current use of insulin  Last A1c 5.9    Plan:  Goal blood glucose 140-180  Follow up endocrine recs/notify them of any diet changes          GI  GI bleed  BRB per rectum seen 2/28 with increasing pressor reqs  1u pRBC ordered  CBC q12h  Consider GI consult    SBP (spontaneous bacterial peritonitis)  Diagnostic " para 2/27  Positive for SBP, covered by zosyn    Cirrhosis of liver without ascites  Patient complaining of abdominal pain. Bedside ultrasound revealing ascites. Lactate elevated at 6.2. Ct from 2/1 revealing small amount of ascites.     MELD-Na score: 38 at 2/28/2022 12:38 PM  MELD score: 38 at 2/28/2022 12:38 PM  Calculated from:  Serum Creatinine: On dialysis. Using 4 mg/dL.  Serum Sodium: 135 mmol/L at 2/28/2022 12:38 PM  Total Bilirubin: 8.7 mg/dL at 2/28/2022 12:38 PM  INR(ratio): 2.4 at 2/27/2022 12:59 PM  Age: 71 years      - Ct abd/pelvis showing diffusely edematous pancreas suggesting pancreatitis, ascites, supravesical space  small amount of layering hyperdensity, possibly representing small component of hemorrhage or debris/proteinaceous fluid, colonic diverticulosis.  Diffuse mild bowel wall thickening and surrounding inflammatory change.  Findings are nonspecific could represent infectious or ischemic etiologies. Cirrhotic liver morphology with sequela of portal hypertension including ascites, varices, and anasarca.    - diagnostic para 2/27  -- SBP, PMN >2000  - lipase wnl  - Hepatology consult, appreciate recs      Gastroesophageal reflux disease  Continue Famotidine while on steroids    Other  Palliative care encounter  Palliative care consulted, appreciate recs    Debility  PT/OT consulted    Sleep apnea  BiPAP QHS              Critical care was time spent personally by me on the following activities: development of treatment plan with patient or surrogate and bedside caregivers, discussions with consultants, evaluation of patient's response to treatment, examination of patient, ordering and performing treatments and interventions, ordering and review of laboratory studies, ordering and review of radiographic studies, pulse oximetry, re-evaluation of patient's condition. This critical care time did not overlap with that of any other provider or involve time for any procedures.     Nat Brannon,  DO  Critical Care Medicine  [facility] - Cardiac Intensive Care

## 2022-03-01 NOTE — NURSING
Notified DO Clovis regarding pts niece and sister requesting pt to transition to comfort measures.  MD to bedside to speak with family. Comfort measures orders placed and initiated.  All questions and concerns addressed.

## 2022-03-01 NOTE — PROGRESS NOTES
Pharmacokinetic Sign-off: IV Vancomycin    Therapy with vancomycin complete and/or consult discontinued by provider.  Pharmacy will sign off, please re-consult as needed.    Opal Hope, PharmD, BCPS  [id number]

## 2022-03-01 NOTE — DISCHARGE SUMMARY
[facility]  Critical Care Medicine  History & Physical    Patient Name: Tammi Farris  MRN: [id number]  Admission Date: 2/1/2022  Hospital Length of Stay: 28 days  Code Status: DNR  Attending Physician: Guy Oliva MD   Primary Care Provider: Ann-Marie Hartman MD   Principal Problem: Acute renal failure superimposed on stage 2 chronic kidney disease    Subjective:     HPI:  Tammi Farris is a 71 y.o. F with history of CKD2, HTN, DM, cirrhosis, and PAULINA, who was admitted to [facility] with acute renal failure. Found to have low complements and positive anti-Sm /RNP antibodies. Unable to renal biopsy due to thrombocytopenia (~ 50). She was treated with pulse dose steroids for 3 days and now on prednisone. Attempted to initiate Cellcept but has not taken due to difficulty with pill size. Initiated on HD, received several sessions, last 2/16. Held over weekend as her Cr improving to 3.5 from 6.4. She reports making good urine. Denies confusion. Concern for possible lupus nephritis and [facility] per rheumatology /nephrology recommendations for IR renal biopsy. Patient admitted to ICU for CRRT needs. Patient notes she has not been feeling well for a couple of months. She says she gets dialysis because she has liver disease.         Hospital/ICU Course:  Stepped up to MICU 2/25 for CRRT. Patient now requiring pressor support (low levels of levo), and found to have HgB of 6.9, unit of PRBC transfused. Has had struggled with dialysis clotting off. Additionally, has been experiencing abdominal pain for some time. Bedside ultrasound revealing ascites, lactate 6.2. CT abd/pelvis showing diffusely edematous pancrease suggesting acute pancreatitis, ascites as well as a small amount of layering hyperdensity that could represent component of hemorrhage or protenacious material, as well as diverticulosis with wall thickening, and liver cirrhosis. Lipase wnl. LFTs increasing, MELD 27, hepatology  consulted. Dx para positive for SBP, PMN >2000. Repeat echo with EF 65%, elevated PAP, and grade 3 LV diastolic dysfunction. Palliative consulted.   On the evening of , goals of care discussion held between family and palliative medicine/MICU team regarding prognosis. Patient was transitioned to comfort care and  later in the night.            No new subjective & objective note has been filed under this hospital service since the last note was generated.    Assessment/Plan:     Cardiac/Vascular  Primary hypertension  On metoprolol 6.25mg BID, was held with hypotension overnight , went into a-fib. Amio started    Hyperlipidemia  Not on statin due to history of cirrhosis    Renal/  * Acute renal failure superimposed on stage 2 chronic kidney disease  71 y.o F w/ dialysis dependent ANI thought to be secondary to an auto-immune etiology, based on serologic pattern of possitive ARMEN, Anti-Posey RNP and low complement levels, negative ANCA.  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      Plan:  --patient admitted to ICU for SLED (6 hours)  --HOLDING Cellcept   --continue Hydroxychloroquine  -- change steroids 40 mg daily, switch to stress dose steroids  --renal bx per nephrology after clinically stable  -- Strict I/O  -- Daily BMP, mag, phos  -- Avoid nephrotoxins    Hyperphosphatemia  Phosphate improved with dialysis  See acute renal failure    Systemic lupus erythematosus with glomerular disease  Rheumatology following patient.   Patient S/p Methylpred 1g x 3 days (-), then transitioned to pred 60mg on , decreased to prednisone 50mg on 2/15, prednisone on 40mg on - present     Patient meets criteria for SLE due to: +ARMEN, +thrombocytopenia,+ proteinuria, + low C3, + low C4, + pericardial effusion (on stress echo from 2021) + alopecia.      Plan  - APS labs ordered per rheum  - continue plaquenil 200mg daily  - nephrology consulted, recommended cellcept PO and renal biopsy to be done at  "later date given concern for complications  - Stress dose steroids, atovaquone for PJP ppx    Hyperkalemia  Hyperkalemia  Likely 2/2 renal failure. Improved with dialysis.     Plan  - SLED  - Continue to monitor  - Shift PRN    ID  Severe sepsis  This patient does have evidence of infective focus  My overall impression is septic shock. Vital signs were reviewed and noted in progress note.  Antibiotics given-   Antibiotics (From admission, onward)            Start     Stop Route Frequency Ordered    02/28/22 0930  vancomycin in dextrose 5 % 1 gram/250 mL IVPB 1,000 mg         -- IV Once 02/28/22 0822 02/28/22 0900  piperacillin-tazobactam 4.5 g in sodium chloride 0.9% 100 mL IVPB (ready to mix system)         -- IV Every 8 hours (non-standard times) 02/28/22 0823 02/26/22 1623  vancomycin - pharmacy to dose  (vancomycin IVPB)        "And" Linked Group Details    -- IV pharmacy to manage frequency 02/26/22 1524        Cultures were taken-   Microbiology Results (last 7 days)     Procedure Component Value Units Date/Time    Blood culture [[id number]] Collected: 02/26/22 1835    Order Status: Completed Specimen: Blood from Peripheral, Forearm, Left Updated: 02/27/22 2212     Blood Culture, Routine No Growth to date      No Growth to date    Blood culture [[id number]] Collected: 02/26/22 1836    Order Status: Completed Specimen: Blood from Peripheral, Forearm, Right Updated: 02/27/22 2212     Blood Culture, Routine No Growth to date      No Growth to date    Gram stain [[id number]] Collected: 02/27/22 1515    Order Status: Completed Specimen: Ascites Updated: 02/27/22 2122     Gram Stain Result Rare WBC's      No organisms seen    Aerobic culture [[id number]] Collected: 02/27/22 1515    Order Status: Sent Specimen: Ascites Updated: 02/27/22 1654    Culture, Anaerobic [[id number]] Collected: 02/27/22 1515    Order Status: Sent Specimen: Ascites Updated: 02/27/22 1653    Culture, Respiratory with Gram Stain [[id number]]  " "   Order Status: No result Specimen: Respiratory from Sputum, Expectorated         Latest lactate reviewed, they are-  Recent Labs   Lab 02/27/22  0302 02/27/22  0844 02/27/22  1259   LACTATE 5.0* 3.6* 5.1*       Organ dysfunction indicated by Acute kidney injury  Source- TBD      Plan:  - vanc and zosyn  - blood cultures  - diagnostic para with SBP  -  Ct abd/pelvis showing diffusely edematous pancreas suggesting pancreatitis, ascites, supravesical space  small amount of layering hyperdensity, possibly representing small component of hemorrhage or debris/proteinaceous fluid, colonic diverticulosis.  Diffuse mild bowel wall thickening and surrounding inflammatory change.  Findings are nonspecific could represent infectious or ischemic etiologies. Cirrhotic liver morphology with sequela of portal hypertension including ascites, varices, and anasarca.      Hematology  Thrombocytopenia   Platelets chronically low. Hematology following and feel that her thrombocytopenia is "likely a chronic thrombocytopenia that is worsening in the setting of active lupus and acute illness".      Plan:   Transfuse PLT if <10,000, or <50,000 with active bleeding or before invasive procedures.   Most likely a chronic thrombocytopenia that is worsening in the setting of active lupus and acute illness.   Transfused unit prior to diagnostic para 2/27            Oncology  Macrocytic anemia  Patient with history of Cirrhosis. HgB dropped to 6.9. Received unit of PRBC and HgB increased appropriately    Plan:  -Follow up daily CBC  -Transfuse for Hgb less than 7    Endocrine  Type 2 diabetes mellitus without complication, without long-term current use of insulin  Last A1c 5.9    Plan:  Goal blood glucose 140-180  Follow up endocrine recs/notify them of any diet changes          GI  GI bleed  BRB per rectum seen 2/28 with increasing pressor reqs  1u pRBC ordered  CBC q12h  Consider GI consult    SBP (spontaneous bacterial peritonitis)  Diagnostic " para 2/27  Positive for SBP, covered by zosyn    Cirrhosis of liver without ascites  Patient complaining of abdominal pain. Bedside ultrasound revealing ascites. Lactate elevated at 6.2. Ct from 2/1 revealing small amount of ascites.     MELD-Na score: 38 at 2/28/2022 12:38 PM  MELD score: 38 at 2/28/2022 12:38 PM  Calculated from:  Serum Creatinine: On dialysis. Using 4 mg/dL.  Serum Sodium: 135 mmol/L at 2/28/2022 12:38 PM  Total Bilirubin: 8.7 mg/dL at 2/28/2022 12:38 PM  INR(ratio): 2.4 at 2/27/2022 12:59 PM  Age: 71 years      - Ct abd/pelvis showing diffusely edematous pancreas suggesting pancreatitis, ascites, supravesical space  small amount of layering hyperdensity, possibly representing small component of hemorrhage or debris/proteinaceous fluid, colonic diverticulosis.  Diffuse mild bowel wall thickening and surrounding inflammatory change.  Findings are nonspecific could represent infectious or ischemic etiologies. Cirrhotic liver morphology with sequela of portal hypertension including ascites, varices, and anasarca.    - diagnostic para 2/27  -- SBP, PMN >2000  - lipase wnl  - Hepatology consult, appreciate recs      Gastroesophageal reflux disease  Continue Famotidine while on steroids    Other  Palliative care encounter  Palliative care consulted, appreciate recs    Debility  PT/OT consulted    Sleep apnea  BiPAP QHS              Critical care was time spent personally by me on the following activities: development of treatment plan with patient or surrogate and bedside caregivers, discussions with consultants, evaluation of patient's response to treatment, examination of patient, ordering and performing treatments and interventions, ordering and review of laboratory studies, ordering and review of radiographic studies, pulse oximetry, re-evaluation of patient's condition. This critical care time did not overlap with that of any other provider or involve time for any procedures.     Nat Brannon,  DO  Critical Care Medicine  [facility]

## 2022-03-02 LAB
BLD PROD TYP BPU: NORMAL
BLOOD UNIT EXPIRATION DATE: NORMAL
BLOOD UNIT TYPE CODE: 5100
BLOOD UNIT TYPE: NORMAL
CODING SYSTEM: NORMAL
DISPENSE STATUS: NORMAL
NUM UNITS TRANS PACKED RBC: NORMAL

## 2022-03-02 NOTE — PLAN OF CARE
[facility]  Discharge Final Note    Primary Care Provider: Ann-Marie Hartman MD    Expected Discharge Date: 3/1/2022  Time of Death: 2:08am  Cause of Death: Acute Renal Failure/Sepsis    Final Discharge Note (most recent)     Final Note - 22 0902        Final Note    Assessment Type Final Discharge Note     Anticipated Discharge Disposition                Jose Gardner LMSW  [facility]  X [id number]        Important Message from Medicare  Important Message from Medicare regarding Discharge Appeal Rights: Given to patient/caregiver, Explained to patient/caregiver, Other (comments) (Explained IMM to sister. Sister expressed understanding. Copy mailed to address on file. [phone])     Date IMM was signed: 22  Time IMM was signed: 1212    Contact Info     [facility] [address] [address]   Phone: [phone]       Next Steps: Go on 2022    Instructions: Outpaitent dialysis, please arrive at 10:15 AM

## 2022-03-03 LAB
BACTERIA BLD CULT: NORMAL
BACTERIA BLD CULT: NORMAL
BACTERIA SPEC AEROBE CULT: NO GROWTH
CRYOGLOB SER QL: NORMAL
DNA TITER: NORMAL
DSDNA AB SER-ACNC: POSITIVE [IU]/ML

## 2022-03-07 LAB — BACTERIA SPEC ANAEROBE CULT: NORMAL

## 2022-09-14 NOTE — ASSESSMENT & PLAN NOTE
175.26 71 y.o. with HTN, diabetes type 2 (hemoglobin A1c 5.9 on 2/1), CKD2, cirrhosis, HFpEF, macrocytic anemia, psorasis, sleep apnea and GERD who was admitted to [facility] on 2/1/22 for acute renal failure. She endorses fatigue, alopecia (for past 3 years), and +raynauds. Denies oral ulcers, , rash, joint swelling/joint pain, chest pain. Stress echo from 12/2021 showed trivial pericardial effusion.    Labs:  ARMEN 1:1280 on 2/3  +Sm/RNP: 4.06 on 2/3  UPE: no paraprotein bands  ESR: > 140  CRP: 20.8  C3: 26 (L)--> 40  C4: < 3 (L)--> 5  VALENTINO: pos (VALENTINO pos 2/2  cold autob, haptoglobin and LDH wnl)  UA: Protein 3+, occult blood 3+, WBC 3+  Microscopic UA: RBC >100, WBC 40  Timed urine protein: 560  CRP 20.9  Haptoglobin: norm  CCP: norm  RF: norm  GBM ab: negative  ANCA: negative  Hep B surface antigen: neg  Hep C ab: neg    Treatment history while inpatient:  - S/p Methylpred 1g x 3 days (2/5-2/7), then transitioned to pred 60mg on 2/8, decreased to prednisone 50mg on 2/15, prednisone on 40mg on 2/17- present    According to the EULAR/ACR 2019 criteria for SLE, she meets criteria for lupus (17pts)  due to: +ARMEN, +thrombocytopenia,+ proteinuria, + low C3, + low C4, + pericardial effusion (on stress echo from 12/2021) + alopecia.     Plan/Recommendations:  - will obtain beta2-glycoproteins, DRVVT, cardiolipin, RNP  - repeat complements and hemolysis labs  - can consider hematology consult if thrombocytopenia does not improvement though likely related to SLE and possible component from underlying cirrhosis  - start plaquenil 200mg daily  - nephrology consulted, advising against renal biopsy due to concern for complications, plan change to AZA though can consider liquid suspension as alternative as well though will defer treatment and steroid regimen to nephrology given concern for nephritis      Patient seen and evaluated with Dr. Miller. Please see staff attestation for final recommendations.

## 2023-03-17 NOTE — ASSESSMENT & PLAN NOTE
Nephrology following, repeat BMP this afternoon  Monitoring UOP  Concern for a possible pulmonary- renal syndrome   right sided AKA

## 2023-04-24 NOTE — TELEPHONE ENCOUNTER
Called pt left Vm requesting return call.   Protopic Counseling: Patient may experience a mild burning sensation during topical application. Protopic is not approved in children less than 2 years of age. There have been case reports of hematologic and skin malignancies in patients using topical calcineurin inhibitors although causality is questionable.

## 2023-05-11 NOTE — ASSESSMENT & PLAN NOTE
-Continue bipap qhs   Xolair Counseling:  Patient informed of potential adverse effects including but not limited to fever, muscle aches, rash and allergic reactions.  The patient verbalized understanding of the proper use and possible adverse effects of Xolair.  All of the patient's questions and concerns were addressed.

## 2023-08-28 NOTE — ASSESSMENT & PLAN NOTE
-Suspect secondary to ANI, management as above   82-year-old female with a past medical history of paroxysmal atrial fibrillation on Eliquis, HFpEF, sick sinus syndrome s/p PPM, Crohn's disease, hypertension, hyperlipidemia, hypothyroidism, pancreatic adenocarcinoma on paclitaxel and gemcitabine every 4 weeks presented to the emergency department with epigastric pain that resolved with flatus.  While in the emergency department she was found to have severe hypokalemia, hypomagnesemia, hypocalcemia, and hypophosphatemia.  She is on Eliquis and sotalol for her atrial fibrillation.  On presentation her EKG shows an atrial paced rhythm with a prolonged AV conduction and left bundle branch block present.  Her QTC on that initial EKG was 565 (corrected for LBBB).  She has since been repleted on many of her electrolytes, and has a similar rhythm on EKG but her QTC has shortened.  It is now in the low 500s, closer to her baseline.  Her sotalol was held during this admission due to prolonged QT, and Cardiology was consulted for recommendations on antiarrhythmic agents in this patient. She has been rate controlled in a paced rhythm since admission. Previously she has been on beta blockers as well. One of the concerns is her PO intake. Her electrolytes are likely depleted due to her poor PO intake around her chemotherapy sessions.